# Patient Record
Sex: MALE | Race: WHITE | NOT HISPANIC OR LATINO
[De-identification: names, ages, dates, MRNs, and addresses within clinical notes are randomized per-mention and may not be internally consistent; named-entity substitution may affect disease eponyms.]

---

## 2017-01-06 ENCOUNTER — LABORATORY RESULT (OUTPATIENT)
Age: 57
End: 2017-01-06

## 2017-01-10 ENCOUNTER — APPOINTMENT (OUTPATIENT)
Dept: TRANSPLANT | Facility: CLINIC | Age: 57
End: 2017-01-10

## 2017-01-10 VITALS
SYSTOLIC BLOOD PRESSURE: 143 MMHG | WEIGHT: 228 LBS | HEIGHT: 72 IN | HEART RATE: 91 BPM | RESPIRATION RATE: 16 BRPM | DIASTOLIC BLOOD PRESSURE: 75 MMHG | BODY MASS INDEX: 30.88 KG/M2 | TEMPERATURE: 98.2 F

## 2017-01-13 ENCOUNTER — LABORATORY RESULT (OUTPATIENT)
Age: 57
End: 2017-01-13

## 2017-01-20 ENCOUNTER — LABORATORY RESULT (OUTPATIENT)
Age: 57
End: 2017-01-20

## 2017-01-23 ENCOUNTER — LABORATORY RESULT (OUTPATIENT)
Age: 57
End: 2017-01-23

## 2017-02-01 ENCOUNTER — LABORATORY RESULT (OUTPATIENT)
Age: 57
End: 2017-02-01

## 2017-02-07 ENCOUNTER — APPOINTMENT (OUTPATIENT)
Dept: TRANSPLANT | Facility: CLINIC | Age: 57
End: 2017-02-07

## 2017-02-07 ENCOUNTER — LABORATORY RESULT (OUTPATIENT)
Age: 57
End: 2017-02-07

## 2017-02-07 VITALS
HEIGHT: 72 IN | BODY MASS INDEX: 30.61 KG/M2 | WEIGHT: 226 LBS | TEMPERATURE: 98.2 F | HEART RATE: 87 BPM | SYSTOLIC BLOOD PRESSURE: 150 MMHG | DIASTOLIC BLOOD PRESSURE: 83 MMHG | RESPIRATION RATE: 17 BRPM

## 2017-03-04 ENCOUNTER — LABORATORY RESULT (OUTPATIENT)
Age: 57
End: 2017-03-04

## 2017-03-14 ENCOUNTER — APPOINTMENT (OUTPATIENT)
Dept: TRANSPLANT | Facility: CLINIC | Age: 57
End: 2017-03-14

## 2017-03-22 ENCOUNTER — LABORATORY RESULT (OUTPATIENT)
Age: 57
End: 2017-03-22

## 2017-03-23 ENCOUNTER — APPOINTMENT (OUTPATIENT)
Dept: TRANSPLANT | Facility: CLINIC | Age: 57
End: 2017-03-23

## 2017-03-23 VITALS
HEIGHT: 72 IN | DIASTOLIC BLOOD PRESSURE: 89 MMHG | TEMPERATURE: 97.6 F | HEART RATE: 74 BPM | RESPIRATION RATE: 14 BRPM | SYSTOLIC BLOOD PRESSURE: 164 MMHG | WEIGHT: 229 LBS | BODY MASS INDEX: 31.02 KG/M2

## 2017-05-10 ENCOUNTER — LABORATORY RESULT (OUTPATIENT)
Age: 57
End: 2017-05-10

## 2017-05-22 ENCOUNTER — LABORATORY RESULT (OUTPATIENT)
Age: 57
End: 2017-05-22

## 2017-06-13 ENCOUNTER — LABORATORY RESULT (OUTPATIENT)
Age: 57
End: 2017-06-13

## 2017-06-19 ENCOUNTER — LABORATORY RESULT (OUTPATIENT)
Age: 57
End: 2017-06-19

## 2017-06-19 ENCOUNTER — FORM ENCOUNTER (OUTPATIENT)
Age: 57
End: 2017-06-19

## 2017-06-20 ENCOUNTER — RESULT REVIEW (OUTPATIENT)
Age: 57
End: 2017-06-20

## 2017-06-20 ENCOUNTER — APPOINTMENT (OUTPATIENT)
Dept: ULTRASOUND IMAGING | Facility: HOSPITAL | Age: 57
End: 2017-06-20

## 2017-06-20 ENCOUNTER — APPOINTMENT (OUTPATIENT)
Dept: TRANSPLANT | Facility: CLINIC | Age: 57
End: 2017-06-20

## 2017-06-20 ENCOUNTER — OUTPATIENT (OUTPATIENT)
Dept: OUTPATIENT SERVICES | Facility: HOSPITAL | Age: 57
LOS: 1 days | End: 2017-06-20
Payer: MEDICARE

## 2017-06-20 VITALS
HEART RATE: 87 BPM | WEIGHT: 216 LBS | SYSTOLIC BLOOD PRESSURE: 144 MMHG | TEMPERATURE: 98.1 F | HEIGHT: 72 IN | BODY MASS INDEX: 29.26 KG/M2 | RESPIRATION RATE: 17 BRPM | DIASTOLIC BLOOD PRESSURE: 77 MMHG

## 2017-06-20 DIAGNOSIS — Z94.0 KIDNEY TRANSPLANT STATUS: ICD-10-CM

## 2017-06-20 PROCEDURE — 88305 TISSUE EXAM BY PATHOLOGIST: CPT | Mod: 26

## 2017-06-20 PROCEDURE — 50200 RENAL BIOPSY PERQ: CPT | Mod: LT

## 2017-06-20 PROCEDURE — 88312 SPECIAL STAINS GROUP 1: CPT

## 2017-06-20 PROCEDURE — 50200 RENAL BIOPSY PERQ: CPT

## 2017-06-20 PROCEDURE — 88305 TISSUE EXAM BY PATHOLOGIST: CPT

## 2017-06-20 PROCEDURE — 88350 IMFLUOR EA ADDL 1ANTB STN PX: CPT | Mod: 26

## 2017-06-20 PROCEDURE — 88342 IMHCHEM/IMCYTCHM 1ST ANTB: CPT

## 2017-06-20 PROCEDURE — 88342 IMHCHEM/IMCYTCHM 1ST ANTB: CPT | Mod: 26

## 2017-06-20 PROCEDURE — 88313 SPECIAL STAINS GROUP 2: CPT

## 2017-06-20 PROCEDURE — 88346 IMFLUOR 1ST 1ANTB STAIN PX: CPT

## 2017-06-20 PROCEDURE — 88350 IMFLUOR EA ADDL 1ANTB STN PX: CPT

## 2017-06-20 PROCEDURE — 88313 SPECIAL STAINS GROUP 2: CPT | Mod: 26

## 2017-06-20 PROCEDURE — 76942 ECHO GUIDE FOR BIOPSY: CPT

## 2017-06-20 PROCEDURE — 88348 ELECTRON MICROSCOPY DX: CPT

## 2017-06-20 PROCEDURE — 88346 IMFLUOR 1ST 1ANTB STAIN PX: CPT | Mod: 26

## 2017-06-20 PROCEDURE — 76942 ECHO GUIDE FOR BIOPSY: CPT | Mod: 26

## 2017-06-20 PROCEDURE — 88348 ELECTRON MICROSCOPY DX: CPT | Mod: 26

## 2017-06-20 PROCEDURE — 88312 SPECIAL STAINS GROUP 1: CPT | Mod: 26

## 2017-06-23 ENCOUNTER — RX RENEWAL (OUTPATIENT)
Age: 57
End: 2017-06-23

## 2017-08-15 ENCOUNTER — APPOINTMENT (OUTPATIENT)
Dept: TRANSPLANT | Facility: CLINIC | Age: 57
End: 2017-08-15

## 2017-08-15 ENCOUNTER — APPOINTMENT (OUTPATIENT)
Dept: TRANSPLANT | Facility: CLINIC | Age: 57
End: 2017-08-15
Payer: MEDICARE

## 2017-08-15 VITALS
DIASTOLIC BLOOD PRESSURE: 73 MMHG | OXYGEN SATURATION: 98 % | BODY MASS INDEX: 29.66 KG/M2 | HEART RATE: 77 BPM | HEIGHT: 72 IN | SYSTOLIC BLOOD PRESSURE: 132 MMHG | RESPIRATION RATE: 17 BRPM | WEIGHT: 219 LBS | TEMPERATURE: 98 F

## 2017-08-15 PROCEDURE — 99215 OFFICE O/P EST HI 40 MIN: CPT

## 2017-08-15 RX ORDER — EVEROLIMUS 0.5 MG/1
0.5 TABLET ORAL TWICE DAILY
Qty: 120 | Refills: 5 | Status: DISCONTINUED | COMMUNITY
Start: 2017-02-07 | End: 2017-08-15

## 2017-08-23 ENCOUNTER — APPOINTMENT (OUTPATIENT)
Dept: NEPHROLOGY | Facility: CLINIC | Age: 57
End: 2017-08-23
Payer: MEDICARE

## 2017-08-23 VITALS
RESPIRATION RATE: 17 BRPM | WEIGHT: 212 LBS | HEIGHT: 72 IN | DIASTOLIC BLOOD PRESSURE: 69 MMHG | BODY MASS INDEX: 28.71 KG/M2 | HEART RATE: 81 BPM | SYSTOLIC BLOOD PRESSURE: 132 MMHG | TEMPERATURE: 98.2 F

## 2017-08-23 PROCEDURE — 99204 OFFICE O/P NEW MOD 45 MIN: CPT

## 2017-09-12 ENCOUNTER — APPOINTMENT (OUTPATIENT)
Dept: TRANSPLANT | Facility: CLINIC | Age: 57
End: 2017-09-12
Payer: MEDICARE

## 2017-09-12 VITALS
SYSTOLIC BLOOD PRESSURE: 126 MMHG | HEART RATE: 75 BPM | TEMPERATURE: 98.3 F | DIASTOLIC BLOOD PRESSURE: 66 MMHG | WEIGHT: 211 LBS | RESPIRATION RATE: 17 BRPM | HEIGHT: 72 IN | BODY MASS INDEX: 28.58 KG/M2

## 2017-09-12 PROCEDURE — 99214 OFFICE O/P EST MOD 30 MIN: CPT

## 2017-09-13 ENCOUNTER — APPOINTMENT (OUTPATIENT)
Dept: NEPHROLOGY | Facility: CLINIC | Age: 57
End: 2017-09-13
Payer: MEDICARE

## 2017-09-13 VITALS — RESPIRATION RATE: 16 BRPM | SYSTOLIC BLOOD PRESSURE: 132 MMHG | DIASTOLIC BLOOD PRESSURE: 80 MMHG | HEART RATE: 72 BPM

## 2017-09-13 PROCEDURE — 96372 THER/PROPH/DIAG INJ SC/IM: CPT

## 2017-09-13 RX ORDER — LABETALOL HYDROCHLORIDE 200 MG/1
200 TABLET, FILM COATED ORAL
Qty: 120 | Refills: 0 | Status: DISCONTINUED | COMMUNITY
Start: 2017-05-26 | End: 2017-09-13

## 2017-10-09 ENCOUNTER — LABORATORY RESULT (OUTPATIENT)
Age: 57
End: 2017-10-09

## 2017-10-09 ENCOUNTER — APPOINTMENT (OUTPATIENT)
Dept: NEPHROLOGY | Facility: CLINIC | Age: 57
End: 2017-10-09
Payer: MEDICARE

## 2017-10-09 VITALS
SYSTOLIC BLOOD PRESSURE: 112 MMHG | DIASTOLIC BLOOD PRESSURE: 65 MMHG | BODY MASS INDEX: 27.62 KG/M2 | HEART RATE: 81 BPM | WEIGHT: 203.92 LBS | HEIGHT: 72 IN | OXYGEN SATURATION: 98 %

## 2017-10-09 PROCEDURE — 96372 THER/PROPH/DIAG INJ SC/IM: CPT

## 2017-10-09 PROCEDURE — 99214 OFFICE O/P EST MOD 30 MIN: CPT | Mod: 25

## 2017-10-10 LAB
ALBUMIN SERPL ELPH-MCNC: 3.5 G/DL
ALP BLD-CCNC: 68 U/L
ALT SERPL-CCNC: 18 U/L
ANION GAP SERPL CALC-SCNC: 18 MMOL/L
APPEARANCE: CLEAR
AST SERPL-CCNC: 20 U/L
BASOPHILS # BLD AUTO: 0.01 K/UL
BASOPHILS NFR BLD AUTO: 0.2 %
BILIRUB SERPL-MCNC: 0.5 MG/DL
BILIRUBIN URINE: NEGATIVE
BLOOD URINE: ABNORMAL
BUN SERPL-MCNC: 70 MG/DL
C DIFF TOX GENS STL QL NAA+PROBE: NORMAL
CALCIUM SERPL-MCNC: 8.8 MG/DL
CDIFF BY PCR: NOT DETECTED
CHLORIDE SERPL-SCNC: 99 MMOL/L
CMV DNA SPEC QL NAA+PROBE: ABNORMAL IU/ML
CMVPCR LOG: ABNORMAL LOGIU/ML
CO2 SERPL-SCNC: 18 MMOL/L
COLOR: YELLOW
CREAT SERPL-MCNC: 7.84 MG/DL
CREAT SPEC-SCNC: 116 MG/DL
CREAT/PROT UR: 1.9 RATIO
EOSINOPHIL # BLD AUTO: 0.01 K/UL
EOSINOPHIL NFR BLD AUTO: 0.2 %
FERRITIN SERPL-MCNC: 1042 NG/ML
GLUCOSE QUALITATIVE U: NEGATIVE MG/DL
GLUCOSE SERPL-MCNC: 99 MG/DL
HCT VFR BLD CALC: 28.5 %
HGB BLD-MCNC: 8.8 G/DL
IMM GRANULOCYTES NFR BLD AUTO: 0.3 %
IRON SATN MFR SERPL: 16 %
IRON SERPL-MCNC: 33 UG/DL
KETONES URINE: NEGATIVE
LDH SERPL-CCNC: 492 U/L
LEUKOCYTE ESTERASE URINE: NEGATIVE
LYMPHOCYTES # BLD AUTO: 0.28 K/UL
LYMPHOCYTES NFR BLD AUTO: 4.6 %
MAGNESIUM SERPL-MCNC: 2.1 MG/DL
MAN DIFF?: NORMAL
MCHC RBC-ENTMCNC: 23.9 PG
MCHC RBC-ENTMCNC: 30.9 GM/DL
MCV RBC AUTO: 77.4 FL
MONOCYTES # BLD AUTO: 0.17 K/UL
MONOCYTES NFR BLD AUTO: 2.8 %
NEUTROPHILS # BLD AUTO: 5.54 K/UL
NEUTROPHILS NFR BLD AUTO: 91.9 %
NITRITE URINE: NEGATIVE
PH URINE: 5.5
PHOSPHATE SERPL-MCNC: 4.4 MG/DL
PLATELET # BLD AUTO: 290 K/UL
POTASSIUM SERPL-SCNC: 4.7 MMOL/L
PROT SERPL-MCNC: 5.8 G/DL
PROT UR-MCNC: 217 MG/DL
PROTEIN URINE: 300 MG/DL
RBC # BLD: 3.68 M/UL
RBC # FLD: 16.6 %
SODIUM SERPL-SCNC: 135 MMOL/L
SPECIFIC GRAVITY URINE: 1.01
TIBC SERPL-MCNC: 207 UG/DL
UIBC SERPL-MCNC: 174 UG/DL
URATE SERPL-MCNC: 8.6 MG/DL
UROBILINOGEN URINE: NEGATIVE MG/DL
WBC # FLD AUTO: 6.03 K/UL

## 2017-10-12 LAB
BACTERIA STL CULT: NORMAL
BKV DNA SPEC QL NAA+PROBE: NORMAL

## 2017-10-13 LAB — DEPRECATED O AND P PREP STL: NORMAL

## 2017-10-26 ENCOUNTER — INPATIENT (INPATIENT)
Facility: HOSPITAL | Age: 57
LOS: 7 days | Discharge: HOME | DRG: 378 | End: 2017-11-03
Attending: INTERNAL MEDICINE | Admitting: INTERNAL MEDICINE
Payer: MEDICARE

## 2017-10-26 VITALS
DIASTOLIC BLOOD PRESSURE: 70 MMHG | RESPIRATION RATE: 16 BRPM | OXYGEN SATURATION: 95 % | HEART RATE: 120 BPM | SYSTOLIC BLOOD PRESSURE: 120 MMHG

## 2017-10-26 DIAGNOSIS — Z90.411 ACQUIRED PARTIAL ABSENCE OF PANCREAS: Chronic | ICD-10-CM

## 2017-10-26 DIAGNOSIS — N17.9 ACUTE KIDNEY FAILURE, UNSPECIFIED: ICD-10-CM

## 2017-10-26 DIAGNOSIS — E87.2 ACIDOSIS: ICD-10-CM

## 2017-10-26 DIAGNOSIS — Z94.0 KIDNEY TRANSPLANT STATUS: Chronic | ICD-10-CM

## 2017-10-26 DIAGNOSIS — I10 ESSENTIAL (PRIMARY) HYPERTENSION: ICD-10-CM

## 2017-10-26 DIAGNOSIS — Z79.899 OTHER LONG TERM (CURRENT) DRUG THERAPY: ICD-10-CM

## 2017-10-26 DIAGNOSIS — D64.9 ANEMIA, UNSPECIFIED: ICD-10-CM

## 2017-10-26 DIAGNOSIS — K92.1 MELENA: ICD-10-CM

## 2017-10-26 LAB
ACANTHOCYTES BLD QL SMEAR: SLIGHT — SIGNIFICANT CHANGE UP
ALBUMIN SERPL ELPH-MCNC: 3.1 G/DL — LOW (ref 3.3–5)
ALP SERPL-CCNC: 44 U/L — SIGNIFICANT CHANGE UP (ref 40–120)
ALT FLD-CCNC: 13 U/L RC — SIGNIFICANT CHANGE UP (ref 10–45)
ANION GAP SERPL CALC-SCNC: 20 MMOL/L — HIGH (ref 5–17)
ANISOCYTOSIS BLD QL: SLIGHT — SIGNIFICANT CHANGE UP
APTT BLD: 24.3 SEC — LOW (ref 27.5–37.4)
AST SERPL-CCNC: 21 U/L — SIGNIFICANT CHANGE UP (ref 10–40)
BASE EXCESS BLDV CALC-SCNC: -2.2 MMOL/L — LOW (ref -2–2)
BASOPHILS # BLD AUTO: 0 K/UL — SIGNIFICANT CHANGE UP (ref 0–0.2)
BASOPHILS NFR BLD AUTO: 0.2 % — SIGNIFICANT CHANGE UP (ref 0–2)
BILIRUB SERPL-MCNC: 0.4 MG/DL — SIGNIFICANT CHANGE UP (ref 0.2–1.2)
BLD GP AB SCN SERPL QL: NEGATIVE — SIGNIFICANT CHANGE UP
BUN SERPL-MCNC: 123 MG/DL — HIGH (ref 7–23)
BURR CELLS BLD QL SMEAR: PRESENT — SIGNIFICANT CHANGE UP
CA-I SERPL-SCNC: 1.16 MMOL/L — SIGNIFICANT CHANGE UP (ref 1.12–1.3)
CALCIUM SERPL-MCNC: 8.7 MG/DL — SIGNIFICANT CHANGE UP (ref 8.4–10.5)
CHLORIDE BLDV-SCNC: 106 MMOL/L — SIGNIFICANT CHANGE UP (ref 96–108)
CHLORIDE SERPL-SCNC: 102 MMOL/L — SIGNIFICANT CHANGE UP (ref 96–108)
CO2 BLDV-SCNC: 23 MMOL/L — SIGNIFICANT CHANGE UP (ref 22–30)
CO2 SERPL-SCNC: 19 MMOL/L — LOW (ref 22–31)
CREAT SERPL-MCNC: 9.52 MG/DL — HIGH (ref 0.5–1.3)
DACRYOCYTES BLD QL SMEAR: SLIGHT — SIGNIFICANT CHANGE UP
ELLIPTOCYTES BLD QL SMEAR: SLIGHT — SIGNIFICANT CHANGE UP
EOSINOPHIL # BLD AUTO: 0 K/UL — SIGNIFICANT CHANGE UP (ref 0–0.5)
EOSINOPHIL NFR BLD AUTO: 0.2 % — SIGNIFICANT CHANGE UP (ref 0–6)
GAS PNL BLDV: 138 MMOL/L — SIGNIFICANT CHANGE UP (ref 136–145)
GAS PNL BLDV: SIGNIFICANT CHANGE UP
GAS PNL BLDV: SIGNIFICANT CHANGE UP
GLUCOSE BLDV-MCNC: 120 MG/DL — HIGH (ref 70–99)
GLUCOSE SERPL-MCNC: 136 MG/DL — HIGH (ref 70–99)
HCO3 BLDV-SCNC: 22 MMOL/L — SIGNIFICANT CHANGE UP (ref 21–29)
HCT VFR BLD CALC: 15.5 % — CRITICAL LOW (ref 39–50)
HCT VFR BLDA CALC: 16 % — CRITICAL LOW (ref 39–50)
HGB BLD CALC-MCNC: 5.1 G/DL — CRITICAL LOW (ref 13–17)
HGB BLD-MCNC: 5.1 G/DL — CRITICAL LOW (ref 13–17)
HOWELL-JOLLY BOD BLD QL SMEAR: PRESENT — SIGNIFICANT CHANGE UP
HYPOCHROMIA BLD QL: SLIGHT — SIGNIFICANT CHANGE UP
INR BLD: 1.13 RATIO — SIGNIFICANT CHANGE UP (ref 0.88–1.16)
LACTATE BLDV-MCNC: 1.6 MMOL/L — SIGNIFICANT CHANGE UP (ref 0.7–2)
LYMPHOCYTES # BLD AUTO: 0.4 K/UL — LOW (ref 1–3.3)
LYMPHOCYTES # BLD AUTO: 5.6 % — LOW (ref 13–44)
MCHC RBC-ENTMCNC: 26.4 PG — LOW (ref 27–34)
MCHC RBC-ENTMCNC: 32.8 GM/DL — SIGNIFICANT CHANGE UP (ref 32–36)
MCV RBC AUTO: 80.5 FL — SIGNIFICANT CHANGE UP (ref 80–100)
MICROCYTES BLD QL: SLIGHT — SIGNIFICANT CHANGE UP
MONOCYTES # BLD AUTO: 0.7 K/UL — SIGNIFICANT CHANGE UP (ref 0–0.9)
MONOCYTES NFR BLD AUTO: 9.9 % — SIGNIFICANT CHANGE UP (ref 2–14)
NEUTROPHILS # BLD AUTO: 6.1 K/UL — SIGNIFICANT CHANGE UP (ref 1.8–7.4)
NEUTROPHILS NFR BLD AUTO: 84.2 % — HIGH (ref 43–77)
PAPPENHEIMER BOD BLD QL SMEAR: PRESENT — SIGNIFICANT CHANGE UP
PCO2 BLDV: 35 MMHG — SIGNIFICANT CHANGE UP (ref 35–50)
PH BLDV: 7.41 — SIGNIFICANT CHANGE UP (ref 7.35–7.45)
PLAT MORPH BLD: NORMAL — SIGNIFICANT CHANGE UP
PLATELET # BLD AUTO: 157 K/UL — SIGNIFICANT CHANGE UP (ref 150–400)
PO2 BLDV: 22 MMHG — LOW (ref 25–45)
POIKILOCYTOSIS BLD QL AUTO: SIGNIFICANT CHANGE UP
POTASSIUM BLDV-SCNC: 4.7 MMOL/L — SIGNIFICANT CHANGE UP (ref 3.5–5)
POTASSIUM SERPL-MCNC: 4.8 MMOL/L — SIGNIFICANT CHANGE UP (ref 3.5–5.3)
POTASSIUM SERPL-SCNC: 4.8 MMOL/L — SIGNIFICANT CHANGE UP (ref 3.5–5.3)
PROT SERPL-MCNC: 4.8 G/DL — LOW (ref 6–8.3)
PROTHROM AB SERPL-ACNC: 12.3 SEC — SIGNIFICANT CHANGE UP (ref 9.8–12.7)
RBC # BLD: 1.92 M/UL — LOW (ref 4.2–5.8)
RBC # FLD: 15.7 % — HIGH (ref 10.3–14.5)
RBC BLD AUTO: ABNORMAL
RH IG SCN BLD-IMP: POSITIVE — SIGNIFICANT CHANGE UP
SAO2 % BLDV: 30 % — LOW (ref 67–88)
SCHISTOCYTES BLD QL AUTO: SIGNIFICANT CHANGE UP
SODIUM SERPL-SCNC: 141 MMOL/L — SIGNIFICANT CHANGE UP (ref 135–145)
TARGETS BLD QL SMEAR: SLIGHT — SIGNIFICANT CHANGE UP
WBC # BLD: 7.2 K/UL — SIGNIFICANT CHANGE UP (ref 3.8–10.5)
WBC # FLD AUTO: 7.2 K/UL — SIGNIFICANT CHANGE UP (ref 3.8–10.5)

## 2017-10-26 PROCEDURE — 71010: CPT | Mod: 26

## 2017-10-26 PROCEDURE — 99222 1ST HOSP IP/OBS MODERATE 55: CPT | Mod: GC

## 2017-10-26 PROCEDURE — 93010 ELECTROCARDIOGRAM REPORT: CPT

## 2017-10-26 PROCEDURE — 99285 EMERGENCY DEPT VISIT HI MDM: CPT | Mod: 25

## 2017-10-26 RX ORDER — PANTOPRAZOLE SODIUM 20 MG/1
8 TABLET, DELAYED RELEASE ORAL
Qty: 80 | Refills: 0 | Status: DISCONTINUED | OUTPATIENT
Start: 2017-10-26 | End: 2017-10-31

## 2017-10-26 RX ORDER — SODIUM CHLORIDE 9 MG/ML
1000 INJECTION INTRAMUSCULAR; INTRAVENOUS; SUBCUTANEOUS ONCE
Qty: 0 | Refills: 0 | Status: COMPLETED | OUTPATIENT
Start: 2017-10-26 | End: 2017-10-26

## 2017-10-26 RX ORDER — EVEROLIMUS 10 MG/1
2 TABLET ORAL DAILY
Qty: 0 | Refills: 0 | Status: DISCONTINUED | OUTPATIENT
Start: 2017-10-26 | End: 2017-11-03

## 2017-10-26 RX ORDER — TACROLIMUS 5 MG/1
0 CAPSULE ORAL
Qty: 0 | Refills: 0 | COMMUNITY

## 2017-10-26 RX ORDER — MYCOPHENOLIC ACID 180 MG/1
0 TABLET, DELAYED RELEASE ORAL
Qty: 0 | Refills: 0 | COMMUNITY

## 2017-10-26 RX ORDER — TACROLIMUS 5 MG/1
1 CAPSULE ORAL
Qty: 0 | Refills: 0 | Status: DISCONTINUED | OUTPATIENT
Start: 2017-10-26 | End: 2017-11-03

## 2017-10-26 RX ORDER — EVEROLIMUS 10 MG/1
1 TABLET ORAL AT BEDTIME
Qty: 0 | Refills: 0 | Status: DISCONTINUED | OUTPATIENT
Start: 2017-10-26 | End: 2017-11-03

## 2017-10-26 RX ORDER — MAGNESIUM OXIDE 400 MG ORAL TABLET 241.3 MG
400 TABLET ORAL DAILY
Qty: 0 | Refills: 0 | Status: DISCONTINUED | OUTPATIENT
Start: 2017-10-26 | End: 2017-11-03

## 2017-10-26 RX ORDER — CALCITRIOL 0.5 UG/1
0.25 CAPSULE ORAL DAILY
Qty: 0 | Refills: 0 | Status: DISCONTINUED | OUTPATIENT
Start: 2017-10-26 | End: 2017-11-03

## 2017-10-26 RX ORDER — SODIUM BICARBONATE 1 MEQ/ML
1300 SYRINGE (ML) INTRAVENOUS
Qty: 0 | Refills: 0 | Status: DISCONTINUED | OUTPATIENT
Start: 2017-10-26 | End: 2017-11-02

## 2017-10-26 RX ORDER — FUROSEMIDE 40 MG
0 TABLET ORAL
Qty: 0 | Refills: 0 | COMMUNITY

## 2017-10-26 RX ORDER — UBIDECARENONE 100 MG
0 CAPSULE ORAL
Qty: 0 | Refills: 0 | COMMUNITY

## 2017-10-26 RX ORDER — OMEGA-3 ACID ETHYL ESTERS 1 G
0 CAPSULE ORAL
Qty: 0 | Refills: 0 | COMMUNITY

## 2017-10-26 RX ORDER — SODIUM CHLORIDE 9 MG/ML
1000 INJECTION INTRAMUSCULAR; INTRAVENOUS; SUBCUTANEOUS ONCE
Qty: 0 | Refills: 0 | Status: DISCONTINUED | OUTPATIENT
Start: 2017-10-26 | End: 2017-10-26

## 2017-10-26 RX ORDER — NIFEDIPINE 30 MG
0 TABLET, EXTENDED RELEASE 24 HR ORAL
Qty: 0 | Refills: 0 | COMMUNITY

## 2017-10-26 RX ORDER — PANTOPRAZOLE SODIUM 20 MG/1
80 TABLET, DELAYED RELEASE ORAL ONCE
Qty: 0 | Refills: 0 | Status: COMPLETED | OUTPATIENT
Start: 2017-10-26 | End: 2017-10-26

## 2017-10-26 RX ADMIN — PANTOPRAZOLE SODIUM 80 MILLIGRAM(S): 20 TABLET, DELAYED RELEASE ORAL at 13:57

## 2017-10-26 RX ADMIN — PANTOPRAZOLE SODIUM 10 MG/HR: 20 TABLET, DELAYED RELEASE ORAL at 14:16

## 2017-10-26 RX ADMIN — SODIUM CHLORIDE 2000 MILLILITER(S): 9 INJECTION INTRAMUSCULAR; INTRAVENOUS; SUBCUTANEOUS at 13:58

## 2017-10-26 NOTE — CONSULT NOTE ADULT - SUBJECTIVE AND OBJECTIVE BOX
Patient is a 57y old  Male who presents with a chief complaint of diarrhea, gib    HPI: Patient is a 57 year old man with a history of renal transplant X 2.  He has been seeing his tranplant nephrologist for the past two weeks and states he has had complaints of diarrhea.  He started having weakness and black stool for the past two days.  He was sent to the ed and found to have a hgb of 5.  He takes aspirin but no other blood thinners or nsaids.      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  History of partial pancreatectomy  History of renal transplant: x 2      MEDICATIONS  (STANDING):  pantoprazole Infusion 8 mG/Hr (10 mL/Hr) IV Continuous <Continuous>      Allergies    No Known Allergies    Intolerances        SOCIAL HISTORY:  Denies ETOh,Smoking,     FAMILY HISTORY:      REVIEW OF SYSTEMS:    CONSTITUTIONAL: No weakness, fevers or chills  EYES/ENT: No visual changes;  No vertigo or throat pain   NECK: No pain or stiffness  RESPIRATORY: No cough, wheezing, hemoptysis; No shortness of breath  CARDIOVASCULAR: No chest pain or palpitations  GASTROINTESTINAL: No abdominal or epigastric pain. No nausea, vomiting, or hematemesis; No diarrhea or constipation. No melena or hematochezia.  GENITOURINARY: No dysuria, frequency or hematuria  NEUROLOGICAL: No numbness or weakness  SKIN: No itching, burning, rashes, or lesions   All other review of systems is negative unless indicated above.    VITAL:  T(C): , Max: 37.3 (10-26-17 @ 15:30)  T(F): , Max: 99.1 (10-26-17 @ 15:30)  HR: 115 (10-26-17 @ 15:30)  BP: 128/69 (10-26-17 @ 15:30)  BP(mean): --  RR: 18 (10-26-17 @ 15:30)  SpO2: 100% (10-26-17 @ 15:30)  Wt(kg): --    I and O's:    10-26 @ 07:01  -  10-26 @ 15:54  --------------------------------------------------------  IN: 1000 mL / OUT: 0 mL / NET: 1000 mL          PHYSICAL EXAM:    Constitutional: NAD  HEENT: PERRLA,   Neck: No JVD  Respiratory: CTA B/L  Cardiovascular: S1 and S2  Gastrointestinal: BS+, soft, NT/ND  Extremities: No peripheral edema  Neurological: A/O x 3, no focal deficits  Psychiatric: Normal mood, normal affect  : No Augustin  Skin: No rashes  Access: Not applicable  Back: No CVA tenderness    LABS:                        5.1    7.2   )-----------( 157      ( 26 Oct 2017 13:26 )             15.5     10-26    141  |  102  |  123<H>  ----------------------------<  136<H>  4.8   |  19<L>  |  9.52<H>    Ca    8.7      26 Oct 2017 13:26    TPro  4.8<L>  /  Alb  3.1<L>  /  TBili  0.4  /  DBili  x   /  AST  21  /  ALT  13  /  AlkPhos  44  10-26          RADIOLOGY & ADDITIONAL STUDIES:

## 2017-10-26 NOTE — ED PROVIDER NOTE - OBJECTIVE STATEMENT
57yom pmhx of Kidney transplant x2, second transplant 2013, BIB EMS for multiple episodes of vomiting and diarrhea. pt reports diarrhea x2 weeks with extensive workup and negative for Cdiff and infectious workup. since last night pt reports started vomiting and diarrhea both melena. and unable to tolerate PO. no fever or chills. No sick contacts. No travel hx. On immunosuppressants for transplant.

## 2017-10-26 NOTE — CONSULT NOTE ADULT - ATTENDING COMMENTS
Pt with 2 kidney transplants complicated by severe CKD on multiple immunosuppressive agents now with acute GI bleed.    Stop myfortic as it can cause diarrhea and GI ulcers  continue prograf (11pm dose) and everolimus/ prednisone.  Hold all HTN meds for now.

## 2017-10-26 NOTE — H&P ADULT - NSHPLABSRESULTS_GEN_ALL_CORE
5.1    7.2   )-----------( 157      ( 26 Oct 2017 13:26 )             15.5   PT/INR - ( 26 Oct 2017 13:26 )   PT: 12.3 sec;   INR: 1.13 ratio         PTT - ( 26 Oct 2017 13:26 )  PTT:24.3 sec  10-26    141  |  102  |  123<H>  ----------------------------<  136<H>  4.8   |  19<L>  |  9.52<H>    Ca    8.7      26 Oct 2017 13:26    TPro  4.8<L>  /  Alb  3.1<L>  /  TBili  0.4  /  DBili  x   /  AST  21  /  ALT  13  /  AlkPhos  44  10-26  < from: Xray Chest 1 View AP/PA (10.26.17 @ 13:26) >    EXAM:  CHEST SINGLE AP OR PA                            PROCEDURE DATE:  10/26/2017            INTERPRETATION:  CLINICAL INFORMATION: Vomiting, diarrhea.    TECHNIQUE: AP chest radiograph.    COMPARISON: None available    FINDINGS:     The lungs are clear. No pleural effusions or pneumothoraces are seen   bilaterally.  The cardiac silhouette is unremarkable.    IMPRESSION:   Clear lungs.                ERIC FLORES M.D., RADIOLOGY RESIDENT  This document has been electronically signed.  MELECIO NUGENT M.D., ATTENDING RADIOLOGIST  This document has been electronically signed. Oct 26 2017  1:57PM        < end of copied text >

## 2017-10-26 NOTE — CONSULT NOTE ADULT - ASSESSMENT
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
Rishi has diarrhea, but his current symptoms are consistent with an upper gib.  I have recommended a ppi drip, micu evaluation and resecutation to a hgb of 10.  He will need endoscopy when stable.  If continued melena or no response to hgb can be done tonight, otherwise will be done sulema if stable.  R/B/A discussed with patient.  no indicaiton for prep for colon although slower lower gib is in the differntial diagnosi (avm/ diverticulosis etc.)
Pt is a 57M HTN and renal transplant x 2 with CKD that p/w melena admitted for UGIB.     At this time pt is not a MICU candidate. He is hemodynamically stable.     #acute blood loss anemia 2/2 UGIB  - Give 2 units pRBCs  - check post transfusion CBC  - Start IVF as pt is orthostatic  - GI c/s for upper endoscpoy  - Hold ASA  - monitor Cr for improvement back to baseline

## 2017-10-26 NOTE — CONSULT NOTE ADULT - SUBJECTIVE AND OBJECTIVE BOX
CHIEF COMPLAINT: Dizziness     HPI: Pt is a 57M w/ PMH of HTN and renal transplant x 2 now with CKD (baseline Cr 7 and baseline BUN of 70) that presents with dizziness and diarrhea. He states that he has been having diarrhea for a long time, and that he was following this up with his PMD. Over the past few days he states that his diarrhea has become more frequent associated with abdominal cramps. He also states that when he wipes he noticed that his stool went from brown to black. This morning when he woke up he felt dizzy especially when standing. He also admits to having 1 episode of black vomit.     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  History of partial pancreatectomy  History of renal transplant: x 2      FAMILY HISTORY:       SOCIAL HISTORY:  Smoking: __ packs x ___ years  EtOH Use:  Marital Status:  Occupation:  Recent Travel:  Country of Birth:  Advance Directives:    Allergies    No Known Allergies    Intolerances        HOME MEDICATIONS:    REVIEW OF SYSTEMS:  Constitutional:   Eyes:  ENT:  CV:  Resp:  GI:  :  MSK:  Integumentary:  Neurological:  Psychiatric:  Endocrine:  Hematologic/Lymphatic:  Allergic/Immunologic:  [ ] All other systems negative  [ ] Unable to assess ROS because ________    OBJECTIVE:  ICU Vital Signs Last 24 Hrs  T(C): 36.9 (26 Oct 2017 12:51), Max: 36.9 (26 Oct 2017 12:51)  T(F): 98.5 (26 Oct 2017 12:51), Max: 98.5 (26 Oct 2017 12:51)  HR: 118 (26 Oct 2017 12:51) (118 - 120)  BP: 120/78 (26 Oct 2017 12:51) (120/70 - 120/78)  BP(mean): --  ABP: --  ABP(mean): --  RR: 18 (26 Oct 2017 12:51) (16 - 18)  SpO2: 99% (26 Oct 2017 12:51) (95% - 99%)        CAPILLARY BLOOD GLUCOSE          PHYSICAL EXAM:  General:   HEENT:   Lymph Nodes:  Neck:   Respiratory:   Cardiovascular:   Abdomen:   Extremities:   Skin:   Neurological:  Psychiatry:    HOSPITAL MEDICATIONS:  MEDICATIONS  (STANDING):  pantoprazole Infusion 8 mG/Hr (10 mL/Hr) IV Continuous <Continuous>    MEDICATIONS  (PRN):      LABS:                        5.1    7.2   )-----------( 157      ( 26 Oct 2017 13:26 )             15.5     10-26    141  |  102  |  123<H>  ----------------------------<  136<H>  4.8   |  19<L>  |  9.52<H>    Ca    8.7      26 Oct 2017 13:26    TPro  4.8<L>  /  Alb  3.1<L>  /  TBili  0.4  /  DBili  x   /  AST  21  /  ALT  13  /  AlkPhos  44  10-26    PT/INR - ( 26 Oct 2017 13:26 )   PT: 12.3 sec;   INR: 1.13 ratio         PTT - ( 26 Oct 2017 13:26 )  PTT:24.3 sec      Venous Blood Gas:  10-26 @ 13:26  7.41/35/22/22/30  VBG Lactate: 1.6      MICROBIOLOGY:     RADIOLOGY:  [ ] Reviewed and interpreted by me    EKG: CHIEF COMPLAINT: Dizziness     HPI: Pt is a 57M w/ PMH of HTN and renal transplant x 2 now with CKD (baseline Cr 7 and baseline BUN of 70) that presents with dizziness and diarrhea. He states that he has been having diarrhea for a long time, and that he was following this up with his PMD. Over the past few days he states that his diarrhea has become more frequent associated with abdominal cramps. He also states that when he wipes he noticed that his stool went from brown to black. This morning when he woke up he felt dizzy especially when standing. He also admits to having 1 episode of black vomit.     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  History of partial pancreatectomy  History of renal transplant: x 2      Fam hx:   HLD      SOCIAL HISTORY:  Never smoker  EtOH Use: none  Marital Status: single  Occupation: business owner  Recent Travel: none      Allergies    No Known Allergies    Intolerances        HOME MEDICATIONS:  ASA 81  prednisone 5 mg daily  zortress 0.5 mg (4 tabs at 11AM and 2 tabs at 11PM)  myfortic 360 mg BID  Nifedipine 60 mg daily  clonidine 0.1 mg daiy  lasix 40 mg daily  bactrim for ppx  coq10  omega 3  Vit c  MVN    Review of Systems:   CONSTITUTIONAL: No fever, weight loss, or fatigue  EYES: No eye pain, visual disturbances, or discharge  ENMT:  No difficulty hearing, tinnitus, vertigo; No sinus or throat pain  NECK: No pain or stiffness  RESPIRATORY: No cough, wheezing, chills or hemoptysis; No shortness of breath  CARDIOVASCULAR: No chest pain, palpitations, dizziness, or leg swelling  GASTROINTESTINAL: No abdominal or epigastric pain. +hematochezia. +N/V  GENITOURINARY: No dysuria, frequency, hematuria, or incontinence  NEUROLOGICAL: No headaches, memory loss, loss of strength, numbness, or tremors  SKIN: No itching, burning, rashes, or lesions   MUSCULOSKELETAL: No joint pain or swelling; No muscle, back, or extremity pain  HEME/LYMPH: No easy bruising, or bleeding gums  ALLERY AND IMMUNOLOGIC: No hives or eczema    OBJECTIVE:  ICU Vital Signs Last 24 Hrs  T(C): 36.9 (26 Oct 2017 12:51), Max: 36.9 (26 Oct 2017 12:51)  T(F): 98.5 (26 Oct 2017 12:51), Max: 98.5 (26 Oct 2017 12:51)  HR: 118 (26 Oct 2017 12:51) (118 - 120)  BP: 120/78 (26 Oct 2017 12:51) (120/70 - 120/78)  BP(mean): --  ABP: --  ABP(mean): --  RR: 18 (26 Oct 2017 12:51) (16 - 18)  SpO2: 99% (26 Oct 2017 12:51) (95% - 99%)        CAPILLARY BLOOD GLUCOSE        PHYSICAL EXAM:  GENERAL: NAD, well-developed  HEAD:  Atraumatic, Normocephalic  EYES: EOMI, PERRLA, conjunctiva and sclera clear  NECK: Supple, No JVD  CHEST/LUNG: Clear to auscultation bilaterally; No wheeze  HEART: Regular rate and rhythm; No murmurs, rubs, or gallops  ABDOMEN: Soft, Nontender, Nondistended; Bowel sounds present  EXTREMITIES:  2+ Peripheral Pulses, No clubbing, cyanosis, or edema  PSYCH: AAOx3  NEUROLOGY: non-focal  SKIN: No rashes or lesions    HOSPITAL MEDICATIONS:  MEDICATIONS  (STANDING):  pantoprazole Infusion 8 mG/Hr (10 mL/Hr) IV Continuous <Continuous>    MEDICATIONS  (PRN):      LABS:                        5.1    7.2   )-----------( 157      ( 26 Oct 2017 13:26 )             15.5     10-26    141  |  102  |  123<H>  ----------------------------<  136<H>  4.8   |  19<L>  |  9.52<H>    Ca    8.7      26 Oct 2017 13:26    TPro  4.8<L>  /  Alb  3.1<L>  /  TBili  0.4  /  DBili  x   /  AST  21  /  ALT  13  /  AlkPhos  44  10-26    PT/INR - ( 26 Oct 2017 13:26 )   PT: 12.3 sec;   INR: 1.13 ratio         PTT - ( 26 Oct 2017 13:26 )  PTT:24.3 sec      Venous Blood Gas:  10-26 @ 13:26  7.41/35/22/22/30  VBG Lactate: 1.6

## 2017-10-26 NOTE — H&P ADULT - NSHPPHYSICALEXAM_GEN_ALL_CORE
AAOX3, EOMI, normocephalic  coarse bs b/l, good air entry  s1s2, tachy  soft, nt, nd, bs+  no edmea le b/l, pp+  + arom and prom X 4  cn 2-12 grossly intact

## 2017-10-26 NOTE — CONSULT NOTE ADULT - PROBLEM SELECTOR RECOMMENDATION 2
Will hold myfortic for now in view of GI symptoms. C/w everolimus 2mg/1mg, tacrolimus 1 mg BID, and prednisone 5 mg daily.

## 2017-10-26 NOTE — ED ADULT NURSE NOTE - PMH
HTN (hypertension)    Renal failure  due to nsaid use HTN (hypertension)    Renal failure  due to nsaid use  SBO (small bowel obstruction)

## 2017-10-26 NOTE — H&P ADULT - NSHPREVIEWOFSYSTEMS_GEN_ALL_CORE
no HA or dizziness  no cp or palpitations  no sob or cough  no n/v/d/c  no dysuria or hematuria  no heat intol or cold intol  no rash or itchiness

## 2017-10-26 NOTE — ED ADULT NURSE NOTE - OBJECTIVE STATEMENT
Pt bib EMS for eval of black stools and tachycardia.  He has been having diarrhea for over 2 weeks and stool became black yesterday.  Abd soft, non-tender Pt bib EMS for eval of black stools and tachycardia.  He has been having diarrhea for over 2 weeks and stool became black yesterday.  Abd soft, non-tender, conjunctiva pale, skin pale, but warm and dry.  Pt stated many years he had traumatic finger injury and took ibuprofen for pain, subsequently developing renall failure.

## 2017-10-26 NOTE — CONSULT NOTE ADULT - PROBLEM SELECTOR RECOMMENDATION 9
AMARI on CKD in setting of GI bleed: Latest Scr. is 9.52 which is elevated from baseline. Patient with hemodynamically mediated AMARI from blood loss. C/w IV hydration. Monitor Scr. and urine output. Avoid nephrotoxins.

## 2017-10-26 NOTE — H&P ADULT - HISTORY OF PRESENT ILLNESS
57yom pmhx of Kidney transplant x2, second transplant 2013, BIB EMS for multiple episodes of vomiting and diarrhea. pt reports diarrhea x2 weeks with extensive workup and negative for Cdiff and outpt cmv pcr positive. since last night pt reports started vomiting and diarrhea both melena. and unable to tolerate PO. no fever or chills. No sick contacts. No travel hx. On immunosuppressants for transplant.

## 2017-10-26 NOTE — ED PROVIDER NOTE - PROGRESS NOTE DETAILS
spoke to Dr. Patton- GI consult- Dr. Mathew Rios (paged); admission to Dr. Peter Watts. -SHAQ Bright GI fellow paged. ZR GI fellow paged.  hospitalist ,decline admissin untill  ICU SEE THE PATIENT

## 2017-10-26 NOTE — CONSULT NOTE ADULT - PROBLEM SELECTOR RECOMMENDATION 4
In setting of GI bleed: Latest Hb is 5.1; receiving blood transfusion. Transfuse if less than 7. Monitor Hb.

## 2017-10-26 NOTE — ED ADULT NURSE NOTE - PSH
History of renal transplant  x 2 History of partial pancreatectomy    History of renal transplant  x 2

## 2017-10-26 NOTE — CONSULT NOTE ADULT - SUBJECTIVE AND OBJECTIVE BOX
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 Patient has been having diarrhea for the past 1 month. But over the past 2 days, patient noticed having dark, tarry stools and also had dizziness when he walks to the bathroom. In ER, patient noted to be tachycardic and Hb of 5.1; patient is to be admitted for workup of GI bleed.    Patient has history of renal disease secondary to chronic NSAID use; he has history of 2 renal transplant. First transplant was done in 1995 at [facility] and donor was his father. 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 However, from 3/2017, Scr. has been slowly trending up with proteinuria; biopsy done on 6/17 showed chronic changes with limited reversibility. He is currently on everolimus, myfortic, prednisone, and low dose tacrolimus. Last Scr. before hospitalization was 7.84 which was done in 10/9/17.      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  History of partial pancreatectomy  History of renal transplant: x 2    FAMILY HISTORY:    PAST SOCIAL HISTORY:    ALLERGIES & MEDICATIONS  --------------------------------------------------------------------------------  Allergies    No Known Allergies    Intolerances      Standing Inpatient Medications  pantoprazole Infusion 8 mG/Hr IV Continuous <Continuous>    PRN Inpatient Medications      REVIEW OF SYSTEMS  --------------------------------------------------------------------------------  Gen: No  fevers/chills, weakness  Skin: No rashes  Head/Eyes/Ears/Mouth: No headache; Normal hearing; Normal vision w/o blurriness;   Respiratory: No dyspnea, cough, wheezing, hemoptysis  CV: No chest pain,   GI: Melena +  : No increased frequency, dysuria, hematuria, nocturia  MSK: No joint pain/swelling;   Neuro: Dizziness/lightheadedness +    All other systems were reviewed and are negative, except as noted.    VITALS/PHYSICAL EXAM  --------------------------------------------------------------------------------  T(C): 37.3 (10-26-17 @ 15:30), Max: 37.3 (10-26-17 @ 15:30)  HR: 115 (10-26-17 @ 15:30) (115 - 120)  BP: 128/69 (10-26-17 @ 15:30) (120/70 - 128/69)  RR: 18 (10-26-17 @ 15:30) (16 - 18)  SpO2: 100% (10-26-17 @ 15:30) (95% - 100%)  Wt(kg): --        10-26-17 @ 07:01  -  10-26-17 @ 15:55  --------------------------------------------------------  IN: 1000 mL / OUT: 0 mL / NET: 1000 mL      Physical Exam:  	Gen: NAD  	HEENT: PERRL, supple neck  	Pulm: CTA B/L  	CV:  S1S2  	Abd: +BS, soft   	Ext: No B/L Lower ext edema  	Neuro: No focal deficits  	Skin: Warm, without rashes    LABS/STUDIES  --------------------------------------------------------------------------------              5.1    7.2   >-----------<  157      [10-26-17 @ 13:26]              15.5     141  |  102  |  123  ----------------------------<  136      [10-26-17 @ 13:26]  4.8   |  19  |  9.52        Ca     8.7     [10-26-17 @ 13:26]    TPro  4.8  /  Alb  3.1  /  TBili  0.4  /  DBili  x   /  AST  21  /  ALT  13  /  AlkPhos  44  [10-26-17 @ 13:26]    PT/INR: PT 12.3 , INR 1.13       [10-26-17 @ 13:26]  PTT: 24.3       [10-26-17 @ 13:26]      Creatinine Trend:  SCr 9.52 [10-26 @ 13:26]

## 2017-10-26 NOTE — H&P ADULT - ASSESSMENT
58 y/o male with history as listed presented for eval of GIB, diarrhea, actue on ckd.    GIB - appreciate gi input, ppi drip, plan for endo  Diarrhea - possibly from cmv or gib or both-- continue valcyte 450mg twice weekly  acute on ckd 4 - appreciate renal input.  htn - hold bp meds    continue hold myfortic  continue pred/zortress/prograf  check cmv pcr

## 2017-10-26 NOTE — CONSULT NOTE ADULT - PROBLEM SELECTOR RECOMMENDATION 3
BP stable. Will hold all antihypertensive medications for now. Goal BP is less than 140/90 mm Hg. If BP elevated, then can restart clonidine 0.1 mg daily. Monitor BP.

## 2017-10-26 NOTE — ED PROVIDER NOTE - MEDICAL DECISION MAKING DETAILS
57yom pmhx of HTN renal failure s/p renal transplant on multiple immuno-suppresant, sent from Dr. Patton (renal) for GI bleed. pt is tachycardiac, rectal exam with melena. Will obtain blood work, type and screen, renal/GI consult. Admission to hospital. ZR

## 2017-10-27 ENCOUNTER — RESULT REVIEW (OUTPATIENT)
Age: 57
End: 2017-10-27

## 2017-10-27 LAB
ANION GAP SERPL CALC-SCNC: 21 MMOL/L — HIGH (ref 5–17)
ANION GAP SERPL CALC-SCNC: 21 MMOL/L — HIGH (ref 5–17)
BUN SERPL-MCNC: 118 MG/DL — HIGH (ref 7–23)
BUN SERPL-MCNC: 124 MG/DL — HIGH (ref 7–23)
CALCIUM SERPL-MCNC: 8.2 MG/DL — LOW (ref 8.4–10.5)
CALCIUM SERPL-MCNC: 8.9 MG/DL — SIGNIFICANT CHANGE UP (ref 8.4–10.5)
CHLORIDE SERPL-SCNC: 104 MMOL/L — SIGNIFICANT CHANGE UP (ref 96–108)
CHLORIDE SERPL-SCNC: 105 MMOL/L — SIGNIFICANT CHANGE UP (ref 96–108)
CO2 SERPL-SCNC: 16 MMOL/L — LOW (ref 22–31)
CO2 SERPL-SCNC: 16 MMOL/L — LOW (ref 22–31)
CREAT SERPL-MCNC: 9.34 MG/DL — HIGH (ref 0.5–1.3)
CREAT SERPL-MCNC: 9.36 MG/DL — HIGH (ref 0.5–1.3)
GLUCOSE SERPL-MCNC: 108 MG/DL — HIGH (ref 70–99)
GLUCOSE SERPL-MCNC: 112 MG/DL — HIGH (ref 70–99)
HCT VFR BLD CALC: 18.1 % — CRITICAL LOW (ref 39–50)
HCT VFR BLD CALC: 21.6 % — LOW (ref 39–50)
HCT VFR BLD CALC: 24.8 % — LOW (ref 39–50)
HGB BLD-MCNC: 6.3 G/DL — CRITICAL LOW (ref 13–17)
HGB BLD-MCNC: 7.3 G/DL — LOW (ref 13–17)
HGB BLD-MCNC: 8.8 G/DL — LOW (ref 13–17)
MAGNESIUM SERPL-MCNC: 2 MG/DL — SIGNIFICANT CHANGE UP (ref 1.6–2.6)
MAGNESIUM SERPL-MCNC: 2 MG/DL — SIGNIFICANT CHANGE UP (ref 1.6–2.6)
MCHC RBC-ENTMCNC: 28.1 PG — SIGNIFICANT CHANGE UP (ref 27–34)
MCHC RBC-ENTMCNC: 28.5 PG — SIGNIFICANT CHANGE UP (ref 27–34)
MCHC RBC-ENTMCNC: 29.2 PG — SIGNIFICANT CHANGE UP (ref 27–34)
MCHC RBC-ENTMCNC: 33.9 GM/DL — SIGNIFICANT CHANGE UP (ref 32–36)
MCHC RBC-ENTMCNC: 34.8 GM/DL — SIGNIFICANT CHANGE UP (ref 32–36)
MCHC RBC-ENTMCNC: 35.4 GM/DL — SIGNIFICANT CHANGE UP (ref 32–36)
MCV RBC AUTO: 82 FL — SIGNIFICANT CHANGE UP (ref 80–100)
MCV RBC AUTO: 82.5 FL — SIGNIFICANT CHANGE UP (ref 80–100)
MCV RBC AUTO: 83 FL — SIGNIFICANT CHANGE UP (ref 80–100)
PHOSPHATE SERPL-MCNC: 5.9 MG/DL — HIGH (ref 2.5–4.5)
PHOSPHATE SERPL-MCNC: 6.6 MG/DL — HIGH (ref 2.5–4.5)
PLATELET # BLD AUTO: 137 K/UL — LOW (ref 150–400)
PLATELET # BLD AUTO: 145 K/UL — LOW (ref 150–400)
PLATELET # BLD AUTO: 173 K/UL — SIGNIFICANT CHANGE UP (ref 150–400)
POTASSIUM SERPL-MCNC: 4.8 MMOL/L — SIGNIFICANT CHANGE UP (ref 3.5–5.3)
POTASSIUM SERPL-MCNC: 5 MMOL/L — SIGNIFICANT CHANGE UP (ref 3.5–5.3)
POTASSIUM SERPL-SCNC: 4.8 MMOL/L — SIGNIFICANT CHANGE UP (ref 3.5–5.3)
POTASSIUM SERPL-SCNC: 5 MMOL/L — SIGNIFICANT CHANGE UP (ref 3.5–5.3)
RBC # BLD: 2.2 M/UL — LOW (ref 4.2–5.8)
RBC # BLD: 2.6 M/UL — LOW (ref 4.2–5.8)
RBC # BLD: 3 M/UL — LOW (ref 4.2–5.8)
RBC # FLD: 13.9 % — SIGNIFICANT CHANGE UP (ref 10.3–14.5)
RBC # FLD: 14.2 % — SIGNIFICANT CHANGE UP (ref 10.3–14.5)
RBC # FLD: 14.8 % — HIGH (ref 10.3–14.5)
SODIUM SERPL-SCNC: 141 MMOL/L — SIGNIFICANT CHANGE UP (ref 135–145)
SODIUM SERPL-SCNC: 142 MMOL/L — SIGNIFICANT CHANGE UP (ref 135–145)
TACROLIMUS SERPL-MCNC: 2.6 NG/ML — SIGNIFICANT CHANGE UP
WBC # BLD: 4.9 K/UL — SIGNIFICANT CHANGE UP (ref 3.8–10.5)
WBC # BLD: 6.1 K/UL — SIGNIFICANT CHANGE UP (ref 3.8–10.5)
WBC # BLD: 9.5 K/UL — SIGNIFICANT CHANGE UP (ref 3.8–10.5)
WBC # FLD AUTO: 4.9 K/UL — SIGNIFICANT CHANGE UP (ref 3.8–10.5)
WBC # FLD AUTO: 6.1 K/UL — SIGNIFICANT CHANGE UP (ref 3.8–10.5)
WBC # FLD AUTO: 9.5 K/UL — SIGNIFICANT CHANGE UP (ref 3.8–10.5)

## 2017-10-27 PROCEDURE — 88305 TISSUE EXAM BY PATHOLOGIST: CPT | Mod: 26

## 2017-10-27 PROCEDURE — 99232 SBSQ HOSP IP/OBS MODERATE 35: CPT | Mod: GC

## 2017-10-27 RX ORDER — INFLUENZA VIRUS VACCINE 15; 15; 15; 15 UG/.5ML; UG/.5ML; UG/.5ML; UG/.5ML
0.5 SUSPENSION INTRAMUSCULAR ONCE
Qty: 0 | Refills: 0 | Status: COMPLETED | OUTPATIENT
Start: 2017-10-27 | End: 2017-11-03

## 2017-10-27 RX ADMIN — Medication 1300 MILLIGRAM(S): at 05:39

## 2017-10-27 RX ADMIN — PANTOPRAZOLE SODIUM 10 MG/HR: 20 TABLET, DELAYED RELEASE ORAL at 00:59

## 2017-10-27 RX ADMIN — EVEROLIMUS 1 MILLIGRAM(S): 10 TABLET ORAL at 00:04

## 2017-10-27 RX ADMIN — Medication 5 MILLIGRAM(S): at 05:39

## 2017-10-27 RX ADMIN — TACROLIMUS 1 MILLIGRAM(S): 5 CAPSULE ORAL at 00:59

## 2017-10-27 RX ADMIN — TACROLIMUS 1 MILLIGRAM(S): 5 CAPSULE ORAL at 23:08

## 2017-10-27 RX ADMIN — EVEROLIMUS 2 MILLIGRAM(S): 10 TABLET ORAL at 11:38

## 2017-10-27 RX ADMIN — CALCITRIOL 0.25 MICROGRAM(S): 0.5 CAPSULE ORAL at 13:04

## 2017-10-27 RX ADMIN — Medication 1 TABLET(S): at 11:41

## 2017-10-27 RX ADMIN — EVEROLIMUS 1 MILLIGRAM(S): 10 TABLET ORAL at 23:09

## 2017-10-27 RX ADMIN — PANTOPRAZOLE SODIUM 10 MG/HR: 20 TABLET, DELAYED RELEASE ORAL at 23:39

## 2017-10-27 NOTE — PROGRESS NOTE ADULT - SUBJECTIVE AND OBJECTIVE BOX
Pre-Endoscopy Evaluation      Referring Physician:  Dr. Peter Watts                               Procedure: EGD    Indication for Procedure: GIB    Pertinent History:  57 year old man with a history of Renal Transplant X 2.  Admitted with weakness and black stool, and diarrhea. 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  History of partial pancreatectomy  History of renal transplant: x 2      PMH of Gastroparesis [ ]  Gastric Surgery [ ]  Gastric Outlet Obstruction [ ]    Allergies:    No Known Allergies    Intolerances:    Latex allergy: [ ] yes [X] no    Medications:MEDICATIONS  (STANDING):  calcitriol   Capsule 0.25 MICROGram(s) Oral daily  everolimus 2 milliGRAM(s) Oral daily  everolimus 1 milliGRAM(s) Oral at bedtime  influenza   Vaccine 0.5 milliLiter(s) IntraMuscular once  magnesium oxide 400 milliGRAM(s) Oral daily  pantoprazole Infusion 8 mG/Hr (10 mL/Hr) IV Continuous <Continuous>  predniSONE   Tablet 5 milliGRAM(s) Oral daily  sodium bicarbonate 1300 milliGRAM(s) Oral two times a day  tacrolimus 1 milliGRAM(s) Oral <User Schedule>  trimethoprim  160 mG/sulfamethoxazole 800 mG 1 Tablet(s) Oral <User Schedule>    MEDICATIONS  (PRN):      Smoking: [ ] yes  [X] no    AICD/PPM: [ ] yes   [X] no    Pertinent lab data:                        7.3    6.1   )-----------( 145      ( 27 Oct 2017 09:31 )             21.6     10-27    142  |  105  |  124<H>  ----------------------------<  108<H>  5.0   |  16<L>  |  9.36<H>    Ca    8.9      27 Oct 2017 12:26  Phos  6.6     10-27  Mg     2.0     10-27    TPro  4.8<L>  /  Alb  3.1<L>  /  TBili  0.4  /  DBili  x   /  AST  21  /  ALT  13  /  AlkPhos  44  10-26    PT/INR - ( 26 Oct 2017 13:26 )   PT: 12.3 sec;   INR: 1.13 ratio       PTT - ( 26 Oct 2017 13:26 )  PTT:24.3 sec      Physical Examination:  Daily Height in cm: 182.88 (26 Oct 2017 22:48)    Daily   Vital Signs Last 24 Hrs  T(C): 36.6 (27 Oct 2017 12:27), Max: 37.4 (26 Oct 2017 18:30)  T(F): 97.8 (27 Oct 2017 12:27), Max: 99.4 (26 Oct 2017 18:30)  HR: 92 (27 Oct 2017 12:27) (85 - 115)  BP: 113/67 (27 Oct 2017 12:27) (110/72 - 128/69)  BP(mean): --  RR: 18 (27 Oct 2017 12:27) (18 - 18)  SpO2: 97% (27 Oct 2017 12:27) (96% - 100%)      Constitutional: NAD    HEENT: PERRLA, EOMI,       Neck:  No JVD    Respiratory: CTAB/L    Cardiovascular: S1 and S2    Gastrointestinal: BS+, soft, NT/ND    Extremities: No peripheral edema    Neurological: A/O x 3    : No Augustin    Skin: No rashes    Comments:    ASA Class: I [ ]  II [ ]  III [ ]  IV [x]    The patient is a suitable candidate for the planned procedure unless box checked [ ]  No, explain:

## 2017-10-27 NOTE — CHART NOTE - NSCHARTNOTEFT_GEN_A_CORE
Anemia s/p 2 units prbc repeat hgb 6.3. GI on board. recommend resuscitation of HGB up to 10. Pt had an episode of melena when he arrived to the floor this past evening pt states that he has had similar episodes at home prior to admission. Denies chest pain, dizziness, headache, sob, weakness, fatigue    Plan  2 units of PRBC to be administered   call placed to Dr. Watts service regarding above plan     Will endorse to primary team, attending to follow.

## 2017-10-27 NOTE — PATIENT PROFILE ADULT. - TEACHING/LEARNING LEARNING PREFERENCES
written material/verbal instruction/audio/computer/internet/individual instruction/video/skill demonstration

## 2017-10-27 NOTE — PROGRESS NOTE ADULT - SUBJECTIVE AND OBJECTIVE BOX
MEDICINE, PROGRESS NOTE [phone]    MEAGAN RAMOS 57y [id number]    Patient was een and examined earlier  Patient is a 57y old  Male who presents with a chief complaint of started having black stools today (26 Oct 2017 18:33)  Pt felt ok, no current complaints.    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  History of partial pancreatectomy  History of renal transplant: x 2    MEDICATIONS  (STANDING):  calcitriol   Capsule 0.25 MICROGram(s) Oral daily  everolimus 2 milliGRAM(s) Oral daily  everolimus 1 milliGRAM(s) Oral at bedtime  influenza   Vaccine 0.5 milliLiter(s) IntraMuscular once  magnesium oxide 400 milliGRAM(s) Oral daily  pantoprazole Infusion 8 mG/Hr (10 mL/Hr) IV Continuous <Continuous>  predniSONE   Tablet 5 milliGRAM(s) Oral daily  sodium bicarbonate 1300 milliGRAM(s) Oral two times a day  tacrolimus 1 milliGRAM(s) Oral <User Schedule>  trimethoprim  160 mG/sulfamethoxazole 800 mG 1 Tablet(s) Oral <User Schedule>    MEDICATIONS  (PRN):    Allergies    No Known Allergies    Intolerances        PHYSICAL EXAM:  Constitutional: NAD  HEENT: Normocephalic, EOMI  Neck:  No JVD  Respiratory: CTA B/L, No wheezes  Cardiovascular: S1, S2, RRR, + systolic murmur  Gastrointestinal: BS+, soft, NT/ND  Extremities: No peripheral edema  Neurological: AAOX3, no focal deficits  Psychiatric: Normal mood, normal affect  : No Augustin    Vital Signs Last 24 Hrs  T(C): 36.7 (27 Oct 2017 15:40), Max: 37.3 (26 Oct 2017 21:44)  T(F): 98 (27 Oct 2017 15:40), Max: 99.1 (26 Oct 2017 21:44)  HR: 88 (27 Oct 2017 15:40) (85 - 95)  BP: 118/70 (27 Oct 2017 15:40) (110/72 - 122/76)  BP(mean): --  RR: 18 (27 Oct 2017 15:40) (18 - 18)  SpO2: 99% (27 Oct 2017 15:40) (96% - 100%)  I&O's Summary    26 Oct 2017 07:01  -  27 Oct 2017 07:00  --------------------------------------------------------  IN: 1390 mL / OUT: 450 mL / NET: 940 mL    27 Oct 2017 07:01  -  27 Oct 2017 18:47  --------------------------------------------------------  IN: 0 mL / OUT: 0 mL / NET: 0 mL        LABS:                        7.3    6.1   )-----------( 145      ( 27 Oct 2017 09:31 )             21.6     10-27    142  |  105  |  124<H>  ----------------------------<  108<H>  5.0   |  16<L>  |  9.36<H>    Ca    8.9      27 Oct 2017 12:26  Phos  6.6     10-27  Mg     2.0     10-27    TPro  4.8<L>  /  Alb  3.1<L>  /  TBili  0.4  /  DBili  x   /  AST  21  /  ALT  13  /  AlkPhos  44  10-26        Magnesium, Serum: 2.0 mg/dL (10-27 @ 12:26)

## 2017-10-27 NOTE — PATIENT PROFILE ADULT. - VISION (WITH CORRECTIVE LENSES IF THE PATIENT USUALLY WEARS THEM):
Reading glasses in use and present at this time/Normal vision: sees adequately in most situations; can see medication labels, newsprint

## 2017-10-27 NOTE — PATIENT PROFILE ADULT. - NS PRO INDICAT FLU
Patient is 18 to 64 years of age with chronic diseases or conditions/Patient is immunocompromised/Patient is 18 years or older

## 2017-10-27 NOTE — PROGRESS NOTE ADULT - ATTENDING COMMENTS
Pt feels well - no BM's today  Going for EGD.  On home immunosuppression   Please check CMV pcr as last test was positive but vl < 200  Has significant AMARI but not uremic.  Monitor creatinine and electrolytes for now.  No acute indication for RRT.

## 2017-10-27 NOTE — PROGRESS NOTE ADULT - SUBJECTIVE AND OBJECTIVE BOX
INTERVAL HPI/OVERNIGHT EVENTS: Stable overnight, last BM was melenic last night, receiving additional blood this am    MEDICATIONS  (STANDING):  calcitriol   Capsule 0.25 MICROGram(s) Oral daily  everolimus 2 milliGRAM(s) Oral daily  everolimus 1 milliGRAM(s) Oral at bedtime  influenza   Vaccine 0.5 milliLiter(s) IntraMuscular once  magnesium oxide 400 milliGRAM(s) Oral daily  pantoprazole Infusion 8 mG/Hr (10 mL/Hr) IV Continuous <Continuous>  predniSONE   Tablet 5 milliGRAM(s) Oral daily  sodium bicarbonate 1300 milliGRAM(s) Oral two times a day  tacrolimus 1 milliGRAM(s) Oral <User Schedule>  trimethoprim  160 mG/sulfamethoxazole 800 mG 1 Tablet(s) Oral <User Schedule>    MEDICATIONS  (PRN):      Allergies    No Known Allergies    Intolerances            PHYSICAL EXAM:   Vital Signs:  Vital Signs Last 24 Hrs  T(C): 36.7 (27 Oct 2017 04:37), Max: 37.4 (26 Oct 2017 18:30)  T(F): 98 (27 Oct 2017 04:37), Max: 99.4 (26 Oct 2017 18:30)  HR: 85 (27 Oct 2017 04:37) (85 - 120)  BP: 115/73 (27 Oct 2017 04:37) (110/72 - 128/69)  BP(mean): --  RR: 18 (27 Oct 2017 04:37) (16 - 18)  SpO2: 100% (27 Oct 2017 04:37) (95% - 100%)  Daily Height in cm: 182.88 (26 Oct 2017 22:48)    Daily     GENERAL:  no distress  HEENT:  NC/AT,  anicteric  CHEST:   no increased effort, breath sounds clear  HEART:  Regular rhythm  ABDOMEN:  Soft, non-tender, non-distended, normoactive bowel sounds,  no masses ,no hepato-splenomegaly, no signs of chronic liver disease  EXTEREMITIES:  no cyanosis      LABS:                        6.3    4.9   )-----------( 137      ( 27 Oct 2017 03:26 )             18.1     10-27    141  |  104  |  118<H>  ----------------------------<  112<H>  4.8   |  16<L>  |  9.34<H>    Ca    8.2<L>      27 Oct 2017 04:41  Phos  5.9     10-27  Mg     2.0     10-27    TPro  4.8<L>  /  Alb  3.1<L>  /  TBili  0.4  /  DBili  x   /  AST  21  /  ALT  13  /  AlkPhos  44  10-26    PT/INR - ( 26 Oct 2017 13:26 )   PT: 12.3 sec;   INR: 1.13 ratio         PTT - ( 26 Oct 2017 13:26 )  PTT:24.3 sec      RADIOLOGY & ADDITIONAL TESTS:

## 2017-10-27 NOTE — PROGRESS NOTE ADULT - SUBJECTIVE AND OBJECTIVE BOX
[facility] Division of Kidney Diseases & Hypertension  FOLLOW UP NOTE  [phone]--------------------------------------------------------------------------------    24 hour events/subjective:    Vitals stable.  Had 1 episode of melena overnight.   Received another unit of PRBC; scheduled for EGD today.     PAST HISTORY  --------------------------------------------------------------------------------  No significant changes to PMH, PSH, FHx, SHx, unless otherwise noted    ALLERGIES & MEDICATIONS  --------------------------------------------------------------------------------  Allergies    No Known Allergies    Intolerances      Standing Inpatient Medications  calcitriol   Capsule 0.25 MICROGram(s) Oral daily  everolimus 2 milliGRAM(s) Oral daily  everolimus 1 milliGRAM(s) Oral at bedtime  influenza   Vaccine 0.5 milliLiter(s) IntraMuscular once  magnesium oxide 400 milliGRAM(s) Oral daily  pantoprazole Infusion 8 mG/Hr IV Continuous <Continuous>  predniSONE   Tablet 5 milliGRAM(s) Oral daily  sodium bicarbonate 1300 milliGRAM(s) Oral two times a day  tacrolimus 1 milliGRAM(s) Oral <User Schedule>  trimethoprim  160 mG/sulfamethoxazole 800 mG 1 Tablet(s) Oral <User Schedule>    PRN Inpatient Medications      REVIEW OF SYSTEMS  --------------------------------------------------------------------------------  Gen: No  fevers/chills  Skin: No rashes  Head/Eyes/Ears/Mouth: No headache; Normal hearing; Normal vision w/o blurriness  Respiratory: No dyspnea, cough, wheezing, hemoptysis  CV: No chest pain, PND, orthopnea  GI: No abdominal pain, diarrhea, constipation, nausea, vomiting  : No increased frequency, dysuria, hematuria, nocturia  MSK: No joint pain/swelling; no back pain; no edema  Neuro: No dizziness/lightheadedness, weakness, seizures, numbness, tingling      All other systems were reviewed and are negative, except as noted.    VITALS/PHYSICAL EXAM  --------------------------------------------------------------------------------  T(C): 36.7 (10-27-17 @ 09:00), Max: 37.4 (10-26-17 @ 18:30)  HR: 94 (10-27-17 @ 09:00) (85 - 120)  BP: 122/76 (10-27-17 @ 09:00) (110/72 - 128/69)  RR: 18 (10-27-17 @ 09:00) (16 - 18)  SpO2: 99% (10-27-17 @ 09:00) (95% - 100%)  Wt(kg): --  Height (cm): 182.88 (10-26-17 @ 22:48)  Weight (kg): 86.9 (10-26-17 @ 22:48)  BMI (kg/m2): 26 (10-26-17 @ 22:48)  BSA (m2): 2.09 (10-26-17 @ 22:48)      10-26-17 @ 07:01  -  10-27-17 @ 07:00  --------------------------------------------------------  IN: 1390 mL / OUT: 450 mL / NET: 940 mL      Physical Exam:  	Gen: NAD  	HEENT:  supple neck  	Pulm: CTA B/L  	CV: RRR, S1S2;  	Back: No spinal or CVA tenderness  	Abd: +BS, soft, nontender/nondistended  	: No suprapubic tenderness                      Extremities: no bilateral LE edema noted.                       Neuro: No focal deficits  	Skin: Warm, without rashes    LABS/STUDIES  --------------------------------------------------------------------------------              7.3    6.1   >-----------<  145      [10-27-17 @ 09:31]              21.6     141  |  104  |  118  ----------------------------<  112      [10-27-17 @ 04:41]  4.8   |  16  |  9.34        Ca     8.2     [10-27-17 @ 04:41]      Mg     2.0     [10-27-17 @ 04:41]      Phos  5.9     [10-27-17 @ 04:41]    TPro  4.8  /  Alb  3.1  /  TBili  0.4  /  DBili  x   /  AST  21  /  ALT  13  /  AlkPhos  44  [10-26-17 @ 13:26]    PT/INR: PT 12.3 , INR 1.13       [10-26-17 @ 13:26]  PTT: 24.3       [10-26-17 @ 13:26]      Creatinine Trend:  SCr 9.34 [10-27 @ 04:41]  SCr 9.52 [10-26 @ 13:26]

## 2017-10-27 NOTE — PROVIDER CONTACT NOTE (CRITICAL VALUE NOTIFICATION) - ACTION/TREATMENT ORDERED:
Orders received to administer Packed Red Cell -1 unit for Anemia hgb <7.0 mg/dl.  Following the POC.

## 2017-10-27 NOTE — PROVIDER CONTACT NOTE (CRITICAL VALUE NOTIFICATION) - BACKGROUND
Patient 57 years old male admitted to unit s/p 2 unit PRBC's in Ed secondary to low hgb 5.0 related to rectal bleeding.

## 2017-10-27 NOTE — PATIENT PROFILE ADULT. - NS TRANSFER PATIENT BELONGINGS
Clothing/Jewelry/Money (specify)/Cell Phone/PDA (specify)/White chain with a pendant and a white bracelet

## 2017-10-28 LAB
ANION GAP SERPL CALC-SCNC: 16 MMOL/L — SIGNIFICANT CHANGE UP (ref 5–17)
ANION GAP SERPL CALC-SCNC: 20 MMOL/L — HIGH (ref 5–17)
BUN SERPL-MCNC: 116 MG/DL — HIGH (ref 7–23)
BUN SERPL-MCNC: 120 MG/DL — HIGH (ref 7–23)
CALCIUM SERPL-MCNC: 8.3 MG/DL — LOW (ref 8.4–10.5)
CALCIUM SERPL-MCNC: 8.5 MG/DL — SIGNIFICANT CHANGE UP (ref 8.4–10.5)
CHLORIDE SERPL-SCNC: 107 MMOL/L — SIGNIFICANT CHANGE UP (ref 96–108)
CHLORIDE SERPL-SCNC: 108 MMOL/L — SIGNIFICANT CHANGE UP (ref 96–108)
CO2 SERPL-SCNC: 15 MMOL/L — LOW (ref 22–31)
CO2 SERPL-SCNC: 19 MMOL/L — LOW (ref 22–31)
CREAT SERPL-MCNC: 9.39 MG/DL — HIGH (ref 0.5–1.3)
CREAT SERPL-MCNC: 9.68 MG/DL — HIGH (ref 0.5–1.3)
GLUCOSE SERPL-MCNC: 121 MG/DL — HIGH (ref 70–99)
GLUCOSE SERPL-MCNC: 82 MG/DL — SIGNIFICANT CHANGE UP (ref 70–99)
HCT VFR BLD CALC: 22.8 % — LOW (ref 39–50)
HCT VFR BLD CALC: 22.8 % — LOW (ref 39–50)
HGB BLD-MCNC: 7.5 G/DL — LOW (ref 13–17)
HGB BLD-MCNC: 7.6 G/DL — LOW (ref 13–17)
MCHC RBC-ENTMCNC: 27 PG — SIGNIFICANT CHANGE UP (ref 27–34)
MCHC RBC-ENTMCNC: 28 PG — SIGNIFICANT CHANGE UP (ref 27–34)
MCHC RBC-ENTMCNC: 33.1 GM/DL — SIGNIFICANT CHANGE UP (ref 32–36)
MCHC RBC-ENTMCNC: 33.3 GM/DL — SIGNIFICANT CHANGE UP (ref 32–36)
MCV RBC AUTO: 80.9 FL — SIGNIFICANT CHANGE UP (ref 80–100)
MCV RBC AUTO: 84.8 FL — SIGNIFICANT CHANGE UP (ref 80–100)
PLATELET # BLD AUTO: 155 K/UL — SIGNIFICANT CHANGE UP (ref 150–400)
PLATELET # BLD AUTO: 193 K/UL — SIGNIFICANT CHANGE UP (ref 150–400)
POTASSIUM SERPL-MCNC: 4.6 MMOL/L — SIGNIFICANT CHANGE UP (ref 3.5–5.3)
POTASSIUM SERPL-MCNC: 5.4 MMOL/L — HIGH (ref 3.5–5.3)
POTASSIUM SERPL-SCNC: 4.6 MMOL/L — SIGNIFICANT CHANGE UP (ref 3.5–5.3)
POTASSIUM SERPL-SCNC: 5.4 MMOL/L — HIGH (ref 3.5–5.3)
RBC # BLD: 2.69 M/UL — LOW (ref 4.2–5.8)
RBC # BLD: 2.82 M/UL — LOW (ref 4.2–5.8)
RBC # FLD: 14.4 % — SIGNIFICANT CHANGE UP (ref 10.3–14.5)
RBC # FLD: 15.9 % — HIGH (ref 10.3–14.5)
SODIUM SERPL-SCNC: 142 MMOL/L — SIGNIFICANT CHANGE UP (ref 135–145)
SODIUM SERPL-SCNC: 143 MMOL/L — SIGNIFICANT CHANGE UP (ref 135–145)
WBC # BLD: 3.89 K/UL — SIGNIFICANT CHANGE UP (ref 3.8–10.5)
WBC # BLD: 4.7 K/UL — SIGNIFICANT CHANGE UP (ref 3.8–10.5)
WBC # FLD AUTO: 3.89 K/UL — SIGNIFICANT CHANGE UP (ref 3.8–10.5)
WBC # FLD AUTO: 4.7 K/UL — SIGNIFICANT CHANGE UP (ref 3.8–10.5)

## 2017-10-28 PROCEDURE — 99232 SBSQ HOSP IP/OBS MODERATE 35: CPT | Mod: GC

## 2017-10-28 RX ADMIN — PANTOPRAZOLE SODIUM 10 MG/HR: 20 TABLET, DELAYED RELEASE ORAL at 21:40

## 2017-10-28 RX ADMIN — Medication 1300 MILLIGRAM(S): at 18:41

## 2017-10-28 RX ADMIN — CALCITRIOL 0.25 MICROGRAM(S): 0.5 CAPSULE ORAL at 11:19

## 2017-10-28 RX ADMIN — Medication 5 MILLIGRAM(S): at 05:58

## 2017-10-28 RX ADMIN — EVEROLIMUS 2 MILLIGRAM(S): 10 TABLET ORAL at 18:52

## 2017-10-28 RX ADMIN — TACROLIMUS 1 MILLIGRAM(S): 5 CAPSULE ORAL at 23:22

## 2017-10-28 RX ADMIN — EVEROLIMUS 1 MILLIGRAM(S): 10 TABLET ORAL at 23:23

## 2017-10-28 RX ADMIN — Medication 1300 MILLIGRAM(S): at 05:59

## 2017-10-28 NOTE — PROGRESS NOTE ADULT - SUBJECTIVE AND OBJECTIVE BOX
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  History of partial pancreatectomy  History of renal transplant: x 2    FAMILY HISTORY:    MEDICATIONS  (STANDING):  calcitriol   Capsule 0.25 MICROGram(s) Oral daily  everolimus 2 milliGRAM(s) Oral daily  everolimus 1 milliGRAM(s) Oral at bedtime  influenza   Vaccine 0.5 milliLiter(s) IntraMuscular once  magnesium oxide 400 milliGRAM(s) Oral daily  pantoprazole Infusion 8 mG/Hr (10 mL/Hr) IV Continuous <Continuous>  predniSONE   Tablet 5 milliGRAM(s) Oral daily  sodium bicarbonate 1300 milliGRAM(s) Oral two times a day  tacrolimus 1 milliGRAM(s) Oral <User Schedule>  trimethoprim  160 mG/sulfamethoxazole 800 mG 1 Tablet(s) Oral <User Schedule>    MEDICATIONS  (PRN):      Vital Signs Last 24 Hrs  T(C): 36.6 (28 Oct 2017 04:21), Max: 36.9 (27 Oct 2017 21:05)  T(F): 97.9 (28 Oct 2017 04:21), Max: 98.5 (27 Oct 2017 21:05)  HR: 88 (28 Oct 2017 04:21) (86 - 99)  BP: 146/77 (28 Oct 2017 04:21) (113/67 - 146/77)  BP(mean): --  RR: 18 (28 Oct 2017 04:21) (18 - 18)  SpO2: 92% (28 Oct 2017 04:21) (92% - 99%)  nc/at  s1s2  cta  soft, nt, nd no guarding or rebound  no c/c/e    CBC Full  -  ( 27 Oct 2017 20:47 )  WBC Count : 9.5 K/uL  Hemoglobin : 8.8 g/dL  Hematocrit : 24.8 %  Platelet Count - Automated : 173 K/uL  Mean Cell Volume : 82.5 fl  Mean Cell Hemoglobin : 29.2 pg  Mean Cell Hemoglobin Concentration : 35.4 gm/dL  Auto Neutrophil # : x  Auto Lymphocyte # : x  Auto Monocyte # : x  Auto Eosinophil # : x  Auto Basophil # : x  Auto Neutrophil % : x  Auto Lymphocyte % : x  Auto Monocyte % : x  Auto Eosinophil % : x  Auto Basophil % : x    10-28    143  |  108  |  120<H>  ----------------------------<  82  4.6   |  15<L>  |  9.39<H>    Ca    8.5      28 Oct 2017 08:27  Phos  6.6     10-27  Mg     2.0     10-27    TPro  4.8<L>  /  Alb  3.1<L>  /  TBili  0.4  /  DBili  x   /  AST  21  /  ALT  13  /  AlkPhos  44  10-26    PT/INR - ( 26 Oct 2017 13:26 )   PT: 12.3 sec;   INR: 1.13 ratio         PTT - ( 26 Oct 2017 13:26 )  PTT:24.3 sec

## 2017-10-28 NOTE — PROGRESS NOTE ADULT - SUBJECTIVE AND OBJECTIVE BOX
MEDICINE, PROGRESS NOTE [phone]    MEAGAN RAMOS 57y [id number]    Patient seen and examined.  Patient is a 57y old  Male who presents with a chief complaint of started having black stools today (26 Oct 2017 18:33)  Pt feels better.    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  History of partial pancreatectomy  History of renal transplant: x 2    MEDICATIONS  (STANDING):  calcitriol   Capsule 0.25 MICROGram(s) Oral daily  everolimus 2 milliGRAM(s) Oral daily  everolimus 1 milliGRAM(s) Oral at bedtime  influenza   Vaccine 0.5 milliLiter(s) IntraMuscular once  magnesium oxide 400 milliGRAM(s) Oral daily  pantoprazole Infusion 8 mG/Hr (10 mL/Hr) IV Continuous <Continuous>  predniSONE   Tablet 5 milliGRAM(s) Oral daily  sodium bicarbonate 1300 milliGRAM(s) Oral two times a day  tacrolimus 1 milliGRAM(s) Oral <User Schedule>  trimethoprim  160 mG/sulfamethoxazole 800 mG 1 Tablet(s) Oral <User Schedule>    MEDICATIONS  (PRN):    Allergies    No Known Allergies    Intolerances        PHYSICAL EXAM:  Constitutional: NAD  HEENT: Normocephalic, EOMI  Neck:  No JVD  Respiratory: CTA B/L, No wheezes  Cardiovascular: S1, S2, RRR, + systolic murmur  Gastrointestinal: BS+, soft, NT/ND  Extremities: No peripheral edema  Neurological: AAOX3, no focal deficits  Psychiatric: Normal mood, normal affect  : No Augustin    Vital Signs Last 24 Hrs  T(C): 36.6 (28 Oct 2017 12:19), Max: 36.9 (27 Oct 2017 21:05)  T(F): 97.9 (28 Oct 2017 12:19), Max: 98.5 (27 Oct 2017 21:05)  HR: 84 (28 Oct 2017 12:19) (84 - 99)  BP: 147/79 (28 Oct 2017 12:19) (118/70 - 147/79)  BP(mean): --  RR: 18 (28 Oct 2017 12:19) (18 - 18)  SpO2: 98% (28 Oct 2017 12:19) (92% - 99%)  I&O's Summary    27 Oct 2017 07:01  -  28 Oct 2017 07:00  --------------------------------------------------------  IN: 60 mL / OUT: 525 mL / NET: -465 mL    28 Oct 2017 07:01  -  28 Oct 2017 15:24  --------------------------------------------------------  IN: 340 mL / OUT: 500 mL / NET: -160 mL        LABS:                        7.6    3.89  )-----------( 193      ( 28 Oct 2017 08:27 )             22.8     10-28    143  |  108  |  120<H>  ----------------------------<  82  4.6   |  15<L>  |  9.39<H>    Ca    8.5      28 Oct 2017 08:27  Phos  6.6     10-27  Mg     2.0     10-27

## 2017-10-28 NOTE — PROGRESS NOTE ADULT - SUBJECTIVE AND OBJECTIVE BOX
[facility] DIVISION OF KIDNEY DISEASES AND HYPERTENSION -- FOLLOW UP NOTE  --------------------------------------------------------------------------------  Chief Complaint:  AMARI on CKD    24 hour events/subjective:  s/p EGD yesterday which revealed bleeding angioectasia in duodenum. Pt denies any further episodes of bleeding. Denies any acute complaint including metallic taste in mouth, nausea, vomiting, fatigue.         PAST HISTORY  --------------------------------------------------------------------------------  No significant changes to PMH, PSH, FHx, SHx, unless otherwise noted    ALLERGIES & MEDICATIONS  --------------------------------------------------------------------------------  Allergies    No Known Allergies    Intolerances      Standing Inpatient Medications  calcitriol   Capsule 0.25 MICROGram(s) Oral daily  everolimus 2 milliGRAM(s) Oral daily  everolimus 1 milliGRAM(s) Oral at bedtime  influenza   Vaccine 0.5 milliLiter(s) IntraMuscular once  magnesium oxide 400 milliGRAM(s) Oral daily  pantoprazole Infusion 8 mG/Hr IV Continuous <Continuous>  predniSONE   Tablet 5 milliGRAM(s) Oral daily  sodium bicarbonate 1300 milliGRAM(s) Oral two times a day  tacrolimus 1 milliGRAM(s) Oral <User Schedule>  trimethoprim  160 mG/sulfamethoxazole 800 mG 1 Tablet(s) Oral <User Schedule>    PRN Inpatient Medications      REVIEW OF SYSTEMS  --------------------------------------------------------------------------------  Gen: No weight changes, fatigue, fevers/chills, weakness  Skin: No rashes  Head/Eyes/Ears/Mouth: No headache; Normal hearing; Normal vision w/o blurriness; No sinus pain/discomfort, sore throat  Respiratory: No dyspnea, cough, wheezing, hemoptysis  CV: No chest pain, PND, orthopnea  GI: No abdominal pain, diarrhea, constipation, nausea, vomiting, melena, hematochezia  : No increased frequency, dysuria, hematuria, nocturia  MSK: No joint pain/swelling; no back pain; no edema  Neuro: No dizziness/lightheadedness, weakness, seizures, numbness, tingling  Heme: No easy bruising or bleeding  Endo: No heat/cold intolerance  Psych: No significant nervousness, anxiety, stress, depression    All other systems were reviewed and are negative, except as noted.    VITALS/PHYSICAL EXAM  --------------------------------------------------------------------------------  T(C): 36.6 (10-28-17 @ 04:21), Max: 36.9 (10-27-17 @ 21:05)  HR: 88 (10-28-17 @ 04:21) (86 - 99)  BP: 146/77 (10-28-17 @ 04:21) (113/67 - 146/77)  RR: 18 (10-28-17 @ 04:21) (18 - 18)  SpO2: 92% (10-28-17 @ 04:21) (92% - 99%)  Wt(kg): --  Height (cm): 182.88 (10-26-17 @ 22:48)  Weight (kg): 86.9 (10-26-17 @ 22:48)  BMI (kg/m2): 26 (10-26-17 @ 22:48)  BSA (m2): 2.09 (10-26-17 @ 22:48)      10-27-17 @ 07:01  -  10-28-17 @ 07:00  --------------------------------------------------------  IN: 60 mL / OUT: 525 mL / NET: -465 mL      Physical Exam:  	Gen: NAD, well-appearing  	Pulm: CTA B/L  	CV: RRR, S1S2; no rub  	Abd: +BS, soft  	: No suprapubic tenderness  	UE: Warm,  no edema; no asterixis  	LE: trace edema  	Skin: Warm, without rashes      LABS/STUDIES  --------------------------------------------------------------------------------              7.6    3.89  >-----------<  193      [10-28-17 @ 08:27]              22.8     143  |  108  |  120  ----------------------------<  82      [10-28-17 @ 08:27]  4.6   |  15  |  9.39        Ca     8.5     [10-28-17 @ 08:27]      Mg     2.0     [10-27-17 @ 12:26]      Phos  6.6     [10-27-17 @ 12:26]    TPro  4.8  /  Alb  3.1  /  TBili  0.4  /  DBili  x   /  AST  21  /  ALT  13  /  AlkPhos  44  [10-26-17 @ 13:26]    PT/INR: PT 12.3 , INR 1.13       [10-26-17 @ 13:26]  PTT: 24.3       [10-26-17 @ 13:26]      Creatinine Trend:  SCr 9.39 [10-28 @ 08:27]  SCr 9.36 [10-27 @ 12:26]  SCr 9.34 [10-27 @ 04:41]  SCr 9.52 [10-26 @ 13:26]

## 2017-10-29 LAB
ANION GAP SERPL CALC-SCNC: 16 MMOL/L — SIGNIFICANT CHANGE UP (ref 5–17)
BUN SERPL-MCNC: 109 MG/DL — HIGH (ref 7–23)
CALCIUM SERPL-MCNC: 8.3 MG/DL — LOW (ref 8.4–10.5)
CHLORIDE SERPL-SCNC: 106 MMOL/L — SIGNIFICANT CHANGE UP (ref 96–108)
CO2 SERPL-SCNC: 16 MMOL/L — LOW (ref 22–31)
CREAT SERPL-MCNC: 9.7 MG/DL — HIGH (ref 0.5–1.3)
GLUCOSE SERPL-MCNC: 84 MG/DL — SIGNIFICANT CHANGE UP (ref 70–99)
HCT VFR BLD CALC: 23.6 % — LOW (ref 39–50)
HCT VFR BLD CALC: 24.1 % — LOW (ref 39–50)
HCT VFR BLD CALC: 25.1 % — LOW (ref 39–50)
HGB BLD-MCNC: 7.6 G/DL — LOW (ref 13–17)
HGB BLD-MCNC: 8.1 G/DL — LOW (ref 13–17)
HGB BLD-MCNC: 8.9 G/DL — LOW (ref 13–17)
MCHC RBC-ENTMCNC: 26.1 PG — LOW (ref 27–34)
MCHC RBC-ENTMCNC: 28.6 PG — SIGNIFICANT CHANGE UP (ref 27–34)
MCHC RBC-ENTMCNC: 30.2 PG — SIGNIFICANT CHANGE UP (ref 27–34)
MCHC RBC-ENTMCNC: 32.2 GM/DL — SIGNIFICANT CHANGE UP (ref 32–36)
MCHC RBC-ENTMCNC: 33.5 GM/DL — SIGNIFICANT CHANGE UP (ref 32–36)
MCHC RBC-ENTMCNC: 35.4 GM/DL — SIGNIFICANT CHANGE UP (ref 32–36)
MCV RBC AUTO: 81.1 FL — SIGNIFICANT CHANGE UP (ref 80–100)
MCV RBC AUTO: 85.3 FL — SIGNIFICANT CHANGE UP (ref 80–100)
MCV RBC AUTO: 85.5 FL — SIGNIFICANT CHANGE UP (ref 80–100)
PLATELET # BLD AUTO: 157 K/UL — SIGNIFICANT CHANGE UP (ref 150–400)
PLATELET # BLD AUTO: 159 K/UL — SIGNIFICANT CHANGE UP (ref 150–400)
PLATELET # BLD AUTO: 168 K/UL — SIGNIFICANT CHANGE UP (ref 150–400)
POTASSIUM SERPL-MCNC: 4.4 MMOL/L — SIGNIFICANT CHANGE UP (ref 3.5–5.3)
POTASSIUM SERPL-SCNC: 4.4 MMOL/L — SIGNIFICANT CHANGE UP (ref 3.5–5.3)
RBC # BLD: 2.82 M/UL — LOW (ref 4.2–5.8)
RBC # BLD: 2.91 M/UL — LOW (ref 4.2–5.8)
RBC # BLD: 2.95 M/UL — LOW (ref 4.2–5.8)
RBC # FLD: 14.5 % — SIGNIFICANT CHANGE UP (ref 10.3–14.5)
RBC # FLD: 14.6 % — HIGH (ref 10.3–14.5)
RBC # FLD: 16.5 % — HIGH (ref 10.3–14.5)
SODIUM SERPL-SCNC: 138 MMOL/L — SIGNIFICANT CHANGE UP (ref 135–145)
WBC # BLD: 4.36 K/UL — SIGNIFICANT CHANGE UP (ref 3.8–10.5)
WBC # BLD: 4.6 K/UL — SIGNIFICANT CHANGE UP (ref 3.8–10.5)
WBC # BLD: 4.7 K/UL — SIGNIFICANT CHANGE UP (ref 3.8–10.5)
WBC # FLD AUTO: 4.36 K/UL — SIGNIFICANT CHANGE UP (ref 3.8–10.5)
WBC # FLD AUTO: 4.6 K/UL — SIGNIFICANT CHANGE UP (ref 3.8–10.5)
WBC # FLD AUTO: 4.7 K/UL — SIGNIFICANT CHANGE UP (ref 3.8–10.5)

## 2017-10-29 RX ADMIN — Medication 5 MILLIGRAM(S): at 05:28

## 2017-10-29 RX ADMIN — EVEROLIMUS 2 MILLIGRAM(S): 10 TABLET ORAL at 10:55

## 2017-10-29 RX ADMIN — TACROLIMUS 1 MILLIGRAM(S): 5 CAPSULE ORAL at 23:10

## 2017-10-29 RX ADMIN — PANTOPRAZOLE SODIUM 10 MG/HR: 20 TABLET, DELAYED RELEASE ORAL at 05:30

## 2017-10-29 RX ADMIN — Medication 1300 MILLIGRAM(S): at 05:28

## 2017-10-29 RX ADMIN — CALCITRIOL 0.25 MICROGRAM(S): 0.5 CAPSULE ORAL at 10:55

## 2017-10-29 RX ADMIN — EVEROLIMUS 1 MILLIGRAM(S): 10 TABLET ORAL at 23:11

## 2017-10-29 NOTE — PROGRESS NOTE ADULT - SUBJECTIVE AND OBJECTIVE BOX
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  History of partial pancreatectomy  History of renal transplant: x 2    FAMILY HISTORY:    MEDICATIONS  (STANDING):  calcitriol   Capsule 0.25 MICROGram(s) Oral daily  everolimus 2 milliGRAM(s) Oral daily  everolimus 1 milliGRAM(s) Oral at bedtime  influenza   Vaccine 0.5 milliLiter(s) IntraMuscular once  magnesium oxide 400 milliGRAM(s) Oral daily  pantoprazole Infusion 8 mG/Hr (10 mL/Hr) IV Continuous <Continuous>  predniSONE   Tablet 5 milliGRAM(s) Oral daily  sodium bicarbonate 1300 milliGRAM(s) Oral two times a day  tacrolimus 1 milliGRAM(s) Oral <User Schedule>  trimethoprim  160 mG/sulfamethoxazole 800 mG 1 Tablet(s) Oral <User Schedule>    MEDICATIONS  (PRN):      Vital Signs Last 24 Hrs  T(C): 36.9 (29 Oct 2017 05:27), Max: 37.1 (28 Oct 2017 21:45)  T(F): 98.4 (29 Oct 2017 05:27), Max: 98.7 (28 Oct 2017 21:45)  HR: 77 (29 Oct 2017 05:27) (77 - 87)  BP: 148/80 (29 Oct 2017 05:27) (147/79 - 166/81)  BP(mean): --  RR: 18 (29 Oct 2017 05:27) (18 - 18)  SpO2: 98% (29 Oct 2017 05:27) (96% - 100%)  nc/at  s1s2  cta  soft, nt, nd no guarding or rebound  no c/c/e    CBC Full  -  ( 29 Oct 2017 08:29 )  WBC Count : 4.36 K/uL  Hemoglobin : 7.6 g/dL  Hematocrit : 23.6 %  Platelet Count - Automated : 168 K/uL  Mean Cell Volume : 81.1 fl  Mean Cell Hemoglobin : 26.1 pg  Mean Cell Hemoglobin Concentration : 32.2 gm/dL  Auto Neutrophil # : x  Auto Lymphocyte # : x  Auto Monocyte # : x  Auto Eosinophil # : x  Auto Basophil # : x  Auto Neutrophil % : x  Auto Lymphocyte % : x  Auto Monocyte % : x  Auto Eosinophil % : x  Auto Basophil % : x    10-29    138  |  106  |  109<H>  ----------------------------<  84  4.4   |  16<L>  |  9.70<H>    Ca    8.3<L>      29 Oct 2017 08:56  Phos  6.6     10-27  Mg     2.0     10-27

## 2017-10-29 NOTE — PROGRESS NOTE ADULT - SUBJECTIVE AND OBJECTIVE BOX
MEDICINE, PROGRESS NOTE [phone]    MEAGAN RAMOS 57y [id number]    Patient seen and examined.  Patient is a 57y old  Male who presents with a chief complaint of started having black stools today (26 Oct 2017 18:33)  Pt feels well. Pt tolerating liquids, diarrhea has resolved. feels constipated now.    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  History of partial pancreatectomy  History of renal transplant: x 2    MEDICATIONS  (STANDING):  calcitriol   Capsule 0.25 MICROGram(s) Oral daily  everolimus 2 milliGRAM(s) Oral daily  everolimus 1 milliGRAM(s) Oral at bedtime  influenza   Vaccine 0.5 milliLiter(s) IntraMuscular once  magnesium oxide 400 milliGRAM(s) Oral daily  pantoprazole Infusion 8 mG/Hr (10 mL/Hr) IV Continuous <Continuous>  predniSONE   Tablet 5 milliGRAM(s) Oral daily  sodium bicarbonate 1300 milliGRAM(s) Oral two times a day  tacrolimus 1 milliGRAM(s) Oral <User Schedule>  trimethoprim  160 mG/sulfamethoxazole 800 mG 1 Tablet(s) Oral <User Schedule>    MEDICATIONS  (PRN):    Allergies    No Known Allergies    Intolerances        PHYSICAL EXAM:  Constitutional: NAD  HEENT: Normocephalic, EOMI  Neck:  No JVD  Respiratory: CTA B/L, No wheezes  Cardiovascular: S1, S2, RRR, + systolic murmur  Gastrointestinal: BS+, soft, NT/ND  Extremities: No peripheral edema  Neurological: AAOX3, no focal deficits  Psychiatric: Normal mood, normal affect  : No Augustin    Vital Signs Last 24 Hrs  T(C): 36.8 (29 Oct 2017 20:13), Max: 37.1 (28 Oct 2017 21:45)  T(F): 98.2 (29 Oct 2017 20:13), Max: 98.7 (28 Oct 2017 21:45)  HR: 76 (29 Oct 2017 20:13) (76 - 87)  BP: 171/90 (29 Oct 2017 20:13) (144/87 - 171/90)  BP(mean): --  RR: 18 (29 Oct 2017 20:13) (18 - 18)  SpO2: 96% (29 Oct 2017 20:13) (96% - 98%)  I&O's Summary    28 Oct 2017 07:01  -  29 Oct 2017 07:00  --------------------------------------------------------  IN: 1050 mL / OUT: 950 mL / NET: 100 mL    29 Oct 2017 07:01  -  29 Oct 2017 21:36  --------------------------------------------------------  IN: 1070 mL / OUT: 500 mL / NET: 570 mL        LABS:                        8.9    4.6   )-----------( 157      ( 29 Oct 2017 15:22 )             25.1     10-29    138  |  106  |  109<H>  ----------------------------<  84  4.4   |  16<L>  |  9.70<H>    Ca    8.3<L>      29 Oct 2017 08:56

## 2017-10-30 LAB
ANION GAP SERPL CALC-SCNC: 16 MMOL/L — SIGNIFICANT CHANGE UP (ref 5–17)
BUN SERPL-MCNC: 97 MG/DL — HIGH (ref 7–23)
CALCIUM SERPL-MCNC: 8.8 MG/DL — SIGNIFICANT CHANGE UP (ref 8.4–10.5)
CHLORIDE SERPL-SCNC: 105 MMOL/L — SIGNIFICANT CHANGE UP (ref 96–108)
CO2 SERPL-SCNC: 18 MMOL/L — LOW (ref 22–31)
CREAT SERPL-MCNC: 9.6 MG/DL — HIGH (ref 0.5–1.3)
GLUCOSE SERPL-MCNC: 96 MG/DL — SIGNIFICANT CHANGE UP (ref 70–99)
HCT VFR BLD CALC: 26 % — LOW (ref 39–50)
HGB BLD-MCNC: 8.3 G/DL — LOW (ref 13–17)
MCHC RBC-ENTMCNC: 26.7 PG — LOW (ref 27–34)
MCHC RBC-ENTMCNC: 31.9 GM/DL — LOW (ref 32–36)
MCV RBC AUTO: 83.6 FL — SIGNIFICANT CHANGE UP (ref 80–100)
PLATELET # BLD AUTO: 190 K/UL — SIGNIFICANT CHANGE UP (ref 150–400)
POTASSIUM SERPL-MCNC: 5 MMOL/L — SIGNIFICANT CHANGE UP (ref 3.5–5.3)
POTASSIUM SERPL-SCNC: 5 MMOL/L — SIGNIFICANT CHANGE UP (ref 3.5–5.3)
RBC # BLD: 3.11 M/UL — LOW (ref 4.2–5.8)
RBC # FLD: 16.7 % — HIGH (ref 10.3–14.5)
SODIUM SERPL-SCNC: 139 MMOL/L — SIGNIFICANT CHANGE UP (ref 135–145)
WBC # BLD: 4.18 K/UL — SIGNIFICANT CHANGE UP (ref 3.8–10.5)
WBC # FLD AUTO: 4.18 K/UL — SIGNIFICANT CHANGE UP (ref 3.8–10.5)

## 2017-10-30 RX ORDER — ACETAMINOPHEN 500 MG
650 TABLET ORAL ONCE
Qty: 0 | Refills: 0 | Status: COMPLETED | OUTPATIENT
Start: 2017-10-30 | End: 2017-10-30

## 2017-10-30 RX ADMIN — EVEROLIMUS 1 MILLIGRAM(S): 10 TABLET ORAL at 22:55

## 2017-10-30 RX ADMIN — Medication 0.1 MILLIGRAM(S): at 11:07

## 2017-10-30 RX ADMIN — CALCITRIOL 0.25 MICROGRAM(S): 0.5 CAPSULE ORAL at 11:07

## 2017-10-30 RX ADMIN — Medication 1300 MILLIGRAM(S): at 17:46

## 2017-10-30 RX ADMIN — EVEROLIMUS 2 MILLIGRAM(S): 10 TABLET ORAL at 11:12

## 2017-10-30 RX ADMIN — TACROLIMUS 1 MILLIGRAM(S): 5 CAPSULE ORAL at 22:55

## 2017-10-30 RX ADMIN — Medication 1 TABLET(S): at 07:58

## 2017-10-30 RX ADMIN — Medication 5 MILLIGRAM(S): at 11:07

## 2017-10-30 NOTE — PROGRESS NOTE ADULT - SUBJECTIVE AND OBJECTIVE BOX
[facility] Division of Kidney Diseases & Hypertension  FOLLOW UP NOTE  [phone]--------------------------------------------------------------------------------    24 hour events/subjective:    No complaints.   Vitals stable, afebrile     PAST HISTORY  --------------------------------------------------------------------------------  No significant changes to PMH, PSH, FHx, SHx, unless otherwise noted    ALLERGIES & MEDICATIONS  --------------------------------------------------------------------------------  Allergies    No Known Allergies    Intolerances      Standing Inpatient Medications  calcitriol   Capsule 0.25 MICROGram(s) Oral daily  cloNIDine      everolimus 2 milliGRAM(s) Oral daily  everolimus 1 milliGRAM(s) Oral at bedtime  influenza   Vaccine 0.5 milliLiter(s) IntraMuscular once  magnesium oxide 400 milliGRAM(s) Oral daily  pantoprazole Infusion 8 mG/Hr IV Continuous <Continuous>  predniSONE   Tablet 5 milliGRAM(s) Oral daily  sodium bicarbonate 1300 milliGRAM(s) Oral two times a day  tacrolimus 1 milliGRAM(s) Oral <User Schedule>  trimethoprim  160 mG/sulfamethoxazole 800 mG 1 Tablet(s) Oral <User Schedule>    PRN Inpatient Medications      REVIEW OF SYSTEMS  --------------------------------------------------------------------------------  Gen: No  fevers/chills  Skin: No rashes  Head/Eyes/Ears/Mouth: No headache; Normal hearing; Normal vision w/o blurriness  Respiratory: No dyspnea, cough, wheezing, hemoptysis  CV: No chest pain, PND, orthopnea  GI: No abdominal pain, diarrhea, constipation, nausea, vomiting  : No increased frequency, dysuria, hematuria, nocturia  MSK: No joint pain/swelling; no back pain; no edema  Neuro: No dizziness/lightheadedness, weakness, seizures, numbness, tingling      All other systems were reviewed and are negative, except as noted.    VITALS/PHYSICAL EXAM  --------------------------------------------------------------------------------  T(C): 37.1 (10-30-17 @ 11:58), Max: 37.1 (10-30-17 @ 11:58)  HR: 90 (10-30-17 @ 11:58) (67 - 90)  BP: 163/81 (10-30-17 @ 11:58) (144/87 - 171/90)  RR: 18 (10-30-17 @ 11:58) (18 - 18)  SpO2: 98% (10-30-17 @ 11:58) (94% - 100%)  Wt(kg): --        10-29-17 @ 07:01  -  10-30-17 @ 07:00  --------------------------------------------------------  IN: 1470 mL / OUT: 500 mL / NET: 970 mL    10-30-17 @ 07:01  -  10-30-17 @ 13:06  --------------------------------------------------------  IN: 0 mL / OUT: 500 mL / NET: -500 mL      Physical Exam:  	Gen: NAD  	HEENT: supple neck  	Pulm: CTA B/L  	CV: RRR, S1S2;  	Abd: +BS, soft, nontender/nondistended                      Extremities: no bilateral LE edema noted.                       Neuro: No focal deficits  	Skin: Warm, without rashes    LABS/STUDIES  --------------------------------------------------------------------------------              8.3    4.18  >-----------<  190      [10-30-17 @ 07:30]              26.0     139  |  105  |  97  ----------------------------<  96      [10-30-17 @ 07:25]  5.0   |  18  |  9.60        Ca     8.8     [10-30-17 @ 07:25]            Creatinine Trend:  SCr 9.60 [10-30 @ 07:25]  SCr 9.70 [10-29 @ 08:56]  SCr 9.68 [10-28 @ 15:46]  SCr 9.39 [10-28 @ 08:27]  SCr 9.36 [10-27 @ 12:26] [facility] [facility] Division of Kidney Diseases & Hypertension  FOLLOW UP NOTE  [phone]--------------------------------------------------------------------------------    24 hour events/subjective:    Vitals stable, afebrile  C/o diarrhea     PAST HISTORY  --------------------------------------------------------------------------------  No significant changes to PMH, PSH, FHx, SHx, unless otherwise noted    ALLERGIES & MEDICATIONS  --------------------------------------------------------------------------------  Allergies    No Known Allergies    Intolerances      Standing Inpatient Medications  calcitriol   Capsule 0.25 MICROGram(s) Oral daily  cloNIDine      everolimus 2 milliGRAM(s) Oral daily  everolimus 1 milliGRAM(s) Oral at bedtime  influenza   Vaccine 0.5 milliLiter(s) IntraMuscular once  magnesium oxide 400 milliGRAM(s) Oral daily  pantoprazole Infusion 8 mG/Hr IV Continuous <Continuous>  predniSONE   Tablet 5 milliGRAM(s) Oral daily  sodium bicarbonate 1300 milliGRAM(s) Oral two times a day  tacrolimus 1 milliGRAM(s) Oral <User Schedule>  trimethoprim  160 mG/sulfamethoxazole 800 mG 1 Tablet(s) Oral <User Schedule>    PRN Inpatient Medications      REVIEW OF SYSTEMS  --------------------------------------------------------------------------------  Gen: No  fevers/chills  Skin: No rashes  Head/Eyes/Ears/Mouth: No headache; Normal hearing; Normal vision w/o blurriness  Respiratory: No dyspnea, cough, wheezing, hemoptysis  CV: No chest pain, PND, orthopnea  GI: No abdominal pain, diarrhea, constipation, nausea, vomiting  : No increased frequency, dysuria, hematuria, nocturia  MSK: No joint pain/swelling; no back pain; no edema  Neuro: No dizziness/lightheadedness, weakness, seizures, numbness, tingling      All other systems were reviewed and are negative, except as noted.    VITALS/PHYSICAL EXAM  --------------------------------------------------------------------------------  T(C): 37.1 (10-30-17 @ 11:58), Max: 37.1 (10-30-17 @ 11:58)  HR: 90 (10-30-17 @ 11:58) (67 - 90)  BP: 163/81 (10-30-17 @ 11:58) (144/87 - 171/90)  RR: 18 (10-30-17 @ 11:58) (18 - 18)  SpO2: 98% (10-30-17 @ 11:58) (94% - 100%)  Wt(kg): --        10-29-17 @ 07:01  -  10-30-17 @ 07:00  --------------------------------------------------------  IN: 1470 mL / OUT: 500 mL / NET: 970 mL    10-30-17 @ 07:01  -  10-30-17 @ 13:06  --------------------------------------------------------  IN: 0 mL / OUT: 500 mL / NET: -500 mL      Physical Exam:  	Gen: NAD  	HEENT: supple neck  	Pulm: CTA B/L  	CV: RRR, S1S2;  	Abd: +BS, soft, nontender/nondistended                      Extremities:  bilateral LE edema noted.                       Neuro: No focal deficits  	Skin: Warm, without rashes    LABS/STUDIES  --------------------------------------------------------------------------------              8.3    4.18  >-----------<  190      [10-30-17 @ 07:30]              26.0     139  |  105  |  97  ----------------------------<  96      [10-30-17 @ 07:25]  5.0   |  18  |  9.60        Ca     8.8     [10-30-17 @ 07:25]            Creatinine Trend:  SCr 9.60 [10-30 @ 07:25]  SCr 9.70 [10-29 @ 08:56]  SCr 9.68 [10-28 @ 15:46]  SCr 9.39 [10-28 @ 08:27]  SCr 9.36 [10-27 @ 12:26]

## 2017-10-30 NOTE — PROGRESS NOTE ADULT - SUBJECTIVE AND OBJECTIVE BOX
MEDICINE, PROGRESS NOTE [phone]    MEAGAN RAMOS 57y [id number]    Patient seen and examined.  Patient is a 57y old  Male who presents with a chief complaint of started having black stools today (26 Oct 2017 18:33)  Pt feel fine.     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  History of partial pancreatectomy  History of renal transplant: x 2    MEDICATIONS  (STANDING):  calcitriol   Capsule 0.25 MICROGram(s) Oral daily  cloNIDine      everolimus 2 milliGRAM(s) Oral daily  everolimus 1 milliGRAM(s) Oral at bedtime  influenza   Vaccine 0.5 milliLiter(s) IntraMuscular once  magnesium oxide 400 milliGRAM(s) Oral daily  pantoprazole Infusion 8 mG/Hr (10 mL/Hr) IV Continuous <Continuous>  predniSONE   Tablet 5 milliGRAM(s) Oral daily  sodium bicarbonate 1300 milliGRAM(s) Oral two times a day  tacrolimus 1 milliGRAM(s) Oral <User Schedule>  trimethoprim  160 mG/sulfamethoxazole 800 mG 1 Tablet(s) Oral <User Schedule>    MEDICATIONS  (PRN):    Allergies    No Known Allergies    Intolerances        PHYSICAL EXAM:  Constitutional: NAD  HEENT: Normocephalic, EOMI  Neck:  No JVD  Respiratory: CTA B/L, No wheezes  Cardiovascular: S1, S2, RRR, + systolic murmur  Gastrointestinal: BS+, soft, NT/ND  Extremities: No peripheral edema  Neurological: AAOX3, no focal deficits  Psychiatric: Normal mood, normal affect  : No Augustin    Vital Signs Last 24 Hrs  T(C): 36.6 (30 Oct 2017 16:34), Max: 37.1 (30 Oct 2017 11:58)  T(F): 97.9 (30 Oct 2017 16:34), Max: 98.7 (30 Oct 2017 11:58)  HR: 59 (30 Oct 2017 16:34) (59 - 90)  BP: 125/69 (30 Oct 2017 16:34) (125/69 - 168/86)  BP(mean): --  RR: 18 (30 Oct 2017 16:34) (18 - 18)  SpO2: 93% (30 Oct 2017 16:34) (93% - 100%)  I&O's Summary    29 Oct 2017 07:01  -  30 Oct 2017 07:00  --------------------------------------------------------  IN: 1470 mL / OUT: 500 mL / NET: 970 mL    30 Oct 2017 07:01  -  30 Oct 2017 20:17  --------------------------------------------------------  IN: 840 mL / OUT: 500 mL / NET: 340 mL        LABS:                        8.3    4.18  )-----------( 190      ( 30 Oct 2017 07:30 )             26.0     10-30    139  |  105  |  97<H>  ----------------------------<  96  5.0   |  18<L>  |  9.60<H>    Ca    8.8      30 Oct 2017 07:25

## 2017-10-30 NOTE — PROGRESS NOTE ADULT - SUBJECTIVE AND OBJECTIVE BOX
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  History of partial pancreatectomy  History of renal transplant: x 2    FAMILY HISTORY:    MEDICATIONS  (STANDING):  calcitriol   Capsule 0.25 MICROGram(s) Oral daily  everolimus 2 milliGRAM(s) Oral daily  everolimus 1 milliGRAM(s) Oral at bedtime  influenza   Vaccine 0.5 milliLiter(s) IntraMuscular once  magnesium oxide 400 milliGRAM(s) Oral daily  pantoprazole Infusion 8 mG/Hr (10 mL/Hr) IV Continuous <Continuous>  predniSONE   Tablet 5 milliGRAM(s) Oral daily  sodium bicarbonate 1300 milliGRAM(s) Oral two times a day  tacrolimus 1 milliGRAM(s) Oral <User Schedule>  trimethoprim  160 mG/sulfamethoxazole 800 mG 1 Tablet(s) Oral <User Schedule>    MEDICATIONS  (PRN):      Vital Signs Last 24 Hrs  T(C): 36.8 (30 Oct 2017 07:39), Max: 36.9 (30 Oct 2017 00:13)  T(F): 98.2 (30 Oct 2017 07:39), Max: 98.5 (30 Oct 2017 00:13)  HR: 83 (30 Oct 2017 07:39) (67 - 83)  BP: 155/79 (30 Oct 2017 07:39) (144/87 - 171/90)  BP(mean): --  RR: 18 (30 Oct 2017 07:39) (18 - 18)  SpO2: 94% (30 Oct 2017 07:39) (94% - 100%)  nc/at  s1s2  cta  soft, nt, nd no guarding or rebound  no c/c/e    CBC Full  -  ( 30 Oct 2017 07:30 )  WBC Count : 4.18 K/uL  Hemoglobin : 8.3 g/dL  Hematocrit : 26.0 %  Platelet Count - Automated : 190 K/uL  Mean Cell Volume : 83.6 fl  Mean Cell Hemoglobin : 26.7 pg  Mean Cell Hemoglobin Concentration : 31.9 gm/dL  Auto Neutrophil # : x  Auto Lymphocyte # : x  Auto Monocyte # : x  Auto Eosinophil # : x  Auto Basophil # : x  Auto Neutrophil % : x  Auto Lymphocyte % : x  Auto Monocyte % : x  Auto Eosinophil % : x  Auto Basophil % : x    10-30    139  |  105  |  97<H>  ----------------------------<  96  5.0   |  18<L>  |  9.60<H>    Ca    8.8      30 Oct 2017 07:25

## 2017-10-31 LAB
ANION GAP SERPL CALC-SCNC: 16 MMOL/L — SIGNIFICANT CHANGE UP (ref 5–17)
BUN SERPL-MCNC: 82 MG/DL — HIGH (ref 7–23)
CALCIUM SERPL-MCNC: 8.5 MG/DL — SIGNIFICANT CHANGE UP (ref 8.4–10.5)
CHLORIDE SERPL-SCNC: 104 MMOL/L — SIGNIFICANT CHANGE UP (ref 96–108)
CO2 SERPL-SCNC: 17 MMOL/L — LOW (ref 22–31)
CREAT SERPL-MCNC: 9.12 MG/DL — HIGH (ref 0.5–1.3)
GLUCOSE SERPL-MCNC: 92 MG/DL — SIGNIFICANT CHANGE UP (ref 70–99)
HCT VFR BLD CALC: 24.8 % — LOW (ref 39–50)
HCT VFR BLD CALC: 25.5 % — LOW (ref 39–50)
HGB BLD-MCNC: 7.9 G/DL — LOW (ref 13–17)
HGB BLD-MCNC: 8.3 G/DL — LOW (ref 13–17)
MCHC RBC-ENTMCNC: 26.3 PG — LOW (ref 27–34)
MCHC RBC-ENTMCNC: 28.3 PG — SIGNIFICANT CHANGE UP (ref 27–34)
MCHC RBC-ENTMCNC: 31.9 GM/DL — LOW (ref 32–36)
MCHC RBC-ENTMCNC: 32.6 GM/DL — SIGNIFICANT CHANGE UP (ref 32–36)
MCV RBC AUTO: 82.7 FL — SIGNIFICANT CHANGE UP (ref 80–100)
MCV RBC AUTO: 86.6 FL — SIGNIFICANT CHANGE UP (ref 80–100)
PLATELET # BLD AUTO: 147 K/UL — LOW (ref 150–400)
PLATELET # BLD AUTO: 153 K/UL — SIGNIFICANT CHANGE UP (ref 150–400)
POTASSIUM SERPL-MCNC: 4.8 MMOL/L — SIGNIFICANT CHANGE UP (ref 3.5–5.3)
POTASSIUM SERPL-SCNC: 4.8 MMOL/L — SIGNIFICANT CHANGE UP (ref 3.5–5.3)
RBC # BLD: 2.94 M/UL — LOW (ref 4.2–5.8)
RBC # BLD: 3 M/UL — LOW (ref 4.2–5.8)
RBC # FLD: 15.3 % — HIGH (ref 10.3–14.5)
RBC # FLD: 17.1 % — HIGH (ref 10.3–14.5)
SODIUM SERPL-SCNC: 137 MMOL/L — SIGNIFICANT CHANGE UP (ref 135–145)
SURGICAL PATHOLOGY STUDY: SIGNIFICANT CHANGE UP
WBC # BLD: 4.05 K/UL — SIGNIFICANT CHANGE UP (ref 3.8–10.5)
WBC # BLD: 5.6 K/UL — SIGNIFICANT CHANGE UP (ref 3.8–10.5)
WBC # FLD AUTO: 4.05 K/UL — SIGNIFICANT CHANGE UP (ref 3.8–10.5)
WBC # FLD AUTO: 5.6 K/UL — SIGNIFICANT CHANGE UP (ref 3.8–10.5)

## 2017-10-31 PROCEDURE — 99232 SBSQ HOSP IP/OBS MODERATE 35: CPT | Mod: GC

## 2017-10-31 RX ORDER — PANTOPRAZOLE SODIUM 20 MG/1
40 TABLET, DELAYED RELEASE ORAL
Qty: 0 | Refills: 0 | Status: DISCONTINUED | OUTPATIENT
Start: 2017-10-31 | End: 2017-11-03

## 2017-10-31 RX ADMIN — EVEROLIMUS 2 MILLIGRAM(S): 10 TABLET ORAL at 10:58

## 2017-10-31 RX ADMIN — Medication 1300 MILLIGRAM(S): at 18:02

## 2017-10-31 RX ADMIN — Medication 0.1 MILLIGRAM(S): at 10:56

## 2017-10-31 RX ADMIN — EVEROLIMUS 1 MILLIGRAM(S): 10 TABLET ORAL at 22:59

## 2017-10-31 RX ADMIN — Medication 5 MILLIGRAM(S): at 10:57

## 2017-10-31 RX ADMIN — TACROLIMUS 1 MILLIGRAM(S): 5 CAPSULE ORAL at 22:59

## 2017-10-31 RX ADMIN — CALCITRIOL 0.25 MICROGRAM(S): 0.5 CAPSULE ORAL at 10:57

## 2017-10-31 RX ADMIN — PANTOPRAZOLE SODIUM 40 MILLIGRAM(S): 20 TABLET, DELAYED RELEASE ORAL at 18:02

## 2017-10-31 NOTE — PROGRESS NOTE ADULT - SUBJECTIVE AND OBJECTIVE BOX
INTERVAL HPI/OVERNIGHT EVENTS: Reports having mostly brown watery diarrhea with some "pieces of black". Hb decreased 1gm over 48hrs otherwise feels well    MEDICATIONS  (STANDING):  calcitriol   Capsule 0.25 MICROGram(s) Oral daily  cloNIDine      cloNIDine 0.1 milliGRAM(s) Oral daily  everolimus 2 milliGRAM(s) Oral daily  everolimus 1 milliGRAM(s) Oral at bedtime  influenza   Vaccine 0.5 milliLiter(s) IntraMuscular once  magnesium oxide 400 milliGRAM(s) Oral daily  pantoprazole Infusion 8 mG/Hr (10 mL/Hr) IV Continuous <Continuous>  predniSONE   Tablet 5 milliGRAM(s) Oral daily  sodium bicarbonate 1300 milliGRAM(s) Oral two times a day  tacrolimus 1 milliGRAM(s) Oral <User Schedule>  trimethoprim  160 mG/sulfamethoxazole 800 mG 1 Tablet(s) Oral <User Schedule>    MEDICATIONS  (PRN):      Allergies    No Known Allergies    Intolerances            PHYSICAL EXAM:   Vital Signs:  Vital Signs Last 24 Hrs  T(C): 37.1 (31 Oct 2017 07:52), Max: 37.1 (30 Oct 2017 11:58)  T(F): 98.7 (31 Oct 2017 07:52), Max: 98.7 (30 Oct 2017 11:58)  HR: 67 (31 Oct 2017 07:52) (59 - 90)  BP: 166/86 (31 Oct 2017 07:52) (125/69 - 166/86)  BP(mean): --  RR: 18 (31 Oct 2017 07:52) (16 - 18)  SpO2: 99% (31 Oct 2017 07:52) (93% - 99%)  Daily     Daily     GENERAL:  no distress  HEENT:  NC/AT,  anicteric  CHEST:   no increased effort, breath sounds clear  HEART:  Regular rhythm  ABDOMEN:  Soft, non-tender, non-distended, normoactive bowel sounds,  no masses ,no hepato-splenomegaly, no signs of chronic liver disease  EXTEREMITIES:  no cyanosis      LABS:                        7.9    4.05  )-----------( 153      ( 31 Oct 2017 07:30 )             24.8     10-31    137  |  104  |  82<H>  ----------------------------<  92  4.8   |  17<L>  |  9.12<H>    Ca    8.5      31 Oct 2017 07:46            RADIOLOGY & ADDITIONAL TESTS:

## 2017-10-31 NOTE — PROGRESS NOTE ADULT - SUBJECTIVE AND OBJECTIVE BOX
[facility] [place] [facility] Division of Kidney Diseases & Hypertension  FOLLOW UP NOTE  [phone]--------------------------------------------------------------------------------    24 hour events/subjective:    BP slightly elevated; afebrile  Still has diarrhea but no melena    PAST HISTORY  --------------------------------------------------------------------------------  No significant changes to PMH, PSH, FHx, SHx, unless otherwise noted    ALLERGIES & MEDICATIONS  --------------------------------------------------------------------------------  Allergies    No Known Allergies    Intolerances      Standing Inpatient Medications  calcitriol   Capsule 0.25 MICROGram(s) Oral daily  cloNIDine      cloNIDine 0.1 milliGRAM(s) Oral daily  everolimus 2 milliGRAM(s) Oral daily  everolimus 1 milliGRAM(s) Oral at bedtime  influenza   Vaccine 0.5 milliLiter(s) IntraMuscular once  magnesium oxide 400 milliGRAM(s) Oral daily  pantoprazole Infusion 8 mG/Hr IV Continuous <Continuous>  predniSONE   Tablet 5 milliGRAM(s) Oral daily  sodium bicarbonate 1300 milliGRAM(s) Oral two times a day  tacrolimus 1 milliGRAM(s) Oral <User Schedule>  trimethoprim  160 mG/sulfamethoxazole 800 mG 1 Tablet(s) Oral <User Schedule>    PRN Inpatient Medications      REVIEW OF SYSTEMS  --------------------------------------------------------------------------------  Gen: No  fevers/chills  Skin: No rashes  Head/Eyes/Ears/Mouth: No headache; Normal hearing; Normal vision w/o blurriness  Respiratory: No dyspnea, cough, wheezing, hemoptysis  CV: No chest pain, PND, orthopnea  GI: Diarrhea +. No abdominal pain, constipation, nausea, vomiting  : No increased frequency, dysuria, hematuria, nocturia  MSK: No joint pain/swelling; no back pain; no edema  Neuro: No dizziness/lightheadedness, weakness, seizures, numbness, tingling      All other systems were reviewed and are negative, except as noted.    VITALS/PHYSICAL EXAM  --------------------------------------------------------------------------------  T(C): 37.1 (10-31-17 @ 07:52), Max: 37.1 (10-30-17 @ 11:58)  HR: 67 (10-31-17 @ 07:52) (59 - 90)  BP: 166/86 (10-31-17 @ 07:52) (125/69 - 166/86)  RR: 18 (10-31-17 @ 07:52) (16 - 18)  SpO2: 99% (10-31-17 @ 07:52) (93% - 99%)  Wt(kg): --        10-30-17 @ 07:01  -  10-31-17 @ 07:00  --------------------------------------------------------  IN: 1320 mL / OUT: 1200 mL / NET: 120 mL    10-31-17 @ 07:01  -  10-31-17 @ 10:59  --------------------------------------------------------  IN: 360 mL / OUT: 700 mL / NET: -340 mL      Physical Exam:  	Gen: NAD  	HEENT: neck supple  	Pulm: CTA B/L  	CV: RRR, S1S2;  	Back: No spinal or CVA tenderness  	Abd: +BS, soft, nontender/nondistended  	: No suprapubic tenderness                      Extremities: bilateral LE edema noted.                       Neuro: No focal deficits, intact gait  	Skin: Warm, without rashes    LABS/STUDIES  --------------------------------------------------------------------------------              7.9    4.05  >-----------<  153      [10-31-17 @ 07:30]              24.8     137  |  104  |  82  ----------------------------<  92      [10-31-17 @ 07:46]  4.8   |  17  |  9.12        Ca     8.5     [10-31-17 @ 07:46]            Creatinine Trend:  SCr 9.12 [10-31 @ 07:46]  SCr 9.60 [10-30 @ 07:25]  SCr 9.70 [10-29 @ 08:56]  SCr 9.68 [10-28 @ 15:46]  SCr 9.39 [10-28 @ 08:27]

## 2017-10-31 NOTE — PROGRESS NOTE ADULT - ATTENDING COMMENTS
Pt feels well, still with some diarrhea but no longer dark and tarry.  Still with AMARI - slight improvement today, pt not uremic and not ready for RRT  HTN - BP elevated, would resume nifedipine ER 30mgs daily.    check CMV pcr.

## 2017-10-31 NOTE — PROGRESS NOTE ADULT - SUBJECTIVE AND OBJECTIVE BOX
MEDICINE, PROGRESS NOTE [phone]    MEAGAN RAMOS 57y [id number]    Patient seen and examined.  Patient is a 57y old  Male who presents with a chief complaint of started having black stools today (26 Oct 2017 18:33)  Pt has no current complaints. 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  History of partial pancreatectomy  History of renal transplant: x 2    MEDICATIONS  (STANDING):  calcitriol   Capsule 0.25 MICROGram(s) Oral daily  cloNIDine      cloNIDine 0.1 milliGRAM(s) Oral daily  everolimus 2 milliGRAM(s) Oral daily  everolimus 1 milliGRAM(s) Oral at bedtime  influenza   Vaccine 0.5 milliLiter(s) IntraMuscular once  magnesium oxide 400 milliGRAM(s) Oral daily  pantoprazole    Tablet 40 milliGRAM(s) Oral two times a day before meals  predniSONE   Tablet 5 milliGRAM(s) Oral daily  sodium bicarbonate 1300 milliGRAM(s) Oral two times a day  tacrolimus 1 milliGRAM(s) Oral <User Schedule>  trimethoprim  160 mG/sulfamethoxazole 800 mG 1 Tablet(s) Oral <User Schedule>    MEDICATIONS  (PRN):    Allergies    No Known Allergies    Intolerances        PHYSICAL EXAM:  Constitutional: NAD  HEENT: Normocephalic, EOMI  Neck:  No JVD  Respiratory: CTA B/L, No wheezes  Cardiovascular: S1, S2, RRR, + systolic murmur  Gastrointestinal: BS+, soft, NT/ND  Extremities: + peripheral edema le b/l  Neurological: AAOX3, no focal deficits  Psychiatric: Normal mood, normal affect  : No Augustin    Vital Signs Last 24 Hrs  T(C): 37.1 (31 Oct 2017 11:43), Max: 37.1 (31 Oct 2017 07:52)  T(F): 98.7 (31 Oct 2017 11:43), Max: 98.7 (31 Oct 2017 07:52)  HR: 79 (31 Oct 2017 11:43) (67 - 80)  BP: 134/68 (31 Oct 2017 11:43) (134/68 - 166/86)  BP(mean): --  RR: 18 (31 Oct 2017 11:43) (16 - 18)  SpO2: 98% (31 Oct 2017 11:43) (97% - 99%)  I&O's Summary    30 Oct 2017 07:01  -  31 Oct 2017 07:00  --------------------------------------------------------  IN: 1320 mL / OUT: 1200 mL / NET: 120 mL    31 Oct 2017 07:01  -  31 Oct 2017 20:37  --------------------------------------------------------  IN: 660 mL / OUT: 700 mL / NET: -40 mL        LABS:                        8.3    5.6   )-----------( 147      ( 31 Oct 2017 13:39 )             25.5     10-31    137  |  104  |  82<H>  ----------------------------<  92  4.8   |  17<L>  |  9.12<H>    Ca    8.5      31 Oct 2017 07:46

## 2017-11-01 LAB
ALBUMIN SERPL ELPH-MCNC: 2.8 G/DL — LOW (ref 3.3–5)
ALP SERPL-CCNC: 41 U/L — SIGNIFICANT CHANGE UP (ref 40–120)
ALT FLD-CCNC: 16 U/L RC — SIGNIFICANT CHANGE UP (ref 10–45)
ANION GAP SERPL CALC-SCNC: 12 MMOL/L — SIGNIFICANT CHANGE UP (ref 5–17)
ANION GAP SERPL CALC-SCNC: 16 MMOL/L — SIGNIFICANT CHANGE UP (ref 5–17)
AST SERPL-CCNC: 22 U/L — SIGNIFICANT CHANGE UP (ref 10–40)
BILIRUB SERPL-MCNC: 0.5 MG/DL — SIGNIFICANT CHANGE UP (ref 0.2–1.2)
BUN SERPL-MCNC: 70 MG/DL — HIGH (ref 7–23)
BUN SERPL-MCNC: 73 MG/DL — HIGH (ref 7–23)
CALCIUM SERPL-MCNC: 8 MG/DL — LOW (ref 8.4–10.5)
CALCIUM SERPL-MCNC: 8.2 MG/DL — LOW (ref 8.4–10.5)
CHLORIDE SERPL-SCNC: 106 MMOL/L — SIGNIFICANT CHANGE UP (ref 96–108)
CHLORIDE SERPL-SCNC: 108 MMOL/L — SIGNIFICANT CHANGE UP (ref 96–108)
CO2 SERPL-SCNC: 15 MMOL/L — LOW (ref 22–31)
CO2 SERPL-SCNC: 16 MMOL/L — LOW (ref 22–31)
CREAT SERPL-MCNC: 9.28 MG/DL — HIGH (ref 0.5–1.3)
CREAT SERPL-MCNC: 9.46 MG/DL — HIGH (ref 0.5–1.3)
GLUCOSE SERPL-MCNC: 107 MG/DL — HIGH (ref 70–99)
GLUCOSE SERPL-MCNC: 91 MG/DL — SIGNIFICANT CHANGE UP (ref 70–99)
HCT VFR BLD CALC: 22.4 % — LOW (ref 39–50)
HCT VFR BLD CALC: 23.8 % — LOW (ref 39–50)
HGB BLD-MCNC: 7.3 G/DL — LOW (ref 13–17)
HGB BLD-MCNC: 7.8 G/DL — LOW (ref 13–17)
MCHC RBC-ENTMCNC: 27 PG — SIGNIFICANT CHANGE UP (ref 27–34)
MCHC RBC-ENTMCNC: 28.5 PG — SIGNIFICANT CHANGE UP (ref 27–34)
MCHC RBC-ENTMCNC: 32.6 GM/DL — SIGNIFICANT CHANGE UP (ref 32–36)
MCHC RBC-ENTMCNC: 32.8 GM/DL — SIGNIFICANT CHANGE UP (ref 32–36)
MCV RBC AUTO: 83 FL — SIGNIFICANT CHANGE UP (ref 80–100)
MCV RBC AUTO: 86.9 FL — SIGNIFICANT CHANGE UP (ref 80–100)
PLATELET # BLD AUTO: 134 K/UL — LOW (ref 150–400)
PLATELET # BLD AUTO: 149 K/UL — LOW (ref 150–400)
POTASSIUM SERPL-MCNC: 4.6 MMOL/L — SIGNIFICANT CHANGE UP (ref 3.5–5.3)
POTASSIUM SERPL-MCNC: 5 MMOL/L — SIGNIFICANT CHANGE UP (ref 3.5–5.3)
POTASSIUM SERPL-SCNC: 4.6 MMOL/L — SIGNIFICANT CHANGE UP (ref 3.5–5.3)
POTASSIUM SERPL-SCNC: 5 MMOL/L — SIGNIFICANT CHANGE UP (ref 3.5–5.3)
PROT SERPL-MCNC: 4.5 G/DL — LOW (ref 6–8.3)
RBC # BLD: 2.7 M/UL — LOW (ref 4.2–5.8)
RBC # BLD: 2.74 M/UL — LOW (ref 4.2–5.8)
RBC # FLD: 15.3 % — HIGH (ref 10.3–14.5)
RBC # FLD: 17.4 % — HIGH (ref 10.3–14.5)
SODIUM SERPL-SCNC: 134 MMOL/L — LOW (ref 135–145)
SODIUM SERPL-SCNC: 139 MMOL/L — SIGNIFICANT CHANGE UP (ref 135–145)
WBC # BLD: 4.42 K/UL — SIGNIFICANT CHANGE UP (ref 3.8–10.5)
WBC # BLD: 4.8 K/UL — SIGNIFICANT CHANGE UP (ref 3.8–10.5)
WBC # FLD AUTO: 4.42 K/UL — SIGNIFICANT CHANGE UP (ref 3.8–10.5)
WBC # FLD AUTO: 4.8 K/UL — SIGNIFICANT CHANGE UP (ref 3.8–10.5)

## 2017-11-01 PROCEDURE — 99232 SBSQ HOSP IP/OBS MODERATE 35: CPT

## 2017-11-01 RX ORDER — FUROSEMIDE 40 MG
40 TABLET ORAL DAILY
Qty: 0 | Refills: 0 | Status: DISCONTINUED | OUTPATIENT
Start: 2017-11-01 | End: 2017-11-03

## 2017-11-01 RX ADMIN — MAGNESIUM OXIDE 400 MG ORAL TABLET 400 MILLIGRAM(S): 241.3 TABLET ORAL at 11:08

## 2017-11-01 RX ADMIN — CALCITRIOL 0.25 MICROGRAM(S): 0.5 CAPSULE ORAL at 11:07

## 2017-11-01 RX ADMIN — EVEROLIMUS 1 MILLIGRAM(S): 10 TABLET ORAL at 23:12

## 2017-11-01 RX ADMIN — PANTOPRAZOLE SODIUM 40 MILLIGRAM(S): 20 TABLET, DELAYED RELEASE ORAL at 05:59

## 2017-11-01 RX ADMIN — Medication 1300 MILLIGRAM(S): at 17:22

## 2017-11-01 RX ADMIN — Medication 40 MILLIGRAM(S): at 17:22

## 2017-11-01 RX ADMIN — PANTOPRAZOLE SODIUM 40 MILLIGRAM(S): 20 TABLET, DELAYED RELEASE ORAL at 17:22

## 2017-11-01 RX ADMIN — EVEROLIMUS 2 MILLIGRAM(S): 10 TABLET ORAL at 11:08

## 2017-11-01 RX ADMIN — Medication 0.1 MILLIGRAM(S): at 05:59

## 2017-11-01 RX ADMIN — Medication 1 TABLET(S): at 11:06

## 2017-11-01 RX ADMIN — Medication 5 MILLIGRAM(S): at 11:07

## 2017-11-01 RX ADMIN — TACROLIMUS 1 MILLIGRAM(S): 5 CAPSULE ORAL at 23:09

## 2017-11-01 RX ADMIN — Medication 1300 MILLIGRAM(S): at 11:08

## 2017-11-01 NOTE — PROGRESS NOTE ADULT - SUBJECTIVE AND OBJECTIVE BOX
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  History of partial pancreatectomy  History of renal transplant: x 2    FAMILY HISTORY:    MEDICATIONS  (STANDING):  calcitriol   Capsule 0.25 MICROGram(s) Oral daily  cloNIDine      cloNIDine 0.1 milliGRAM(s) Oral daily  everolimus 2 milliGRAM(s) Oral daily  everolimus 1 milliGRAM(s) Oral at bedtime  influenza   Vaccine 0.5 milliLiter(s) IntraMuscular once  magnesium oxide 400 milliGRAM(s) Oral daily  pantoprazole    Tablet 40 milliGRAM(s) Oral two times a day before meals  predniSONE   Tablet 5 milliGRAM(s) Oral daily  sodium bicarbonate 1300 milliGRAM(s) Oral two times a day  tacrolimus 1 milliGRAM(s) Oral <User Schedule>  trimethoprim  160 mG/sulfamethoxazole 800 mG 1 Tablet(s) Oral <User Schedule>    MEDICATIONS  (PRN):      Vital Signs Last 24 Hrs  T(C): 37.6 (01 Nov 2017 08:31), Max: 37.6 (01 Nov 2017 08:31)  T(F): 99.6 (01 Nov 2017 08:31), Max: 99.6 (01 Nov 2017 08:31)  HR: 85 (01 Nov 2017 08:31) (69 - 93)  BP: 149/88 (01 Nov 2017 08:31) (137/81 - 157/83)  BP(mean): --  RR: 18 (01 Nov 2017 08:31) (18 - 18)  SpO2: 96% (01 Nov 2017 08:31) (96% - 99%)  nc/at  s1s2  cta  soft, nt, nd no guarding or rebound  no c/c/e    CBC Full  -  ( 01 Nov 2017 07:29 )  WBC Count : 4.42 K/uL  Hemoglobin : 7.3 g/dL  Hematocrit : 22.4 %  Platelet Count - Automated : 149 K/uL  Mean Cell Volume : 83.0 fl  Mean Cell Hemoglobin : 27.0 pg  Mean Cell Hemoglobin Concentration : 32.6 gm/dL  Auto Neutrophil # : x  Auto Lymphocyte # : x  Auto Monocyte # : x  Auto Eosinophil # : x  Auto Basophil # : x  Auto Neutrophil % : x  Auto Lymphocyte % : x  Auto Monocyte % : x  Auto Eosinophil % : x  Auto Basophil % : x    11-01    139  |  108  |  73<H>  ----------------------------<  91  4.6   |  15<L>  |  9.28<H>    Ca    8.2<L>      01 Nov 2017 07:37

## 2017-11-01 NOTE — PROGRESS NOTE ADULT - SUBJECTIVE AND OBJECTIVE BOX
[facility] DIVISION OF KIDNEY DISEASES AND HYPERTENSION -- FOLLOW UP NOTE  --------------------------------------------------------------------------------  Chief Complaint:  melana, kidney transplant     24 hour events/subjective:  Pt with diarrhea yesterday, no BM yet today.  Also still notes edema.        PAST HISTORY  --------------------------------------------------------------------------------  No significant changes to PMH, PSH, FHx, SHx, unless otherwise noted    ALLERGIES & MEDICATIONS  --------------------------------------------------------------------------------  Allergies    No Known Allergies    Intolerances      Standing Inpatient Medications  calcitriol   Capsule 0.25 MICROGram(s) Oral daily  cloNIDine      cloNIDine 0.1 milliGRAM(s) Oral daily  everolimus 2 milliGRAM(s) Oral daily  everolimus 1 milliGRAM(s) Oral at bedtime  influenza   Vaccine 0.5 milliLiter(s) IntraMuscular once  magnesium oxide 400 milliGRAM(s) Oral daily  pantoprazole    Tablet 40 milliGRAM(s) Oral two times a day before meals  predniSONE   Tablet 5 milliGRAM(s) Oral daily  sodium bicarbonate 1300 milliGRAM(s) Oral two times a day  tacrolimus 1 milliGRAM(s) Oral <User Schedule>  trimethoprim  160 mG/sulfamethoxazole 800 mG 1 Tablet(s) Oral <User Schedule>    PRN Inpatient Medications      REVIEW OF SYSTEMS  --------------------------------------------------------------------------------  Gen: No fatigue, fevers/chills, weakness  Skin: No rashes  Head/Eyes/Ears/Mouth: No headache; No sore throat  Respiratory: No dyspnea, cough,   CV: No chest pain, PND, orthopnea, + edema  GI: + diarrhea  Transplant: No pain  : No increased frequency, dysuria, hematuria, nocturia  MSK: No joint pain/swelling; no back pain; no edema  Neuro: No dizziness/lightheadedness, weakness, seizures, numbness, tingling  Psych: No significant nervousness, anxiety, stress, depression    All other systems were reviewed and are negative, except as noted.    VITALS/PHYSICAL EXAM  --------------------------------------------------------------------------------  T(C): 37.6 (11-01-17 @ 08:31), Max: 37.6 (11-01-17 @ 08:31)  HR: 85 (11-01-17 @ 08:31) (69 - 93)  BP: 149/88 (11-01-17 @ 08:31) (137/81 - 157/83)  RR: 18 (11-01-17 @ 08:31) (18 - 18)  SpO2: 96% (11-01-17 @ 08:31) (96% - 99%)  Wt(kg): --        10-31-17 @ 07:01  -  11-01-17 @ 07:00  --------------------------------------------------------  IN: 1260 mL / OUT: 1900 mL / NET: -640 mL      Physical Exam:  	Gen: NAD, well-appearing  	HEENT: PERRL, supple neck, clear oropharynx  	Pulm: CTA B/L  	CV: RRR, S1S2; no rub  	Back: No spinal or CVA tenderness; no sacral edema  	Abd: +BS, soft, nontender/nondistended                      Transplant: No tenderness, swelling  	: No suprapubic tenderness  	UE: Warm, FROM, intact strength; no edema; no asterixis  	LE: Warm, FROM, intact strength; +1 b/l LE edema  	Neuro: No focal deficits, intact gait  	Psych: Normal affect and mood  	Skin: Warm, without rashes      LABS/STUDIES  --------------------------------------------------------------------------------              7.3    4.42  >-----------<  149      [11-01-17 @ 07:29]              22.4     139  |  108  |  73  ----------------------------<  91      [11-01-17 @ 07:37]  4.6   |  15  |  9.28        Ca     8.2     [11-01-17 @ 07:37]            Creatinine Trend:  SCr 9.28 [11-01 @ 07:37]  SCr 9.12 [10-31 @ 07:46]  SCr 9.60 [10-30 @ 07:25]  SCr 9.70 [10-29 @ 08:56]  SCr 9.68 [10-28 @ 15:46]    Tacrolimus (), Serum: 2.6 ng/mL (10-27 @ 12:26)

## 2017-11-01 NOTE — DIETITIAN INITIAL EVALUATION ADULT. - NS AS NUTRI INTERV MEALS SNACK
As medically feasible advance diet per GI. Noted previously elevated phosphorus, plan to monitor. Pt states his transplant nephrologist does not want any diet restrictions as pt knows what he should have, NP aware. Encourage po intake with nutrient dense foods. Provide food preferences as able. Monitor weight, lab values, po intake and GI tolerance. RD to remain available for further nutrition interventions as indicated.

## 2017-11-01 NOTE — DIETITIAN INITIAL EVALUATION ADULT. - OTHER INFO
Nutrition assessment for length of stay. Pt reports having good po intake on clear and full liquid diets and anxiously awaiting advancement of diet. Pt declines nutrition supplements upon diet advancement. Per discussion with NP, awaiting GI confirmation for advancement. No nausea or vomiting. Pt reports bowel movements are watery but pt is no longer having melena. No chewing or swallowing difficulties at this time. No known food allergies.

## 2017-11-01 NOTE — DIETITIAN INITIAL EVALUATION ADULT. - ADHERENCE
Pt reports working very close with a transplant nephrologist and RD PTA and states following a low protein, low fat diet. Pt states he having "experience to do it without becoming malnourished". Pt S/P 2 kidney transplants, states he will need a third.

## 2017-11-01 NOTE — DIETITIAN INITIAL EVALUATION ADULT. - ENERGY NEEDS
IBW: 178 lbs (+/-10%)  Pertinent Information: Pt admitted with GIB S/P clipping. Pt S/P kidney transplant x2, Acute on CKD 3. Pt awaiting GI clearance for diet advancement.   1+ parul. foot edema, no pressure injury

## 2017-11-01 NOTE — PROGRESS NOTE ADULT - SUBJECTIVE AND OBJECTIVE BOX
MEDICINE, PROGRESS NOTE [phone]    MEAGAN RAMOS 57y [id number]    Patient seen and examined.  Patient is a 57y old  Male who presents with a chief complaint of started having black stools today (26 Oct 2017 18:33)  Pt has no current complaints.    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  History of partial pancreatectomy  History of renal transplant: x 2    MEDICATIONS  (STANDING):  calcitriol   Capsule 0.25 MICROGram(s) Oral daily  cloNIDine      cloNIDine 0.1 milliGRAM(s) Oral daily  everolimus 2 milliGRAM(s) Oral daily  everolimus 1 milliGRAM(s) Oral at bedtime  furosemide    Tablet 40 milliGRAM(s) Oral daily  influenza   Vaccine 0.5 milliLiter(s) IntraMuscular once  magnesium oxide 400 milliGRAM(s) Oral daily  pantoprazole    Tablet 40 milliGRAM(s) Oral two times a day before meals  predniSONE   Tablet 5 milliGRAM(s) Oral daily  sodium bicarbonate 1300 milliGRAM(s) Oral two times a day  tacrolimus 1 milliGRAM(s) Oral <User Schedule>  trimethoprim  160 mG/sulfamethoxazole 800 mG 1 Tablet(s) Oral <User Schedule>    MEDICATIONS  (PRN):    Allergies    No Known Allergies    Intolerances        PHYSICAL EXAM:  Constitutional: NAD  HEENT: Normocephalic, EOMI  Neck:  No JVD  Respiratory: CTA B/L, No wheezes  Cardiovascular: S1, S2, RRR, + systolic murmur  Gastrointestinal: BS+, soft, NT/ND  Extremities: No peripheral edema  Neurological: AAOX3, no focal deficits  Psychiatric: Normal mood, normal affect  : No Augustin    Vital Signs Last 24 Hrs  T(C): 36.9 (01 Nov 2017 16:30), Max: 37.6 (01 Nov 2017 08:31)  T(F): 98.5 (01 Nov 2017 16:30), Max: 99.6 (01 Nov 2017 08:31)  HR: 76 (01 Nov 2017 16:30) (69 - 93)  BP: 149/80 (01 Nov 2017 16:30) (137/81 - 157/83)  BP(mean): --  RR: 18 (01 Nov 2017 16:30) (18 - 18)  SpO2: 98% (01 Nov 2017 16:30) (96% - 99%)  I&O's Summary    31 Oct 2017 07:01  -  01 Nov 2017 07:00  --------------------------------------------------------  IN: 1260 mL / OUT: 1900 mL / NET: -640 mL    01 Nov 2017 07:01  -  01 Nov 2017 17:00  --------------------------------------------------------  IN: 630 mL / OUT: 1200 mL / NET: -570 mL        LABS:                        7.8    4.8   )-----------( 134      ( 01 Nov 2017 15:48 )             23.8     11-01    134<L>  |  106  |  70<H>  ----------------------------<  107<H>  5.0   |  16<L>  |  9.46<H>    Ca    8.0<L>      01 Nov 2017 15:48    TPro  4.5<L>  /  Alb  2.8<L>  /  TBili  0.5  /  DBili  x   /  AST  22  /  ALT  16  /  AlkPhos  41  11-01

## 2017-11-01 NOTE — DIETITIAN INITIAL EVALUATION ADULT. - ORAL INTAKE PTA
good/Pt reports although he was having diarrhea for 2 weeks PTA he was eating well and normally until the day before admission. Typical intake: fruit such as peaches, plums and grapes for breakfast; chicken or bean soups with vegetables, salads and fruit for lunch; grilled chicken, vegetables and fruit; snacks on nuts. Pt also reports taking MVI, Vitamin C, CoQ10, Omega 2 fish oil PTA.

## 2017-11-01 NOTE — DIETITIAN INITIAL EVALUATION ADULT. - NS FNS WEIGHT CHANGE REASON
intentional/Pt reports by cutting out desserts and red meat and having healthier snacks, he was able to lose wt from 224 pounds to 190 pounds PTA and states being at his goal wt.

## 2017-11-02 DIAGNOSIS — B25.9 CYTOMEGALOVIRAL DISEASE, UNSPECIFIED: ICD-10-CM

## 2017-11-02 LAB
ANION GAP SERPL CALC-SCNC: 17 MMOL/L — SIGNIFICANT CHANGE UP (ref 5–17)
BUN SERPL-MCNC: 69 MG/DL — HIGH (ref 7–23)
CALCIUM SERPL-MCNC: 8 MG/DL — LOW (ref 8.4–10.5)
CHLORIDE SERPL-SCNC: 105 MMOL/L — SIGNIFICANT CHANGE UP (ref 96–108)
CMV DNA CSF QL NAA+PROBE: 442 — SIGNIFICANT CHANGE UP
CMV DNA SPEC NAA+PROBE-LOG#: 2.65 LOGIU/ML — HIGH
CO2 SERPL-SCNC: 15 MMOL/L — LOW (ref 22–31)
CREAT SERPL-MCNC: 9.75 MG/DL — HIGH (ref 0.5–1.3)
GLUCOSE SERPL-MCNC: 91 MG/DL — SIGNIFICANT CHANGE UP (ref 70–99)
HCT VFR BLD CALC: 22.6 % — LOW (ref 39–50)
HGB BLD-MCNC: 7.3 G/DL — LOW (ref 13–17)
MCHC RBC-ENTMCNC: 26.7 PG — LOW (ref 27–34)
MCHC RBC-ENTMCNC: 32.3 GM/DL — SIGNIFICANT CHANGE UP (ref 32–36)
MCV RBC AUTO: 82.8 FL — SIGNIFICANT CHANGE UP (ref 80–100)
PLATELET # BLD AUTO: 156 K/UL — SIGNIFICANT CHANGE UP (ref 150–400)
POTASSIUM SERPL-MCNC: 4.3 MMOL/L — SIGNIFICANT CHANGE UP (ref 3.5–5.3)
POTASSIUM SERPL-SCNC: 4.3 MMOL/L — SIGNIFICANT CHANGE UP (ref 3.5–5.3)
RBC # BLD: 2.73 M/UL — LOW (ref 4.2–5.8)
RBC # FLD: 17.3 % — HIGH (ref 10.3–14.5)
SODIUM SERPL-SCNC: 137 MMOL/L — SIGNIFICANT CHANGE UP (ref 135–145)
WBC # BLD: 4.37 K/UL — SIGNIFICANT CHANGE UP (ref 3.8–10.5)
WBC # FLD AUTO: 4.37 K/UL — SIGNIFICANT CHANGE UP (ref 3.8–10.5)

## 2017-11-02 PROCEDURE — 99232 SBSQ HOSP IP/OBS MODERATE 35: CPT

## 2017-11-02 RX ORDER — NIFEDIPINE 30 MG
30 TABLET, EXTENDED RELEASE 24 HR ORAL DAILY
Qty: 0 | Refills: 0 | Status: DISCONTINUED | OUTPATIENT
Start: 2017-11-02 | End: 2017-11-03

## 2017-11-02 RX ORDER — CITRIC ACID/SODIUM CITRATE 300-500 MG
30 SOLUTION, ORAL ORAL
Qty: 0 | Refills: 0 | Status: DISCONTINUED | OUTPATIENT
Start: 2017-11-02 | End: 2017-11-03

## 2017-11-02 RX ORDER — VALGANCICLOVIR 450 MG/1
450 TABLET, FILM COATED ORAL
Qty: 0 | Refills: 0 | Status: DISCONTINUED | OUTPATIENT
Start: 2017-11-02 | End: 2017-11-03

## 2017-11-02 RX ADMIN — PANTOPRAZOLE SODIUM 40 MILLIGRAM(S): 20 TABLET, DELAYED RELEASE ORAL at 17:11

## 2017-11-02 RX ADMIN — CALCITRIOL 0.25 MICROGRAM(S): 0.5 CAPSULE ORAL at 11:33

## 2017-11-02 RX ADMIN — Medication 0.1 MILLIGRAM(S): at 07:01

## 2017-11-02 RX ADMIN — Medication 5 MILLIGRAM(S): at 07:01

## 2017-11-02 RX ADMIN — Medication 1300 MILLIGRAM(S): at 17:10

## 2017-11-02 RX ADMIN — Medication 1300 MILLIGRAM(S): at 07:49

## 2017-11-02 RX ADMIN — Medication 40 MILLIGRAM(S): at 07:01

## 2017-11-02 RX ADMIN — TACROLIMUS 1 MILLIGRAM(S): 5 CAPSULE ORAL at 23:11

## 2017-11-02 RX ADMIN — EVEROLIMUS 1 MILLIGRAM(S): 10 TABLET ORAL at 23:12

## 2017-11-02 RX ADMIN — PANTOPRAZOLE SODIUM 40 MILLIGRAM(S): 20 TABLET, DELAYED RELEASE ORAL at 07:01

## 2017-11-02 RX ADMIN — MAGNESIUM OXIDE 400 MG ORAL TABLET 400 MILLIGRAM(S): 241.3 TABLET ORAL at 11:34

## 2017-11-02 RX ADMIN — EVEROLIMUS 2 MILLIGRAM(S): 10 TABLET ORAL at 11:40

## 2017-11-02 NOTE — PROGRESS NOTE ADULT - SUBJECTIVE AND OBJECTIVE BOX
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  History of partial pancreatectomy  History of renal transplant: x 2    FAMILY HISTORY:    MEDICATIONS  (STANDING):  calcitriol   Capsule 0.25 MICROGram(s) Oral daily  cloNIDine      cloNIDine 0.1 milliGRAM(s) Oral daily  everolimus 2 milliGRAM(s) Oral daily  everolimus 1 milliGRAM(s) Oral at bedtime  furosemide    Tablet 40 milliGRAM(s) Oral daily  influenza   Vaccine 0.5 milliLiter(s) IntraMuscular once  magnesium oxide 400 milliGRAM(s) Oral daily  pantoprazole    Tablet 40 milliGRAM(s) Oral two times a day before meals  predniSONE   Tablet 5 milliGRAM(s) Oral daily  sodium bicarbonate 1300 milliGRAM(s) Oral two times a day  tacrolimus 1 milliGRAM(s) Oral <User Schedule>  trimethoprim  160 mG/sulfamethoxazole 800 mG 1 Tablet(s) Oral <User Schedule>    MEDICATIONS  (PRN):      Vital Signs Last 24 Hrs  T(C): 36.9 (02 Nov 2017 04:17), Max: 37.2 (02 Nov 2017 00:25)  T(F): 98.5 (02 Nov 2017 04:17), Max: 99 (02 Nov 2017 00:25)  HR: 82 (02 Nov 2017 06:56) (74 - 82)  BP: 159/82 (02 Nov 2017 06:56) (149/80 - 166/85)  BP(mean): --  RR: 16 (02 Nov 2017 04:17) (16 - 18)  SpO2: 96% (02 Nov 2017 04:17) (96% - 98%)  nc/at  s1s2  cta  soft, nt, nd no guarding or rebound  no c/c/e    CBC Full  -  ( 02 Nov 2017 07:25 )  WBC Count : 4.37 K/uL  Hemoglobin : 7.3 g/dL  Hematocrit : 22.6 %  Platelet Count - Automated : 156 K/uL  Mean Cell Volume : 82.8 fl  Mean Cell Hemoglobin : 26.7 pg  Mean Cell Hemoglobin Concentration : 32.3 gm/dL  Auto Neutrophil # : x  Auto Lymphocyte # : x  Auto Monocyte # : x  Auto Eosinophil # : x  Auto Basophil # : x  Auto Neutrophil % : x  Auto Lymphocyte % : x  Auto Monocyte % : x  Auto Eosinophil % : x  Auto Basophil % : x    11-01    134<L>  |  106  |  70<H>  ----------------------------<  107<H>  5.0   |  16<L>  |  9.46<H>    Ca    8.0<L>      01 Nov 2017 15:48    TPro  4.5<L>  /  Alb  2.8<L>  /  TBili  0.5  /  DBili  x   /  AST  22  /  ALT  16  /  AlkPhos  41  11-01

## 2017-11-02 NOTE — PROGRESS NOTE ADULT - SUBJECTIVE AND OBJECTIVE BOX
[facility] DIVISION OF KIDNEY DISEASES AND HYPERTENSION -- FOLLOW UP NOTE  --------------------------------------------------------------------------------  Chief Complaint:  GI bleed    24 hour events/subjective:  Pt feels ok.  Had diarrhea yesterday but no BM yet today.  No n/v, good apatite.        PAST HISTORY  --------------------------------------------------------------------------------  No significant changes to PMH, PSH, FHx, SHx, unless otherwise noted    ALLERGIES & MEDICATIONS  --------------------------------------------------------------------------------  Allergies    No Known Allergies    Intolerances      Standing Inpatient Medications  calcitriol   Capsule 0.25 MICROGram(s) Oral daily  cloNIDine      cloNIDine 0.1 milliGRAM(s) Oral daily  everolimus 2 milliGRAM(s) Oral daily  everolimus 1 milliGRAM(s) Oral at bedtime  furosemide    Tablet 40 milliGRAM(s) Oral daily  influenza   Vaccine 0.5 milliLiter(s) IntraMuscular once  magnesium oxide 400 milliGRAM(s) Oral daily  pantoprazole    Tablet 40 milliGRAM(s) Oral two times a day before meals  predniSONE   Tablet 5 milliGRAM(s) Oral daily  sodium bicarbonate 1300 milliGRAM(s) Oral two times a day  tacrolimus 1 milliGRAM(s) Oral <User Schedule>  trimethoprim  160 mG/sulfamethoxazole 800 mG 1 Tablet(s) Oral <User Schedule>    PRN Inpatient Medications      REVIEW OF SYSTEMS  --------------------------------------------------------------------------------  Gen: No fatigue, fevers/chills, weakness  Skin: No rashes  Head/Eyes/Ears/Mouth: No headache;No sore throat  Respiratory: No dyspnea, cough,   CV: No chest pain, PND, orthopnea  GI: diarrhea  Transplant: No pain  : No increased frequency, dysuria, hematuria, nocturia  MSK: No joint pain/swelling; no back pain; no edema  Neuro: No dizziness/lightheadedness, weakness, seizures, numbness, tingling  Psych: No significant nervousness, anxiety, stress, depression    All other systems were reviewed and are negative, except as noted.    VITALS/PHYSICAL EXAM  --------------------------------------------------------------------------------  T(C): 36.8 (11-02-17 @ 12:31), Max: 37.2 (11-02-17 @ 00:25)  HR: 76 (11-02-17 @ 12:31) (74 - 82)  BP: 142/78 (11-02-17 @ 12:31) (142/78 - 166/85)  RR: 18 (11-02-17 @ 12:31) (16 - 18)  SpO2: 97% (11-02-17 @ 12:31) (96% - 98%)  Wt(kg): --        11-01-17 @ 07:01  -  11-02-17 @ 07:00  --------------------------------------------------------  IN: 990 mL / OUT: 1650 mL / NET: -660 mL    11-02-17 @ 07:01  -  11-02-17 @ 17:35  --------------------------------------------------------  IN: 600 mL / OUT: 1900 mL / NET: -1300 mL      Physical Exam:  	Gen: NAD, well-appearing  	HEENT: PERRL, supple neck, clear oropharynx  	Pulm: CTA B/L  	CV: RRR, S1S2; no rub  	Back: No spinal or CVA tenderness; no sacral edema  	Abd: +BS, soft, nontender/nondistended                      Transplant: No tenderness, swelling  	: No suprapubic tenderness  	UE: Warm, FROM, intact strength; no edema; no asterixis  	LE: +1 b/l LE edema  	Neuro: No focal deficits, intact gait  	Psych: Normal affect and mood  	Skin: Warm, without rashes      LABS/STUDIES  --------------------------------------------------------------------------------              7.3    4.37  >-----------<  156      [11-02-17 @ 07:25]              22.6     137  |  105  |  69  ----------------------------<  91      [11-02-17 @ 07:25]  4.3   |  15  |  9.75        Ca     8.0     [11-02-17 @ 07:25]    TPro  4.5  /  Alb  2.8  /  TBili  0.5  /  DBili  x   /  AST  22  /  ALT  16  /  AlkPhos  41  [11-01-17 @ 15:48]          Creatinine Trend:  SCr 9.75 [11-02 @ 07:25]  SCr 9.46 [11-01 @ 15:48]  SCr 9.28 [11-01 @ 07:37]  SCr 9.12 [10-31 @ 07:46]  SCr 9.60 [10-30 @ 07:25]    Tacrolimus (), Serum: 2.6 ng/mL (10-27 @ 12:26)

## 2017-11-02 NOTE — PROGRESS NOTE ADULT - SUBJECTIVE AND OBJECTIVE BOX
MEDICINE, PROGRESS NOTE [phone]    MEAGAN RAMOS 57y [id number]    Patient seen and examined.  Patient is a 57y old  Male who presents with a chief complaint of started having black stools today (26 Oct 2017 18:33)  Pt feels well. swelling in legs is worse.    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  History of partial pancreatectomy  History of renal transplant: x 2    MEDICATIONS  (STANDING):  calcitriol   Capsule 0.25 MICROGram(s) Oral daily  citric acid/sodium citrate Solution 30 milliLiter(s) Oral two times a day  cloNIDine      cloNIDine 0.1 milliGRAM(s) Oral daily  everolimus 2 milliGRAM(s) Oral daily  everolimus 1 milliGRAM(s) Oral at bedtime  furosemide    Tablet 40 milliGRAM(s) Oral daily  influenza   Vaccine 0.5 milliLiter(s) IntraMuscular once  magnesium oxide 400 milliGRAM(s) Oral daily  NIFEdipine XL 30 milliGRAM(s) Oral daily  pantoprazole    Tablet 40 milliGRAM(s) Oral two times a day before meals  predniSONE   Tablet 5 milliGRAM(s) Oral daily  tacrolimus 1 milliGRAM(s) Oral <User Schedule>  trimethoprim  160 mG/sulfamethoxazole 800 mG 1 Tablet(s) Oral <User Schedule>  valGANciclovir 450 milliGRAM(s) Oral <User Schedule>    MEDICATIONS  (PRN):    Allergies    No Known Allergies    Intolerances        PHYSICAL EXAM:  Constitutional: NAD  HEENT: Normocephalic, EOMI  Neck:  No JVD  Respiratory: CTA B/L, No wheezes  Cardiovascular: S1, S2, RRR, + systolic murmur  Gastrointestinal: BS+, soft, NT/ND  Extremities: No peripheral edema  Neurological: AAOX3, no focal deficits  Psychiatric: Normal mood, normal affect  : No Augustin    Vital Signs Last 24 Hrs  T(C): 36.8 (02 Nov 2017 12:31), Max: 37.2 (02 Nov 2017 00:25)  T(F): 98.2 (02 Nov 2017 12:31), Max: 99 (02 Nov 2017 00:25)  HR: 76 (02 Nov 2017 12:31) (74 - 82)  BP: 142/78 (02 Nov 2017 12:31) (142/78 - 159/82)  BP(mean): --  RR: 18 (02 Nov 2017 12:31) (16 - 18)  SpO2: 97% (02 Nov 2017 12:31) (96% - 98%)  I&O's Summary    01 Nov 2017 07:01  -  02 Nov 2017 07:00  --------------------------------------------------------  IN: 990 mL / OUT: 1650 mL / NET: -660 mL    02 Nov 2017 07:01  -  02 Nov 2017 20:47  --------------------------------------------------------  IN: 600 mL / OUT: 2350 mL / NET: -1750 mL        LABS:                        7.3    4.37  )-----------( 156      ( 02 Nov 2017 07:25 )             22.6     11-02    137  |  105  |  69<H>  ----------------------------<  91  4.3   |  15<L>  |  9.75<H>    Ca    8.0<L>      02 Nov 2017 07:25    TPro  4.5<L>  /  Alb  2.8<L>  /  TBili  0.5  /  DBili  x   /  AST  22  /  ALT  16  /  AlkPhos  41  11-01

## 2017-11-03 ENCOUNTER — TRANSCRIPTION ENCOUNTER (OUTPATIENT)
Age: 57
End: 2017-11-03

## 2017-11-03 VITALS
HEART RATE: 84 BPM | OXYGEN SATURATION: 97 % | TEMPERATURE: 98 F | RESPIRATION RATE: 18 BRPM | DIASTOLIC BLOOD PRESSURE: 68 MMHG | SYSTOLIC BLOOD PRESSURE: 142 MMHG

## 2017-11-03 LAB
ANION GAP SERPL CALC-SCNC: 16 MMOL/L — SIGNIFICANT CHANGE UP (ref 5–17)
BUN SERPL-MCNC: 72 MG/DL — HIGH (ref 7–23)
CALCIUM SERPL-MCNC: 8 MG/DL — LOW (ref 8.4–10.5)
CHLORIDE SERPL-SCNC: 105 MMOL/L — SIGNIFICANT CHANGE UP (ref 96–108)
CO2 SERPL-SCNC: 17 MMOL/L — LOW (ref 22–31)
CREAT SERPL-MCNC: 9.41 MG/DL — HIGH (ref 0.5–1.3)
GLUCOSE SERPL-MCNC: 122 MG/DL — HIGH (ref 70–99)
HCT VFR BLD CALC: 23.3 % — LOW (ref 39–50)
HGB BLD-MCNC: 7.4 G/DL — LOW (ref 13–17)
MCHC RBC-ENTMCNC: 27.5 PG — SIGNIFICANT CHANGE UP (ref 27–34)
MCHC RBC-ENTMCNC: 31.5 GM/DL — LOW (ref 32–36)
MCV RBC AUTO: 87.2 FL — SIGNIFICANT CHANGE UP (ref 80–100)
PHOSPHATE SERPL-MCNC: 5.3 MG/DL — HIGH (ref 2.5–4.5)
PLATELET # BLD AUTO: 143 K/UL — LOW (ref 150–400)
POTASSIUM SERPL-MCNC: 4.4 MMOL/L — SIGNIFICANT CHANGE UP (ref 3.5–5.3)
POTASSIUM SERPL-SCNC: 4.4 MMOL/L — SIGNIFICANT CHANGE UP (ref 3.5–5.3)
RBC # BLD: 2.67 M/UL — LOW (ref 4.2–5.8)
RBC # FLD: 15.3 % — HIGH (ref 10.3–14.5)
SODIUM SERPL-SCNC: 138 MMOL/L — SIGNIFICANT CHANGE UP (ref 135–145)
WBC # BLD: 5.4 K/UL — SIGNIFICANT CHANGE UP (ref 3.8–10.5)
WBC # FLD AUTO: 5.4 K/UL — SIGNIFICANT CHANGE UP (ref 3.8–10.5)

## 2017-11-03 PROCEDURE — 99232 SBSQ HOSP IP/OBS MODERATE 35: CPT

## 2017-11-03 RX ORDER — VALGANCICLOVIR 450 MG/1
1 TABLET, FILM COATED ORAL
Qty: 15 | Refills: 0 | OUTPATIENT
Start: 2017-11-03 | End: 2017-12-03

## 2017-11-03 RX ORDER — PANTOPRAZOLE SODIUM 20 MG/1
1 TABLET, DELAYED RELEASE ORAL
Qty: 60 | Refills: 0 | OUTPATIENT
Start: 2017-11-03 | End: 2017-12-03

## 2017-11-03 RX ORDER — CITRIC ACID/SODIUM CITRATE 300-500 MG
30 SOLUTION, ORAL ORAL
Qty: 1000 | Refills: 0 | OUTPATIENT
Start: 2017-11-03 | End: 2017-12-03

## 2017-11-03 RX ORDER — MYCOPHENOLIC ACID 180 MG/1
1 TABLET, DELAYED RELEASE ORAL
Qty: 0 | Refills: 0 | COMMUNITY

## 2017-11-03 RX ORDER — ERYTHROPOIETIN 10000 [IU]/ML
20000 INJECTION, SOLUTION INTRAVENOUS; SUBCUTANEOUS ONCE
Qty: 0 | Refills: 0 | Status: COMPLETED | OUTPATIENT
Start: 2017-11-03 | End: 2017-11-03

## 2017-11-03 RX ORDER — NIFEDIPINE 30 MG
1 TABLET, EXTENDED RELEASE 24 HR ORAL
Qty: 30 | Refills: 0 | OUTPATIENT
Start: 2017-11-03 | End: 2017-12-03

## 2017-11-03 RX ORDER — SODIUM BICARBONATE 1 MEQ/ML
2 SYRINGE (ML) INTRAVENOUS
Qty: 0 | Refills: 0 | COMMUNITY

## 2017-11-03 RX ORDER — SODIUM BICARBONATE 1 MEQ/ML
2 SYRINGE (ML) INTRAVENOUS
Qty: 84 | Refills: 0 | OUTPATIENT
Start: 2017-11-03 | End: 2017-11-17

## 2017-11-03 RX ADMIN — Medication 30 MILLIGRAM(S): at 07:12

## 2017-11-03 RX ADMIN — ERYTHROPOIETIN 20000 UNIT(S): 10000 INJECTION, SOLUTION INTRAVENOUS; SUBCUTANEOUS at 16:19

## 2017-11-03 RX ADMIN — Medication 30 MILLILITER(S): at 07:16

## 2017-11-03 RX ADMIN — VALGANCICLOVIR 450 MILLIGRAM(S): 450 TABLET, FILM COATED ORAL at 08:17

## 2017-11-03 RX ADMIN — Medication 5 MILLIGRAM(S): at 11:27

## 2017-11-03 RX ADMIN — PANTOPRAZOLE SODIUM 40 MILLIGRAM(S): 20 TABLET, DELAYED RELEASE ORAL at 16:19

## 2017-11-03 RX ADMIN — Medication 40 MILLIGRAM(S): at 07:12

## 2017-11-03 RX ADMIN — MAGNESIUM OXIDE 400 MG ORAL TABLET 400 MILLIGRAM(S): 241.3 TABLET ORAL at 11:27

## 2017-11-03 RX ADMIN — INFLUENZA VIRUS VACCINE 0.5 MILLILITER(S): 15; 15; 15; 15 SUSPENSION INTRAMUSCULAR at 11:25

## 2017-11-03 RX ADMIN — EVEROLIMUS 2 MILLIGRAM(S): 10 TABLET ORAL at 11:28

## 2017-11-03 RX ADMIN — CALCITRIOL 0.25 MICROGRAM(S): 0.5 CAPSULE ORAL at 11:27

## 2017-11-03 RX ADMIN — PANTOPRAZOLE SODIUM 40 MILLIGRAM(S): 20 TABLET, DELAYED RELEASE ORAL at 07:12

## 2017-11-03 RX ADMIN — Medication 1 TABLET(S): at 08:17

## 2017-11-03 RX ADMIN — Medication 0.1 MILLIGRAM(S): at 07:12

## 2017-11-03 NOTE — DISCHARGE NOTE ADULT - SECONDARY DIAGNOSIS.
AMARI (acute kidney injury) History of renal transplant Cytomegalovirus infection, unspecified cytomegaloviral infection type

## 2017-11-03 NOTE — DISCHARGE NOTE ADULT - CARE PROVIDER_API CALL
Derik Patton), Internal Medicine; Nephrology  [address] [place] [address]  Phone: [phone]  Fax: [phone]    Mathew Rios), Gastroenterology  [address] [place] [address] [address]  [place] [address]  Phone: [phone]  Fax: [phone]

## 2017-11-03 NOTE — DISCHARGE NOTE ADULT - MEDICATION SUMMARY - MEDICATIONS TO TAKE
I will START or STAY ON the medications listed below when I get home from the hospital:    predniSONE 5 mg oral tablet  -- 1 tab(s) by mouth once a day (in the morning)  -- Indication: For Immunosuppressive drug    aspirin 81 mg oral tablet  -- 1 tab(s) by mouth once a day  -- Indication: For CAD    sodium bicarbonate 650 mg oral tablet  -- 2 tab(s) by mouth 3 times a day   -- Indication: For Metabolic acidosis, Renal failure    cloNIDine 0.1 mg oral tablet  -- 1 tab(s) by mouth once a day (in the evening)  -- Indication: For HTN (hypertension)    valGANciclovir 450 mg oral tablet  -- 1 tab(s) by mouth every 48 hours   -- Indication: For Cytomegalovirus infection, unspecified cytomegaloviral infection type    NIFEdipine 60 mg oral tablet, extended release  -- 1 tab(s) by mouth once a day (in the morning)  -- Indication: For HTN (hypertension)    Lasix 40 mg oral tablet  -- 1 tab(s) by mouth once a day (in the morning)  -- Indication: For HTN (hypertension)    Procrit 20,000 units/mL injectable solution  -- injectable once a month  -- Indication: For Anemia     tacrolimus 1 mg oral capsule  -- 1 cap(s) by mouth once a day (in the evening)  -- Indication: For Renal transplant    Mag-Ox 400 oral tablet  -- 1 tab(s) by mouth once a day (in the morning)  -- Indication: For Magnesium supplement    Bactrim  mg-160 mg oral tablet  -- 1 tab(s) by mouth 3 times a week (M, W, F)  -- Indication: For Antibiotic    Zortress 0.5 mg oral tablet  -- 4 tab(s) by mouth in the morning & 2 tab(s) in the evening  -- Indication: For immunosuppressant, renal transplant    Co Q-10  -- 1 cap(s) by mouth 2 times a day  -- Indication: For Supplement    Omega-3  -- 1 cap(s) by mouth 2 times a day  -- Indication: For Supplement    pantoprazole 40 mg oral delayed release tablet  -- 1 tab(s) by mouth 2 times a day (before meals)  -- Indication: For GERD    calcitriol 0.25 mcg oral capsule  -- 1 cap(s) by mouth once a day (in the morning)  -- Indication: For Calcium    Vitamin D3 2000 intl units oral tablet  -- 1 tab(s) by mouth once a day (in the morning)  -- Indication: For vit D    Vitamin C  -- 1 tab(s) by mouth 2 times a week (Mon & Wed)  -- Indication: For Vit C

## 2017-11-03 NOTE — DISCHARGE NOTE ADULT - CARE PROVIDERS DIRECT ADDRESSES
[email or web address]

## 2017-11-03 NOTE — PROGRESS NOTE ADULT - ASSESSMENT
GIB s/p clipping  Acute on ckd 3    continue current care  monitor renal function  pt starting to get edematous  encourage activity  plan to advance to soft diet sulema if gi agrees.
Likely acute UGI bleed, for EGD later today, transfuse to Hb of at least 8
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
Continue to watch for ongoing bleeding, stool c. diff if its mostly diarrhea, repeat EGD if has ongoing bleeding
GIB  acute on ckd 4 in renal transplant    continue current care  continue immunosuppression  await endoscopy and bx  monitor labs   trasfuse as needed
GIB s/p clipping  Acute on ckd 3 in renal transplant    if tolerating fulls may consider advance to soft diet sulema  plan to d/c protonix drip sulema and switch to ivp q 12  continue to monitor renal function  will have to review with renal about transplant
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
GIB s/p clipping  acute on ckd 3-4    continue current care  advance to full liquids sulema  encourage activity  f/u abs in am  s/p prbc
GIB s/p clipping  acute on ckd 4 in renal transplant    continue current care  monitor h/h   will potentially transfuse if needed  continue on clears
GIB s/p clipping  acute on ckd 5 after clarifying outpt cr is at 6-7  cmv viremia    check cmv pcr  continue current care  start lasix  advance diet  monitor h/h
acute on ckd 4-5 renal transplant  gib s/p clipping  metabolic acidosis  worsening cmv viremia    appreciate renal input  starti Bicitra  start valcyte  continue diuresis  poss d/c planning sulema
continjeu ppi drip and clears. would transfuse to 10/30 if risk<< benefit.  still dark stool. conservative management.  if persists angio vs rescope.  I think will stop with conservative managemtn
massive gib from avm.  recommend ppi continue, s/p inject epi, cautery and clip.  bx small ulcer r/o cmv.  stable.
no acitve gib ok advance diet
no sign of gib.  cotninue conservative managment.  can advance diet. ppi drip until tomorrow.
sb avm.  no further bleeding, advanced diet, discharge planning
stable from GI standpoint for d/c home with outpt f/u and continue PPI therapy
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

## 2017-11-03 NOTE — DISCHARGE NOTE ADULT - VISION (WITH CORRECTIVE LENSES IF THE PATIENT USUALLY WEARS THEM):
Normal vision: sees adequately in most situations; can see medication labels, newsprint/Reading glasses in use and present at this time

## 2017-11-03 NOTE — PROGRESS NOTE ADULT - PROBLEM SELECTOR PLAN 3
BP currently stable; continue to hold anti hypertensives for now. If BP more than 140/90 mm Hg, can restart on clonidine 0.1 mg daily.
BP elevated and pt with edema.  Start lasix 40mgs PO daily (pts home dose).
BP improved.  Can start nifedipine 30mgs next
BP more than 146/77 mm Hg, can restart on clonidine 0.1 mg daily.
BP uncontrolled. Currently on clonidine. Can start on nifedipine 30 mg daily if BP remains uncontrolled. Monitor BP. Goal BP is less than 140/90 mm Hg.
BP uncontrolled. Was started on clonidine today. Can restart on nifedipine 60 mg daily if BP remains uncontrolled. Monitor BP. Goal BP is less than 140/90 mm Hg.
d/c home on nifedipine 60

## 2017-11-03 NOTE — PROGRESS NOTE ADULT - SUBJECTIVE AND OBJECTIVE BOX
INTERVAL HPI/OVERNIGHT EVENTS: Doing well. No further melena. Had normal brown stool    MEDICATIONS  (STANDING):  calcitriol   Capsule 0.25 MICROGram(s) Oral daily  citric acid/sodium citrate Solution 30 milliLiter(s) Oral two times a day  cloNIDine      cloNIDine 0.1 milliGRAM(s) Oral daily  everolimus 2 milliGRAM(s) Oral daily  everolimus 1 milliGRAM(s) Oral at bedtime  furosemide    Tablet 40 milliGRAM(s) Oral daily  influenza   Vaccine 0.5 milliLiter(s) IntraMuscular once  magnesium oxide 400 milliGRAM(s) Oral daily  NIFEdipine XL 30 milliGRAM(s) Oral daily  pantoprazole    Tablet 40 milliGRAM(s) Oral two times a day before meals  predniSONE   Tablet 5 milliGRAM(s) Oral daily  tacrolimus 1 milliGRAM(s) Oral <User Schedule>  trimethoprim  160 mG/sulfamethoxazole 800 mG 1 Tablet(s) Oral <User Schedule>  valGANciclovir 450 milliGRAM(s) Oral <User Schedule>    MEDICATIONS  (PRN):      Allergies    No Known Allergies    Intolerances            PHYSICAL EXAM:   Vital Signs:  Vital Signs Last 24 Hrs  T(C): 36.9 (03 Nov 2017 04:57), Max: 36.9 (03 Nov 2017 04:57)  T(F): 98.5 (03 Nov 2017 04:57), Max: 98.5 (03 Nov 2017 04:57)  HR: 77 (03 Nov 2017 07:09) (64 - 77)  BP: 161/84 (03 Nov 2017 07:09) (142/78 - 162/85)  BP(mean): --  RR: 18 (03 Nov 2017 04:57) (18 - 18)  SpO2: 95% (03 Nov 2017 04:57) (95% - 97%)  Daily     Daily     GENERAL:  no distress  HEENT:  NC/AT,  anicteric  CHEST:   no increased effort, breath sounds clear  HEART:  Regular rhythm  ABDOMEN:  Soft, non-tender, non-distended, normoactive bowel sounds,  no masses ,no hepato-splenomegaly, no signs of chronic liver disease  EXTEREMITIES:  no cyanosis      LABS:                        7.3    4.37  )-----------( 156      ( 02 Nov 2017 07:25 )             22.6     11-02    137  |  105  |  69<H>  ----------------------------<  91  4.3   |  15<L>  |  9.75<H>    Ca    8.0<L>      02 Nov 2017 07:25    TPro  4.5<L>  /  Alb  2.8<L>  /  TBili  0.5  /  DBili  x   /  AST  22  /  ALT  16  /  AlkPhos  41  11-01          RADIOLOGY & ADDITIONAL TESTS:

## 2017-11-03 NOTE — PROGRESS NOTE ADULT - SUBJECTIVE AND OBJECTIVE BOX
[facility] DIVISION OF KIDNEY DISEASES AND HYPERTENSION -- FOLLOW UP NOTE  --------------------------------------------------------------------------------  Chief Complaint:  kidney transplant, GI bleed    24 hour events/subjective:  No more bleeding or diarrhea.  felt nauseated after bicitra.      PAST HISTORY  --------------------------------------------------------------------------------  No significant changes to PMH, PSH, FHx, SHx, unless otherwise noted    ALLERGIES & MEDICATIONS  --------------------------------------------------------------------------------  Allergies    No Known Allergies    Intolerances      Standing Inpatient Medications  calcitriol   Capsule 0.25 MICROGram(s) Oral daily  citric acid/sodium citrate Solution 30 milliLiter(s) Oral two times a day  cloNIDine      cloNIDine 0.1 milliGRAM(s) Oral daily  everolimus 2 milliGRAM(s) Oral daily  everolimus 1 milliGRAM(s) Oral at bedtime  furosemide    Tablet 40 milliGRAM(s) Oral daily  magnesium oxide 400 milliGRAM(s) Oral daily  NIFEdipine XL 30 milliGRAM(s) Oral daily  pantoprazole    Tablet 40 milliGRAM(s) Oral two times a day before meals  predniSONE   Tablet 5 milliGRAM(s) Oral daily  tacrolimus 1 milliGRAM(s) Oral <User Schedule>  trimethoprim  160 mG/sulfamethoxazole 800 mG 1 Tablet(s) Oral <User Schedule>  valGANciclovir 450 milliGRAM(s) Oral <User Schedule>    PRN Inpatient Medications      REVIEW OF SYSTEMS  --------------------------------------------------------------------------------  Gen: No fatigue, fevers/chills, weakness  Skin: No rashes  Head/Eyes/Ears/Mouth: No headache;No sore throat  Respiratory: No dyspnea, cough,   CV: No chest pain, PND, orthopnea  GI: nausea which resolved.   Transplant: No pain  : No increased frequency, dysuria, hematuria, nocturia  MSK: No joint pain/swelling; no back pain; edema present.   Neuro: No dizziness/lightheadedness, weakness, seizures, numbness, tingling  Psych: No significant nervousness, anxiety, stress, depression    All other systems were reviewed and are negative, except as noted.    VITALS/PHYSICAL EXAM  --------------------------------------------------------------------------------  T(C): 37 (11-03-17 @ 12:02), Max: 37 (11-03-17 @ 12:02)  HR: 108 (11-03-17 @ 17:32) (4 - 108)  BP: 147/86 (11-03-17 @ 17:32) (147/86 - 162/85)  RR: 18 (11-03-17 @ 17:32) (18 - 18)  SpO2: 96% (11-03-17 @ 17:32) (95% - 97%)  Wt(kg): --        11-02-17 @ 07:01  -  11-03-17 @ 07:00  --------------------------------------------------------  IN: 700 mL / OUT: 3525 mL / NET: -2825 mL    11-03-17 @ 07:01  -  11-03-17 @ 18:25  --------------------------------------------------------  IN: 650 mL / OUT: 2480 mL / NET: -1830 mL      Physical Exam:  	Gen: NAD, well-appearing  	HEENT: PERRL, supple neck, clear oropharynx  	Pulm: CTA B/L  	CV: RRR, S1S2; no rub  	Back: No spinal or CVA tenderness; no sacral edema  	Abd: +BS, soft, nontender/nondistended                      Transplant: No tenderness, swelling  	: No suprapubic tenderness  	UE: Warm, FROM, intact strength; no edema; no asterixis  	LE: Warm, FROM, intact strength; +1 b/l LE edema    	Neuro: No focal deficits, intact gait  	Psych: Normal affect and mood  	Skin: Warm, without rashes      LABS/STUDIES  --------------------------------------------------------------------------------              7.4    5.4   >-----------<  143      [11-03-17 @ 11:11]              23.3     138  |  105  |  72  ----------------------------<  122      [11-03-17 @ 11:11]  4.4   |  17  |  9.41        Ca     8.0     [11-03-17 @ 11:11]      Phos  5.3     [11-03-17 @ 11:11]            Creatinine Trend:  SCr 9.41 [11-03 @ 11:11]  SCr 9.75 [11-02 @ 07:25]  SCr 9.46 [11-01 @ 15:48]  SCr 9.28 [11-01 @ 07:37]  SCr 9.12 [10-31 @ 07:46]

## 2017-11-03 NOTE — DISCHARGE NOTE ADULT - CARE PLAN
Principal Discharge DX:	Gastrointestinal hemorrhage with melena  Goal:	to remain without bleeding  Instructions for follow-up, activity and diet:	There are 2 common types of GI Bleed, Upper GI Bleed and Lower GI Bleed.  Upper GI Bleed affects the esophagus, stomach, and first part of the small intestine. Lower GI Bleed affects the colon and rectum.  Upper GI Bleed signs and symptoms to notify your Health Care Provider are vomiting blood, or coffee ground vomitus, and bowel movements that look like black tar.  Lower GI Bleed signs and symptoms to notify your health care provider are bright red bloody bowel movements.   Take your medications as prescribed by your Gastroenterologist.  If you have had an Endoscopy or Colonoscopy, follow up with your Gastroenterologist for Pathology results.  Avoid NSAIDs unless your Health Care Provider tells you that it is ok (Aspirin, Ibuprofen, Advil, Motrin, Aleve).  Follow up with your Gastroenterologist within 1-2 weeks of discharge.  Secondary Diagnosis:	AMARI (acute kidney injury)  Instructions for follow-up, activity and diet:	Avoid taking (NSAIDs) - (ex: Ibuprofen, Advil, Celebrex, Naprosyn)  Avoid taking any nephrotoxic agents (can harm kidneys) - Intravenous contrast for diagnostic testing, combination cold medications.  Have all medications adjusted for your renal function by your Health Care Provider.  Blood pressure control is important.  Take all medication as prescribed.  Secondary Diagnosis:	History of renal transplant  Instructions for follow-up, activity and diet:	Continue current medications, follow up with Nephrology  Secondary Diagnosis:	Cytomegalovirus infection, unspecified cytomegaloviral infection type  Instructions for follow-up, activity and diet:	Continue antiviral medications Principal Discharge DX:	Gastrointestinal hemorrhage with melena  Goal:	to remain without bleeding  Instructions for follow-up, activity and diet:	There are 2 common types of GI Bleed, Upper GI Bleed and Lower GI Bleed.  Upper GI Bleed affects the esophagus, stomach, and first part of the small intestine. Lower GI Bleed affects the colon and rectum.  Upper GI Bleed signs and symptoms to notify your Health Care Provider are vomiting blood, or coffee ground vomitus, and bowel movements that look like black tar.  Lower GI Bleed signs and symptoms to notify your health care provider are bright red bloody bowel movements.   Take your medications as prescribed by your Gastroenterologist.  If you have had an Endoscopy or Colonoscopy, follow up with your Gastroenterologist for Pathology results.  Avoid NSAIDs unless your Health Care Provider tells you that it is ok (Aspirin, Ibuprofen, Advil, Motrin, Aleve).  Follow up with your Gastroenterologist within 1-2 weeks of discharge.  Secondary Diagnosis:	AMARI (acute kidney injury)  Instructions for follow-up, activity and diet:	Avoid taking (NSAIDs) - (ex: Ibuprofen, Advil, Celebrex, Naprosyn)  Avoid taking any nephrotoxic agents (can harm kidneys) - Intravenous contrast for diagnostic testing, combination cold medications.  Have all medications adjusted for your renal function by your Health Care Provider.  Blood pressure control is important.  Take all medication as prescribed.  Secondary Diagnosis:	History of renal transplant  Instructions for follow-up, activity and diet:	Continue current medications, follow up with Nephrology  Secondary Diagnosis:	Cytomegalovirus infection, unspecified cytomegaloviral infection type  Instructions for follow-up, activity and diet:	Continue antiviral medications, follow up with transplant team as scheduled.

## 2017-11-03 NOTE — DISCHARGE NOTE ADULT - PATIENT PORTAL LINK FT
“You can access the Follow[facility] Patient Portal, offered by [facility], by registering with the following website: [email or web address]”

## 2017-11-03 NOTE — DISCHARGE NOTE ADULT - MEDICATION SUMMARY - MEDICATIONS TO STOP TAKING
I will STOP taking the medications listed below when I get home from the hospital:    Myfortic 360 mg oral delayed release tablet  -- 1 tab(s) by mouth 2 times a day

## 2017-11-03 NOTE — DISCHARGE NOTE ADULT - PLAN OF CARE
to remain without bleeding There are 2 common types of GI Bleed, Upper GI Bleed and Lower GI Bleed.  Upper GI Bleed affects the esophagus, stomach, and first part of the small intestine. Lower GI Bleed affects the colon and rectum.  Upper GI Bleed signs and symptoms to notify your Health Care Provider are vomiting blood, or coffee ground vomitus, and bowel movements that look like black tar.  Lower GI Bleed signs and symptoms to notify your health care provider are bright red bloody bowel movements.   Take your medications as prescribed by your Gastroenterologist.  If you have had an Endoscopy or Colonoscopy, follow up with your Gastroenterologist for Pathology results.  Avoid NSAIDs unless your Health Care Provider tells you that it is ok (Aspirin, Ibuprofen, Advil, Motrin, Aleve).  Follow up with your Gastroenterologist within 1-2 weeks of discharge. Avoid taking (NSAIDs) - (ex: Ibuprofen, Advil, Celebrex, Naprosyn)  Avoid taking any nephrotoxic agents (can harm kidneys) - Intravenous contrast for diagnostic testing, combination cold medications.  Have all medications adjusted for your renal function by your Health Care Provider.  Blood pressure control is important.  Take all medication as prescribed. Continue current medications, follow up with Nephrology Continue antiviral medications Continue antiviral medications, follow up with transplant team as scheduled.

## 2017-11-03 NOTE — PROGRESS NOTE ADULT - PROBLEM SELECTOR PLAN 1
AMARI on CKD in setting of GI bleed: Latest Scr. is 9.34 which is stable. Patient is non oliguric(). Patient with hemodynamically mediated AMARI in setting of GI bleed. C/w IV hydration and blood transfusions. Monitor Scr. and urine output.
Patient with hemodynamically mediated AMARI on CKD in setting of GI bleed.  Latest Scr. is 9.28.   Please check calcium and phosphorus.  Becoming more acidotic - increase sodium bicarb to 1300mgs TID.
Patient with hemodynamically mediated AMARI on CKD in setting of GI bleed.  Latest Scr. is 9.12 which is trending down. pt denies any uremic symptoms. electrolytes stable, no indications for RRT today. Patient is non oliguric  with UOP of about 1200 ml over 24 hours. Monitor Scr. and urine output.
Patient with hemodynamically mediated AMARI on CKD in setting of GI bleed.  Latest Scr. is 9.39, . pt denies any uremic symptoms. electrolytes stable, no indications for RRT today. UOP about 525 ml over 24 hours. Monitor Scr. and urine output.
Patient with hemodynamically mediated AMARI on CKD in setting of GI bleed.  Latest Scr. is 9.4, phos ok 5.3.  Creatinine has not decreased but is stable.  Since pt not denny bicitra can d/c home on NaBicarb 1300 TID.
Patient with hemodynamically mediated AMARI on CKD in setting of GI bleed.  Latest Scr. is 9.60, BUN 97. pt denies any uremic symptoms. electrolytes stable, no indications for RRT today. UOP about 500 ml over 24 hours. Monitor Scr. and urine output.
Patient with hemodynamically mediated AMARI on CKD in setting of GI bleed.  Latest Scr. is 9.7.   Please check phosphorus.  Becoming more acidotic - stop sodium bicarb and start bicitra 30mg BID

## 2017-11-03 NOTE — PROGRESS NOTE ADULT - PROBLEM SELECTOR PROBLEM 1
AMARI (acute kidney injury)

## 2017-11-03 NOTE — PROGRESS NOTE ADULT - SUBJECTIVE AND OBJECTIVE BOX
MEDICINE, PROGRESS NOTE [phone]    MEAGAN RAMOS 57y [id number]    Patient seen and examined.  Patient is a 57y old  Male who presents with a chief complaint of started having black stools today (03 Nov 2017 12:20)  Pt feels well. Had sever belching post bicitra.    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  History of partial pancreatectomy  History of renal transplant: x 2    MEDICATIONS  (STANDING):  calcitriol   Capsule 0.25 MICROGram(s) Oral daily  citric acid/sodium citrate Solution 30 milliLiter(s) Oral two times a day  cloNIDine      cloNIDine 0.1 milliGRAM(s) Oral daily  everolimus 2 milliGRAM(s) Oral daily  everolimus 1 milliGRAM(s) Oral at bedtime  furosemide    Tablet 40 milliGRAM(s) Oral daily  magnesium oxide 400 milliGRAM(s) Oral daily  NIFEdipine XL 30 milliGRAM(s) Oral daily  pantoprazole    Tablet 40 milliGRAM(s) Oral two times a day before meals  predniSONE   Tablet 5 milliGRAM(s) Oral daily  tacrolimus 1 milliGRAM(s) Oral <User Schedule>  trimethoprim  160 mG/sulfamethoxazole 800 mG 1 Tablet(s) Oral <User Schedule>  valGANciclovir 450 milliGRAM(s) Oral <User Schedule>    MEDICATIONS  (PRN):    Allergies    No Known Allergies    Intolerances        PHYSICAL EXAM:  Constitutional: NAD  HEENT: Normocephalic, EOMI  Neck:  No JVD  Respiratory: CTA B/L, No wheezes  Cardiovascular: S1, S2, RRR, + systolic murmur  Gastrointestinal: BS+, soft, NT/ND  Extremities: + peripheral edema le b/l  Neurological: AAOX3, no focal deficits  Psychiatric: Normal mood, normal affect  : No Augustin    Vital Signs Last 24 Hrs  T(C): 37 (03 Nov 2017 12:02), Max: 37 (03 Nov 2017 12:02)  T(F): 98.6 (03 Nov 2017 12:02), Max: 98.6 (03 Nov 2017 12:02)  HR: 4 (03 Nov 2017 12:02) (4 - 77)  BP: 148/82 (03 Nov 2017 12:02) (148/82 - 162/85)  BP(mean): --  RR: 18 (03 Nov 2017 12:02) (18 - 18)  SpO2: 96% (03 Nov 2017 12:02) (95% - 97%)  I&O's Summary    02 Nov 2017 07:01  -  03 Nov 2017 07:00  --------------------------------------------------------  IN: 700 mL / OUT: 3525 mL / NET: -2825 mL    03 Nov 2017 07:01  -  03 Nov 2017 17:28  --------------------------------------------------------  IN: 650 mL / OUT: 1880 mL / NET: -1230 mL        LABS:                        7.4    5.4   )-----------( 143      ( 03 Nov 2017 11:11 )             23.3     11-03    138  |  105  |  72<H>  ----------------------------<  122<H>  4.4   |  17<L>  |  9.41<H>    Ca    8.0<L>      03 Nov 2017 11:11  Phos  5.3     11-03          Phosphorus Level, Serum: 5.3 mg/dL (11-03 @ 11:11)

## 2017-11-03 NOTE — PROGRESS NOTE ADULT - PROBLEM SELECTOR PLAN 2
Will hold myfortic for now in view of GI symptoms. C/w everolimus 2mg/1mg, tacrolimus 1 mg BID, and prednisone 5 mg daily.
myfortic has been held for now in view of GI symptoms. C/w everolimus 2mg/1mg, tacrolimus 1 mg BID, and prednisone 5 mg daily.   Please send serum CMV PCR to r/o CMV infection.
myfortic has been held for now in view of GI symptoms. C/w everolimus 2mg/1mg, tacrolimus 1 mg BID, and prednisone 5 mg daily.
myfortic has been held for now in view of GI symptoms. C/w everolimus 2mg/1mg, tacrolimus 1 mg BID, and prednisone 5 mg daily. Patient still has complains of diarrhea; Recommend to send serum CMV PCR to r/o CMV infection.

## 2017-11-03 NOTE — PROGRESS NOTE ADULT - PROBLEM SELECTOR PROBLEM 2
Long term current use of immunosuppressive drug

## 2017-11-03 NOTE — PROGRESS NOTE ADULT - PROBLEM SELECTOR PLAN 4
Anemia in setting of GI bleed and underlying CKD: Patient received total of 4 units of PRBC. Will have endoscopy today. Continue to monitor Hb.
Anemia in setting of GI bleed and underlying CKD: S/p EGD which showed angioectasia in duodenum. Monitor Hb.  Was on procrit as outpatient.
Anemia in setting of GI bleed and underlying CKD: Patient received total of 4 units of PRBC. s/p EGD.
Anemia in setting of GI bleed and underlying CKD: S/p EGD which showed angioectasia in duodenum.  Gave procrit 20,000 units x1 today.  Pt was on this dose monthly as an outpatient.
Anemia in setting of GI bleed and underlying CKD: S/p EGD which showed angioectasia in duodenum.  Please give procrit 20,000 units x1 now.  Pt was on this dose monthly as an outpatient.
Anemia in setting of GI bleed and underlying CKD: S/p EGD which showed angioectasia in duodenum. Monitor Hb.
Anemia in setting of GI bleed and underlying CKD: S/p EGD which showed angioectasia in duodenum. Monitor Hb.

## 2017-11-03 NOTE — PROGRESS NOTE ADULT - PROBLEM SELECTOR PLAN 6
Viral load low but positive.  With GI symptoms would treat with valcyte 450mgs every other day.  Will check CMV as outpt.    Pt to f/u with Dr. Patton as outpt.

## 2017-11-03 NOTE — PROGRESS NOTE ADULT - PROBLEM SELECTOR PLAN 5
Latest serum CO2 is 16. Increase sodium bicarbonate to 1300 mg TID. Continue to monitor serum CO2.
Increase sodium bicarbonate to 1300 mg TID.
change Na bicarb to bicitra 30ml BID
change back to sodium bicarb 1300mgs TID
continue sodium bicarbonate 1300 mg TID.

## 2017-11-03 NOTE — PROGRESS NOTE ADULT - PROVIDER SPECIALTY LIST ADULT
Gastroenterology
Internal Medicine
Nephrology
Internal Medicine
Gastroenterology
Nephrology
Nephrology

## 2017-11-03 NOTE — PROGRESS NOTE ADULT - PROBLEM SELECTOR PROBLEM 3
HTN (hypertension)

## 2017-11-14 VITALS
WEIGHT: 204 LBS | TEMPERATURE: 98.4 F | BODY MASS INDEX: 27.67 KG/M2 | HEART RATE: 84 BPM | SYSTOLIC BLOOD PRESSURE: 148 MMHG | DIASTOLIC BLOOD PRESSURE: 78 MMHG | RESPIRATION RATE: 16 BRPM

## 2017-11-21 ENCOUNTER — APPOINTMENT (OUTPATIENT)
Dept: TRANSPLANT | Facility: CLINIC | Age: 57
End: 2017-11-21
Payer: MEDICARE

## 2017-11-21 VITALS
WEIGHT: 201 LBS | HEART RATE: 88 BPM | DIASTOLIC BLOOD PRESSURE: 77 MMHG | BODY MASS INDEX: 27.22 KG/M2 | RESPIRATION RATE: 17 BRPM | SYSTOLIC BLOOD PRESSURE: 136 MMHG | TEMPERATURE: 98.1 F | HEIGHT: 72 IN

## 2017-11-21 PROBLEM — K56.609 UNSPECIFIED INTESTINAL OBSTRUCTION, UNSPECIFIED AS TO PARTIAL VERSUS COMPLETE OBSTRUCTION: Chronic | Status: ACTIVE | Noted: 2017-10-26

## 2017-11-21 PROBLEM — I10 ESSENTIAL (PRIMARY) HYPERTENSION: Chronic | Status: ACTIVE | Noted: 2017-10-26

## 2017-11-21 PROBLEM — N19 UNSPECIFIED KIDNEY FAILURE: Chronic | Status: ACTIVE | Noted: 2017-10-26

## 2017-11-21 PROCEDURE — 99214 OFFICE O/P EST MOD 30 MIN: CPT

## 2017-11-27 ENCOUNTER — APPOINTMENT (OUTPATIENT)
Dept: NEPHROLOGY | Facility: CLINIC | Age: 57
End: 2017-11-27
Payer: MEDICARE

## 2017-11-27 VITALS
OXYGEN SATURATION: 99 % | HEIGHT: 72 IN | BODY MASS INDEX: 27.17 KG/M2 | WEIGHT: 200.62 LBS | SYSTOLIC BLOOD PRESSURE: 134 MMHG | HEART RATE: 92 BPM | DIASTOLIC BLOOD PRESSURE: 75 MMHG

## 2017-11-27 PROCEDURE — 96372 THER/PROPH/DIAG INJ SC/IM: CPT

## 2017-12-06 ENCOUNTER — RX RENEWAL (OUTPATIENT)
Age: 57
End: 2017-12-06

## 2017-12-18 ENCOUNTER — APPOINTMENT (OUTPATIENT)
Dept: NEPHROLOGY | Facility: CLINIC | Age: 57
End: 2017-12-18
Payer: MEDICARE

## 2017-12-18 VITALS
SYSTOLIC BLOOD PRESSURE: 152 MMHG | DIASTOLIC BLOOD PRESSURE: 87 MMHG | WEIGHT: 196.21 LBS | HEIGHT: 72 IN | OXYGEN SATURATION: 99 % | BODY MASS INDEX: 26.58 KG/M2

## 2017-12-18 VITALS — DIASTOLIC BLOOD PRESSURE: 85 MMHG | SYSTOLIC BLOOD PRESSURE: 131 MMHG

## 2017-12-18 PROCEDURE — 96372 THER/PROPH/DIAG INJ SC/IM: CPT

## 2017-12-19 PROCEDURE — P9040: CPT

## 2017-12-19 PROCEDURE — 82435 ASSAY OF BLOOD CHLORIDE: CPT

## 2017-12-19 PROCEDURE — 90686 IIV4 VACC NO PRSV 0.5 ML IM: CPT

## 2017-12-19 PROCEDURE — 86850 RBC ANTIBODY SCREEN: CPT

## 2017-12-19 PROCEDURE — 99285 EMERGENCY DEPT VISIT HI MDM: CPT | Mod: 25

## 2017-12-19 PROCEDURE — 86901 BLOOD TYPING SEROLOGIC RH(D): CPT

## 2017-12-19 PROCEDURE — 85014 HEMATOCRIT: CPT

## 2017-12-19 PROCEDURE — 84100 ASSAY OF PHOSPHORUS: CPT

## 2017-12-19 PROCEDURE — 85610 PROTHROMBIN TIME: CPT

## 2017-12-19 PROCEDURE — 84295 ASSAY OF SERUM SODIUM: CPT

## 2017-12-19 PROCEDURE — 83690 ASSAY OF LIPASE: CPT

## 2017-12-19 PROCEDURE — 36430 TRANSFUSION BLD/BLD COMPNT: CPT

## 2017-12-19 PROCEDURE — 82330 ASSAY OF CALCIUM: CPT

## 2017-12-19 PROCEDURE — 83735 ASSAY OF MAGNESIUM: CPT

## 2017-12-19 PROCEDURE — 93005 ELECTROCARDIOGRAM TRACING: CPT

## 2017-12-19 PROCEDURE — 71045 X-RAY EXAM CHEST 1 VIEW: CPT

## 2017-12-19 PROCEDURE — 80053 COMPREHEN METABOLIC PANEL: CPT

## 2017-12-19 PROCEDURE — 88305 TISSUE EXAM BY PATHOLOGIST: CPT

## 2017-12-19 PROCEDURE — 80048 BASIC METABOLIC PNL TOTAL CA: CPT

## 2017-12-19 PROCEDURE — 82272 OCCULT BLD FECES 1-3 TESTS: CPT

## 2017-12-19 PROCEDURE — 80197 ASSAY OF TACROLIMUS: CPT

## 2017-12-19 PROCEDURE — 86900 BLOOD TYPING SEROLOGIC ABO: CPT

## 2017-12-19 PROCEDURE — 82803 BLOOD GASES ANY COMBINATION: CPT

## 2017-12-19 PROCEDURE — 86923 COMPATIBILITY TEST ELECTRIC: CPT

## 2017-12-19 PROCEDURE — 96374 THER/PROPH/DIAG INJ IV PUSH: CPT

## 2017-12-19 PROCEDURE — 83605 ASSAY OF LACTIC ACID: CPT

## 2017-12-19 PROCEDURE — 85027 COMPLETE CBC AUTOMATED: CPT

## 2017-12-19 PROCEDURE — P9016: CPT

## 2017-12-19 PROCEDURE — 82947 ASSAY GLUCOSE BLOOD QUANT: CPT

## 2017-12-19 PROCEDURE — 84132 ASSAY OF SERUM POTASSIUM: CPT

## 2017-12-19 PROCEDURE — 85730 THROMBOPLASTIN TIME PARTIAL: CPT

## 2018-01-22 ENCOUNTER — APPOINTMENT (OUTPATIENT)
Dept: NEPHROLOGY | Facility: CLINIC | Age: 58
End: 2018-01-22
Payer: MEDICARE

## 2018-01-22 VITALS
BODY MASS INDEX: 26.87 KG/M2 | HEIGHT: 72 IN | DIASTOLIC BLOOD PRESSURE: 88 MMHG | HEART RATE: 76 BPM | WEIGHT: 198.41 LBS | SYSTOLIC BLOOD PRESSURE: 145 MMHG | OXYGEN SATURATION: 98 %

## 2018-01-22 PROCEDURE — 99214 OFFICE O/P EST MOD 30 MIN: CPT | Mod: 25

## 2018-01-22 PROCEDURE — 96372 THER/PROPH/DIAG INJ SC/IM: CPT

## 2018-01-23 RX ORDER — ERYTHROPOIETIN 20000 [IU]/ML
20000 INJECTION, SOLUTION INTRAVENOUS; SUBCUTANEOUS
Qty: 1 | Refills: 0 | Status: COMPLETED | OUTPATIENT
Start: 2018-01-23

## 2018-01-23 RX ADMIN — ERYTHROPOIETIN 0 UNIT/ML: 20000 INJECTION, SOLUTION INTRAVENOUS; SUBCUTANEOUS at 00:00

## 2018-01-30 ENCOUNTER — APPOINTMENT (OUTPATIENT)
Dept: TRANSPLANT | Facility: CLINIC | Age: 58
End: 2018-01-30
Payer: MEDICARE

## 2018-01-30 VITALS
TEMPERATURE: 97.8 F | DIASTOLIC BLOOD PRESSURE: 88 MMHG | BODY MASS INDEX: 26.68 KG/M2 | HEIGHT: 72 IN | SYSTOLIC BLOOD PRESSURE: 166 MMHG | WEIGHT: 197 LBS | HEART RATE: 73 BPM | RESPIRATION RATE: 17 BRPM

## 2018-01-30 PROCEDURE — 99214 OFFICE O/P EST MOD 30 MIN: CPT

## 2018-02-15 ENCOUNTER — OTHER (OUTPATIENT)
Age: 58
End: 2018-02-15

## 2018-02-26 ENCOUNTER — APPOINTMENT (OUTPATIENT)
Dept: NEPHROLOGY | Facility: CLINIC | Age: 58
End: 2018-02-26
Payer: MEDICARE

## 2018-02-26 VITALS
BODY MASS INDEX: 26.58 KG/M2 | DIASTOLIC BLOOD PRESSURE: 96 MMHG | WEIGHT: 196.21 LBS | SYSTOLIC BLOOD PRESSURE: 161 MMHG | HEART RATE: 77 BPM | OXYGEN SATURATION: 99 % | HEIGHT: 72 IN

## 2018-02-26 PROCEDURE — 96372 THER/PROPH/DIAG INJ SC/IM: CPT

## 2018-02-26 RX ORDER — METOLAZONE 5 MG/1
5 TABLET ORAL
Qty: 30 | Refills: 3 | Status: DISCONTINUED | COMMUNITY
Start: 2018-01-22 | End: 2018-02-26

## 2018-03-01 LAB
ALBUMIN SERPL ELPH-MCNC: 3.5 G/DL
ANION GAP SERPL CALC-SCNC: 23 MMOL/L
BASOPHILS # BLD AUTO: 0.01 K/UL
BASOPHILS NFR BLD AUTO: 0.1 %
BUN SERPL-MCNC: 127 MG/DL
CALCIUM SERPL-MCNC: 9 MG/DL
CHLORIDE SERPL-SCNC: 101 MMOL/L
CO2 SERPL-SCNC: 16 MMOL/L
CREAT SERPL-MCNC: 15.79 MG/DL
EOSINOPHIL # BLD AUTO: 0.01 K/UL
EOSINOPHIL NFR BLD AUTO: 0.1 %
GLUCOSE SERPL-MCNC: 140 MG/DL
HCT VFR BLD CALC: 27.9 %
HGB BLD-MCNC: 8.8 G/DL
IMM GRANULOCYTES NFR BLD AUTO: 0.1 %
LYMPHOCYTES # BLD AUTO: 0.9 K/UL
LYMPHOCYTES NFR BLD AUTO: 12.8 %
MAN DIFF?: NORMAL
MCHC RBC-ENTMCNC: 26.9 PG
MCHC RBC-ENTMCNC: 31.5 GM/DL
MCV RBC AUTO: 85.3 FL
MONOCYTES # BLD AUTO: 0.34 K/UL
MONOCYTES NFR BLD AUTO: 4.8 %
NEUTROPHILS # BLD AUTO: 5.76 K/UL
NEUTROPHILS NFR BLD AUTO: 82.1 %
PHOSPHATE SERPL-MCNC: 11.7 MG/DL
PLATELET # BLD AUTO: 282 K/UL
POTASSIUM SERPL-SCNC: 4.7 MMOL/L
RBC # BLD: 3.27 M/UL
RBC # FLD: 16 %
SODIUM SERPL-SCNC: 140 MMOL/L
WBC # FLD AUTO: 7.03 K/UL

## 2018-03-12 ENCOUNTER — APPOINTMENT (OUTPATIENT)
Dept: NEPHROLOGY | Facility: CLINIC | Age: 58
End: 2018-03-12
Payer: MEDICARE

## 2018-03-12 VITALS
WEIGHT: 202 LBS | OXYGEN SATURATION: 99 % | RESPIRATION RATE: 14 BRPM | DIASTOLIC BLOOD PRESSURE: 84 MMHG | HEIGHT: 72 IN | HEART RATE: 97 BPM | TEMPERATURE: 97.7 F | SYSTOLIC BLOOD PRESSURE: 172 MMHG | BODY MASS INDEX: 27.36 KG/M2

## 2018-03-12 VITALS — SYSTOLIC BLOOD PRESSURE: 168 MMHG | DIASTOLIC BLOOD PRESSURE: 80 MMHG

## 2018-03-12 PROCEDURE — 99214 OFFICE O/P EST MOD 30 MIN: CPT

## 2018-03-13 ENCOUNTER — RX RENEWAL (OUTPATIENT)
Age: 58
End: 2018-03-13

## 2018-03-16 ENCOUNTER — INPATIENT (INPATIENT)
Facility: HOSPITAL | Age: 58
LOS: 21 days | Discharge: HOME | DRG: 981 | End: 2018-04-07
Attending: INTERNAL MEDICINE | Admitting: INTERNAL MEDICINE
Payer: MEDICARE

## 2018-03-16 VITALS
RESPIRATION RATE: 20 BRPM | OXYGEN SATURATION: 95 % | HEART RATE: 88 BPM | HEIGHT: 71 IN | TEMPERATURE: 98 F | DIASTOLIC BLOOD PRESSURE: 80 MMHG | WEIGHT: 207.45 LBS | SYSTOLIC BLOOD PRESSURE: 158 MMHG

## 2018-03-16 DIAGNOSIS — Z90.411 ACQUIRED PARTIAL ABSENCE OF PANCREAS: Chronic | ICD-10-CM

## 2018-03-16 DIAGNOSIS — Z94.0 KIDNEY TRANSPLANT STATUS: Chronic | ICD-10-CM

## 2018-03-16 DIAGNOSIS — T86.12 KIDNEY TRANSPLANT FAILURE: ICD-10-CM

## 2018-03-16 LAB
ANION GAP SERPL CALC-SCNC: 26 MMOL/L — HIGH (ref 5–17)
APTT BLD: 31.8 SEC — SIGNIFICANT CHANGE UP (ref 27.5–37.4)
BASOPHILS # BLD AUTO: 0 K/UL — SIGNIFICANT CHANGE UP (ref 0–0.2)
BASOPHILS NFR BLD AUTO: 0.1 % — SIGNIFICANT CHANGE UP (ref 0–2)
BUN SERPL-MCNC: 169 MG/DL — HIGH (ref 7–23)
CALCIUM SERPL-MCNC: 9.4 MG/DL — SIGNIFICANT CHANGE UP (ref 8.4–10.5)
CHLORIDE SERPL-SCNC: 97 MMOL/L — SIGNIFICANT CHANGE UP (ref 96–108)
CO2 SERPL-SCNC: 13 MMOL/L — LOW (ref 22–31)
CREAT SERPL-MCNC: 17.56 MG/DL — HIGH (ref 0.5–1.3)
EOSINOPHIL # BLD AUTO: 0 K/UL — SIGNIFICANT CHANGE UP (ref 0–0.5)
EOSINOPHIL NFR BLD AUTO: 0.1 % — SIGNIFICANT CHANGE UP (ref 0–6)
GLUCOSE SERPL-MCNC: 111 MG/DL — HIGH (ref 70–99)
HCT VFR BLD CALC: 27.5 % — LOW (ref 39–50)
HGB BLD-MCNC: 8.9 G/DL — LOW (ref 13–17)
INR BLD: 1 RATIO — SIGNIFICANT CHANGE UP (ref 0.88–1.16)
LYMPHOCYTES # BLD AUTO: 0.9 K/UL — LOW (ref 1–3.3)
LYMPHOCYTES # BLD AUTO: 16.8 % — SIGNIFICANT CHANGE UP (ref 13–44)
MCHC RBC-ENTMCNC: 29.5 PG — SIGNIFICANT CHANGE UP (ref 27–34)
MCHC RBC-ENTMCNC: 32.6 GM/DL — SIGNIFICANT CHANGE UP (ref 32–36)
MCV RBC AUTO: 90.5 FL — SIGNIFICANT CHANGE UP (ref 80–100)
MONOCYTES # BLD AUTO: 0.4 K/UL — SIGNIFICANT CHANGE UP (ref 0–0.9)
MONOCYTES NFR BLD AUTO: 8.7 % — SIGNIFICANT CHANGE UP (ref 2–14)
NEUTROPHILS # BLD AUTO: 3.8 K/UL — SIGNIFICANT CHANGE UP (ref 1.8–7.4)
NEUTROPHILS NFR BLD AUTO: 74.4 % — SIGNIFICANT CHANGE UP (ref 43–77)
PLATELET # BLD AUTO: 299 K/UL — SIGNIFICANT CHANGE UP (ref 150–400)
POTASSIUM SERPL-MCNC: 5.2 MMOL/L — SIGNIFICANT CHANGE UP (ref 3.5–5.3)
POTASSIUM SERPL-SCNC: 5.2 MMOL/L — SIGNIFICANT CHANGE UP (ref 3.5–5.3)
PROTHROM AB SERPL-ACNC: 10.9 SEC — SIGNIFICANT CHANGE UP (ref 9.8–12.7)
RBC # BLD: 3.03 M/UL — LOW (ref 4.2–5.8)
RBC # FLD: 16.3 % — HIGH (ref 10.3–14.5)
SODIUM SERPL-SCNC: 136 MMOL/L — SIGNIFICANT CHANGE UP (ref 135–145)
WBC # BLD: 5.1 K/UL — SIGNIFICANT CHANGE UP (ref 3.8–10.5)
WBC # FLD AUTO: 5.1 K/UL — SIGNIFICANT CHANGE UP (ref 3.8–10.5)

## 2018-03-16 PROCEDURE — 76937 US GUIDE VASCULAR ACCESS: CPT | Mod: 26

## 2018-03-16 PROCEDURE — 77001 FLUOROGUIDE FOR VEIN DEVICE: CPT | Mod: 26

## 2018-03-16 PROCEDURE — 36556 INSERT NON-TUNNEL CV CATH: CPT

## 2018-03-16 PROCEDURE — 71045 X-RAY EXAM CHEST 1 VIEW: CPT | Mod: 26

## 2018-03-16 RX ORDER — CALCITRIOL 0.5 UG/1
0.25 CAPSULE ORAL DAILY
Qty: 0 | Refills: 0 | Status: DISCONTINUED | OUTPATIENT
Start: 2018-03-16 | End: 2018-03-27

## 2018-03-16 RX ORDER — FUROSEMIDE 40 MG
40 TABLET ORAL DAILY
Qty: 0 | Refills: 0 | Status: DISCONTINUED | OUTPATIENT
Start: 2018-03-16 | End: 2018-03-16

## 2018-03-16 RX ORDER — EVEROLIMUS 10 MG/1
1 TABLET ORAL
Qty: 0 | Refills: 0 | Status: DISCONTINUED | OUTPATIENT
Start: 2018-03-16 | End: 2018-03-16

## 2018-03-16 RX ORDER — ASPIRIN/CALCIUM CARB/MAGNESIUM 324 MG
81 TABLET ORAL DAILY
Qty: 0 | Refills: 0 | Status: DISCONTINUED | OUTPATIENT
Start: 2018-03-16 | End: 2018-03-17

## 2018-03-16 RX ORDER — HEPARIN SODIUM 5000 [USP'U]/ML
5000 INJECTION INTRAVENOUS; SUBCUTANEOUS EVERY 8 HOURS
Qty: 0 | Refills: 0 | Status: DISCONTINUED | OUTPATIENT
Start: 2018-03-16 | End: 2018-03-27

## 2018-03-16 RX ORDER — EVEROLIMUS 10 MG/1
2 TABLET ORAL
Qty: 0 | Refills: 0 | Status: DISCONTINUED | OUTPATIENT
Start: 2018-03-16 | End: 2018-03-16

## 2018-03-16 RX ORDER — TACROLIMUS 5 MG/1
1 CAPSULE ORAL
Qty: 0 | Refills: 0 | Status: DISCONTINUED | OUTPATIENT
Start: 2018-03-16 | End: 2018-03-17

## 2018-03-16 RX ORDER — ERYTHROPOIETIN 10000 [IU]/ML
10000 INJECTION, SOLUTION INTRAVENOUS; SUBCUTANEOUS
Qty: 0 | Refills: 0 | Status: DISCONTINUED | OUTPATIENT
Start: 2018-03-16 | End: 2018-03-27

## 2018-03-16 RX ORDER — VALGANCICLOVIR 450 MG/1
450 TABLET, FILM COATED ORAL
Qty: 0 | Refills: 0 | Status: DISCONTINUED | OUTPATIENT
Start: 2018-03-16 | End: 2018-03-27

## 2018-03-16 RX ORDER — NIFEDIPINE 30 MG
60 TABLET, EXTENDED RELEASE 24 HR ORAL DAILY
Qty: 0 | Refills: 0 | Status: DISCONTINUED | OUTPATIENT
Start: 2018-03-16 | End: 2018-03-27

## 2018-03-16 RX ORDER — DESMOPRESSIN ACETATE 0.1 MG/1
20 TABLET ORAL ONCE
Qty: 0 | Refills: 0 | Status: COMPLETED | OUTPATIENT
Start: 2018-03-16 | End: 2018-03-16

## 2018-03-16 RX ADMIN — Medication 0.1 MILLIGRAM(S): at 23:05

## 2018-03-16 RX ADMIN — DESMOPRESSIN ACETATE 220 MICROGRAM(S): 0.1 TABLET ORAL at 16:22

## 2018-03-16 RX ADMIN — TACROLIMUS 1 MILLIGRAM(S): 5 CAPSULE ORAL at 23:05

## 2018-03-16 NOTE — PATIENT PROFILE ADULT. - TEACHING/LEARNING LEARNING PREFERENCES
written material/individual instruction/video/audio/computer/internet/skill demonstration/verbal instruction

## 2018-03-16 NOTE — PROGRESS NOTE ADULT - SUBJECTIVE AND OBJECTIVE BOX
Interventional Radiology Procedure Note    Procedure: Temporary HD catheter placement    Indication: ESRD     Operators: BERTIN WYNN MD    Anesthesia (type): Lidocaine 2%    Contrast: NONE    EBL: MINIMAL    Findings/Follow up Plan of Care: RIGHT IJ TEMP HD CATHETER placed in RIGHT IJ, pt tolerated procedure well, hemostasis secure and VSS,     Specimens Removed: NONE    Implants: 14 F TEMP HD CATHETER    Complications: NONE    Condition/Disposition: STABLE, pt D/C HOME in Stable condition     Please call Interventional Radiology x [phone] with any questions, concerns, or issues.

## 2018-03-16 NOTE — PROGRESS NOTE ADULT - SUBJECTIVE AND OBJECTIVE BOX
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1 %    03-16    136  |  97  |  169<H>  ----------------------------<  111<H>  5.2   |  13<L>  |  17.56<H>    Ca    9.4      16 Mar 2018 15:41      PT/INR - ( 16 Mar 2018 15:41 )   PT: 10.9 sec;   INR: 1.00 ratio         PTT - ( 16 Mar 2018 15:41 )  PTT:31.8 sec    Procedure/ risks/ benefits were explained, informed consent obtained from patient, verbalizes understanding.  Will place temporary dialysis catheter.

## 2018-03-16 NOTE — PATIENT PROFILE ADULT. - NS TRANSFER PATIENT BELONGINGS
Jewelry/Money (specify)/Clothing/White chain with a pendant and a white bracelet/Cell Phone/PDA (specify)

## 2018-03-16 NOTE — H&P ADULT - NSHPREVIEWOFSYSTEMS_GEN_ALL_CORE
no ha or dizziness  no cp or palpitations  no sob or cough  no n/v/d/c  no heat intol or cold intol  no rash or itchiness  + weakness in legs  + leg swelling  no fevers/rigors/chills

## 2018-03-16 NOTE — CHART NOTE - NSCHARTNOTEFT_GEN_A_CORE
Medicine NP note:  Notified by RN that pt. had an episode of SOB while in IR having IJ access.  Pt. had an episode of desat. to 83% and was placed on 3L nc.  Pt. returned to floor and remains on 3l / nc O2 sat 94%.  Discussed with Dr. Watts and chest xray portable ordered and pending  Vital signs q 4 x 12 hours  - and will monitor O2 sat over night.  Pt. now with right IJ access, awaiting HD.

## 2018-03-16 NOTE — H&P ADULT - HISTORY OF PRESENT ILLNESS
58 y/o male with history as listed was direct admitted to have a shiley placed and to start HD as pts allograft no longer functioning well enough. Once pt adequately dialyzed will adjust home meds. Case discussed with IR team and Dr Patton

## 2018-03-16 NOTE — H&P ADULT - NSHPPHYSICALEXAM_GEN_ALL_CORE
AAOX3, eomi, normocephalic  coarse bs b/l, dec bs at bases  s1s2, rrr, +sm  soft, nt, nd, bs+  + edema LE b/l, pp difficult to assess in LE  + arom and prom x 4 extrem  cn 2-12 grossly intact

## 2018-03-16 NOTE — H&P ADULT - ASSESSMENT
58 y/o male with history as listed with progressive volume overload and worseinng renal function and decreased urine output despite diuretics.    continue home meds for now  IR to place livan  check le dopplers  re-evaluate meds throught out the admission  will need to establish outpt HD

## 2018-03-16 NOTE — H&P ADULT - NSHPLABSRESULTS_GEN_ALL_CORE
CBC Full  -  ( 16 Mar 2018 15:41 )  WBC Count : 5.1 K/uL  Hemoglobin : 8.9 g/dL  Hematocrit : 27.5 %  Platelet Count - Automated : 299 K/uL  Mean Cell Volume : 90.5 fl  Mean Cell Hemoglobin : 29.5 pg  Mean Cell Hemoglobin Concentration : 32.6 gm/dL  Auto Neutrophil # : 3.8 K/uL  Auto Lymphocyte # : 0.9 K/uL  Auto Monocyte # : 0.4 K/uL  Auto Eosinophil # : 0.0 K/uL  Auto Basophil # : 0.0 K/uL  Auto Neutrophil % : 74.4 %  Auto Lymphocyte % : 16.8 %  Auto Monocyte % : 8.7 %  Auto Eosinophil % : 0.1 %  Auto Basophil % : 0.1 %  03-16    136  |  97  |  169<H>  ----------------------------<  111<H>  5.2   |  13<L>  |  17.56<H>    Ca    9.4      16 Mar 2018 15:41    PT/INR - ( 16 Mar 2018 15:41 )   PT: 10.9 sec;   INR: 1.00 ratio    PTT - ( 16 Mar 2018 15:41 )  PTT:31.8 sec

## 2018-03-17 DIAGNOSIS — D89.9 DISORDER INVOLVING THE IMMUNE MECHANISM, UNSPECIFIED: ICD-10-CM

## 2018-03-17 DIAGNOSIS — N18.6 END STAGE RENAL DISEASE: ICD-10-CM

## 2018-03-17 DIAGNOSIS — I10 ESSENTIAL (PRIMARY) HYPERTENSION: ICD-10-CM

## 2018-03-17 DIAGNOSIS — Z94.0 KIDNEY TRANSPLANT STATUS: ICD-10-CM

## 2018-03-17 LAB
ALBUMIN SERPL ELPH-MCNC: 3.3 G/DL — SIGNIFICANT CHANGE UP (ref 3.3–5)
ALP SERPL-CCNC: 42 U/L — SIGNIFICANT CHANGE UP (ref 40–120)
ALT FLD-CCNC: 8 U/L RC — LOW (ref 10–45)
ANION GAP SERPL CALC-SCNC: 19 MMOL/L — HIGH (ref 5–17)
AST SERPL-CCNC: 14 U/L — SIGNIFICANT CHANGE UP (ref 10–40)
BILIRUB SERPL-MCNC: 0.4 MG/DL — SIGNIFICANT CHANGE UP (ref 0.2–1.2)
BUN SERPL-MCNC: 117 MG/DL — HIGH (ref 7–23)
CALCIUM SERPL-MCNC: 8.9 MG/DL — SIGNIFICANT CHANGE UP (ref 8.4–10.5)
CHLORIDE SERPL-SCNC: 98 MMOL/L — SIGNIFICANT CHANGE UP (ref 96–108)
CO2 SERPL-SCNC: 20 MMOL/L — LOW (ref 22–31)
CREAT SERPL-MCNC: 13.63 MG/DL — HIGH (ref 0.5–1.3)
GLUCOSE SERPL-MCNC: 119 MG/DL — HIGH (ref 70–99)
HBV CORE AB SER-ACNC: SIGNIFICANT CHANGE UP
HBV CORE IGM SER-ACNC: SIGNIFICANT CHANGE UP
HBV SURFACE AB SER-ACNC: <3 MIU/ML — LOW
HBV SURFACE AG SER-ACNC: SIGNIFICANT CHANGE UP
HCT VFR BLD CALC: 22.7 % — LOW (ref 39–50)
HCV AB S/CO SERPL IA: 0.12 S/CO — SIGNIFICANT CHANGE UP
HCV AB SERPL-IMP: SIGNIFICANT CHANGE UP
HGB BLD-MCNC: 7.2 G/DL — LOW (ref 13–17)
MAGNESIUM SERPL-MCNC: 2.9 MG/DL — HIGH (ref 1.6–2.6)
MCHC RBC-ENTMCNC: 27.8 PG — SIGNIFICANT CHANGE UP (ref 27–34)
MCHC RBC-ENTMCNC: 31.7 GM/DL — LOW (ref 32–36)
MCV RBC AUTO: 87.6 FL — SIGNIFICANT CHANGE UP (ref 80–100)
NRBC # BLD: 0 /100 WBCS — SIGNIFICANT CHANGE UP (ref 0–0)
PHOSPHATE SERPL-MCNC: 9.1 MG/DL — HIGH (ref 2.5–4.5)
PLATELET # BLD AUTO: 322 K/UL — SIGNIFICANT CHANGE UP (ref 150–400)
POTASSIUM SERPL-MCNC: 4.2 MMOL/L — SIGNIFICANT CHANGE UP (ref 3.5–5.3)
POTASSIUM SERPL-SCNC: 4.2 MMOL/L — SIGNIFICANT CHANGE UP (ref 3.5–5.3)
PROT SERPL-MCNC: 6.4 G/DL — SIGNIFICANT CHANGE UP (ref 6–8.3)
RBC # BLD: 2.59 M/UL — LOW (ref 4.2–5.8)
RBC # FLD: 17.3 % — HIGH (ref 10.3–14.5)
SODIUM SERPL-SCNC: 137 MMOL/L — SIGNIFICANT CHANGE UP (ref 135–145)
TACROLIMUS SERPL-MCNC: <2 NG/ML — SIGNIFICANT CHANGE UP
WBC # BLD: 3.72 K/UL — LOW (ref 3.8–10.5)
WBC # FLD AUTO: 3.72 K/UL — LOW (ref 3.8–10.5)

## 2018-03-17 PROCEDURE — 90935 HEMODIALYSIS ONE EVALUATION: CPT | Mod: GC

## 2018-03-17 RX ORDER — DOCUSATE SODIUM 100 MG
100 CAPSULE ORAL THREE TIMES A DAY
Qty: 0 | Refills: 0 | Status: DISCONTINUED | OUTPATIENT
Start: 2018-03-17 | End: 2018-03-27

## 2018-03-17 RX ORDER — TACROLIMUS 5 MG/1
1 CAPSULE ORAL ONCE
Qty: 0 | Refills: 0 | Status: COMPLETED | OUTPATIENT
Start: 2018-03-17 | End: 2018-03-17

## 2018-03-17 RX ORDER — FUROSEMIDE 40 MG
80 TABLET ORAL DAILY
Qty: 0 | Refills: 0 | Status: DISCONTINUED | OUTPATIENT
Start: 2018-03-17 | End: 2018-03-19

## 2018-03-17 RX ORDER — TACROLIMUS 5 MG/1
1 CAPSULE ORAL
Qty: 0 | Refills: 0 | Status: DISCONTINUED | OUTPATIENT
Start: 2018-03-17 | End: 2018-03-27

## 2018-03-17 RX ADMIN — TACROLIMUS 1 MILLIGRAM(S): 5 CAPSULE ORAL at 11:19

## 2018-03-17 RX ADMIN — Medication 100 MILLIGRAM(S): at 18:41

## 2018-03-17 RX ADMIN — Medication 0.1 MILLIGRAM(S): at 22:58

## 2018-03-17 RX ADMIN — TACROLIMUS 1 MILLIGRAM(S): 5 CAPSULE ORAL at 22:59

## 2018-03-17 RX ADMIN — ERYTHROPOIETIN 10000 UNIT(S): 10000 INJECTION, SOLUTION INTRAVENOUS; SUBCUTANEOUS at 15:36

## 2018-03-17 RX ADMIN — Medication 1 TABLET(S): at 11:29

## 2018-03-17 RX ADMIN — Medication 5 MILLIGRAM(S): at 11:29

## 2018-03-17 RX ADMIN — CALCITRIOL 0.25 MICROGRAM(S): 0.5 CAPSULE ORAL at 11:29

## 2018-03-17 RX ADMIN — Medication 60 MILLIGRAM(S): at 11:29

## 2018-03-17 NOTE — PROGRESS NOTE ADULT - SUBJECTIVE AND OBJECTIVE BOX
[facility] DIVISION OF KIDNEY DISEASES AND HYPERTENSION -- HEMODIALYSIS NOTE  --------------------------------------------------------------------------------  Dung Phillips     --------------------------------------------------------------------------------  Chief Complaint: ESRD/Ongoing hemodialysis requirement    24 hour events/subjective:        PAST HISTORY  --------------------------------------------------------------------------------  No significant changes to PMH, PSH, FHx, SHx, unless otherwise noted    ALLERGIES & MEDICATIONS  --------------------------------------------------------------------------------  Allergies    No Known Allergies    Intolerances      Standing Inpatient Medications  aspirin  chewable 81 milliGRAM(s) Oral daily  calcitriol   Capsule 0.25 MICROGram(s) Oral daily  cloNIDine 0.1 milliGRAM(s) Oral at bedtime  docusate sodium 100 milliGRAM(s) Oral three times a day  epoetin sherlyn Injectable 14837 Unit(s) IV Push <User Schedule>  heparin  Injectable 5000 Unit(s) SubCutaneous every 8 hours  NIFEdipine XL 60 milliGRAM(s) Oral daily  predniSONE   Tablet 5 milliGRAM(s) Oral daily  tacrolimus 1 milliGRAM(s) Oral two times a day  trimethoprim   80 mG/sulfamethoxazole 400 mG 1 Tablet(s) Oral <User Schedule>  valGANciclovir 450 milliGRAM(s) Oral <User Schedule>    PRN Inpatient Medications      REVIEW OF SYSTEMS  --------------------------------------------------------------------------------  Constitutional: [ ] Fever [ ] Chills [ ] Fatigue [ ] Weight change   HEENT: [ ] Blurred vision [ ] Eye Pain [ ] Headache [ ] Runny nose [ ] Sore Throat   Respiratory: [ ] Cough [ ] Wheezing [ ] Shortness of breath  Cardiovascular: [ ] Chest Pain [ ] Palpitations [ ] MAY [ ] PND [ ] Orthopnea  Gastrointestinal: [ ] Abdominal Pain [ ] Diarrhea [ ] Constipation [ ] Hemorrhoids [ ] Nausea [ ] Vomiting  Genitourinary: [ ] Nocturia [ ] Dysuria [ ] Incontinence  Extremities: [ ] Swelling [ ] Joint Pain  Neurologic: [ ] Focal deficit [ ] Paresthesias [ ] Syncope  Lymphatic: [ ] Swelling [ ] Lymphadenopathy   Skin: [ ] Rash [ ] Ecchymoses [ ] Wounds [ ] Lesions  Psychiatry: [ ] Depression [ ] Suicidal/Homicidal Ideation [ ] Anxiety [ ] Sleep Disturbances  [ ] 10 point review of systems is otherwise negative except as mentioned above              [ ]Unable to obtain  All other systems were reviewed and are negative, except as noted.    VITALS/PHYSICAL EXAM  --------------------------------------------------------------------------------  T(C): 36.9 (18 @ 05:57), Max: 36.9 (18 @ 18:52)  HR: 88 (18 @ 05:57) (88 - 923)  BP: 156/81 (18 @ 05:57) (155/78 - 167/82)  RR: 18 (18 @ 05:57) (18 - 20)  SpO2: 95% (18 @ 05:57) (93% - 96%)  Wt(kg): --    Height (cm): 180.34 (18 @ 14:15)  Weight (kg): 94.1 (18 @ 14:15)  BMI (kg/m2): 28.9 (18 @ 14:15)  BSA (m2): 2.14 (18 @ 14:15)  Daily Height in cm: 180.34 (16 Mar 2018 14:15)    Daily Weight in k (17 Mar 2018 10:20)  I&O's Summary    16 Mar 2018 07:  -  17 Mar 2018 07:00  --------------------------------------------------------  IN: 0 mL / OUT: 250 mL / NET: -250 mL    17 Mar 2018 07:  -  17 Mar 2018 10:47  --------------------------------------------------------  IN: 440 mL / OUT: 400 mL / NET: 40 mL          18 @ 07:01  -  18 @ 07:00  --------------------------------------------------------  IN: 0 mL / OUT: 250 mL / NET: -250 mL    18 @ 07:01  -  18 @ 10:47  --------------------------------------------------------  IN: 440 mL / OUT: 400 mL / NET: 40 mL        Physical Exam:  	    Gen: NAD   	HEENT: anicteric  	Pulm: CTA B/L   	CV: RRR  	Back: No dependent edema  	Abd: soft, nontender, nondistended  	: No sagastume  	LE: Warm, no edema  	Neuro: no asterixis  	Skin: Warm, without rashes  	Vascular access: none    LABS/STUDIES  --------------------------------------------------------------------------------              7.2    3.72  >-----------<  322      [18 @ 08:39]              22.7     137  |  98  |  117  ----------------------------<  119      [18 @ 06:50]  4.2   |  20  |  13.63        Ca     8.9     [18 @ 06:50]    TPro  6.4  /  Alb  3.3  /  TBili  0.4  /  DBili  x   /  AST  14  /  ALT  8   /  AlkPhos  42  [18 @ 06:50]    PT/INR: PT 10.9 , INR 1.00       [18 @ 15:41]  PTT: 31.8       [18 @ 15:41]          Radiology  --------------------------------------------------------------------------------    --------------------------------------------------------------------------------  Dung Phillips    [facility] DIVISION OF KIDNEY DISEASES AND HYPERTENSION -- HEMODIALYSIS NOTE  --------------------------------------------------------------------------------  Dung Ivyahim     --------------------------------------------------------------------------------  Chief Complaint: ESRD/Ongoing hemodialysis requirement    24 hour events/subjective:  no acute events. Got HD yesterday      PAST HISTORY  --------------------------------------------------------------------------------  No significant changes to PMH, PSH, FHx, SHx, unless otherwise noted    ALLERGIES & MEDICATIONS  --------------------------------------------------------------------------------  Allergies    No Known Allergies    Intolerances      Standing Inpatient Medications  aspirin  chewable 81 milliGRAM(s) Oral daily  calcitriol   Capsule 0.25 MICROGram(s) Oral daily  cloNIDine 0.1 milliGRAM(s) Oral at bedtime  docusate sodium 100 milliGRAM(s) Oral three times a day  epoetin sherlyn Injectable 19273 Unit(s) IV Push <User Schedule>  heparin  Injectable 5000 Unit(s) SubCutaneous every 8 hours  NIFEdipine XL 60 milliGRAM(s) Oral daily  predniSONE   Tablet 5 milliGRAM(s) Oral daily  tacrolimus 1 milliGRAM(s) Oral two times a day  trimethoprim   80 mG/sulfamethoxazole 400 mG 1 Tablet(s) Oral <User Schedule>  valGANciclovir 450 milliGRAM(s) Oral <User Schedule>    PRN Inpatient Medications      REVIEW OF SYSTEMS  --------------------------------------------------------------------------------  Constitutional: [ ] Fever [ ] Chills [ ] Fatigue [ ] Weight change   HEENT: [ ] Blurred vision [ ] Eye Pain [ ] Headache [ ] Runny nose [ ] Sore Throat   Respiratory: [ ] Cough [ ] Wheezing [ ] Shortness of breath  Cardiovascular: [ ] Chest Pain [ ] Palpitations [ ] MAY [ ] PND [ ] Orthopnea  Gastrointestinal: [ ] Abdominal Pain [ ] Diarrhea [ ] Constipation [ ] Hemorrhoids [ ] Nausea [ ] Vomiting  Genitourinary: [ ] Nocturia [ ] Dysuria [ ] Incontinence  Extremities: [ ] Swelling [ ] Joint Pain  Neurologic: [ ] Focal deficit [ ] Paresthesias [ ] Syncope  Lymphatic: [ ] Swelling [ ] Lymphadenopathy   Skin: [ ] Rash [ ] Ecchymoses [ ] Wounds [ ] Lesions  Psychiatry: [ ] Depression [ ] Suicidal/Homicidal Ideation [ ] Anxiety [ ] Sleep Disturbances  [x ] 10 point review of systems is otherwise negative except as mentioned above              [ ]Unable to obtain  All other systems were reviewed and are negative, except as noted.    VITALS/PHYSICAL EXAM  --------------------------------------------------------------------------------  T(C): 36.9 (18 @ 05:57), Max: 36.9 (18 @ 18:52)  HR: 88 (18 @ 05:57) (88 - 923)  BP: 156/81 (18 @ 05:57) (155/78 - 167/82)  RR: 18 (18 @ 05:57) (18 - 20)  SpO2: 95% (18 @ 05:57) (93% - 96%)  Wt(kg): --    Height (cm): 180.34 (18 @ 14:15)  Weight (kg): 94.1 (18 @ 14:15)  BMI (kg/m2): 28.9 (18 @ 14:15)  BSA (m2): 2.14 (18 @ 14:15)  Daily Height in cm: 180.34 (16 Mar 2018 14:15)    Daily Weight in k (17 Mar 2018 10:20)  I&O's Summary    16 Mar 2018 07:  -  17 Mar 2018 07:00  --------------------------------------------------------  IN: 0 mL / OUT: 250 mL / NET: -250 mL    17 Mar 2018 07:  -  17 Mar 2018 10:47  --------------------------------------------------------  IN: 440 mL / OUT: 400 mL / NET: 40 mL          18 @ 07:  -  18 @ 07:00  --------------------------------------------------------  IN: 0 mL / OUT: 250 mL / NET: -250 mL    18 @ 07:01  -  18 @ 10:47  --------------------------------------------------------  IN: 440 mL / OUT: 400 mL / NET: 40 mL        Physical Exam:  	    Gen: NAD   	HEENT: anicteric  	Pulm: CTA B/L   	CV: RRR  	Back: No dependent edema  	Abd: soft, nontender, nondistended  	: No sagastume  	LE: Warm, 2+ edema  	Neuro: no asterixis  	Skin: Warm, without rashes  	Vascular access: antoine licona 3/16 c/d/i    LABS/STUDIES  --------------------------------------------------------------------------------              7.2    3.72  >-----------<  322      [18 @ 08:39]              22.7     137  |  98  |  117  ----------------------------<  119      [18 @ 06:50]  4.2   |  20  |  13.63        Ca     8.9     [18 @ 06:50]    TPro  6.4  /  Alb  3.3  /  TBili  0.4  /  DBili  x   /  AST  14  /  ALT  8   /  AlkPhos  42  [18 @ 06:50]    PT/INR: PT 10.9 , INR 1.00       [18 @ 15:41]  PTT: 31.8       [18 @ 15:41]          Radiology  --------------------------------------------------------------------------------    --------------------------------------------------------------------------------  Dung IvyDavid Ville [phone]

## 2018-03-17 NOTE — PROGRESS NOTE ADULT - ASSESSMENT
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

## 2018-03-17 NOTE — PROGRESS NOTE ADULT - SUBJECTIVE AND OBJECTIVE BOX
MEDICINE, PROGRESS NOTE [phone]    MEAGAN RAMOS 57y [id number]    Patient seen and examined.  Patient is a 57y old  Male who presents with a chief complaint of My kidney is not working (16 Mar 2018 16:07)  Pt feels ok, no sob again.    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  History of partial pancreatectomy  History of renal transplant: x 2    MEDICATIONS  (STANDING):  calcitriol   Capsule 0.25 MICROGram(s) Oral daily  cloNIDine 0.1 milliGRAM(s) Oral at bedtime  docusate sodium 100 milliGRAM(s) Oral three times a day  epoetin sherlyn Injectable 68127 Unit(s) IV Push <User Schedule>  heparin  Injectable 5000 Unit(s) SubCutaneous every 8 hours  NIFEdipine XL 60 milliGRAM(s) Oral daily  predniSONE   Tablet 5 milliGRAM(s) Oral daily  tacrolimus 1 milliGRAM(s) Oral two times a day  trimethoprim   80 mG/sulfamethoxazole 400 mG 1 Tablet(s) Oral <User Schedule>  valGANciclovir 450 milliGRAM(s) Oral <User Schedule>    MEDICATIONS  (PRN):    Allergies    No Known Allergies    Intolerances    PHYSICAL EXAM:  Constitutional: NAD  HEENT: Normocephalic, EOMI  Neck:  No JVD  Respiratory: CTA B/l ulf, no wheezes, dec bs at bases  Cardiovascular: S1, S2, RRR, + systolic murmur  Gastrointestinal: BS+, soft, NT/ND  Extremities: + peripheral edema  Neurological: AAOX3, no focal deficits  Psychiatric: Normal mood, normal affect  : No Augustin    Vital Signs Last 24 Hrs  T(C): 36.9 (17 Mar 2018 14:10), Max: 36.9 (16 Mar 2018 18:52)  T(F): 98.4 (17 Mar 2018 14:10), Max: 98.5 (16 Mar 2018 23:02)  HR: 88 (17 Mar 2018 14:10) (84 - 923)  BP: 173/89 (17 Mar 2018 14:10) (155/78 - 173/89)  BP(mean): --  RR: 18 (17 Mar 2018 14:10) (17 - 20)  SpO2: 93% (17 Mar 2018 14:10) (93% - 96%)  I&O's Summary    16 Mar 2018 07:01  -  17 Mar 2018 07:00  --------------------------------------------------------  IN: 0 mL / OUT: 250 mL / NET: -250 mL    17 Mar 2018 07:01  -  17 Mar 2018 17:34  --------------------------------------------------------  IN: 440 mL / OUT: 1550 mL / NET: -1110 mL        LABS:                        7.2    3.72  )-----------( 322      ( 17 Mar 2018 08:39 )             22.7     03-17    137  |  98  |  117<H>  ----------------------------<  119<H>  4.2   |  20<L>  |  13.63<H>    Ca    8.9      17 Mar 2018 06:50  Phos  9.1     03-17  Mg     2.9     03-17    TPro  6.4  /  Alb  3.3  /  TBili  0.4  /  DBili  x   /  AST  14  /  ALT  8<L>  /  AlkPhos  42  03-17  Tacrolimus (), Serum: <2.0 ng/mL (03-17 @ 08:39)  cxr - reviewed by me -  catheter in place in svc, increased pulm vascular markings

## 2018-03-17 NOTE — PROVIDER CONTACT NOTE (OTHER) - RECOMMENDATIONS
Verify medications w/ MD, change orders. Add magnesium & phosphorus labs to check electrolytes. Assess patient's L-upper arm. Continue to monitor.

## 2018-03-17 NOTE — PROGRESS NOTE ADULT - ASSESSMENT
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 2nd dialysis today.  C/w with tacro, valcyte, bactrim, ad prednisone,  Anemia-c/w epogen  HTN- will do HD with UF, cont with nifedipine

## 2018-03-18 DIAGNOSIS — N18.9 CHRONIC KIDNEY DISEASE, UNSPECIFIED: ICD-10-CM

## 2018-03-18 LAB
ANION GAP SERPL CALC-SCNC: 18 MMOL/L — HIGH (ref 5–17)
BLD GP AB SCN SERPL QL: NEGATIVE — SIGNIFICANT CHANGE UP
BUN SERPL-MCNC: 82 MG/DL — HIGH (ref 7–23)
CALCIUM SERPL-MCNC: 9.1 MG/DL — SIGNIFICANT CHANGE UP (ref 8.4–10.5)
CHLORIDE SERPL-SCNC: 100 MMOL/L — SIGNIFICANT CHANGE UP (ref 96–108)
CO2 SERPL-SCNC: 21 MMOL/L — LOW (ref 22–31)
CREAT SERPL-MCNC: 10.2 MG/DL — HIGH (ref 0.5–1.3)
GLUCOSE SERPL-MCNC: 95 MG/DL — SIGNIFICANT CHANGE UP (ref 70–99)
HCT VFR BLD CALC: 25 % — LOW (ref 39–50)
HGB BLD-MCNC: 7.7 G/DL — LOW (ref 13–17)
MCHC RBC-ENTMCNC: 27.1 PG — SIGNIFICANT CHANGE UP (ref 27–34)
MCHC RBC-ENTMCNC: 30.8 GM/DL — LOW (ref 32–36)
MCV RBC AUTO: 88 FL — SIGNIFICANT CHANGE UP (ref 80–100)
NRBC # BLD: 0 /100 WBCS — SIGNIFICANT CHANGE UP (ref 0–0)
PLATELET # BLD AUTO: 316 K/UL — SIGNIFICANT CHANGE UP (ref 150–400)
POTASSIUM SERPL-MCNC: 4.2 MMOL/L — SIGNIFICANT CHANGE UP (ref 3.5–5.3)
POTASSIUM SERPL-SCNC: 4.2 MMOL/L — SIGNIFICANT CHANGE UP (ref 3.5–5.3)
RBC # BLD: 2.84 M/UL — LOW (ref 4.2–5.8)
RBC # FLD: 17.8 % — HIGH (ref 10.3–14.5)
RH IG SCN BLD-IMP: POSITIVE — SIGNIFICANT CHANGE UP
SODIUM SERPL-SCNC: 139 MMOL/L — SIGNIFICANT CHANGE UP (ref 135–145)
WBC # BLD: 4.86 K/UL — SIGNIFICANT CHANGE UP (ref 3.8–10.5)
WBC # FLD AUTO: 4.86 K/UL — SIGNIFICANT CHANGE UP (ref 3.8–10.5)

## 2018-03-18 PROCEDURE — 99233 SBSQ HOSP IP/OBS HIGH 50: CPT | Mod: GC

## 2018-03-18 RX ADMIN — Medication 100 MILLIGRAM(S): at 11:21

## 2018-03-18 RX ADMIN — Medication 100 MILLIGRAM(S): at 22:59

## 2018-03-18 RX ADMIN — TACROLIMUS 1 MILLIGRAM(S): 5 CAPSULE ORAL at 11:21

## 2018-03-18 RX ADMIN — Medication 0.1 MILLIGRAM(S): at 22:59

## 2018-03-18 RX ADMIN — TACROLIMUS 1 MILLIGRAM(S): 5 CAPSULE ORAL at 22:59

## 2018-03-18 RX ADMIN — CALCITRIOL 0.25 MICROGRAM(S): 0.5 CAPSULE ORAL at 11:21

## 2018-03-18 RX ADMIN — Medication 5 MILLIGRAM(S): at 11:21

## 2018-03-18 RX ADMIN — Medication 60 MILLIGRAM(S): at 11:21

## 2018-03-18 NOTE — PROGRESS NOTE ADULT - ASSESSMENT
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

## 2018-03-18 NOTE — PROGRESS NOTE ADULT - SUBJECTIVE AND OBJECTIVE BOX
MEDICINE, PROGRESS NOTE [phone]    MEAGAN RAMOS 57y [id number]    Patient seen and examined.  Patient is a 57y old  Male who presents with a chief complaint of My kidney is not working (16 Mar 2018 16:07)  Pt has no new complaints.    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  History of partial pancreatectomy  History of renal transplant: x 2    MEDICATIONS  (STANDING):  calcitriol   Capsule 0.25 MICROGram(s) Oral daily  cloNIDine 0.1 milliGRAM(s) Oral at bedtime  docusate sodium 100 milliGRAM(s) Oral three times a day  epoetin sherlyn Injectable 30839 Unit(s) IV Push <User Schedule>  furosemide    Tablet 80 milliGRAM(s) Oral daily  heparin  Injectable 5000 Unit(s) SubCutaneous every 8 hours  NIFEdipine XL 60 milliGRAM(s) Oral daily  predniSONE   Tablet 5 milliGRAM(s) Oral daily  tacrolimus 1 milliGRAM(s) Oral two times a day  trimethoprim   80 mG/sulfamethoxazole 400 mG 1 Tablet(s) Oral <User Schedule>  valGANciclovir 450 milliGRAM(s) Oral <User Schedule>    MEDICATIONS  (PRN):    Allergies    No Known Allergies    Intolerances        PHYSICAL EXAM:  Constitutional: NAD  HEENT: Normocephalic, EOMI  Neck:  No JVD  Respiratory: CTA B/L, No wheezes  Cardiovascular: S1, S2, RRR, + systolic murmur  Gastrointestinal: BS+, soft, NT/ND  Extremities: + peripheral edema LE b/l  Neurological: AAOX3, no focal deficits  Psychiatric: Normal mood, normal affect  : No Augustin    Vital Signs Last 24 Hrs  T(C): 37 (18 Mar 2018 13:55), Max: 37.3 (17 Mar 2018 20:52)  T(F): 98.6 (18 Mar 2018 13:55), Max: 99.1 (17 Mar 2018 20:52)  HR: 81 (18 Mar 2018 13:55) (81 - 96)  BP: 161/84 (18 Mar 2018 13:55) (153/78 - 176/85)  BP(mean): --  RR: 18 (18 Mar 2018 13:55) (18 - 18)  SpO2: 96% (18 Mar 2018 13:55) (90% - 96%)  I&O's Summary    17 Mar 2018 07:01  -  18 Mar 2018 07:00  --------------------------------------------------------  IN: 1140 mL / OUT: 1800 mL / NET: -660 mL    18 Mar 2018 07:01  -  18 Mar 2018 17:19  --------------------------------------------------------  IN: 740 mL / OUT: 525 mL / NET: 215 mL        LABS:                        7.7    4.86  )-----------( 316      ( 18 Mar 2018 08:32 )             25.0     03-18    139  |  100  |  82<H>  ----------------------------<  95  4.2   |  21<L>  |  10.20<H>    Ca    9.1      18 Mar 2018 06:40  Phos  9.1     03-17  Mg     2.9     03-17    TPro  6.4  /  Alb  3.3  /  TBili  0.4  /  DBili  x   /  AST  14  /  ALT  8<L>  /  AlkPhos  42  03-17

## 2018-03-18 NOTE — PROGRESS NOTE ADULT - SUBJECTIVE AND OBJECTIVE BOX
[facility] DIVISION OF KIDNEY DISEASES AND HYPERTENSION -- FOLLOW UP NOTE  --------------------------------------------------------------------------------  Dung Phillips     --------------------------------------------------------------------------------  Chief Complaint:renal transplant    24 hour events/subjective:  Got HD yesterday. with 1 L removed      PAST HISTORY  --------------------------------------------------------------------------------  No significant changes to PMH, PSH, FHx, SHx, unless otherwise noted    ALLERGIES & MEDICATIONS  --------------------------------------------------------------------------------  Allergies    No Known Allergies    Intolerances      Standing Inpatient Medications  calcitriol   Capsule 0.25 MICROGram(s) Oral daily  cloNIDine 0.1 milliGRAM(s) Oral at bedtime  docusate sodium 100 milliGRAM(s) Oral three times a day  epoetin sherlyn Injectable 89607 Unit(s) IV Push <User Schedule>  furosemide    Tablet 80 milliGRAM(s) Oral daily  heparin  Injectable 5000 Unit(s) SubCutaneous every 8 hours  NIFEdipine XL 60 milliGRAM(s) Oral daily  predniSONE   Tablet 5 milliGRAM(s) Oral daily  tacrolimus 1 milliGRAM(s) Oral two times a day  trimethoprim   80 mG/sulfamethoxazole 400 mG 1 Tablet(s) Oral <User Schedule>  valGANciclovir 450 milliGRAM(s) Oral <User Schedule>    PRN Inpatient Medications      REVIEW OF SYSTEMS  --------------------------------------------------------------------------------  Review Of Systems:  Constitutional: [ ] Fever [ ] Chills [ ] Fatigue [ ] Weight change   HEENT: [ ] Blurred vision [ ] Eye Pain [ ] Headache [ ] Runny nose [ ] Sore Throat   Respiratory: [ ] Cough [ ] Wheezing [ ] Shortness of breath  Cardiovascular: [ ] Chest Pain [ ] Palpitations [x ] MAY [ ] PND [ ] Orthopnea  Gastrointestinal: [ ] Abdominal Pain [ ] Diarrhea [ ] Constipation [ ] Hemorrhoids [ ] Nausea [ ] Vomiting  Genitourinary: [ ] Nocturia [ ] Dysuria [ ] Incontinence  Extremities: [x ] Swelling [ ] Joint Pain  Neurologic: [ ] Focal deficit [ ] Paresthesias [ ] Syncope  Lymphatic: [ ] Swelling [ ] Lymphadenopathy   Skin: [ ] Rash [ ] Ecchymoses [ ] Wounds [ ] Lesions  Psychiatry: [ ] Depression [ ] Suicidal/Homicidal Ideation [ ] Anxiety [ ] Sleep Disturbances  [x ] 10 point review of systems is otherwise negative except as mentioned above       [ ]Unable to obtain    All other systems were reviewed and are negative, except as noted.    VITALS/PHYSICAL EXAM  --------------------------------------------------------------------------------  T(C): 37.3 (18 @ 20:52), Max: 37.3 (18 @ 20:52)  HR: 93 (18 @ 20:52) (84 - 96)  BP: 153/78 (18 @ 20:52) (153/78 - 173/89)  RR: 18 (18 @ 20:52) (17 - 18)  SpO2: 90% (18 @ 20:52) (90% - 96%)  Wt(kg): --    Height (cm): 180.34 (18 @ 14:15)  Weight (kg): 94.1 (18 @ 14:15)  BMI (kg/m2): 28.9 (18 @ 14:15)  BSA (m2): 2.14 (18 @ 14:15)  Daily     Daily Weight in k.9 (17 Mar 2018 14:10)  I&O's Summary    17 Mar 2018 07:01  -  18 Mar 2018 07:00  --------------------------------------------------------  IN: 1140 mL / OUT: 1800 mL / NET: -660 mL          18 @ 07:01  -  18 @ 07:00  --------------------------------------------------------  IN: 1140 mL / OUT: 1800 mL / NET: -660 mL        Physical Exam:              Gen: NAD   	HEENT: anicteric  	Pulm: CTA B/L   	CV: RRR  	Back: No dependent edema  	Abd: soft, nontender, nondistended  	: No sagastume  	LE: Warm,  edema  	Neuro: no asterixis  	Skin: Warm, without rashes  	Vascular access: SIDNEY gaytan/jez/i    LABS/STUDIES  --------------------------------------------------------------------------------              7.2    3.72  >-----------<  322      [18 08:39]              22.7     139  |  100  |  82  ----------------------------<  95      [18 06:40]  4.2   |  21  |  10.20        Ca     9.1     [18 06:40]      Mg     2.9     [18 06:50]      Phos  9.1     [18 06:50]    TPro  6.4  /  Alb  3.3  /  TBili  0.4  /  DBili  x   /  AST  14  /  ALT  8   /  AlkPhos  42  [18 06:50]    PT/INR: PT 10.9 , INR 1.00       [18 15:41]  PTT: 31.8       [18 15:41]      Creatinine Trend:  SCr 10.20 [ 06:40]  SCr 13.63 [:50]  SCr 17.56 [ 15:41]          HBsAb <3.0      [18 05:35]  HBsAg Nonreact      [18 05:35]  HBcAb Nonreact      [18 05:35]  HCV 0.12, Nonreact      [18 05:35]        Radiology  --------------------------------------------------------------------------------    --------------------------------------------------------------------------------  Dung Phillips

## 2018-03-18 NOTE — PROGRESS NOTE ADULT - ASSESSMENT
Failed renal transplant s/p alejaley and HD X 2  hypertension  anemia  hypermagnesemia  hyperphosphatemia    continue current care  plan for hd sulema  check stress test  will adjust meds as volume is removed

## 2018-03-19 PROCEDURE — 90935 HEMODIALYSIS ONE EVALUATION: CPT | Mod: GC

## 2018-03-19 PROCEDURE — 93306 TTE W/DOPPLER COMPLETE: CPT | Mod: 26

## 2018-03-19 RX ORDER — ERYTHROPOIETIN 10000 [IU]/ML
10000 INJECTION, SOLUTION INTRAVENOUS; SUBCUTANEOUS ONCE
Qty: 0 | Refills: 0 | Status: COMPLETED | OUTPATIENT
Start: 2018-03-19 | End: 2018-03-19

## 2018-03-19 RX ADMIN — TACROLIMUS 1 MILLIGRAM(S): 5 CAPSULE ORAL at 23:08

## 2018-03-19 RX ADMIN — Medication 5 MILLIGRAM(S): at 11:54

## 2018-03-19 RX ADMIN — VALGANCICLOVIR 450 MILLIGRAM(S): 450 TABLET, FILM COATED ORAL at 09:52

## 2018-03-19 RX ADMIN — TACROLIMUS 1 MILLIGRAM(S): 5 CAPSULE ORAL at 11:54

## 2018-03-19 RX ADMIN — Medication 60 MILLIGRAM(S): at 13:28

## 2018-03-19 RX ADMIN — ERYTHROPOIETIN 10000 UNIT(S): 10000 INJECTION, SOLUTION INTRAVENOUS; SUBCUTANEOUS at 15:16

## 2018-03-19 RX ADMIN — CALCITRIOL 0.25 MICROGRAM(S): 0.5 CAPSULE ORAL at 11:54

## 2018-03-19 RX ADMIN — Medication 0.1 MILLIGRAM(S): at 21:46

## 2018-03-19 NOTE — PROGRESS NOTE ADULT - ASSESSMENT
early cardiac tamponade  Failed renal transplant s/p shiley and HD X 3  hypertension  anemia  hypermagnesemia  hyperphosphatemia    continue current care  tele - on cardiac floor  d/c lasix  keep bp in syst 150 range  cardio eval  check iron panel,b12,folate  will get interval repeat echo  if decompensates will need urgent pericardial drain  discussed with NP laith  will hold off on permacath for now

## 2018-03-19 NOTE — PROGRESS NOTE ADULT - ASSESSMENT
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

## 2018-03-19 NOTE — PROGRESS NOTE ADULT - SUBJECTIVE AND OBJECTIVE BOX
MEDICINE, PROGRESS NOTE [phone]    MEAGAN RAMOS 57y [id number]    Patient seen and examined.  Patient is a 57y old  Male who presents with a chief complaint of My kidney is not working (16 Mar 2018 16:07)  Pt has no current complaints. Feels ok. still feels swollen.    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  History of partial pancreatectomy  History of renal transplant: x 2    MEDICATIONS  (STANDING):  calcitriol   Capsule 0.25 MICROGram(s) Oral daily  cloNIDine 0.1 milliGRAM(s) Oral at bedtime  docusate sodium 100 milliGRAM(s) Oral three times a day  epoetin sherlyn Injectable 92200 Unit(s) IV Push <User Schedule>  heparin  Injectable 5000 Unit(s) SubCutaneous every 8 hours  NIFEdipine XL 60 milliGRAM(s) Oral daily  predniSONE   Tablet 5 milliGRAM(s) Oral daily  tacrolimus 1 milliGRAM(s) Oral two times a day  trimethoprim   80 mG/sulfamethoxazole 400 mG 1 Tablet(s) Oral <User Schedule>  valGANciclovir 450 milliGRAM(s) Oral <User Schedule>    MEDICATIONS  (PRN):    Allergies    No Known Allergies    Intolerances        PHYSICAL EXAM:  Constitutional: NAD  HEENT: Normocephalic, EOMI  Neck:  + JVD  Respiratory: CTA B/L ulf, No wheezes, dec bs at bases  Cardiovascular: S1, S2, RRR, + systolic murmur  Gastrointestinal: BS+, soft, NT/ND  Extremities: + peripheral edema X 4 extrem - slowly improving  Neurological: AAOX3, no focal deficits  Psychiatric: Normal mood, normal affect  : No Augustin    Vital Signs Last 24 Hrs  T(C): 37.4 (19 Mar 2018 16:05), Max: 37.5 (18 Mar 2018 21:05)  T(F): 99.3 (19 Mar 2018 16:05), Max: 99.5 (18 Mar 2018 21:05)  HR: 97 (19 Mar 2018 16:05) (80 - 97)  BP: 173/83 (19 Mar 2018 16:05) (161/88 - 173/83)  BP(mean): --  RR: 18 (19 Mar 2018 16:05) (18 - 18)  SpO2: 91% (19 Mar 2018 16:05) (91% - 97%)  I&O's Summary    18 Mar 2018 07:01  -  19 Mar 2018 07:00  --------------------------------------------------------  IN: 1340 mL / OUT: 825 mL / NET: 515 mL    19 Mar 2018 07:01  -  19 Mar 2018 18:53  --------------------------------------------------------  IN: 0 mL / OUT: 2000 mL / NET: -2000 mL        LABS:                        7.7    4.86  )-----------( 316      ( 18 Mar 2018 08:32 )             25.0     03-18    139  |  100  |  82<H>  ----------------------------<  95  4.2   |  21<L>  |  10.20<H>    Ca    9.1      18 Mar 2018 06:40  < from: Transthoracic Echocardiogram (03.19.18 @ 11:00) >  Patient name: MEAGAN RAMOS  YOB: 1960   Age: 57 (M)   MR#: [id number]  Study Date: 3/19/2018  Location: [address] [place], [place] [id number]: Loretta Odonnell [facility]  Study quality: Technically good  Referring Physician: Peter Watts MD  Blood Pressure: 161/88 mmHg  Height: 180 cm  Weight: 94 kg  BSA: 2.1 m2  ------------------------------------------------------------------------  PROCEDURE: Transthoracic echocardiogram with 2-D, M-Mode  and complete spectral and color flow Doppler.  INDICATION: Edema, unspecified (R60.9)  ------------------------------------------------------------------------  Dimensions:    Normal Values:  LA:     4.3    2.0 - 4.0 cm  Ao:     3.3    2.0 - 3.8 cm  SEPTUM: 1.1    0.6 - 1.2 cm  PWT:    1.3    0.6 - 1.1cm  LVIDd:  5.7    3.0 - 5.6 cm  LVIDs:  3.7    1.8 - 4.0 cm  Derived variables:  LVMI: 135 g/m2  RWT: 0.45  Fractional short: 35 %  EF (Teicholtz): 64 %  ------------------------------------------------------------------------  Observations:  MitralValve: Normal mitral valve. Mild-moderate mitral  regurgitation.  Aortic Valve/Aorta: Normal trileaflet aortic valve.  Aortic Root: 3.4 cm.  Left Atrium: Normal left atrium.  Left Ventricle: Normal left ventricular systolic function.  Mild concentricleft ventricular hypertrophy.  Right Heart: Normal right atrium. Normal right ventricular  size and function.  RV base measures 3.1cm- 3.5cm.   Normal tricuspid valve. Mild tricuspid regurgitation.  Normal pulmonic valve. Mild pulmonic regurgitation.  Pericardium/Pleura: Moderate-large circumfrential  pericardial effusion. 1.9-2.3 cm posterior and lateral  to  the LV, 0.7cm  at  RV, 2.1 cm at RA. There is RA inversion,  respiratory variation noted at the Tricuspid valve and  IVC  measures 2.6cm and collapses 20%. There is no RV diastolic  collapse seen on this TTE, however the RV is small at times  and the RV EF is elevated. BP elevated 161/88 and HR  70s-80s. These findings are consistant with RAISED  intrapericardial pressures/early tamponade. BP is elevated  on this study, clinical evaluation indicated.  Hemodynamic: Estimated right atrial pressure is 8 mm Hg.  Estimated right ventricular systolic pressure equals 54 mm  Hg, assuming right atrial pressure equals 8 mm Hg,  consistent with moderate pulmonary hypertension.  ------------------------------------------------------------------------  Conclusions:  1. Mild concentric left ventricular hypertrophy.  2. Normal left ventricular systolic function.  3. Moderate-large circumfrential pericardial effusion.  1.9-2.3 cm posterior and lateral  to the LV, 0.7cm  at  RV,  2.1 cm at RA. There is RA inversion, respiratory variation  noted at the Tricuspid valve and  IVC measures 2.6cm and  collapses 20%. There is no RV diastolic collapse seen on  this TTE, however the RV is small at times and the RV EF is  elevated. BP elevated 161/88 and HR 70s-80s. These findings  are consistant with RAISED intrapericardial pressures/early  tamponade. BP is elevated on this study, clinical  evaluation indicated.  Discussed graham Monroy  *** No previous Echo exam.  ------------------------------------------------------------------------  Confirmed on  3/19/2018 - 11:45:59 by Vanessa Driver M.D.  ------------------------------------------------------------------------

## 2018-03-19 NOTE — PROGRESS NOTE ADULT - SUBJECTIVE AND OBJECTIVE BOX
[facility] DIVISION OF KIDNEY DISEASES AND HYPERTENSION -- FOLLOW UP NOTE  --------------------------------------------------------------------------------  Dung Pihllips     --------------------------------------------------------------------------------  Chief Complaint:Renal transplant    24 hour events/subjective:  Last HD 3/17. for HD today       PAST HISTORY  --------------------------------------------------------------------------------  No significant changes to PMH, PSH, FHx, SHx, unless otherwise noted    ALLERGIES & MEDICATIONS  --------------------------------------------------------------------------------  Allergies    No Known Allergies    Intolerances      Standing Inpatient Medications  calcitriol   Capsule 0.25 MICROGram(s) Oral daily  cloNIDine 0.1 milliGRAM(s) Oral at bedtime  docusate sodium 100 milliGRAM(s) Oral three times a day  epoetin sherlyn Injectable 99131 Unit(s) IV Push <User Schedule>  epoetin sherlyn Injectable 95225 Unit(s) IV Push once  furosemide    Tablet 80 milliGRAM(s) Oral daily  heparin  Injectable 5000 Unit(s) SubCutaneous every 8 hours  NIFEdipine XL 60 milliGRAM(s) Oral daily  predniSONE   Tablet 5 milliGRAM(s) Oral daily  tacrolimus 1 milliGRAM(s) Oral two times a day  trimethoprim   80 mG/sulfamethoxazole 400 mG 1 Tablet(s) Oral <User Schedule>  valGANciclovir 450 milliGRAM(s) Oral <User Schedule>    PRN Inpatient Medications      REVIEW OF SYSTEMS  --------------------------------------------------------------------------------  Constitutional: [ ] Fever [ ] Chills [ ] Fatigue [ ] Weight change   HEENT: [ ] Blurred vision [ ] Eye Pain [ ] Headache [ ] Runny nose [ ] Sore Throat   Respiratory: [ ] Cough [ ] Wheezing [ ] Shortness of breath  Cardiovascular: [ ] Chest Pain [ ] Palpitations [ ] MAY [ ] PND [ ] Orthopnea  Gastrointestinal: [ ] Abdominal Pain [ ] Diarrhea [ ] Constipation [ ] Hemorrhoids [ ] Nausea [ ] Vomiting  Genitourinary: [ ] Nocturia [ ] Dysuria [ ] Incontinence  Extremities: [ ] Swelling [ ] Joint Pain  Neurologic: [ ] Focal deficit [ ] Paresthesias [ ] Syncope  Lymphatic: [ ] Swelling [ ] Lymphadenopathy   Skin: [ ] Rash [ ] Ecchymoses [ ] Wounds [ ] Lesions  Psychiatry: [ ] Depression [ ] Suicidal/Homicidal Ideation [ ] Anxiety [ ] Sleep Disturbances  [x ] 10 point review of systems is otherwise negative except as mentioned above              [ ]Unable to obtain      VITALS/PHYSICAL EXAM  --------------------------------------------------------------------------------  T(C): 37.1 (18 @ 05:50), Max: 37.5 (18 @ 21:05)  HR: 80 (18 @ 05:50) (80 - 92)  BP: 161/88 (18 @ 05:50) (161/84 - 165/85)  RR: 18 (18 @ 05:50) (18 - 18)  SpO2: 93% (18 @ 05:50) (93% - 96%)  Wt(kg): --      Daily     Daily Weight in k.3 (19 Mar 2018 06:37)  I&O's Summary    18 Mar 2018 07:01  -  19 Mar 2018 07:00  --------------------------------------------------------  IN: 1340 mL / OUT: 825 mL / NET: 515 mL          18 @ 07:01  -  18 @ 07:00  --------------------------------------------------------  IN: 1340 mL / OUT: 825 mL / NET: 515 mL        Physical Exam:              Gen: NAD   	HEENT: anicteric  	Pulm: CTA B/L   	CV: RRR  	Back: No dependent edema  	Abd: soft, nontender, nondistended  	: No sagastume  	LE: Warm,  edema  	Neuro: no asterixis  	Skin: Warm, without rashes  	Vascular access: SIDNEY licona c/d/i      LABS/STUDIES  --------------------------------------------------------------------------------              7.7    4.86  >-----------<  316      [18 @ 08:32]              25.0     139  |  100  |  82  ----------------------------<  95      [18 @ 06:40]  4.2   |  21  |  10.20        Ca     9.1     [18 @ 06:40]            Creatinine Trend:  SCr 10.20 [ @ 06:40]  SCr 13.63 [ 06:50]  SCr 17.56 [ 15:41]    Tacrolimus (), Serum: <2.0 ng/mL ( 08:39)      HBsAb <3.0      [18 05:35]  HBsAg Nonreact      [18 05:35]  HBcAb Nonreact      [18 05:35]  HCV 0.12, Nonreact      [18 05:35]        Radiology  --------------------------------------------------------------------------------    --------------------------------------------------------------------------------  Dung Phillips

## 2018-03-20 LAB
FERRITIN SERPL-MCNC: 250 NG/ML — SIGNIFICANT CHANGE UP (ref 30–400)
FOLATE SERPL-MCNC: 5.9 NG/ML — SIGNIFICANT CHANGE UP (ref 4.8–24.2)
IRON SATN MFR SERPL: 15 % — LOW (ref 16–55)
IRON SATN MFR SERPL: 24 UG/DL — LOW (ref 45–165)
IRON SATN MFR SERPL: 32 % — SIGNIFICANT CHANGE UP (ref 16–55)
IRON SATN MFR SERPL: 53 UG/DL — SIGNIFICANT CHANGE UP (ref 45–165)
OB PNL STL: NEGATIVE — SIGNIFICANT CHANGE UP
TIBC SERPL-MCNC: 161 UG/DL — LOW (ref 220–430)
TIBC SERPL-MCNC: 164 UG/DL — LOW (ref 220–430)
UIBC SERPL-MCNC: 111 UG/DL — SIGNIFICANT CHANGE UP (ref 110–370)
UIBC SERPL-MCNC: 137 UG/DL — SIGNIFICANT CHANGE UP (ref 110–370)
VIT B12 SERPL-MCNC: 450 PG/ML — SIGNIFICANT CHANGE UP (ref 232–1245)

## 2018-03-20 PROCEDURE — 99233 SBSQ HOSP IP/OBS HIGH 50: CPT | Mod: GC

## 2018-03-20 RX ORDER — ACETAMINOPHEN 500 MG
650 TABLET ORAL ONCE
Qty: 0 | Refills: 0 | Status: COMPLETED | OUTPATIENT
Start: 2018-03-20 | End: 2018-03-20

## 2018-03-20 RX ADMIN — Medication 0.1 MILLIGRAM(S): at 22:29

## 2018-03-20 RX ADMIN — TACROLIMUS 1 MILLIGRAM(S): 5 CAPSULE ORAL at 12:11

## 2018-03-20 RX ADMIN — Medication 650 MILLIGRAM(S): at 11:38

## 2018-03-20 RX ADMIN — TACROLIMUS 1 MILLIGRAM(S): 5 CAPSULE ORAL at 22:29

## 2018-03-20 RX ADMIN — Medication 1 TABLET(S): at 06:04

## 2018-03-20 RX ADMIN — CALCITRIOL 0.25 MICROGRAM(S): 0.5 CAPSULE ORAL at 12:11

## 2018-03-20 RX ADMIN — ERYTHROPOIETIN 10000 UNIT(S): 10000 INJECTION, SOLUTION INTRAVENOUS; SUBCUTANEOUS at 10:09

## 2018-03-20 RX ADMIN — Medication 60 MILLIGRAM(S): at 06:04

## 2018-03-20 RX ADMIN — Medication 5 MILLIGRAM(S): at 07:43

## 2018-03-20 RX ADMIN — Medication 650 MILLIGRAM(S): at 13:14

## 2018-03-20 NOTE — PHYSICAL THERAPY INITIAL EVALUATION ADULT - GAIT TRAINING, PT EVAL
GOAL: Pt will ambulate 200 feet without assistive device independently in 2 weeks GOAL: Pt will ambulate 150 feet without assistive device independently in 2 weeks

## 2018-03-20 NOTE — CONSULT NOTE ADULT - ATTENDING COMMENTS
57 year old man with failing renal transplant admitted to start dialysis has pericardial effusion. However not short of breath at rest, no pleuritic pain, blood pressure elevated, heart rate normal. No audible rub. Management is successive days dialysis.

## 2018-03-20 NOTE — CONSULT NOTE ADULT - SUBJECTIVE AND OBJECTIVE BOX
Patient seen and evaluated at bedside    HPI:   56 yo M hx of CKD, s/p bilateral renal transplants 2/2 to NSAID use, HTN, who presented initially to establish dialysis in the hospital, in light of rising BUN/ Cr , and symptoms of pain with walking noted at his nephrologists office.  On arrival here , patient complained of SOB and pain while walking. An TTE was done which revealed a mod-large pericardial effusion with early signs of tamponade.     This AM pt underwent HD session without complaints. Pt states that he feels better with HD sessions, he is able to walk without SOB. He has no trouble breathing otherwise. He denies any chest pain, abd pain currently. He notes improved LE swelling with HD sessions.       PMH:   SBO (small bowel obstruction)  HTN (hypertension)  Renal failure      PSH:   History of partial pancreatectomy  History of renal transplant      Medications:   MEDICATIONS  (STANDING):  calcitriol   Capsule 0.25 MICROGram(s) Oral daily  cloNIDine 0.1 milliGRAM(s) Oral at bedtime  docusate sodium 100 milliGRAM(s) Oral three times a day  epoetin sherlyn Injectable 25198 Unit(s) IV Push <User Schedule>  heparin  Injectable 5000 Unit(s) SubCutaneous every 8 hours  NIFEdipine XL 60 milliGRAM(s) Oral daily  predniSONE   Tablet 5 milliGRAM(s) Oral daily  tacrolimus 1 milliGRAM(s) Oral two times a day  trimethoprim   80 mG/sulfamethoxazole 400 mG 1 Tablet(s) Oral <User Schedule>  valGANciclovir 450 milliGRAM(s) Oral <User Schedule>      Allergies:  No Known Allergies      Review of Systems:  REVIEW OF SYSTEMS:    CONSTITUTIONAL: No weakness, fevers or chills  EYES/ENT: No visual changes;  No dysphagia  NECK: No pain or stiffness  RESPIRATORY: No cough, wheezing, hemoptysis; No shortness of breath  CARDIOVASCULAR: No chest pain or palpitations; +ve lower extremity edema  GASTROINTESTINAL: No abdominal or epigastric pain. No nausea, vomiting, or hematemesis; No diarrhea or constipation. No melena or hematochezia.  BACK: No back pain  GENITOURINARY: No dysuria, frequency or hematuria  NEUROLOGICAL: No numbness,  +ve  weakness  SKIN: No itching, burning, rashes, or lesions   All other review of systems is negative unless indicated above.    Physical Exam:  T(F): 98.3 (03-20), Max: 99.3 (03-19)  HR: 97 (03-20) (85 - 97)  BP: 148/77 (03-20) (148/77 - 188/98)  RR: 18 (03-20)  SpO2: 93% (03-20)  GENERAL: No acute distress, well-developed  HEAD:  Atraumatic, Normocephalic  ENT: EOMI, PERRLA, conjunctiva and sclera clear, Neck supple, No JVD, moist mucosa  CHEST/LUNG: Clear to auscultation bilaterally; No wheeze, equal breath sounds bilaterally   BACK: No spinal tenderness  HEART: Regular rate and rhythm; nl  S1 S2, No murmurs, rubs, or gallops  ABDOMEN: Soft, Nontender, Nondistended; Bowel sounds present  EXTREMITIES:  2+ edema  to shins   PSYCH: Nl behavior, nl affect  NEUROLOGY: AAOx3, non-focal, cranial nerves intact  SKIN: Normal color, No rashes or lesions  LINES:    Cardiovascular Diagnostic Testing:    ECG: none found in chart     Echo:  < from: Transthoracic Echocardiogram (03.19.18 @ 11:00) >  Conclusions:  1. Mild concentric left ventricular hypertrophy.  2. Normal left ventricular systolic function.  3. Moderate-large circumfrential pericardial effusion.  1.9-2.3 cm posterior and lateral  to the LV, 0.7cm  at  RV,  2.1 cm at RA. There is RA inversion, respiratory variation  noted at the Tricuspid valve and  IVC measures 2.6cm and  collapses 20%. There is no RV diastolic collapse seen on  this TTE, however the RV is small at times and the RV EF is  elevated. BP elevated 161/88 and HR 70s-80s. These findings  are consistant with RAISED intrapericardial pressures/early  tamponade. BP is elevated on this study, clinical  evaluation indicated.  Discussed graham Monroy  *** No previous Echo exam.    < end of copied text >

## 2018-03-20 NOTE — PHYSICAL THERAPY INITIAL EVALUATION ADULT - PLANNED THERAPY INTERVENTIONS, PT EVAL
gait training/GOAL: Pt will negotiate 9 steps with unilateral handrail in a reciprocal pattern independently in 2 weeks

## 2018-03-20 NOTE — PHYSICAL THERAPY INITIAL EVALUATION ADULT - PERTINENT HX OF CURRENT PROBLEM, REHAB EVAL
56 y/o male presents with failed renal transplant 2/2 NSAID use. Pt with c/o progressive volume overload and worsening renal function and decreased urine output despite diuretics. Pt admitted for RIJ shiley placement and HD. TTE revealed a mod-large pericardial effusion with early signs of tamponade.

## 2018-03-20 NOTE — PHYSICAL THERAPY INITIAL EVALUATION ADULT - DISCHARGE DISPOSITION, PT EVAL
TBD pending further functional assessments. Pt appeared fatigue during today's session 2/2 HD outpatient PT

## 2018-03-20 NOTE — PHYSICAL THERAPY INITIAL EVALUATION ADULT - IMPAIRMENTS FOUND, PT EVAL
aerobic capacity/endurance/gait, locomotion, and balance muscle strength/gait, locomotion, and balance/aerobic capacity/endurance

## 2018-03-20 NOTE — PROGRESS NOTE ADULT - ASSESSMENT
early cardiac tamponade  Failed renal transplant s/p shiley and HD X 3  hypertension  anemia  hypermagnesemia  hyperphosphatemia    continue current care  continue tele  plan to check labs sulema to assess if another hd session needed sulema or we will give pt a break  plan to repeat a tte on thursday  plan for stress tamponade physiology controlled.

## 2018-03-20 NOTE — PROGRESS NOTE ADULT - SUBJECTIVE AND OBJECTIVE BOX
MEDICINE, PROGRESS NOTE [phone]    MEAGAN RAMOS 57y [id number]    Patient seen and examined.  Patient is a 57y old  Male who presents with a chief complaint of My kidney is not working (16 Mar 2018 16:07)  Pt feels ok. No current complaints.    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  History of partial pancreatectomy  History of renal transplant: x 2    MEDICATIONS  (STANDING):  calcitriol   Capsule 0.25 MICROGram(s) Oral daily  cloNIDine 0.1 milliGRAM(s) Oral at bedtime  docusate sodium 100 milliGRAM(s) Oral three times a day  epoetin sherlyn Injectable 40208 Unit(s) IV Push <User Schedule>  heparin  Injectable 5000 Unit(s) SubCutaneous every 8 hours  NIFEdipine XL 60 milliGRAM(s) Oral daily  predniSONE   Tablet 5 milliGRAM(s) Oral daily  tacrolimus 1 milliGRAM(s) Oral two times a day  trimethoprim   80 mG/sulfamethoxazole 400 mG 1 Tablet(s) Oral <User Schedule>  valGANciclovir 450 milliGRAM(s) Oral <User Schedule>    MEDICATIONS  (PRN):    Allergies    No Known Allergies    Intolerances        PHYSICAL EXAM:  Constitutional: NAD  HEENT: Normocephalic, EOMI  Neck:  No JVD  Respiratory: CTA B/L, No wheezes  Cardiovascular: S1, S2, RRR, + systolic murmur  Gastrointestinal: BS+, soft, NT/ND  Extremities: dec peripheral edema le b/l  Neurological: AAOX3, no focal deficits  Psychiatric: Normal mood, normal affect  : No Augustin    Vital Signs Last 24 Hrs  T(C): 36.8 (20 Mar 2018 12:32), Max: 37.2 (20 Mar 2018 11:31)  T(F): 98.3 (20 Mar 2018 12:32), Max: 99 (20 Mar 2018 11:31)  HR: 97 (20 Mar 2018 13:21) (87 - 97)  BP: 148/77 (20 Mar 2018 13:21) (148/77 - 188/98)  BP(mean): --  RR: 18 (20 Mar 2018 12:32) (16 - 18)  SpO2: 93% (20 Mar 2018 13:21) (93% - 95%)  I&O's Summary    19 Mar 2018 07:01  -  20 Mar 2018 07:00  --------------------------------------------------------  IN: 660 mL / OUT: 2400 mL / NET: -1740 mL    20 Mar 2018 07:01  -  20 Mar 2018 20:00  --------------------------------------------------------  IN: 200 mL / OUT: 2200 mL / NET: -2000 mL

## 2018-03-20 NOTE — PHYSICAL THERAPY INITIAL EVALUATION ADULT - ADDITIONAL COMMENTS
Pt states he lives in an apartment with a few steps to enter (+handrail). Pt states prior to admission being independent with all functional mobility and ADLs.

## 2018-03-20 NOTE — PROGRESS NOTE ADULT - SUBJECTIVE AND OBJECTIVE BOX
[facility] DIVISION OF KIDNEY DISEASES AND HYPERTENSION -- FOLLOW UP NOTE  --------------------------------------------------------------------------------  -------------------------------------------------------  Chief Complaint:Renal transplant on dialysis    24 hour events/subjective:   for HD today       PAST HISTORY  --------------------------------------------------------------------------------  No significant changes to PMH, PSH, FHx, SHx, unless otherwise noted    ALLERGIES & MEDICATIONS  --------------------------------------------------------------------------------  Allergies    No Known Allergies    Intolerances      Standing Inpatient Medications  calcitriol   Capsule 0.25 MICROGram(s) Oral daily  cloNIDine 0.1 milliGRAM(s) Oral at bedtime  docusate sodium 100 milliGRAM(s) Oral three times a day  epoetin sherlyn Injectable 15572 Unit(s) IV Push <User Schedule>  epoetin sherlyn Injectable 70405 Unit(s) IV Push once  furosemide    Tablet 80 milliGRAM(s) Oral daily  heparin  Injectable 5000 Unit(s) SubCutaneous every 8 hours  NIFEdipine XL 60 milliGRAM(s) Oral daily  predniSONE   Tablet 5 milliGRAM(s) Oral daily  tacrolimus 1 milliGRAM(s) Oral two times a day  trimethoprim   80 mG/sulfamethoxazole 400 mG 1 Tablet(s) Oral <User Schedule>  valGANciclovir 450 milliGRAM(s) Oral <User Schedule>    PRN Inpatient Medications      REVIEW OF SYSTEMS  --------------------------------------------------------------------------------  Constitutional: [ ] Fever [ ] Chills [ ] Fatigue [ ] Weight change   HEENT: [ ] Blurred vision [ ] Eye Pain [ ] Headache [ ] Runny nose [ ] Sore Throat   Respiratory: [ ] Cough [ ] Wheezing [ ] Shortness of breath  Cardiovascular: [ ] Chest Pain [ ] Palpitations [ ] MAY [ ] PND [ ] Orthopnea  Gastrointestinal: [ ] Abdominal Pain [ ] Diarrhea [ ] Constipation [ ] Hemorrhoids [ ] Nausea [ ] Vomiting  Genitourinary: [ ] Nocturia [ ] Dysuria [ ] Incontinence  Extremities: [ ] Swelling [ ] Joint Pain  Neurologic: [ ] Focal deficit [ ] Paresthesias [ ] Syncope  Lymphatic: [ ] Swelling [ ] Lymphadenopathy   Skin: [ ] Rash [ ] Ecchymoses [ ] Wounds [ ] Lesions  Psychiatry: [ ] Depression [ ] Suicidal/Homicidal Ideation [ ] Anxiety [ ] Sleep Disturbances  [x ] 10 point review of systems is otherwise negative except as mentioned above              [ ]Unable to obtain      Vital Signs Last 24 Hrs  T(C): 36.8 (03-20-18 @ 12:32)  T(F): 98.3 (03-20-18 @ 12:32), Max: 99 (03-20-18 @ 11:31)  HR: 97 (03-20-18 @ 13:21) (87 - 97)  BP: 148/77 (03-20-18 @ 13:21)  BP(mean): --  RR: 18 (03-20-18 @ 12:32) (16 - 18)  SpO2: 93% (03-20-18 @ 13:21) (93% - 95%)  Wt(kg): --    03-19 @ 07:01  -  03-20 @ 07:00  --------------------------------------------------------  IN: 660 mL / OUT: 2400 mL / NET: -1740 mL    03-20 @ 07:01  -  03-20 @ 16:46  --------------------------------------------------------  IN: 0 mL / OUT: 2000 mL / NET: -2000 mL        03-18-18 @ 07:01  -  03-19-18 @ 07:00  --------------------------------------------------------  IN: 1340 mL / OUT: 825 mL / NET: 515 mL        Physical Exam:              Gen: NAD   	HEENT: anicteric  	Pulm: CTA B/L   	CV: RRR  	Back: No dependent edema  	Abd: soft, nontender, nondistended  	: No sagastume  	LE: Warm,  edema  	Neuro: no asterixis  	Skin: Warm, without rashes  	Vascular access: SIDNEY licona c/d/i      LABS/STUDIES  --------------------------------------------------------------------------------                Creatinine Trend:  SCr 10.20 [03-18 @ 06:40]  SCr 13.63 [03-17 @ 06:50]  SCr 17.56 [03-16 @ 15:41]    Tacrolimus (), Serum: <2.0 ng/mL (03-17 @ 08:39)

## 2018-03-21 DIAGNOSIS — N19 UNSPECIFIED KIDNEY FAILURE: ICD-10-CM

## 2018-03-21 DIAGNOSIS — I31.3 PERICARDIAL EFFUSION (NONINFLAMMATORY): ICD-10-CM

## 2018-03-21 LAB
ANION GAP SERPL CALC-SCNC: 13 MMOL/L — SIGNIFICANT CHANGE UP (ref 5–17)
BUN SERPL-MCNC: 28 MG/DL — HIGH (ref 7–23)
CALCIUM SERPL-MCNC: 9 MG/DL — SIGNIFICANT CHANGE UP (ref 8.4–10.5)
CHLORIDE SERPL-SCNC: 95 MMOL/L — LOW (ref 96–108)
CO2 SERPL-SCNC: 27 MMOL/L — SIGNIFICANT CHANGE UP (ref 22–31)
CREAT SERPL-MCNC: 6.28 MG/DL — HIGH (ref 0.5–1.3)
GLUCOSE SERPL-MCNC: 90 MG/DL — SIGNIFICANT CHANGE UP (ref 70–99)
HCT VFR BLD CALC: 25.7 % — LOW (ref 39–50)
HGB BLD-MCNC: 7.5 G/DL — LOW (ref 13–17)
MCHC RBC-ENTMCNC: 27.5 PG — SIGNIFICANT CHANGE UP (ref 27–34)
MCHC RBC-ENTMCNC: 29.2 GM/DL — LOW (ref 32–36)
MCV RBC AUTO: 94.1 FL — SIGNIFICANT CHANGE UP (ref 80–100)
PLATELET # BLD AUTO: 401 K/UL — HIGH (ref 150–400)
POTASSIUM SERPL-MCNC: 4.2 MMOL/L — SIGNIFICANT CHANGE UP (ref 3.5–5.3)
POTASSIUM SERPL-SCNC: 4.2 MMOL/L — SIGNIFICANT CHANGE UP (ref 3.5–5.3)
RBC # BLD: 2.73 M/UL — LOW (ref 4.2–5.8)
RBC # FLD: 17.4 % — HIGH (ref 10.3–14.5)
SODIUM SERPL-SCNC: 135 MMOL/L — SIGNIFICANT CHANGE UP (ref 135–145)
TACROLIMUS SERPL-MCNC: <2 NG/ML — SIGNIFICANT CHANGE UP
WBC # BLD: 7.01 K/UL — SIGNIFICANT CHANGE UP (ref 3.8–10.5)
WBC # FLD AUTO: 7.01 K/UL — SIGNIFICANT CHANGE UP (ref 3.8–10.5)

## 2018-03-21 PROCEDURE — 93970 EXTREMITY STUDY: CPT | Mod: 26

## 2018-03-21 PROCEDURE — 99233 SBSQ HOSP IP/OBS HIGH 50: CPT

## 2018-03-21 PROCEDURE — 99233 SBSQ HOSP IP/OBS HIGH 50: CPT | Mod: GC

## 2018-03-21 RX ORDER — MENTHOL AND METHYL SALICYLATE 10; 30 G/100G; G/100G
1 STICK TOPICAL
Qty: 0 | Refills: 0 | Status: DISCONTINUED | OUTPATIENT
Start: 2018-03-21 | End: 2018-03-27

## 2018-03-21 RX ORDER — IRON SUCROSE 20 MG/ML
200 INJECTION, SOLUTION INTRAVENOUS DAILY
Qty: 0 | Refills: 0 | Status: COMPLETED | OUTPATIENT
Start: 2018-03-21 | End: 2018-03-26

## 2018-03-21 RX ADMIN — CALCITRIOL 0.25 MICROGRAM(S): 0.5 CAPSULE ORAL at 11:02

## 2018-03-21 RX ADMIN — Medication 5 MILLIGRAM(S): at 11:02

## 2018-03-21 RX ADMIN — TACROLIMUS 1 MILLIGRAM(S): 5 CAPSULE ORAL at 11:02

## 2018-03-21 RX ADMIN — IRON SUCROSE 110 MILLIGRAM(S): 20 INJECTION, SOLUTION INTRAVENOUS at 19:37

## 2018-03-21 RX ADMIN — VALGANCICLOVIR 450 MILLIGRAM(S): 450 TABLET, FILM COATED ORAL at 11:02

## 2018-03-21 RX ADMIN — Medication 60 MILLIGRAM(S): at 05:15

## 2018-03-21 RX ADMIN — Medication 0.1 MILLIGRAM(S): at 22:57

## 2018-03-21 RX ADMIN — TACROLIMUS 1 MILLIGRAM(S): 5 CAPSULE ORAL at 22:57

## 2018-03-21 NOTE — PROGRESS NOTE ADULT - SUBJECTIVE AND OBJECTIVE BOX
[facility] DIVISION OF KIDNEY DISEASES AND HYPERTENSION -- FOLLOW UP NOTE  --------------------------------------------------------------------------------  -------------------------------------------------------  Chief Complaint:Renal transplant, failing allograft,  on dialysis    24 hour events/subjective:   for HD today       PAST HISTORY  --------------------------------------------------------------------------------  No significant changes to PMH, PSH, FHx, SHx, unless otherwise noted    ALLERGIES & MEDICATIONS  --------------------------------------------------------------------------------  Allergies    No Known Allergies    Intolerances  MEDICATIONS  (STANDING):  calcitriol   Capsule 0.25 MICROGram(s) Oral daily  cloNIDine 0.1 milliGRAM(s) Oral at bedtime  docusate sodium 100 milliGRAM(s) Oral three times a day  epoetin sherlyn Injectable 07267 Unit(s) IV Push <User Schedule>  heparin  Injectable 5000 Unit(s) SubCutaneous every 8 hours  iron sucrose IVPB 200 milliGRAM(s) IV Intermittent daily  NIFEdipine XL 60 milliGRAM(s) Oral daily  predniSONE   Tablet 5 milliGRAM(s) Oral daily  tacrolimus 1 milliGRAM(s) Oral two times a day  trimethoprim   80 mG/sulfamethoxazole 400 mG 1 Tablet(s) Oral <User Schedule>  valGANciclovir 450 milliGRAM(s) Oral <User Schedule>    MEDICATIONS  (PRN):  methyl salicylate 14%/menthol 6% Topical Ointment 1 Application(s) Topical two times a day PRN LE discomfort      REVIEW OF SYSTEMS  --------------------------------------------------------------------------------  Constitutional: [ ] Fever [ ] Chills [ ] Fatigue [ ] Weight change   HEENT: [ ] Blurred vision [ ] Eye Pain [ ] Headache [ ] Runny nose [ ] Sore Throat   Respiratory: [ ] Cough [ ] Wheezing [ ] Shortness of breath  Cardiovascular: [ ] Chest Pain [ ] Palpitations [ ] MAY [ ] PND [ ] Orthopnea  Gastrointestinal: [ ] Abdominal Pain [ ] Diarrhea [ ] Constipation [ ] Hemorrhoids [ ] Nausea [ ] Vomiting  Genitourinary: [ ] Nocturia [ ] Dysuria [ ] Incontinence  Extremities: [ ] Swelling [ ] Joint Pain  Neurologic: [ ] Focal deficit [ ] Paresthesias [ ] Syncope  Lymphatic: [ ] Swelling [ ] Lymphadenopathy   Skin: [ ] Rash [ ] Ecchymoses [ ] Wounds [ ] Lesions  Psychiatry: [ ] Depression [ ] Suicidal/Homicidal Ideation [ ] Anxiety [ ] Sleep Disturbances  [x ] 10 point review of systems is otherwise negative except as mentioned above              [ ]Unable to obtain      Vital Signs Last 24 Hrs  T(C): 36.9 (03-21-18 @ 14:19)  T(F): 98.5 (03-21-18 @ 14:19), Max: 98.5 (03-21-18 @ 14:19)  HR: 84 (03-21-18 @ 14:19) (74 - 89)  BP: 156/86 (03-21-18 @ 14:19)  BP(mean): --  RR: 18 (03-21-18 @ 14:19) (18 - 18)  SpO2: 96% (03-21-18 @ 14:19) (96% - 98%)  Wt(kg): --    03-20 @ 07:01  -  03-21 @ 07:00  --------------------------------------------------------  IN: 200 mL / OUT: 2540 mL / NET: -2340 mL    03-21 @ 07:01  -  03-21 @ 15:25  --------------------------------------------------------  IN: 520 mL / OUT: 360 mL / NET: 160 mL        Physical Exam:              Gen: NAD   	HEENT: anicteric  	Pulm: CTA B/L   	CV: RRR  	Abd: soft, nontender, nondistended  	: No tanika  	LE: Warm,  edema  	Neuro: no asterixis  	Skin: Warm, without rashes  	Vascular access: RIHELGA non tunneled dialysis catheter        LABS/STUDIES  --------------------------------------------------------------------------------    135  |  95  |  28  ----------------------------<  90      [03-21-18 @ 05:48]  4.2   |  27  |  6.28        Ca     9.0     [03-21-18 @ 05:48]            Creatinine Trend:  SCr 6.28 [03-21 @ 05:48]  SCr 10.20 [03-18 @ 06:40]  SCr 13.63 [03-17 @ 06:50]  SCr 17.56 [03-16 @ 15:41]    Tacrolimus (), Serum: <2.0 ng/mL (03-21 @ 07:43)  Tacrolimus (), Serum: <2.0 ng/mL (03-17 @ 08:39)

## 2018-03-21 NOTE — PROGRESS NOTE ADULT - ASSESSMENT
A/P:  56 yo M hx of CKD, s/p bilateral renal transplants 2/2 to NSAID use, HTN, who presented initially to establish dialysis in the hospital,on TTE noted to have pericardial effusion with early signs of tamponade. Patient is awaiting renal transplant. 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 Pt shows echo evidence of early tamponade, however is not clinically in tamponade.   Plan  - repeat TTE on Thursday  - monitor clinically for signs of hypoxia, SOB, and monitor on tele for signs of tachycardia.     Jimmie Ching MD  Cardiology Fellow  x [phone]

## 2018-03-21 NOTE — PROGRESS NOTE ADULT - SUBJECTIVE AND OBJECTIVE BOX
No acute events overnight. remains w/o CP, SOB.    Medications:   MEDICATIONS  (STANDING):  calcitriol   Capsule 0.25 MICROGram(s) Oral daily  cloNIDine 0.1 milliGRAM(s) Oral at bedtime  docusate sodium 100 milliGRAM(s) Oral three times a day  epoetin sherlyn Injectable 63575 Unit(s) IV Push <User Schedule>  heparin  Injectable 5000 Unit(s) SubCutaneous every 8 hours  NIFEdipine XL 60 milliGRAM(s) Oral daily  predniSONE   Tablet 5 milliGRAM(s) Oral daily  tacrolimus 1 milliGRAM(s) Oral two times a day  trimethoprim   80 mG/sulfamethoxazole 400 mG 1 Tablet(s) Oral <User Schedule>  valGANciclovir 450 milliGRAM(s) Oral <User Schedule>    Allergies:  No Known Allergies      Review of Systems:  REVIEW OF SYSTEMS:    CONSTITUTIONAL: No weakness, fevers or chills  EYES/ENT: No visual changes;  No dysphagia  NECK: No pain or stiffness  RESPIRATORY: No cough, wheezing, hemoptysis; No shortness of breath  CARDIOVASCULAR: No chest pain or palpitations; +ve lower extremity edema  GASTROINTESTINAL: No abdominal or epigastric pain. No nausea, vomiting, or hematemesis; No diarrhea or constipation. No melena or hematochezia.  BACK: No back pain  GENITOURINARY: No dysuria, frequency or hematuria  NEUROLOGICAL: No numbness,  +ve  weakness  SKIN: No itching, burning, rashes, or lesions   All other review of systems is negative unless indicated above.    Physical Exam:  Vital Signs Last 24 Hrs  T(C): 36.7 (21 Mar 2018 04:51), Max: 37.2 (20 Mar 2018 11:31)  T(F): 98.1 (21 Mar 2018 04:51), Max: 99 (20 Mar 2018 11:31)  HR: 74 (21 Mar 2018 04:51) (74 - 97)  BP: 177/95 (21 Mar 2018 04:51) (148/77 - 188/98)  BP(mean): --  RR: 18 (21 Mar 2018 04:51) (17 - 18)  SpO2: 96% (21 Mar 2018 04:51) (93% - 98%)  GENERAL: No acute distress, well-developed  HEAD:  Atraumatic, Normocephalic  ENT: EOMI, PERRLA, conjunctiva and sclera clear, Neck supple, No JVD, moist mucosa  CHEST/LUNG: Clear to auscultation bilaterally; No wheeze, equal breath sounds bilaterally   BACK: No spinal tenderness  HEART: Regular rate and rhythm; nl  S1 S2, No murmurs, rubs, or gallops  ABDOMEN: Soft, Nontender, Nondistended; Bowel sounds present  EXTREMITIES:  2+ edema  to shins   PSYCH: Nl behavior, nl affect  NEUROLOGY: AAOx3, non-focal, cranial nerves intact  SKIN: Normal color, No rashes or lesions  LINES:    Cardiovascular Diagnostic Testing:    ECG: none found in chart     Echo:  < from: Transthoracic Echocardiogram (03.19.18 @ 11:00) >  Conclusions:  1. Mild concentric left ventricular hypertrophy.  2. Normal left ventricular systolic function.  3. Moderate-large circumfrential pericardial effusion.  1.9-2.3 cm posterior and lateral  to the LV, 0.7cm  at  RV,  2.1 cm at RA. There is RA inversion, respiratory variation  noted at the Tricuspid valve and  IVC measures 2.6cm and  collapses 20%. There is no RV diastolic collapse seen on  this TTE, however the RV is small at times and the RV EF is  elevated. BP elevated 161/88 and HR 70s-80s. These findings  are consistant with RAISED intrapericardial pressures/early  tamponade. BP is elevated on this study, clinical  evaluation indicated.  Discussed graham Monroy  *** No previous Echo exam.    < end of copied text > No acute events overnight. remains w/o CP, SOB.    Medications:   MEDICATIONS  (STANDING):  calcitriol   Capsule 0.25 MICROGram(s) Oral daily  cloNIDine 0.1 milliGRAM(s) Oral at bedtime  docusate sodium 100 milliGRAM(s) Oral three times a day  epoetin sherlyn Injectable 59603 Unit(s) IV Push <User Schedule>  heparin  Injectable 5000 Unit(s) SubCutaneous every 8 hours  NIFEdipine XL 60 milliGRAM(s) Oral daily  predniSONE   Tablet 5 milliGRAM(s) Oral daily  tacrolimus 1 milliGRAM(s) Oral two times a day  trimethoprim   80 mG/sulfamethoxazole 400 mG 1 Tablet(s) Oral <User Schedule>  valGANciclovir 450 milliGRAM(s) Oral <User Schedule>    Allergies:  No Known Allergies      Review of Systems:  REVIEW OF SYSTEMS:    CONSTITUTIONAL: No weakness, fevers or chills  EYES/ENT: No visual changes;  No dysphagia  NECK: No pain or stiffness  RESPIRATORY: No cough, wheezing, hemoptysis; No shortness of breath  CARDIOVASCULAR: No chest pain or palpitations; +ve lower extremity edema  GASTROINTESTINAL: No abdominal or epigastric pain. No nausea, vomiting, or hematemesis; No diarrhea or constipation. No melena or hematochezia.  BACK: No back pain  GENITOURINARY: No dysuria, frequency or hematuria  NEUROLOGICAL: No numbness,  +ve  weakness  SKIN: No itching, burning, rashes, or lesions   All other review of systems is negative unless indicated above.    Physical Exam:  Vital Signs Last 24 Hrs  T(C): 36.7 (21 Mar 2018 04:51), Max: 37.2 (20 Mar 2018 11:31)  T(F): 98.1 (21 Mar 2018 04:51), Max: 99 (20 Mar 2018 11:31)  HR: 74 (21 Mar 2018 04:51) (74 - 97)  BP: 177/95 (21 Mar 2018 04:51) (148/77 - 188/98)  BP(mean): --  RR: 18 (21 Mar 2018 04:51) (17 - 18)  SpO2: 96% (21 Mar 2018 04:51) (93% - 98%)  GENERAL: No acute distress, well-developed  HEAD:  Atraumatic, Normocephalic  ENT: EOMI, PERRLA, conjunctiva and sclera clear, Neck supple, No JVD, moist mucosa  CHEST/LUNG: Clear to auscultation bilaterally; No wheeze, equal breath sounds bilaterally   BACK: No spinal tenderness  HEART: Regular rate and rhythm; nl  S1 S2, No murmurs, rubs, or gallops  ABDOMEN: Soft, Nontender, Nondistended; Bowel sounds present  EXTREMITIES:  2+ edema  to shins   PSYCH: Nl behavior, nl affect  NEUROLOGY: AAOx3, non-focal, cranial nerves intact  SKIN: Normal color, No rashes or lesions  LINES:    Cardiovascular Diagnostic Testing:    ECG: none in chart     Echo:  < from: Transthoracic Echocardiogram (03.19.18 @ 11:00) >  Conclusions:  1. Mild concentric left ventricular hypertrophy.  2. Normal left ventricular systolic function.  3. Moderate-large circumfrential pericardial effusion.  1.9-2.3 cm posterior and lateral  to the LV, 0.7cm  at  RV,  2.1 cm at RA. There is RA inversion, respiratory variation  noted at the Tricuspid valve and  IVC measures 2.6cm and  collapses 20%. There is no RV diastolic collapse seen on  this TTE, however the RV is small at times and the RV EF is  elevated. BP elevated 161/88 and HR 70s-80s. These findings  are consistant with RAISED intrapericardial pressures/early  tamponade. BP is elevated on this study, clinical  evaluation indicated.  Discussed graham Monroy  *** No previous Echo exam.    < end of copied text >

## 2018-03-21 NOTE — PROGRESS NOTE ADULT - ASSESSMENT
early cardiac tamponade  Failed renal transplant s/p shiley and HD X 3  hypertension  anemia  hypermagnesemia  hyperphosphatemia    continue current care  plan for hd today  plan for repeat tte sulema  eventual stress test  eventual transition to PermCath

## 2018-03-21 NOTE — PROGRESS NOTE ADULT - ASSESSMENT
Renal Transplant, Failing allograft, anemia, pericardial effusion on daily hemodialysis.  Plan:  Procrit three times weekly  Venofer  Hemodialysis daily.    Will follow

## 2018-03-21 NOTE — PROGRESS NOTE ADULT - SUBJECTIVE AND OBJECTIVE BOX
MEDICINE, PROGRESS NOTE [phone]    MEAGAN RAMOS 57y [id number]    Patient seen and examined.  Patient is a 57y old  Male who presents with a chief complaint of My kidney is not working (16 Mar 2018 16:07)  Pt feels better. 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  History of partial pancreatectomy  History of renal transplant: x 2    MEDICATIONS  (STANDING):  calcitriol   Capsule 0.25 MICROGram(s) Oral daily  cloNIDine 0.1 milliGRAM(s) Oral at bedtime  docusate sodium 100 milliGRAM(s) Oral three times a day  epoetin sherlyn Injectable 81163 Unit(s) IV Push <User Schedule>  heparin  Injectable 5000 Unit(s) SubCutaneous every 8 hours  NIFEdipine XL 60 milliGRAM(s) Oral daily  predniSONE   Tablet 5 milliGRAM(s) Oral daily  tacrolimus 1 milliGRAM(s) Oral two times a day  trimethoprim   80 mG/sulfamethoxazole 400 mG 1 Tablet(s) Oral <User Schedule>  valGANciclovir 450 milliGRAM(s) Oral <User Schedule>    MEDICATIONS  (PRN):    Allergies    No Known Allergies    Intolerances        PHYSICAL EXAM:  Constitutional: NAD  HEENT: Normocephalic, EOMI  Neck:  mild JVD  Respiratory: CTA B/L, No wheezes  Cardiovascular: S1, S2, RRR, + systolic murmur  Gastrointestinal: BS+, soft, NT/ND  Extremities: + peripheral edema le b/l - slowly improving  Neurological: AAOX3, no focal deficits  Psychiatric: Normal mood, normal affect  : No Augustin    Vital Signs Last 24 Hrs  T(C): 36.7 (21 Mar 2018 04:51), Max: 36.8 (20 Mar 2018 20:21)  T(F): 98.1 (21 Mar 2018 04:51), Max: 98.3 (20 Mar 2018 20:21)  HR: 80 (21 Mar 2018 07:50) (74 - 97)  BP: 164/88 (21 Mar 2018 07:50) (148/77 - 177/95)  BP(mean): --  RR: 18 (21 Mar 2018 04:51) (18 - 18)  SpO2: 96% (21 Mar 2018 04:51) (93% - 98%)  I&O's Summary    20 Mar 2018 07:01  -  21 Mar 2018 07:00  --------------------------------------------------------  IN: 200 mL / OUT: 2540 mL / NET: -2340 mL        LABS:    03-21    135  |  95<L>  |  28<H>  ----------------------------<  90  4.2   |  27  |  6.28<H>    Ca    9.0      21 Mar 2018 05:48  Tacrolimus (), Serum: <2.0 ng/mL (03-21 @ 07:43)    < from: [facility] Duplex Lower Ext Vein Scan, Bilat (03.21.18 @ 10:02) >  EXAM:  DUPLEX SCAN EXT VEINS LOWER BI                        PROCEDURE DATE:  03/21/2018      INTERPRETATION:  CLINICAL INFORMATION: Bilateral lower extremity edema.   Rule out deep vein thrombosis.    COMPARISON: None available.    TECHNIQUE: Duplex sonography of the BILATERAL LOWER extremities with   color and spectral Doppler, with and without compression.      FINDINGS:    There is normal compressibility of the bilateral common femoral, femoral   and popliteal veins. No calf vein thrombosis is detected.    Doppler examination shows normal spontaneous and phasic flow.   Subcutaneous edema is noted.    IMPRESSION:     No evidence of bilateral lower extremity deep venous thrombosis.  DERICK HEWITT M.D., ATTENDING RADIOLOGIST [phone]  This document has been electronically signed. Mar 21 2018 12:06PM

## 2018-03-22 LAB
ANION GAP SERPL CALC-SCNC: 15 MMOL/L — SIGNIFICANT CHANGE UP (ref 5–17)
BUN SERPL-MCNC: 20 MG/DL — SIGNIFICANT CHANGE UP (ref 7–23)
CALCIUM SERPL-MCNC: 8.6 MG/DL — SIGNIFICANT CHANGE UP (ref 8.4–10.5)
CHLORIDE SERPL-SCNC: 98 MMOL/L — SIGNIFICANT CHANGE UP (ref 96–108)
CMV DNA CSF QL NAA+PROBE: SIGNIFICANT CHANGE UP
CO2 SERPL-SCNC: 25 MMOL/L — SIGNIFICANT CHANGE UP (ref 22–31)
CREAT SERPL-MCNC: 5.2 MG/DL — HIGH (ref 0.5–1.3)
GLUCOSE SERPL-MCNC: 104 MG/DL — HIGH (ref 70–99)
HCT VFR BLD CALC: 25.9 % — LOW (ref 39–50)
HGB BLD-MCNC: 8.1 G/DL — LOW (ref 13–17)
MCHC RBC-ENTMCNC: 29.6 PG — SIGNIFICANT CHANGE UP (ref 27–34)
MCHC RBC-ENTMCNC: 31.1 GM/DL — LOW (ref 32–36)
MCV RBC AUTO: 95 FL — SIGNIFICANT CHANGE UP (ref 80–100)
PLATELET # BLD AUTO: 402 K/UL — HIGH (ref 150–400)
POTASSIUM SERPL-MCNC: 4.3 MMOL/L — SIGNIFICANT CHANGE UP (ref 3.5–5.3)
POTASSIUM SERPL-SCNC: 4.3 MMOL/L — SIGNIFICANT CHANGE UP (ref 3.5–5.3)
RBC # BLD: 2.72 M/UL — LOW (ref 4.2–5.8)
RBC # FLD: 16.5 % — HIGH (ref 10.3–14.5)
SODIUM SERPL-SCNC: 138 MMOL/L — SIGNIFICANT CHANGE UP (ref 135–145)
WBC # BLD: 6.6 K/UL — SIGNIFICANT CHANGE UP (ref 3.8–10.5)
WBC # FLD AUTO: 6.6 K/UL — SIGNIFICANT CHANGE UP (ref 3.8–10.5)

## 2018-03-22 PROCEDURE — 99233 SBSQ HOSP IP/OBS HIGH 50: CPT | Mod: GC

## 2018-03-22 PROCEDURE — 93306 TTE W/DOPPLER COMPLETE: CPT | Mod: 26

## 2018-03-22 PROCEDURE — 90935 HEMODIALYSIS ONE EVALUATION: CPT

## 2018-03-22 RX ADMIN — Medication 1 TABLET(S): at 11:04

## 2018-03-22 RX ADMIN — CALCITRIOL 0.25 MICROGRAM(S): 0.5 CAPSULE ORAL at 11:04

## 2018-03-22 RX ADMIN — ERYTHROPOIETIN 10000 UNIT(S): 10000 INJECTION, SOLUTION INTRAVENOUS; SUBCUTANEOUS at 14:03

## 2018-03-22 RX ADMIN — Medication 5 MILLIGRAM(S): at 11:04

## 2018-03-22 RX ADMIN — Medication 0.1 MILLIGRAM(S): at 21:18

## 2018-03-22 RX ADMIN — IRON SUCROSE 110 MILLIGRAM(S): 20 INJECTION, SOLUTION INTRAVENOUS at 14:04

## 2018-03-22 RX ADMIN — TACROLIMUS 1 MILLIGRAM(S): 5 CAPSULE ORAL at 11:04

## 2018-03-22 RX ADMIN — TACROLIMUS 1 MILLIGRAM(S): 5 CAPSULE ORAL at 21:18

## 2018-03-22 RX ADMIN — Medication 60 MILLIGRAM(S): at 05:04

## 2018-03-22 NOTE — DIETITIAN INITIAL EVALUATION ADULT. - NS AS NUTRI INTERV MEALS SNACK
Would recommend Renal diet, however per pt request and MD orders, plan to continue with regular diet. Monitor po intake, GI tolerance, weight and lab values. RD to remain available for further nutritional interventions as indicated.

## 2018-03-22 NOTE — DIETITIAN INITIAL EVALUATION ADULT. - NS AS NUTRI INTERV ED CONTENT
RD provided nutrition education for dialysis, went over [facility] Hemodialysis Diet booklet. Discussed limiting foods high in sodium, phosphorous & potassium. Discussed increased calorie and protein needs while on dialysis. Discussed limiting fluid intake and following MD recommendations. Pt receptive and accepted written materials, pt aware RD remains available for follow-up as needed.

## 2018-03-22 NOTE — PROGRESS NOTE ADULT - SUBJECTIVE AND OBJECTIVE BOX
MEDICINE, PROGRESS NOTE [phone]    MEAGAN RAMOS 57y [id number]    Patient seen and examined.  Patient is a 57y old  Male who presents with a chief complaint of My kidney is not working (16 Mar 2018 16:07)  Pt has no current complaints.    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  History of partial pancreatectomy  History of renal transplant: x 2    MEDICATIONS  (STANDING):  calcitriol   Capsule 0.25 MICROGram(s) Oral daily  cloNIDine 0.1 milliGRAM(s) Oral at bedtime  docusate sodium 100 milliGRAM(s) Oral three times a day  epoetin sherlyn Injectable 30713 Unit(s) IV Push <User Schedule>  heparin  Injectable 5000 Unit(s) SubCutaneous every 8 hours  iron sucrose IVPB 200 milliGRAM(s) IV Intermittent daily  NIFEdipine XL 60 milliGRAM(s) Oral daily  predniSONE   Tablet 5 milliGRAM(s) Oral daily  tacrolimus 1 milliGRAM(s) Oral two times a day  trimethoprim   80 mG/sulfamethoxazole 400 mG 1 Tablet(s) Oral <User Schedule>  valGANciclovir 450 milliGRAM(s) Oral <User Schedule>    MEDICATIONS  (PRN):  methyl salicylate 14%/menthol 6% Topical Ointment 1 Application(s) Topical two times a day PRN LE discomfort    Allergies    No Known Allergies    Intolerances        PHYSICAL EXAM:  Constitutional: NAD  HEENT: Normocephalic, EOMI  Neck:  No JVD  Respiratory: CTA B/L, No wheezes  Cardiovascular: S1, S2, RRR, + systolic murmur  Gastrointestinal: BS+, soft, NT/ND  Extremities: + peripheral edema le b/l  Neurological: AAOX3, no focal deficits  Psychiatric: Normal mood, normal affect  : No Augustin    Vital Signs Last 24 Hrs  T(C): 36.9 (22 Mar 2018 16:43), Max: 37.3 (22 Mar 2018 04:18)  T(F): 98.5 (22 Mar 2018 16:43), Max: 99.1 (22 Mar 2018 04:18)  HR: 89 (22 Mar 2018 16:43) (75 - 89)  BP: 180/97 (22 Mar 2018 16:43) (149/94 - 180/97)  BP(mean): --  RR: 18 (22 Mar 2018 16:43) (18 - 18)  SpO2: 96% (22 Mar 2018 16:43) (92% - 99%)  I&O's Summary    21 Mar 2018 07:01  -  22 Mar 2018 07:00  --------------------------------------------------------  IN: 824 mL / OUT: 2560 mL / NET: -1736 mL    22 Mar 2018 07:01  -  22 Mar 2018 19:38  --------------------------------------------------------  IN: 1120 mL / OUT: 3350 mL / NET: -2230 mL        LABS:                        8.1    6.6   )-----------( 402      ( 22 Mar 2018 13:38 )             25.9     03-22    138  |  98  |  20  ----------------------------<  104<H>  4.3   |  25  |  5.20<H>    Ca    8.6      22 Mar 2018 13:38    < from: Transthoracic Echocardiogram (03.22.18 @ 15:49) >  Patient name: MEAGAN RAMOS  YOB: 1960   Age: 57 (M)   MR#: [id number]  Study Date: 3/22/2018  Location: [address] [place], [place] [id number]: Melany NguyenADRIANNA  Study quality: Technically good  Referring Physician: Peter Watts MD  Blood Pressure: 165/95 mmHg  Height: 180 cm  Weight: 86 kg  BSA: 2.1 m2  ------------------------------------------------------------------------  PROCEDURE: Transthoracic echocardiogram with 2-D, M-Mode  and complete spectral and color flow Doppler.  INDICATION: Pericardial effusion (noninflammatory) (I31.3)  ------------------------------------------------------------------------  Observations:  Left Ventricle: Endocardium not well visualized; grossly  normal left ventricular systolic function.  Right Heart: Normal right atrium. Normal right ventricular  size and function. Normal tricuspid valve.  Pericardium/Pleura: Thickened pericardium anterior to the  right heart with a large pericardial effusion predominantly  posterior and lateral to the LV. Theeffusion measures up  to approximately 2.6 cm lateral to the LV in its largest  dimension. There is signficant inversion of the right  atrial wall during atrial diastole. On apical views, there  is inversion of the RV wall during ventricular diastole.  The IVC is dilated and plethoric. These findings are  consistent with echocardiographic evidence of pericardial  tamponade physiology.  Hemodynamic: Estimated right atrial pressure is >15 mm Hg.  ------------------------------------------------------------------------  Conclusions:  Limited TTE to evaluate the pericardium.  1. Thickened pericardium anterior to the right heart with a  large pericardial effusion predominantly posterior and  lateral to the LV. The effusion measures up to  approximately 2.6 cm lateral to the LV in its largest  dimension. There is signficant inversion of the right  atrial wall during atrial diastole. On apical views, there  is inversion of the RV wall during ventricular diastole.  The IVC is dilated and plethoric. These findings are  consistent with echocardiographic evidence of pericardial  tamponade physiology.  *** Compared with echocardiogram of 3/19/2018, findings are  similar on today's study. The effusion is larger posterior  and lateral to the LV. Signs of pericardial tamponade are  again seen on today's study. Results discussed with  cardiology consult fellow.  ------------------------------------------------------------------------  Confirmed on  3/22/2018 - 18:10:57 by Margaux Stuart M.D.  ------------------------------------------------------------------------

## 2018-03-22 NOTE — DIETITIAN INITIAL EVALUATION ADULT. - OTHER INFO
Nutrition assessment for length of stay. Pt reports his appetite is great. Pt refuses modified diet and states his doctors (transplant and attending physician) have agreed to allow him to continue a regular diet. No GI distress at this time. Last BM was today. No chewing or swallowing difficulties at this time. No known food allergies, pt reports he avoids MSG- RD noted in food preferences.

## 2018-03-22 NOTE — DIETITIAN INITIAL EVALUATION ADULT. - ENERGY NEEDS
Ht: 71“, Wt: 190 lbs-dry wt, BMI: 26.5 kg/m2, IBW: 172 lbs (+/-10%)  Pertinent Information: Pt S/P renal transplant x2- now failing- pt started HD via [facility]- today was 4th session, pt receiving daily HD for uremic pericardial fluid management. Eventual transition to PermCath. Pt also with eventual plan for stress test and pt awaiting another kidney transplant.   1+ parul. leg edema, no pressure injury

## 2018-03-22 NOTE — DIETITIAN INITIAL EVALUATION ADULT. - NS FNS WEIGHT CHANGE REASON
intentional/Pt reports losing wt from 244 pound about 2.5 years ago to 190 pounds. Pt reports that is likely his dry wt as he currently has fluid retention. Pt reports losing wt from 244 pound about 2.5 years ago to 190 pounds. Pt reports that is likely his dry wt as he currently has fluid retention. Pt's admission wt was 207.4 pounds and lowest measured standing wt was today: 198.4 pounds./intentional

## 2018-03-22 NOTE — DIETITIAN INITIAL EVALUATION ADULT. - ADHERENCE
Pt S/P 2 kidney transplants- now failing. Pt reports his 1st transplant was 20+ years ago and he never required HD before this admission. Pt reports following a low sodium, low protein diet and monitoring phosphorus and K intake. Pt states his levels were closely monitored and his potassium was within normal ranges. Pt diet did contain high K and Phos foods, RD reviewed with pt and other HD recommendations.

## 2018-03-22 NOTE — DIETITIAN INITIAL EVALUATION ADULT. - SOURCE
Comprehensive review of medical records/patient patient/seen in inpatient HD, Comprehensive review of medical records

## 2018-03-22 NOTE — PROGRESS NOTE ADULT - SUBJECTIVE AND OBJECTIVE BOX
No acute events overnight. remains w/o CP, SOB.  SR HR 60-80s on tele     Medications:   MEDICATIONS  (STANDING):  calcitriol   Capsule 0.25 MICROGram(s) Oral daily  cloNIDine 0.1 milliGRAM(s) Oral at bedtime  docusate sodium 100 milliGRAM(s) Oral three times a day  epoetin sherlyn Injectable 26085 Unit(s) IV Push <User Schedule>  heparin  Injectable 5000 Unit(s) SubCutaneous every 8 hours  iron sucrose IVPB 200 milliGRAM(s) IV Intermittent daily  NIFEdipine XL 60 milliGRAM(s) Oral daily  predniSONE   Tablet 5 milliGRAM(s) Oral daily  tacrolimus 1 milliGRAM(s) Oral two times a day  trimethoprim   80 mG/sulfamethoxazole 400 mG 1 Tablet(s) Oral <User Schedule>  valGANciclovir 450 milliGRAM(s) Oral <User Schedule>      Allergies:  No Known Allergies      Review of Systems:  REVIEW OF SYSTEMS:    CONSTITUTIONAL: No weakness, fevers or chills  EYES/ENT: No visual changes;  No dysphagia  NECK: No pain or stiffness  RESPIRATORY: No cough, wheezing, hemoptysis; No shortness of breath  CARDIOVASCULAR: No chest pain or palpitations; +ve lower extremity edema  GASTROINTESTINAL: No abdominal or epigastric pain. No nausea, vomiting, or hematemesis; No diarrhea or constipation. No melena or hematochezia.  BACK: No back pain  GENITOURINARY: No dysuria, frequency or hematuria  NEUROLOGICAL: No numbness,  +ve  weakness  SKIN: No itching, burning, rashes, or lesions   All other review of systems is negative unless indicated above.    Physical Exam:  Vital Signs Last 24 Hrs  T(C): 37.3 (22 Mar 2018 04:18), Max: 37.3 (22 Mar 2018 04:18)  T(F): 99.1 (22 Mar 2018 04:18), Max: 99.1 (22 Mar 2018 04:18)  HR: 83 (22 Mar 2018 04:18) (80 - 92)  BP: 173/96 (22 Mar 2018 04:18) (151/91 - 173/96)  BP(mean): --  RR: 18 (22 Mar 2018 04:18) (18 - 18)  SpO2: 92% (22 Mar 2018 04:18) (92% - 98%)  GENERAL: No acute distress, well-developed  HEAD:  Atraumatic, Normocephalic  ENT: EOMI, PERRLA, conjunctiva and sclera clear, Neck supple, No JVD, moist mucosa  CHEST/LUNG: Clear to auscultation bilaterally; No wheeze, equal breath sounds bilaterally   BACK: No spinal tenderness  HEART: Regular rate and rhythm; nl  S1 S2, No murmurs, rubs, or gallops  ABDOMEN: Soft, Nontender, Nondistended; Bowel sounds present  EXTREMITIES:  2+ edema  to shins   PSYCH: Nl behavior, nl affect  NEUROLOGY: AAOx3, non-focal, cranial nerves intact  SKIN: Normal color, No rashes or lesions  LINES:    Cardiovascular Diagnostic Testing:    ECG: none in chart     Echo:  < from: Transthoracic Echocardiogram (03.19.18 @ 11:00) >  Conclusions:  1. Mild concentric left ventricular hypertrophy.  2. Normal left ventricular systolic function.  3. Moderate-large circumfrential pericardial effusion.  1.9-2.3 cm posterior and lateral  to the LV, 0.7cm  at  RV,  2.1 cm at RA. There is RA inversion, respiratory variation  noted at the Tricuspid valve and  IVC measures 2.6cm and  collapses 20%. There is no RV diastolic collapse seen on  this TTE, however the RV is small at times and the RV EF is  elevated. BP elevated 161/88 and HR 70s-80s. These findings  are consistant with RAISED intrapericardial pressures/early  tamponade. BP is elevated on this study, clinical  evaluation indicated.  Discussed graham Monroy  *** No previous Echo exam.    < end of copied text >

## 2018-03-22 NOTE — PROGRESS NOTE ADULT - ASSESSMENT
A/P:  58 yo M hx of CKD, s/p bilateral renal transplants 2/2 to NSAID use, HTN, who presented initially to establish dialysis in the hospital,on TTE noted to have pericardial effusion with early signs of tamponade. Patient is awaiting renal transplant. 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 Pt shows echo evidence of early tamponade, however is not clinically in tamponade.   Plan  - repeat TTE today to assess for changes in effusion/ tamponade physiology.   - monitor clinically for signs of hypoxia, SOB, and monitor on tele for signs of tachycardia.     Jimmie Ching MD  Cardiology Fellow  x [phone]

## 2018-03-22 NOTE — PROGRESS NOTE ADULT - SUBJECTIVE AND OBJECTIVE BOX
[facility] DIVISION OF KIDNEY DISEASES AND HYPERTENSION -- FOLLOW UP NOTE  --------------------------------------------------------------------------------  Dung Phillips     --------------------------------------------------------------------------------  Chief Complaint:Renal transplant, failing allograft,  on dialysis    24 hour events/subjective:  Seen on HD today. no acute events.       PAST HISTORY  --------------------------------------------------------------------------------  No significant changes to PMH, PSH, FHx, SHx, unless otherwise noted    ALLERGIES & MEDICATIONS  --------------------------------------------------------------------------------  Allergies    No Known Allergies    Intolerances      Standing Inpatient Medications  calcitriol   Capsule 0.25 MICROGram(s) Oral daily  cloNIDine 0.1 milliGRAM(s) Oral at bedtime  docusate sodium 100 milliGRAM(s) Oral three times a day  epoetin sherlyn Injectable 87272 Unit(s) IV Push <User Schedule>  heparin  Injectable 5000 Unit(s) SubCutaneous every 8 hours  iron sucrose IVPB 200 milliGRAM(s) IV Intermittent daily  NIFEdipine XL 60 milliGRAM(s) Oral daily  predniSONE   Tablet 5 milliGRAM(s) Oral daily  tacrolimus 1 milliGRAM(s) Oral two times a day  trimethoprim   80 mG/sulfamethoxazole 400 mG 1 Tablet(s) Oral <User Schedule>  valGANciclovir 450 milliGRAM(s) Oral <User Schedule>    PRN Inpatient Medications  methyl salicylate 14%/menthol 6% Topical Ointment 1 Application(s) Topical two times a day PRN      REVIEW OF SYSTEMS  --------------------------------------------------------------------------------  Constitutional: [ ] Fever [ ] Chills [ ] Fatigue [ ] Weight change   HEENT: [ ] Blurred vision [ ] Eye Pain [ ] Headache [ ] Runny nose [ ] Sore Throat   Respiratory: [ ] Cough [ ] Wheezing [ ] Shortness of breath  Cardiovascular: [ ] Chest Pain [ ] Palpitations [ ] MAY [ ] PND [ ] Orthopnea  Gastrointestinal: [ ] Abdominal Pain [ ] Diarrhea [ ] Constipation [ ] Hemorrhoids [ ] Nausea [ ] Vomiting  Genitourinary: [ ] Nocturia [ ] Dysuria [ ] Incontinence  Extremities: [ ] Swelling [ ] Joint Pain  Neurologic: [ ] Focal deficit [ ] Paresthesias [ ] Syncope  Lymphatic: [ ] Swelling [ ] Lymphadenopathy   Skin: [ ] Rash [ ] Ecchymoses [ ] Wounds [ ] Lesions  Psychiatry: [ ] Depression [ ] Suicidal/Homicidal Ideation [ ] Anxiety [ ] Sleep Disturbances  [ x] 10 point review of systems is otherwise negative except as mentioned above              [ ]Unable to obtain      VITALS/PHYSICAL EXAM  --------------------------------------------------------------------------------  T(C): 37.3 (18 @ 04:18), Max: 37.3 (18 @ 04:18)  HR: 83 (18 @ 04:18) (83 - 92)  BP: 173/96 (18 @ 04:18) (151/91 - 173/96)  RR: 18 (18 @ 04:18) (18 - 18)  SpO2: 92% (18 @ 04:18) (92% - 98%)  Wt(kg): --      Daily     Daily Weight in k (22 Mar 2018 08:00)  I&O's Summary    21 Mar 2018 07:01  -  22 Mar 2018 07:00  --------------------------------------------------------  IN: 824 mL / OUT: 2560 mL / NET: -1736 mL    22 Mar 2018 07:  -  22 Mar 2018 12:33  --------------------------------------------------------  IN: 0 mL / OUT: 550 mL / NET: -550 mL          18 @ 07:  -  18 @ 07:00  --------------------------------------------------------  IN: 824 mL / OUT: 2560 mL / NET: -1736 mL    18 @ 07:01  -  18 @ 12:33  --------------------------------------------------------  IN: 0 mL / OUT: 550 mL / NET: -550 mL        Physical Exam:              Gen: NAD   	HEENT: anicteric  	Pulm: CTA B/L   	CV: RRR  	Back: No dependent edema  	Abd: soft, nontender, nondistended  	: No sagastume  	LE: Warm, 2+ edema  	Neuro: no asterixis  	Skin: Warm, without rashes  	Vascular access: RAUDEL licona c/d/i    LABS/STUDIES  --------------------------------------------------------------------------------              7.5    7.01  >-----------<  401      [18 07:43]              25.7     135  |  95  |  28  ----------------------------<  90      [18 05:48]  4.2   |  27  |  6.28        Ca     9.0     [18 05:48]      Creatinine Trend:  SCr 6.28 [ 05:48]  SCr 10.20 [ 06:40]  SCr 13.63 [ 06:50]  SCr 17.56 [ @ 15:41]    Tacrolimus (), Serum: <2.0 ng/mL ( 07:43)  Tacrolimus (), Serum: <2.0 ng/mL ( 08:39)      Iron 24, TIBC 161, %sat 15      [18 @ 11:31]  Ferritin 250      [18 @ 20:02]    HBsAb <3.0      [18 05:35]  HBsAg Nonreact      [18 05:35]  HBcAb Nonreact      [18 05:35]  HCV 0.12, Nonreact      [18 05:35]        Radiology  --------------------------------------------------------------------------------    --------------------------------------------------------------------------------  Dung Phillips

## 2018-03-22 NOTE — PROGRESS NOTE ADULT - ASSESSMENT
early cardiac tamponade - worsening effusion  Failed renal transplant s/p shiley and HD X 3  hypertension  anemia  hypermagnesemia  hyperphosphatemia    continue current care  check ct chest non contrast  discussed with IR in case emergent pericardial drain required  check ldh  continue HD  monitor labs daily  discussed with cardio fellow  discussed with pt  discussed with NP rolan  hold off on permacath for now  keep pt npo after MN for possible drain in am if needed

## 2018-03-22 NOTE — DIETITIAN INITIAL EVALUATION ADULT. - ORAL INTAKE PTA
Typical intake: fruit sometimes toast for breakfast; low sodium or no sodium homemade soups or sometimes tomato and mozzarella for lunch; chicken or fish with vegetables and potatoes. Pt was taking CoQ10, omega 3 fish oil, vitamin C (2x/week), Vitamin D3, started taking calcium acetate but stopped due to tolerance issues./good Typical intake: fruit sometimes toast with cheese for breakfast; low sodium or no sodium homemade soups or sometimes tomato and mozzarella for lunch; chicken or fish with vegetables and potatoes. Pt was taking CoQ10, omega 3 fish oil, vitamin C (2x/week), Vitamin D3, started taking calcium acetate but stopped due to tolerance issues./good

## 2018-03-23 LAB
ANION GAP SERPL CALC-SCNC: 12 MMOL/L — SIGNIFICANT CHANGE UP (ref 5–17)
BUN SERPL-MCNC: 21 MG/DL — SIGNIFICANT CHANGE UP (ref 7–23)
CALCIUM SERPL-MCNC: 9 MG/DL — SIGNIFICANT CHANGE UP (ref 8.4–10.5)
CHLORIDE SERPL-SCNC: 99 MMOL/L — SIGNIFICANT CHANGE UP (ref 96–108)
CO2 SERPL-SCNC: 28 MMOL/L — SIGNIFICANT CHANGE UP (ref 22–31)
CREAT SERPL-MCNC: 5.54 MG/DL — HIGH (ref 0.5–1.3)
GLUCOSE SERPL-MCNC: 119 MG/DL — HIGH (ref 70–99)
HCT VFR BLD CALC: 26.6 % — LOW (ref 39–50)
HGB BLD-MCNC: 8.2 G/DL — LOW (ref 13–17)
LDH SERPL L TO P-CCNC: 400 U/L — HIGH (ref 50–242)
MCHC RBC-ENTMCNC: 29.6 PG — SIGNIFICANT CHANGE UP (ref 27–34)
MCHC RBC-ENTMCNC: 30.9 GM/DL — LOW (ref 32–36)
MCV RBC AUTO: 95.9 FL — SIGNIFICANT CHANGE UP (ref 80–100)
PHOSPHATE SERPL-MCNC: 4.1 MG/DL — SIGNIFICANT CHANGE UP (ref 2.5–4.5)
PLATELET # BLD AUTO: 391 K/UL — SIGNIFICANT CHANGE UP (ref 150–400)
POTASSIUM SERPL-MCNC: 4.2 MMOL/L — SIGNIFICANT CHANGE UP (ref 3.5–5.3)
POTASSIUM SERPL-SCNC: 4.2 MMOL/L — SIGNIFICANT CHANGE UP (ref 3.5–5.3)
RBC # BLD: 2.77 M/UL — LOW (ref 4.2–5.8)
RBC # FLD: 16.8 % — HIGH (ref 10.3–14.5)
SODIUM SERPL-SCNC: 139 MMOL/L — SIGNIFICANT CHANGE UP (ref 135–145)
WBC # BLD: 8 K/UL — SIGNIFICANT CHANGE UP (ref 3.8–10.5)
WBC # FLD AUTO: 8 K/UL — SIGNIFICANT CHANGE UP (ref 3.8–10.5)

## 2018-03-23 PROCEDURE — 99233 SBSQ HOSP IP/OBS HIGH 50: CPT | Mod: GC

## 2018-03-23 PROCEDURE — 71250 CT THORAX DX C-: CPT | Mod: 26

## 2018-03-23 PROCEDURE — 99232 SBSQ HOSP IP/OBS MODERATE 35: CPT

## 2018-03-23 RX ORDER — OXYCODONE AND ACETAMINOPHEN 5; 325 MG/1; MG/1
1 TABLET ORAL ONCE
Qty: 0 | Refills: 0 | Status: DISCONTINUED | OUTPATIENT
Start: 2018-03-23 | End: 2018-03-23

## 2018-03-23 RX ADMIN — IRON SUCROSE 110 MILLIGRAM(S): 20 INJECTION, SOLUTION INTRAVENOUS at 13:41

## 2018-03-23 RX ADMIN — CALCITRIOL 0.25 MICROGRAM(S): 0.5 CAPSULE ORAL at 11:14

## 2018-03-23 RX ADMIN — TACROLIMUS 1 MILLIGRAM(S): 5 CAPSULE ORAL at 11:14

## 2018-03-23 RX ADMIN — Medication 60 MILLIGRAM(S): at 05:06

## 2018-03-23 RX ADMIN — TACROLIMUS 1 MILLIGRAM(S): 5 CAPSULE ORAL at 22:41

## 2018-03-23 RX ADMIN — ERYTHROPOIETIN 10000 UNIT(S): 10000 INJECTION, SOLUTION INTRAVENOUS; SUBCUTANEOUS at 13:36

## 2018-03-23 RX ADMIN — OXYCODONE AND ACETAMINOPHEN 1 TABLET(S): 5; 325 TABLET ORAL at 08:48

## 2018-03-23 RX ADMIN — Medication 0.1 MILLIGRAM(S): at 22:41

## 2018-03-23 RX ADMIN — Medication 5 MILLIGRAM(S): at 11:14

## 2018-03-23 RX ADMIN — OXYCODONE AND ACETAMINOPHEN 1 TABLET(S): 5; 325 TABLET ORAL at 09:20

## 2018-03-23 NOTE — PROGRESS NOTE ADULT - SUBJECTIVE AND OBJECTIVE BOX
Interval events:     - Remains w/o CP, SOB, complains of cramps in his legs  - SR HR .   - TTE with continued 2.6 cm large pericardial effusion.   - CT chest done overnight, for anticipation of possible IR drain     Medications:   MEDICATIONS  (STANDING):  calcitriol   Capsule 0.25 MICROGram(s) Oral daily  cloNIDine 0.1 milliGRAM(s) Oral at bedtime  docusate sodium 100 milliGRAM(s) Oral three times a day  epoetin sherlyn Injectable 02871 Unit(s) IV Push <User Schedule>  heparin  Injectable 5000 Unit(s) SubCutaneous every 8 hours  iron sucrose IVPB 200 milliGRAM(s) IV Intermittent daily  NIFEdipine XL 60 milliGRAM(s) Oral daily  predniSONE   Tablet 5 milliGRAM(s) Oral daily  tacrolimus 1 milliGRAM(s) Oral two times a day  trimethoprim   80 mG/sulfamethoxazole 400 mG 1 Tablet(s) Oral <User Schedule>  valGANciclovir 450 milliGRAM(s) Oral <User Schedule>      Review of Systems:  REVIEW OF SYSTEMS:    CONSTITUTIONAL: No weakness, fevers or chills  EYES/ENT: No visual changes;  No dysphagia  NECK: No pain or stiffness  RESPIRATORY: No cough, wheezing, hemoptysis; No shortness of breath  CARDIOVASCULAR: No chest pain or palpitations; mild lower extremity edema  GASTROINTESTINAL: No abdominal or epigastric pain. No nausea, vomiting, or hematemesis; No diarrhea or constipation. No melena or hematochezia.  BACK: No back pain  GENITOURINARY: No dysuria, frequency or hematuria  NEUROLOGICAL: No numbness,  +ve  weakness  SKIN: No itching, burning, rashes, or lesions   All other review of systems is negative unless indicated above.    Physical Exam:  Vital Signs Last 24 Hrs  Vital Signs Last 24 Hrs  T(C): 36.5 (23 Mar 2018 05:05), Max: 37.2 (22 Mar 2018 20:19)  T(F): 97.7 (23 Mar 2018 05:05), Max: 98.9 (22 Mar 2018 20:19)  HR: 82 (23 Mar 2018 05:05) (75 - 91)  BP: 171/96 (23 Mar 2018 05:05) (149/94 - 180/97)  BP(mean): --  RR: 18 (23 Mar 2018 05:05) (18 - 18)  SpO2: 94% (23 Mar 2018 05:05) (94% - 99%)  GENERAL: No acute distress, well-developed  HEAD:  Atraumatic, Normocephalic  ENT: EOMI, PERRLA, conjunctiva and sclera clear, Neck supple, No JVD, moist mucosa  CHEST/LUNG: Clear to auscultation bilaterally; No wheeze, equal breath sounds bilaterally   BACK: No spinal tenderness  HEART: Regular rate and rhythm; nl  S1 S2, No murmurs, rubs, or gallops  ABDOMEN: Soft, Nontender, Nondistended; Bowel sounds present  EXTREMITIES:  2+ edema  to shins   PSYCH: Nl behavior, nl affect  NEUROLOGY: AAOx3, non-focal, cranial nerves intact  SKIN: Normal color, No rashes or lesions  LINES:    Cardiovascular Diagnostic Testing:    ECG: none in chart     < from: Transthoracic Echocardiogram (03.22.18 @ 15:49) >  Limited TTE to evaluate the pericardium.  1. Thickened pericardium anterior to the right heart with a  large pericardial effusion predominantly posterior and  lateral to the LV. The effusion measures up to  approximately 2.6 cm lateral to the LV in its largest  dimension. There is signficant inversion of the right  atrial wall during atrial diastole. On apical views, there  is inversion of the RV wall during ventricular diastole.  The IVC is dilated and plethoric. These findings are  consistent with echocardiographic evidence of pericardial  tamponade physiology.    < end of copied text >    < from: Transthoracic Echocardiogram (03.19.18 @ 11:00) >  1. Mild concentric left ventricular hypertrophy.  2. Normal left ventricular systolic function.  3. Moderate-large circumfrential pericardial effusion.  1.9-2.3 cm posterior and lateral  to the LV, 0.7cm  at  RV,  2.1 cm at RA. There is RA inversion, respiratory variation  noted at the Tricuspid valve and  IVC measures 2.6cm and  collapses 20%. There is no RV diastolic collapse seen on  this TTE, however the RV is small at times and the RV EF is  elevated. BP elevated 161/88 and HR 70s-80s. These findings  are consistant with RAISED intrapericardial pressures/early  tamponade. BP is elevated on this study, clinical  evaluation indicated.    < end of copied text > Interval events:     - Remains w/o CP, SOB, complains of cramps in his legs  - SR HR .   - TTE with continued 2.6 cm large pericardial effusion.   - CT chest done overnight, for anticipation of possible IR drain     Medications:   MEDICATIONS  (STANDING):  calcitriol   Capsule 0.25 MICROGram(s) Oral daily  cloNIDine 0.1 milliGRAM(s) Oral at bedtime  docusate sodium 100 milliGRAM(s) Oral three times a day  epoetin sherlyn Injectable 48902 Unit(s) IV Push <User Schedule>  heparin  Injectable 5000 Unit(s) SubCutaneous every 8 hours  iron sucrose IVPB 200 milliGRAM(s) IV Intermittent daily  NIFEdipine XL 60 milliGRAM(s) Oral daily  predniSONE   Tablet 5 milliGRAM(s) Oral daily  tacrolimus 1 milliGRAM(s) Oral two times a day  trimethoprim   80 mG/sulfamethoxazole 400 mG 1 Tablet(s) Oral <User Schedule>  valGANciclovir 450 milliGRAM(s) Oral <User Schedule>      REVIEW OF SYSTEMS:  CONSTITUTIONAL: No weakness, fevers or chills  EYES/ENT: No visual changes;  No dysphagia  NECK: No pain or stiffness  RESPIRATORY: No cough, wheezing, hemoptysis; No shortness of breath  CARDIOVASCULAR: No chest pain or palpitations; mild lower extremity edema  GASTROINTESTINAL: No abdominal or epigastric pain. No nausea, vomiting, or hematemesis; No diarrhea or constipation. No melena or hematochezia.  BACK: No back pain  GENITOURINARY: No dysuria, frequency or hematuria  NEUROLOGICAL: No numbness,  +ve  weakness  SKIN: No itching, burning, rashes, or lesions   All other review of systems is negative unless indicated above.    Physical Exam:  Vital Signs Last 24 Hrs  Vital Signs Last 24 Hrs  T(C): 36.5 (23 Mar 2018 05:05), Max: 37.2 (22 Mar 2018 20:19)  T(F): 97.7 (23 Mar 2018 05:05), Max: 98.9 (22 Mar 2018 20:19)  HR: 82 (23 Mar 2018 05:05) (75 - 91)  BP: 171/96 (23 Mar 2018 05:05) (149/94 - 180/97)  BP(mean): --  RR: 18 (23 Mar 2018 05:05) (18 - 18)  SpO2: 94% (23 Mar 2018 05:05) (94% - 99%)  GENERAL: No acute distress, well-developed  HEAD:  Atraumatic, Normocephalic  ENT: EOMI, PERRLA, conjunctiva and sclera clear, Neck supple, No JVD, moist mucosa  CHEST/LUNG: Clear to auscultation bilaterally; No wheeze, equal breath sounds bilaterally   BACK: No spinal tenderness  HEART: Regular rate and rhythm; nl  S1 S2, No murmurs, rubs, or gallops  ABDOMEN: Soft, Nontender, Nondistended; Bowel sounds present  EXTREMITIES:  2+ edema  to shins   PSYCH: Nl behavior, nl affect  NEUROLOGY: AAOx3, non-focal, cranial nerves intact  SKIN: Normal color, No rashes or lesions  LINES:    Cardiovascular Diagnostic Testing:    ECG: none in chart     < from: Transthoracic Echocardiogram (03.22.18 @ 15:49) >  Limited TTE to evaluate the pericardium.  1. Thickened pericardium anterior to the right heart with a  large pericardial effusion predominantly posterior and  lateral to the LV. The effusion measures up to  approximately 2.6 cm lateral to the LV in its largest  dimension. There is signficant inversion of the right  atrial wall during atrial diastole. On apical views, there  is inversion of the RV wall during ventricular diastole.  The IVC is dilated and plethoric. These findings are  consistent with echocardiographic evidence of pericardial  tamponade physiology.    < end of copied text >    < from: Transthoracic Echocardiogram (03.19.18 @ 11:00) >  1. Mild concentric left ventricular hypertrophy.  2. Normal left ventricular systolic function.  3. Moderate-large circumfrential pericardial effusion.  1.9-2.3 cm posterior and lateral  to the LV, 0.7cm  at  RV,  2.1 cm at RA. There is RA inversion, respiratory variation  noted at the Tricuspid valve and  IVC measures 2.6cm and  collapses 20%. There is no RV diastolic collapse seen on  this TTE, however the RV is small at times and the RV EF is  elevated. BP elevated 161/88 and HR 70s-80s. These findings  are consistant with RAISED intrapericardial pressures/early  tamponade. BP is elevated on this study, clinical  evaluation indicated.    < end of copied text >

## 2018-03-23 NOTE — PROGRESS NOTE ADULT - ASSESSMENT
early cardiac tamponade - worsening effusion  Failed renal transplant s/p shiley and HD X 3  hypertension  anemia  hypermagnesemia  hyperphosphatemia    continue current care  monitor pt over weekend  will discuss with IR today  plan for repeat ECHO Monday  suspect drainage and analysis of fluid will be needed  discussed with dr randall and dr hagan  discussed case in detail with pt and answered all his questions to the best of my ability.

## 2018-03-23 NOTE — PROGRESS NOTE ADULT - ASSESSMENT
A/P:  58 yo M hx of CKD, s/p bilateral renal transplants 2/2 to NSAID use, HTN, who presented initially to establish dialysis in the hospital,on TTE noted to have pericardial effusion with early signs of tamponade. Patient is awaiting renal transplant. Pt has 3 HD sessions, and a subsequent TTE that shows the pericardial effusion continues to remain large with signs of echocardiographic tamponade.     Plan:  - will discuss with attending Dr. Velazquez role of possible drainage today.   - monitor clinically for signs of hypoxia, SOB, and monitor on tele for signs of tachycardia.   - continue to keep NPO     Jimmie Ching MD  Cardiology Fellow  x [phone]

## 2018-03-23 NOTE — PROGRESS NOTE ADULT - SUBJECTIVE AND OBJECTIVE BOX
MEDICINE, PROGRESS NOTE [phone]    MEAGAN RAMOS 57y [id number]    Patient seen and examined.  Patient is a 57y old  Male who presents with a chief complaint of My kidney is not working (16 Mar 2018 16:07)  Pt feels ok with no sob or cp. Legs hurt a little.    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  History of partial pancreatectomy  History of renal transplant: x 2    MEDICATIONS  (STANDING):  calcitriol   Capsule 0.25 MICROGram(s) Oral daily  cloNIDine 0.1 milliGRAM(s) Oral at bedtime  docusate sodium 100 milliGRAM(s) Oral three times a day  epoetin sherlyn Injectable 75276 Unit(s) IV Push <User Schedule>  heparin  Injectable 5000 Unit(s) SubCutaneous every 8 hours  iron sucrose IVPB 200 milliGRAM(s) IV Intermittent daily  NIFEdipine XL 60 milliGRAM(s) Oral daily  predniSONE   Tablet 5 milliGRAM(s) Oral daily  tacrolimus 1 milliGRAM(s) Oral two times a day  trimethoprim   80 mG/sulfamethoxazole 400 mG 1 Tablet(s) Oral <User Schedule>  valGANciclovir 450 milliGRAM(s) Oral <User Schedule>    MEDICATIONS  (PRN):  methyl salicylate 14%/menthol 6% Topical Ointment 1 Application(s) Topical two times a day PRN LE discomfort    Allergies    No Known Allergies    Intolerances        PHYSICAL EXAM:  Constitutional: NAD  HEENT: Normocephalic, EOMI  Neck:  + JVD  Respiratory: CTA B/L, No wheezes  Cardiovascular: S1, S2, RRR, + systolic murmur  Gastrointestinal: BS+, soft, NT/ND  Extremities: No peripheral edema  Neurological: AAOX3, no focal deficits  Psychiatric: Normal mood, normal affect  : No Augustin    Vital Signs Last 24 Hrs  T(C): 37 (23 Mar 2018 12:45), Max: 37.2 (22 Mar 2018 20:19)  T(F): 98.6 (23 Mar 2018 12:45), Max: 98.9 (22 Mar 2018 20:19)  HR: 93 (23 Mar 2018 12:45) (70 - 93)  BP: 159/88 (23 Mar 2018 12:45) (141/78 - 180/97)  BP(mean): --  RR: 17 (23 Mar 2018 12:45) (17 - 18)  SpO2: 94% (23 Mar 2018 12:45) (94% - 97%)  I&O's Summary    22 Mar 2018 07:01  -  23 Mar 2018 07:00  --------------------------------------------------------  IN: 1200 mL / OUT: 3470 mL / NET: -2270 mL    23 Mar 2018 07:01  -  23 Mar 2018 16:07  --------------------------------------------------------  IN: 440 mL / OUT: 200 mL / NET: 240 mL        LABS:                        8.2    8.0   )-----------( 391      ( 23 Mar 2018 13:26 )             26.6     03-23    139  |  99  |  21  ----------------------------<  119<H>  4.2   |  28  |  5.54<H>    Ca    9.0      23 Mar 2018 13:23  Phos  4.1     03-23  Phosphorus Level, Serum: 4.1 mg/dL (03-23 @ 13:23)

## 2018-03-23 NOTE — CHART NOTE - NSCHARTNOTEFT_GEN_A_CORE
03/23/2018 0230  Interval events  Patient's CT chest prelim results with large pericardial effusion ( patient refused to go to CT chest multiple times, then agreed). Patient hemodynamically stable, asymptomatic. No SOB/hypoxia/tachycardia    56 yo M hx of CKD, s/p bilateral renal transplants, HTN, on HD daily now, awaiting PermaCath, on TTE noted to have pericardial effusion with early signs of tamponade. subsequent TTE that shows the pericardial effusion with early signs of tamponade.     Vital Signs Last 24 Hrs  T(C): 36.5 (23 Mar 2018 05:05), Max: 37.2 (22 Mar 2018 20:19)  T(F): 97.7 (23 Mar 2018 05:05), Max: 98.9 (22 Mar 2018 20:19)  HR: 82 (23 Mar 2018 05:05) (75 - 91)  BP: 171/96 (23 Mar 2018 05:05) (149/94 - 180/97)  BP(mean): --  ABP: --  ABP(mean): --  RR: 18 (23 Mar 2018 05:05) (18 - 18)  SpO2: 94% (23 Mar 2018 05:05) (94% - 99%)      Limited TTE to evaluate the pericardium.  1. Thickened pericardium anterior to the right heart with a  large pericardial effusion predominantly posterior and  lateral to the LV. The effusion measures up to  approximately 2.6 cm lateral to the LV in its largest  dimension. There is signficant inversion of the right  atrial wall during atrial diastole. On apical views, there  is inversion of the RV wall during ventricular diastole.  The IVC is dilated and plethoric. These findings are  consistent with echocardiographic evidence of pericardial  tamponade physiology.  *** Compared with echocardiogram of 3/19/2018, findings are  similar on today's study. The effusion is larger posterior  and lateral to the LV. Signs of pericardial tamponade are  again seen on today's study. Results discussed with  cardiology consult fellow.    CT chest results discussed with cardiology fellow  No plan for pericardial drain now per cardiology fellow,   Vitals q4 hourly  Will continue to monitor  If patient's symptoms worsening/tamponade will consider CCU consult  Will continue to monitor  Will update with primary team    Raphael Arreaga [facility]  Spectra# [id number]

## 2018-03-24 LAB
ANION GAP SERPL CALC-SCNC: 15 MMOL/L — SIGNIFICANT CHANGE UP (ref 5–17)
BUN SERPL-MCNC: 18 MG/DL — SIGNIFICANT CHANGE UP (ref 7–23)
CALCIUM SERPL-MCNC: 8.8 MG/DL — SIGNIFICANT CHANGE UP (ref 8.4–10.5)
CHLORIDE SERPL-SCNC: 97 MMOL/L — SIGNIFICANT CHANGE UP (ref 96–108)
CO2 SERPL-SCNC: 25 MMOL/L — SIGNIFICANT CHANGE UP (ref 22–31)
CREAT SERPL-MCNC: 5.41 MG/DL — HIGH (ref 0.5–1.3)
GLUCOSE SERPL-MCNC: 109 MG/DL — HIGH (ref 70–99)
HCT VFR BLD CALC: 27.4 % — LOW (ref 39–50)
HGB BLD-MCNC: 8.5 G/DL — LOW (ref 13–17)
LDH SERPL L TO P-CCNC: 299 U/L — HIGH (ref 50–242)
MCHC RBC-ENTMCNC: 30 PG — SIGNIFICANT CHANGE UP (ref 27–34)
MCHC RBC-ENTMCNC: 31 GM/DL — LOW (ref 32–36)
MCV RBC AUTO: 96.7 FL — SIGNIFICANT CHANGE UP (ref 80–100)
PLATELET # BLD AUTO: 379 K/UL — SIGNIFICANT CHANGE UP (ref 150–400)
POTASSIUM SERPL-MCNC: 4.7 MMOL/L — SIGNIFICANT CHANGE UP (ref 3.5–5.3)
POTASSIUM SERPL-SCNC: 4.7 MMOL/L — SIGNIFICANT CHANGE UP (ref 3.5–5.3)
RBC # BLD: 2.83 M/UL — LOW (ref 4.2–5.8)
RBC # FLD: 17.5 % — HIGH (ref 10.3–14.5)
SODIUM SERPL-SCNC: 137 MMOL/L — SIGNIFICANT CHANGE UP (ref 135–145)
TACROLIMUS SERPL-MCNC: 2.4 NG/ML — SIGNIFICANT CHANGE UP
WBC # BLD: 8.6 K/UL — SIGNIFICANT CHANGE UP (ref 3.8–10.5)
WBC # FLD AUTO: 8.6 K/UL — SIGNIFICANT CHANGE UP (ref 3.8–10.5)

## 2018-03-24 PROCEDURE — 90935 HEMODIALYSIS ONE EVALUATION: CPT

## 2018-03-24 PROCEDURE — 99233 SBSQ HOSP IP/OBS HIGH 50: CPT | Mod: GC

## 2018-03-24 RX ORDER — OXYCODONE AND ACETAMINOPHEN 5; 325 MG/1; MG/1
1 TABLET ORAL ONCE
Qty: 0 | Refills: 0 | Status: DISCONTINUED | OUTPATIENT
Start: 2018-03-24 | End: 2018-03-24

## 2018-03-24 RX ADMIN — Medication 60 MILLIGRAM(S): at 05:23

## 2018-03-24 RX ADMIN — Medication 0.1 MILLIGRAM(S): at 22:26

## 2018-03-24 RX ADMIN — Medication 1 TABLET(S): at 11:17

## 2018-03-24 RX ADMIN — CALCITRIOL 0.25 MICROGRAM(S): 0.5 CAPSULE ORAL at 11:17

## 2018-03-24 RX ADMIN — IRON SUCROSE 110 MILLIGRAM(S): 20 INJECTION, SOLUTION INTRAVENOUS at 14:56

## 2018-03-24 RX ADMIN — Medication 5 MILLIGRAM(S): at 11:18

## 2018-03-24 RX ADMIN — OXYCODONE AND ACETAMINOPHEN 1 TABLET(S): 5; 325 TABLET ORAL at 05:23

## 2018-03-24 RX ADMIN — TACROLIMUS 1 MILLIGRAM(S): 5 CAPSULE ORAL at 22:26

## 2018-03-24 RX ADMIN — OXYCODONE AND ACETAMINOPHEN 1 TABLET(S): 5; 325 TABLET ORAL at 06:10

## 2018-03-24 RX ADMIN — TACROLIMUS 1 MILLIGRAM(S): 5 CAPSULE ORAL at 11:16

## 2018-03-24 NOTE — PROGRESS NOTE ADULT - ASSESSMENT
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 Pt is currently asymptomatic and hemodynamically stable, without hypotension or tachycardia. He does not appear to be in clinical tamponade at this time.  -given characteristics of effusion and increasing size, would recommend elective diagnostic/therapeutic pericardiocentesis  -monitor for clinical tamponade

## 2018-03-24 NOTE — PROGRESS NOTE ADULT - SUBJECTIVE AND OBJECTIVE BOX
Obtained CDs of CT from OSH, will take to radiology for upload.  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    T(F): 98.5, Max: 98.8 (03-24-18 @ 04:03)  HR: 84  BP: 169/99  SpO2: 98%    Gen: NAD, pleasant  Resp: No respiratory distress    WBC 8.0   / Hct 26.6  / SCr 5.54

## 2018-03-24 NOTE — CONSULT NOTE ADULT - SUBJECTIVE AND OBJECTIVE BOX
56 y/o male with hx ESRD s/p renal transplantation (father's kidney) 1990s, s/p second transplant (sister's kidney) 2013, was admitted for HD, as pts transplant no longer functioning well enough.     Patient has hx malignancy in native kidney s/p nephrectomy.  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      Patient working on getting on multiple transplant lists, needs urologic clearance.     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  History of partial pancreatectomy  History of renal transplant: x 2    No Known Allergies    REVIEW OF SYSTEMS: Pertinent positives and negatives as stated in HPI, otherwise negative    Vital signs  T(C): 36.9, Max: 37.1 (03-23 @ 15:50)  HR: 84  BP: 169/99  SpO2: 98%      Physical Exam  Gen: NAD  Abd: Soft, NT, ND    WBC 8.0   / Hct 26.6  / SCr 5.54    139  |  99  |  21  ----------------------------<  119<H>  4.2   |  28  |  5.54<H>    Ca    9.0      23 Mar 2018 13:23  Phos  4.1     03-23    RADIOLOGY: Obtained CDs of CT from OSH, will take to radiology for upload.

## 2018-03-24 NOTE — PROGRESS NOTE ADULT - ASSESSMENT
early cardiac tamponade - worsening effusion  Failed renal transplant s/p shiley   hypertension  anemia    continue current care  check repeat echo to assess effusion as it has been worsening thus far.  monitor pt closely  will if effusion not improving will review with IR/CTS for drainage and analysis

## 2018-03-24 NOTE — PROGRESS NOTE ADULT - SUBJECTIVE AND OBJECTIVE BOX
MEDICINE, PROGRESS NOTE [phone]    MEAGAN RAMOS 57y [id number]    Patient seen and examined.  Patient is a 57y old  Male who presents with a chief complaint of My kidney is not working (16 Mar 2018 16:07)  Pt has no current complaints. feels well.    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  History of partial pancreatectomy  History of renal transplant: x 2    MEDICATIONS  (STANDING):  calcitriol   Capsule 0.25 MICROGram(s) Oral daily  cloNIDine 0.1 milliGRAM(s) Oral at bedtime  docusate sodium 100 milliGRAM(s) Oral three times a day  epoetin sherlyn Injectable 01519 Unit(s) IV Push <User Schedule>  heparin  Injectable 5000 Unit(s) SubCutaneous every 8 hours  iron sucrose IVPB 200 milliGRAM(s) IV Intermittent daily  NIFEdipine XL 60 milliGRAM(s) Oral daily  predniSONE   Tablet 5 milliGRAM(s) Oral daily  tacrolimus 1 milliGRAM(s) Oral two times a day  trimethoprim   80 mG/sulfamethoxazole 400 mG 1 Tablet(s) Oral <User Schedule>  valGANciclovir 450 milliGRAM(s) Oral <User Schedule>    MEDICATIONS  (PRN):  methyl salicylate 14%/menthol 6% Topical Ointment 1 Application(s) Topical two times a day PRN LE discomfort    Allergies    No Known Allergies    Intolerances        PHYSICAL EXAM:  Constitutional: NAD  HEENT: Normocephalic, EOMI  Neck:  No JVD  Respiratory: CTA B/L, No wheezes  Cardiovascular: S1, S2, RRR, + systolic murmur  Gastrointestinal: BS+, soft, NT/ND  Extremities: No peripheral edema  Neurological: AAOX3, no focal deficits  Psychiatric: Normal mood, normal affect  : No Augustin    Vital Signs Last 24 Hrs  T(C): 37.2 (24 Mar 2018 16:50), Max: 37.3 (24 Mar 2018 13:06)  T(F): 98.9 (24 Mar 2018 16:50), Max: 99.1 (24 Mar 2018 13:06)  HR: 74 (24 Mar 2018 16:50) (58 - 86)  BP: 146/91 (24 Mar 2018 16:50) (143/82 - 175/100)  BP(mean): --  RR: 17 (24 Mar 2018 16:50) (16 - 18)  SpO2: 95% (24 Mar 2018 16:50) (95% - 98%)  I&O's Summary    23 Mar 2018 07:01  -  24 Mar 2018 07:00  --------------------------------------------------------  IN: 920 mL / OUT: 2200 mL / NET: -1280 mL    24 Mar 2018 07:01  -  24 Mar 2018 19:11  --------------------------------------------------------  IN: 1460 mL / OUT: 2525 mL / NET: -1065 mL        LABS:                        8.5    8.6   )-----------( 379      ( 24 Mar 2018 14:09 )             27.4     03-24    137  |  97  |  18  ----------------------------<  109<H>  4.7   |  25  |  5.41<H>    Ca    8.8      24 Mar 2018 14:09  Phos  4.1     03-23          Tacrolimus (), Serum: 2.4 ng/mL (03-24 @ 16:57)

## 2018-03-24 NOTE — PROGRESS NOTE ADULT - SUBJECTIVE AND OBJECTIVE BOX
[facility] Progress Note  Consult Spectra [id number]    Chief Complaint: pericardial effusion    Interval Events:      MEDICATIONS:  cloNIDine 0.1 milliGRAM(s) Oral at bedtime  heparin  Injectable 5000 Unit(s) SubCutaneous every 8 hours  NIFEdipine XL 60 milliGRAM(s) Oral daily    trimethoprim   80 mG/sulfamethoxazole 400 mG 1 Tablet(s) Oral <User Schedule>  valGANciclovir 450 milliGRAM(s) Oral <User Schedule>        docusate sodium 100 milliGRAM(s) Oral three times a day    predniSONE   Tablet 5 milliGRAM(s) Oral daily    calcitriol   Capsule 0.25 MICROGram(s) Oral daily  epoetin sherlyn Injectable 37699 Unit(s) IV Push <User Schedule>  iron sucrose IVPB 200 milliGRAM(s) IV Intermittent daily  methyl salicylate 14%/menthol 6% Topical Ointment 1 Application(s) Topical two times a day PRN  tacrolimus 1 milliGRAM(s) Oral two times a day        PHYSICAL EXAM:  T(C): 36.9 (18 @ 08:35), Max: 37.1 (18 @ 15:50)  HR: 84 (18 @ 08:35) (58 - 93)  BP: 169/99 (18 @ 08:35) (141/78 - 175/100)  RR: 16 (18 @ 08:35) (16 - 18)  SpO2: 98% (18 @ 08:35) (94% - 98%)  Wt(kg): --  I&O's Summary    23 Mar 2018 07:  -  24 Mar 2018 07:00  --------------------------------------------------------  IN: 920 mL / OUT: 2200 mL / NET: -1280 mL    24 Mar 2018 07:  -  24 Mar 2018 11:26  --------------------------------------------------------  IN: 280 mL / OUT: 150 mL / NET: 130 mL      Daily     Daily Weight in k.1 (24 Mar 2018 08:35)    Appearance: Normal	  HEENT:   Normal oral mucosa, PERRL, EOMI	  Cardiovascular: Normal S1 S2, No JVD, No murmurs, No edema  Respiratory: Lungs clear to auscultation	  Psychiatry: A & O x 3, Mood & affect appropriate  Gastrointestinal:  soft nt nd, normoactive bs  Skin: No rashes, No ecchymoses, No cyanosis	  Neurologic: grossly nonfocal  Extremities: Normal range of motion, No clubbing, cyanosis  Vascular: Peripheral pulses palpable bilaterally    LABS:	 	    CBC Full  -  ( 23 Mar 2018 13:26 )  WBC Count : 8.0 K/uL  Hemoglobin : 8.2 g/dL  Hematocrit : 26.6 %  Platelet Count - Automated : 391 K/uL  Mean Cell Volume : 95.9 fl  Mean Cell Hemoglobin : 29.6 pg  Mean Cell Hemoglobin Concentration : 30.9 gm/dL  Auto Neutrophil # : x  Auto Lymphocyte # : x  Auto Monocyte # : x  Auto Eosinophil # : x  Auto Basophil # : x  Auto Neutrophil % : x  Auto Lymphocyte % : x  Auto Monocyte % : x  Auto Eosinophil % : x  Auto Basophil % : x    03    139  |  99  |  21  ----------------------------<  119<H>  4.2   |  28  |  5.54<H>  03    138  |  98  |  20  ----------------------------<  104<H>  4.3   |  25  |  5.20<H>    Ca    9.0      23 Mar 2018 13:23  Ca    8.6      22 Mar 2018 13:38  Phos  4.1             proBNP:   Lipid Profile:   HgA1c:   TSH:     CARDIAC MARKERS:            TELEMETRY: 	    ECG:  	  RADIOLOGY:  OTHER: 	    PREVIOUS DIAGNOSTIC TESTING:    [ ] Echocardiogram:  [ ] Catheterization:  [ ] Stress Test: [facility] Progress Note  Consult Spectra [id number]    Chief Complaint: pericardial effusion    Interval Events:  Feels well. No CP, orthopnea, dizziness, lightheadedness. No hypotension.    MEDICATIONS:  cloNIDine 0.1 milliGRAM(s) Oral at bedtime  heparin  Injectable 5000 Unit(s) SubCutaneous every 8 hours  NIFEdipine XL 60 milliGRAM(s) Oral daily  trimethoprim   80 mG/sulfamethoxazole 400 mG 1 Tablet(s) Oral <User Schedule>  valGANciclovir 450 milliGRAM(s) Oral <User Schedule>  docusate sodium 100 milliGRAM(s) Oral three times a day  predniSONE   Tablet 5 milliGRAM(s) Oral daily  calcitriol   Capsule 0.25 MICROGram(s) Oral daily  epoetin sherlyn Injectable 17496 Unit(s) IV Push <User Schedule>  iron sucrose IVPB 200 milliGRAM(s) IV Intermittent daily  methyl salicylate 14%/menthol 6% Topical Ointment 1 Application(s) Topical two times a day PRN  tacrolimus 1 milliGRAM(s) Oral two times a day    PHYSICAL EXAM:  T(C): 36.9 (18 @ 08:35), Max: 37.1 (18 @ 15:50)  HR: 84 (18 @ 08:35) (58 - 93)  BP: 169/99 (18 @ 08:35) (141/78 - 175/100)  RR: 16 (18 @ 08:35) (16 - 18)  SpO2: 98% (18 @ 08:35) (94% - 98%)  Wt(kg): --  I&O's Summary    23 Mar 2018 07:  -  24 Mar 2018 07:00  --------------------------------------------------------  IN: 920 mL / OUT: 2200 mL / NET: -1280 mL    24 Mar 2018 07:  -  24 Mar 2018 11:26  --------------------------------------------------------  IN: 280 mL / OUT: 150 mL / NET: 130 mL    Daily     Daily Weight in k.1 (24 Mar 2018 08:35)    Appearance: No distress  HEENT:   mmm	  Cardiovascular: Normal S1 S2, significantly elevated JVP  Respiratory: Lungs clear to auscultation	  Psychiatry: A & O x 3, Mood & affect appropriate  Gastrointestinal:  soft nt nd  Skin: No rashes, No ecchymoses, No cyanosis	  Neurologic: grossly nonfocal  Extremities: 1+/2+ LE edema  Vascular: Peripheral pulses palpable bilaterally    LABS:	 	  CBC Full  -  ( 23 Mar 2018 13:26 )  WBC Count : 8.0 K/uL  Hemoglobin : 8.2 g/dL  Hematocrit : 26.6 %  Platelet Count - Automated : 391 K/uL      139  |  99  |  21  ----------------------------<  119<H>  4.2   |  28  |  5.54<H>      138  |  98  |  20  ----------------------------<  104<H>  4.3   |  25  |  5.20<H>    Ca    9.0      23 Mar 2018 13:23  Ca    8.6      22 Mar 2018 13:38  Phos  4.1         PREVIOUS DIAGNOSTIC TESTING:    [x] Echocardiogram: < from: Transthoracic Echocardiogram (18 @ 15:49) >  Conclusions:  Limited TTE to evaluate the pericardium.  1. Thickened pericardium anterior to the right heart with a  large pericardial effusion predominantly posterior and  lateral to the LV. The effusion measures up to  approximately 2.6 cm lateral to the LV in its largest  dimension. There is signficant inversion of the right  atrial wall during atrial diastole. On apical views, there  is inversion of the RV wall during ventricular diastole.  The IVC is dilated and plethoric. These findings are  consistent with echocardiographic evidence of pericardial  tamponade physiology.  *** Compared with echocardiogram of 3/19/2018, findings are  similar on today's study. The effusion is larger posterior  and lateral to the LV. Signs of pericardial tamponade are  again seen on today's study. Results discussed with  cardiology consult fellow.    < end of copied text >    [ ] Catheterization:  [ ] Stress Test: [facility] Progress Note  Consult Spectra [id number]    Chief Complaint: pericardial effusion    Interval Events:  Feels well. No CP, orthopnea, dizziness, lightheadedness. No hypotension.    MEDICATIONS:  cloNIDine 0.1 milliGRAM(s) Oral at bedtime  heparin  Injectable 5000 Unit(s) SubCutaneous every 8 hours  NIFEdipine XL 60 milliGRAM(s) Oral daily  trimethoprim   80 mG/sulfamethoxazole 400 mG 1 Tablet(s) Oral <User Schedule>  valGANciclovir 450 milliGRAM(s) Oral <User Schedule>  docusate sodium 100 milliGRAM(s) Oral three times a day  predniSONE   Tablet 5 milliGRAM(s) Oral daily  calcitriol   Capsule 0.25 MICROGram(s) Oral daily  epoetin sherlyn Injectable 63593 Unit(s) IV Push <User Schedule>  iron sucrose IVPB 200 milliGRAM(s) IV Intermittent daily  methyl salicylate 14%/menthol 6% Topical Ointment 1 Application(s) Topical two times a day PRN  tacrolimus 1 milliGRAM(s) Oral two times a day    PHYSICAL EXAM:  T(C): 36.9 (18 @ 08:35), Max: 37.1 (18 @ 15:50)  HR: 84 (18 @ 08:35) (58 - 93)  BP: 169/99 (18 @ 08:35) (141/78 - 175/100)  RR: 16 (18 @ 08:35) (16 - 18)  SpO2: 98% (18 @ 08:35) (94% - 98%)  Wt(kg): --  I&O's Summary    23 Mar 2018 07:  -  24 Mar 2018 07:00  --------------------------------------------------------  IN: 920 mL / OUT: 2200 mL / NET: -1280 mL    24 Mar 2018 07:  -  24 Mar 2018 11:26  --------------------------------------------------------  IN: 280 mL / OUT: 150 mL / NET: 130 mL    Daily     Daily Weight in k.1 (24 Mar 2018 08:35)    Appearance: No distress  HEENT:   mmm	  Cardiovascular: Normal S1 S2, significantly elevated JVP  Respiratory: Lungs clear to auscultation	  Psychiatry: A & O x 3, Mood & affect appropriate  Gastrointestinal:  soft nt nd  Skin: No rashes, No ecchymoses, No cyanosis	  Neurologic: grossly nonfocal  Extremities: 1+/2+ LE edema  Vascular: Peripheral pulses palpable bilaterally    LABS:	 	  CBC Full  -  ( 23 Mar 2018 13:26 )  WBC Count : 8.0 K/uL  Hemoglobin : 8.2 g/dL  Hematocrit : 26.6 %  Platelet Count - Automated : 391 K/uL      139  |  99  |  21  ----------------------------<  119<H>  4.2   |  28  |  5.54<H>      138  |  98  |  20  ----------------------------<  104<H>  4.3   |  25  |  5.20<H>    Ca    9.0      23 Mar 2018 13:23  Ca    8.6      22 Mar 2018 13:38  Phos  4.1         PREVIOUS DIAGNOSTIC TESTING:    [x] Echocardiogram: < from: Transthoracic Echocardiogram (18 @ 15:49) >  Conclusions:  Limited TTE to evaluate the pericardium.  1. Thickened pericardium anterior to the right heart with a  large pericardial effusion predominantly posterior and  lateral to the LV. The effusion measures up to  approximately 2.6 cm lateral to the LV in its largest  dimension. There is signficant inversion of the right  atrial wall during atrial diastole. On apical views, there  is inversion of the RV wall during ventricular diastole.  The IVC is dilated and plethoric. These findings are  consistent with echocardiographic evidence of pericardial  tamponade physiology.  *** Compared with echocardiogram of 3/19/2018, findings are  similar on today's study. The effusion is larger posterior  and lateral to the LV. Signs of pericardial tamponade are  again seen on today's study. Results discussed with  cardiology consult fellow.    < end of copied text >

## 2018-03-24 NOTE — PROGRESS NOTE ADULT - SUBJECTIVE AND OBJECTIVE BOX
[facility] DIVISION OF KIDNEY DISEASES AND HYPERTENSION -- FOLLOW UP NOTE  --------------------------------------------------------------------------------  Chief Complaint:  AMARI and failing graft    24 hour events/subjective:  seen on hd today      PAST HISTORY  --------------------------------------------------------------------------------  No significant changes to PMH, PSH, FHx, SHx, unless otherwise noted    ALLERGIES & MEDICATIONS  --------------------------------------------------------------------------------  Allergies    No Known Allergies    Intolerances      Standing Inpatient Medications  calcitriol   Capsule 0.25 MICROGram(s) Oral daily  cloNIDine 0.1 milliGRAM(s) Oral at bedtime  docusate sodium 100 milliGRAM(s) Oral three times a day  epoetin sherlyn Injectable 07242 Unit(s) IV Push <User Schedule>  heparin  Injectable 5000 Unit(s) SubCutaneous every 8 hours  iron sucrose IVPB 200 milliGRAM(s) IV Intermittent daily  NIFEdipine XL 60 milliGRAM(s) Oral daily  predniSONE   Tablet 5 milliGRAM(s) Oral daily  tacrolimus 1 milliGRAM(s) Oral two times a day  trimethoprim   80 mG/sulfamethoxazole 400 mG 1 Tablet(s) Oral <User Schedule>  valGANciclovir 450 milliGRAM(s) Oral <User Schedule>    PRN Inpatient Medications  methyl salicylate 14%/menthol 6% Topical Ointment 1 Application(s) Topical two times a day PRN      REVIEW OF SYSTEMS  --------------------------------------------------------------------------------  Gen: No weight changes, fatigue, fevers/chills, weakness  Skin: No rashes  Head/Eyes/Ears/Mouth: No headache; Normal hearing; Normal vision w/o blurriness; No sinus pain/discomfort, sore throat  Respiratory: No dyspnea, cough, wheezing, hemoptysis  CV: No chest pain, PND, orthopnea  GI: No abdominal pain, diarrhea, constipation, nausea, vomiting, melena, hematochezia  : No increased frequency, dysuria, hematuria, nocturia  MSK: No joint pain/swelling; no back pain; no edema  Neuro: No dizziness/lightheadedness, weakness, seizures, numbness, tingling  Heme: No easy bruising or bleeding  Endo: No heat/cold intolerance  Psych: No significant nervousness, anxiety, stress, depression    All other systems were reviewed and are negative, except as noted.    VITALS/PHYSICAL EXAM  --------------------------------------------------------------------------------  T(C): 37.3 (03-24-18 @ 13:06), Max: 37.3 (03-24-18 @ 13:06)  HR: 80 (03-24-18 @ 13:06) (58 - 93)  BP: 165/95 (03-24-18 @ 13:06) (159/91 - 175/100)  RR: 16 (03-24-18 @ 13:06) (16 - 18)  SpO2: 97% (03-24-18 @ 13:06) (94% - 98%)  Wt(kg): --        03-23-18 @ 07:01  -  03-24-18 @ 07:00  --------------------------------------------------------  IN: 920 mL / OUT: 2200 mL / NET: -1280 mL    03-24-18 @ 07:01  -  03-24-18 @ 13:58  --------------------------------------------------------  IN: 1060 mL / OUT: 275 mL / NET: 785 mL      Physical Exam:  	Gen: NAD, well-appearing  	HEENT: PERRL, supple neck, clear oropharynx  	Pulm: CTA B/L  	CV: RRR, S1S2; no rub  	Back: No spinal or CVA tenderness; no sacral edema  	Abd: +BS, soft, nontender/nondistended  	: No suprapubic tenderness  	UE: Warm, FROM, no clubbing, intact strength; no edema; no asterixis  	LE: Warm, FROM, no clubbing, intact strength; no edema  	  LABS/STUDIES  --------------------------------------------------------------------------------              8.2    8.0   >-----------<  391      [03-23-18 @ 13:26]              26.6     139  |  99  |  21  ----------------------------<  119      [03-23-18 @ 13:23]  4.2   |  28  |  5.54        Ca     9.0     [03-23-18 @ 13:23]      Phos  4.1     [03-23-18 @ 13:23]                [03-23-18 @ 13:23]    Creatinine Trend:  SCr 5.54 [03-23 @ 13:23]  SCr 5.20 [03-22 @ 13:38]  SCr 6.28 [03-21 @ 05:48]  SCr 10.20 [03-18 @ 06:40]  SCr 13.63 [03-17 @ 06:50]        Iron 24, TIBC 161, %sat 15      [03-20-18 @ 11:31]  Ferritin 250      [03-19-18 @ 20:02]    HBsAb <3.0      [03-17-18 @ 05:35]  HBsAg Nonreact      [03-17-18 @ 05:35]  HBcAb Nonreact      [03-17-18 @ 05:35]  HCV 0.12, Nonreact      [03-17-18 @ 05:35]

## 2018-03-24 NOTE — CONSULT NOTE ADULT - ATTENDING COMMENTS
CT from 2017 and prior pathology removed. There is relatively low likelihood of recurrence with chromophobe subtype, but there are several indeterminate findings on most recent CT that require further characterization before urologic clearance can be given. Recommend non-contrast MRI Abd/pel with diffusion weighted images for further assessment.

## 2018-03-25 LAB
ANION GAP SERPL CALC-SCNC: 13 MMOL/L — SIGNIFICANT CHANGE UP (ref 5–17)
BUN SERPL-MCNC: 15 MG/DL — SIGNIFICANT CHANGE UP (ref 7–23)
CALCIUM SERPL-MCNC: 8.8 MG/DL — SIGNIFICANT CHANGE UP (ref 8.4–10.5)
CHLORIDE SERPL-SCNC: 100 MMOL/L — SIGNIFICANT CHANGE UP (ref 96–108)
CO2 SERPL-SCNC: 26 MMOL/L — SIGNIFICANT CHANGE UP (ref 22–31)
CREAT SERPL-MCNC: 4.62 MG/DL — HIGH (ref 0.5–1.3)
GLUCOSE SERPL-MCNC: 100 MG/DL — HIGH (ref 70–99)
HCT VFR BLD CALC: 28.5 % — LOW (ref 39–50)
HGB BLD-MCNC: 8.1 G/DL — LOW (ref 13–17)
MCHC RBC-ENTMCNC: 28.4 GM/DL — LOW (ref 32–36)
MCHC RBC-ENTMCNC: 28.6 PG — SIGNIFICANT CHANGE UP (ref 27–34)
MCV RBC AUTO: 100.7 FL — HIGH (ref 80–100)
NRBC # BLD: 2 /100 WBCS — HIGH (ref 0–0)
PLATELET # BLD AUTO: 398 K/UL — SIGNIFICANT CHANGE UP (ref 150–400)
POTASSIUM SERPL-MCNC: 4.5 MMOL/L — SIGNIFICANT CHANGE UP (ref 3.5–5.3)
POTASSIUM SERPL-SCNC: 4.5 MMOL/L — SIGNIFICANT CHANGE UP (ref 3.5–5.3)
RBC # BLD: 2.83 M/UL — LOW (ref 4.2–5.8)
RBC # FLD: 18.3 % — HIGH (ref 10.3–14.5)
SODIUM SERPL-SCNC: 139 MMOL/L — SIGNIFICANT CHANGE UP (ref 135–145)
TACROLIMUS SERPL-MCNC: <2 NG/ML — SIGNIFICANT CHANGE UP
WBC # BLD: 6.57 K/UL — SIGNIFICANT CHANGE UP (ref 3.8–10.5)
WBC # FLD AUTO: 6.57 K/UL — SIGNIFICANT CHANGE UP (ref 3.8–10.5)

## 2018-03-25 PROCEDURE — 93321 DOPPLER ECHO F-UP/LMTD STD: CPT | Mod: 26

## 2018-03-25 PROCEDURE — 99232 SBSQ HOSP IP/OBS MODERATE 35: CPT | Mod: GC

## 2018-03-25 PROCEDURE — 93308 TTE F-UP OR LMTD: CPT | Mod: 26

## 2018-03-25 RX ADMIN — Medication 100 MILLIGRAM(S): at 05:11

## 2018-03-25 RX ADMIN — Medication 60 MILLIGRAM(S): at 05:11

## 2018-03-25 RX ADMIN — TACROLIMUS 1 MILLIGRAM(S): 5 CAPSULE ORAL at 23:23

## 2018-03-25 RX ADMIN — Medication 0.1 MILLIGRAM(S): at 21:40

## 2018-03-25 RX ADMIN — Medication 5 MILLIGRAM(S): at 11:15

## 2018-03-25 RX ADMIN — TACROLIMUS 1 MILLIGRAM(S): 5 CAPSULE ORAL at 11:15

## 2018-03-25 RX ADMIN — CALCITRIOL 0.25 MICROGRAM(S): 0.5 CAPSULE ORAL at 11:15

## 2018-03-25 RX ADMIN — Medication 100 MILLIGRAM(S): at 21:40

## 2018-03-25 NOTE — PROGRESS NOTE ADULT - ASSESSMENT
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 Pt is currently asymptomatic and hemodynamically stable, without hypotension or tachycardia. He does not appear to be in clinical tamponade at this time.  -repeat TTE pending  -given characteristics of effusion and increasing size, elective diagnostic/therapeutic pericardiocentesis a possibility if accessible, prev studies showing posterior collection of fluid  -monitor for clinical tamponade    Andres Ramirez MD [id number]

## 2018-03-25 NOTE — PROGRESS NOTE ADULT - SUBJECTIVE AND OBJECTIVE BOX
24H hour events:   feeling well, no complaints  no acute overnight events  tele: SR 80-90s  MEDICATIONS:  cloNIDine 0.1 milliGRAM(s) Oral at bedtime  heparin  Injectable 5000 Unit(s) SubCutaneous every 8 hours  NIFEdipine XL 60 milliGRAM(s) Oral daily    trimethoprim   80 mG/sulfamethoxazole 400 mG 1 Tablet(s) Oral <User Schedule>  valGANciclovir 450 milliGRAM(s) Oral <User Schedule>        docusate sodium 100 milliGRAM(s) Oral three times a day    predniSONE   Tablet 5 milliGRAM(s) Oral daily    calcitriol   Capsule 0.25 MICROGram(s) Oral daily  epoetin sherlyn Injectable 34015 Unit(s) IV Push <User Schedule>  iron sucrose IVPB 200 milliGRAM(s) IV Intermittent daily  methyl salicylate 14%/menthol 6% Topical Ointment 1 Application(s) Topical two times a day PRN  tacrolimus 1 milliGRAM(s) Oral two times a day          PHYSICAL EXAM:  T(C): 36.9 (03-25-18 @ 08:59), Max: 37.3 (03-24-18 @ 13:06)  HR: 80 (03-25-18 @ 08:59) (74 - 87)  BP: 151/89 (03-25-18 @ 08:59) (143/82 - 167/91)  RR: 18 (03-25-18 @ 08:59) (16 - 19)  SpO2: 97% (03-25-18 @ 08:59) (95% - 97%)  Wt(kg): --  I&O's Summary    24 Mar 2018 07:01  -  25 Mar 2018 07:00  --------------------------------------------------------  IN: 2540 mL / OUT: 2525 mL / NET: 15 mL    25 Mar 2018 07:01  -  25 Mar 2018 10:20  --------------------------------------------------------  IN: 360 mL / OUT: 275 mL / NET: 85 mL        Appearance: Normal	  HEENT:   Normal oral mucosa, PERRL, EOMI	  Lymphatic: No lymphadenopathy  Cardiovascular: Normal clear S1 S2, No JVD, No murmurs, 3+ pitting b/l LE edema  Respiratory: Lungs clear to auscultation	  Psychiatry: A & O x 3, Mood & affect appropriate  Gastrointestinal:  Soft, Non-tender, + BS	  Skin: No rashes, No ecchymoses, No cyanosis	  Neurologic: Non-focal  Extremities: Normal range of motion, No clubbing, cyanosis       LABS:	 	    CBC Full  -  ( 24 Mar 2018 14:09 )  WBC Count : 8.6 K/uL  Hemoglobin : 8.5 g/dL  Hematocrit : 27.4 %  Platelet Count - Automated : 379 K/uL  Mean Cell Volume : 96.7 fl  Mean Cell Hemoglobin : 30.0 pg  Mean Cell Hemoglobin Concentration : 31.0 gm/dL  Auto Neutrophil # : x  Auto Lymphocyte # : x  Auto Monocyte # : x  Auto Eosinophil # : x  Auto Basophil # : x  Auto Neutrophil % : x  Auto Lymphocyte % : x  Auto Monocyte % : x  Auto Eosinophil % : x  Auto Basophil % : x    03-25    139  |  100  |  15  ----------------------------<  100<H>  4.5   |  26  |  4.62<H>  03-24    137  |  97  |  18  ----------------------------<  109<H>  4.7   |  25  |  5.41<H>    Ca    8.8      25 Mar 2018 05:17  Ca    8.8      24 Mar 2018 14:09  Phos  4.1     03-23        proBNP:   Lipid Profile:   HgA1c:   TSH:       CARDIAC MARKERS:            TELEMETRY: 	    ECG:  	  RADIOLOGY:  OTHER: 	    PREVIOUS DIAGNOSTIC TESTING:    [ ] Echocardiogram:  [ ]  Catheterization:  [ ] Stress Test:  	  	  ASSESSMENT/PLAN:

## 2018-03-25 NOTE — CHART NOTE - NSCHARTNOTEFT_GEN_A_CORE
Medicine NP([id number])    D/W Dr.V. Watts earlier today 3/25 TTE results. Pt VSS; no c/o CP/SOB/dizziness. Pt. Amb.  -c/w monitoring; Dr. Watts will see pt.  today  - s/w Cardio Fellow; c/w monitoring since pt. hemodynamically stable.

## 2018-03-25 NOTE — PROGRESS NOTE ADULT - ASSESSMENT
cardiac tamponade - worsening effusion  Failed renal transplant s/p shiley   hypertension  anemia    continue current care  npo after mn for IR attempt at pericardiocentesis, if IR unablt to do will call CTS eval  continue hd per renal  once effusion managed will address change over to permacath and stress test.

## 2018-03-25 NOTE — PROGRESS NOTE ADULT - SUBJECTIVE AND OBJECTIVE BOX
MEDICINE, PROGRESS NOTE [phone]    MEAGAN RAMOS 57y [id number]    Patient seen and examined.  Patient is a 57y old  Male who presents with a chief complaint of My kidney is not working (16 Mar 2018 16:07)  Pt feels ok and is ambulatory.    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  History of partial pancreatectomy  History of renal transplant: x 2    MEDICATIONS  (STANDING):  calcitriol   Capsule 0.25 MICROGram(s) Oral daily  cloNIDine 0.1 milliGRAM(s) Oral at bedtime  docusate sodium 100 milliGRAM(s) Oral three times a day  epoetin sherlyn Injectable 21237 Unit(s) IV Push <User Schedule>  heparin  Injectable 5000 Unit(s) SubCutaneous every 8 hours  iron sucrose IVPB 200 milliGRAM(s) IV Intermittent daily  NIFEdipine XL 60 milliGRAM(s) Oral daily  predniSONE   Tablet 5 milliGRAM(s) Oral daily  tacrolimus 1 milliGRAM(s) Oral two times a day  trimethoprim   80 mG/sulfamethoxazole 400 mG 1 Tablet(s) Oral <User Schedule>  valGANciclovir 450 milliGRAM(s) Oral <User Schedule>    MEDICATIONS  (PRN):  methyl salicylate 14%/menthol 6% Topical Ointment 1 Application(s) Topical two times a day PRN LE discomfort    Allergies    No Known Allergies    Intolerances        PHYSICAL EXAM:  Constitutional: NAD  HEENT: Normocephalic, EOMI  Neck:  No JVD  Respiratory: CTA B/L, No wheezes  Cardiovascular: S1, S2, RRR, + systolic murmur  Gastrointestinal: BS+, soft, NT/ND  Extremities: No peripheral edema  Neurological: AAOX3, no focal deficits  Psychiatric: Normal mood, normal affect  : No Augustin    Vital Signs Last 24 Hrs  T(C): 36.9 (25 Mar 2018 14:22), Max: 36.9 (25 Mar 2018 08:59)  T(F): 98.5 (25 Mar 2018 14:22), Max: 98.5 (25 Mar 2018 08:59)  HR: 97 (25 Mar 2018 17:25) (74 - 97)  BP: 158/76 (25 Mar 2018 17:25) (151/89 - 167/91)  BP(mean): --  RR: 18 (25 Mar 2018 17:25) (18 - 19)  SpO2: 97% (25 Mar 2018 17:25) (95% - 99%)  I&O's Summary    24 Mar 2018 07:01  -  25 Mar 2018 07:00  --------------------------------------------------------  IN: 2540 mL / OUT: 2525 mL / NET: 15 mL    25 Mar 2018 07:01  -  25 Mar 2018 18:27  --------------------------------------------------------  IN: 600 mL / OUT: 375 mL / NET: 225 mL        LABS:                        8.1    6.57  )-----------( 398      ( 25 Mar 2018 07:11 )             28.5     03-25    139  |  100  |  15  ----------------------------<  100<H>  4.5   |  26  |  4.62<H>    Ca    8.8      25 Mar 2018 05:17    Tacrolimus (), Serum: <2.0 ng/mL (03-25 @ 07:11)

## 2018-03-26 LAB
ANION GAP SERPL CALC-SCNC: 17 MMOL/L — SIGNIFICANT CHANGE UP (ref 5–17)
APTT BLD: 32.7 SEC — SIGNIFICANT CHANGE UP (ref 27.5–37.4)
BUN SERPL-MCNC: 34 MG/DL — HIGH (ref 7–23)
CALCIUM SERPL-MCNC: 9.1 MG/DL — SIGNIFICANT CHANGE UP (ref 8.4–10.5)
CHLORIDE SERPL-SCNC: 97 MMOL/L — SIGNIFICANT CHANGE UP (ref 96–108)
CO2 SERPL-SCNC: 25 MMOL/L — SIGNIFICANT CHANGE UP (ref 22–31)
CREAT SERPL-MCNC: 6.68 MG/DL — HIGH (ref 0.5–1.3)
GLUCOSE SERPL-MCNC: 90 MG/DL — SIGNIFICANT CHANGE UP (ref 70–99)
HCT VFR BLD CALC: 26.5 % — LOW (ref 39–50)
HGB BLD-MCNC: 8 G/DL — LOW (ref 13–17)
INR BLD: 1.11 RATIO — SIGNIFICANT CHANGE UP (ref 0.88–1.16)
MAGNESIUM SERPL-MCNC: 2 MG/DL — SIGNIFICANT CHANGE UP (ref 1.6–2.6)
MCHC RBC-ENTMCNC: 29 PG — SIGNIFICANT CHANGE UP (ref 27–34)
MCHC RBC-ENTMCNC: 30.2 GM/DL — LOW (ref 32–36)
MCV RBC AUTO: 96 FL — SIGNIFICANT CHANGE UP (ref 80–100)
NRBC # BLD: 0 /100 WBCS — SIGNIFICANT CHANGE UP (ref 0–0)
PLATELET # BLD AUTO: 387 K/UL — SIGNIFICANT CHANGE UP (ref 150–400)
POTASSIUM SERPL-MCNC: 4.8 MMOL/L — SIGNIFICANT CHANGE UP (ref 3.5–5.3)
POTASSIUM SERPL-SCNC: 4.8 MMOL/L — SIGNIFICANT CHANGE UP (ref 3.5–5.3)
PROTHROM AB SERPL-ACNC: 12.1 SEC — SIGNIFICANT CHANGE UP (ref 9.8–12.7)
RBC # BLD: 2.76 M/UL — LOW (ref 4.2–5.8)
RBC # FLD: 18.5 % — HIGH (ref 10.3–14.5)
SODIUM SERPL-SCNC: 139 MMOL/L — SIGNIFICANT CHANGE UP (ref 135–145)
WBC # BLD: 7.04 K/UL — SIGNIFICANT CHANGE UP (ref 3.8–10.5)
WBC # FLD AUTO: 7.04 K/UL — SIGNIFICANT CHANGE UP (ref 3.8–10.5)

## 2018-03-26 PROCEDURE — 99232 SBSQ HOSP IP/OBS MODERATE 35: CPT | Mod: GC

## 2018-03-26 PROCEDURE — 90935 HEMODIALYSIS ONE EVALUATION: CPT | Mod: GC

## 2018-03-26 RX ORDER — CEFAZOLIN SODIUM 1 G
2000 VIAL (EA) INJECTION ONCE
Qty: 0 | Refills: 0 | Status: DISCONTINUED | OUTPATIENT
Start: 2018-03-26 | End: 2018-03-27

## 2018-03-26 RX ORDER — OXYCODONE AND ACETAMINOPHEN 5; 325 MG/1; MG/1
1 TABLET ORAL ONCE
Qty: 0 | Refills: 0 | Status: DISCONTINUED | OUTPATIENT
Start: 2018-03-26 | End: 2018-03-26

## 2018-03-26 RX ORDER — CHLORHEXIDINE GLUCONATE 213 G/1000ML
30 SOLUTION TOPICAL ONCE
Qty: 0 | Refills: 0 | Status: COMPLETED | OUTPATIENT
Start: 2018-03-26 | End: 2018-03-27

## 2018-03-26 RX ORDER — CHLORHEXIDINE GLUCONATE 213 G/1000ML
1 SOLUTION TOPICAL ONCE
Qty: 0 | Refills: 0 | Status: COMPLETED | OUTPATIENT
Start: 2018-03-26 | End: 2018-03-26

## 2018-03-26 RX ADMIN — TACROLIMUS 1 MILLIGRAM(S): 5 CAPSULE ORAL at 22:25

## 2018-03-26 RX ADMIN — Medication 100 MILLIGRAM(S): at 05:42

## 2018-03-26 RX ADMIN — ERYTHROPOIETIN 10000 UNIT(S): 10000 INJECTION, SOLUTION INTRAVENOUS; SUBCUTANEOUS at 14:59

## 2018-03-26 RX ADMIN — Medication 5 MILLIGRAM(S): at 11:49

## 2018-03-26 RX ADMIN — IRON SUCROSE 110 MILLIGRAM(S): 20 INJECTION, SOLUTION INTRAVENOUS at 14:46

## 2018-03-26 RX ADMIN — OXYCODONE AND ACETAMINOPHEN 1 TABLET(S): 5; 325 TABLET ORAL at 06:30

## 2018-03-26 RX ADMIN — OXYCODONE AND ACETAMINOPHEN 1 TABLET(S): 5; 325 TABLET ORAL at 05:42

## 2018-03-26 RX ADMIN — Medication 0.1 MILLIGRAM(S): at 22:25

## 2018-03-26 RX ADMIN — TACROLIMUS 1 MILLIGRAM(S): 5 CAPSULE ORAL at 10:50

## 2018-03-26 RX ADMIN — CALCITRIOL 0.25 MICROGRAM(S): 0.5 CAPSULE ORAL at 11:49

## 2018-03-26 RX ADMIN — CHLORHEXIDINE GLUCONATE 1 APPLICATION(S): 213 SOLUTION TOPICAL at 22:23

## 2018-03-26 RX ADMIN — Medication 60 MILLIGRAM(S): at 05:42

## 2018-03-26 RX ADMIN — Medication 100 MILLIGRAM(S): at 22:25

## 2018-03-26 RX ADMIN — VALGANCICLOVIR 450 MILLIGRAM(S): 450 TABLET, FILM COATED ORAL at 10:50

## 2018-03-26 NOTE — CONSULT NOTE ADULT - CONSULT REASON
Evaluate patient for pericardiocentesis with subxiphoid pericardial window procedure.
Urologic clearance for transplant
pericardial effusion

## 2018-03-26 NOTE — CONSULT NOTE ADULT - PROBLEM SELECTOR RECOMMENDATION 9
Admit to [facility]   Continue with current medication regimen   NPO after midnight   Hibiclens Shower   Plan for pericardial window in AM with Dr. Rodriguez

## 2018-03-26 NOTE — PROGRESS NOTE ADULT - NSHPATTENDINGPLANDISCUSS_GEN_ALL_CORE
cardiology fellow, Dr. NELY Ching; patient seen and examined.  Hx., exam and labs as above.  I agree with the assessment and recommendations.

## 2018-03-26 NOTE — CONSULT NOTE ADULT - ASSESSMENT
A/P:  56 yo M hx of CKD, s/p bilateral renal transplants 2/2 to NSAID use, HTN, who presented initially to establish dialysis in the hospital,on TTE noted to have pericardial effusion with early signs of tamponade. Patient is awaiting renal transplant. 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 Pt shows echo evidence of early tamponade, however is not clinically in tamponade.   Plan:  - obtain pulsus paradoxus daily   - may consider repeat TTE tomorrow   - monitor clinically for signs of hypoxia, SOB, and monitor on tele for signs of tachycardia.   - no EKG in chart, please obtain     Jimmie Ching MD  Cardiology Fellow  x [phone]
57y Male with large circumferential pericardial effusion with tamponade physiology
57yoM with ESRD on HD 2/2 transplant failure x2, requesting urologic clearance for transplant list    -Will take CDs of OSH imaging to radiology for upload into [facility]  -Obtain path report from Dr. Tom Joyner at [facility]

## 2018-03-26 NOTE — CONSULT NOTE ADULT - SUBJECTIVE AND OBJECTIVE BOX
History of Present Illness:    58 yo M hx of ESRD, s/p bilateral renal transplants 2/2 to NSAID use, renal transplantation (father's kidney) 1990s, s/p second transplant (sister's kidney) 2013, HTN, and SBO (small bowel obstruction) who presented initially to establish dialysis in the hospital, in light of rising BUN/ Cr , and symptoms of pain with walking noted at his nephrologists office.  On arrival here, patient complained of SOB and pain while walking. On 3/19 TTE was done which revealed Moderate-large circumferential pericardial effusion. 1.9-2.3 cm posterior and lateral to the LV, 0.7cm at RV, 2.1 cm at RA. There is RA inversion, respiratory variation noted at the Tricuspid valve and IVC measures 2.6cm and collapses 20%. There is no RV diastolic collapse seen on this TTE, however the RV is small at times and the RV EF is elevated, consistent early tamponade. Patient started in daily HD, clinically asymptomatic, repeat TTE on 3/25 revealed Large (> 2cm) circumferential pericardial effusion, largest over the right atrium and postero-lateral left ventricle. There is increased respiratory variation across the mitral valve. The right atrium is buckling. The IVC is dilated with < 50% collapse with sniff. Findings are suggestive of tamponade physiology.  CTS consult called to evaluate patient for pericardiocentesis with subxiphoid pericardial window procedure.       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      Past Surgical History  History of partial pancreatectomy  History of renal transplant: x 2    MEDICATIONS  (STANDING):  calcitriol   Capsule 0.25 MICROGram(s) Oral daily  cloNIDine 0.1 milliGRAM(s) Oral at bedtime  docusate sodium 100 milliGRAM(s) Oral three times a day  epoetin sherlyn Injectable 85557 Unit(s) IV Push <User Schedule>  heparin  Injectable 5000 Unit(s) SubCutaneous every 8 hours  iron sucrose IVPB 200 milliGRAM(s) IV Intermittent daily  NIFEdipine XL 60 milliGRAM(s) Oral daily  predniSONE   Tablet 5 milliGRAM(s) Oral daily  tacrolimus 1 milliGRAM(s) Oral two times a day  trimethoprim   80 mG/sulfamethoxazole 400 mG 1 Tablet(s) Oral <User Schedule>  valGANciclovir 450 milliGRAM(s) Oral <User Schedule>    MEDICATIONS  (PRN):  methyl salicylate 14%/menthol 6% Topical Ointment 1 Application(s) Topical two times a day PRN LE discomfort    Allergies: No Known Allergies    SOCIAL HISTORY:  Smoker: [ ] Yes  [ ] No        PACK YEARS:                         WHEN QUIT?  ETOH use: [ ] Yes  [ ] No              FREQUENCY / QUANTITY:  Ilicit Drug use:  [ ] Yes  [ ] No  Occupation:  Live with:  Assist device use:    Relevant Family History  FAMILY HISTORY:      Review of Systems  GENERAL:  Fevers[] chills[] sweats[] fatigue[] weight loss[] weight gain []                                        NEURO:  parathesias[] seizures []  syncope []  confusion []                                                                                  EYES: glasses[]  blurry vision[]  discharge[] pain[] glaucoma []                                                                            ENMT:  difficulty hearing []  vertigo[]  dysphagia[] epistaxis[] recent dental work []                                      CV:  chest pain[] palpitations[] MAY [] diaphoresis [] edema[]                                                                                             RESPIRATORY:  wheezing[] SOB[] cough [] sputum[] hemoptysis[]                                                                    GI:  nausea[]  vomiting []  diarrhea[] constipation [] melena []                                                                        : hematuria[ ]  dysuria[ ] urgency[] incontinence[]                                                                                              MUSCULOSKELETAL  arthritis[ ]  joint swelling [ ] muscle weakness [ ]                                                                  SKIN/BREAST:  rash[ ] itching [ ]  hair loss[ ] masses[ ]                                                                                                PSYCH:  dementia [ ] depression [ ] anxiety[ ]                                                                                                                  HEME/LYMPH:  bruises easily[ ] enlarged lymph nodes[ ] tender lymph nodes[ ]                                                 ENDOCRINE:  cold intolerance[ ] heat intolerance[ ] polydipsia[ ]                                                                              PHYSICAL EXAM  Vital Signs Last 24 Hrs  T(C): 36.9 (26 Mar 2018 12:51), Max: 37.3 (26 Mar 2018 05:40)  T(F): 98.5 (26 Mar 2018 12:51), Max: 99.1 (26 Mar 2018 05:40)  HR: 87 (26 Mar 2018 12:51) (75 - 97)  BP: 162/86 (26 Mar 2018 12:51) (158/76 - 174/94)  BP(mean): --  RR: 18 (26 Mar 2018 12:51) (18 - 19)  SpO2: 97% (26 Mar 2018 12:51) (94% - 98%)    General: Well nourished, well developed, no acute distress.                                                         Neuro: Normal exam oriented to person/place & time with no focal motor or sensory  deficits.                    Eyes: Normal exam of conjunctiva & lids, pupils equally reactive.   ENT: Normal exam of nasal/oral mucosa with absence of cyanosis.   Neck: Normal exam of jugular veins, trachea & thyroid.   Chest: Normal lung exam with good air movement absence of wheezes, rales, or rhonchi:                                                                          CV:  Auscultation: normal [ ] S3[ ] S4[ ] Irregular [ ] Rub[ ] Clicks[ ]  Murmurs none:[ ]systolic [ ]  diastolic [ ] holosystolic [ ]  Carotids: No Bruits[ ] Other____________ Abdominal Aorta: normal [ ] nonpalpable[ ]                                                                         GI: Normal exam of abdomen, liver & spleen with no noted masses or tenderness.  (+) BS X 4 Quadrants, Nontender / Non Distended.                                                                                             Extremities: Normal no evidence of cyanosis or deformity Edema: none[ ]trace[ ]1+[ ]2+[ ]3+[ ]4+[ ]  Lower Extremity Pulses: Right[ ] Left[ ]Varicosities[ ]  SKIN : Normal exam to inspection & palpation.                                                           LABS:                        8.0    7.04  )-----------( 387      ( 26 Mar 2018 06:57 )             26.5     03-26    139  |  97  |  34<H>  ----------------------------<  90  4.8   |  25  |  6.68<H>    Ca    9.1      26 Mar 2018 06:14  Mg     2.0     03-26 History of Present Illness:    58 yo M hx of ESRD, s/p bilateral renal transplants 2/2 to NSAID use, renal transplantation (father's kidney) 1990s, s/p second transplant (sister's kidney) 2013, HTN, and SBO (small bowel obstruction) who presented initially to establish dialysis in the hospital, in light of rising BUN/ Cr , and symptoms of pain with walking noted at his nephrologists office.  On arrival here, patient complained of SOB and pain while walking. On 3/19 TTE was done which revealed Moderate-large circumferential pericardial effusion. 1.9-2.3 cm posterior and lateral to the LV, 0.7cm at RV, 2.1 cm at RA. There is RA inversion, respiratory variation noted at the Tricuspid valve and IVC measures 2.6cm and collapses 20%. There is no RV diastolic collapse seen on this TTE, however the RV is small at times and the RV EF is elevated, consistent early tamponade. Patient started in daily HD, clinically asymptomatic, repeat TTE on 3/25 revealed Large (> 2cm) circumferential pericardial effusion, largest over the right atrium and postero-lateral left ventricle. There is increased respiratory variation across the mitral valve. The right atrium is buckling. The IVC is dilated with < 50% collapse with sniff. Findings are suggestive of tamponade physiology.  CTS consult called to evaluate patient for pericardiocentesis with subxiphoid pericardial window procedure.       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      Past Surgical History  History of partial pancreatectomy  History of renal transplant: x 2    MEDICATIONS  (STANDING):  calcitriol Capsule 0.25 MICROGram(s) Oral daily  cloNIDine 0.1 milliGRAM(s) Oral at bedtime  docusate sodium 100 milliGRAM(s) Oral three times a day  epoetin sherlyn Injectable 39419 Unit(s) IV Push <User Schedule>  heparinInjectable 5000 Unit(s) SubCutaneous every 8 hours  iron sucrose IVPB 200 milliGRAM(s) IV Intermittent daily  NIFEdipine XL 60 milliGRAM(s) Oral daily  predniSONE Tablet 5 milliGRAM(s) Oral daily  tacrolimus 1 milliGRAM(s) Oral two times a day  trimethoprim  80 mG/sulfamethoxazole 400 mG 1 Tablet(s) Oral <User Schedule>  valGANciclovir 450 milliGRAM(s) Oral <User Schedule>    MEDICATIONS  (PRN):  methyl salicylate 14%/menthol 6% Topical Ointment 1 Application(s) Topical two times a day PRN LE discomfort    Allergies: No Known Allergies    SOCIAL HISTORY:  Smoker: [ ] Yes  [X ] No        PACK YEARS:                         WHEN QUIT?  ETOH use: [ ] Yes  [X] No              FREQUENCY / QUANTITY:  Ilicit Drug use:  [ ] Yes  [x ] No  Occupation:  Live with:  Assist device use: Denies    Relevant Family History  FAMILY HISTORY:      Review of Systems  GENERAL:  Fevers[] chills[] sweats[] fatigue[X] weight loss[] weight gain []                                        NEURO:  parathesias[] seizures []  syncope []  confusion []                                                                                  EYES: glasses[]  blurry vision[]  discharge[] pain[] glaucoma []                                                                            ENMT:  difficulty hearing []  vertigo[]  dysphagia[] epistaxis[] recent dental work []                                      CV:  chest pain[] palpitations[] MAY [X] diaphoresis [] edema[]                                                                                             RESPIRATORY:  wheezing[] SOB[X] cough [] sputum[] hemoptysis[]                                                                    GI:  nausea[]  vomiting []  diarrhea[] constipation [] melena []                                                                        : hematuria[ ]  dysuria[ ] urgency[] incontinence[]                                                                                              MUSCULOSKELETAL  arthritis[ ]  joint swelling [ ] muscle weakness [ ]                                                                  SKIN/BREAST:  rash[ ] itching [ ]  hair loss[ ] masses[ ]                                                                                                PSYCH:  dementia [ ] depression [ ] anxiety[ ]                                                                                                                  HEME/LYMPH:  bruises easily[ ] enlarged lymph nodes[ ] tender lymph nodes[ ]                                                 ENDOCRINE:  cold intolerance[ ] heat intolerance[ ] polydipsia[ ]                                                                              PHYSICAL EXAM  Vital Signs Last 24 Hrs  T(C): 36.9 (26 Mar 2018 12:51), Max: 37.3 (26 Mar 2018 05:40)  T(F): 98.5 (26 Mar 2018 12:51), Max: 99.1 (26 Mar 2018 05:40)  HR: 87 (26 Mar 2018 12:51) (75 - 97)  BP: 162/86 (26 Mar 2018 12:51) (158/76 - 174/94)  BP(mean): --  RR: 18 (26 Mar 2018 12:51) (18 - 19)  SpO2: 97% (26 Mar 2018 12:51) (94% - 98%)    General: Well nourished, well developed, no acute distress.                                                         Neuro: Normal exam oriented to person/place & time with no focal motor or sensory  deficits.                    Eyes: Normal exam of conjunctiva & lids, pupils equally reactive.   ENT: Normal exam of nasal/oral mucosa with absence of cyanosis.   Neck: Normal exam of jugular veins, trachea & thyroid.   Chest: Normal lung exam with good air movement absence of wheezes, rales, or rhonchi:                                                                          CV:  Auscultation: normal [ ] S3[ ] S4[ ] Irregular [ ] Rub[ ] Clicks[ ]  Murmurs none:[ ]systolic [ ]  diastolic [ ] holosystolic [ ]; Right IJ HD cath with occlusive dressing C/D/I   Carotids: No Bruits[-] Other____________ Abdominal Aorta: normal [+ ] nonpalpable[+]                                                                         GI: Normal exam of abdomen, liver & spleen with no noted masses or tenderness.  (+) BS X 4 Quadrants, Nontender / Non Distended.                                                                                             Extremities: Normal no evidence of cyanosis or deformity Edema: none[ ]trace[ ]1+[+ ]2+[ ]3+[ ]4+[ ]  Lower Extremity Pulses: Right[+1 ] Left[+1 ]Varicosities[0 ]  SKIN : Normal exam to inspection & palpation.                                                           LABS:                        8.0    7.04  )-----------( 387      ( 26 Mar 2018 06:57 )             26.5     03-26    139  |  97  |  34<H>  ----------------------------<  90  4.8   |  25  |  6.68<H>    Ca    9.1      26 Mar 2018 06:14  Mg     2.0     03-26

## 2018-03-26 NOTE — PROGRESS NOTE ADULT - ASSESSMENT
cardiac tamponade - worsening effusion  Failed renal transplant s/p livan   hypertension  anemia    continue current care  plan for CTS sulema   npo after mn  check labs and fluid analysis.  appreciate renal and cts input

## 2018-03-26 NOTE — PROGRESS NOTE ADULT - ASSESSMENT
A/P:   58 yo M hx of CKD, s/p bilateral renal transplants 2/2 to NSAID use, HTN, who presented initially to establish dialysis in the hospital,on TTE noted to have pericardial effusion with echocardiographic findings of tamponade. Pt is currently asymptomatic and hemodynamically stable, without hypotension or tachycardia. He does not appear to be in clinical tamponade at this time. Pt is currently awaiting renal transplant     -given characteristics of effusion and increasing size, elective diagnostic/therapeutic pericardiocentesis a possibility if accessible, prev studies showing posterior collection of fluid  -monitor for clinical tamponade    Jimmie Ching MD  Cardiology Fellow  x [phone] ASSESSMENT:   56 yo M hx of CKD, s/p bilateral renal transplants 2/2 to NSAID use, HTN  Presented initially to establish dialysis in the hospital; on TTE, noted to have pericardial effusion with echocardiographic findings c/w  tamponade physiology. Pt is currently asymptomatic and hemodynamically stable, without hypotension or tachycardia. He does not appear to be in clinical tamponade at this time. Pt is currently awaiting renal transplant     REC:  -given characteristics of effusion and increasing size, elective diagnostic/therapeutic pericardiocentesis a possibility if accessible, prev studies showing posterior collection of fluid  -monitor for clinical tamponade    Jimmie Ching MD  Cardiology Fellow  x [phone]     Favian Smith M.D.  Cardiology Consult Service  [phone] or [phone]

## 2018-03-26 NOTE — PROGRESS NOTE ADULT - SUBJECTIVE AND OBJECTIVE BOX
MEDICINE, PROGRESS NOTE [phone]    MEAGAN RAMOS 57y [id number]    Patient seen and examined.  Patient is a 57y old  Male who presents with a chief complaint of My kidney is not working (16 Mar 2018 16:07)  pt has no current complaints.       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  History of partial pancreatectomy  History of renal transplant: x 2    MEDICATIONS  (STANDING):  calcitriol   Capsule 0.25 MICROGram(s) Oral daily  cloNIDine 0.1 milliGRAM(s) Oral at bedtime  docusate sodium 100 milliGRAM(s) Oral three times a day  epoetin sherlyn Injectable 27844 Unit(s) IV Push <User Schedule>  heparin  Injectable 5000 Unit(s) SubCutaneous every 8 hours  NIFEdipine XL 60 milliGRAM(s) Oral daily  predniSONE   Tablet 5 milliGRAM(s) Oral daily  tacrolimus 1 milliGRAM(s) Oral two times a day  trimethoprim   80 mG/sulfamethoxazole 400 mG 1 Tablet(s) Oral <User Schedule>  valGANciclovir 450 milliGRAM(s) Oral <User Schedule>    MEDICATIONS  (PRN):  methyl salicylate 14%/menthol 6% Topical Ointment 1 Application(s) Topical two times a day PRN LE discomfort    Allergies    No Known Allergies    Intolerances        PHYSICAL EXAM:  Constitutional: NAD  HEENT: Normocephalic, EOMI  Neck:  No JVD  Respiratory: CTA B/L, No wheezes  Cardiovascular: S1, S2, RRR, + systolic murmur  Gastrointestinal: BS+, soft, NT/ND  Extremities: No peripheral edema  Neurological: AAOX3, no focal deficits  Psychiatric: Normal mood, normal affect  : No Augustin    Vital Signs Last 24 Hrs  T(C): 36.5 (26 Mar 2018 13:50), Max: 37.3 (26 Mar 2018 05:40)  T(F): 97.7 (26 Mar 2018 13:50), Max: 99.1 (26 Mar 2018 05:40)  HR: 84 (26 Mar 2018 13:50) (75 - 97)  BP: 162/91 (26 Mar 2018 13:50) (158/76 - 174/94)  BP(mean): --  RR: 18 (26 Mar 2018 13:50) (18 - 19)  SpO2: 97% (26 Mar 2018 13:50) (94% - 98%)  I&O's Summary    25 Mar 2018 07:01  -  26 Mar 2018 07:00  --------------------------------------------------------  IN: 1000 mL / OUT: 525 mL / NET: 475 mL        LABS:                        8.0    7.04  )-----------( 387      ( 26 Mar 2018 06:57 )             26.5     03-26    139  |  97  |  34<H>  ----------------------------<  90  4.8   |  25  |  6.68<H>    Ca    9.1      26 Mar 2018 06:14  Mg     2.0     03-26

## 2018-03-26 NOTE — PROGRESS NOTE ADULT - SUBJECTIVE AND OBJECTIVE BOX
24H hour events:   feeling well, no complaints  no acute overnight events  tele: SR 80-90s      MEDICATIONS:  MEDICATIONS  (STANDING):  calcitriol   Capsule 0.25 MICROGram(s) Oral daily  cloNIDine 0.1 milliGRAM(s) Oral at bedtime  docusate sodium 100 milliGRAM(s) Oral three times a day  epoetin sherlyn Injectable 43558 Unit(s) IV Push <User Schedule>  heparin  Injectable 5000 Unit(s) SubCutaneous every 8 hours  iron sucrose IVPB 200 milliGRAM(s) IV Intermittent daily  NIFEdipine XL 60 milliGRAM(s) Oral daily  predniSONE   Tablet 5 milliGRAM(s) Oral daily  tacrolimus 1 milliGRAM(s) Oral two times a day  trimethoprim   80 mG/sulfamethoxazole 400 mG 1 Tablet(s) Oral <User Schedule>  valGANciclovir 450 milliGRAM(s) Oral <User Schedule>        PHYSICAL EXAM:  Vital Signs Last 24 Hrs  T(C): 36.9 (26 Mar 2018 12:51), Max: 37.3 (26 Mar 2018 05:40)  T(F): 98.5 (26 Mar 2018 12:51), Max: 99.1 (26 Mar 2018 05:40)  HR: 87 (26 Mar 2018 12:51) (75 - 97)  BP: 162/86 (26 Mar 2018 12:51) (158/76 - 174/94)  BP(mean): --  RR: 18 (26 Mar 2018 12:51) (18 - 19)  SpO2: 97% (26 Mar 2018 12:51) (94% - 98%)    Appearance: Normal	  HEENT:   Normal oral mucosa, PERRL, EOMI	  Lymphatic: No lymphadenopathy  Cardiovascular: Normal clear S1 S2, No JVD, No murmurs,  Respiratory: Lungs clear to auscultation	  Psychiatry: A & O x 3, Mood & affect appropriate  Gastrointestinal:  Soft, Non-tender, + BS	  Skin: No rashes, No ecchymoses, No cyanosis	  Neurologic: Non-focal  Extremities: Normal range of motion, No clubbing, cyanosis       LABS:	 	                        8.0    7.04  )-----------( 387      ( 26 Mar 2018 06:57 )             26.5   03-26    139  |  97  |  34<H>  ----------------------------<  90  4.8   |  25  |  6.68<H>    Ca    9.1      26 Mar 2018 06:14  Mg     2.0     03-26    < from: Limited Transthoracic Echo (03.25.18 @ 10:58) >  ***Limited TTE to evaluate effusion.  1. Large (> 2cm) circumferential pericardial effusion. The  effusion is largest over the right atrium and  postero-lateral left ventricle. There is increased  respiratory variation across the mtiral valve. The right  atrium is buckling. The IVC is dilated with < 50% collapse  with sniff. Findings are suggestive of tamponade  physiology.  *** Compared with echocardiogram of 3/22/2018, effusion  appears slightly larger over right ventricle and right  atrium. Findings discussed with KULDEEP Woodson at 1PM on  3/25/2018.    < end of copied text > 24H hour events:   feeling well, no complaints  no acute overnight events    tele: SR 80-90s    MEDICATIONS  (STANDING):  calcitriol   Capsule 0.25 MICROGram(s) Oral daily  cloNIDine 0.1 milliGRAM(s) Oral at bedtime  docusate sodium 100 milliGRAM(s) Oral three times a day  epoetin sherlyn Injectable 25691 Unit(s) IV Push <User Schedule>  heparin  Injectable 5000 Unit(s) SubCutaneous every 8 hours  iron sucrose IVPB 200 milliGRAM(s) IV Intermittent daily  NIFEdipine XL 60 milliGRAM(s) Oral daily  predniSONE   Tablet 5 milliGRAM(s) Oral daily  tacrolimus 1 milliGRAM(s) Oral two times a day  trimethoprim   80 mG/sulfamethoxazole 400 mG 1 Tablet(s) Oral <User Schedule>  valGANciclovir 450 milliGRAM(s) Oral <User Schedule>      PHYSICAL EXAM:  Vital Signs Last 24 Hrs  T(C): 36.9 (26 Mar 2018 12:51), Max: 37.3 (26 Mar 2018 05:40)  T(F): 98.5 (26 Mar 2018 12:51), Max: 99.1 (26 Mar 2018 05:40)  HR: 87 (26 Mar 2018 12:51) (75 - 97)  BP: 162/86 (26 Mar 2018 12:51) (158/76 - 174/94)  RR: 18 (26 Mar 2018 12:51) (18 - 19)  SpO2: 97% (26 Mar 2018 12:51) (94% - 98%)    Appearance: Normal	  HEENT:   Normal oral mucosa, PERRL, EOMI	  Lymphatic: No lymphadenopathy  Cardiovascular: Normal clear S1 S2, No JVD, No murmurs,  Respiratory: Lungs clear to auscultation	  Psychiatry: A & O x 3, Mood & affect appropriate  Gastrointestinal:  Soft, Non-tender, + BS	  Skin: No rashes, No ecchymoses, No cyanosis	  Neurologic: Non-focal  Extremities: Normal range of motion, No clubbing, cyanosis     LABS:	 	                      8.0    7.04  )-----------( 387      ( 26 Mar 2018 06:57 )             26.5     03-26    139  |  97  |  34<H>  ----------------------------<  90  4.8   |  25  |  6.68<H>    Ca    9.1      26 Mar 2018 06:14  Mg     2.0     03-26      Limited Transthoracic Echo (03.25.18 @ 10:58) >  ***Limited TTE to evaluate effusion.  1. Large (> 2cm) circumferential pericardial effusion. The effusion is largest over the right atrium and postero-lateral left ventricle. There is increased  respiratory variation across the mtiral valve. The right atrium is buckling. The IVC is dilated with < 50% collapse with sniff. Findings are suggestive of tamponade physiology.  *** Compared with echocardiogram of 3/22/2018, effusion appears slightly larger over right ventricle and right atrium. Findings discussed with KULDEEP Woodson at 1PM on 3/25/2018.

## 2018-03-26 NOTE — PROGRESS NOTE ADULT - SUBJECTIVE AND OBJECTIVE BOX
[facility] DIVISION OF KIDNEY DISEASES AND HYPERTENSION -- HEMODIALYSIS NOTE  --------------------------------------------------------------------------------  Dung Ivyahim     --------------------------------------------------------------------------------  Chief Complaint: ESRD/Ongoing hemodialysis requirement    24 hour events/subjective:   pericardial effusion increasing in size,      PAST HISTORY  --------------------------------------------------------------------------------  No significant changes to PMH, PSH, FHx, SHx, unless otherwise noted    ALLERGIES & MEDICATIONS  --------------------------------------------------------------------------------  Allergies    No Known Allergies    Intolerances      Standing Inpatient Medications  calcitriol   Capsule 0.25 MICROGram(s) Oral daily  cloNIDine 0.1 milliGRAM(s) Oral at bedtime  docusate sodium 100 milliGRAM(s) Oral three times a day  epoetin sherlyn Injectable 84738 Unit(s) IV Push <User Schedule>  heparin  Injectable 5000 Unit(s) SubCutaneous every 8 hours  iron sucrose IVPB 200 milliGRAM(s) IV Intermittent daily  NIFEdipine XL 60 milliGRAM(s) Oral daily  predniSONE   Tablet 5 milliGRAM(s) Oral daily  tacrolimus 1 milliGRAM(s) Oral two times a day  trimethoprim   80 mG/sulfamethoxazole 400 mG 1 Tablet(s) Oral <User Schedule>  valGANciclovir 450 milliGRAM(s) Oral <User Schedule>    PRN Inpatient Medications  methyl salicylate 14%/menthol 6% Topical Ointment 1 Application(s) Topical two times a day PRN      REVIEW OF SYSTEMS  --------------------------------------------------------------------------------  Constitutional: [ ] Fever [ ] Chills [ ] Fatigue [ ] Weight change   HEENT: [ ] Blurred vision [ ] Eye Pain [ ] Headache [ ] Runny nose [ ] Sore Throat   Respiratory: [ ] Cough [ ] Wheezing [ ] Shortness of breath  Cardiovascular: [ ] Chest Pain [ ] Palpitations [ ] MAY [ ] PND [ ] Orthopnea  Gastrointestinal: [ ] Abdominal Pain [ ] Diarrhea [ ] Constipation [ ] Hemorrhoids [ ] Nausea [ ] Vomiting  Genitourinary: [ ] Nocturia [ ] Dysuria [ ] Incontinence  Extremities: [ ] Swelling [ ] Joint Pain  Neurologic: [ ] Focal deficit [ ] Paresthesias [ ] Syncope  Lymphatic: [ ] Swelling [ ] Lymphadenopathy   Skin: [ ] Rash [ ] Ecchymoses [ ] Wounds [ ] Lesions  Psychiatry: [ ] Depression [ ] Suicidal/Homicidal Ideation [ ] Anxiety [ ] Sleep Disturbances  [x ] 10 point review of systems is otherwise negative except as mentioned above              [ ]Unable to obtain  All other systems were reviewed and are negative, except as noted.    VITALS/PHYSICAL EXAM  --------------------------------------------------------------------------------  T(C): 36.9 (18 @ 12:51), Max: 37.3 (18 @ 05:40)  HR: 87 (18 @ 12:51) (75 - 97)  BP: 162/86 (18 @ 12:51) (158/76 - 174/94)  RR: 18 (18 @ 12:51) (18 - 19)  SpO2: 97% (18 @ 12:51) (94% - 98%)  Wt(kg): --      Daily     Daily Weight in k.6 (26 Mar 2018 08:51)  I&O's Summary    25 Mar 2018 07:01  -  26 Mar 2018 07:00  --------------------------------------------------------  IN: 1000 mL / OUT: 525 mL / NET: 475 mL          18 @ 07:01  -  18 @ 07:00  --------------------------------------------------------  IN: 1000 mL / OUT: 525 mL / NET: 475 mL        Physical Exam:  	    Gen: NAD   	HEENT: anicteric  	Pulm: CTA B/L   	CV: RRR  	Back: No dependent edema  	Abd: soft, nontender, nondistended  	: No sagastume  	LE: Warm,  edema  	Neuro: no asterixis  	Skin: Warm, without rashes  	Vascular access: SIDNEY licona   LABS/STUDIES  --------------------------------------------------------------------------------              8.0    7.04  >-----------<  387      [18 @ 06:57]              26.5     139  |  97  |  34  ----------------------------<  90      [18 06:14]  4.8   |  25  |  6.68        Ca     9.1     [18 06:14]      Mg     2.0     [18 06:14]            [18 14:09]    Iron 24, TIBC 161, %sat 15      [18 11:31]  Ferritin 250      [18 20:02]    HBsAb <3.0      [18:35]  HBsAg Nonreact      [18:35]  HBcAb Nonreact      [18:35]  HCV 0.12, Nonreact      [18:35]    Iron Total, Serum: 24 ug/dL (:)  Iron - Total Binding Capacity.: 161 ug/dL (:)  Iron Total, Serum: 53 ug/dL (:27)  Iron - Total Binding Capacity.: 164 ug/dL (:27)  Ferritin, Serum: 250 ng/mL ( 20:02)      Radiology  --------------------------------------------------------------------------------    --------------------------------------------------------------------------------  Dung Phillips

## 2018-03-27 ENCOUNTER — RESULT REVIEW (OUTPATIENT)
Age: 58
End: 2018-03-27

## 2018-03-27 ENCOUNTER — APPOINTMENT (OUTPATIENT)
Dept: CARDIOTHORACIC SURGERY | Facility: HOSPITAL | Age: 58
End: 2018-03-27
Payer: MEDICARE

## 2018-03-27 LAB
ALBUMIN FLD-MCNC: 2.8 G/DL — SIGNIFICANT CHANGE UP
ALBUMIN SERPL ELPH-MCNC: 3.4 G/DL — SIGNIFICANT CHANGE UP (ref 3.3–5)
ALP SERPL-CCNC: 60 U/L — SIGNIFICANT CHANGE UP (ref 40–120)
ALT FLD-CCNC: 18 U/L RC — SIGNIFICANT CHANGE UP (ref 10–45)
ANION GAP SERPL CALC-SCNC: 15 MMOL/L — SIGNIFICANT CHANGE UP (ref 5–17)
ANION GAP SERPL CALC-SCNC: 18 MMOL/L — HIGH (ref 5–17)
APTT BLD: 32.8 SEC — SIGNIFICANT CHANGE UP (ref 27.5–37.4)
AST SERPL-CCNC: 18 U/L — SIGNIFICANT CHANGE UP (ref 10–40)
B PERT IGG+IGM PNL SER: ABNORMAL
BASE EXCESS BLDA CALC-SCNC: -0.7 MMOL/L — SIGNIFICANT CHANGE UP (ref -2–2)
BASOPHILS # BLD AUTO: 0 K/UL — SIGNIFICANT CHANGE UP (ref 0–0.2)
BASOPHILS NFR BLD AUTO: 0.2 % — SIGNIFICANT CHANGE UP (ref 0–2)
BILIRUB SERPL-MCNC: 0.4 MG/DL — SIGNIFICANT CHANGE UP (ref 0.2–1.2)
BLD GP AB SCN SERPL QL: NEGATIVE — SIGNIFICANT CHANGE UP
BUN SERPL-MCNC: 36 MG/DL — HIGH (ref 7–23)
BUN SERPL-MCNC: 40 MG/DL — HIGH (ref 7–23)
CALCIUM SERPL-MCNC: 9 MG/DL — SIGNIFICANT CHANGE UP (ref 8.4–10.5)
CALCIUM SERPL-MCNC: 9.2 MG/DL — SIGNIFICANT CHANGE UP (ref 8.4–10.5)
CHLORIDE SERPL-SCNC: 97 MMOL/L — SIGNIFICANT CHANGE UP (ref 96–108)
CHLORIDE SERPL-SCNC: 98 MMOL/L — SIGNIFICANT CHANGE UP (ref 96–108)
CK MB BLD-MCNC: 2 % — SIGNIFICANT CHANGE UP (ref 0–3.5)
CK MB CFR SERPL CALC: 2.5 NG/ML — SIGNIFICANT CHANGE UP (ref 0–6.7)
CK SERPL-CCNC: 127 U/L — SIGNIFICANT CHANGE UP (ref 30–200)
CO2 BLDA-SCNC: 26 MMOL/L — SIGNIFICANT CHANGE UP (ref 22–30)
CO2 SERPL-SCNC: 19 MMOL/L — LOW (ref 22–31)
CO2 SERPL-SCNC: 25 MMOL/L — SIGNIFICANT CHANGE UP (ref 22–31)
COLOR FLD: YELLOW — SIGNIFICANT CHANGE UP
CREAT SERPL-MCNC: 5.83 MG/DL — HIGH (ref 0.5–1.3)
CREAT SERPL-MCNC: 6.34 MG/DL — HIGH (ref 0.5–1.3)
EOSINOPHIL # BLD AUTO: 0 K/UL — SIGNIFICANT CHANGE UP (ref 0–0.5)
EOSINOPHIL NFR BLD AUTO: 0 % — SIGNIFICANT CHANGE UP (ref 0–6)
FIBRINOGEN PPP-MCNC: 631 MG/DL — HIGH (ref 310–510)
FLUID INTAKE SUBSTANCE CLASS: SIGNIFICANT CHANGE UP
FLUID SEGMENTED GRANULOCYTES: 0 % — SIGNIFICANT CHANGE UP
GAS PNL BLDA: SIGNIFICANT CHANGE UP
GLUCOSE SERPL-MCNC: 102 MG/DL — HIGH (ref 70–99)
GLUCOSE SERPL-MCNC: 117 MG/DL — HIGH (ref 70–99)
GRAM STN FLD: SIGNIFICANT CHANGE UP
HCO3 BLDA-SCNC: 25 MMOL/L — SIGNIFICANT CHANGE UP (ref 21–29)
HCT VFR BLD CALC: 29.8 % — LOW (ref 39–50)
HGB BLD-MCNC: 9.1 G/DL — LOW (ref 13–17)
HOROWITZ INDEX BLDA+IHG-RTO: 100 — SIGNIFICANT CHANGE UP
INR BLD: 1.15 RATIO — SIGNIFICANT CHANGE UP (ref 0.88–1.16)
LDH SERPL L TO P-CCNC: 153 U/L — SIGNIFICANT CHANGE UP
LYMPHOCYTES # BLD AUTO: 1.9 K/UL — SIGNIFICANT CHANGE UP (ref 1–3.3)
LYMPHOCYTES # BLD AUTO: 23.5 % — SIGNIFICANT CHANGE UP (ref 13–44)
LYMPHOCYTES # FLD: 7 % — SIGNIFICANT CHANGE UP
MCHC RBC-ENTMCNC: 30.2 PG — SIGNIFICANT CHANGE UP (ref 27–34)
MCHC RBC-ENTMCNC: 30.5 GM/DL — LOW (ref 32–36)
MCV RBC AUTO: 99 FL — SIGNIFICANT CHANGE UP (ref 80–100)
MESOTHL CELL # FLD: 8 % — SIGNIFICANT CHANGE UP
MONOCYTES # BLD AUTO: 0.6 K/UL — SIGNIFICANT CHANGE UP (ref 0–0.9)
MONOCYTES NFR BLD AUTO: 7.4 % — SIGNIFICANT CHANGE UP (ref 2–14)
MONOS+MACROS # FLD: 84 % — SIGNIFICANT CHANGE UP
MRSA PCR RESULT.: SIGNIFICANT CHANGE UP
NEUTROPHILS # BLD AUTO: 5.5 K/UL — SIGNIFICANT CHANGE UP (ref 1.8–7.4)
NEUTROPHILS NFR BLD AUTO: 68.8 % — SIGNIFICANT CHANGE UP (ref 43–77)
NIGHT BLUE STAIN TISS: SIGNIFICANT CHANGE UP
OTHER CELLS FLD MANUAL: 1 % — SIGNIFICANT CHANGE UP
PCO2 BLDA: 49 MMHG — HIGH (ref 32–46)
PH BLDA: 7.32 — LOW (ref 7.35–7.45)
PLATELET # BLD AUTO: 377 K/UL — SIGNIFICANT CHANGE UP (ref 150–400)
PO2 BLDA: 93 MMHG — SIGNIFICANT CHANGE UP (ref 74–108)
POTASSIUM SERPL-MCNC: 5.1 MMOL/L — SIGNIFICANT CHANGE UP (ref 3.5–5.3)
POTASSIUM SERPL-MCNC: 6.3 MMOL/L — CRITICAL HIGH (ref 3.5–5.3)
POTASSIUM SERPL-SCNC: 5.1 MMOL/L — SIGNIFICANT CHANGE UP (ref 3.5–5.3)
POTASSIUM SERPL-SCNC: 6.3 MMOL/L — CRITICAL HIGH (ref 3.5–5.3)
PROT FLD-MCNC: 4.6 G/DL — SIGNIFICANT CHANGE UP
PROT SERPL-MCNC: 6.7 G/DL — SIGNIFICANT CHANGE UP (ref 6–8.3)
PROTHROM AB SERPL-ACNC: 12.5 SEC — SIGNIFICANT CHANGE UP (ref 9.8–12.7)
RBC # BLD: 3.01 M/UL — LOW (ref 4.2–5.8)
RBC # FLD: 17.9 % — HIGH (ref 10.3–14.5)
RCV VOL RI: 210 /UL — HIGH (ref 0–5)
RH IG SCN BLD-IMP: POSITIVE — SIGNIFICANT CHANGE UP
S AUREUS DNA NOSE QL NAA+PROBE: DETECTED
SAO2 % BLDA: 96 % — SIGNIFICANT CHANGE UP (ref 92–96)
SODIUM SERPL-SCNC: 134 MMOL/L — LOW (ref 135–145)
SODIUM SERPL-SCNC: 138 MMOL/L — SIGNIFICANT CHANGE UP (ref 135–145)
SP GR UR: 1.03 — SIGNIFICANT CHANGE UP
SPECIMEN SOURCE: SIGNIFICANT CHANGE UP
SPECIMEN SOURCE: SIGNIFICANT CHANGE UP
TACROLIMUS SERPL-MCNC: <2 NG/ML — SIGNIFICANT CHANGE UP
TOTAL NUCLEATED CELL COUNT, BODY FLUID: 193 /UL — HIGH (ref 0–5)
TROPONIN T SERPL-MCNC: 0.08 NG/ML — HIGH (ref 0–0.06)
TUBE TYPE: SIGNIFICANT CHANGE UP
WBC # BLD: 8 K/UL — SIGNIFICANT CHANGE UP (ref 3.8–10.5)
WBC # FLD AUTO: 8 K/UL — SIGNIFICANT CHANGE UP (ref 3.8–10.5)

## 2018-03-27 PROCEDURE — 88305 TISSUE EXAM BY PATHOLOGIST: CPT | Mod: 26,59

## 2018-03-27 PROCEDURE — 33025 INCISION OF HEART SAC: CPT

## 2018-03-27 PROCEDURE — 88112 CYTOPATH CELL ENHANCE TECH: CPT | Mod: 26

## 2018-03-27 PROCEDURE — 88305 TISSUE EXAM BY PATHOLOGIST: CPT | Mod: 26

## 2018-03-27 PROCEDURE — 33025 INCISION OF HEART SAC: CPT | Mod: AS

## 2018-03-27 PROCEDURE — 99222 1ST HOSP IP/OBS MODERATE 55: CPT

## 2018-03-27 PROCEDURE — 71045 X-RAY EXAM CHEST 1 VIEW: CPT | Mod: 26

## 2018-03-27 PROCEDURE — 99233 SBSQ HOSP IP/OBS HIGH 50: CPT

## 2018-03-27 PROCEDURE — 88108 CYTOPATH CONCENTRATE TECH: CPT | Mod: 26,59

## 2018-03-27 PROCEDURE — 93010 ELECTROCARDIOGRAM REPORT: CPT | Mod: 76

## 2018-03-27 RX ORDER — SODIUM CHLORIDE 9 MG/ML
1000 INJECTION, SOLUTION INTRAVENOUS
Qty: 0 | Refills: 0 | Status: DISCONTINUED | OUTPATIENT
Start: 2018-03-27 | End: 2018-03-31

## 2018-03-27 RX ORDER — HYDROMORPHONE HYDROCHLORIDE 2 MG/ML
30 INJECTION INTRAMUSCULAR; INTRAVENOUS; SUBCUTANEOUS
Qty: 0 | Refills: 0 | Status: DISCONTINUED | OUTPATIENT
Start: 2018-03-27 | End: 2018-03-30

## 2018-03-27 RX ORDER — CEFAZOLIN SODIUM 1 G
1000 VIAL (EA) INJECTION
Qty: 0 | Refills: 0 | Status: COMPLETED | OUTPATIENT
Start: 2018-03-27 | End: 2018-03-30

## 2018-03-27 RX ORDER — TACROLIMUS 5 MG/1
1 CAPSULE ORAL
Qty: 0 | Refills: 0 | Status: DISCONTINUED | OUTPATIENT
Start: 2018-03-27 | End: 2018-04-07

## 2018-03-27 RX ORDER — METOCLOPRAMIDE HCL 10 MG
10 TABLET ORAL ONCE
Qty: 0 | Refills: 0 | Status: DISCONTINUED | OUTPATIENT
Start: 2018-03-27 | End: 2018-03-27

## 2018-03-27 RX ORDER — DOCUSATE SODIUM 100 MG
100 CAPSULE ORAL THREE TIMES A DAY
Qty: 0 | Refills: 0 | Status: DISCONTINUED | OUTPATIENT
Start: 2018-03-27 | End: 2018-04-07

## 2018-03-27 RX ORDER — OXYCODONE HYDROCHLORIDE 5 MG/1
5 TABLET ORAL ONCE
Qty: 0 | Refills: 0 | Status: DISCONTINUED | OUTPATIENT
Start: 2018-03-27 | End: 2018-03-30

## 2018-03-27 RX ORDER — FENTANYL CITRATE 50 UG/ML
50 INJECTION INTRAVENOUS
Qty: 0 | Refills: 0 | Status: DISCONTINUED | OUTPATIENT
Start: 2018-03-27 | End: 2018-03-27

## 2018-03-27 RX ORDER — MEPERIDINE HYDROCHLORIDE 50 MG/ML
25 INJECTION INTRAMUSCULAR; INTRAVENOUS; SUBCUTANEOUS ONCE
Qty: 0 | Refills: 0 | Status: DISCONTINUED | OUTPATIENT
Start: 2018-03-27 | End: 2018-03-30

## 2018-03-27 RX ORDER — HYDROMORPHONE HYDROCHLORIDE 2 MG/ML
0.25 INJECTION INTRAMUSCULAR; INTRAVENOUS; SUBCUTANEOUS
Qty: 0 | Refills: 0 | Status: DISCONTINUED | OUTPATIENT
Start: 2018-03-27 | End: 2018-03-30

## 2018-03-27 RX ORDER — HYDROMORPHONE HYDROCHLORIDE 2 MG/ML
0.5 INJECTION INTRAMUSCULAR; INTRAVENOUS; SUBCUTANEOUS
Qty: 0 | Refills: 0 | Status: DISCONTINUED | OUTPATIENT
Start: 2018-03-27 | End: 2018-03-27

## 2018-03-27 RX ORDER — ONDANSETRON 8 MG/1
4 TABLET, FILM COATED ORAL ONCE
Qty: 0 | Refills: 0 | Status: DISCONTINUED | OUTPATIENT
Start: 2018-03-27 | End: 2018-03-27

## 2018-03-27 RX ORDER — ACETAMINOPHEN 500 MG
1000 TABLET ORAL ONCE
Qty: 0 | Refills: 0 | Status: COMPLETED | OUTPATIENT
Start: 2018-03-27 | End: 2018-03-27

## 2018-03-27 RX ORDER — HYDROMORPHONE HYDROCHLORIDE 2 MG/ML
0.25 INJECTION INTRAMUSCULAR; INTRAVENOUS; SUBCUTANEOUS
Qty: 0 | Refills: 0 | Status: DISCONTINUED | OUTPATIENT
Start: 2018-03-27 | End: 2018-03-27

## 2018-03-27 RX ORDER — PANTOPRAZOLE SODIUM 20 MG/1
40 TABLET, DELAYED RELEASE ORAL DAILY
Qty: 0 | Refills: 0 | Status: DISCONTINUED | OUTPATIENT
Start: 2018-03-27 | End: 2018-04-05

## 2018-03-27 RX ORDER — ONDANSETRON 8 MG/1
4 TABLET, FILM COATED ORAL EVERY 6 HOURS
Qty: 0 | Refills: 0 | Status: DISCONTINUED | OUTPATIENT
Start: 2018-03-27 | End: 2018-03-30

## 2018-03-27 RX ORDER — NALOXONE HYDROCHLORIDE 4 MG/.1ML
0.1 SPRAY NASAL
Qty: 0 | Refills: 0 | Status: DISCONTINUED | OUTPATIENT
Start: 2018-03-27 | End: 2018-03-30

## 2018-03-27 RX ADMIN — SODIUM CHLORIDE 10 MILLILITER(S): 9 INJECTION, SOLUTION INTRAVENOUS at 11:45

## 2018-03-27 RX ADMIN — HYDROMORPHONE HYDROCHLORIDE 0.25 MILLIGRAM(S): 2 INJECTION INTRAMUSCULAR; INTRAVENOUS; SUBCUTANEOUS at 11:30

## 2018-03-27 RX ADMIN — PANTOPRAZOLE SODIUM 40 MILLIGRAM(S): 20 TABLET, DELAYED RELEASE ORAL at 12:08

## 2018-03-27 RX ADMIN — HYDROMORPHONE HYDROCHLORIDE 0.5 MILLIGRAM(S): 2 INJECTION INTRAMUSCULAR; INTRAVENOUS; SUBCUTANEOUS at 11:00

## 2018-03-27 RX ADMIN — HYDROMORPHONE HYDROCHLORIDE 0.5 MILLIGRAM(S): 2 INJECTION INTRAMUSCULAR; INTRAVENOUS; SUBCUTANEOUS at 11:15

## 2018-03-27 RX ADMIN — CHLORHEXIDINE GLUCONATE 30 MILLILITER(S): 213 SOLUTION TOPICAL at 05:12

## 2018-03-27 RX ADMIN — TACROLIMUS 1 MILLIGRAM(S): 5 CAPSULE ORAL at 23:08

## 2018-03-27 RX ADMIN — Medication 400 MILLIGRAM(S): at 11:26

## 2018-03-27 RX ADMIN — Medication 100 MILLIGRAM(S): at 05:11

## 2018-03-27 RX ADMIN — HYDROMORPHONE HYDROCHLORIDE 0.25 MILLIGRAM(S): 2 INJECTION INTRAMUSCULAR; INTRAVENOUS; SUBCUTANEOUS at 11:45

## 2018-03-27 RX ADMIN — HYDROMORPHONE HYDROCHLORIDE 0.25 MILLIGRAM(S): 2 INJECTION INTRAMUSCULAR; INTRAVENOUS; SUBCUTANEOUS at 12:45

## 2018-03-27 RX ADMIN — HYDROMORPHONE HYDROCHLORIDE 0.25 MILLIGRAM(S): 2 INJECTION INTRAMUSCULAR; INTRAVENOUS; SUBCUTANEOUS at 12:22

## 2018-03-27 RX ADMIN — Medication 5 MILLIGRAM(S): at 05:11

## 2018-03-27 RX ADMIN — HYDROMORPHONE HYDROCHLORIDE 30 MILLILITER(S): 2 INJECTION INTRAMUSCULAR; INTRAVENOUS; SUBCUTANEOUS at 15:20

## 2018-03-27 RX ADMIN — HYDROMORPHONE HYDROCHLORIDE 30 MILLILITER(S): 2 INJECTION INTRAMUSCULAR; INTRAVENOUS; SUBCUTANEOUS at 19:13

## 2018-03-27 RX ADMIN — Medication 60 MILLIGRAM(S): at 05:12

## 2018-03-27 RX ADMIN — Medication 100 MILLIGRAM(S): at 22:14

## 2018-03-27 RX ADMIN — Medication 100 MILLIGRAM(S): at 13:51

## 2018-03-27 RX ADMIN — HYDROMORPHONE HYDROCHLORIDE 0.25 MILLIGRAM(S): 2 INJECTION INTRAMUSCULAR; INTRAVENOUS; SUBCUTANEOUS at 11:20

## 2018-03-27 RX ADMIN — Medication 1 TABLET(S): at 05:11

## 2018-03-27 RX ADMIN — TACROLIMUS 1 MILLIGRAM(S): 5 CAPSULE ORAL at 16:01

## 2018-03-27 RX ADMIN — Medication 1000 MILLIGRAM(S): at 11:57

## 2018-03-27 RX ADMIN — HYDROMORPHONE HYDROCHLORIDE 30 MILLILITER(S): 2 INJECTION INTRAMUSCULAR; INTRAVENOUS; SUBCUTANEOUS at 17:09

## 2018-03-27 NOTE — PROVIDER CONTACT NOTE (CRITICAL VALUE NOTIFICATION) - BACKGROUND
Patient ESRD with right shiley for HD. s/p pericardial window for pericardial effusion. 2 Left chest tubes and PCA pump.

## 2018-03-27 NOTE — CHART NOTE - NSCHARTNOTEFT_GEN_A_CORE
Notified by RN for critical lab value Potassium 6.3. Pt w/o complaints at this time, sinus 80bpm on telemetry with no ectopy. STAT ekg NSR 83bpm, no peaked T's or signs of hyperkalemia. Case discussed with covering nephrology fellow, pt to be dialyzed this evening. Pt in the process of being transferred to  to accommodate dialysis. Case discussed with Dr. Watts, c/w current management.     Rishi Mcbride PA-C  Department of Medicine  [id number]

## 2018-03-27 NOTE — PROGRESS NOTE ADULT - SUBJECTIVE AND OBJECTIVE BOX
Subjective    Currently POD #0 from pericardial window by CT Sx. Pt c/o significant discomfort from the chest tubes. Anxiously awaiting transplantation    Objective    Vital signs  T(F): , Max: 98.7 (03-26-18 @ 20:21)  HR: 85 (03-27-18 @ 17:00)  BP: 168/94 (03-27-18 @ 17:00)  SpO2: 94% (03-27-18 @ 17:00)  Wt(kg): --    Output     OUT:    Voided: 300 mL  Total OUT: 300 mL    Total NET: -300 mL          Gen NAD  Chest chest tube light red to suction  Abd non-distended   non-palpable bladder    Labs      03-27 @ 11:19    WBC 8.0   / Hct 29.8  / SCr 5.83     03-26 @ 06:57    WBC 7.04  / Hct 26.5  / SCr --

## 2018-03-27 NOTE — PROGRESS NOTE ADULT - ASSESSMENT
cardiac tamponade s/p window/drainage  Failed renal transplant s/p livan   hypertension  anemia  hyperkalemia    continue current care  drains per cts  check stat bmp  HD per renal  f/u fluid analysis

## 2018-03-27 NOTE — PROGRESS NOTE ADULT - ASSESSMENT
Failed renal allograft, pericardial effusion s/p drainage, window. Now recovering in PACU.  Plan:  Monitor K, if remains within normal will dialyze in AM and then daily without heparin. If becomes hyperkalemic will dialyze tonight  F/u pericardial fluid studies  Monitor EKG  Will follow Failed renal allograft, pericardial effusion s/p drainage, window. Now recovering in PACU.  Plan:  Monitor K, if remains within normal will dialyze in AM and then daily without heparin. If becomes hyperkalemic will dialyze tonight  F/u pericardial fluid studies  Monitor EKG  restart prednisone 5 mg daily  Will follow

## 2018-03-27 NOTE — PROGRESS NOTE ADULT - SUBJECTIVE AND OBJECTIVE BOX
[facility] DIVISION OF KIDNEY DISEASES AND HYPERTENSION -- HEMODIALYSIS NOTE  --------------------------------------------------------------------------------    24 hour events/subjective:  a/p pericardial window  seen in PACU      PAST HISTORY  --------------------------------------------------------------------------------  No significant changes to PMH, PSH, FHx, SHx, unless otherwise noted    ALLERGIES & MEDICATIONS  --------------------------------------------------------------------------------  Allergies    No Known Allergies    Intolerances      MEDICATIONS  (STANDING):  ceFAZolin   IVPB 1000 milliGRAM(s) IV Intermittent <User Schedule>  docusate sodium 100 milliGRAM(s) Oral three times a day  HYDROmorphone PCA (1 mG/mL) 30 milliLiter(s) PCA Continuous PCA Continuous  pantoprazole  Injectable 40 milliGRAM(s) IV Push daily  sodium chloride 0.45%. 1000 milliLiter(s) (10 mL/Hr) IV Continuous <Continuous>  tacrolimus 1 milliGRAM(s) Oral <User Schedule>    MEDICATIONS  (PRN):  HYDROmorphone  Injectable 0.5 milliGRAM(s) IV Push every 10 minutes PRN Moderate Pain  HYDROmorphone PCA (1 mG/mL) Rescue Clinician Bolus 0.25 milliGRAM(s) IV Push every 15 minutes PRN for Pain Scale GREATER THAN 6  meperidine     Injectable 25 milliGRAM(s) IV Push once PRN For Shivering  metoclopramide Injectable 10 milliGRAM(s) IV Push once PRN Nausea and/or Vomiting  naloxone Injectable 0.1 milliGRAM(s) IV Push every 3 minutes PRN For ANY of the following changes in patient status:  A. RR LESS THAN 10 breaths per minute, B. Oxygen saturation LESS THAN 90%, C. Sedation score of 6  ondansetron Injectable 4 milliGRAM(s) IV Push once PRN Nausea and/or Vomiting  ondansetron Injectable 4 milliGRAM(s) IV Push every 6 hours PRN Nausea  oxyCODONE    IR 5 milliGRAM(s) Oral once PRN Severe Pain (7 - 10)  Vital Signs Last 24 Hrs  T(C): 36.5 (03-27-18 @ 16:30)  T(F): 97.7 (03-27-18 @ 16:30), Max: 98.7 (03-26-18 @ 20:21)  HR: 87 (03-27-18 @ 16:45) (67 - 96)  BP: 162/93 (03-27-18 @ 16:45)  BP(mean): 121 (03-27-18 @ 16:45) (96 - 121)  RR: 16 (03-27-18 @ 16:45) (6 - 18)  SpO2: 97% (03-27-18 @ 16:45) (89% - 100%)  Wt(kg): --    03-26 @ 07:01  -  03-27 @ 07:00  --------------------------------------------------------  IN: 240 mL / OUT: 2300 mL / NET: -2060 mL    03-27 @ 07:01  -  03-27 @ 16:51  --------------------------------------------------------  IN: 150 mL / OUT: 350 mL / NET: -200 mL        Physical Exam:  	    Gen: Conscious alert, reports being uncomfortable  	HEENT: anicteric  	Pulm: Left sided drains with underwater seal  	CV: rub noted  	Abd: soft, nontender, nondistended  	LE: Warm,  edema  	Neuro: no asterixis  	Skin: Warm, without rashes  	Vascular access: [facility] hemodialysis catheter    LABS/STUDIES  --------------------------------------------------------------------------------              9.1    8.0   >-----------<  377      [03-27-18 @ 11:19]              29.8     138  |  98  |  36  ----------------------------<  117      [03-27-18 @ 11:19]  5.1   |  25  |  5.83        Ca     9.0     [03-27-18 @ 11:19]      Mg     2.0     [03-26-18 @ 06:14]    TPro  6.7  /  Alb  3.4  /  TBili  0.4  /  DBili  x   /  AST  18  /  ALT  18  /  AlkPhos  60  [03-27-18 @ 11:19]    PT/INR: PT 12.5 , INR 1.15       [03-27-18 @ 11:19]  PTT: 32.8       [03-27-18 @ 11:19]    Troponin 0.08      [03-27-18 @ 11:19]        [03-27-18 @ 11:19]    Creatinine Trend:  SCr 5.83 [03-27 @ 11:19]  SCr 6.68 [03-26 @ 06:14]  SCr 4.62 [03-25 @ 05:17]  SCr 5.41 [03-24 @ 14:09]  SCr 5.54 [03-23 @ 13:23]    Tacrolimus (), Serum: <2.0 ng/mL (03-27 @ 04:10)  Tacrolimus (), Serum: <2.0 ng/mL (03-25 @ 07:11)  Tacrolimus (), Serum: 2.4 ng/mL (03-24 @ 16:57)  Tacrolimus (), Serum: <2.0 ng/mL (03-21 @ 07:43)

## 2018-03-27 NOTE — PROGRESS NOTE ADULT - ASSESSMENT
56 y/o M h/o RCC, ESRD s/p renal transplant x 2, both failed now awaiting renal transplantation, cardiac tamponade s/p pericardial window presently stable

## 2018-03-27 NOTE — PROGRESS NOTE ADULT - SUBJECTIVE AND OBJECTIVE BOX
MEDICINE, PROGRESS NOTE [phone]    MEAGAN RAMOS 57y [id number]    Patient seen and examined.  Patient is a 57y old  Male who presents with a chief complaint of My kidney is not working (16 Mar 2018 16:07)  Pt has a lot of chest pain. PCA is helping a little.    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 1000 milliLiter(s) (10 mL/Hr) IV Continuous <Continuous>  tacrolimus 1 milliGRAM(s) Oral <User Schedule>    MEDICATIONS  (PRN):  HYDROmorphone PCA (1 mG/mL) Rescue Clinician Bolus 0.25 milliGRAM(s) IV Push every 15 minutes PRN for Pain Scale GREATER THAN 6  meperidine     Injectable 25 milliGRAM(s) IV Push once PRN For Shivering  naloxone Injectable 0.1 milliGRAM(s) IV Push every 3 minutes PRN For ANY of the following changes in patient status:  A. RR LESS THAN 10 breaths per minute, B. Oxygen saturation LESS THAN 90%, C. Sedation score of 6  ondansetron Injectable 4 milliGRAM(s) IV Push every 6 hours PRN Nausea  oxyCODONE    IR 5 milliGRAM(s) Oral once PRN Severe Pain (7 - 10)    Allergies    No Known Allergies    Intolerances        PHYSICAL EXAM:  Constitutional: NAD  HEENT: Normocephalic, EOMI  Neck:  No JVD  Respiratory: CTA B/L, No wheezes  Cardiovascular: S1, S2, RRR,  Gastrointestinal: BS+, soft, NT/ND  Extremities: No peripheral edema  Neurological: AAOX3, no focal deficits  Psychiatric: Normal mood, normal affect  : No Augustin  + chest tube and + pericardial drain    Vital Signs Last 24 Hrs  T(C): 36.4 (27 Mar 2018 17:00), Max: 37.1 (26 Mar 2018 20:21)  T(F): 97.5 (27 Mar 2018 17:00), Max: 98.7 (26 Mar 2018 20:21)  HR: 85 (27 Mar 2018 17:00) (67 - 94)  BP: 168/94 (27 Mar 2018 17:00) (126/67 - 169/83)  BP(mean): 121 (27 Mar 2018 16:45) (96 - 121)  RR: 16 (27 Mar 2018 17:00) (6 - 18)  SpO2: 94% (27 Mar 2018 17:00) (89% - 100%)  I&O's Summary    26 Mar 2018 07:01  -  27 Mar 2018 07:00  --------------------------------------------------------  IN: 240 mL / OUT: 2300 mL / NET: -2060 mL    27 Mar 2018 07:01  -  27 Mar 2018 18:27  --------------------------------------------------------  IN: 150 mL / OUT: 350 mL / NET: -200 mL        LABS:                        9.1    8.0   )-----------( 377      ( 27 Mar 2018 11:19 )             29.8     03-27    138  |  98  |  36<H>  ----------------------------<  117<H>  5.1   |  25  |  5.83<H>    Ca    9.0      27 Mar 2018 11:19  Mg     2.0     03-26    TPro  6.7  /  Alb  3.4  /  TBili  0.4  /  DBili  x   /  AST  18  /  ALT  18  /  AlkPhos  60  03-27    CARDIAC MARKERS ( 27 Mar 2018 11:19 )  x     / 0.08 ng/mL / 127 U/L / x     / 2.5 ng/mL      Tacrolimus (), Serum: <2.0 ng/mL (03-27 @ 04:10)

## 2018-03-27 NOTE — BRIEF OPERATIVE NOTE - PROCEDURE
<<-----Click on this checkbox to enter Procedure Creation of pericardial window  03/27/2018  Pericardial Window via Left Thoracotomy  Active  ANN MARIEC

## 2018-03-28 DIAGNOSIS — I10 ESSENTIAL (PRIMARY) HYPERTENSION: ICD-10-CM

## 2018-03-28 DIAGNOSIS — I31.4 CARDIAC TAMPONADE: ICD-10-CM

## 2018-03-28 LAB
ANION GAP SERPL CALC-SCNC: 13 MMOL/L — SIGNIFICANT CHANGE UP (ref 5–17)
ANISOCYTOSIS BLD QL: SLIGHT — SIGNIFICANT CHANGE UP
BASE EXCESS BLDA CALC-SCNC: 4 MMOL/L — HIGH (ref -2–2)
BASOPHILS # BLD AUTO: 0.12 K/UL — SIGNIFICANT CHANGE UP (ref 0–0.2)
BASOPHILS NFR BLD AUTO: 0.9 % — SIGNIFICANT CHANGE UP (ref 0–2)
BLASTS # FLD: 0 % — SIGNIFICANT CHANGE UP (ref 0–0)
BUN SERPL-MCNC: 30 MG/DL — HIGH (ref 7–23)
CALCIUM SERPL-MCNC: 9.1 MG/DL — SIGNIFICANT CHANGE UP (ref 8.4–10.5)
CHLORIDE SERPL-SCNC: 93 MMOL/L — LOW (ref 96–108)
CO2 BLDA-SCNC: 30 MMOL/L — SIGNIFICANT CHANGE UP (ref 22–30)
CO2 SERPL-SCNC: 28 MMOL/L — SIGNIFICANT CHANGE UP (ref 22–31)
CREAT SERPL-MCNC: 5.8 MG/DL — HIGH (ref 0.5–1.3)
EOSINOPHIL # BLD AUTO: 0 K/UL — SIGNIFICANT CHANGE UP (ref 0–0.5)
EOSINOPHIL NFR BLD AUTO: 0 % — SIGNIFICANT CHANGE UP (ref 0–6)
GAS PNL BLDA: SIGNIFICANT CHANGE UP
GAS PNL BLDA: SIGNIFICANT CHANGE UP
GLUCOSE SERPL-MCNC: 100 MG/DL — HIGH (ref 70–99)
HCO3 BLDA-SCNC: 29 MMOL/L — SIGNIFICANT CHANGE UP (ref 21–29)
HCT VFR BLD CALC: 32.1 % — LOW (ref 39–50)
HGB BLD-MCNC: 9.6 G/DL — LOW (ref 13–17)
HOROWITZ INDEX BLDA+IHG-RTO: 32 — SIGNIFICANT CHANGE UP
LYMPHOCYTES # BLD AUTO: 1.04 K/UL — SIGNIFICANT CHANGE UP (ref 1–3.3)
LYMPHOCYTES # BLD AUTO: 7.8 % — LOW (ref 13–44)
LYMPHOCYTES # SPEC AUTO: 0 % — SIGNIFICANT CHANGE UP (ref 0–0)
MACROCYTES BLD QL: SIGNIFICANT CHANGE UP
MANUAL SMEAR VERIFICATION: SIGNIFICANT CHANGE UP
MCHC RBC-ENTMCNC: 28.8 PG — SIGNIFICANT CHANGE UP (ref 27–34)
MCHC RBC-ENTMCNC: 29.9 GM/DL — LOW (ref 32–36)
MCV RBC AUTO: 96.4 FL — SIGNIFICANT CHANGE UP (ref 80–100)
METAMYELOCYTES # FLD: 0 % — SIGNIFICANT CHANGE UP (ref 0–0)
MONOCYTES # BLD AUTO: 1.63 K/UL — HIGH (ref 0–0.9)
MONOCYTES NFR BLD AUTO: 12.2 % — SIGNIFICANT CHANGE UP (ref 2–14)
MYELOCYTES NFR BLD: 0 % — SIGNIFICANT CHANGE UP (ref 0–0)
NEUTROPHILS # BLD AUTO: 10.58 K/UL — HIGH (ref 1.8–7.4)
NEUTROPHILS NFR BLD AUTO: 79.1 % — HIGH (ref 43–77)
NEUTS BAND # BLD: 0 % — SIGNIFICANT CHANGE UP (ref 0–8)
PCO2 BLDA: 48 MMHG — HIGH (ref 32–46)
PH BLDA: 7.4 — SIGNIFICANT CHANGE UP (ref 7.35–7.45)
PLAT MORPH BLD: NORMAL — SIGNIFICANT CHANGE UP
PLATELET # BLD AUTO: 377 K/UL — SIGNIFICANT CHANGE UP (ref 150–400)
PO2 BLDA: 83 MMHG — SIGNIFICANT CHANGE UP (ref 74–108)
POLYCHROMASIA BLD QL SMEAR: SLIGHT — SIGNIFICANT CHANGE UP
POTASSIUM SERPL-MCNC: 5 MMOL/L — SIGNIFICANT CHANGE UP (ref 3.5–5.3)
POTASSIUM SERPL-SCNC: 5 MMOL/L — SIGNIFICANT CHANGE UP (ref 3.5–5.3)
PROMYELOCYTES # FLD: 0 % — SIGNIFICANT CHANGE UP (ref 0–0)
RBC # BLD: 3.33 M/UL — LOW (ref 4.2–5.8)
RBC # FLD: 18.7 % — HIGH (ref 10.3–14.5)
RBC BLD AUTO: ABNORMAL
SAO2 % BLDA: 97 % — HIGH (ref 92–96)
SODIUM SERPL-SCNC: 134 MMOL/L — LOW (ref 135–145)
WBC # BLD: 13.38 K/UL — HIGH (ref 3.8–10.5)
WBC # FLD AUTO: 13.38 K/UL — HIGH (ref 3.8–10.5)

## 2018-03-28 PROCEDURE — 71045 X-RAY EXAM CHEST 1 VIEW: CPT | Mod: 26

## 2018-03-28 PROCEDURE — 99233 SBSQ HOSP IP/OBS HIGH 50: CPT

## 2018-03-28 RX ORDER — CALCITRIOL 0.5 UG/1
0.25 CAPSULE ORAL DAILY
Qty: 0 | Refills: 0 | Status: DISCONTINUED | OUTPATIENT
Start: 2018-03-28 | End: 2018-03-28

## 2018-03-28 RX ORDER — ERYTHROPOIETIN 10000 [IU]/ML
10000 INJECTION, SOLUTION INTRAVENOUS; SUBCUTANEOUS
Qty: 0 | Refills: 0 | Status: DISCONTINUED | OUTPATIENT
Start: 2018-03-28 | End: 2018-03-28

## 2018-03-28 RX ORDER — NIFEDIPINE 30 MG
60 TABLET, EXTENDED RELEASE 24 HR ORAL DAILY
Qty: 0 | Refills: 0 | Status: DISCONTINUED | OUTPATIENT
Start: 2018-03-28 | End: 2018-04-07

## 2018-03-28 RX ADMIN — SODIUM CHLORIDE 10 MILLILITER(S): 9 INJECTION, SOLUTION INTRAVENOUS at 18:47

## 2018-03-28 RX ADMIN — SODIUM CHLORIDE 10 MILLILITER(S): 9 INJECTION, SOLUTION INTRAVENOUS at 07:20

## 2018-03-28 RX ADMIN — Medication 100 MILLIGRAM(S): at 23:00

## 2018-03-28 RX ADMIN — Medication 100 MILLIGRAM(S): at 18:45

## 2018-03-28 RX ADMIN — TACROLIMUS 1 MILLIGRAM(S): 5 CAPSULE ORAL at 23:00

## 2018-03-28 RX ADMIN — Medication 100 MILLIGRAM(S): at 13:51

## 2018-03-28 RX ADMIN — HYDROMORPHONE HYDROCHLORIDE 30 MILLILITER(S): 2 INJECTION INTRAMUSCULAR; INTRAVENOUS; SUBCUTANEOUS at 07:21

## 2018-03-28 RX ADMIN — HYDROMORPHONE HYDROCHLORIDE 30 MILLILITER(S): 2 INJECTION INTRAMUSCULAR; INTRAVENOUS; SUBCUTANEOUS at 19:36

## 2018-03-28 RX ADMIN — Medication 60 MILLIGRAM(S): at 13:50

## 2018-03-28 RX ADMIN — Medication 100 MILLIGRAM(S): at 05:04

## 2018-03-28 RX ADMIN — PANTOPRAZOLE SODIUM 40 MILLIGRAM(S): 20 TABLET, DELAYED RELEASE ORAL at 13:52

## 2018-03-28 RX ADMIN — TACROLIMUS 1 MILLIGRAM(S): 5 CAPSULE ORAL at 11:46

## 2018-03-28 RX ADMIN — Medication 5 MILLIGRAM(S): at 13:50

## 2018-03-28 NOTE — PROGRESS NOTE ADULT - ASSESSMENT
Renal transplant recipient, failing allograft, s/p pericardial window, renal failure, s/p hyperkalemia.    Plan:  hemodialysis as scheduled  Once improved clinically will change catheter to tunneled catheter  Home dialysis on discharge at [facility] on discharge , may need to bridge through [facility] out patient dialysis unit  Continue Tacrolimus and prednisone 5 mg daily  Hemodialysis today

## 2018-03-28 NOTE — PROVIDER CONTACT NOTE (OTHER) - RECOMMENDATIONS
order states -20cm h20 suction   please clarify if low wall suction or water seal is wanted  please specify in orders

## 2018-03-28 NOTE — PROGRESS NOTE ADULT - ASSESSMENT
cardiac tamponade s/p window/drainage  Failed renal transplant s/p shiley   hypertension  anemia  hyperkalemia    continue current care  drains per cts  monitor potassium closely  f/u fluid analysis  eventual permacath  eventual stress test  eventual mri a/p

## 2018-03-28 NOTE — PROGRESS NOTE ADULT - SUBJECTIVE AND OBJECTIVE BOX
MEDICINE, PROGRESS NOTE [phone]    MEAGAN RAMOS 57y [id number]    Patient seen and examined.  Patient is a 57y old  Male who presents with a chief complaint of My kidney is not working (16 Mar 2018 16:07)  Pt feels better today.    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 1000 milliLiter(s) (10 mL/Hr) IV Continuous <Continuous>  tacrolimus 1 milliGRAM(s) Oral <User Schedule>    MEDICATIONS  (PRN):  HYDROmorphone PCA (1 mG/mL) Rescue Clinician Bolus 0.25 milliGRAM(s) IV Push every 15 minutes PRN for Pain Scale GREATER THAN 6  meperidine     Injectable 25 milliGRAM(s) IV Push once PRN For Shivering  naloxone Injectable 0.1 milliGRAM(s) IV Push every 3 minutes PRN For ANY of the following changes in patient status:  A. RR LESS THAN 10 breaths per minute, B. Oxygen saturation LESS THAN 90%, C. Sedation score of 6  ondansetron Injectable 4 milliGRAM(s) IV Push every 6 hours PRN Nausea  oxyCODONE    IR 5 milliGRAM(s) Oral once PRN Severe Pain (7 - 10)    Allergies    No Known Allergies    Intolerances        PHYSICAL EXAM:  Constitutional: NAD  HEENT: Normocephalic, EOMI  Neck:  No JVD  Respiratory: CTA B/L, No wheezes, + pericardial drain and chest tube  Cardiovascular: S1, S2, RRR, + systolic murmur  Gastrointestinal: BS+, soft, NT/ND  Extremities: No peripheral edema  Neurological: AAOX3, no focal deficits  Psychiatric: Normal mood, normal affect  : No Augustin    Vital Signs Last 24 Hrs  T(C): 36.8 (28 Mar 2018 14:25), Max: 37.2 (28 Mar 2018 02:40)  T(F): 98.3 (28 Mar 2018 14:25), Max: 98.9 (28 Mar 2018 02:40)  HR: 88 (28 Mar 2018 17:30) (81 - 91)  BP: 168/96 (28 Mar 2018 17:30) (155/96 - 190/110)  BP(mean): --  RR: 18 (28 Mar 2018 14:25) (18 - 18)  SpO2: 97% (28 Mar 2018 14:25) (92% - 98%)  I&O's Summary    27 Mar 2018 07:01  -  28 Mar 2018 07:00  --------------------------------------------------------  IN: 200 mL / OUT: 1895 mL / NET: -1695 mL    28 Mar 2018 07:01  -  28 Mar 2018 19:40  --------------------------------------------------------  IN: 900 mL / OUT: 2110 mL / NET: -1210 mL        LABS:                        9.6    13.38 )-----------( 377      ( 28 Mar 2018 07:30 )             32.1     03-28    134<L>  |  93<L>  |  30<H>  ----------------------------<  100<H>  5.0   |  28  |  5.80<H>    Ca    9.1      28 Mar 2018 13:42    TPro  6.7  /  Alb  3.4  /  TBili  0.4  /  DBili  x   /  AST  18  /  ALT  18  /  AlkPhos  60  03-27    CARDIAC MARKERS ( 27 Mar 2018 11:19 )  x     / 0.08 ng/mL / 127 U/L / x     / 2.5 ng/mL

## 2018-03-28 NOTE — PROGRESS NOTE ADULT - SUBJECTIVE AND OBJECTIVE BOX
Subjective    pt states he feels better today, pain improved compared to yesterday.    Objective    Vital signs  T(F): , Max: 98.9 (03-28-18 @ 02:40)  HR: 87 (03-28-18 @ 05:00)  BP: 161/88 (03-28-18 @ 05:00)  SpO2: 98% (03-28-18 @ 05:00)  Wt(kg): --    Output     OUT:    Voided: 300 mL  Total OUT: 300 mL    Total NET: -300 mL    Gen NAD  Abd nondistended   nonpalpable bladder    Labs      03-27 @ 19:54    WBC --    / Hct --    / SCr 6.34     03-27 @ 11:19    WBC 8.0   / Hct 29.8  / SCr 5.83

## 2018-03-28 NOTE — PROGRESS NOTE ADULT - ASSESSMENT
56 yo M hx of ESRD, s/p bilateral renal transplants 2/2 to NSAID use, renal transplantation (father's kidney) 1990s, s/p second transplant (sister's kidney) 2013, HTN, and SBO (small bowel obstruction) who presented initially to establish dialysis in the hospital, in light of rising BUN/ Cr , and symptoms of pain with walking noted at his nephrologists office.  On arrival here, patient complained of SOB and pain while walking. On 3/19 TTE was done which revealed Moderate-large circumferential pericardial effusion. 1.9-2.3 cm posterior and lateral to the LV, 0.7cm at RV, 2.1 cm at RA. There is RA inversion, respiratory variation noted at the Tricuspid valve and IVC measures 2.6cm and collapses 20%. There is no RV diastolic collapse seen on this TTE, however the RV is small at times and the RV EF is elevated, consistent early tamponade. Patient started in daily HD, clinically asymptomatic, repeat TTE on 3/25 revealed Large (> 2cm) circumferential pericardial effusion, largest over the right atrium and postero-lateral left ventricle. There is increased respiratory variation across the mitral valve. The right atrium is buckling. The IVC is dilated with < 50% collapse with sniff. Findings are suggestive of tamponade physiology.  CTS consult called to evaluate patient for pericardiocentesis with subxiphoid pericardial window procedure.   On 03/27/2018 s/p Pericardial Window via Left Thoracotomy   Post op Course: Unremarkable   Pain management on PCA   Renal following on HD   MS madhu drain x 2 à Pleural vac 58 yo M hx of ESRD, s/p bilateral renal transplants 2/2 to NSAID use, renal transplantation (father's kidney) 1990s, s/p second transplant (sister's kidney) 2013, HTN, and SBO (small bowel obstruction) who presented initially to establish dialysis in the hospital, in light of rising BUN/ Cr , and symptoms of pain with walking noted at his nephrologists office.  On arrival here, patient complained of SOB and pain while walking. On 3/19 TTE was done which revealed Moderate-large circumferential pericardial effusion. 1.9-2.3 cm posterior and lateral to the LV, 0.7cm at RV, 2.1 cm at RA. There is RA inversion, respiratory variation noted at the Tricuspid valve and IVC measures 2.6cm and collapses 20%. There is no RV diastolic collapse seen on this TTE, however the RV is small at times and the RV EF is elevated, consistent early tamponade. Patient started in daily HD, clinically asymptomatic, repeat TTE on 3/25 revealed Large (> 2cm) circumferential pericardial effusion, largest over the right atrium and postero-lateral left ventricle. There is increased respiratory variation across the mitral valve. The right atrium is buckling. The IVC is dilated with < 50% collapse with sniff. Findings are suggestive of tamponade physiology.  CTS consult called to evaluate patient for pericardiocentesis with subxiphoid pericardial window procedure.   On 03/27/2018 s/p Pericardial Window via Left Thoracotomy   Post op Course: Unremarkable   Pain management on PCA   Renal following on HD   MS madhu drain x 1 and Left pleural effusion x 1 à Pleural vac

## 2018-03-28 NOTE — PROGRESS NOTE ADULT - SUBJECTIVE AND OBJECTIVE BOX
Day 1 of Anesthesia Pain Management Service    SUBJECTIVE: Patient is doing well with IV PCA    Pain Scale Score:	[X] Refer to charted pain scores    THERAPY:    [ ] IV PCA Morphine		[ ] 5 mg/mL	[ ] 1 mg/mL  [X] IV PCA Hydromorphone	[ ] 5 mg/mL	[X] 1 mg/mL  [ ] IV PCA Fentanyl		[ ] 50 micrograms/mL    Demand dose: 0.2 mg     Lockout: 6 minutes   Continuous Rate: 0 mg/hr  4 Hour Limit: 4 mg    MEDICATIONS  (STANDING):  ceFAZolin   IVPB 1000 milliGRAM(s) IV Intermittent <User Schedule>  docusate sodium 100 milliGRAM(s) Oral three times a day  HYDROmorphone PCA (1 mG/mL) 30 milliLiter(s) PCA Continuous PCA Continuous  pantoprazole  Injectable 40 milliGRAM(s) IV Push daily  sodium chloride 0.45%. 1000 milliLiter(s) (10 mL/Hr) IV Continuous <Continuous>  tacrolimus 1 milliGRAM(s) Oral <User Schedule>    MEDICATIONS  (PRN):  HYDROmorphone PCA (1 mG/mL) Rescue Clinician Bolus 0.25 milliGRAM(s) IV Push every 15 minutes PRN for Pain Scale GREATER THAN 6  meperidine     Injectable 25 milliGRAM(s) IV Push once PRN For Shivering  naloxone Injectable 0.1 milliGRAM(s) IV Push every 3 minutes PRN For ANY of the following changes in patient status:  A. RR LESS THAN 10 breaths per minute, B. Oxygen saturation LESS THAN 90%, C. Sedation score of 6  ondansetron Injectable 4 milliGRAM(s) IV Push every 6 hours PRN Nausea  oxyCODONE    IR 5 milliGRAM(s) Oral once PRN Severe Pain (7 - 10)      OBJECTIVE:    Sedation Score:	[ X] Alert	[ ] Drowsy 	[ ] Arousable	[ ] Asleep	[ ] Unresponsive    Side Effects:	[X ] None	[ ] Nausea	[ ] Vomiting	[ ] Pruritus  		[ ] Other:    Vital Signs Last 24 Hrs  T(C): 36.7 (28 Mar 2018 05:00), Max: 37.2 (28 Mar 2018 02:40)  T(F): 98 (28 Mar 2018 05:00), Max: 98.9 (28 Mar 2018 02:40)  HR: 87 (28 Mar 2018 05:00) (71 - 94)  BP: 161/88 (28 Mar 2018 05:00) (131/73 - 175/93)  BP(mean): 121 (27 Mar 2018 16:45) (96 - 121)  RR: 18 (28 Mar 2018 05:00) (6 - 18)  SpO2: 98% (28 Mar 2018 05:00) (89% - 100%)    ASSESSMENT/ PLAN    Therapy to  be:               [X] Continued   [ ] Discontinued   [ ] Changed to PRN Analgesics    Documentation and Verification of current medications:   [X] Done	[ ] Not done, not eligible    Comments: OOB in chair. +Chest tubes x2, Reeducated to use.

## 2018-03-28 NOTE — PROGRESS NOTE ADULT - SUBJECTIVE AND OBJECTIVE BOX
VITAL SIGNS    Subjective: Patient states: "I'm feeling much better today with the pain control". Denies CP, palpitation, SOB, MAY, HA, N/V/D, fever or chills.  No acute events noted overnight.      Telemetry: NSR 80-90       Vital Signs Last 24 Hrs  T(C): 36.8 (03-28-18 @ 13:31), Max: 37.2 (03-28-18 @ 02:40)  T(F): 98.3 (03-28-18 @ 13:31), Max: 98.9 (03-28-18 @ 02:40)  HR: 91 (03-28-18 @ 13:31) (81 - 93)  BP: 190/110 (03-28-18 @ 13:31) (145/80 - 190/110)  RR: 18 (03-28-18 @ 13:31) (6 - 18)  SpO2: 96% (03-28-18 @ 13:31) (89% - 98%)           03-27 @ 07:01  -  03-28 @ 07:00  --------------------------------------------------------  IN: 200 mL / OUT: 1895 mL / NET: -1695 mL    03-28 @ 07:01  -  03-28 @ 13:56  --------------------------------------------------------  IN: 660 mL / OUT: 0 mL / NET: 660 mL    CAPILLARY BLOOD GLUCOSE       Drains:  MS #1[ x ] Madhu Drainage:  60/420 /    MS #2 [ X ] Madhu Drainage: 5/170       PHYSICAL EXAM    Neurology: alert and oriented x 3, nonfocal, no gross deficits    CV: (+) S1 and S2, No murmurs, rubs, gallops or clicks     Sternal Wound:  MSI subxiphoid incision -->CDI , sternum stable; MS madhu drain x 2 --> SD     Lungs: CTA B/L     Abdomen: soft, nontender, nondistended, positive bowel sounds, (+) Flatus; (+) BM     : bladder non palpable; non distended               Extremities:  B/L LE (+) edema; negative calf tenderness; (+) 2 DP palpable; Right IJ HD cath C/D/I         ceFAZolin IVPB 1000 milliGRAM(s) IV Intermittent <User Schedule>  docusate sodium 100 milliGRAM(s) Oral three times a day  HYDROmorphone PCA (1 mG/mL) 30 milliLiter(s) PCA Continuous PCA Continuous  HYDROmorphone PCA (1 mG/mL) Rescue Clinician Bolus 0.25 milliGRAM(s) IV Push every 15 minutes PRN  meperidine  Injectable 25 milliGRAM(s) IV Push once PRN  naloxone Injectable 0.1 milliGRAM(s) IV Push every 3 minutes PRN  NIFEdipine XL 60 milliGRAM(s) Oral daily  ondansetron Injectable 4 milliGRAM(s) IV Push every 6 hours PRN  oxyCODONE  IR 5 milliGRAM(s) Oral once PRN  pantoprazole  Injectable 40 milliGRAM(s) IV Push daily  predniSONE  Tablet 5 milliGRAM(s) Oral daily  sodium chloride 0.45%. 1000 milliLiter(s) IV Continuous <Continuous>  tacrolimus 1 milliGRAM(s) Oral <User Schedule>    Discussed with Cardiothoracic Team at AM rounds. VITAL SIGNS    Subjective: Patient states: "I'm feeling much better today with the pain control". Denies CP, palpitation, SOB, MAY, HA, N/V/D, fever or chills.  No acute events noted overnight.      Telemetry: NSR 80-90       Vital Signs Last 24 Hrs  T(C): 36.8 (03-28-18 @ 13:31), Max: 37.2 (03-28-18 @ 02:40)  T(F): 98.3 (03-28-18 @ 13:31), Max: 98.9 (03-28-18 @ 02:40)  HR: 91 (03-28-18 @ 13:31) (81 - 93)  BP: 190/110 (03-28-18 @ 13:31) (145/80 - 190/110)  RR: 18 (03-28-18 @ 13:31) (6 - 18)  SpO2: 96% (03-28-18 @ 13:31) (89% - 98%)           03-27 @ 07:01  -  03-28 @ 07:00  --------------------------------------------------------  IN: 200 mL / OUT: 1895 mL / NET: -1695 mL    03-28 @ 07:01  -  03-28 @ 13:56  --------------------------------------------------------  IN: 660 mL / OUT: 0 mL / NET: 660 mL    CAPILLARY BLOOD GLUCOSE       Drains:  MS #1[ x ] Madhu Drainage:  420 cc/24 hr /    MS #2 [ X ] Pleural CT Drainage: 170 cc / 24 hr     PHYSICAL EXAM    Neurology: alert and oriented x 3, nonfocal, no gross deficits    CV: (+) S1 and S2, No murmurs, rubs, gallops or clicks     Chest Wound: Left thoracotomy incision with coverlet dressing -->CDI , sternum stable; MS madhu drain and left pleural CT --> SD     Lungs: CTA B/L     Abdomen: soft, nontender, nondistended, positive bowel sounds, (+) Flatus; (+) BM     : bladder non palpable; non distended               Extremities:  B/L LE (+) 1 pitting edema; negative calf tenderness; (+) 2 DP palpable; Right IJ HD cath C/D/I         ceFAZolin IVPB 1000 milliGRAM(s) IV Intermittent <User Schedule>  docusate sodium 100 milliGRAM(s) Oral three times a day  HYDROmorphone PCA (1 mG/mL) 30 milliLiter(s) PCA Continuous PCA Continuous  HYDROmorphone PCA (1 mG/mL) Rescue Clinician Bolus 0.25 milliGRAM(s) IV Push every 15 minutes PRN  meperidine  Injectable 25 milliGRAM(s) IV Push once PRN  naloxone Injectable 0.1 milliGRAM(s) IV Push every 3 minutes PRN  NIFEdipine XL 60 milliGRAM(s) Oral daily  ondansetron Injectable 4 milliGRAM(s) IV Push every 6 hours PRN  oxyCODONE  IR 5 milliGRAM(s) Oral once PRN  pantoprazole  Injectable 40 milliGRAM(s) IV Push daily  predniSONE  Tablet 5 milliGRAM(s) Oral daily  sodium chloride 0.45%. 1000 milliLiter(s) IV Continuous <Continuous>  tacrolimus 1 milliGRAM(s) Oral <User Schedule>    Discussed with Cardiothoracic Team at AM rounds.

## 2018-03-28 NOTE — CHART NOTE - NSCHARTNOTEFT_GEN_A_CORE
57 year old male pt     Source: Patient [x]    Family [ ]     other [x] electronic medical records    Diet :     Patient reports [ ] nausea  [ ] vomiting [ ] diarrhea [ ] constipation  [ ]chewing problems [ ] swallowing issues  [ ] other:     PO intake:  < 50% [ ] 50-75% [ ]   % [ ]  other :    Source for PO intake [ ] Patient [ ] family [ ] chart [ ] staff [ ] other    Enteral /Parenteral Nutrition:     Current Weight: Weight (kg): 88.4 (03-27 @ 05:07)  % Weight Change    Pertinent Medications: MEDICATIONS  (STANDING):  ceFAZolin   IVPB 1000 milliGRAM(s) IV Intermittent <User Schedule>  docusate sodium 100 milliGRAM(s) Oral three times a day  HYDROmorphone PCA (1 mG/mL) 30 milliLiter(s) PCA Continuous PCA Continuous  pantoprazole  Injectable 40 milliGRAM(s) IV Push daily  sodium chloride 0.45%. 1000 milliLiter(s) (10 mL/Hr) IV Continuous <Continuous>  tacrolimus 1 milliGRAM(s) Oral <User Schedule>    MEDICATIONS  (PRN):  HYDROmorphone PCA (1 mG/mL) Rescue Clinician Bolus 0.25 milliGRAM(s) IV Push every 15 minutes PRN for Pain Scale GREATER THAN 6  meperidine     Injectable 25 milliGRAM(s) IV Push once PRN For Shivering  naloxone Injectable 0.1 milliGRAM(s) IV Push every 3 minutes PRN For ANY of the following changes in patient status:  A. RR LESS THAN 10 breaths per minute, B. Oxygen saturation LESS THAN 90%, C. Sedation score of 6  ondansetron Injectable 4 milliGRAM(s) IV Push every 6 hours PRN Nausea  oxyCODONE    IR 5 milliGRAM(s) Oral once PRN Severe Pain (7 - 10)    Pertinent Labs:  03-27 Na134 mmol/L<L> Glu 102 mg/dL<H> K+ 6.3 mmol/L<HH> Cr  6.34 mg/dL<H> BUN 40 mg/dL<H> 03-23 Phos 4.1 mg/dL 03-27 Alb 3.4 g/dL      Skin: No pressure ulcers noted       Estimated Needs:   [x] no change since previous assessment  [ ] recalculated:       Previous Nutrition Diagnosis: Food and nutrition-related knowledge deficit      Nutrition Diagnosis is [x] ongoing  [ ] resolved [ ] not applicable        New Nutrition Diagnosis: Altered nutrition-related lab values (K+) related to ESRD on HD as evidenced by K+ 6.3 3/27       Interventions:     1) Recommend renal diet in setting of hyperkalemia, ESRD on HD  2)      Monitoring and Evaluation:     [x] PO intake [x] Tolerance to diet prescription [x] weights [x] follow up per protocol    [x] other: RD remains available: Marquita Duron MS, RDN, CDN, CDE. #[phone] 57 year old male pt with PMH HTN, renal failure s/p kidney transplant x2 both failed was directly admitted for shiley placement and to start HD. Plans for permacath prior to d/c. Pt s/p cardiac tamponade s/p pericardial window 3/27. Of note pt with hyperkalemia per yesterday's labs; BMP pending for today's result.    Pt seen today for LOS follow-up. Pt reports good po intake/appetite, eating soups and vegetables from the meals here, has also been getting chicken breast and other protein-rich food sources brought in from home. Pt well versed on HD diet recommendations, received education upon prior RD visit and has HD diet packet for reference. Pt however wishes to have diet liberalized to promote improved food choices, otherwise can manage the diet himself, per prior RD note MD on board with diet liberalization.    Source: Patient [x]    Family [ ]     other [x] electronic medical records    Diet : DASH/TLC diet    Patient reports [ ] nausea  [ ] vomiting [ ] diarrhea [ ] constipation  [ ]chewing problems [ ] swallowing issues  [x] other: Pt denies GI distress at this time, last BM 3/26    PO intake:  < 50% [ ] 50-75% [ ]   % [x]  other :    Source for PO intake [x] Patient [ ] family [x] chart [ ] staff [ ] other    Enteral /Parenteral Nutrition: [x] n/a    Current Weight:  194 pounds (as of 3/26, bedscale, +3 B/L leg edema). 212.7 pounds admit wt ~19 pound wt loss likely r/t fluid removal s/p HD.    Pertinent Medications: MEDICATIONS  (STANDING):  ceFAZolin   IVPB 1000 milliGRAM(s) IV Intermittent <User Schedule>  docusate sodium 100 milliGRAM(s) Oral three times a day  HYDROmorphone PCA (1 mG/mL) 30 milliLiter(s) PCA Continuous PCA Continuous  pantoprazole  Injectable 40 milliGRAM(s) IV Push daily  sodium chloride 0.45%. 1000 milliLiter(s) (10 mL/Hr) IV Continuous <Continuous>  tacrolimus 1 milliGRAM(s) Oral <User Schedule>    MEDICATIONS  (PRN):  HYDROmorphone PCA (1 mG/mL) Rescue Clinician Bolus 0.25 milliGRAM(s) IV Push every 15 minutes PRN for Pain Scale GREATER THAN 6  meperidine     Injectable 25 milliGRAM(s) IV Push once PRN For Shivering  naloxone Injectable 0.1 milliGRAM(s) IV Push every 3 minutes PRN For ANY of the following changes in patient status:  A. RR LESS THAN 10 breaths per minute, B. Oxygen saturation LESS THAN 90%, C. Sedation score of 6  ondansetron Injectable 4 milliGRAM(s) IV Push every 6 hours PRN Nausea  oxyCODONE    IR 5 milliGRAM(s) Oral once PRN Severe Pain (7 - 10)    Pertinent Labs:  03-27 Na134 mmol/L<L> Glu 102 mg/dL<H> K+ 6.3 mmol/L<HH> Cr  6.34 mg/dL<H> BUN 40 mg/dL<H> 03-23 Phos 4.1 mg/dL 03-27 Alb 3.4 g/dL      Skin: No pressure ulcers noted       Estimated Needs:   [x] no change since previous assessment  [ ] recalculated:       Previous Nutrition Diagnosis: Food and nutrition-related knowledge deficit      Nutrition Diagnosis is [ ] ongoing  [x] resolved, pt well versed on HD diet, was educated by RD upon last assessment and has packet for reference as needed [ ] not applicable        New Nutrition Diagnosis: Altered nutrition-related lab values (K+) related to ESRD on HD as evidenced by K+ 6.3 3/27       Interventions:     1) Discussed elevated K+ with pt and NP; per pt MD stated elevated lab as a result of cardiac tamponade yesterday, has had WDL K+ otherwise; NP agrees that dietary K+ does not need to be restricted at this time however will follow up with K+ results and re-visit if needed  2) Otherwise can continue regular liberalized diet. Encouraged adequate intake of kcal/protein. Pt voiced understanding.     Monitoring and Evaluation:     [x] PO intake [x] Tolerance to diet prescription [x] weights [x] follow up per protocol    [x] other: RD remains available: Marquita Duron MS, RDN, CDN, CDE. #[phone]

## 2018-03-28 NOTE — PROGRESS NOTE ADULT - SUBJECTIVE AND OBJECTIVE BOX
[facility] DIVISION OF KIDNEY DISEASES AND HYPERTENSION -- HEMODIALYSIS NOTE  --------------------------------------------------------------------------------    24 hour events/subjective:  s/p pericardial window, received dialysis last night for hyperkalemia  seen in 2 DSU      PAST HISTORY  --------------------------------------------------------------------------------  No significant changes to PMH, PSH, FHx, SHx, unless otherwise noted    ALLERGIES & MEDICATIONS  --------------------------------------------------------------------------------  Allergies    No Known Allergies    Intolerances      MEDICATIONS  (STANDING):  ceFAZolin   IVPB 1000 milliGRAM(s) IV Intermittent <User Schedule>  docusate sodium 100 milliGRAM(s) Oral three times a day  HYDROmorphone PCA (1 mG/mL) 30 milliLiter(s) PCA Continuous PCA Continuous  NIFEdipine XL 60 milliGRAM(s) Oral daily  pantoprazole  Injectable 40 milliGRAM(s) IV Push daily  predniSONE   Tablet 5 milliGRAM(s) Oral daily  sodium chloride 0.45%. 1000 milliLiter(s) (10 mL/Hr) IV Continuous <Continuous>  tacrolimus 1 milliGRAM(s) Oral <User Schedule>    MEDICATIONS  (PRN):  HYDROmorphone PCA (1 mG/mL) Rescue Clinician Bolus 0.25 milliGRAM(s) IV Push every 15 minutes PRN for Pain Scale GREATER THAN 6  meperidine     Injectable 25 milliGRAM(s) IV Push once PRN For Shivering  naloxone Injectable 0.1 milliGRAM(s) IV Push every 3 minutes PRN For ANY of the following changes in patient status:  A. RR LESS THAN 10 breaths per minute, B. Oxygen saturation LESS THAN 90%, C. Sedation score of 6  ondansetron Injectable 4 milliGRAM(s) IV Push every 6 hours PRN Nausea  Vital Signs Last 24 Hrs  T(C): 36.8 (03-28-18 @ 14:25)  T(F): 98.3 (03-28-18 @ 14:25), Max: 98.9 (03-28-18 @ 02:40)  HR: 88 (03-28-18 @ 14:25) (81 - 91)  BP: 164/97 (03-28-18 @ 14:25)  BP(mean): 121 (03-27-18 @ 16:45) (121 - 121)  RR: 18 (03-28-18 @ 14:25) (16 - 18)  SpO2: 97% (03-28-18 @ 14:25) (92% - 98%)      03-27 @ 07:01  -  03-28 @ 07:00  --------------------------------------------------------  IN: 200 mL / OUT: 1895 mL / NET: -1695 mL    03-28 @ 07:01  -  03-28 @ 16:06  --------------------------------------------------------  IN: 660 mL / OUT: 0 mL / NET: 660 mL    oxyCODONE    IR 5 milliGRAM(s) Oral once PRN Severe Pain (7 - 10)        Physical Exam:  	   Gen: Conscious alert, reports improved comfort  	HEENT: no acute signs  	Pulm: Left sided drains with underwater seal  	CV: No rub noted  	Abd: soft, nontender, nondistended  	LE: Warm,  edema noted bilaterally  	Neuro: no asterixis  	Skin: Warm, without rashes  	Vascular access: [facility] [place] hemodialysis catheter      LABS/STUDIES  --------------------------------------------------------------------------------              9.6    13.38 >-----------<  377      [03-28-18 @ 07:30]              32.1     134  |  93  |  30  ----------------------------<  100      [03-28-18 @ 13:42]  5.0   |  28  |  5.80        Ca     9.1     [03-28-18 @ 13:42]    TPro  6.7  /  Alb  3.4  /  TBili  0.4  /  DBili  x   /  AST  18  /  ALT  18  /  AlkPhos  60  [03-27-18 @ 11:19]    PT/INR: PT 12.5 , INR 1.15       [03-27-18 @ 11:19]  PTT: 32.8       [03-27-18 @ 11:19]    Troponin 0.08      [03-27-18 @ 11:19]        [03-27-18 @ 11:19]    Creatinine Trend:  SCr 5.80 [03-28 @ 13:42]  SCr 6.34 [03-27 @ 19:54]  SCr 5.83 [03-27 @ 11:19]  SCr 6.68 [03-26 @ 06:14]  SCr 4.62 [03-25 @ 05:17]    Tacrolimus (), Serum: <2.0 ng/mL (03-27 @ 04:10)  Tacrolimus (), Serum: <2.0 ng/mL (03-25 @ 07:11)  Tacrolimus (), Serum: 2.4 ng/mL (03-24 @ 16:57)

## 2018-03-29 LAB
ANION GAP SERPL CALC-SCNC: 13 MMOL/L — SIGNIFICANT CHANGE UP (ref 5–17)
BUN SERPL-MCNC: 24 MG/DL — HIGH (ref 7–23)
CALCIUM SERPL-MCNC: 9 MG/DL — SIGNIFICANT CHANGE UP (ref 8.4–10.5)
CHLORIDE SERPL-SCNC: 94 MMOL/L — LOW (ref 96–108)
CO2 SERPL-SCNC: 27 MMOL/L — SIGNIFICANT CHANGE UP (ref 22–31)
CREAT SERPL-MCNC: 4.88 MG/DL — HIGH (ref 0.5–1.3)
GLUCOSE SERPL-MCNC: 126 MG/DL — HIGH (ref 70–99)
HCT VFR BLD CALC: 33.5 % — LOW (ref 39–50)
HGB BLD-MCNC: 9.6 G/DL — LOW (ref 13–17)
MCHC RBC-ENTMCNC: 28.7 GM/DL — LOW (ref 32–36)
MCHC RBC-ENTMCNC: 28.8 PG — SIGNIFICANT CHANGE UP (ref 27–34)
MCV RBC AUTO: 100.6 FL — HIGH (ref 80–100)
NON-GYNECOLOGICAL CYTOLOGY STUDY: SIGNIFICANT CHANGE UP
NRBC # BLD: 0 /100 WBCS — SIGNIFICANT CHANGE UP (ref 0–0)
PLATELET # BLD AUTO: 372 K/UL — SIGNIFICANT CHANGE UP (ref 150–400)
POTASSIUM SERPL-MCNC: 4.2 MMOL/L — SIGNIFICANT CHANGE UP (ref 3.5–5.3)
POTASSIUM SERPL-SCNC: 4.2 MMOL/L — SIGNIFICANT CHANGE UP (ref 3.5–5.3)
RBC # BLD: 3.33 M/UL — LOW (ref 4.2–5.8)
RBC # FLD: 18.1 % — HIGH (ref 10.3–14.5)
SODIUM SERPL-SCNC: 134 MMOL/L — LOW (ref 135–145)
WBC # BLD: 8.46 K/UL — SIGNIFICANT CHANGE UP (ref 3.8–10.5)
WBC # FLD AUTO: 8.46 K/UL — SIGNIFICANT CHANGE UP (ref 3.8–10.5)

## 2018-03-29 PROCEDURE — 71045 X-RAY EXAM CHEST 1 VIEW: CPT | Mod: 26

## 2018-03-29 PROCEDURE — 99233 SBSQ HOSP IP/OBS HIGH 50: CPT

## 2018-03-29 RX ADMIN — Medication 100 MILLIGRAM(S): at 17:20

## 2018-03-29 RX ADMIN — Medication 100 MILLIGRAM(S): at 11:18

## 2018-03-29 RX ADMIN — Medication 5 MILLIGRAM(S): at 11:15

## 2018-03-29 RX ADMIN — SODIUM CHLORIDE 10 MILLILITER(S): 9 INJECTION, SOLUTION INTRAVENOUS at 17:20

## 2018-03-29 RX ADMIN — TACROLIMUS 1 MILLIGRAM(S): 5 CAPSULE ORAL at 23:09

## 2018-03-29 RX ADMIN — TACROLIMUS 1 MILLIGRAM(S): 5 CAPSULE ORAL at 11:15

## 2018-03-29 RX ADMIN — Medication 60 MILLIGRAM(S): at 05:23

## 2018-03-29 RX ADMIN — Medication 100 MILLIGRAM(S): at 05:23

## 2018-03-29 RX ADMIN — PANTOPRAZOLE SODIUM 40 MILLIGRAM(S): 20 TABLET, DELAYED RELEASE ORAL at 11:16

## 2018-03-29 RX ADMIN — HYDROMORPHONE HYDROCHLORIDE 30 MILLILITER(S): 2 INJECTION INTRAMUSCULAR; INTRAVENOUS; SUBCUTANEOUS at 13:58

## 2018-03-29 RX ADMIN — SODIUM CHLORIDE 10 MILLILITER(S): 9 INJECTION, SOLUTION INTRAVENOUS at 07:15

## 2018-03-29 RX ADMIN — HYDROMORPHONE HYDROCHLORIDE 30 MILLILITER(S): 2 INJECTION INTRAMUSCULAR; INTRAVENOUS; SUBCUTANEOUS at 07:15

## 2018-03-29 RX ADMIN — Medication 100 MILLIGRAM(S): at 23:09

## 2018-03-29 RX ADMIN — HYDROMORPHONE HYDROCHLORIDE 30 MILLILITER(S): 2 INJECTION INTRAMUSCULAR; INTRAVENOUS; SUBCUTANEOUS at 19:36

## 2018-03-29 NOTE — PROGRESS NOTE ADULT - ASSESSMENT
Patient with advanced CKD, s/p pericardial drainage, failed allograft on low dose Tac/prednisone.  Plan:  Hemodialysis today.  Regular diet  No need for daily lab draw if clinically stable  Change temp catheter to tunneled catheter for dialysis access  Discussed with out patient dialysis team for continued care  Will follow.

## 2018-03-29 NOTE — PROGRESS NOTE ADULT - ASSESSMENT
56 yo M hx of ESRD, s/p bilateral renal transplants 2/2 to NSAID use, renal transplantation (father's kidney) 1990s, s/p second transplant (sister's kidney) 2013, HTN, and SBO (small bowel obstruction) who presented initially to establish dialysis in the hospital, in light of rising BUN/ Cr , and symptoms of pain with walking noted at his nephrologists office.  On arrival here, patient complained of SOB and pain while walking. On 3/19 TTE was done which revealed Moderate-large circumferential pericardial effusion. 1.9-2.3 cm posterior and lateral to the LV, 0.7cm at RV, 2.1 cm at RA. There is RA inversion, respiratory variation noted at the Tricuspid valve and IVC measures 2.6cm and collapses 20%. There is no RV diastolic collapse seen on this TTE, however the RV is small at times and the RV EF is elevated, consistent early tamponade. Patient started in daily HD, clinically asymptomatic, repeat TTE on 3/25 revealed Large (> 2cm) circumferential pericardial effusion, largest over the right atrium and postero-lateral left ventricle. There is increased respiratory variation across the mitral valve. The right atrium is buckling. The IVC is dilated with < 50% collapse with sniff. Findings are suggestive of tamponade physiology.  CTS consult called to evaluate patient for pericardiocentesis with subxiphoid pericardial window procedure.   On 03/27/2018 s/p Pericardial Window via Left Thoracotomy   Post op Course: Unremarkable   Pain management on PCA   Renal following on HD   3/28 Maintain Pericardial madhu drain x 1 and Left pleural effusion x 1 à Pleural vac f/u CT in AM   3/29 D/C Pericardial drain; f/u CXR; Maintain Left pleural CT --> SD

## 2018-03-29 NOTE — PROGRESS NOTE ADULT - SUBJECTIVE AND OBJECTIVE BOX
Day 2 of Anesthesia Pain Management Service    SUBJECTIVE: Patient is doing well with IV PCA    Pain Scale Score:	[X] Refer to charted pain scores    THERAPY:    [ ] IV PCA Morphine		[ ] 5 mg/mL	[ ] 1 mg/mL  [X] IV PCA Hydromorphone	[ ] 5 mg/mL	[X] 1 mg/mL  [ ] IV PCA Fentanyl		[ ] 50 micrograms/mL    Demand dose: 0.2 mg     Lockout: 6 minutes   Continuous Rate: 0 mg/hr  4 Hour Limit: 4 mg    MEDICATIONS  (STANDING):  ceFAZolin   IVPB 1000 milliGRAM(s) IV Intermittent <User Schedule>  docusate sodium 100 milliGRAM(s) Oral three times a day  HYDROmorphone PCA (1 mG/mL) 30 milliLiter(s) PCA Continuous PCA Continuous  NIFEdipine XL 60 milliGRAM(s) Oral daily  pantoprazole  Injectable 40 milliGRAM(s) IV Push daily  predniSONE   Tablet 5 milliGRAM(s) Oral daily  sodium chloride 0.45%. 1000 milliLiter(s) (10 mL/Hr) IV Continuous <Continuous>  tacrolimus 1 milliGRAM(s) Oral <User Schedule>    MEDICATIONS  (PRN):  HYDROmorphone PCA (1 mG/mL) Rescue Clinician Bolus 0.25 milliGRAM(s) IV Push every 15 minutes PRN for Pain Scale GREATER THAN 6  meperidine     Injectable 25 milliGRAM(s) IV Push once PRN For Shivering  naloxone Injectable 0.1 milliGRAM(s) IV Push every 3 minutes PRN For ANY of the following changes in patient status:  A. RR LESS THAN 10 breaths per minute, B. Oxygen saturation LESS THAN 90%, C. Sedation score of 6  ondansetron Injectable 4 milliGRAM(s) IV Push every 6 hours PRN Nausea  oxyCODONE    IR 5 milliGRAM(s) Oral once PRN Severe Pain (7 - 10)      OBJECTIVE:    Sedation Score:	[ X] Alert	[ ] Drowsy 	[ ] Arousable	[ ] Asleep	[ ] Unresponsive    Side Effects:	[X ] None	[ ] Nausea	[ ] Vomiting	[ ] Pruritus  		[ ] Other:    Vital Signs Last 24 Hrs  T(C): 37.3 (29 Mar 2018 04:40), Max: 37.4 (29 Mar 2018 00:36)  T(F): 99.2 (29 Mar 2018 04:40), Max: 99.3 (29 Mar 2018 00:36)  HR: 98 (29 Mar 2018 04:40) (88 - 103)  BP: 138/77 (29 Mar 2018 04:40) (137/74 - 190/110)  BP(mean): --  RR: 18 (29 Mar 2018 04:40) (18 - 18)  SpO2: 95% (29 Mar 2018 04:40) (92% - 97%)    ASSESSMENT/ PLAN    Therapy to  be:               [X] Continued   [ ] Discontinued   [ ] Changed to PRN Analgesics    Documentation and Verification of current medications:   [X] Done	[ ] Not done, not eligible    Comments: OOB. (+) chest tubes x2. Continue PCA until CT's D\C'd

## 2018-03-29 NOTE — PROGRESS NOTE ADULT - SUBJECTIVE AND OBJECTIVE BOX
MEDICINE, PROGRESS NOTE [phone]    MEAGAN RAMOS 57y [id number]    Patient seen and examined.  Patient is a 57y old  Male who presents with a chief complaint of My kidney is not working (16 Mar 2018 16:07)  Pt feels better.    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 1000 milliLiter(s) (10 mL/Hr) IV Continuous <Continuous>  tacrolimus 1 milliGRAM(s) Oral <User Schedule>    MEDICATIONS  (PRN):  HYDROmorphone PCA (1 mG/mL) Rescue Clinician Bolus 0.25 milliGRAM(s) IV Push every 15 minutes PRN for Pain Scale GREATER THAN 6  meperidine     Injectable 25 milliGRAM(s) IV Push once PRN For Shivering  naloxone Injectable 0.1 milliGRAM(s) IV Push every 3 minutes PRN For ANY of the following changes in patient status:  A. RR LESS THAN 10 breaths per minute, B. Oxygen saturation LESS THAN 90%, C. Sedation score of 6  ondansetron Injectable 4 milliGRAM(s) IV Push every 6 hours PRN Nausea  oxyCODONE    IR 5 milliGRAM(s) Oral once PRN Severe Pain (7 - 10)    Allergies    No Known Allergies    Intolerances        PHYSICAL EXAM:  Constitutional: NAD  HEENT: Normocephalic, EOMI  Neck:  No JVD  Respiratory: CTA B/L, No wheezes  Cardiovascular: S1, S2, RRR, + systolic murmur  Gastrointestinal: BS+, soft, NT/ND  Extremities: No peripheral edema  Neurological: AAOX3, no focal deficits  Psychiatric: Normal mood, normal affect  : No Augustin    Vital Signs Last 24 Hrs  T(C): 36.9 (29 Mar 2018 20:21), Max: 37.4 (29 Mar 2018 00:36)  T(F): 98.4 (29 Mar 2018 20:21), Max: 99.3 (29 Mar 2018 00:36)  HR: 111 (29 Mar 2018 20:21) (95 - 111)  BP: 140/73 (29 Mar 2018 20:21) (137/74 - 161/99)  BP(mean): --  RR: 18 (29 Mar 2018 20:21) (18 - 18)  SpO2: 96% (29 Mar 2018 20:21) (85% - 96%)  I&O's Summary    28 Mar 2018 07:01  -  29 Mar 2018 07:00  --------------------------------------------------------  IN: 1020 mL / OUT: 2510 mL / NET: -1490 mL    29 Mar 2018 07:01  -  29 Mar 2018 20:49  --------------------------------------------------------  IN: 1760 mL / OUT: 2600 mL / NET: -840 mL        LABS:                        9.6    8.46  )-----------( 372      ( 29 Mar 2018 17:32 )             33.5     03-29    134<L>  |  94<L>  |  24<H>  ----------------------------<  126<H>  4.2   |  27  |  4.88<H>    Ca    9.0      29 Mar 2018 13:59

## 2018-03-29 NOTE — PROGRESS NOTE ADULT - SUBJECTIVE AND OBJECTIVE BOX
VITAL SIGNS    Subjective: Patient states: "I'm feeling better today".  Denies CP, palpitation, SOB, MAY,     Telemetry: NSR / ST  (PVC's / Breif 's)     Vital Signs Last 24 Hrs  T(C): 37.3 (18 @ 04:40), Max: 37.4 (18 @ 00:36)  T(F): 99.2 (18 @ 04:40), Max: 99.3 (18 @ 00:36)  HR: 98 (18 @ 04:40) (88 - 103)  BP: 138/77 (18 @ 04:40) (137/74 - 190/110)  RR: 18 (18 @ 04:40) (18 - 18)  SpO2: 95% (18 @ 04:40) (92% - 97%)              @ 07:01  -   @ 07:00  --------------------------------------------------------  IN: 1020 mL / OUT: 2510 mL / NET: -1490 mL    Daily     Daily Weight in k.9 (28 Mar 2018 14:25)      Drains:     Pericardial Naseem [ X ] Drainage: 40 cc /24 hr   L Pleural  [ X ]  Drainage: 120 cc /24 hr                        PHYSICAL EXAM    Neurology: alert and oriented x 3, nonfocal, no gross deficits    CV: (+) S1 and S2, No murmurs, rubs, gallops or clicks     Chest Wound:  Left thoracotomy incision with coverlet dressing-->CDI     Lungs: CTA B/L     Abdomen: soft, nontender, nondistended, positive bowel sounds, (+) Flatus; (+) BM     :  Voiding               Extremities:  B/L LE (+ 1) pitting edema; negative calf tenderness; (+) 2 DP palpable; Right IJ HS cath with occlusive dressing in place C/D/I          ceFAZolin IVPB 1000 milliGRAM(s) IV Intermittent <User Schedule>  docusate sodium 100 milliGRAM(s) Oral three times a day  HYDROmorphone PCA (1 mG/mL) 30 milliLiter(s) PCA Continuous PCA Continuous  HYDROmorphone PCA (1 mG/mL) Rescue Clinician Bolus 0.25 milliGRAM(s) IV Push every 15 minutes PRN  meperidine  Injectable 25 milliGRAM(s) IV Push once PRN  naloxone Injectable 0.1 milliGRAM(s) IV Push every 3 minutes PRN  NIFEdipine XL 60 milliGRAM(s) Oral daily  ondansetron Injectable 4 milliGRAM(s) IV Push every 6 hours PRN  oxyCODONE IR 5 milliGRAM(s) Oral once PRN  pantoprazoleInjectable 40 milliGRAM(s) IV Push daily  predniSONE Tablet 5 milliGRAM(s) Oral daily  sodium chloride 0.45%. 1000 milliLiter(s) IV Continuous <Continuous>  tacrolimus 1 milliGRAM(s) Oral <User Schedule>    Physical Therapy Rec:   Home  [  ]   Home w/ PT  [ X ]  Rehab  [  ]    Discussed with Cardiothoracic Team at AM rounds.

## 2018-03-29 NOTE — CHART NOTE - NSCHARTNOTEFT_GEN_A_CORE
MEAGAN RAMOS  57y Male  Patient is a 57y old  Male who presents with a chief complaint of My kidney is not working (16 Mar 2018 16:07)     Event Summary:    Patient seen & examined during AM rounds for hypoxia 85-87% on RA last night, improved on 3LNC to >94%. Patient denies any SOB last night, with considerable pain improvement while on PCA pump. Pt is s/p pericardial madhu drain removal by CTs team this morning, remains with L CT drain in place. Denies CP, palpitation, SOB, MAY, HA, N/V/D, fever or chills.     -Pt with no respiratory distress on exam with no fragmented speech, RR>12, clear BS b/l, no wheezes, crackles or rhonchi. PCA pump noted with daily decreased demand.   -Placed on continuous pulse oximetry monitoring, supplemental O2 2-3L NC to maintain spo2>93%, elevate HOB 30-45 degrees as tolerated 2/2 CT drain in place. Nursing staff instructed to monitor for RR<10 & notify provider.  -Plan to continue IV PCA pump as per CTs team & Anethesia team      Nathaniel Saintus [facility]  #[phone]

## 2018-03-29 NOTE — PROGRESS NOTE ADULT - SUBJECTIVE AND OBJECTIVE BOX
[facility] DIVISION OF KIDNEY DISEASES AND HYPERTENSION -- HEMODIALYSIS NOTE  --------------------------------------------------------------------------------    24 hour events/subjective:  s/p pericardial window, received dialysis on 3/28/18  seen in 2 DSU      PAST HISTORY  --------------------------------------------------------------------------------  No significant changes to PMH, PSH, FHx, SHx, unless otherwise noted    ALLERGIES & MEDICATIONS  --------------------------------------------------------------------------------  Allergies    No Known Allergies    Intolerances      MEDICATIONS  (STANDING):  ceFAZolin   IVPB 1000 milliGRAM(s) IV Intermittent <User Schedule>  docusate sodium 100 milliGRAM(s) Oral three times a day  HYDROmorphone PCA (1 mG/mL) 30 milliLiter(s) PCA Continuous PCA Continuous  NIFEdipine XL 60 milliGRAM(s) Oral daily  pantoprazole  Injectable 40 milliGRAM(s) IV Push daily  predniSONE   Tablet 5 milliGRAM(s) Oral daily  sodium chloride 0.45%. 1000 milliLiter(s) (10 mL/Hr) IV Continuous <Continuous>  tacrolimus 1 milliGRAM(s) Oral <User Schedule>    MEDICATIONS  (PRN):  HYDROmorphone PCA (1 mG/mL) Rescue Clinician Bolus 0.25 milliGRAM(s) IV Push every 15 minutes PRN for Pain Scale GREATER THAN 6  meperidine     Injectable 25 milliGRAM(s) IV Push once PRN For Shivering  naloxone Injectable 0.1 milliGRAM(s) IV Push every 3 minutes PRN For ANY of the following changes in patient status:  A. RR LESS THAN 10 breaths per minute, B. Oxygen saturation LESS THAN 90%, C. Sedation score of 6  ondansetron Injectable 4 milliGRAM(s) IV Push every 6 hours PRN Nausea  oxyCODONE    IR 5 milliGRAM(s) Oral once PRN Severe Pain (7 - 10)  Vital Signs Last 24 Hrs  T(C): 36.9 (03-29-18 @ 13:15)  T(F): 98.4 (03-29-18 @ 13:15), Max: 99.3 (03-29-18 @ 00:36)  HR: 108 (03-29-18 @ 13:15) (88 - 108)  BP: 161/99 (03-29-18 @ 13:15)  BP(mean): --  RR: 18 (03-29-18 @ 13:15) (18 - 18)  SpO2: 93% (03-29-18 @ 13:15) (85% - 95%)      03-28 @ 07:01  -  03-29 @ 07:00  --------------------------------------------------------  IN: 1020 mL / OUT: 2510 mL / NET: -1490 mL    03-29 @ 07:01  -  03-29 @ 14:39  --------------------------------------------------------  IN: 660 mL / OUT: 200 mL / NET: 460 mL        Physical Exam:  	Gen: Appears more comfortable.  	HEENT: no acute signs  	Pulm: Left sided drains with underwater seal  	CV: No rub noted. No gallop  	Abd: soft, nontender, nondistended  	LE: Warm,  edema noted bilaterally  	Neuro: no asterixis  	Skin: Warm, without rashes  	Vascular access: [facility] hemodialysis catheter      LABS/STUDIES  --------------------------------------------------------------------------------              9.6    13.38 >-----------<  377      [03-28-18 @ 07:30]              32.1     134  |  94  |  24  ----------------------------<  126      [03-29-18 @ 13:59]  4.2   |  27  |  4.88        Ca     9.0     [03-29-18 @ 13:59]            Creatinine Trend:  SCr 4.88 [03-29 @ 13:59]  SCr 5.80 [03-28 @ 13:42]  SCr 6.34 [03-27 @ 19:54]  SCr 5.83 [03-27 @ 11:19]  SCr 6.68 [03-26 @ 06:14]    Tacrolimus (), Serum: <2.0 ng/mL (03-27 @ 04:10)  Tacrolimus (), Serum: <2.0 ng/mL (03-25 @ 07:11)  Tacrolimus (), Serum: 2.4 ng/mL (03-24 @ 16:57)

## 2018-03-29 NOTE — PROGRESS NOTE ADULT - ASSESSMENT
cardiac tamponade s/p window/drainage  Failed renal transplant s/p livan   hypertension  anemia  hyperkalemia    continue current care  chest tube per cts  f/u path  eventual permacath

## 2018-03-30 LAB
ANION GAP SERPL CALC-SCNC: 13 MMOL/L — SIGNIFICANT CHANGE UP (ref 5–17)
BUN SERPL-MCNC: 28 MG/DL — HIGH (ref 7–23)
CALCIUM SERPL-MCNC: 9.4 MG/DL — SIGNIFICANT CHANGE UP (ref 8.4–10.5)
CHLORIDE SERPL-SCNC: 96 MMOL/L — SIGNIFICANT CHANGE UP (ref 96–108)
CMV DNA CSF QL NAA+PROBE: SIGNIFICANT CHANGE UP
CMV DNA SPEC NAA+PROBE-LOG#: SIGNIFICANT CHANGE UP LOGIU/ML
CO2 SERPL-SCNC: 27 MMOL/L — SIGNIFICANT CHANGE UP (ref 22–31)
CREAT SERPL-MCNC: 5.79 MG/DL — HIGH (ref 0.5–1.3)
GLUCOSE SERPL-MCNC: 112 MG/DL — HIGH (ref 70–99)
HCT VFR BLD CALC: 31.7 % — LOW (ref 39–50)
HGB BLD-MCNC: 9.2 G/DL — LOW (ref 13–17)
MCHC RBC-ENTMCNC: 28.8 PG — SIGNIFICANT CHANGE UP (ref 27–34)
MCHC RBC-ENTMCNC: 29 GM/DL — LOW (ref 32–36)
MCV RBC AUTO: 99.4 FL — SIGNIFICANT CHANGE UP (ref 80–100)
NON-GYNECOLOGICAL CYTOLOGY STUDY: SIGNIFICANT CHANGE UP
NRBC # BLD: 0 /100 WBCS — SIGNIFICANT CHANGE UP (ref 0–0)
PLATELET # BLD AUTO: 388 K/UL — SIGNIFICANT CHANGE UP (ref 150–400)
POTASSIUM SERPL-MCNC: 4.9 MMOL/L — SIGNIFICANT CHANGE UP (ref 3.5–5.3)
POTASSIUM SERPL-SCNC: 4.9 MMOL/L — SIGNIFICANT CHANGE UP (ref 3.5–5.3)
RBC # BLD: 3.19 M/UL — LOW (ref 4.2–5.8)
RBC # FLD: 17.7 % — HIGH (ref 10.3–14.5)
SODIUM SERPL-SCNC: 136 MMOL/L — SIGNIFICANT CHANGE UP (ref 135–145)
WBC # BLD: 9.61 K/UL — SIGNIFICANT CHANGE UP (ref 3.8–10.5)
WBC # FLD AUTO: 9.61 K/UL — SIGNIFICANT CHANGE UP (ref 3.8–10.5)

## 2018-03-30 PROCEDURE — 90935 HEMODIALYSIS ONE EVALUATION: CPT | Mod: GC

## 2018-03-30 PROCEDURE — 71045 X-RAY EXAM CHEST 1 VIEW: CPT | Mod: 26

## 2018-03-30 PROCEDURE — 99232 SBSQ HOSP IP/OBS MODERATE 35: CPT

## 2018-03-30 RX ORDER — POLYETHYLENE GLYCOL 3350 17 G/17G
17 POWDER, FOR SOLUTION ORAL DAILY
Qty: 0 | Refills: 0 | Status: DISCONTINUED | OUTPATIENT
Start: 2018-03-30 | End: 2018-04-07

## 2018-03-30 RX ORDER — HYDROMORPHONE HYDROCHLORIDE 2 MG/ML
0.5 INJECTION INTRAMUSCULAR; INTRAVENOUS; SUBCUTANEOUS EVERY 6 HOURS
Qty: 0 | Refills: 0 | Status: DISCONTINUED | OUTPATIENT
Start: 2018-03-30 | End: 2018-04-06

## 2018-03-30 RX ORDER — ERYTHROPOIETIN 10000 [IU]/ML
10000 INJECTION, SOLUTION INTRAVENOUS; SUBCUTANEOUS ONCE
Qty: 0 | Refills: 0 | Status: COMPLETED | OUTPATIENT
Start: 2018-03-31 | End: 2018-03-31

## 2018-03-30 RX ORDER — HYDROMORPHONE HYDROCHLORIDE 2 MG/ML
30 INJECTION INTRAMUSCULAR; INTRAVENOUS; SUBCUTANEOUS
Qty: 0 | Refills: 0 | Status: DISCONTINUED | OUTPATIENT
Start: 2018-03-30 | End: 2018-03-30

## 2018-03-30 RX ORDER — OXYCODONE HYDROCHLORIDE 5 MG/1
5 TABLET ORAL EVERY 6 HOURS
Qty: 0 | Refills: 0 | Status: DISCONTINUED | OUTPATIENT
Start: 2018-03-30 | End: 2018-04-06

## 2018-03-30 RX ADMIN — PANTOPRAZOLE SODIUM 40 MILLIGRAM(S): 20 TABLET, DELAYED RELEASE ORAL at 11:06

## 2018-03-30 RX ADMIN — Medication 60 MILLIGRAM(S): at 05:07

## 2018-03-30 RX ADMIN — Medication 5 MILLIGRAM(S): at 11:06

## 2018-03-30 RX ADMIN — SODIUM CHLORIDE 10 MILLILITER(S): 9 INJECTION, SOLUTION INTRAVENOUS at 07:23

## 2018-03-30 RX ADMIN — Medication 100 MILLIGRAM(S): at 22:39

## 2018-03-30 RX ADMIN — HYDROMORPHONE HYDROCHLORIDE 30 MILLILITER(S): 2 INJECTION INTRAMUSCULAR; INTRAVENOUS; SUBCUTANEOUS at 10:52

## 2018-03-30 RX ADMIN — Medication 100 MILLIGRAM(S): at 18:46

## 2018-03-30 RX ADMIN — SODIUM CHLORIDE 10 MILLILITER(S): 9 INJECTION, SOLUTION INTRAVENOUS at 05:07

## 2018-03-30 RX ADMIN — Medication 100 MILLIGRAM(S): at 05:07

## 2018-03-30 RX ADMIN — HYDROMORPHONE HYDROCHLORIDE 30 MILLILITER(S): 2 INJECTION INTRAMUSCULAR; INTRAVENOUS; SUBCUTANEOUS at 07:22

## 2018-03-30 RX ADMIN — TACROLIMUS 1 MILLIGRAM(S): 5 CAPSULE ORAL at 11:06

## 2018-03-30 RX ADMIN — TACROLIMUS 1 MILLIGRAM(S): 5 CAPSULE ORAL at 22:39

## 2018-03-30 RX ADMIN — POLYETHYLENE GLYCOL 3350 17 GRAM(S): 17 POWDER, FOR SOLUTION ORAL at 11:21

## 2018-03-30 NOTE — PROGRESS NOTE ADULT - ASSESSMENT
cardiac tamponade s/p window/drainage  Failed renal transplant s/p shiley   hypertension  anemia    continue current care  with drains removed, f/u cxr  d/c pca  d/c tele  encourage activity  check limited echo  eventual nst  eventual permacath

## 2018-03-30 NOTE — PROGRESS NOTE ADULT - SUBJECTIVE AND OBJECTIVE BOX
MEDICINE, PROGRESS NOTE [phone]    MEAGAN RAMOS 57y [id number]    Patient seen and examined.  Patient is a 57y old  Male who presents with a chief complaint of My kidney is not working (16 Mar 2018 16:07)  Pt has no current complaint. Feels well.    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 1000 milliLiter(s) (10 mL/Hr) IV Continuous <Continuous>  tacrolimus 1 milliGRAM(s) Oral <User Schedule>    MEDICATIONS  (PRN):  HYDROmorphone  Injectable 0.5 milliGRAM(s) IV Push every 6 hours PRN Severe Pain (7 - 10)  oxyCODONE    IR 5 milliGRAM(s) Oral every 6 hours PRN Moderate Pain (4 - 6)    Allergies    No Known Allergies    Intolerances        PHYSICAL EXAM:  Constitutional: NAD  HEENT: Normocephalic, EOMI  Neck:  No JVD  Respiratory: CTA B/L, No wheezes  Cardiovascular: S1, S2, RRR, + systolic murmur  Gastrointestinal: BS+, soft, NT/ND  Extremities: No peripheral edema  Neurological: AAOX3, no focal deficits  Psychiatric: Normal mood, normal affect  : No Augustin    Vital Signs Last 24 Hrs  T(C): 37.2 (30 Mar 2018 14:45), Max: 37.2 (30 Mar 2018 14:45)  T(F): 98.9 (30 Mar 2018 14:45), Max: 98.9 (30 Mar 2018 14:45)  HR: 98 (30 Mar 2018 14:45) (93 - 111)  BP: 152/91 (30 Mar 2018 14:45) (140/73 - 162/83)  BP(mean): --  RR: 18 (30 Mar 2018 14:45) (18 - 18)  SpO2: 96% (30 Mar 2018 14:45) (95% - 99%)  I&O's Summary    29 Mar 2018 07:01  -  30 Mar 2018 07:00  --------------------------------------------------------  IN: 1880 mL / OUT: 3360 mL / NET: -1480 mL    30 Mar 2018 07:01  -  30 Mar 2018 17:37  --------------------------------------------------------  IN: 720 mL / OUT: 0 mL / NET: 720 mL        LABS:                        9.2    9.61  )-----------( 388      ( 30 Mar 2018 16:08 )             31.7     03-30    136  |  96  |  28<H>  ----------------------------<  112<H>  4.9   |  27  |  5.79<H>    Ca    9.4      30 Mar 2018 15:18

## 2018-03-30 NOTE — PROGRESS NOTE ADULT - ASSESSMENT
56 yo M hx of ESRD, s/p bilateral renal transplants 2/2 to NSAID use, renal transplantation (father's kidney) 1990s, s/p second transplant (sister's kidney) 2013, HTN, and SBO (small bowel obstruction) who presented initially to establish dialysis in the hospital, in light of rising BUN/ Cr , and symptoms of pain with walking noted at his nephrologists office.  On arrival here, patient complained of SOB and pain while walking. On 3/19 TTE was done which revealed Moderate-large circumferential pericardial effusion. 1.9-2.3 cm posterior and lateral to the LV, 0.7cm at RV, 2.1 cm at RA. There is RA inversion, respiratory variation noted at the Tricuspid valve and IVC measures 2.6cm and collapses 20%. There is no RV diastolic collapse seen on this TTE, however the RV is small at times and the RV EF is elevated, consistent early tamponade. Patient started in daily HD, clinically asymptomatic, repeat TTE on 3/25 revealed Large (> 2cm) circumferential pericardial effusion, largest over the right atrium and postero-lateral left ventricle. There is increased respiratory variation across the mitral valve. The right atrium is buckling. The IVC is dilated with < 50% collapse with sniff. Findings are suggestive of tamponade physiology.  CTS consult called to evaluate patient for pericardiocentesis with subxiphoid pericardial window procedure.   On 03/27/2018 s/p Pericardial Window via Left Thoracotomy   Post op Course: Unremarkable   Pain management on PCA   Renal following on HD   3/28 Maintain Pericardial madhu drain x 1 and Left pleural effusion x 1 à Pleural vac f/u CT in AM   3/29 D/C Pericardial drain; f/u CXR; Maintain Left pleural CT --> SD   3/20 Left CT D/C'd --> F/U CXR

## 2018-03-30 NOTE — PROGRESS NOTE ADULT - SUBJECTIVE AND OBJECTIVE BOX
Day 3 of Anesthesia Pain Management Service    SUBJECTIVE: Patient is doing well with IV PCA    Pain Scale Score:	[X] Refer to charted pain scores    THERAPY:    [ ] IV PCA Morphine		[ ] 5 mg/mL	[ ] 1 mg/mL  [X] IV PCA Hydromorphone	[ ] 5 mg/mL	[X] 1 mg/mL  [ ] IV PCA Fentanyl		[ ] 50 micrograms/mL    Demand dose: 0.2 mg     Lockout: 6 minutes   Continuous Rate: 0 mg/hr  4 Hour Limit: 4 mg    MEDICATIONS  (STANDING):  ceFAZolin   IVPB 1000 milliGRAM(s) IV Intermittent <User Schedule>  docusate sodium 100 milliGRAM(s) Oral three times a day  HYDROmorphone PCA (1 mG/mL) 30 milliLiter(s) PCA Continuous PCA Continuous  NIFEdipine XL 60 milliGRAM(s) Oral daily  pantoprazole  Injectable 40 milliGRAM(s) IV Push daily  predniSONE   Tablet 5 milliGRAM(s) Oral daily  sodium chloride 0.45%. 1000 milliLiter(s) (10 mL/Hr) IV Continuous <Continuous>  tacrolimus 1 milliGRAM(s) Oral <User Schedule>    MEDICATIONS  (PRN):  HYDROmorphone PCA (1 mG/mL) Rescue Clinician Bolus 0.25 milliGRAM(s) IV Push every 15 minutes PRN for Pain Scale GREATER THAN 6  meperidine     Injectable 25 milliGRAM(s) IV Push once PRN For Shivering  naloxone Injectable 0.1 milliGRAM(s) IV Push every 3 minutes PRN For ANY of the following changes in patient status:  A. RR LESS THAN 10 breaths per minute, B. Oxygen saturation LESS THAN 90%, C. Sedation score of 6  ondansetron Injectable 4 milliGRAM(s) IV Push every 6 hours PRN Nausea  oxyCODONE    IR 5 milliGRAM(s) Oral once PRN Severe Pain (7 - 10)      OBJECTIVE:    Sedation Score:	[ X] Alert	[ ] Drowsy 	[ ] Arousable	[ ] Asleep	[ ] Unresponsive    Side Effects:	[X ] None	[ ] Nausea	[ ] Vomiting	[ ] Pruritus  		[ ] Other:    Vital Signs Last 24 Hrs  T(C): 36.3 (30 Mar 2018 04:42), Max: 36.9 (29 Mar 2018 11:09)  T(F): 97.4 (30 Mar 2018 04:42), Max: 98.5 (30 Mar 2018 00:31)  HR: 98 (30 Mar 2018 04:42) (93 - 111)  BP: 162/83 (30 Mar 2018 04:42) (140/73 - 162/83)  BP(mean): --  RR: 18 (30 Mar 2018 04:42) (18 - 18)  SpO2: 95% (30 Mar 2018 04:42) (93% - 99%)    ASSESSMENT/ PLAN    Therapy to  be:               [X] Continued   [ ] Discontinued   [ ] Changed to PRN Analgesics    Documentation and Verification of current medications:   [X] Done	[ ] Not done, not eligible    Comments: Using 1-6x/hr. Chest tube x 1 remains. Reporting pain at chest tube site. Demand dose increased to 0.25mg and four hour limit to 5mg

## 2018-03-30 NOTE — PROGRESS NOTE ADULT - SUBJECTIVE AND OBJECTIVE BOX
Pain Management Attending Addendum    SUBJECTIVE: Patient doing well with IV PCA    Therapy:    [X] IV PCA         [ ] PRN Analgesics    OBJECTIVE:   [X] Pain appropriately controlled    [ ] Other:    Side Effects:  [X] None	             [ ] Nausea              [ ] Pruritis                	[ ] Other:    ASSESSMENT/PLAN:  Therapy changed to PRN analgesics    Comments: Pain Management Attending Addendum    SUBJECTIVE: Patient doing well with IV PCA    Therapy:    [X] IV PCA         [ ] PRN Analgesics    OBJECTIVE:   [X] Pain appropriately controlled    [ ] Other:    Side Effects:  [X] None	             [ ] Nausea              [ ] Pruritis                	[ ] Other:    ASSESSMENT/PLAN:  Therapy changed to PRN analgesics once chest tube is d/c.    Comments:

## 2018-03-30 NOTE — PROGRESS NOTE ADULT - SUBJECTIVE AND OBJECTIVE BOX
[facility] DIVISION OF KIDNEY DISEASES AND HYPERTENSION -- HEMODIALYSIS NOTE  --------------------------------------------------------------------------------  Dung Ivyahim     --------------------------------------------------------------------------------  Chief Complaint: ESRD/Ongoing hemodialysis requirement    24 hour events/subjective:  no acute events.       PAST HISTORY  --------------------------------------------------------------------------------  No significant changes to PMH, PSH, FHx, SHx, unless otherwise noted    ALLERGIES & MEDICATIONS  --------------------------------------------------------------------------------  Allergies    No Known Allergies    Intolerances      Standing Inpatient Medications  ceFAZolin   IVPB 1000 milliGRAM(s) IV Intermittent <User Schedule>  docusate sodium 100 milliGRAM(s) Oral three times a day  HYDROmorphone PCA (1 mG/mL) 30 milliLiter(s) PCA Continuous PCA Continuous  NIFEdipine XL 60 milliGRAM(s) Oral daily  pantoprazole  Injectable 40 milliGRAM(s) IV Push daily  predniSONE   Tablet 5 milliGRAM(s) Oral daily  sodium chloride 0.45%. 1000 milliLiter(s) IV Continuous <Continuous>  tacrolimus 1 milliGRAM(s) Oral <User Schedule>    PRN Inpatient Medications  HYDROmorphone PCA (1 mG/mL) Rescue Clinician Bolus 0.25 milliGRAM(s) IV Push every 15 minutes PRN  meperidine     Injectable 25 milliGRAM(s) IV Push once PRN  naloxone Injectable 0.1 milliGRAM(s) IV Push every 3 minutes PRN  ondansetron Injectable 4 milliGRAM(s) IV Push every 6 hours PRN  oxyCODONE    IR 5 milliGRAM(s) Oral once PRN      REVIEW OF SYSTEMS  --------------------------------------------------------------------------------  Constitutional: [ ] Fever [ ] Chills [ ] Fatigue [ ] Weight change   HEENT: [ ] Blurred vision [ ] Eye Pain [ ] Headache [ ] Runny nose [ ] Sore Throat   Respiratory: [ ] Cough [ ] Wheezing [ ] Shortness of breath  Cardiovascular: [ ] Chest Pain [ ] Palpitations [ ] MAY [ ] PND [ ] Orthopnea  Gastrointestinal: [ ] Abdominal Pain [ ] Diarrhea [ ] Constipation [ ] Hemorrhoids [ ] Nausea [ ] Vomiting  Genitourinary: [ ] Nocturia [ ] Dysuria [ ] Incontinence  Extremities: [ ] Swelling [ ] Joint Pain  Neurologic: [ ] Focal deficit [ ] Paresthesias [ ] Syncope  Lymphatic: [ ] Swelling [ ] Lymphadenopathy   Skin: [ ] Rash [ ] Ecchymoses [ ] Wounds [ ] Lesions  Psychiatry: [ ] Depression [ ] Suicidal/Homicidal Ideation [ ] Anxiety [ ] Sleep Disturbances  [ ] 10 point review of systems is otherwise negative except as mentioned above              [ ]Unable to obtain  All other systems were reviewed and are negative, except as noted.    VITALS/PHYSICAL EXAM  --------------------------------------------------------------------------------  T(C): 36.3 (18 @ 04:42), Max: 36.9 (18 @ 11:09)  HR: 98 (18 @ 04:42) (93 - 111)  BP: 162/83 (18 @ 04:42) (140/73 - 162/83)  RR: 18 (18 @ 04:42) (18 - 18)  SpO2: 95% (18 @ 04:42) (93% - 99%)  Wt(kg): --      Daily     Daily Weight in k.5 (30 Mar 2018 08:13)  I&O's Summary    29 Mar 2018 07:  -  30 Mar 2018 07:00  --------------------------------------------------------  IN: 1880 mL / OUT: 3360 mL / NET: -1480 mL    30 Mar 2018 07:01  -  30 Mar 2018 10:57  --------------------------------------------------------  IN: 240 mL / OUT: 0 mL / NET: 240 mL          18 @ 07:18 @ 07:00  --------------------------------------------------------  IN: 1880 mL / OUT: 3360 mL / NET: -1480 mL    18 @ 07:18 @ 10:57  --------------------------------------------------------  IN: 240 mL / OUT: 0 mL / NET: 240 mL        Physical Exam:  	    Gen: NAD   	HEENT: anicteric  	Pulm: CTA B/L   	CV: RRR  	Back: No dependent edema  	Abd: soft, nontender, nondistended  	: No sagastume  	LE: Warm, no edema  	Neuro: no asterixis  	Skin: Warm, without rashes  	Vascular access: none    LABS/STUDIES  --------------------------------------------------------------------------------              9.6    8.46  >-----------<  372      [18 17:32]              33.5     134  |  94  |  24  ----------------------------<  126      [18 13:59]  4.2   |  27  |  4.88        Ca     9.0     [18 @ 13:59]    Iron 24, TIBC 161, %sat 15      [18 11:31]  Ferritin 250      [18 20:02]    HBsAb <3.0      [18 05:35]  HBsAg Nonreact      [18 05:35]  HBcAb Nonreact      [18 05:35]  HCV 0.12, Nonreact      [18 05:35]    Iron Total, Serum: 24 ug/dL (:31)  Iron - Total Binding Capacity.: 161 ug/dL ( 11:31)  Iron Total, Serum: 53 ug/dL (:27)  Iron - Total Binding Capacity.: 164 ug/dL (:27)  Ferritin, Serum: 250 ng/mL ( 20:02)      Radiology  --------------------------------------------------------------------------------    --------------------------------------------------------------------------------  Dung Phillips   [phone] [facility] DIVISION OF KIDNEY DISEASES AND HYPERTENSION -- HEMODIALYSIS NOTE  --------------------------------------------------------------------------------  Dung Ivyahim     --------------------------------------------------------------------------------  Chief Complaint: ESRD/Ongoing hemodialysis requirement    24 hour events/subjective:  no acute events.       PAST HISTORY  --------------------------------------------------------------------------------  No significant changes to PMH, PSH, FHx, SHx, unless otherwise noted    ALLERGIES & MEDICATIONS  --------------------------------------------------------------------------------  Allergies    No Known Allergies    Intolerances      Standing Inpatient Medications  ceFAZolin   IVPB 1000 milliGRAM(s) IV Intermittent <User Schedule>  docusate sodium 100 milliGRAM(s) Oral three times a day  HYDROmorphone PCA (1 mG/mL) 30 milliLiter(s) PCA Continuous PCA Continuous  NIFEdipine XL 60 milliGRAM(s) Oral daily  pantoprazole  Injectable 40 milliGRAM(s) IV Push daily  predniSONE   Tablet 5 milliGRAM(s) Oral daily  sodium chloride 0.45%. 1000 milliLiter(s) IV Continuous <Continuous>  tacrolimus 1 milliGRAM(s) Oral <User Schedule>    PRN Inpatient Medications  HYDROmorphone PCA (1 mG/mL) Rescue Clinician Bolus 0.25 milliGRAM(s) IV Push every 15 minutes PRN  meperidine     Injectable 25 milliGRAM(s) IV Push once PRN  naloxone Injectable 0.1 milliGRAM(s) IV Push every 3 minutes PRN  ondansetron Injectable 4 milliGRAM(s) IV Push every 6 hours PRN  oxyCODONE    IR 5 milliGRAM(s) Oral once PRN      REVIEW OF SYSTEMS  --------------------------------------------------------------------------------  Constitutional: [ ] Fever [ ] Chills [ ] Fatigue [ ] Weight change   HEENT: [ ] Blurred vision [ ] Eye Pain [ ] Headache [ ] Runny nose [ ] Sore Throat   Respiratory: [ ] Cough [ ] Wheezing [ ] Shortness of breath  Cardiovascular: [ ] Chest Pain [ ] Palpitations [ ] MAY [ ] PND [ ] Orthopnea  Gastrointestinal: [ ] Abdominal Pain [ ] Diarrhea [ ] Constipation [ ] Hemorrhoids [ ] Nausea [ ] Vomiting  Genitourinary: [ ] Nocturia [ ] Dysuria [ ] Incontinence  Extremities: [ ] Swelling [ ] Joint Pain  Neurologic: [ ] Focal deficit [ ] Paresthesias [ ] Syncope  Lymphatic: [ ] Swelling [ ] Lymphadenopathy   Skin: [ ] Rash [ ] Ecchymoses [ ] Wounds [ ] Lesions  Psychiatry: [ ] Depression [ ] Suicidal/Homicidal Ideation [ ] Anxiety [ ] Sleep Disturbances  [ ] 10 point review of systems is otherwise negative except as mentioned above              [ ]Unable to obtain  All other systems were reviewed and are negative, except as noted.    VITALS/PHYSICAL EXAM  --------------------------------------------------------------------------------  T(C): 36.3 (18 @ 04:42), Max: 36.9 (18 @ 11:09)  HR: 98 (18 @ 04:42) (93 - 111)  BP: 162/83 (18 @ 04:42) (140/73 - 162/83)  RR: 18 (18 @ 04:42) (18 - 18)  SpO2: 95% (18 @ 04:42) (93% - 99%)  Wt(kg): --      Daily     Daily Weight in k.5 (30 Mar 2018 08:13)  I&O's Summary    29 Mar 2018 07:  -  30 Mar 2018 07:00  --------------------------------------------------------  IN: 1880 mL / OUT: 3360 mL / NET: -1480 mL    30 Mar 2018 07:01  -  30 Mar 2018 10:57  --------------------------------------------------------  IN: 240 mL / OUT: 0 mL / NET: 240 mL          18 @ 07:18 @ 07:00  --------------------------------------------------------  IN: 1880 mL / OUT: 3360 mL / NET: -1480 mL    18 @ 07:18 @ 10:57  --------------------------------------------------------  IN: 240 mL / OUT: 0 mL / NET: 240 mL        Physical Exam:  	    Gen: NAD   	HEENT: anicteric  	Pulm: CTA B/L chest tube X1 on left  	CV: no rub  	Abd: soft, nontender, nondistended  	: No sagastume  	LE: Warm, 2+edema  	Neuro: no asterixis  	Skin: Warm, without rashes  	Vascular access: I/j non tunneled catheter    LABS/STUDIES  --------------------------------------------------------------------------------              9.6    8.46  >-----------<  372      [18 17:32]              33.5     134  |  94  |  24  ----------------------------<  126      [18 13:59]  4.2   |  27  |  4.88        Ca     9.0     [18 13:59]    Iron 24, TIBC 161, %sat 15      [18 11:31]  Ferritin 250      [18 20:02]    HBsAb <3.0      [18 05:35]  HBsAg Nonreact      [18 05:35]  HBcAb Nonreact      [18 05:35]  HCV 0.12, Nonreact      [18 05:35]    Iron Total, Serum: 24 ug/dL (:31)  Iron - Total Binding Capacity.: 161 ug/dL ( 11:31)  Iron Total, Serum: 53 ug/dL (:27)  Iron - Total Binding Capacity.: 164 ug/dL (:27)  Ferritin, Serum: 250 ng/mL ( 20:02)      Radiology  --------------------------------------------------------------------------------    --------------------------------------------------------------------------------  Dung Phillips

## 2018-03-30 NOTE — PROGRESS NOTE ADULT - SUBJECTIVE AND OBJECTIVE BOX
SUBJ:  Feeling well overall and tolerating dialysis. One drain remains to be removed in the near future. CXR stable with small bilateral pleural effusions. Denies shortness of breath, chest pain, palptiations.     MEDICATIONS  (STANDING):  ceFAZolin   IVPB 1000 milliGRAM(s) IV Intermittent <User Schedule>  docusate sodium 100 milliGRAM(s) Oral three times a day  HYDROmorphone PCA (1 mG/mL) 30 milliLiter(s) PCA Continuous PCA Continuous  NIFEdipine XL 60 milliGRAM(s) Oral daily  pantoprazole  Injectable 40 milliGRAM(s) IV Push daily  predniSONE   Tablet 5 milliGRAM(s) Oral daily  sodium chloride 0.45%. 1000 milliLiter(s) (10 mL/Hr) IV Continuous <Continuous>  tacrolimus 1 milliGRAM(s) Oral <User Schedule>    MEDICATIONS  (PRN):  HYDROmorphone PCA (1 mG/mL) Rescue Clinician Bolus 0.25 milliGRAM(s) IV Push every 15 minutes PRN for Pain Scale GREATER THAN 6  meperidine     Injectable 25 milliGRAM(s) IV Push once PRN For Shivering  naloxone Injectable 0.1 milliGRAM(s) IV Push every 3 minutes PRN For ANY of the following changes in patient status:  A. RR LESS THAN 10 breaths per minute, B. Oxygen saturation LESS THAN 90%, C. Sedation score of 6  ondansetron Injectable 4 milliGRAM(s) IV Push every 6 hours PRN Nausea  oxyCODONE    IR 5 milliGRAM(s) Oral once PRN Severe Pain (7 - 10)    Vital Signs Last 24 Hrs  T(C): 36.3 (30 Mar 2018 04:42), Max: 36.9 (29 Mar 2018 11:09)  T(F): 97.4 (30 Mar 2018 04:42), Max: 98.5 (30 Mar 2018 00:31)  HR: 98 (30 Mar 2018 04:42) (93 - 111)  BP: 162/83 (30 Mar 2018 04:42) (140/73 - 162/83)  RR: 18 (30 Mar 2018 04:42) (18 - 18)  SpO2: 95% (30 Mar 2018 04:42) (93% - 99%)    REVIEW OF SYSTEMS:  As per HPI, otherwise unremarkable.     PHYSICAL EXAM:  · CONSTITUTIONAL: Well-developed, well nourished      · EYES: no drainage or redness  · NECK: supple  ·RESPIRATORY:   airway patent; breath sounds equal; good air movement; respirations non-labored; rare crackles bibasilar.   · CARDIOVASCULAR: regular rate and rhythm  . GASTROINTESTINAL:  no distention; no masses palpable  · EXTREMITIES: No cyanosis, clubbing  · VASCULAR:  Equal and normal pulses (radial)  ·NEUROLOGICAL:  Alert and oriented x 3  · SKIN: No lesions; no rash  . LYMPH NODES: No lymphadedenopathy  · MUSCULOSKELETAL:  No calf tenderness    TTE 3/25/2018  Conclusions:  ***Limited TTE to evaluate effusion.  1. Large (> 2cm) circumferential pericardial effusion. The  effusion is largest over the right atrium and  postero-lateral left ventricle. There is increased  respiratory variation across the mtiral valve. The right  atrium is buckling. The IVC is dilated with < 50% collapse  with sniff. Findings are suggestive of tamponade  physiology.  *** Compared with echocardiogram of 3/22/2018, effusion  appears slightly larger over right ventricle and right  atrium. Findings discussed with KULDEEP Woodson at 1PM on  3/25/2018.    LABS:   CBC Full  -  ( 29 Mar 2018 17:32 )  WBC Count : 8.46 K/uL  Hemoglobin : 9.6 g/dL  Hematocrit : 33.5 %  Platelet Count - Automated : 372 K/uL  Mean Cell Volume : 100.6 fl  Mean Cell Hemoglobin : 28.8 pg  Mean Cell Hemoglobin Concentration : 28.7 gm/dL    03-29    134<L>  |  94<L>  |  24<H>  ----------------------------<  126<H>  4.2   |  27  |  4.88<H>    Ca    9.0      29 Mar 2018 13:59    RADIOLOGY & ADDITIONAL STUDIES:  CXR 3/29/2018  Lungs: Bilateral lower lobe atelectasis.  Pleura: Bilateral pleural effusions.  Heart and Mediastinum: Cardiomegaly.      Skeletal: Unremarkable.    IMPRESSION:  1.  Bilateral pleural effusions and atelectasis.    IMPRESSION AND PLAN:  58 yo M hx of ESRD on dialysis, HTN, with asymptomatic large pericardial effusion with tamponade physiology s/p pericardial window 3/27/2018.     1) Pericardial effusion  Large effusion with pericardial tamponade physiology without hypotension or tachycardia.   s/p pericardial window 3/27/2018    ·	CTS managing drains; to be removed in the near future  ·	Tolerating dialysis without complication  ·	Suggest limited ECHO to r/o pericardial effusion and establish new baseline    No further cardiac recommendations Signing off the case. Please call with questions.     Monica Anderson MD, MPH, ART  Cardiovascular Specialist Attending  [facility]  C: [phone]  E: [email or web address]  (Cardiology nocturnist available every night 7 pm - 7 am; available week days for follow-up; general cardiology consult coverage weekend days)

## 2018-03-30 NOTE — PROGRESS NOTE ADULT - ASSESSMENT
Patient with advanced CKD, s/p Pericardial window via Left thoracotomy failed allograft on low dose Tac/prednisone.      Plan:  Hemodialysis everyday with UF except sunday  Regular diet  No need for daily lab draw if clinically stable  Change temp catheter to tunneled catheter for dialysis access once pericardial drains are removed.   Discussed with out patient dialysis team for continued care  Will follow. Patient with advanced CKD, s/p Pericardial window via Left thoracotomy failed allograft on low dose Tac/prednisone.      Plan:  Hemodialysis everyday with UF except sunday  Regular diet  No need for daily lab draw if clinically stable  Change temp catheter to tunneled catheter for dialysis access once pericardial drains are removed.   Procrit 39530 units with dialysis on Saturday.  continue low dose Tacrolimus 1 mg/dose and prednisone to prevent sensitization  Discussed with out patient dialysis team for continued care  Will follow.

## 2018-03-30 NOTE — PROGRESS NOTE ADULT - SUBJECTIVE AND OBJECTIVE BOX
VITAL SIGNS    Subjective: Patient states: "Is my tube coming out today" Denies CT, palpitation, SOB, MAY, HA, N/V/D, fever or chills.  No acute events noted overnight.     Telemetry: NSR / ST      Vital Signs Last 24 Hrs  T(C): 37 (18 @ 11:03), Max: 37 (18 @ 11:03)  T(F): 98.6 (18 @ 11:03), Max: 98.6 (18 @ 11:03)  HR: 102 (18 @ 11:03) (93 - 111)  BP: 145/86 (18 @ 11:03) (140/73 - 162/83)  RR: 18 (18 @ 11:03) (18 - 18)  SpO2: 98% (18 @ 11:03) (95% - 99%)            @ 07:01  -   @ 07:00  --------------------------------------------------------  IN: 1880 mL / OUT: 3360 mL / NET: -1480 mL     @ 07:01  -   @ 13:46  --------------------------------------------------------  IN: 240 mL / OUT: 0 mL / NET: 240 mL    Daily     Daily Weight in k.5 (30 Mar 2018 08:13)    PHYSICAL EXAM      Neurology: alert and oriented x 3, nonfocal, no gross deficits    CV: (+) S1 and S2, No murmurs, rubs, gallops or clicks     Chest Wound: Left Thoracotomy incision C/D/I; Left pleural CT --> Pleural vac --> LWS; no air leak noted.     Lungs: CTA B/L     Abdomen: soft, nontender, nondistended, positive bowel sounds, (+) Flatus; (+) BM     :  Voiding               Extremities:  B/L LE (+) 1-2 pitting edema; negative calf tenderness; (+) 2 DP palpable; Right IJ HD cath --> C/D/I        ceFAZolin IVPB 1000 milliGRAM(s) IV Intermittent <User Schedule>  docusate sodium 100 milliGRAM(s) Oral three times a day  HYDROmorphone PCA (1 mG/mL) 30 milliLiter(s) PCA Continuous PCA Continuous  HYDROmorphone PCA (1 mG/mL) Rescue Clinician Bolus 0.25 milliGRAM(s) IV Push every 15 minutes PRN  meperidine Injectable 25 milliGRAM(s) IV Push once PRN  naloxone Injectable 0.1 milliGRAM(s) IV Push every 3 minutes PRN  NIFEdipine XL 60 milliGRAM(s) Oral daily  ondansetron Injectable 4 milliGRAM(s) IV Push every 6 hours PRN  oxyCODONE IR 5 milliGRAM(s) Oral once PRN  pantoprazole Injectable 40 milliGRAM(s) IV Push daily  polyethylene glycol 3350 17 Gram(s) Oral daily  predniSONE Tablet 5 milliGRAM(s) Oral daily  tacrolimus 1 milliGRAM(s) Oral <User Schedule>    Discussed with Cardiothoracic Team at AM rounds.

## 2018-03-31 LAB
ALBUMIN SERPL ELPH-MCNC: 3.3 G/DL — SIGNIFICANT CHANGE UP (ref 3.3–5)
ALP SERPL-CCNC: 50 U/L — SIGNIFICANT CHANGE UP (ref 40–120)
ALT FLD-CCNC: <5 U/L RC — LOW (ref 10–45)
ANION GAP SERPL CALC-SCNC: 12 MMOL/L — SIGNIFICANT CHANGE UP (ref 5–17)
ANION GAP SERPL CALC-SCNC: 13 MMOL/L — SIGNIFICANT CHANGE UP (ref 5–17)
AST SERPL-CCNC: 18 U/L — SIGNIFICANT CHANGE UP (ref 10–40)
BILIRUB SERPL-MCNC: 0.6 MG/DL — SIGNIFICANT CHANGE UP (ref 0.2–1.2)
BUN SERPL-MCNC: 18 MG/DL — SIGNIFICANT CHANGE UP (ref 7–23)
BUN SERPL-MCNC: 23 MG/DL — SIGNIFICANT CHANGE UP (ref 7–23)
CALCIUM SERPL-MCNC: 9.3 MG/DL — SIGNIFICANT CHANGE UP (ref 8.4–10.5)
CALCIUM SERPL-MCNC: 9.4 MG/DL — SIGNIFICANT CHANGE UP (ref 8.4–10.5)
CHLORIDE SERPL-SCNC: 98 MMOL/L — SIGNIFICANT CHANGE UP (ref 96–108)
CHLORIDE SERPL-SCNC: 99 MMOL/L — SIGNIFICANT CHANGE UP (ref 96–108)
CO2 SERPL-SCNC: 27 MMOL/L — SIGNIFICANT CHANGE UP (ref 22–31)
CO2 SERPL-SCNC: 29 MMOL/L — SIGNIFICANT CHANGE UP (ref 22–31)
CREAT SERPL-MCNC: 4.55 MG/DL — HIGH (ref 0.5–1.3)
CREAT SERPL-MCNC: 5.69 MG/DL — HIGH (ref 0.5–1.3)
GLUCOSE SERPL-MCNC: 112 MG/DL — HIGH (ref 70–99)
GLUCOSE SERPL-MCNC: 99 MG/DL — SIGNIFICANT CHANGE UP (ref 70–99)
HCT VFR BLD CALC: 31.8 % — LOW (ref 39–50)
HGB BLD-MCNC: 9.3 G/DL — LOW (ref 13–17)
MCHC RBC-ENTMCNC: 28.4 PG — SIGNIFICANT CHANGE UP (ref 27–34)
MCHC RBC-ENTMCNC: 29.2 GM/DL — LOW (ref 32–36)
MCV RBC AUTO: 97.2 FL — SIGNIFICANT CHANGE UP (ref 80–100)
NRBC # BLD: 0 /100 WBCS — SIGNIFICANT CHANGE UP (ref 0–0)
PLATELET # BLD AUTO: 434 K/UL — HIGH (ref 150–400)
POTASSIUM SERPL-MCNC: 4.2 MMOL/L — SIGNIFICANT CHANGE UP (ref 3.5–5.3)
POTASSIUM SERPL-MCNC: 4.3 MMOL/L — SIGNIFICANT CHANGE UP (ref 3.5–5.3)
POTASSIUM SERPL-SCNC: 4.2 MMOL/L — SIGNIFICANT CHANGE UP (ref 3.5–5.3)
POTASSIUM SERPL-SCNC: 4.3 MMOL/L — SIGNIFICANT CHANGE UP (ref 3.5–5.3)
PROT SERPL-MCNC: 6.6 G/DL — SIGNIFICANT CHANGE UP (ref 6–8.3)
RBC # BLD: 3.27 M/UL — LOW (ref 4.2–5.8)
RBC # FLD: 17.8 % — HIGH (ref 10.3–14.5)
SODIUM SERPL-SCNC: 138 MMOL/L — SIGNIFICANT CHANGE UP (ref 135–145)
SODIUM SERPL-SCNC: 140 MMOL/L — SIGNIFICANT CHANGE UP (ref 135–145)
WBC # BLD: 8.4 K/UL — SIGNIFICANT CHANGE UP (ref 3.8–10.5)
WBC # FLD AUTO: 8.4 K/UL — SIGNIFICANT CHANGE UP (ref 3.8–10.5)

## 2018-03-31 PROCEDURE — 74018 RADEX ABDOMEN 1 VIEW: CPT | Mod: 26

## 2018-03-31 PROCEDURE — 90935 HEMODIALYSIS ONE EVALUATION: CPT | Mod: GC

## 2018-03-31 PROCEDURE — 71045 X-RAY EXAM CHEST 1 VIEW: CPT | Mod: 26

## 2018-03-31 RX ADMIN — ERYTHROPOIETIN 10000 UNIT(S): 10000 INJECTION, SOLUTION INTRAVENOUS; SUBCUTANEOUS at 17:39

## 2018-03-31 RX ADMIN — PANTOPRAZOLE SODIUM 40 MILLIGRAM(S): 20 TABLET, DELAYED RELEASE ORAL at 11:49

## 2018-03-31 RX ADMIN — Medication 5 MILLIGRAM(S): at 11:50

## 2018-03-31 RX ADMIN — HYDROMORPHONE HYDROCHLORIDE 0.5 MILLIGRAM(S): 2 INJECTION INTRAMUSCULAR; INTRAVENOUS; SUBCUTANEOUS at 21:51

## 2018-03-31 RX ADMIN — POLYETHYLENE GLYCOL 3350 17 GRAM(S): 17 POWDER, FOR SOLUTION ORAL at 08:31

## 2018-03-31 RX ADMIN — HYDROMORPHONE HYDROCHLORIDE 0.5 MILLIGRAM(S): 2 INJECTION INTRAMUSCULAR; INTRAVENOUS; SUBCUTANEOUS at 21:36

## 2018-03-31 RX ADMIN — Medication 60 MILLIGRAM(S): at 05:27

## 2018-03-31 RX ADMIN — Medication 100 MILLIGRAM(S): at 05:27

## 2018-03-31 RX ADMIN — TACROLIMUS 1 MILLIGRAM(S): 5 CAPSULE ORAL at 11:49

## 2018-03-31 RX ADMIN — HYDROMORPHONE HYDROCHLORIDE 0.5 MILLIGRAM(S): 2 INJECTION INTRAMUSCULAR; INTRAVENOUS; SUBCUTANEOUS at 14:53

## 2018-03-31 RX ADMIN — HYDROMORPHONE HYDROCHLORIDE 0.5 MILLIGRAM(S): 2 INJECTION INTRAMUSCULAR; INTRAVENOUS; SUBCUTANEOUS at 15:16

## 2018-03-31 RX ADMIN — TACROLIMUS 1 MILLIGRAM(S): 5 CAPSULE ORAL at 23:10

## 2018-03-31 NOTE — CHART NOTE - NSCHARTNOTEFT_GEN_A_CORE
NP Note (episodic)     Called by DOUGIE Luo for patient with 2x episodes of vomiting. Patient seen and evaluated, Denies fever, chills, SOB, CP, abdominal pain. Last BM 3/30/18.      Vital Signs Last 24 Hrs  T(C): 36.8 (31 Mar 2018 02:10), Max: 37.3 (31 Mar 2018 00:07)  T(F): 98.2 (31 Mar 2018 02:10), Max: 99.2 (31 Mar 2018 00:07)  HR: 110 (31 Mar 2018 02:10) (98 - 111)  BP: 170/98 (31 Mar 2018 02:10) (133/79 - 171/87)  BP(mean): --  RR: 18 (31 Mar 2018 02:10) (16 - 18)  SpO2: 96% (31 Mar 2018 02:10) (95% - 98%)      Labs:                          9.2    9.61  )-----------( 388      ( 30 Mar 2018 16:08 )             31.7     03-30    136  |  96  |  28<H>  ----------------------------<  112<H>  4.9   |  27  |  5.79<H>    Ca    9.4      30 Mar 2018 15:18        PHYSICAL EXAM:  Constitutional: NAD  HEENT: Normocephalic, EOMI  Neck:  No JVD  Respiratory: CTA B/L, No wheezes  Cardiovascular: S1, S2, RRR, + systolic murmur  Gastrointestinal: BS positive in all 4 quadrants, soft, NT/ND  Extremities: No peripheral edema  Neurological: AAOX3, no focal deficits  Psychiatric: Normal mood, normal affect  : No Augustin      No Known Allergies      Assessment & Plan:    56 y/o male with ESRD on HD awaiting permacath placement s/p chest tube removal for pericardiacal tamponde, projectile vomiting    Stat abdominal xray  stat CMP         >  >  >  >        Follow up with Primary Team in AM. NP Note (episodic)     Called by DOUGIE Luo for patient with 2x episodes of vomiting. Patient seen and evaluated, Denies fever, chills, SOB, CP, abdominal pain. Last BM 3/30/18.      Vital Signs Last 24 Hrs  T(C): 36.8 (31 Mar 2018 02:10), Max: 37.3 (31 Mar 2018 00:07)  T(F): 98.2 (31 Mar 2018 02:10), Max: 99.2 (31 Mar 2018 00:07)  HR: 110 (31 Mar 2018 02:10) (98 - 111)  BP: 170/98 (31 Mar 2018 02:10) (133/79 - 171/87)  BP(mean): --  RR: 18 (31 Mar 2018 02:10) (16 - 18)  SpO2: 96% (31 Mar 2018 02:10) (95% - 98%)      Labs:                          9.2    9.61  )-----------( 388      ( 30 Mar 2018 16:08 )             31.7     03-30    136  |  96  |  28<H>  ----------------------------<  112<H>  4.9   |  27  |  5.79<H>    Ca    9.4      30 Mar 2018 15:18        PHYSICAL EXAM:  Constitutional: NAD  HEENT: Normocephalic, EOMI  Neck:  No JVD  Respiratory: CTA B/L, No wheezes  Cardiovascular: S1, S2, RRR, + systolic murmur  Gastrointestinal: BS positive in all 4 quadrants, soft, NT/ND  Extremities: No peripheral edema  Neurological: AAOX3, no focal deficits  Psychiatric: Normal mood, normal affect  : No Augustin      No Known Allergies      Assessment & Plan:    56 y/o male with ESRD on HD awaiting permacath placement s/p chest tube removal for pericardiacal tamponde, projectile vomiting    Stat abdominal xray  stat CMP   attending aware primary team to follow  Paz Domingo, ANP  [id number]            Follow up with Primary Team in AM.

## 2018-03-31 NOTE — PROGRESS NOTE ADULT - ASSESSMENT
Patient with advanced CKD, s/p Pericardial window via Left thoracotomy failed allograft on low dose Tac/prednisone

## 2018-03-31 NOTE — PROGRESS NOTE ADULT - SUBJECTIVE AND OBJECTIVE BOX
VITAL SIGNS    Telemetry:    Vital Signs Last 24 Hrs  T(C): 37.8 (18 @ 15:25), Max: 37.8 (18 @ 15:25)  T(F): 100 (18 @ 15:25), Max: 100 (18 @ 15:25)  HR: 105 (18 @ 15:25) (105 - 113)  BP: 161/99 (18 @ 15:25) (145/86 - 171/87)  RR: 20 (18 @ 15:25) (17 - 20)  SpO2: 100% (18 @ 15:25) (94% - 100%)             @ 07:01  -   @ 07:00  --------------------------------------------------------  IN: 1320 mL / OUT: 2400 mL / NET: -1080 mL     @ 07:01  -   @ 18:32  --------------------------------------------------------  IN: 480 mL / OUT: 0 mL / NET: 480 mL       Daily     Daily Weight in k.1 (31 Mar 2018 15:25)  Admit Wt: Drug Dosing Weight  Height (cm): 180.3 (27 Mar 2018 05:07)  Weight (kg): 88.4 (27 Mar 2018 05:07)  BMI (kg/m2): 27.2 (27 Mar 2018 05:07)  BSA (m2): 2.09 (27 Mar 2018 05:07)     Daily Weight in k.1 (18 @ 15:25)    [facility]      138  |  98  |  23  ----------------------------<  99  4.3   |  27  |  5.69<H>    Ca    9.3      31 Mar 2018 15:50    TPro  6.6  /  Alb  3.3  /  TBili  0.6  /  DBili  x   /  AST  18  /  ALT  <5<L>  /  AlkPhos  50                                   9.2    9.61  )-----------( 388      ( 30 Mar 2018 16:08 )             31.7                  Bilirubin Total, Serum: 0.6 mg/dL ( @ 02:50)    CAPILLARY BLOOD GLUCOSE              Drains:     MS       [  ]   [  ]            L Pleural [  ]            R Pleural  [  ]            BANG  [  ]           Augustin  [  ]    Pacing Wires      [  ]   Settings:                                  Isolated  [  ]                    CXR:      MEDICATIONS  docusate sodium 100 milliGRAM(s) Oral three times a day  HYDROmorphone  Injectable 0.5 milliGRAM(s) IV Push every 6 hours PRN  NIFEdipine XL 60 milliGRAM(s) Oral daily  oxyCODONE    IR 5 milliGRAM(s) Oral every 6 hours PRN  pantoprazole  Injectable 40 milliGRAM(s) IV Push daily  polyethylene glycol 3350 17 Gram(s) Oral daily  predniSONE   Tablet 5 milliGRAM(s) Oral daily  tacrolimus 1 milliGRAM(s) Oral <User Schedule>      PHYSICAL EXAM      Unable to complete physical exam pt off the floor to HD.  As per RN dressings have remained C/D/I.            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

## 2018-03-31 NOTE — PROGRESS NOTE ADULT - ASSESSMENT
58 yo M hx of ESRD, s/p bilateral renal transplants 2/2 to NSAID use, renal transplantation (father's kidney) 1990s, s/p second transplant (sister's kidney) 2013, HTN, and SBO (small bowel obstruction) who presented initially to establish dialysis in the hospital, in light of rising BUN/ Cr , and symptoms of pain with walking noted at his nephrologists office.  On arrival here, patient complained of SOB and pain while walking. On 3/19 TTE was done which revealed Moderate-large circumferential pericardial effusion. 1.9-2.3 cm posterior and lateral to the LV, 0.7cm at RV, 2.1 cm at RA. There is RA inversion, respiratory variation noted at the Tricuspid valve and IVC measures 2.6cm and collapses 20%. There is no RV diastolic collapse seen on this TTE, however the RV is small at times and the RV EF is elevated, consistent early tamponade. Patient started in daily HD, clinically asymptomatic, repeat TTE on 3/25 revealed Large (> 2cm) circumferential pericardial effusion, largest over the right atrium and postero-lateral left ventricle. There is increased respiratory variation across the mitral valve. The right atrium is buckling. The IVC is dilated with < 50% collapse with sniff. Findings are suggestive of tamponade physiology.  CTS consult called to evaluate patient for pericardiocentesis with subxiphoid pericardial window procedure.   On 03/27/2018 s/p Pericardial Window via Left Thoracotomy   Post op Course: Unremarkable   Pain management on PCA   Renal following on HD   3/28 Maintain Pericardial madhu drain x 1 and Left pleural effusion x 1 à Pleural vac f/u CT in AM   3/29 D/C Pericardial drain; f/u CXR; Maintain Left pleural CT --> SD   3/20 Left CT D/C'd --> F/U CXR

## 2018-03-31 NOTE — PROGRESS NOTE ADULT - ASSESSMENT
cardiac tamponade s/p window/drainage  Failed renal transplant s/p shiley   hypertension  anemia  functional bowel obstruction    continue current care  check limited echo  plan for permacath monday  eventual stress test/MRI  bowel regimen with enema and miralax

## 2018-03-31 NOTE — PROGRESS NOTE ADULT - SUBJECTIVE AND OBJECTIVE BOX
MEDICINE, PROGRESS NOTE [phone]    MEAGAN RAMOS 57y [id number]    Patient seen and examined.  Patient is a 57y old  Male who presents with a chief complaint of My kidney is not working (16 Mar 2018 16:07)  Pt had 2 episodes of projectile vomiting overnight.    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  History of partial pancreatectomy  History of renal transplant: x 2    MEDICATIONS  (STANDING):  docusate sodium 100 milliGRAM(s) Oral three times a day  NIFEdipine XL 60 milliGRAM(s) Oral daily  pantoprazole  Injectable 40 milliGRAM(s) IV Push daily  polyethylene glycol 3350 17 Gram(s) Oral daily  predniSONE   Tablet 5 milliGRAM(s) Oral daily  tacrolimus 1 milliGRAM(s) Oral <User Schedule>    MEDICATIONS  (PRN):  HYDROmorphone  Injectable 0.5 milliGRAM(s) IV Push every 6 hours PRN Severe Pain (7 - 10)  oxyCODONE    IR 5 milliGRAM(s) Oral every 6 hours PRN Moderate Pain (4 - 6)    Allergies    No Known Allergies    Intolerances        PHYSICAL EXAM:  Constitutional: NAD  HEENT: Normocephalic, EOMI  Neck:  No JVD  Respiratory: CTA B/L, No wheezes  Cardiovascular: S1, S2, RRR, + systolic murmur  Gastrointestinal: BS+, soft, NT/ND  Extremities: + peripheral edema  Neurological: AAOX3, no focal deficits  Psychiatric: Normal mood, normal affect  : No Augustin    Vital Signs Last 24 Hrs  T(C): 37.8 (31 Mar 2018 15:25), Max: 37.8 (31 Mar 2018 15:25)  T(F): 100 (31 Mar 2018 15:25), Max: 100 (31 Mar 2018 15:25)  HR: 105 (31 Mar 2018 15:25) (105 - 113)  BP: 161/99 (31 Mar 2018 15:25) (145/86 - 171/87)  BP(mean): --  RR: 20 (31 Mar 2018 15:25) (17 - 20)  SpO2: 100% (31 Mar 2018 15:25) (94% - 100%)  I&O's Summary    30 Mar 2018 07:01  -  31 Mar 2018 07:00  --------------------------------------------------------  IN: 1320 mL / OUT: 2400 mL / NET: -1080 mL    31 Mar 2018 07:01  -  31 Mar 2018 17:59  --------------------------------------------------------  IN: 480 mL / OUT: 0 mL / NET: 480 mL        LABS:                        9.2    9.61  )-----------( 388      ( 30 Mar 2018 16:08 )             31.7     03-31    138  |  98  |  23  ----------------------------<  99  4.3   |  27  |  5.69<H>    Ca    9.3      31 Mar 2018 15:50    TPro  6.6  /  Alb  3.3  /  TBili  0.6  /  DBili  x   /  AST  18  /  ALT  <5<L>  /  AlkPhos  50  03-31  < from: Xray Abdomen 1 View PORTABLE -Urgent (03.31.18 @ 04:37) >  EXAM:  XR ABDOMEN PORTABLE URGENT 1V                            PROCEDURE DATE:  03/31/2018            INTERPRETATION:  EXAM: XR ABDOMEN PORTABLE URGENT 1V     HISTORY: Vomiting r/o SBO.    COMPARISON: None.    TECHNIQUE: AP view of the abdomen.    FINDINGS:  There is a mildly dilated loop of small bowel projecting over the left   upper quadrant. No evidence of free air. Moderate amount of stool   projects over the right colon and rectum. Surgical staples project over   the left upper quadrant and right hemiabdomen.    IMPRESSION:  Mildly dilated loop of small bowel projecting over the left upper   quadrant. CT with IV and oral contrast is suggested for further   evaluation as indicated.    TADEO THOMAS MD., ATTENDING RADIOLOGIST  This document has been electronically signed. Mar 31 2018  5:06PM

## 2018-03-31 NOTE — PROGRESS NOTE ADULT - SUBJECTIVE AND OBJECTIVE BOX
[facility] Division of Kidney Diseases & Hypertension  HEMODIALYSIS NOTE  --------------------------------------------------------------------------------  Chief Complaint: ESRD/Ongoing hemodialysis requirement    24 hour events/subjective: No events overnight     PAST HISTORY  --------------------------------------------------------------------------------  No significant changes to PMH, PSH, FHx, SHx, unless otherwise noted    ALLERGIES & MEDICATIONS  --------------------------------------------------------------------------------  Allergies: No Known Allergies    Standing Inpatient Medications  docusate sodium 100 milliGRAM(s) Oral three times a day  epoetin sherlyn Injectable 27491 Unit(s) IV Push once  NIFEdipine XL 60 milliGRAM(s) Oral daily  pantoprazole  Injectable 40 milliGRAM(s) IV Push daily  polyethylene glycol 3350 17 Gram(s) Oral daily  predniSONE   Tablet 5 milliGRAM(s) Oral daily  tacrolimus 1 milliGRAM(s) Oral <User Schedule>    PRN Inpatient Medications  HYDROmorphone  Injectable 0.5 milliGRAM(s) IV Push every 6 hours PRN  oxyCODONE    IR 5 milliGRAM(s) Oral every 6 hours PRN      REVIEW OF SYSTEMS  --------------------------------------------------------------------------------  Gen: No weight changes, fatigue, fevers/chills, weakness  Skin: No rashes  Head/Eyes/Ears/Mouth: No headache; Normal hearing; Normal vision w/o blurriness; No sinus pain/discomfort, sore throat  Respiratory: No dyspnea, cough, wheezing, hemoptysis  CV: No chest pain, PND, orthopnea  GI: No abdominal pain, diarrhea, constipation, nausea, vomiting, melena, hematochezia  : No increased frequency, dysuria, hematuria, nocturia  MSK: No joint pain/swelling; no back pain; no edema  Neuro: No dizziness/lightheadedness, weakness, seizures, numbness, tingling  Heme: No easy bruising or bleeding  Endo: No heat/cold intolerance  Psych: No significant nervousness, anxiety, stress, depression    All other systems were reviewed and are negative, except as noted.    VITALS/PHYSICAL EXAM  --------------------------------------------------------------------------------  T(C): 37.2 (03-31-18 @ 14:28), Max: 37.4 (03-31-18 @ 04:39)  HR: 108 (03-31-18 @ 14:28) (106 - 113)  BP: 152/89 (03-31-18 @ 14:28) (133/79 - 171/87)  RR: 18 (03-31-18 @ 14:28) (16 - 18)  SpO2: 94% (03-31-18 @ 14:28) (94% - 100%)  Wt(kg): --        03-30-18 @ 07:01  -  03-31-18 @ 07:00  --------------------------------------------------------  IN: 1320 mL / OUT: 2400 mL / NET: -1080 mL    03-31-18 @ 07:01  -  03-31-18 @ 15:16  --------------------------------------------------------  IN: 480 mL / OUT: 0 mL / NET: 480 mL      Physical Exam:  	Gen: NAD, well-appearing  	HEENT: PERRL, supple neck, clear oropharynx  	Pulm: CTA B/L  	CV: RRR, S1S2; no rub  	Back: No spinal or CVA tenderness; no sacral edema  	Abd: +BS, soft, nontender/nondistended  	: No suprapubic tenderness  	UE: Warm, FROM, no clubbing, intact strength; no edema; no asterixis  	LE: Warm, FROM, no clubbing, intact strength; no edema  	Neuro: No focal deficits, intact gait  	Psych: Normal affect and mood  	Skin: Warm, without rashes  	Vascular access:    LABS/STUDIES  --------------------------------------------------------------------------------              9.2    9.61  >-----------<  388      [03-30-18 @ 16:08]              31.7     140  |  99  |  18  ----------------------------<  112      [03-31-18 @ 02:50]  4.2   |  29  |  4.55        Ca     9.4     [03-31-18 @ 02:50]    TPro  6.6  /  Alb  3.3  /  TBili  0.6  /  DBili  x   /  AST  18  /  ALT  <5  /  AlkPhos  50  [03-31-18 @ 02:50] [facility] Division of Kidney Diseases & Hypertension  HEMODIALYSIS NOTE  --------------------------------------------------------------------------------  Chief Complaint: ESRD/Ongoing hemodialysis requirement    24 hour events/subjective: No events overnight     PAST HISTORY  --------------------------------------------------------------------------------  No significant changes to PMH, PSH, FHx, SHx, unless otherwise noted    ALLERGIES & MEDICATIONS  --------------------------------------------------------------------------------  Allergies: No Known Allergies    Standing Inpatient Medications  docusate sodium 100 milliGRAM(s) Oral three times a day  epoetin sherlyn Injectable 26804 Unit(s) IV Push once  NIFEdipine XL 60 milliGRAM(s) Oral daily  pantoprazole  Injectable 40 milliGRAM(s) IV Push daily  polyethylene glycol 3350 17 Gram(s) Oral daily  predniSONE   Tablet 5 milliGRAM(s) Oral daily  tacrolimus 1 milliGRAM(s) Oral <User Schedule>    PRN Inpatient Medications  HYDROmorphone  Injectable 0.5 milliGRAM(s) IV Push every 6 hours PRN  oxyCODONE    IR 5 milliGRAM(s) Oral every 6 hours PRN      REVIEW OF SYSTEMS  --------------------------------------------------------------------------------  Gen: No weight changes, fatigue, fevers/chills, weakness  Skin: No rashes  Head/Eyes/Ears/Mouth: No headache; Normal hearing; Normal vision w/o blurriness; No sinus pain/discomfort, sore throat  Respiratory: No dyspnea, cough, wheezing, hemoptysis  CV: No chest pain, PND, orthopnea  GI: No abdominal pain, diarrhea, constipation, nausea, vomiting, melena, hematochezia  : No increased frequency, dysuria, hematuria, nocturia  MSK: No joint pain/swelling; no back pain; no edema  Neuro: No dizziness/lightheadedness, weakness, seizures, numbness, tingling  Heme: No easy bruising or bleeding  Endo: No heat/cold intolerance  Psych: No significant nervousness, anxiety, stress, depression    All other systems were reviewed and are negative, except as noted.    VITALS/PHYSICAL EXAM  --------------------------------------------------------------------------------  T(C): 37.2 (03-31-18 @ 14:28), Max: 37.4 (03-31-18 @ 04:39)  HR: 108 (03-31-18 @ 14:28) (106 - 113)  BP: 152/89 (03-31-18 @ 14:28) (133/79 - 171/87)  RR: 18 (03-31-18 @ 14:28) (16 - 18)  SpO2: 94% (03-31-18 @ 14:28) (94% - 100%)  Wt(kg): --        03-30-18 @ 07:01  -  03-31-18 @ 07:00  --------------------------------------------------------  IN: 1320 mL / OUT: 2400 mL / NET: -1080 mL    03-31-18 @ 07:01  -  03-31-18 @ 15:16  --------------------------------------------------------  IN: 480 mL / OUT: 0 mL / NET: 480 mL      Physical Exam:  	Gen: NAD, well-appearing, sitting in chair  	HEENT: PERRL   	Pulm: Left base with fine crackles   	CV: RRR, S1S2; no rub  	Back: No spinal or CVA tenderness  	Abd: +BS, soft, nontender/nondistended  	: No suprapubic tenderness  	UE: Warm, FROM, no edema   	LE: Warm, FROM, +2-3 b/l LE edema   	Neuro: No focal deficits, intact gait  	Psych: Normal affect and mood  	Skin: Warm, without rashes  	Vascular access: Right IJ non-tunneled catheter     LABS/STUDIES  --------------------------------------------------------------------------------              9.2    9.61  >-----------<  388      [03-30-18 @ 16:08]              31.7     140  |  99  |  18  ----------------------------<  112      [03-31-18 @ 02:50]  4.2   |  29  |  4.55        Ca     9.4     [03-31-18 @ 02:50]    TPro  6.6  /  Alb  3.3  /  TBili  0.6  /  DBili  x   /  AST  18  /  ALT  <5  /  AlkPhos  50  [03-31-18 @ 02:50]

## 2018-04-01 LAB
ANION GAP SERPL CALC-SCNC: 13 MMOL/L — SIGNIFICANT CHANGE UP (ref 5–17)
BUN SERPL-MCNC: 21 MG/DL — SIGNIFICANT CHANGE UP (ref 7–23)
CALCIUM SERPL-MCNC: 8.8 MG/DL — SIGNIFICANT CHANGE UP (ref 8.4–10.5)
CHLORIDE SERPL-SCNC: 95 MMOL/L — LOW (ref 96–108)
CO2 SERPL-SCNC: 28 MMOL/L — SIGNIFICANT CHANGE UP (ref 22–31)
CREAT SERPL-MCNC: 5.29 MG/DL — HIGH (ref 0.5–1.3)
CULTURE RESULTS: NO GROWTH — SIGNIFICANT CHANGE UP
GLUCOSE SERPL-MCNC: 88 MG/DL — SIGNIFICANT CHANGE UP (ref 70–99)
HCT VFR BLD CALC: 30.8 % — LOW (ref 39–50)
HGB BLD-MCNC: 9.2 G/DL — LOW (ref 13–17)
MCHC RBC-ENTMCNC: 28.8 PG — SIGNIFICANT CHANGE UP (ref 27–34)
MCHC RBC-ENTMCNC: 29.9 GM/DL — LOW (ref 32–36)
MCV RBC AUTO: 96.3 FL — SIGNIFICANT CHANGE UP (ref 80–100)
NRBC # BLD: 0 /100 WBCS — SIGNIFICANT CHANGE UP (ref 0–0)
PLATELET # BLD AUTO: 415 K/UL — HIGH (ref 150–400)
POTASSIUM SERPL-MCNC: 4 MMOL/L — SIGNIFICANT CHANGE UP (ref 3.5–5.3)
POTASSIUM SERPL-SCNC: 4 MMOL/L — SIGNIFICANT CHANGE UP (ref 3.5–5.3)
RBC # BLD: 3.2 M/UL — LOW (ref 4.2–5.8)
RBC # FLD: 17.1 % — HIGH (ref 10.3–14.5)
SODIUM SERPL-SCNC: 136 MMOL/L — SIGNIFICANT CHANGE UP (ref 135–145)
SPECIMEN SOURCE: SIGNIFICANT CHANGE UP
WBC # BLD: 7.19 K/UL — SIGNIFICANT CHANGE UP (ref 3.8–10.5)
WBC # FLD AUTO: 7.19 K/UL — SIGNIFICANT CHANGE UP (ref 3.8–10.5)

## 2018-04-01 RX ADMIN — Medication 100 MILLIGRAM(S): at 23:53

## 2018-04-01 RX ADMIN — PANTOPRAZOLE SODIUM 40 MILLIGRAM(S): 20 TABLET, DELAYED RELEASE ORAL at 11:00

## 2018-04-01 RX ADMIN — HYDROMORPHONE HYDROCHLORIDE 0.5 MILLIGRAM(S): 2 INJECTION INTRAMUSCULAR; INTRAVENOUS; SUBCUTANEOUS at 08:14

## 2018-04-01 RX ADMIN — TACROLIMUS 1 MILLIGRAM(S): 5 CAPSULE ORAL at 23:53

## 2018-04-01 RX ADMIN — Medication 60 MILLIGRAM(S): at 07:07

## 2018-04-01 RX ADMIN — Medication 100 MILLIGRAM(S): at 11:00

## 2018-04-01 RX ADMIN — HYDROMORPHONE HYDROCHLORIDE 0.5 MILLIGRAM(S): 2 INJECTION INTRAMUSCULAR; INTRAVENOUS; SUBCUTANEOUS at 08:44

## 2018-04-01 RX ADMIN — POLYETHYLENE GLYCOL 3350 17 GRAM(S): 17 POWDER, FOR SOLUTION ORAL at 11:01

## 2018-04-01 RX ADMIN — Medication 5 MILLIGRAM(S): at 11:00

## 2018-04-01 RX ADMIN — TACROLIMUS 1 MILLIGRAM(S): 5 CAPSULE ORAL at 11:00

## 2018-04-01 RX ADMIN — HYDROMORPHONE HYDROCHLORIDE 0.5 MILLIGRAM(S): 2 INJECTION INTRAMUSCULAR; INTRAVENOUS; SUBCUTANEOUS at 19:03

## 2018-04-01 RX ADMIN — Medication 100 MILLIGRAM(S): at 07:07

## 2018-04-01 NOTE — PROGRESS NOTE ADULT - ASSESSMENT
cardiac tamponade s/p window/drainage  Failed renal transplant s/p shiley   hypertension  anemia  functional bowel obstruction    pt feels better after enema  tolerating po intake  await limited echo  plan for permacath sulema  eventual stress test  mri abd per urology

## 2018-04-01 NOTE — PROGRESS NOTE ADULT - SUBJECTIVE AND OBJECTIVE BOX
MEDICINE, PROGRESS NOTE [phone]    MEAGAN RAMOS 57y [id number]    Patient seen and examined.  Patient is a 57y old  Male who presents with a chief complaint of My kidney is not working (16 Mar 2018 16:07)  Pt has no current complaints.    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  History of partial pancreatectomy  History of renal transplant: x 2    MEDICATIONS  (STANDING):  docusate sodium 100 milliGRAM(s) Oral three times a day  NIFEdipine XL 60 milliGRAM(s) Oral daily  pantoprazole  Injectable 40 milliGRAM(s) IV Push daily  polyethylene glycol 3350 17 Gram(s) Oral daily  predniSONE   Tablet 5 milliGRAM(s) Oral daily  tacrolimus 1 milliGRAM(s) Oral <User Schedule>    MEDICATIONS  (PRN):  HYDROmorphone  Injectable 0.5 milliGRAM(s) IV Push every 6 hours PRN Severe Pain (7 - 10)  oxyCODONE    IR 5 milliGRAM(s) Oral every 6 hours PRN Moderate Pain (4 - 6)    Allergies    No Known Allergies    Intolerances        PHYSICAL EXAM:  Constitutional: NAD  HEENT: Normocephalic, EOMI  Neck:  No JVD,  Respiratory: CTA B/L, No wheezes  Cardiovascular: S1, S2, RRR, + systolic murmur  Gastrointestinal: BS+, soft, NT/ND  Extremities: No peripheral edema  Neurological: AAOX3, no focal deficits  Psychiatric: Normal mood, normal affect  : No Augustin    Vital Signs Last 24 Hrs  T(C): 37.1 (01 Apr 2018 14:31), Max: 37.1 (01 Apr 2018 14:31)  T(F): 98.7 (01 Apr 2018 14:31), Max: 98.7 (01 Apr 2018 14:31)  HR: 110 (01 Apr 2018 14:31) (86 - 110)  BP: 153/88 (01 Apr 2018 14:31) (153/88 - 166/90)  BP(mean): --  RR: 18 (01 Apr 2018 14:31) (18 - 20)  SpO2: 97% (01 Apr 2018 14:31) (96% - 98%)  I&O's Summary    31 Mar 2018 07:01  -  01 Apr 2018 07:00  --------------------------------------------------------  IN: 720 mL / OUT: 2000 mL / NET: -1280 mL    01 Apr 2018 07:01  -  01 Apr 2018 15:54  --------------------------------------------------------  IN: 240 mL / OUT: 0 mL / NET: 240 mL        LABS:                        9.2    7.19  )-----------( 415      ( 01 Apr 2018 09:25 )             30.8     04-01    136  |  95<L>  |  21  ----------------------------<  88  4.0   |  28  |  5.29<H>    Ca    8.8      01 Apr 2018 07:51    TPro  6.6  /  Alb  3.3  /  TBili  0.6  /  DBili  x   /  AST  18  /  ALT  <5<L>  /  AlkPhos  50  03-31

## 2018-04-02 PROCEDURE — 99232 SBSQ HOSP IP/OBS MODERATE 35: CPT | Mod: GC

## 2018-04-02 RX ADMIN — Medication 60 MILLIGRAM(S): at 05:10

## 2018-04-02 RX ADMIN — HYDROMORPHONE HYDROCHLORIDE 0.5 MILLIGRAM(S): 2 INJECTION INTRAMUSCULAR; INTRAVENOUS; SUBCUTANEOUS at 13:29

## 2018-04-02 RX ADMIN — POLYETHYLENE GLYCOL 3350 17 GRAM(S): 17 POWDER, FOR SOLUTION ORAL at 11:27

## 2018-04-02 RX ADMIN — TACROLIMUS 1 MILLIGRAM(S): 5 CAPSULE ORAL at 11:27

## 2018-04-02 RX ADMIN — HYDROMORPHONE HYDROCHLORIDE 0.5 MILLIGRAM(S): 2 INJECTION INTRAMUSCULAR; INTRAVENOUS; SUBCUTANEOUS at 20:45

## 2018-04-02 RX ADMIN — Medication 5 MILLIGRAM(S): at 11:27

## 2018-04-02 RX ADMIN — HYDROMORPHONE HYDROCHLORIDE 0.5 MILLIGRAM(S): 2 INJECTION INTRAMUSCULAR; INTRAVENOUS; SUBCUTANEOUS at 13:10

## 2018-04-02 RX ADMIN — PANTOPRAZOLE SODIUM 40 MILLIGRAM(S): 20 TABLET, DELAYED RELEASE ORAL at 05:10

## 2018-04-02 RX ADMIN — Medication 100 MILLIGRAM(S): at 05:10

## 2018-04-02 RX ADMIN — Medication 100 MILLIGRAM(S): at 22:20

## 2018-04-02 RX ADMIN — Medication 100 MILLIGRAM(S): at 13:12

## 2018-04-02 RX ADMIN — HYDROMORPHONE HYDROCHLORIDE 0.5 MILLIGRAM(S): 2 INJECTION INTRAMUSCULAR; INTRAVENOUS; SUBCUTANEOUS at 20:07

## 2018-04-02 RX ADMIN — TACROLIMUS 1 MILLIGRAM(S): 5 CAPSULE ORAL at 22:21

## 2018-04-02 NOTE — PROVIDER CONTACT NOTE (OTHER) - REASON
Elevated BP
Patient's saturation
Pt c/o b/l hand spasms. Pt c/o feeling hard bump felt under skin of L-upper arm. Pt request aspirin PO to be d/c and add on furosemide 2 pills of 40mg PO.
Pt refusing 10:30AM tacro lab draw. Pt also continues to refuse all heparin subQ.
clarify Chest Tube orders
patient continues to refuse all heparin subQ.
pt desaturating on room air
Pt vomiting

## 2018-04-02 NOTE — PROGRESS NOTE ADULT - ASSESSMENT
ardiac tamponade s/p window/drainage  Failed renal transplant s/p shiley   hypertension  anemia  functional bowel obstruction- resolved    continue current care  await IR permacath  MRI per urology  await limited echo to assess effusion  eventual stress test  d/c planning once studies/procedures/ HD set up is made

## 2018-04-02 NOTE — PROGRESS NOTE ADULT - SUBJECTIVE AND OBJECTIVE BOX
MEDICINE, PROGRESS NOTE [phone]    MEAGAN RAMOS 57y [id number]    Patient seen and examined.  Patient is a 57y old  Male who presents with a chief complaint of My kidney is not working (16 Mar 2018 16:07)  Pt has no new complaints. Feels well and is ambulating well.    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  History of partial pancreatectomy  History of renal transplant: x 2    MEDICATIONS  (STANDING):  docusate sodium 100 milliGRAM(s) Oral three times a day  NIFEdipine XL 60 milliGRAM(s) Oral daily  pantoprazole  Injectable 40 milliGRAM(s) IV Push daily  polyethylene glycol 3350 17 Gram(s) Oral daily  predniSONE   Tablet 5 milliGRAM(s) Oral daily  tacrolimus 1 milliGRAM(s) Oral <User Schedule>    MEDICATIONS  (PRN):  HYDROmorphone  Injectable 0.5 milliGRAM(s) IV Push every 6 hours PRN Severe Pain (7 - 10)  oxyCODONE    IR 5 milliGRAM(s) Oral every 6 hours PRN Moderate Pain (4 - 6)    Allergies    No Known Allergies    Intolerances        PHYSICAL EXAM:  Constitutional: NAD  HEENT: Normocephalic, EOMI  Neck:  No JVD  Respiratory: CTA B/L, No wheezes  Cardiovascular: S1, S2, RRR, + systolic murmur  Gastrointestinal: BS+, soft, NT/ND  Extremities: + peripheral edema le b/l  Neurological: AAOX3, no focal deficits  Psychiatric: Normal mood, normal affect  : No Augustin    Vital Signs Last 24 Hrs  T(C): 37 (02 Apr 2018 09:08), Max: 37.1 (01 Apr 2018 14:31)  T(F): 98.6 (02 Apr 2018 09:08), Max: 98.7 (01 Apr 2018 14:31)  HR: 100 (02 Apr 2018 09:08) (93 - 110)  BP: 164/97 (02 Apr 2018 09:08) (139/78 - 164/97)  BP(mean): --  RR: 18 (02 Apr 2018 09:08) (18 - 18)  SpO2: 97% (02 Apr 2018 09:08) (94% - 97%)  I&O's Summary    01 Apr 2018 07:01  -  02 Apr 2018 07:00  --------------------------------------------------------  IN: 480 mL / OUT: 625 mL / NET: -145 mL        LABS:                        9.2    7.19  )-----------( 415      ( 01 Apr 2018 09:25 )             30.8     04-01    136  |  95<L>  |  21  ----------------------------<  88  4.0   |  28  |  5.29<H>    Ca    8.8      01 Apr 2018 07:51

## 2018-04-02 NOTE — PROGRESS NOTE ADULT - SUBJECTIVE AND OBJECTIVE BOX
[facility] DIVISION OF KIDNEY DISEASES AND HYPERTENSION -- [phone]   FOLLOW UP NOTE  --------------------------------------------------------------------------------  HPI:        PAST HISTORY  --------------------------------------------------------------------------------  No significant changes to PMH, PSH, FHx, SHx, unless otherwise noted    ALLERGIES & MEDICATIONS  --------------------------------------------------------------------------------  Allergies    No Known Allergies    Intolerances      Standing Inpatient Medications  docusate sodium 100 milliGRAM(s) Oral three times a day  NIFEdipine XL 60 milliGRAM(s) Oral daily  pantoprazole  Injectable 40 milliGRAM(s) IV Push daily  polyethylene glycol 3350 17 Gram(s) Oral daily  predniSONE   Tablet 5 milliGRAM(s) Oral daily  tacrolimus 1 milliGRAM(s) Oral <User Schedule>    PRN Inpatient Medications  HYDROmorphone  Injectable 0.5 milliGRAM(s) IV Push every 6 hours PRN  oxyCODONE    IR 5 milliGRAM(s) Oral every 6 hours PRN      REVIEW OF SYSTEMS  --------------------------------------------------------------------------------  General: no fever  CVS: no chest pain  RESP: no sob, no cough  ABD: no abdominal pain  : no dysuria,  MSK: no edema     VITALS/PHYSICAL EXAM  --------------------------------------------------------------------------------  T(C): 37 (04-02-18 @ 14:28), Max: 37 (04-02-18 @ 09:08)  HR: 108 (04-02-18 @ 14:28) (93 - 108)  BP: 160/94 (04-02-18 @ 14:28) (139/78 - 164/97)  RR: 18 (04-02-18 @ 14:28) (18 - 18)  SpO2: 97% (04-02-18 @ 14:28) (94% - 97%)  Wt(kg): --  Height (cm): 180.3 (04-02-18 @ 08:26)  Weight (kg): 88.4 (04-02-18 @ 08:26)  BMI (kg/m2): 27.2 (04-02-18 @ 08:26)  BSA (m2): 2.09 (04-02-18 @ 08:26)      04-01-18 @ 07:01  -  04-02-18 @ 07:00  --------------------------------------------------------  IN: 480 mL / OUT: 625 mL / NET: -145 mL    04-02-18 @ 07:01  -  04-02-18 @ 16:24  --------------------------------------------------------  IN: 380 mL / OUT: 0 mL / NET: 380 mL      Physical Exam:  	Gen: NAD, sitting in chair   	HEENT: MMM  	Pulm: CTA B/L  	CV: S1S2  	Abd: Soft, +BS  	Ext:  LE edema B/L present                       Neuro: Awake   	Skin: Warm and Dry   	Vascular access: Right non-tunnelled HD catheter     LABS/STUDIES  --------------------------------------------------------------------------------              9.2    7.19  >-----------<  415      [04-01-18 @ 09:25]              30.8     136  |  95  |  21  ----------------------------<  88      [04-01-18 @ 07:51]  4.0   |  28  |  5.29        Ca     8.8     [04-01-18 @ 07:51]            Creatinine Trend:  SCr 5.29 [04-01 @ 07:51]  SCr 5.69 [03-31 @ 15:50]  SCr 4.55 [03-31 @ 02:50]  SCr 5.79 [03-30 @ 15:18]  SCr 4.88 [03-29 @ 13:59]        Iron 24, TIBC 161, %sat 15      [03-20-18 @ 11:31]  Ferritin 250      [03-19-18 @ 20:02]    HBsAb <3.0      [03-17-18 @ 05:35]  HBsAg Nonreact      [03-17-18 @ 05:35]  HBcAb Nonreact      [03-17-18 @ 05:35]  HCV 0.12, Nonreact      [03-17-18 @ 05:35] [facility] DIVISION OF KIDNEY DISEASES AND HYPERTENSION -- [phone]   FOLLOW UP NOTE  --------------------------------------------------------------------------------  24 hr events:  Pt seen and examined. Barbara bragg, chest pain  States dialysis has been going well.     PAST HISTORY  --------------------------------------------------------------------------------  No significant changes to PMH, PSH, FHx, SHx, unless otherwise noted    ALLERGIES & MEDICATIONS  --------------------------------------------------------------------------------  Allergies    No Known Allergies    Intolerances      Standing Inpatient Medications  docusate sodium 100 milliGRAM(s) Oral three times a day  NIFEdipine XL 60 milliGRAM(s) Oral daily  pantoprazole  Injectable 40 milliGRAM(s) IV Push daily  polyethylene glycol 3350 17 Gram(s) Oral daily  predniSONE   Tablet 5 milliGRAM(s) Oral daily  tacrolimus 1 milliGRAM(s) Oral <User Schedule>    PRN Inpatient Medications  HYDROmorphone  Injectable 0.5 milliGRAM(s) IV Push every 6 hours PRN  oxyCODONE    IR 5 milliGRAM(s) Oral every 6 hours PRN      REVIEW OF SYSTEMS  --------------------------------------------------------------------------------  General: no fever  CVS: no chest pain  RESP: no sob, no cough  ABD: no abdominal pain  : no dysuria,  MSK: + edema     VITALS/PHYSICAL EXAM  --------------------------------------------------------------------------------  T(C): 37 (04-02-18 @ 14:28), Max: 37 (04-02-18 @ 09:08)  HR: 108 (04-02-18 @ 14:28) (93 - 108)  BP: 160/94 (04-02-18 @ 14:28) (139/78 - 164/97)  RR: 18 (04-02-18 @ 14:28) (18 - 18)  SpO2: 97% (04-02-18 @ 14:28) (94% - 97%)  Wt(kg): --  Height (cm): 180.3 (04-02-18 @ 08:26)  Weight (kg): 88.4 (04-02-18 @ 08:26)  BMI (kg/m2): 27.2 (04-02-18 @ 08:26)  BSA (m2): 2.09 (04-02-18 @ 08:26)      04-01-18 @ 07:01  -  04-02-18 @ 07:00  --------------------------------------------------------  IN: 480 mL / OUT: 625 mL / NET: -145 mL    04-02-18 @ 07:01  -  04-02-18 @ 16:24  --------------------------------------------------------  IN: 380 mL / OUT: 0 mL / NET: 380 mL      Physical Exam:  	Gen: NAD, sitting in chair   	HEENT: MMM  	Pulm: CTA B/L  	CV: S1S2  	Abd: Soft, +BS  	Ext:  LE edema B/L present                       Neuro: Awake   	Skin: Warm and Dry   	Vascular access: Right non-tunnelled HD catheter     LABS/STUDIES  --------------------------------------------------------------------------------              9.2    7.19  >-----------<  415      [04-01-18 @ 09:25]              30.8     136  |  95  |  21  ----------------------------<  88      [04-01-18 @ 07:51]  4.0   |  28  |  5.29        Ca     8.8     [04-01-18 @ 07:51]            Creatinine Trend:  SCr 5.29 [04-01 @ 07:51]  SCr 5.69 [03-31 @ 15:50]  SCr 4.55 [03-31 @ 02:50]  SCr 5.79 [03-30 @ 15:18]  SCr 4.88 [03-29 @ 13:59]        Iron 24, TIBC 161, %sat 15      [03-20-18 @ 11:31]  Ferritin 250      [03-19-18 @ 20:02]    HBsAb <3.0      [03-17-18 @ 05:35]  HBsAg Nonreact      [03-17-18 @ 05:35]  HBcAb Nonreact      [03-17-18 @ 05:35]  HCV 0.12, Nonreact      [03-17-18 @ 05:35]

## 2018-04-02 NOTE — PROVIDER CONTACT NOTE (OTHER) - ASSESSMENT
Pt A&Ox4, denies pain or discomfort. Not currently nauseous.
Pt A&Ox4, sitting in armchair, wearing reading glasses, denies any pain/discomfort. Pt VS as charted. Pt c/o b/l hand spasms. Pt c/o feeling hard bump felt under skin of L-upper arm.
Pt A&Ox4, wears reading glasses, sitting up in bed. Pt denies any pain/discomfort at this time. Pt R-IJ livan dressing dry & intact.
Pt A&Ox4, wears reading glasses, sitting up in bed. Pt denies any pain/discomfort at this time. Pt R-IJ livan dressing dry & intact.
Pt without "usual" meds of clonodine, furosemide at this time secondary to HD daily.
no acute distress noted when patient returned to the unit, saturation 94% on 3L of oxygen.  (see flow sheet).
no signs of distress, VSS, pca pump on  pt has 2 chest tubes from L lateral chest
spO2 87% on room air, pt denies difficulty breathing, no signs of distress  RR wdl  VSS on 3LO2 via NC

## 2018-04-02 NOTE — PROVIDER CONTACT NOTE (OTHER) - BACKGROUND
Pt originally admitted for emergent dialysis. States that he has not vomited since admission on 3/16. Had one episode last evening however did not notify RN.
56 yo male with kidney transplant failure, h/o HTN.
Kidney transplant failure
Pt 57yoM, direct admit for kidney transplant failure & for HD, [facility]. PMH of 2 kidney transplants, SBO, HTN.
Pt 57yoM, direct admit for kidney transplant failure & for HD, [facility]. PMH of 2 kidney transplants, SBO, HTN.
Pt 57yoM, direct admit for shiley placement. R-IJ HD access placed 3/16/18, had HD 3/16/18. PMH of 2 renal transplants, SBO, renal failure, HTN.
adm with kidney transplant failure, new HD, pericardial effusion
adm with kidney transplant rejection   s/p chest tubes x2 with PCA pump

## 2018-04-02 NOTE — PROVIDER CONTACT NOTE (OTHER) - DATE AND TIME:
02-Apr-2018 06:50
16-Mar-2016 18:40
17-Mar-2018 11:40
18-Mar-2018 10:33
26-Mar-2018 03:02
28-Mar-2018 20:00
29-Mar-2018 00:55
31-Mar-2018 02:00

## 2018-04-02 NOTE — PROVIDER CONTACT NOTE (OTHER) - SITUATION
Pt vomiting, approximately 750cc, yellow in color w/ particles of food. Pt states he was resting in bed and felt suddenly nauseous.
Elevated BP this AM
Informed by RN in IR patient's pulse ox. was in the 80's on room air during procedure. Oxygen 3 liters was applied.
Pt c/o b/l hand spasms. Pt c/o feeling hard bump felt under skin of L-upper arm . Pt request aspirin PO to be d/c and add on furosemide 2 pills of 40mg PO.
Pt has 2 Chest tubes originating from L lateral chest, currently to LWS   orders say to h20 suction, only one order for chest tube, one for mediastinal drain
Pt refusing 10:30AM tacro lab draw. Pt also continues to refuse all heparin subQ.
Pt spO2 87% on room air  placed on 3LO2, spO2 now 94%
patient continues to refuse all heparin subQ.

## 2018-04-02 NOTE — PROVIDER CONTACT NOTE (OTHER) - NAME OF MD/NP/PA/DO NOTIFIED:
Berenice Villarreal NP
KULDEEP Steen
KULDEEP Thayer
KULDEEP Thayer
SHAQ Baez
SHAQ Baez
alan laureano NP
Paz Domingo, NP

## 2018-04-02 NOTE — PROVIDER CONTACT NOTE (OTHER) - ACTION/TREATMENT ORDERED:
Provider to come and assess pt. Stat lab ordered as well as x-ray of abdomen.
Will continue to monitor
Do AM lab draws with tacro lab draw. Give tacro at 11am as ordered. Educate pt on importance of heparin.
Educate pt on importance of heparin. pt has the right to refuse heparin
Repeat BP later this AM.
Will verify medications w/ MD, change orders if needed. Add magnesium & phosphorus labs to check electrolytes. Continue to monitor.
keep both chest tubes to low wall suction as per PA Baez  orders not adjusted at this time.
monitor pt on O2 via NC and continuous spO2 monitoring

## 2018-04-03 LAB
ANION GAP SERPL CALC-SCNC: 17 MMOL/L — SIGNIFICANT CHANGE UP (ref 5–17)
BUN SERPL-MCNC: 41 MG/DL — HIGH (ref 7–23)
CALCIUM SERPL-MCNC: 9.2 MG/DL — SIGNIFICANT CHANGE UP (ref 8.4–10.5)
CHLORIDE SERPL-SCNC: 96 MMOL/L — SIGNIFICANT CHANGE UP (ref 96–108)
CO2 SERPL-SCNC: 24 MMOL/L — SIGNIFICANT CHANGE UP (ref 22–31)
CREAT SERPL-MCNC: 9.56 MG/DL — HIGH (ref 0.5–1.3)
GLUCOSE SERPL-MCNC: 181 MG/DL — HIGH (ref 70–99)
HCT VFR BLD CALC: 30.8 % — LOW (ref 39–50)
HGB BLD-MCNC: 9.3 G/DL — LOW (ref 13–17)
MCHC RBC-ENTMCNC: 29.1 PG — SIGNIFICANT CHANGE UP (ref 27–34)
MCHC RBC-ENTMCNC: 30.2 GM/DL — LOW (ref 32–36)
MCV RBC AUTO: 96.3 FL — SIGNIFICANT CHANGE UP (ref 80–100)
NRBC # BLD: 0 /100 WBCS — SIGNIFICANT CHANGE UP (ref 0–0)
PLATELET # BLD AUTO: 569 K/UL — HIGH (ref 150–400)
POTASSIUM SERPL-MCNC: 5 MMOL/L — SIGNIFICANT CHANGE UP (ref 3.5–5.3)
POTASSIUM SERPL-SCNC: 5 MMOL/L — SIGNIFICANT CHANGE UP (ref 3.5–5.3)
RBC # BLD: 3.2 M/UL — LOW (ref 4.2–5.8)
RBC # FLD: 17.3 % — HIGH (ref 10.3–14.5)
SODIUM SERPL-SCNC: 137 MMOL/L — SIGNIFICANT CHANGE UP (ref 135–145)
WBC # BLD: 8.21 K/UL — SIGNIFICANT CHANGE UP (ref 3.8–10.5)
WBC # FLD AUTO: 8.21 K/UL — SIGNIFICANT CHANGE UP (ref 3.8–10.5)

## 2018-04-03 PROCEDURE — 72195 MRI PELVIS W/O DYE: CPT | Mod: 26

## 2018-04-03 PROCEDURE — 74181 MRI ABDOMEN W/O CONTRAST: CPT | Mod: 26

## 2018-04-03 PROCEDURE — 93308 TTE F-UP OR LMTD: CPT | Mod: 26

## 2018-04-03 PROCEDURE — 36558 INSERT TUNNELED CV CATH: CPT

## 2018-04-03 PROCEDURE — 93321 DOPPLER ECHO F-UP/LMTD STD: CPT | Mod: 26

## 2018-04-03 RX ORDER — HYDROMORPHONE HYDROCHLORIDE 2 MG/ML
0.5 INJECTION INTRAMUSCULAR; INTRAVENOUS; SUBCUTANEOUS ONCE
Qty: 0 | Refills: 0 | Status: DISCONTINUED | OUTPATIENT
Start: 2018-04-03 | End: 2018-04-03

## 2018-04-03 RX ORDER — NIFEDIPINE 30 MG
30 TABLET, EXTENDED RELEASE 24 HR ORAL ONCE
Qty: 0 | Refills: 0 | Status: COMPLETED | OUTPATIENT
Start: 2018-04-03 | End: 2018-04-03

## 2018-04-03 RX ADMIN — HYDROMORPHONE HYDROCHLORIDE 0.5 MILLIGRAM(S): 2 INJECTION INTRAMUSCULAR; INTRAVENOUS; SUBCUTANEOUS at 22:58

## 2018-04-03 RX ADMIN — Medication 100 MILLIGRAM(S): at 21:20

## 2018-04-03 RX ADMIN — Medication 30 MILLIGRAM(S): at 22:57

## 2018-04-03 RX ADMIN — HYDROMORPHONE HYDROCHLORIDE 0.5 MILLIGRAM(S): 2 INJECTION INTRAMUSCULAR; INTRAVENOUS; SUBCUTANEOUS at 15:50

## 2018-04-03 RX ADMIN — Medication 60 MILLIGRAM(S): at 05:41

## 2018-04-03 RX ADMIN — HYDROMORPHONE HYDROCHLORIDE 0.5 MILLIGRAM(S): 2 INJECTION INTRAMUSCULAR; INTRAVENOUS; SUBCUTANEOUS at 07:30

## 2018-04-03 RX ADMIN — TACROLIMUS 1 MILLIGRAM(S): 5 CAPSULE ORAL at 22:57

## 2018-04-03 RX ADMIN — PANTOPRAZOLE SODIUM 40 MILLIGRAM(S): 20 TABLET, DELAYED RELEASE ORAL at 05:41

## 2018-04-03 RX ADMIN — Medication 100 MILLIGRAM(S): at 05:41

## 2018-04-03 RX ADMIN — TACROLIMUS 1 MILLIGRAM(S): 5 CAPSULE ORAL at 13:59

## 2018-04-03 RX ADMIN — Medication 5 MILLIGRAM(S): at 13:59

## 2018-04-03 RX ADMIN — HYDROMORPHONE HYDROCHLORIDE 0.5 MILLIGRAM(S): 2 INJECTION INTRAMUSCULAR; INTRAVENOUS; SUBCUTANEOUS at 06:46

## 2018-04-03 RX ADMIN — HYDROMORPHONE HYDROCHLORIDE 0.5 MILLIGRAM(S): 2 INJECTION INTRAMUSCULAR; INTRAVENOUS; SUBCUTANEOUS at 23:28

## 2018-04-03 RX ADMIN — POLYETHYLENE GLYCOL 3350 17 GRAM(S): 17 POWDER, FOR SOLUTION ORAL at 13:59

## 2018-04-03 RX ADMIN — Medication 100 MILLIGRAM(S): at 13:59

## 2018-04-03 RX ADMIN — HYDROMORPHONE HYDROCHLORIDE 0.5 MILLIGRAM(S): 2 INJECTION INTRAMUSCULAR; INTRAVENOUS; SUBCUTANEOUS at 15:18

## 2018-04-03 NOTE — PROCEDURE NOTE - NSPROCDETAILS_GEN_ALL_CORE
guidewire recovered/lumen(s) aspirated and flushed/sterile dressing applied/sterile technique, catheter placed

## 2018-04-03 NOTE — PROGRESS NOTE ADULT - SUBJECTIVE AND OBJECTIVE BOX
Interventional Radiology     57y Male with PMH as below with ESRD on HD, h/o previously failed kidney transplant now referred to IR for conversion of temporary HD catheter to tunnelled HD catheter.  Pt previously had Right IJ temporary HD catheter placement on 3/16/18 by Dr. Vaca.       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  History of partial pancreatectomy  History of renal transplant: x 2    Allergies    No Known Allergies    Labs:  Complete Blood Count in AM (04.01.18 @ 09:25)    Nucleated RBC: 0 /100 WBCs    WBC Count: 7.19 K/uL    RBC Count: 3.20 M/uL    Hemoglobin: 9.2 g/dL    Hematocrit: 30.8 %    Mean Cell Volume: 96.3 fl    Mean Cell Hemoglobin: 28.8 pg    Mean Cell Hemoglobin Conc: 29.9 gm/dL    Red Cell Distrib Width: 17.1 %    Platelet Count - Automated: 415 K/uL    Basic Metabolic Panel in AM (04.01.18 @ 07:51)    Sodium, Serum: 136 mmol/L    Potassium, Serum: 4.0 mmol/L    Chloride, Serum: 95 mmol/L    Carbon Dioxide, Serum: 28 mmol/L    Anion Gap, Serum: 13 mmol/L    Blood Urea Nitrogen, Serum: 21 mg/dL    Creatinine, Serum: 5.29 mg/dL    Glucose, Serum: 88 mg/dL    Calcium, Total Serum: 8.8 mg/dL    eGFR if Non : 11    eGFR if : 13 mL/min/1.73M2    Prothrombin Time and INR, Plasma (03.27.18 @ 11:19)    Prothrombin Time, Plasma: 12.5    INR: 1.15  Activated Partial Thromboplastin Time (03.27.18 @ 11:19)    Activated Partial Thromboplastin Time: 32.8    After risks, benefits, alternatives discussion pt verbalized understanding and signed informed consent.  Will plan for conversion of temporary HD catheter to Right side tunnelled HD catheter.    Loy Germain, DONAL-C  [facility] [id number]  Ext [phone] Interventional Radiology     57y Male with PMH as below with ESRD on HD, h/o previously failed kidney transplant now referred to IR for conversion of temporary HD catheter to tunnelled HD catheter.  Pt previously had Right IJ temporary HD catheter placement on 3/16/18 by Dr. Vaca.       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  History of partial pancreatectomy  History of renal transplant: x 2    Allergies    No Known Allergies    Labs:  Complete Blood Count in AM (04.01.18 @ 09:25)    Nucleated RBC: 0 /100 WBCs    WBC Count: 7.19 K/uL    RBC Count: 3.20 M/uL    Hemoglobin: 9.2 g/dL    Hematocrit: 30.8 %    Mean Cell Volume: 96.3 fl    Mean Cell Hemoglobin: 28.8 pg    Mean Cell Hemoglobin Conc: 29.9 gm/dL    Red Cell Distrib Width: 17.1 %    Platelet Count - Automated: 415 K/uL    Basic Metabolic Panel in AM (04.01.18 @ 07:51)    Sodium, Serum: 136 mmol/L    Potassium, Serum: 4.0 mmol/L    Chloride, Serum: 95 mmol/L    Carbon Dioxide, Serum: 28 mmol/L    Anion Gap, Serum: 13 mmol/L    Blood Urea Nitrogen, Serum: 21 mg/dL    Creatinine, Serum: 5.29 mg/dL    Glucose, Serum: 88 mg/dL    Calcium, Total Serum: 8.8 mg/dL    eGFR if Non : 11    eGFR if : 13 mL/min/1.73M2    Prothrombin Time and INR, Plasma (03.27.18 @ 11:19)    Prothrombin Time, Plasma: 12.5    INR: 1.15  Activated Partial Thromboplastin Time (03.27.18 @ 11:19)    Activated Partial Thromboplastin Time: 32.8      After risks, benefits, alternatives discussion pt verbalized understanding and signed informed consent.  Will plan for conversion of temporary HD catheter to Right side tunnelled HD catheter.    Loy Germain, DONAL-C  [facility] [id number]  Ext [phone]

## 2018-04-03 NOTE — PROGRESS NOTE ADULT - SUBJECTIVE AND OBJECTIVE BOX
MEDICINE, PROGRESS NOTE [phone]    MEAGAN RAMOS 57y [id number]    Patient seen and examined.  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  History of partial pancreatectomy  History of renal transplant: x 2    MEDICATIONS  (STANDING):  docusate sodium 100 milliGRAM(s) Oral three times a day  NIFEdipine XL 60 milliGRAM(s) Oral daily  NIFEdipine XL 30 milliGRAM(s) Oral once  pantoprazole  Injectable 40 milliGRAM(s) IV Push daily  polyethylene glycol 3350 17 Gram(s) Oral daily  predniSONE   Tablet 5 milliGRAM(s) Oral daily  tacrolimus 1 milliGRAM(s) Oral <User Schedule>    MEDICATIONS  (PRN):  HYDROmorphone  Injectable 0.5 milliGRAM(s) IV Push every 6 hours PRN Severe Pain (7 - 10)  oxyCODONE    IR 5 milliGRAM(s) Oral every 6 hours PRN Moderate Pain (4 - 6)    Allergies    No Known Allergies    Intolerances        PHYSICAL EXAM:  Constitutional: NAD  HEENT: Normocephalic, EOMI  Neck:  No JVD  Respiratory: CTA B/L, No wheezes  Cardiovascular: S1, S2, RRR, + systolic murmur  Gastrointestinal: BS+, soft, NT/ND  Extremities: No peripheral edema  Neurological: AAOX3, no focal deficits  Psychiatric: Normal mood, normal affect  : No Augustin    Vital Signs Last 24 Hrs  T(C): 36.3 (03 Apr 2018 20:40), Max: 37 (03 Apr 2018 05:40)  T(F): 97.4 (03 Apr 2018 20:40), Max: 98.6 (03 Apr 2018 05:40)  HR: 100 (03 Apr 2018 20:40) (92 - 100)  BP: 180/109 (03 Apr 2018 20:40) (143/82 - 180/109)  BP(mean): --  RR: 18 (03 Apr 2018 20:40) (16 - 18)  SpO2: 96% (03 Apr 2018 20:40) (92% - 98%)  I&O's Summary    02 Apr 2018 07:01  -  03 Apr 2018 07:00  --------------------------------------------------------  IN: 980 mL / OUT: 0 mL / NET: 980 mL    03 Apr 2018 07:01  -  03 Apr 2018 21:02  --------------------------------------------------------  IN: 1520 mL / OUT: 2800 mL / NET: -1280 mL        LABS:    04-03    137  |  96  |  41<H>  ----------------------------<  181<H>  5.0   |  24  |  9.56<H>    Ca    9.2      03 Apr 2018 16:55  < from: Limited Transthoracic Echo (04.03.18 @ 05:52) >  Patient name: MEAGAN RAMOS  YOB: 1960   Age: 57 (M)   MR#: [id number]  Study Date: 4/3/2018  Location: [address] [place], [place] [id number]: Melany Finn RDCS  Study quality: Limited  Referring Physician: Peter Watts MD  Blood Pressure: 172/100 mmHg  Height: 180 cm  Weight: 88 kg  BSA: 2.1 m2  ------------------------------------------------------------------------  PROCEDURE: Limited transthoracic echocardiogram with 2-D.  M-Mode and spectral and color flow Doppler.  INDICATION: Cardiac tamponade (I31.4)  ------------------------------------------------------------------------  OBSERVATIONS:  Pericardium/PleuraNo pericardial effusion seen.  ------------------------------------------------------------------------  CONCLUSIONS:  1. No pericardial effusion seen.  ------------------------------------------------------------------------  Confirmed on  4/3/2018 - 10:32:09 by Ben Dixon M.D.  ------------------------------------------------------------------------    < from: MR Abdomen No Cont (04.03.18 @ 00:04) >  EXAM:  MR ABDOMEN                          EXAM:  MR PELVIS                            PROCEDURE DATE:  04/03/2018            INTERPRETATION:  CLINICAL INFORMATION: 57-year-old male with history of   left renal cell carcinoma status post nephrectomy and two failed renal   transplants. Left upper quadrant cystic lesion and indeterminate right   renal lesions seen on recent CT chest. Pretransplant workup.    COMPARISON: CT chest 3/23/2018, CT abdomen/pelvis 1/4/2017, CT   abdomen/pelvis 11/11/2011.    PROCEDURE:   MRI of the abdomen and pelvis was performed without intravenous contrast.    FINDINGS:    LOWER CHEST: Small bilateral pleural effusions, partially loculated on   the left side. Small pericardial effusion.    LIVER: The liver demonstrates decreased signal on the in-phase gradient   echo sequences, compatible with iron deposition. Right hepatic lobe 1.1   cm cyst.  BILE DUCTS: Normal caliber.  GALLBLADDER: Within normal limits.  SPLEEN: Splenectomy.  PANCREAS: Distal pancreatectomy. An indeterminate lesion in the surgical   bed region of the pancreatic tail measures  4.5 x 3.2 cm (8, 20),   previously measured 4.6 x 3.4 cm on CT from 1/4/2017. The lesion   demonstrates restricted diffusion.   ADRENALS: Within normal limits.  KIDNEYS/URETERS: Left nephrectomy. Atrophic right pelvic renal transplant   and left pelvic renal transplant noted. Multiple right renal cysts,   including a 5.2 x 4.8 cm hemorrhagic cyst in the upper pole.  A right   lower pole 3.4 x 2.9 cm lesion demonstrates areas of T1 hyperintense   material, most compatible with hemorrhagic/proteinaceous material. These   lesions are larger compared to prior study.    BLADDER: Within normal limits.  REPRODUCTIVE ORGANS: Prostate gland within normal limits.    BOWEL: Colonic diverticulosis.   PERITONEUM: No ascites.  VESSELS:  Within normal limits.  RETROPERITONEUM: No lymphadenopathy.    ABDOMINAL WALL: Small fat-containing umbilical hernia.      IMPRESSION:     Lesion in the surgical bed in the region of the pancreatic tail   demonstrates restricted diffusion, but is unchanged in size since   1/4/2017. Findings are indeterminate without intravenous contrast.    Complex right renal lesions are most compatible with hemorrhagic cysts,   but evaluation for solid components is limited without intravenous   contrast. Underlying malignancy cannot be excluded.    Mild hepatic iron deposition.    CARMINA EDUARDO M.D., RADIOLOGY FELLOW  This document has been electronically signed.  HERMILA CRUZ M.D., ATTENDING RADIOLOGIST  This document has been electronically signed. Apr  3 2018  3:09PM

## 2018-04-03 NOTE — PROGRESS NOTE ADULT - ASSESSMENT
Cardiac tamponade s/p window/drainage  Failed renal transplant s/p livan and now with tunneled cath  hypertension  anemia  functional bowel obstruction- resolved    continue current care  plan for stress test sulema  procardia 30 mg tonight  d/c planning once hd set up

## 2018-04-04 LAB — SURGICAL PATHOLOGY STUDY: SIGNIFICANT CHANGE UP

## 2018-04-04 PROCEDURE — 78452 HT MUSCLE IMAGE SPECT MULT: CPT | Mod: 26

## 2018-04-04 PROCEDURE — 93016 CV STRESS TEST SUPVJ ONLY: CPT

## 2018-04-04 PROCEDURE — 93018 CV STRESS TEST I&R ONLY: CPT

## 2018-04-04 PROCEDURE — 99232 SBSQ HOSP IP/OBS MODERATE 35: CPT | Mod: GC

## 2018-04-04 RX ORDER — HYDROMORPHONE HYDROCHLORIDE 2 MG/ML
0.5 INJECTION INTRAMUSCULAR; INTRAVENOUS; SUBCUTANEOUS ONCE
Qty: 0 | Refills: 0 | Status: DISCONTINUED | OUTPATIENT
Start: 2018-04-04 | End: 2018-04-04

## 2018-04-04 RX ORDER — NIFEDIPINE 30 MG
30 TABLET, EXTENDED RELEASE 24 HR ORAL AT BEDTIME
Qty: 0 | Refills: 0 | Status: DISCONTINUED | OUTPATIENT
Start: 2018-04-04 | End: 2018-04-07

## 2018-04-04 RX ADMIN — HYDROMORPHONE HYDROCHLORIDE 0.5 MILLIGRAM(S): 2 INJECTION INTRAMUSCULAR; INTRAVENOUS; SUBCUTANEOUS at 14:00

## 2018-04-04 RX ADMIN — Medication 5 MILLIGRAM(S): at 13:44

## 2018-04-04 RX ADMIN — HYDROMORPHONE HYDROCHLORIDE 0.5 MILLIGRAM(S): 2 INJECTION INTRAMUSCULAR; INTRAVENOUS; SUBCUTANEOUS at 14:30

## 2018-04-04 RX ADMIN — Medication 30 MILLIGRAM(S): at 22:08

## 2018-04-04 RX ADMIN — TACROLIMUS 1 MILLIGRAM(S): 5 CAPSULE ORAL at 13:44

## 2018-04-04 RX ADMIN — POLYETHYLENE GLYCOL 3350 17 GRAM(S): 17 POWDER, FOR SOLUTION ORAL at 13:44

## 2018-04-04 RX ADMIN — PANTOPRAZOLE SODIUM 40 MILLIGRAM(S): 20 TABLET, DELAYED RELEASE ORAL at 06:25

## 2018-04-04 RX ADMIN — HYDROMORPHONE HYDROCHLORIDE 0.5 MILLIGRAM(S): 2 INJECTION INTRAMUSCULAR; INTRAVENOUS; SUBCUTANEOUS at 09:43

## 2018-04-04 RX ADMIN — TACROLIMUS 1 MILLIGRAM(S): 5 CAPSULE ORAL at 23:24

## 2018-04-04 RX ADMIN — Medication 100 MILLIGRAM(S): at 22:08

## 2018-04-04 RX ADMIN — Medication 100 MILLIGRAM(S): at 13:44

## 2018-04-04 RX ADMIN — Medication 100 MILLIGRAM(S): at 06:25

## 2018-04-04 RX ADMIN — Medication 60 MILLIGRAM(S): at 06:25

## 2018-04-04 NOTE — PROGRESS NOTE ADULT - SUBJECTIVE AND OBJECTIVE BOX
MEDICINE, PROGRESS NOTE [phone]    MEAGAN RAMOS 57y [id number]    Patient seen and examined.  Patient is a 57y old  Male who presents with a chief complaint of My kidney is not working (16 Mar 2018 16:07)  Pt has no current complaints.    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  History of partial pancreatectomy  History of renal transplant: x 2    MEDICATIONS  (STANDING):  docusate sodium 100 milliGRAM(s) Oral three times a day  NIFEdipine XL 60 milliGRAM(s) Oral daily  pantoprazole  Injectable 40 milliGRAM(s) IV Push daily  polyethylene glycol 3350 17 Gram(s) Oral daily  predniSONE   Tablet 5 milliGRAM(s) Oral daily  tacrolimus 1 milliGRAM(s) Oral <User Schedule>    MEDICATIONS  (PRN):  HYDROmorphone  Injectable 0.5 milliGRAM(s) IV Push every 6 hours PRN Severe Pain (7 - 10)  oxyCODONE    IR 5 milliGRAM(s) Oral every 6 hours PRN Moderate Pain (4 - 6)    Allergies    No Known Allergies    Intolerances        PHYSICAL EXAM:  Constitutional: NAD  HEENT: Normocephalic, EOMI  Neck:  No JVD  Respiratory: CTA B/L, No wheezes  Cardiovascular: S1, S2, RRR, + systolic murmur  Gastrointestinal: BS+, soft, NT/ND  Extremities: No peripheral edema  Neurological: AAOX3, no focal deficits  Psychiatric: Normal mood, normal affect  : No Augustin    Vital Signs Last 24 Hrs  T(C): 36.7 (04 Apr 2018 13:43), Max: 37.2 (04 Apr 2018 00:31)  T(F): 98.1 (04 Apr 2018 13:43), Max: 99 (04 Apr 2018 00:31)  HR: 90 (04 Apr 2018 13:43) (90 - 100)  BP: 136/81 (04 Apr 2018 13:43) (136/81 - 180/109)  BP(mean): --  RR: 18 (04 Apr 2018 13:43) (16 - 18)  SpO2: 97% (04 Apr 2018 13:43) (96% - 97%)  I&O's Summary    03 Apr 2018 07:01  -  04 Apr 2018 07:00  --------------------------------------------------------  IN: 1640 mL / OUT: 2800 mL / NET: -1160 mL    04 Apr 2018 07:01  -  04 Apr 2018 15:35  --------------------------------------------------------  IN: 720 mL / OUT: 0 mL / NET: 720 mL        LABS:                        9.3    8.21  )-----------( 569      ( 03 Apr 2018 21:07 )             30.8     04-03    137  |  96  |  41<H>  ----------------------------<  181<H>  5.0   |  24  |  9.56<H>    Ca    9.2      03 Apr 2018 16:55

## 2018-04-04 NOTE — PROGRESS NOTE ADULT - SUBJECTIVE AND OBJECTIVE BOX
[facility] DIVISION OF KIDNEY DISEASES AND HYPERTENSION -- [phone]   FOLLOW UP NOTE  --------------------------------------------------------------------------------  HPI:  atient with advanced CKD, s/p Pericardial window via Left thoracotomy failed allograft on low dose Tac/prednisone. Pt now dialysis dependent.  Pt s/p right  tunneled catheter on 4/3. Pt last  HD on 4/3  tolerated well. Pt seen and examined at bedside. Pt denies sob, chest pain.     PAST HISTORY  --------------------------------------------------------------------------------  No significant changes to PMH, PSH, FHx, SHx, unless otherwise noted    ALLERGIES & MEDICATIONS  --------------------------------------------------------------------------------  Allergies    No Known Allergies    Intolerances      Standing Inpatient Medications  docusate sodium 100 milliGRAM(s) Oral three times a day  HYDROmorphone  Injectable 0.5 milliGRAM(s) IV Push once  NIFEdipine XL 60 milliGRAM(s) Oral daily  pantoprazole  Injectable 40 milliGRAM(s) IV Push daily  polyethylene glycol 3350 17 Gram(s) Oral daily  predniSONE   Tablet 5 milliGRAM(s) Oral daily  tacrolimus 1 milliGRAM(s) Oral <User Schedule>    PRN Inpatient Medications  HYDROmorphone  Injectable 0.5 milliGRAM(s) IV Push every 6 hours PRN  oxyCODONE    IR 5 milliGRAM(s) Oral every 6 hours PRN      REVIEW OF SYSTEMS  --------------------------------------------------------------------------------  General: no fever  CVS: no chest pain  RESP: no sob, no cough  ABD: no abdominal pain  : no dysuria,  MSK: + edema     VITALS/PHYSICAL EXAM  --------------------------------------------------------------------------------  T(C): 36.7 (04-04-18 @ 13:43), Max: 37.2 (04-04-18 @ 00:31)  HR: 90 (04-04-18 @ 13:43) (90 - 100)  BP: 136/81 (04-04-18 @ 13:43) (136/81 - 180/109)  RR: 18 (04-04-18 @ 13:43) (16 - 18)  SpO2: 97% (04-04-18 @ 13:43) (95% - 97%)  Wt(kg): --        04-03-18 @ 07:01  -  04-04-18 @ 07:00  --------------------------------------------------------  IN: 1640 mL / OUT: 2800 mL / NET: -1160 mL    04-04-18 @ 07:01  -  04-04-18 @ 14:35  --------------------------------------------------------  IN: 480 mL / OUT: 0 mL / NET: 480 mL      Physical Exam:  	Gen: NAD, sitting in chair   	HEENT: MMM  	Pulm: CTA B/L  	CV: S1S2  	Abd: Soft, +BS  	Ext:  LE edema B/L present                       Neuro: Awake   	Skin: Warm and Dry   	Vascular access: Right tunnelled HD catheter , clean , no erythema   LABS/STUDIES  --------------------------------------------------------------------------------              9.3    8.21  >-----------<  569      [04-03-18 @ 21:07]              30.8     137  |  96  |  41  ----------------------------<  181      [04-03-18 @ 16:55]  5.0   |  24  |  9.56        Ca     9.2     [04-03-18 @ 16:55]            Creatinine Trend:  SCr 9.56 [04-03 @ 16:55]  SCr 5.29 [04-01 @ 07:51]  SCr 5.69 [03-31 @ 15:50]  SCr 4.55 [03-31 @ 02:50]  SCr 5.79 [03-30 @ 15:18]        Iron 24, TIBC 161, %sat 15      [03-20-18 @ 11:31]  Ferritin 250      [03-19-18 @ 20:02]    HBsAb <3.0      [03-17-18 @ 05:35]  HBsAg Nonreact      [03-17-18 @ 05:35]  HBcAb Nonreact      [03-17-18 @ 05:35]  HCV 0.12, Nonreact      [03-17-18 @ 05:35]

## 2018-04-05 ENCOUNTER — TRANSCRIPTION ENCOUNTER (OUTPATIENT)
Age: 58
End: 2018-04-05

## 2018-04-05 LAB
ANION GAP SERPL CALC-SCNC: 14 MMOL/L — SIGNIFICANT CHANGE UP (ref 5–17)
BUN SERPL-MCNC: 37 MG/DL — HIGH (ref 7–23)
CALCIUM SERPL-MCNC: 9.2 MG/DL — SIGNIFICANT CHANGE UP (ref 8.4–10.5)
CHLORIDE SERPL-SCNC: 98 MMOL/L — SIGNIFICANT CHANGE UP (ref 96–108)
CO2 SERPL-SCNC: 25 MMOL/L — SIGNIFICANT CHANGE UP (ref 22–31)
CREAT SERPL-MCNC: 8.29 MG/DL — HIGH (ref 0.5–1.3)
GLUCOSE SERPL-MCNC: 131 MG/DL — HIGH (ref 70–99)
HCT VFR BLD CALC: 30.3 % — LOW (ref 39–50)
HGB BLD-MCNC: 8.9 G/DL — LOW (ref 13–17)
MCHC RBC-ENTMCNC: 29.4 GM/DL — LOW (ref 32–36)
MCHC RBC-ENTMCNC: 29.5 PG — SIGNIFICANT CHANGE UP (ref 27–34)
MCV RBC AUTO: 100.3 FL — HIGH (ref 80–100)
NRBC # BLD: 0 /100 WBCS — SIGNIFICANT CHANGE UP (ref 0–0)
PLATELET # BLD AUTO: 602 K/UL — HIGH (ref 150–400)
POTASSIUM SERPL-MCNC: 5.1 MMOL/L — SIGNIFICANT CHANGE UP (ref 3.5–5.3)
POTASSIUM SERPL-SCNC: 5.1 MMOL/L — SIGNIFICANT CHANGE UP (ref 3.5–5.3)
RBC # BLD: 3.02 M/UL — LOW (ref 4.2–5.8)
RBC # FLD: 16.9 % — HIGH (ref 10.3–14.5)
SODIUM SERPL-SCNC: 137 MMOL/L — SIGNIFICANT CHANGE UP (ref 135–145)
WBC # BLD: 8.36 K/UL — SIGNIFICANT CHANGE UP (ref 3.8–10.5)
WBC # FLD AUTO: 8.36 K/UL — SIGNIFICANT CHANGE UP (ref 3.8–10.5)

## 2018-04-05 RX ORDER — PANTOPRAZOLE SODIUM 20 MG/1
40 TABLET, DELAYED RELEASE ORAL
Qty: 0 | Refills: 0 | Status: DISCONTINUED | OUTPATIENT
Start: 2018-04-05 | End: 2018-04-07

## 2018-04-05 RX ADMIN — PANTOPRAZOLE SODIUM 40 MILLIGRAM(S): 20 TABLET, DELAYED RELEASE ORAL at 06:18

## 2018-04-05 RX ADMIN — Medication 60 MILLIGRAM(S): at 06:18

## 2018-04-05 RX ADMIN — POLYETHYLENE GLYCOL 3350 17 GRAM(S): 17 POWDER, FOR SOLUTION ORAL at 13:03

## 2018-04-05 RX ADMIN — HYDROMORPHONE HYDROCHLORIDE 0.5 MILLIGRAM(S): 2 INJECTION INTRAMUSCULAR; INTRAVENOUS; SUBCUTANEOUS at 21:25

## 2018-04-05 RX ADMIN — HYDROMORPHONE HYDROCHLORIDE 0.5 MILLIGRAM(S): 2 INJECTION INTRAMUSCULAR; INTRAVENOUS; SUBCUTANEOUS at 21:06

## 2018-04-05 RX ADMIN — HYDROMORPHONE HYDROCHLORIDE 0.5 MILLIGRAM(S): 2 INJECTION INTRAMUSCULAR; INTRAVENOUS; SUBCUTANEOUS at 13:21

## 2018-04-05 RX ADMIN — TACROLIMUS 1 MILLIGRAM(S): 5 CAPSULE ORAL at 23:35

## 2018-04-05 RX ADMIN — Medication 30 MILLIGRAM(S): at 21:06

## 2018-04-05 RX ADMIN — Medication 100 MILLIGRAM(S): at 06:18

## 2018-04-05 RX ADMIN — Medication 5 MILLIGRAM(S): at 13:03

## 2018-04-05 RX ADMIN — Medication 100 MILLIGRAM(S): at 13:03

## 2018-04-05 RX ADMIN — TACROLIMUS 1 MILLIGRAM(S): 5 CAPSULE ORAL at 13:03

## 2018-04-05 RX ADMIN — Medication 100 MILLIGRAM(S): at 21:06

## 2018-04-05 RX ADMIN — HYDROMORPHONE HYDROCHLORIDE 0.5 MILLIGRAM(S): 2 INJECTION INTRAMUSCULAR; INTRAVENOUS; SUBCUTANEOUS at 00:33

## 2018-04-05 RX ADMIN — HYDROMORPHONE HYDROCHLORIDE 0.5 MILLIGRAM(S): 2 INJECTION INTRAMUSCULAR; INTRAVENOUS; SUBCUTANEOUS at 13:09

## 2018-04-05 RX ADMIN — HYDROMORPHONE HYDROCHLORIDE 0.5 MILLIGRAM(S): 2 INJECTION INTRAMUSCULAR; INTRAVENOUS; SUBCUTANEOUS at 00:03

## 2018-04-05 NOTE — DISCHARGE NOTE ADULT - CARE PLAN
Principal Discharge DX:	End stage kidney disease  Goal:	Maintain baseline renal function  Assessment and plan of treatment:	Continue medications as prescribed  Follow up next week with Dr. Weston  Return for any worsening symptoms  Secondary Diagnosis:	Pericardial tamponade  Goal:	Resolved  Assessment and plan of treatment:	Continue medications as prescribed  Continue dialysis as ordered  Follow up next week with Dr. Ceballos  Return for any worsening symptoms  Secondary Diagnosis:	Pericardial effusion  Goal:	Resolved  Assessment and plan of treatment:	Follow up next week with Dr. Weston  Return for any worsening symptoms  Secondary Diagnosis:	Essential hypertension  Goal:	Controlled blood pressure  Assessment and plan of treatment:	Continue medications as prescribed  Follow up next week with Dr. Weston  Return for any worsening symptoms  Secondary Diagnosis:	Anemia due to chronic kidney disease  Assessment and plan of treatment:	Continue medications as prescribed  Follow up next week with Dr. Weston  Return for any worsening symptoms Principal Discharge DX:	End stage kidney disease  Goal:	Maintain baseline renal function  Assessment and plan of treatment:	Continue medications as prescribed  Hemodialysis at [address]  Monday at 11 AM  Follow up next week with Dr. Weston  Return for any worsening symptoms  Secondary Diagnosis:	Pericardial tamponade  Goal:	Resolved  Assessment and plan of treatment:	Continue medications as prescribed  Continue dialysis as ordered  Follow up next week with Dr. Ceballos  Return for any worsening symptoms  Secondary Diagnosis:	Pericardial effusion  Goal:	Resolved  Assessment and plan of treatment:	Follow up next week with Dr. Weston  Return for any worsening symptoms  Secondary Diagnosis:	Essential hypertension  Goal:	Controlled blood pressure  Assessment and plan of treatment:	Continue medications as prescribed  Follow up next week with Dr. Weston  Return for any worsening symptoms  Secondary Diagnosis:	Anemia due to chronic kidney disease  Assessment and plan of treatment:	Continue medications as prescribed  Follow up next week with Dr. Weston  Return for any worsening symptoms

## 2018-04-05 NOTE — DISCHARGE NOTE ADULT - HOSPITAL COURSE
Cardiac tamponade s/p window/drainage  Failed renal transplant s/p livan and now with tunneled cath  hypertension  anemia  functional bowel obstruction- resolved    continue current care  plan is for HD sulema then d/c and pt is scheduled for outpt hd on Monday.

## 2018-04-05 NOTE — DISCHARGE NOTE ADULT - COMMUNITY RESOURCES
[facility] [address] [place] [address] Dr. Sandra Kennedy [place]. [phone]  1st appointment Monday 4/9 at 11am

## 2018-04-05 NOTE — PROGRESS NOTE ADULT - SUBJECTIVE AND OBJECTIVE BOX
[facility] DIVISION OF KIDNEY DISEASES AND HYPERTENSION -- [phone]   FOLLOW UP NOTE  --------------------------------------------------------------------------------  HPI:  Patient with advanced CKD, s/p Pericardial window via Left thoracotomy failed allograft on low dose Tac/prednisone. Pt now dialysis dependent.  Pt s/p right  tunneled catheter on 4/3. Pt last  HD on 4/3  tolerated well. Pt seen and examined at bedside. Pt denies sob, chest pain.     PAST HISTORY  --------------------------------------------------------------------------------  No significant changes to PMH, PSH, FHx, SHx, unless otherwise noted    ALLERGIES & MEDICATIONS  --------------------------------------------------------------------------------  Allergies    No Known Allergies    Intolerances      Standing Inpatient Medications  docusate sodium 100 milliGRAM(s) Oral three times a day  NIFEdipine XL 60 milliGRAM(s) Oral daily  NIFEdipine XL 30 milliGRAM(s) Oral at bedtime  pantoprazole    Tablet 40 milliGRAM(s) Oral before breakfast  polyethylene glycol 3350 17 Gram(s) Oral daily  predniSONE   Tablet 5 milliGRAM(s) Oral daily  tacrolimus 1 milliGRAM(s) Oral <User Schedule>    PRN Inpatient Medications  HYDROmorphone  Injectable 0.5 milliGRAM(s) IV Push every 6 hours PRN  oxyCODONE    IR 5 milliGRAM(s) Oral every 6 hours PRN      REVIEW OF SYSTEMS  --------------------------------------------------------------------------------  General: no fever  CVS: no chest pain  RESP: no sob, no cough  ABD: no abdominal pain  : no dysuria,  MSK: + edema     VITALS/PHYSICAL EXAM  --------------------------------------------------------------------------------  T(C): 36.8 (04-05-18 @ 08:40), Max: 37.1 (04-05-18 @ 00:06)  HR: 100 (04-05-18 @ 08:40) (90 - 102)  BP: 168/100 (04-05-18 @ 08:40) (136/81 - 168/100)  RR: 17 (04-05-18 @ 08:40) (17 - 18)  SpO2: 97% (04-05-18 @ 08:40) (94% - 97%)  Wt(kg): --        04-04-18 @ 07:01  -  04-05-18 @ 07:00  --------------------------------------------------------  IN: 1200 mL / OUT: 0 mL / NET: 1200 mL      Physical Exam:  	Gen: NAD, sitting in chair   	HEENT: MMM  	Pulm: CTA B/L  	CV: S1S2  	Abd: Soft, +BS  	Ext:  LE edema B/L present                       Neuro: Awake   	Skin: Warm and Dry   	Vascular access: Right tunnelled HD catheter , clean , no erythema    LABS/STUDIES  --------------------------------------------------------------------------------              9.3    8.21  >-----------<  569      [04-03-18 @ 21:07]              30.8     137  |  98  |  37  ----------------------------<  131      [04-05-18 @ 09:16]  5.1   |  25  |  8.29        Ca     9.2     [04-05-18 @ 09:16]            Creatinine Trend:  SCr 8.29 [04-05 @ 09:16]  SCr 9.56 [04-03 @ 16:55]  SCr 5.29 [04-01 @ 07:51]  SCr 5.69 [03-31 @ 15:50]  SCr 4.55 [03-31 @ 02:50]        Iron 24, TIBC 161, %sat 15      [03-20-18 @ 11:31]  Ferritin 250      [03-19-18 @ 20:02]    HBsAb <3.0      [03-17-18 @ 05:35]  HBsAg Nonreact      [03-17-18 @ 05:35]  HBcAb Nonreact      [03-17-18 @ 05:35]  HCV 0.12, Nonreact      [03-17-18 @ 05:35]

## 2018-04-05 NOTE — DISCHARGE NOTE ADULT - MEDICATION SUMMARY - MEDICATIONS TO CHANGE
I will SWITCH the dose or number of times a day I take the medications listed below when I get home from the hospital:    NIFEdipine 60 mg oral tablet, extended release  -- 1 tab(s) by mouth once a day (in the morning)

## 2018-04-05 NOTE — PROGRESS NOTE ADULT - ASSESSMENT
Cardiac tamponade s/p window/drainage  Failed renal transplant s/p livan and now with tunneled cath  hypertension  anemia  functional bowel obstruction- resolved    continue current care  d/c planning once outpt hd is set up

## 2018-04-05 NOTE — DISCHARGE NOTE ADULT - SECONDARY DIAGNOSIS.
Pericardial tamponade Pericardial effusion Essential hypertension Anemia due to chronic kidney disease

## 2018-04-05 NOTE — DISCHARGE NOTE ADULT - PATIENT PORTAL LINK FT
You can access the [facility] Patient Portal, offered by [facility], by registering with the following website: [email or web address]

## 2018-04-05 NOTE — DISCHARGE NOTE ADULT - PLAN OF CARE
Maintain baseline renal function Continue medications as prescribed  Follow up next week with Dr. Weston  Return for any worsening symptoms Resolved Continue medications as prescribed  Continue dialysis as ordered  Follow up next week with Dr. Ceballos  Return for any worsening symptoms Follow up next week with Dr. Weston  Return for any worsening symptoms Controlled blood pressure Continue medications as prescribed  Hemodialysis at [address]  Monday at 11 AM  Follow up next week with Dr. Weston  Return for any worsening symptoms

## 2018-04-05 NOTE — DISCHARGE NOTE ADULT - MEDICATION SUMMARY - MEDICATIONS TO TAKE
I will START or STAY ON the medications listed below when I get home from the hospital:    predniSONE 5 mg oral tablet  -- 1 tab(s) by mouth once a day (in the morning)  -- Indication: For Immunosuppression    aspirin 81 mg oral tablet  -- 1 tab(s) by mouth once a day  -- Indication: For Need for prophylactic measure    HYDROmorphone 2 mg oral tablet  -- 1 tab(s) by mouth every 6 hours, As Needed -Moderate Pain (4 - 6) MDD:4  -- Indication: For Pain    NIFEdipine 30 mg oral tablet, extended release  -- 1 tab(s) by mouth once a day (at bedtime) MDD:1  -- Indication: For HTN (hypertension)    NIFEdipine 60 mg oral tablet, extended release  -- 1 tab(s) by mouth once a day (in the morning)  -- Indication: For HTN (hypertension)    tacrolimus 1 mg oral capsule  -- 1 cap(s) by mouth once a day (in the evening)  -- Indication: For Renal failure    polyethylene glycol 3350 oral powder for reconstitution  -- 17 gram(s) by mouth once a day  -- Indication: For Constipaton    docusate sodium 100 mg oral capsule  -- 1 cap(s) by mouth 3 times a day  -- Indication: For Constipation    Mag-Ox 400 oral tablet  -- 1 tab(s) by mouth once a day (in the morning)  -- Indication: For Electrolyte replacement    Omega-3  -- 1 cap(s) by mouth 2 times a day  -- Indication: For Nutritional supplement    Co Q-10  -- 1 cap(s) by mouth 2 times a day  -- Indication: For Nutritional supplement    pantoprazole 40 mg oral delayed release tablet  -- 1 tab(s) by mouth 2 times a day (before meals)  -- Indication: For Need for prophylactic measure    calcitriol 0.25 mcg oral capsule  -- 1 cap(s) by mouth once a day (in the morning)  -- Indication: For Nutritional supplement    Vitamin D3 2000 intl units oral tablet  -- 1 tab(s) by mouth once a day (in the morning)  -- Indication: For Nutritional supplement    Vitamin C  -- 1 tab(s) by mouth 2 times a week (Mon & Wed)  -- Indication: For Nutritional supplement

## 2018-04-05 NOTE — DISCHARGE NOTE ADULT - INSTRUCTIONS
call for  follow up  appointment  with   primary care   md   renal  md    dialysis  as per md   any severe pain nausea  vomiting fevers  any  problems  call md  call 911 [facility]  EMERGENCY ROOM

## 2018-04-05 NOTE — DISCHARGE NOTE ADULT - CARE PROVIDER_API CALL
Peter Watts), Internal Medicine  [address] [address]  Phone: [phone]  Fax: [phone]    Derik Patton), Internal Medicine; Nephrology  [address] [place] [address]  Phone: [phone]  Fax: [phone]

## 2018-04-05 NOTE — PROGRESS NOTE ADULT - SUBJECTIVE AND OBJECTIVE BOX
MEDICINE, PROGRESS NOTE [phone]    MEAGAN RAMOS 57y [id number]    Patient seen and examined.  Patient is a 57y old  Male who presents with a chief complaint of My kidney is not working (16 Mar 2018 16:07)  Pt has no current complaints.    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  History of partial pancreatectomy  History of renal transplant: x 2    MEDICATIONS  (STANDING):  docusate sodium 100 milliGRAM(s) Oral three times a day  NIFEdipine XL 60 milliGRAM(s) Oral daily  NIFEdipine XL 30 milliGRAM(s) Oral at bedtime  pantoprazole    Tablet 40 milliGRAM(s) Oral before breakfast  polyethylene glycol 3350 17 Gram(s) Oral daily  predniSONE   Tablet 5 milliGRAM(s) Oral daily  tacrolimus 1 milliGRAM(s) Oral <User Schedule>    MEDICATIONS  (PRN):  HYDROmorphone  Injectable 0.5 milliGRAM(s) IV Push every 6 hours PRN Severe Pain (7 - 10)  oxyCODONE    IR 5 milliGRAM(s) Oral every 6 hours PRN Moderate Pain (4 - 6)    Allergies    No Known Allergies    Intolerances        PHYSICAL EXAM:  Constitutional: NAD  HEENT: Normocephalic, EOMI  Neck:  No JVD  Respiratory: CTA B/L, No wheezes  Cardiovascular: S1, S2, RRR, + systolic murmur  Gastrointestinal: BS+, soft, NT/ND  Extremities: No peripheral edema  Neurological: AAOX3, no focal deficits  Psychiatric: Normal mood, normal affect  : No Augustin    Vital Signs Last 24 Hrs  T(C): 36.7 (05 Apr 2018 13:05), Max: 37.1 (05 Apr 2018 00:06)  T(F): 98 (05 Apr 2018 13:05), Max: 98.7 (05 Apr 2018 00:06)  HR: 100 (05 Apr 2018 16:46) (95 - 102)  BP: 159/97 (05 Apr 2018 16:46) (152/90 - 170/98)  BP(mean): --  RR: 18 (05 Apr 2018 13:05) (17 - 18)  SpO2: 96% (05 Apr 2018 16:46) (94% - 100%)  I&O's Summary    04 Apr 2018 07:01  -  05 Apr 2018 07:00  --------------------------------------------------------  IN: 1200 mL / OUT: 0 mL / NET: 1200 mL    05 Apr 2018 07:01  -  05 Apr 2018 19:34  --------------------------------------------------------  IN: 480 mL / OUT: 1500 mL / NET: -1020 mL        LABS:                        8.9    8.36  )-----------( 602      ( 05 Apr 2018 11:26 )             30.3     04-05    137  |  98  |  37<H>  ----------------------------<  131<H>  5.1   |  25  |  8.29<H>    Ca    9.2      05 Apr 2018 09:16

## 2018-04-05 NOTE — DISCHARGE NOTE ADULT - PAIN PRESENT
No Acute liver failure without hepatic coma    Alcohol abuse    Atrial fibrillation, unspecified type    Hepatic encephalopathy    Hernia of abdominal cavity    Umbilical hernia

## 2018-04-05 NOTE — DISCHARGE NOTE ADULT - MEDICATION SUMMARY - MEDICATIONS TO STOP TAKING
I will STOP taking the medications listed below when I get home from the hospital:    Bactrim  mg-160 mg oral tablet  -- 1 tab(s) by mouth 3 times a week (M, W, F)    cloNIDine 0.1 mg oral tablet  -- 1 tab(s) by mouth once a day (in the evening)    Lasix 40 mg oral tablet  -- 1 tab(s) by mouth once a day (in the morning)    Zortress 0.5 mg oral tablet  -- 4 tab(s) by mouth in the morning & 2 tab(s) in the evening    valGANciclovir 450 mg oral tablet  -- 1 tab(s) by mouth every 48 hours

## 2018-04-06 RX ORDER — ACETAMINOPHEN 500 MG
1000 TABLET ORAL ONCE
Qty: 0 | Refills: 0 | Status: DISCONTINUED | OUTPATIENT
Start: 2018-04-06 | End: 2018-04-07

## 2018-04-06 RX ORDER — HYDROMORPHONE HYDROCHLORIDE 2 MG/ML
0.5 INJECTION INTRAMUSCULAR; INTRAVENOUS; SUBCUTANEOUS ONCE
Qty: 0 | Refills: 0 | Status: DISCONTINUED | OUTPATIENT
Start: 2018-04-06 | End: 2018-04-06

## 2018-04-06 RX ORDER — HYDROMORPHONE HYDROCHLORIDE 2 MG/ML
2 INJECTION INTRAMUSCULAR; INTRAVENOUS; SUBCUTANEOUS EVERY 8 HOURS
Qty: 0 | Refills: 0 | Status: DISCONTINUED | OUTPATIENT
Start: 2018-04-06 | End: 2018-04-07

## 2018-04-06 RX ORDER — MENTHOL AND METHYL SALICYLATE 10; 30 G/100G; G/100G
1 STICK TOPICAL THREE TIMES A DAY
Qty: 0 | Refills: 0 | Status: DISCONTINUED | OUTPATIENT
Start: 2018-04-06 | End: 2018-04-07

## 2018-04-06 RX ADMIN — TACROLIMUS 1 MILLIGRAM(S): 5 CAPSULE ORAL at 23:18

## 2018-04-06 RX ADMIN — MENTHOL AND METHYL SALICYLATE 1 APPLICATION(S): 10; 30 STICK TOPICAL at 13:40

## 2018-04-06 RX ADMIN — Medication 100 MILLIGRAM(S): at 13:40

## 2018-04-06 RX ADMIN — TACROLIMUS 1 MILLIGRAM(S): 5 CAPSULE ORAL at 10:59

## 2018-04-06 RX ADMIN — PANTOPRAZOLE SODIUM 40 MILLIGRAM(S): 20 TABLET, DELAYED RELEASE ORAL at 05:06

## 2018-04-06 RX ADMIN — HYDROMORPHONE HYDROCHLORIDE 2 MILLIGRAM(S): 2 INJECTION INTRAMUSCULAR; INTRAVENOUS; SUBCUTANEOUS at 21:30

## 2018-04-06 RX ADMIN — HYDROMORPHONE HYDROCHLORIDE 0.5 MILLIGRAM(S): 2 INJECTION INTRAMUSCULAR; INTRAVENOUS; SUBCUTANEOUS at 11:06

## 2018-04-06 RX ADMIN — HYDROMORPHONE HYDROCHLORIDE 0.5 MILLIGRAM(S): 2 INJECTION INTRAMUSCULAR; INTRAVENOUS; SUBCUTANEOUS at 17:22

## 2018-04-06 RX ADMIN — HYDROMORPHONE HYDROCHLORIDE 0.5 MILLIGRAM(S): 2 INJECTION INTRAMUSCULAR; INTRAVENOUS; SUBCUTANEOUS at 05:25

## 2018-04-06 RX ADMIN — HYDROMORPHONE HYDROCHLORIDE 0.5 MILLIGRAM(S): 2 INJECTION INTRAMUSCULAR; INTRAVENOUS; SUBCUTANEOUS at 05:06

## 2018-04-06 RX ADMIN — Medication 30 MILLIGRAM(S): at 20:43

## 2018-04-06 RX ADMIN — HYDROMORPHONE HYDROCHLORIDE 2 MILLIGRAM(S): 2 INJECTION INTRAMUSCULAR; INTRAVENOUS; SUBCUTANEOUS at 20:43

## 2018-04-06 RX ADMIN — HYDROMORPHONE HYDROCHLORIDE 0.5 MILLIGRAM(S): 2 INJECTION INTRAMUSCULAR; INTRAVENOUS; SUBCUTANEOUS at 17:07

## 2018-04-06 RX ADMIN — Medication 5 MILLIGRAM(S): at 10:59

## 2018-04-06 RX ADMIN — POLYETHYLENE GLYCOL 3350 17 GRAM(S): 17 POWDER, FOR SOLUTION ORAL at 11:02

## 2018-04-06 RX ADMIN — Medication 100 MILLIGRAM(S): at 20:43

## 2018-04-06 RX ADMIN — HYDROMORPHONE HYDROCHLORIDE 0.5 MILLIGRAM(S): 2 INJECTION INTRAMUSCULAR; INTRAVENOUS; SUBCUTANEOUS at 10:51

## 2018-04-06 RX ADMIN — Medication 100 MILLIGRAM(S): at 05:06

## 2018-04-06 RX ADMIN — Medication 60 MILLIGRAM(S): at 05:06

## 2018-04-06 NOTE — PROGRESS NOTE ADULT - SUBJECTIVE AND OBJECTIVE BOX
MEDICINE, PROGRESS NOTE [phone]    MEAGAN RAMOS 57y [id number]    Patient seen and examined.  Patient is a 57y old  Male who presents with a chief complaint of My kidney is not working (16 Mar 2018 16:07)  Pt c/o leg cramps today and didn't want to do HD.    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  History of partial pancreatectomy  History of renal transplant: x 2    MEDICATIONS  (STANDING):  docusate sodium 100 milliGRAM(s) Oral three times a day  NIFEdipine XL 60 milliGRAM(s) Oral daily  NIFEdipine XL 30 milliGRAM(s) Oral at bedtime  pantoprazole    Tablet 40 milliGRAM(s) Oral before breakfast  polyethylene glycol 3350 17 Gram(s) Oral daily  predniSONE   Tablet 5 milliGRAM(s) Oral daily  tacrolimus 1 milliGRAM(s) Oral <User Schedule>    MEDICATIONS  (PRN):  HYDROmorphone  Injectable 0.5 milliGRAM(s) IV Push every 6 hours PRN Severe Pain (7 - 10)  methyl salicylate 14%/menthol 6% Topical Ointment 1 Application(s) Topical three times a day PRN leg pain    Allergies    No Known Allergies    Intolerances        PHYSICAL EXAM:  Constitutional: NAD  HEENT: Normocephalic, EOMI  Neck:  No JVD  Respiratory: CTA B/L, No wheezes  Cardiovascular: S1, S2, RRR, + systolic murmur  Gastrointestinal: BS+, soft, NT/ND  Extremities: + peripheral edema le b/l  Neurological: AAOX3, no focal deficits  Psychiatric: Normal mood, normal affect  : No Augustin    Vital Signs Last 24 Hrs  T(C): 36.7 (06 Apr 2018 10:29), Max: 37 (05 Apr 2018 20:51)  T(F): 98.1 (06 Apr 2018 10:29), Max: 98.6 (05 Apr 2018 20:51)  HR: 73 (06 Apr 2018 11:29) (73 - 99)  BP: 143/71 (06 Apr 2018 11:29) (143/71 - 157/95)  BP(mean): --  RR: 18 (06 Apr 2018 11:29) (18 - 18)  SpO2: 96% (06 Apr 2018 11:29) (96% - 98%)  I&O's Summary    05 Apr 2018 07:01  -  06 Apr 2018 07:00  --------------------------------------------------------  IN: 840 mL / OUT: 1500 mL / NET: -660 mL    06 Apr 2018 07:01  -  06 Apr 2018 17:17  --------------------------------------------------------  IN: 680 mL / OUT: 0 mL / NET: 680 mL        LABS:                        8.9    8.36  )-----------( 602      ( 05 Apr 2018 11:26 )             30.3     04-05    137  |  98  |  37<H>  ----------------------------<  131<H>  5.1   |  25  |  8.29<H>    Ca    9.2      05 Apr 2018 09:16

## 2018-04-06 NOTE — CHART NOTE - NSCHARTNOTEFT_GEN_A_CORE
Follow up note. pt eating well on regular diet. Admitted Kidney transplant failure, now on HD.   Source: Patient [x ]    good appetite    Diet : Regular, renal electrolytes wnl      Patient reports:  x] other: no complaints     PO intake:  < 50% [ ] 50-75% [x ]   % [ ]      Source for PO intake [x ] Patient [ ] family [x ] chart [ ] staff [ ] other     Enteral /Parenteral Nutrition:   NA      Current Weight: Weight (kg): 88.4 (04-02 @ 08:26)  % Weight Change loss 9 lbs noted, fluid shift    Pertinent Medications: MEDICATIONS  (STANDING):  docusate sodium 100 milliGRAM(s) Oral three times a day  NIFEdipine XL 60 milliGRAM(s) Oral daily  NIFEdipine XL 30 milliGRAM(s) Oral at bedtime  pantoprazole    Tablet 40 milliGRAM(s) Oral before breakfast  polyethylene glycol 3350 17 Gram(s) Oral daily  predniSONE   Tablet 5 milliGRAM(s) Oral daily  tacrolimus 1 milliGRAM(s) Oral <User Schedule>    MEDICATIONS  (PRN):  HYDROmorphone  Injectable 0.5 milliGRAM(s) IV Push every 6 hours PRN Severe Pain (7 - 10)  methyl salicylate 14%/menthol 6% Topical Ointment 1 Application(s) Topical three times a day PRN leg pain  oxyCODONE    IR 5 milliGRAM(s) Oral every 6 hours PRN Moderate Pain (4 - 6)    Pertinent Labs:  04-05 Na137 mmol/L Glu 131 mg/dL<H> K+ 5.1 mmol/L Cr  8.29 mg/dL<H> BUN 37 mg/dL<H> 03-31 Alb 3.3 g/dL      Skin: intact    Estimated Needs:   [x ] no change since previous assessment  [ ] recalculated:       Previous Nutrition Diagnosis:     [ ] Inadequate Energy Intake [ ]Inadequate Oral Intake [ ] Excessive Energy Intake     [ ] Underweight [ ] Increased Nutrient Needs [ ] Overweight/Obesity     [ ] Altered GI Function [ ] Unintended Weight Loss [ ] Food & Nutrition Related Knowledge Deficit [ ] Malnutrition          Nutrition Diagnosis is [ ] ongoing  [ ] resolved [ ] not applicable          New Nutrition Diagnosis: [ ] not applicable    [ ] Inadequate Protein Energy Intake [ ]Inadequate Oral Intake [ ] Excessive Energy Intake     [ ] Underweight [ ] Increased Nutrient Needs [ ] Overweight/Obesity     [ ] Altered GI Function [ ] Unintended Weight Loss [ ] Food & Nutrition Related Knowledge Deficit[ ] Limited Adherence to nutrition related recommendations [ ] Malnutrition  [ ] other: Free text       Related to:      As evidenced by:      Interventions:     Recommend    [ ] Change Diet To:    [ ] Nutrition Supplement    [ ] Nutrition Support    [ ] Other:        Monitoring and Evaluation:     [ ] PO intake [ ] Tolerance to diet prescription [ ] weights [ ] follow up per protocol    [ ] other: Follow up note. pt eating well on regular diet. Admitted Kidney transplant failure 2x,, now on HD.   Source: Patient [x ]    good appetite    Diet : Regular, renal electrolytes wnl      Patient reports:  x] other: no complaints     PO intake:  < 50% [ ] 50-75% [x ]   % [ ]      Source for PO intake [x ] Patient [ ] family [x ] chart [ ] staff [ ] other     Enteral /Parenteral Nutrition:   NA      Current Weight: Weight (kg): 88.4 (04-02 @ 08:26)  % Weight Change loss 9 lbs noted, fluid shift    Pertinent Medications: MEDICATIONS  (STANDING):  docusate sodium 100 milliGRAM(s) Oral three times a day  NIFEdipine XL 60 milliGRAM(s) Oral daily  NIFEdipine XL 30 milliGRAM(s) Oral at bedtime  pantoprazole    Tablet 40 milliGRAM(s) Oral before breakfast  polyethylene glycol 3350 17 Gram(s) Oral daily  predniSONE   Tablet 5 milliGRAM(s) Oral daily  tacrolimus 1 milliGRAM(s) Oral <User Schedule>    MEDICATIONS  (PRN):  HYDROmorphone  Injectable 0.5 milliGRAM(s) IV Push every 6 hours PRN Severe Pain (7 - 10)  methyl salicylate 14%/menthol 6% Topical Ointment 1 Application(s) Topical three times a day PRN leg pain  oxyCODONE    IR 5 milliGRAM(s) Oral every 6 hours PRN Moderate Pain (4 - 6)    Pertinent Labs:  04-05 Na137 mmol/L Glu 131 mg/dL<H> K+ 5.1 mmol/L Cr  8.29 mg/dL<H> BUN 37 mg/dL<H> 03-31 Alb 3.3 g/dL      Skin: intact    Estimated Needs:   [x ] no change since previous assessment  [ ] recalculated:       Previous Nutrition Diagnosis:      [x ] Food & Nutrition Related Knowledge Deficit [ ] Malnutrition          Nutrition Diagnosis is [ x] ongoing  [ ] resolved [ ] not applicable          New Nutrition Diagnosis: [x ] not applicable    [ ] Inadequate Protein Energy Intake [ ]Inadequate Oral Intake [ ] Excessive Energy Intake     [ ] Underweight [ ] Increased Nutrient Needs [ ] Overweight/Obesity     [ ] Altered GI Function [ ] Unintended Weight Loss [ ] Food & Nutrition Related Knowledge Deficit[ ] Limited Adherence to nutrition related recommendations [ ] Malnutrition  [ ] other: Free text           Interventions: reinforce diet restrictions    Recommend    [ x]  Diet :  continue Regular diet per MD  [ ] Nutrition Supplement    [ ] Nutrition Support    [ ] Other:        Monitoring and Evaluation:     [ ] PO intake [ x] Tolerance to diet prescription [x] weights [x ] follow up per protocol    [x ] other: reinforce diet adherence

## 2018-04-07 VITALS
OXYGEN SATURATION: 100 % | SYSTOLIC BLOOD PRESSURE: 167 MMHG | DIASTOLIC BLOOD PRESSURE: 89 MMHG | HEART RATE: 98 BPM | TEMPERATURE: 98 F | RESPIRATION RATE: 18 BRPM

## 2018-04-07 LAB
ALBUMIN SERPL ELPH-MCNC: 3.4 G/DL — SIGNIFICANT CHANGE UP (ref 3.3–5)
ALP SERPL-CCNC: 52 U/L — SIGNIFICANT CHANGE UP (ref 40–120)
ALT FLD-CCNC: <5 U/L RC — LOW (ref 10–45)
ANION GAP SERPL CALC-SCNC: 16 MMOL/L — SIGNIFICANT CHANGE UP (ref 5–17)
AST SERPL-CCNC: 10 U/L — SIGNIFICANT CHANGE UP (ref 10–40)
BILIRUB SERPL-MCNC: 0.4 MG/DL — SIGNIFICANT CHANGE UP (ref 0.2–1.2)
BUN SERPL-MCNC: 43 MG/DL — HIGH (ref 7–23)
CALCIUM SERPL-MCNC: 9.5 MG/DL — SIGNIFICANT CHANGE UP (ref 8.4–10.5)
CHLORIDE SERPL-SCNC: 98 MMOL/L — SIGNIFICANT CHANGE UP (ref 96–108)
CO2 SERPL-SCNC: 25 MMOL/L — SIGNIFICANT CHANGE UP (ref 22–31)
CREAT SERPL-MCNC: 8.1 MG/DL — HIGH (ref 0.5–1.3)
GLUCOSE SERPL-MCNC: 106 MG/DL — HIGH (ref 70–99)
HCT VFR BLD CALC: 31.3 % — LOW (ref 39–50)
HGB BLD-MCNC: 9.4 G/DL — LOW (ref 13–17)
MCHC RBC-ENTMCNC: 29.1 PG — SIGNIFICANT CHANGE UP (ref 27–34)
MCHC RBC-ENTMCNC: 30 GM/DL — LOW (ref 32–36)
MCV RBC AUTO: 96.9 FL — SIGNIFICANT CHANGE UP (ref 80–100)
PLATELET # BLD AUTO: 636 K/UL — HIGH (ref 150–400)
POTASSIUM SERPL-MCNC: 5.8 MMOL/L — HIGH (ref 3.5–5.3)
POTASSIUM SERPL-SCNC: 5.8 MMOL/L — HIGH (ref 3.5–5.3)
PROT SERPL-MCNC: 6.7 G/DL — SIGNIFICANT CHANGE UP (ref 6–8.3)
RBC # BLD: 3.23 M/UL — LOW (ref 4.2–5.8)
RBC # FLD: 16.6 % — HIGH (ref 10.3–14.5)
SODIUM SERPL-SCNC: 139 MMOL/L — SIGNIFICANT CHANGE UP (ref 135–145)
WBC # BLD: 10.74 K/UL — HIGH (ref 3.8–10.5)
WBC # FLD AUTO: 10.74 K/UL — HIGH (ref 3.8–10.5)

## 2018-04-07 PROCEDURE — 87640 STAPH A DNA AMP PROBE: CPT

## 2018-04-07 PROCEDURE — 86923 COMPATIBILITY TEST ELECTRIC: CPT

## 2018-04-07 PROCEDURE — 83735 ASSAY OF MAGNESIUM: CPT

## 2018-04-07 PROCEDURE — 80053 COMPREHEN METABOLIC PANEL: CPT

## 2018-04-07 PROCEDURE — 82803 BLOOD GASES ANY COMBINATION: CPT

## 2018-04-07 PROCEDURE — 36600 WITHDRAWAL OF ARTERIAL BLOOD: CPT

## 2018-04-07 PROCEDURE — 87070 CULTURE OTHR SPECIMN AEROBIC: CPT

## 2018-04-07 PROCEDURE — A9500: CPT

## 2018-04-07 PROCEDURE — 93017 CV STRESS TEST TRACING ONLY: CPT

## 2018-04-07 PROCEDURE — 36556 INSERT NON-TUNNEL CV CATH: CPT

## 2018-04-07 PROCEDURE — 72195 MRI PELVIS W/O DYE: CPT

## 2018-04-07 PROCEDURE — 85027 COMPLETE CBC AUTOMATED: CPT

## 2018-04-07 PROCEDURE — P9040: CPT

## 2018-04-07 PROCEDURE — 86704 HEP B CORE ANTIBODY TOTAL: CPT

## 2018-04-07 PROCEDURE — 84100 ASSAY OF PHOSPHORUS: CPT

## 2018-04-07 PROCEDURE — C1752: CPT

## 2018-04-07 PROCEDURE — 71250 CT THORAX DX C-: CPT

## 2018-04-07 PROCEDURE — 85384 FIBRINOGEN ACTIVITY: CPT

## 2018-04-07 PROCEDURE — 97161 PT EVAL LOW COMPLEX 20 MIN: CPT

## 2018-04-07 PROCEDURE — 87205 SMEAR GRAM STAIN: CPT

## 2018-04-07 PROCEDURE — 85610 PROTHROMBIN TIME: CPT

## 2018-04-07 PROCEDURE — 74018 RADEX ABDOMEN 1 VIEW: CPT

## 2018-04-07 PROCEDURE — 82272 OCCULT BLD FECES 1-3 TESTS: CPT

## 2018-04-07 PROCEDURE — 99233 SBSQ HOSP IP/OBS HIGH 50: CPT | Mod: GC

## 2018-04-07 PROCEDURE — 82607 VITAMIN B-12: CPT

## 2018-04-07 PROCEDURE — 87075 CULTR BACTERIA EXCEPT BLOOD: CPT

## 2018-04-07 PROCEDURE — 86705 HEP B CORE ANTIBODY IGM: CPT

## 2018-04-07 PROCEDURE — C1729: CPT

## 2018-04-07 PROCEDURE — 84315 BODY FLUID SPECIFIC GRAVITY: CPT

## 2018-04-07 PROCEDURE — 93005 ELECTROCARDIOGRAM TRACING: CPT

## 2018-04-07 PROCEDURE — 99261: CPT

## 2018-04-07 PROCEDURE — 78452 HT MUSCLE IMAGE SPECT MULT: CPT

## 2018-04-07 PROCEDURE — 86850 RBC ANTIBODY SCREEN: CPT

## 2018-04-07 PROCEDURE — 85730 THROMBOPLASTIN TIME PARTIAL: CPT

## 2018-04-07 PROCEDURE — 87340 HEPATITIS B SURFACE AG IA: CPT

## 2018-04-07 PROCEDURE — 71045 X-RAY EXAM CHEST 1 VIEW: CPT

## 2018-04-07 PROCEDURE — 86706 HEP B SURFACE ANTIBODY: CPT

## 2018-04-07 PROCEDURE — 84484 ASSAY OF TROPONIN QUANT: CPT

## 2018-04-07 PROCEDURE — 83615 LACTATE (LD) (LDH) ENZYME: CPT

## 2018-04-07 PROCEDURE — 87102 FUNGUS ISOLATION CULTURE: CPT

## 2018-04-07 PROCEDURE — 82746 ASSAY OF FOLIC ACID SERUM: CPT

## 2018-04-07 PROCEDURE — 93321 DOPPLER ECHO F-UP/LMTD STD: CPT

## 2018-04-07 PROCEDURE — 89051 BODY FLUID CELL COUNT: CPT

## 2018-04-07 PROCEDURE — 87206 SMEAR FLUORESCENT/ACID STAI: CPT

## 2018-04-07 PROCEDURE — 36558 INSERT TUNNELED CV CATH: CPT

## 2018-04-07 PROCEDURE — 82042 OTHER SOURCE ALBUMIN QUAN EA: CPT

## 2018-04-07 PROCEDURE — 84157 ASSAY OF PROTEIN OTHER: CPT

## 2018-04-07 PROCEDURE — 86901 BLOOD TYPING SEROLOGIC RH(D): CPT

## 2018-04-07 PROCEDURE — 82553 CREATINE MB FRACTION: CPT

## 2018-04-07 PROCEDURE — 76937 US GUIDE VASCULAR ACCESS: CPT

## 2018-04-07 PROCEDURE — 93306 TTE W/DOPPLER COMPLETE: CPT

## 2018-04-07 PROCEDURE — P9047: CPT

## 2018-04-07 PROCEDURE — C1750: CPT

## 2018-04-07 PROCEDURE — 82728 ASSAY OF FERRITIN: CPT

## 2018-04-07 PROCEDURE — 80048 BASIC METABOLIC PNL TOTAL CA: CPT

## 2018-04-07 PROCEDURE — 93970 EXTREMITY STUDY: CPT

## 2018-04-07 PROCEDURE — 87116 MYCOBACTERIA CULTURE: CPT

## 2018-04-07 PROCEDURE — 87015 SPECIMEN INFECT AGNT CONCNTJ: CPT

## 2018-04-07 PROCEDURE — 88305 TISSUE EXAM BY PATHOLOGIST: CPT

## 2018-04-07 PROCEDURE — 74181 MRI ABDOMEN W/O CONTRAST: CPT

## 2018-04-07 PROCEDURE — 88112 CYTOPATH CELL ENHANCE TECH: CPT

## 2018-04-07 PROCEDURE — 80197 ASSAY OF TACROLIMUS: CPT

## 2018-04-07 PROCEDURE — 86803 HEPATITIS C AB TEST: CPT

## 2018-04-07 PROCEDURE — 77001 FLUOROGUIDE FOR VEIN DEVICE: CPT

## 2018-04-07 PROCEDURE — 86900 BLOOD TYPING SEROLOGIC ABO: CPT

## 2018-04-07 PROCEDURE — 82550 ASSAY OF CK (CPK): CPT

## 2018-04-07 PROCEDURE — 93308 TTE F-UP OR LMTD: CPT

## 2018-04-07 PROCEDURE — 87641 MR-STAPH DNA AMP PROBE: CPT

## 2018-04-07 PROCEDURE — C1769: CPT

## 2018-04-07 PROCEDURE — 83550 IRON BINDING TEST: CPT

## 2018-04-07 RX ORDER — EVEROLIMUS 10 MG/1
4 TABLET ORAL
Qty: 0 | Refills: 0 | COMMUNITY

## 2018-04-07 RX ORDER — FUROSEMIDE 40 MG
1 TABLET ORAL
Qty: 0 | Refills: 0 | COMMUNITY

## 2018-04-07 RX ORDER — POLYETHYLENE GLYCOL 3350 17 G/17G
17 POWDER, FOR SOLUTION ORAL
Qty: 0 | Refills: 0 | COMMUNITY
Start: 2018-04-07

## 2018-04-07 RX ORDER — OXYCODONE HYDROCHLORIDE 5 MG/1
5 TABLET ORAL ONCE
Qty: 0 | Refills: 0 | Status: DISCONTINUED | OUTPATIENT
Start: 2018-04-07 | End: 2018-04-07

## 2018-04-07 RX ORDER — HYDROMORPHONE HYDROCHLORIDE 2 MG/ML
2 INJECTION INTRAMUSCULAR; INTRAVENOUS; SUBCUTANEOUS ONCE
Qty: 0 | Refills: 0 | Status: DISCONTINUED | OUTPATIENT
Start: 2018-04-07 | End: 2018-04-07

## 2018-04-07 RX ORDER — ERYTHROPOIETIN 10000 [IU]/ML
0 INJECTION, SOLUTION INTRAVENOUS; SUBCUTANEOUS
Qty: 0 | Refills: 0 | COMMUNITY

## 2018-04-07 RX ORDER — AZTREONAM 2 G
1 VIAL (EA) INJECTION
Qty: 0 | Refills: 0 | COMMUNITY

## 2018-04-07 RX ORDER — DOCUSATE SODIUM 100 MG
1 CAPSULE ORAL
Qty: 0 | Refills: 0 | COMMUNITY
Start: 2018-04-07

## 2018-04-07 RX ORDER — NIFEDIPINE 30 MG
1 TABLET, EXTENDED RELEASE 24 HR ORAL
Qty: 30 | Refills: 0 | OUTPATIENT
Start: 2018-04-07 | End: 2018-05-06

## 2018-04-07 RX ORDER — HYDROMORPHONE HYDROCHLORIDE 2 MG/ML
1 INJECTION INTRAMUSCULAR; INTRAVENOUS; SUBCUTANEOUS
Qty: 20 | Refills: 0 | OUTPATIENT
Start: 2018-04-07

## 2018-04-07 RX ADMIN — Medication 100 MILLIGRAM(S): at 06:03

## 2018-04-07 RX ADMIN — HYDROMORPHONE HYDROCHLORIDE 2 MILLIGRAM(S): 2 INJECTION INTRAMUSCULAR; INTRAVENOUS; SUBCUTANEOUS at 17:30

## 2018-04-07 RX ADMIN — OXYCODONE HYDROCHLORIDE 5 MILLIGRAM(S): 5 TABLET ORAL at 01:00

## 2018-04-07 RX ADMIN — HYDROMORPHONE HYDROCHLORIDE 2 MILLIGRAM(S): 2 INJECTION INTRAMUSCULAR; INTRAVENOUS; SUBCUTANEOUS at 13:01

## 2018-04-07 RX ADMIN — PANTOPRAZOLE SODIUM 40 MILLIGRAM(S): 20 TABLET, DELAYED RELEASE ORAL at 06:03

## 2018-04-07 RX ADMIN — HYDROMORPHONE HYDROCHLORIDE 2 MILLIGRAM(S): 2 INJECTION INTRAMUSCULAR; INTRAVENOUS; SUBCUTANEOUS at 12:31

## 2018-04-07 RX ADMIN — POLYETHYLENE GLYCOL 3350 17 GRAM(S): 17 POWDER, FOR SOLUTION ORAL at 12:00

## 2018-04-07 RX ADMIN — OXYCODONE HYDROCHLORIDE 5 MILLIGRAM(S): 5 TABLET ORAL at 00:28

## 2018-04-07 RX ADMIN — HYDROMORPHONE HYDROCHLORIDE 2 MILLIGRAM(S): 2 INJECTION INTRAMUSCULAR; INTRAVENOUS; SUBCUTANEOUS at 20:08

## 2018-04-07 RX ADMIN — Medication 5 MILLIGRAM(S): at 12:00

## 2018-04-07 RX ADMIN — Medication 100 MILLIGRAM(S): at 17:30

## 2018-04-07 RX ADMIN — Medication 60 MILLIGRAM(S): at 06:07

## 2018-04-07 RX ADMIN — HYDROMORPHONE HYDROCHLORIDE 2 MILLIGRAM(S): 2 INJECTION INTRAMUSCULAR; INTRAVENOUS; SUBCUTANEOUS at 19:38

## 2018-04-07 RX ADMIN — HYDROMORPHONE HYDROCHLORIDE 2 MILLIGRAM(S): 2 INJECTION INTRAMUSCULAR; INTRAVENOUS; SUBCUTANEOUS at 06:03

## 2018-04-07 RX ADMIN — HYDROMORPHONE HYDROCHLORIDE 2 MILLIGRAM(S): 2 INJECTION INTRAMUSCULAR; INTRAVENOUS; SUBCUTANEOUS at 07:00

## 2018-04-07 RX ADMIN — TACROLIMUS 1 MILLIGRAM(S): 5 CAPSULE ORAL at 11:59

## 2018-04-07 RX ADMIN — HYDROMORPHONE HYDROCHLORIDE 2 MILLIGRAM(S): 2 INJECTION INTRAMUSCULAR; INTRAVENOUS; SUBCUTANEOUS at 18:00

## 2018-04-07 NOTE — PROGRESS NOTE ADULT - PROBLEM SELECTOR PLAN 1
ESRD failed transplant. Now HD dependent.   Plan for HD today  MOnitor BMP and BP
Resolved. Recommend TTE on Monday per Dr. Rodriguez.
ESRD failed transplant. Now HD dependent.   Plan for HD today. Pt accepted at [facility] on MWF schedule. Plan for HD tomorrow to place pt on MWF schedule
- MR abdomen/pelvis non-con w/ DWI sequence to assess kidneys for recurrent disease  - f/u CT surgery  - f/u nephrology  - care per primary team
- discussion held with pt and Dr. Goodman regarding rationale behind repeat imaging of the kidney  - given his recent treatment for cardiac tamponade, will defer MR abd/pelvis non-contrast w/ diffusion weighted imaging sequence until his acute issues resolve  - may have MR abd/pelvis as inpatient after his chest tubes are removed and his cardiac condition stabilized or as an outpatient when he is recovered from his acute issues
ESRD failed transplant. Now HD dependent.   Plan for HD today. Pt accepted at [facility] on MWF schedule, refused HD yesterday.  HD today, then possible d/c
ESRD failed transplant. Seen on HD, tolerating treatment well. UF goal 1-2L
Patient with failing renal transplant.    getting HD everyday for uremic pedicardial fluid management.   now on HD via RAUDEL licona. will need it switched to permacath  c/w pred and tacro
Patient with failing renal transplant.   getting HD everyday for presumed uremic pedicardial fluid management.   No change in effusion despite good dialy dialysis - less likely the cause of the effusion  now on HD via RAUDEL l[place]. will need it switched to permacath  c/w pred and tacro
now started on HD. 2nd dialysis today.  Third session for monday.   No need for HD today.
now started on HD. 3rd dialysis today.
s/p Pericardial window via Left thoracotomy   Continue with current medication regimen   Pain management   D/C Pericardial Naseem drain   D/C Left pleural CT--> CXR in AM   HD as per Renal   Continue with medical management as per primary team   Plan of care discussed with attending
s/p Pericardial window via Left thoracotomy   Continue with current medication regimen   Pain management   D/C Pericardial Naseem drain   Maintain Left plueral CT--> Pleural vac --> SD   CXR in AM   HD as per Renal   Continue with medical management as per primary team   Plan of care discussed with attending
s/p Pericardial window via Left thoracotomy   Continue with current medication regimen   Pain management   Maintain MS Naseem drain --> Pleural vac --> SD   HD as per Renal   Continue with medical management as per primary team   Plan of care discussed with attending
Patient with failing renal transplant.   getting HD everyday for presumed uremic pedicardial fluid management.   pericardial effusion now larger despite good dialy dialysis - less likely the cause of the effusion  now on HD via RAUDEL l[place]a. will need it switched to permacath  c/w pred and tacro
ESRD failed transplant. Now HD dependent. Pt received HD yesterday with 2 L fluid removal.   Plan for HD tomorrow

## 2018-04-07 NOTE — PROGRESS NOTE ADULT - PROVIDER SPECIALTY LIST ADULT
Anesthesia
CT Surgery
Cardiology
Internal Medicine
Intervent Radiology
Nephrology
Transplant Medicine
Urology
Nephrology
Transplant Medicine
Internal Medicine
CT Surgery
Nephrology

## 2018-04-07 NOTE — PROGRESS NOTE ADULT - PROBLEM SELECTOR PLAN 3
iron depleted  For venofer 200 mg q5 days.  c/w epo
Failed allograft. Re-started on HD. Continue Tacrolimus and prednisone for now
Failed allograft. Re-started on HD. Continue Tacrolimus and prednisone per transplant nephrology.
iron depleted  For venofer 200 mg q5 days.  c/w epo
iron depleted  For venopher 200 mg q5 days.  c/w epo
will do HD with UF, cont with nifedipine  can increase lasix dosing if needed.
will do HD with UF, cont with nifedipine  can increase lasix dosing if needed.

## 2018-04-07 NOTE — PROGRESS NOTE ADULT - ASSESSMENT
Cardiac tamponade s/p window/drainage  Failed renal transplant s/p livan and now with tunneled cath  hypertension  anemia  functional bowel obstruction- resolved    continue current care  DC planning.

## 2018-04-07 NOTE — PROGRESS NOTE ADULT - SUBJECTIVE AND OBJECTIVE BOX
Patient is a 57y old  Male who presents with a chief complaint of Failed renal transplant/ New hemodialysis (05 Apr 2018 09:22)      SUBJECTIVE / OVERNIGHT EVENTS:   Feels better.  Denies CP/SOB/Palpitation/HA.    MEDICATIONS  (STANDING):  docusate sodium 100 milliGRAM(s) Oral three times a day  NIFEdipine XL 60 milliGRAM(s) Oral daily  NIFEdipine XL 30 milliGRAM(s) Oral at bedtime  pantoprazole    Tablet 40 milliGRAM(s) Oral before breakfast  polyethylene glycol 3350 17 Gram(s) Oral daily  predniSONE   Tablet 5 milliGRAM(s) Oral daily  tacrolimus 1 milliGRAM(s) Oral <User Schedule>    MEDICATIONS  (PRN):  HYDROmorphone   Tablet 2 milliGRAM(s) Oral every 8 hours PRN Moderate Pain (4 - 6)  methyl salicylate 14%/menthol 6% Topical Ointment 1 Application(s) Topical three times a day PRN leg pain        CAPILLARY BLOOD GLUCOSE        I&O's Summary    06 Apr 2018 07:01  -  07 Apr 2018 07:00  --------------------------------------------------------  IN: 1160 mL / OUT: 0 mL / NET: 1160 mL    07 Apr 2018 07:01  -  07 Apr 2018 23:44  --------------------------------------------------------  IN: 1080 mL / OUT: 600 mL / NET: 480 mL        PHYSICAL EXAM:  GENERAL: NAD, well-developed  HEAD:  Atraumatic, Normocephalic  NECK: Supple, No JVD  CHEST/LUNG: Clear to auscultation bilaterally; No wheezing.  HEART: Regular rate and rhythm; No murmurs, rubs, or gallops  ABDOMEN: Soft, Nontender, Nondistended; Bowel sounds present  EXTREMITIES:   No clubbing, cyanosis, or edema  NEUROLOGY: AAO X 3  SKIN: No rashes    LABS:                        9.4    10.74 )-----------( 636      ( 07 Apr 2018 16:43 )             31.3     04-07    139  |  98  |  43<H>  ----------------------------<  106<H>  5.8<H>   |  25  |  8.10<H>    Ca    9.5      07 Apr 2018 14:35    TPro  6.7  /  Alb  3.4  /  TBili  0.4  /  DBili  x   /  AST  10  /  ALT  <5<L>  /  AlkPhos  52  04-07            CAPILLARY BLOOD GLUCOSE                    RADIOLOGY & ADDITIONAL TESTS:    Imaging Personally Reviewed:    Consultant(s) Notes Reviewed:      Care Discussed with Consultants/Other Providers:

## 2018-04-07 NOTE — PROGRESS NOTE ADULT - SUBJECTIVE AND OBJECTIVE BOX
[facility] DIVISION OF KIDNEY DISEASES AND HYPERTENSION -- HEMODIALYSIS NOTE  --------------------------------------------------------------------------------  Chief Complaint: ESRD/Ongoing hemodialysis requirement    24 hour events/subjective:        PAST HISTORY  --------------------------------------------------------------------------------  No significant changes to PMH, PSH, FHx, SHx, unless otherwise noted    ALLERGIES & MEDICATIONS  --------------------------------------------------------------------------------  Allergies    No Known Allergies    Intolerances      Standing Inpatient Medications  docusate sodium 100 milliGRAM(s) Oral three times a day  NIFEdipine XL 60 milliGRAM(s) Oral daily  NIFEdipine XL 30 milliGRAM(s) Oral at bedtime  pantoprazole    Tablet 40 milliGRAM(s) Oral before breakfast  polyethylene glycol 3350 17 Gram(s) Oral daily  predniSONE   Tablet 5 milliGRAM(s) Oral daily  tacrolimus 1 milliGRAM(s) Oral <User Schedule>    PRN Inpatient Medications  HYDROmorphone   Tablet 2 milliGRAM(s) Oral every 8 hours PRN  methyl salicylate 14%/menthol 6% Topical Ointment 1 Application(s) Topical three times a day PRN      REVIEW OF SYSTEMS  --------------------------------------------------------------------------------  Gen: No fatigue, fevers/chills, weakness  Skin: No rashes  Head/Eyes/Ears/Mouth: No headache; Normal hearing; Normal vision w/o blurriness;   Respiratory: No dyspnea, cough, wheezing, hemoptysis  CV: No chest pain, PND, orthopnea  GI: No abdominal pain, diarrhea, constipation, nausea, vomiting, melena, hematochezia  : No increased frequency, dysuria, hematuria, nocturia  MSK:  edema      All other systems were reviewed and are negative, except as noted.    VITALS/PHYSICAL EXAM  --------------------------------------------------------------------------------  T(C): 36.8 (04-07-18 @ 13:55), Max: 37 (04-06-18 @ 19:48)  HR: 98 (04-07-18 @ 13:55) (93 - 99)  BP: 165/92 (04-07-18 @ 13:55) (149/80 - 165/92)  RR: 18 (04-07-18 @ 13:55) (18 - 18)  SpO2: 95% (04-07-18 @ 13:55) (94% - 98%)  Wt(kg): --        04-06-18 @ 07:01  -  04-07-18 @ 07:00  --------------------------------------------------------  IN: 1160 mL / OUT: 0 mL / NET: 1160 mL    04-07-18 @ 07:01  -  04-07-18 @ 15:10  --------------------------------------------------------  IN: 480 mL / OUT: 0 mL / NET: 480 mL      Physical Exam:  	Gen: NAD  	Pulm: CTA B/L  	CV: S1S2  	Abd: Soft, +BS  	Ext:  LE edema B/L present               Neuro: Awake   	Skin: Warm and Dry   	Vascular access: Right tunnelled HD catheter    LABS/STUDIES  --------------------------------------------------------------------------------                Iron 24, TIBC 161, %sat 15      [03-20-18 @ 11:31]  Ferritin 250      [03-19-18 @ 20:02]    HBsAb <3.0      [03-17-18 @ 05:35]  HBsAg Nonreact      [03-17-18 @ 05:35]  HBcAb Nonreact      [03-17-18 @ 05:35]  HCV 0.12, Nonreact      [03-17-18 @ 05:35]

## 2018-04-12 ENCOUNTER — OTHER (OUTPATIENT)
Age: 58
End: 2018-04-12

## 2018-04-16 ENCOUNTER — FORM ENCOUNTER (OUTPATIENT)
Age: 58
End: 2018-04-16

## 2018-04-17 ENCOUNTER — OUTPATIENT (OUTPATIENT)
Dept: OUTPATIENT SERVICES | Facility: HOSPITAL | Age: 58
LOS: 1 days | End: 2018-04-17

## 2018-04-17 ENCOUNTER — APPOINTMENT (OUTPATIENT)
Dept: ULTRASOUND IMAGING | Facility: CLINIC | Age: 58
End: 2018-04-17
Payer: MEDICARE

## 2018-04-17 ENCOUNTER — OUTPATIENT (OUTPATIENT)
Dept: OUTPATIENT SERVICES | Facility: HOSPITAL | Age: 58
LOS: 1 days | End: 2018-04-17
Payer: MEDICARE

## 2018-04-17 ENCOUNTER — APPOINTMENT (OUTPATIENT)
Dept: CV DIAGNOSITCS | Facility: HOSPITAL | Age: 58
End: 2018-04-17
Payer: MEDICARE

## 2018-04-17 DIAGNOSIS — Z94.0 KIDNEY TRANSPLANT STATUS: Chronic | ICD-10-CM

## 2018-04-17 DIAGNOSIS — N18.6 END STAGE RENAL DISEASE: ICD-10-CM

## 2018-04-17 DIAGNOSIS — Z90.411 ACQUIRED PARTIAL ABSENCE OF PANCREAS: Chronic | ICD-10-CM

## 2018-04-17 PROCEDURE — 76700 US EXAM ABDOM COMPLETE: CPT | Mod: 26

## 2018-04-17 PROCEDURE — 76700 US EXAM ABDOM COMPLETE: CPT

## 2018-04-17 PROCEDURE — 93306 TTE W/DOPPLER COMPLETE: CPT | Mod: 26

## 2018-04-19 ENCOUNTER — APPOINTMENT (OUTPATIENT)
Dept: UROLOGY | Facility: CLINIC | Age: 58
End: 2018-04-19
Payer: MEDICARE

## 2018-04-19 DIAGNOSIS — N28.1 CYST OF KIDNEY, ACQUIRED: ICD-10-CM

## 2018-04-19 PROCEDURE — 99214 OFFICE O/P EST MOD 30 MIN: CPT

## 2018-04-20 PROBLEM — N28.1 COMPLEX RENAL CYST: Status: ACTIVE | Noted: 2018-04-20

## 2018-04-25 ENCOUNTER — APPOINTMENT (OUTPATIENT)
Dept: CV DIAGNOSITCS | Facility: HOSPITAL | Age: 58
End: 2018-04-25

## 2018-04-25 LAB
CULTURE RESULTS: SIGNIFICANT CHANGE UP
SPECIMEN SOURCE: SIGNIFICANT CHANGE UP

## 2018-05-03 ENCOUNTER — APPOINTMENT (OUTPATIENT)
Dept: TRANSPLANT | Facility: CLINIC | Age: 58
End: 2018-05-03
Payer: MEDICARE

## 2018-05-03 VITALS
SYSTOLIC BLOOD PRESSURE: 170 MMHG | TEMPERATURE: 98 F | BODY MASS INDEX: 26.28 KG/M2 | HEART RATE: 93 BPM | HEIGHT: 72 IN | RESPIRATION RATE: 16 BRPM | OXYGEN SATURATION: 98 % | DIASTOLIC BLOOD PRESSURE: 91 MMHG | WEIGHT: 194 LBS

## 2018-05-03 DIAGNOSIS — Z85.528 PERSONAL HISTORY OF OTHER MALIGNANT NEOPLASM OF KIDNEY: ICD-10-CM

## 2018-05-03 DIAGNOSIS — D64.9 ANEMIA, UNSPECIFIED: ICD-10-CM

## 2018-05-03 DIAGNOSIS — N18.9 CHRONIC KIDNEY DISEASE, UNSPECIFIED: ICD-10-CM

## 2018-05-03 DIAGNOSIS — D63.1 CHRONIC KIDNEY DISEASE, UNSPECIFIED: ICD-10-CM

## 2018-05-03 PROCEDURE — 99215 OFFICE O/P EST HI 40 MIN: CPT

## 2018-05-03 RX ORDER — VALGANCICLOVIR HYDROCHLORIDE 450 MG/1
450 TABLET ORAL
Qty: 25 | Refills: 1 | Status: DISCONTINUED | COMMUNITY
Start: 2017-11-14 | End: 2018-05-03

## 2018-05-04 ENCOUNTER — APPOINTMENT (OUTPATIENT)
Dept: NEPHROLOGY | Facility: CLINIC | Age: 58
End: 2018-05-04
Payer: MEDICARE

## 2018-05-04 VITALS
RESPIRATION RATE: 16 BRPM | HEIGHT: 72 IN | BODY MASS INDEX: 26.28 KG/M2 | HEART RATE: 96 BPM | DIASTOLIC BLOOD PRESSURE: 84 MMHG | WEIGHT: 194 LBS | SYSTOLIC BLOOD PRESSURE: 156 MMHG | TEMPERATURE: 98 F

## 2018-05-04 DIAGNOSIS — R11.0 NAUSEA: ICD-10-CM

## 2018-05-04 PROCEDURE — 99214 OFFICE O/P EST MOD 30 MIN: CPT

## 2018-05-04 RX ORDER — PANTOPRAZOLE SODIUM 40 MG/1
40 GRANULE, DELAYED RELEASE ORAL TWICE DAILY
Refills: 0 | Status: DISCONTINUED | COMMUNITY
Start: 2017-11-14 | End: 2018-05-04

## 2018-05-04 RX ORDER — CLONIDINE HYDROCHLORIDE 0.1 MG/1
0.1 TABLET ORAL DAILY
Qty: 90 | Refills: 3 | Status: DISCONTINUED | COMMUNITY
Start: 2016-12-02 | End: 2018-05-04

## 2018-05-04 RX ORDER — EPOETIN ALFA 20000 [IU]/ML
20000 SOLUTION INTRAVENOUS; SUBCUTANEOUS
Refills: 0 | Status: DISCONTINUED | COMMUNITY
Start: 2017-11-21 | End: 2018-05-04

## 2018-05-04 RX ORDER — NIFEDIPINE 30 MG/1
30 TABLET, EXTENDED RELEASE ORAL
Qty: 30 | Refills: 3 | Status: DISCONTINUED | COMMUNITY
Start: 2018-03-12 | End: 2018-05-04

## 2018-05-04 RX ORDER — ROSUVASTATIN CALCIUM 5 MG/1
5 TABLET, FILM COATED ORAL
Qty: 90 | Refills: 0 | Status: DISCONTINUED | COMMUNITY
Start: 2017-05-19 | End: 2018-05-04

## 2018-05-04 RX ORDER — FAMOTIDINE 20 MG/1
20 TABLET, FILM COATED ORAL
Qty: 90 | Refills: 0 | Status: DISCONTINUED | COMMUNITY
Start: 2016-12-02 | End: 2018-05-04

## 2018-05-04 RX ORDER — GENTAMICIN SULFATE 1 MG/G
0.1 CREAM TOPICAL
Qty: 15 | Refills: 0 | Status: DISCONTINUED | COMMUNITY
Start: 2018-01-08

## 2018-05-04 RX ORDER — POLYETHYLENE GLYCOL 3350, SODIUM SULFATE, SODIUM CHLORIDE, POTASSIUM CHLORIDE, ASCORBIC ACID, SODIUM ASCORBATE 7.5-2.691G
100 KIT ORAL
Qty: 1 | Refills: 0 | Status: DISCONTINUED | COMMUNITY
Start: 2017-12-06 | End: 2018-05-04

## 2018-05-04 RX ORDER — HYDROMORPHONE HYDROCHLORIDE 2 MG/1
2 TABLET ORAL
Qty: 20 | Refills: 0 | Status: DISCONTINUED | COMMUNITY
Start: 2018-04-07

## 2018-05-07 PROBLEM — R11.0 NAUSEA: Status: ACTIVE | Noted: 2018-05-07

## 2018-05-16 ENCOUNTER — MEDICATION RENEWAL (OUTPATIENT)
Age: 58
End: 2018-05-16

## 2018-05-16 LAB
CULTURE RESULTS: SIGNIFICANT CHANGE UP
SPECIMEN SOURCE: SIGNIFICANT CHANGE UP

## 2018-07-03 ENCOUNTER — APPOINTMENT (OUTPATIENT)
Dept: NEPHROLOGY | Facility: CLINIC | Age: 58
End: 2018-07-03
Payer: MEDICARE

## 2018-07-03 VITALS
SYSTOLIC BLOOD PRESSURE: 167 MMHG | DIASTOLIC BLOOD PRESSURE: 93 MMHG | WEIGHT: 190 LBS | BODY MASS INDEX: 25.73 KG/M2 | TEMPERATURE: 97.6 F | HEIGHT: 72 IN | OXYGEN SATURATION: 96 % | RESPIRATION RATE: 13 BRPM | HEART RATE: 79 BPM

## 2018-07-03 VITALS — SYSTOLIC BLOOD PRESSURE: 160 MMHG | DIASTOLIC BLOOD PRESSURE: 88 MMHG

## 2018-07-03 PROCEDURE — 99214 OFFICE O/P EST MOD 30 MIN: CPT

## 2018-07-03 RX ORDER — CLONIDINE 0.1 MG/24H
0.1 PATCH, EXTENDED RELEASE TRANSDERMAL WEEKLY
Qty: 24 | Refills: 3 | Status: DISCONTINUED | COMMUNITY
Start: 2018-05-04 | End: 2018-07-03

## 2018-07-09 RX ORDER — LOSARTAN POTASSIUM 25 MG/1
25 TABLET, FILM COATED ORAL DAILY
Qty: 90 | Refills: 3 | Status: DISCONTINUED | COMMUNITY
Start: 2018-07-03 | End: 2018-07-09

## 2018-08-14 ENCOUNTER — RX CHANGE (OUTPATIENT)
Age: 58
End: 2018-08-14

## 2018-08-16 ENCOUNTER — INPATIENT (INPATIENT)
Facility: HOSPITAL | Age: 58
LOS: 12 days | Discharge: HOME | DRG: 124 | End: 2018-08-29
Attending: INTERNAL MEDICINE | Admitting: INTERNAL MEDICINE
Payer: MEDICARE

## 2018-08-16 VITALS
SYSTOLIC BLOOD PRESSURE: 202 MMHG | OXYGEN SATURATION: 95 % | RESPIRATION RATE: 18 BRPM | DIASTOLIC BLOOD PRESSURE: 99 MMHG | HEART RATE: 58 BPM | TEMPERATURE: 98 F

## 2018-08-16 DIAGNOSIS — Z90.411 ACQUIRED PARTIAL ABSENCE OF PANCREAS: Chronic | ICD-10-CM

## 2018-08-16 DIAGNOSIS — H53.9 UNSPECIFIED VISUAL DISTURBANCE: ICD-10-CM

## 2018-08-16 DIAGNOSIS — Z94.0 KIDNEY TRANSPLANT STATUS: Chronic | ICD-10-CM

## 2018-08-16 LAB
ALBUMIN SERPL ELPH-MCNC: 3.9 G/DL — SIGNIFICANT CHANGE UP (ref 3.3–5)
ALP SERPL-CCNC: 62 U/L — SIGNIFICANT CHANGE UP (ref 40–120)
ALT FLD-CCNC: 17 U/L — SIGNIFICANT CHANGE UP (ref 10–45)
ANION GAP SERPL CALC-SCNC: 14 MMOL/L — SIGNIFICANT CHANGE UP (ref 5–17)
APTT BLD: 29.9 SEC — SIGNIFICANT CHANGE UP (ref 27.5–37.4)
AST SERPL-CCNC: 17 U/L — SIGNIFICANT CHANGE UP (ref 10–40)
BASOPHILS # BLD AUTO: 0 K/UL — SIGNIFICANT CHANGE UP (ref 0–0.2)
BASOPHILS NFR BLD AUTO: 0.6 % — SIGNIFICANT CHANGE UP (ref 0–2)
BILIRUB SERPL-MCNC: 0.5 MG/DL — SIGNIFICANT CHANGE UP (ref 0.2–1.2)
BUN SERPL-MCNC: 35 MG/DL — HIGH (ref 7–23)
CALCIUM SERPL-MCNC: 9 MG/DL — SIGNIFICANT CHANGE UP (ref 8.4–10.5)
CHLORIDE SERPL-SCNC: 94 MMOL/L — LOW (ref 96–108)
CO2 SERPL-SCNC: 29 MMOL/L — SIGNIFICANT CHANGE UP (ref 22–31)
CREAT SERPL-MCNC: 6.38 MG/DL — HIGH (ref 0.5–1.3)
EOSINOPHIL # BLD AUTO: 0 K/UL — SIGNIFICANT CHANGE UP (ref 0–0.5)
EOSINOPHIL NFR BLD AUTO: 0.5 % — SIGNIFICANT CHANGE UP (ref 0–6)
GLUCOSE SERPL-MCNC: 122 MG/DL — HIGH (ref 70–99)
HCT VFR BLD CALC: 34.8 % — LOW (ref 39–50)
HGB BLD-MCNC: 11.2 G/DL — LOW (ref 13–17)
INR BLD: 1.04 RATIO — SIGNIFICANT CHANGE UP (ref 0.88–1.16)
LYMPHOCYTES # BLD AUTO: 2.4 K/UL — SIGNIFICANT CHANGE UP (ref 1–3.3)
LYMPHOCYTES # BLD AUTO: 30.7 % — SIGNIFICANT CHANGE UP (ref 13–44)
MCHC RBC-ENTMCNC: 27.1 PG — SIGNIFICANT CHANGE UP (ref 27–34)
MCHC RBC-ENTMCNC: 32.2 GM/DL — SIGNIFICANT CHANGE UP (ref 32–36)
MCV RBC AUTO: 84.2 FL — SIGNIFICANT CHANGE UP (ref 80–100)
MONOCYTES # BLD AUTO: 1 K/UL — HIGH (ref 0–0.9)
MONOCYTES NFR BLD AUTO: 12.4 % — SIGNIFICANT CHANGE UP (ref 2–14)
NEUTROPHILS # BLD AUTO: 4.3 K/UL — SIGNIFICANT CHANGE UP (ref 1.8–7.4)
NEUTROPHILS NFR BLD AUTO: 55.8 % — SIGNIFICANT CHANGE UP (ref 43–77)
PLATELET # BLD AUTO: 311 K/UL — SIGNIFICANT CHANGE UP (ref 150–400)
POTASSIUM SERPL-MCNC: 5.2 MMOL/L — SIGNIFICANT CHANGE UP (ref 3.5–5.3)
POTASSIUM SERPL-SCNC: 5.2 MMOL/L — SIGNIFICANT CHANGE UP (ref 3.5–5.3)
PROT SERPL-MCNC: 6.9 G/DL — SIGNIFICANT CHANGE UP (ref 6–8.3)
PROTHROM AB SERPL-ACNC: 11.4 SEC — SIGNIFICANT CHANGE UP (ref 9.8–12.7)
RBC # BLD: 4.13 M/UL — LOW (ref 4.2–5.8)
RBC # FLD: 16.5 % — HIGH (ref 10.3–14.5)
SODIUM SERPL-SCNC: 137 MMOL/L — SIGNIFICANT CHANGE UP (ref 135–145)
WBC # BLD: 7.7 K/UL — SIGNIFICANT CHANGE UP (ref 3.8–10.5)
WBC # FLD AUTO: 7.7 K/UL — SIGNIFICANT CHANGE UP (ref 3.8–10.5)

## 2018-08-16 PROCEDURE — 70450 CT HEAD/BRAIN W/O DYE: CPT | Mod: 26

## 2018-08-16 PROCEDURE — 99285 EMERGENCY DEPT VISIT HI MDM: CPT | Mod: GC

## 2018-08-16 PROCEDURE — 99223 1ST HOSP IP/OBS HIGH 75: CPT

## 2018-08-16 RX ORDER — HYDRALAZINE HCL 50 MG
20 TABLET ORAL ONCE
Qty: 0 | Refills: 0 | Status: COMPLETED | OUTPATIENT
Start: 2018-08-16 | End: 2018-08-16

## 2018-08-16 RX ORDER — TACROLIMUS 5 MG/1
1 CAPSULE ORAL
Qty: 0 | Refills: 0 | COMMUNITY

## 2018-08-16 RX ORDER — MAGNESIUM OXIDE 400 MG ORAL TABLET 241.3 MG
1 TABLET ORAL
Qty: 0 | Refills: 0 | COMMUNITY

## 2018-08-16 RX ORDER — ISOSORBIDE DINITRATE 5 MG/1
5 TABLET ORAL ONCE
Qty: 0 | Refills: 0 | Status: DISCONTINUED | OUTPATIENT
Start: 2018-08-16 | End: 2018-08-16

## 2018-08-16 RX ORDER — ONDANSETRON 8 MG/1
4 TABLET, FILM COATED ORAL ONCE
Qty: 0 | Refills: 0 | Status: COMPLETED | OUTPATIENT
Start: 2018-08-16 | End: 2018-08-16

## 2018-08-16 RX ORDER — NIFEDIPINE 30 MG
1 TABLET, EXTENDED RELEASE 24 HR ORAL
Qty: 0 | Refills: 0 | COMMUNITY

## 2018-08-16 RX ADMIN — Medication 0.1 MILLIGRAM(S): at 21:54

## 2018-08-16 NOTE — H&P ADULT - ASSESSMENT
59yo M w/pmhx of HTN, ESRD s/p renal transplant x 2 but now requiring HD again (T Th Sat), now presenting with accelerated hypertension with hypertensive emergency (w/visual changes).

## 2018-08-16 NOTE — ED PROVIDER NOTE - PROGRESS NOTE DETAILS
Attending MD Sams.  Visual acuity 20:100 each eye individually 20:70 with both eyes.  Pt states that he usually has intact distance vision and only 'needs reading eyeglasses'.  Visual acuity performed uncorrected given lack of correction with distance vision at baseline. Attending MD Sams. PT's visual acuity is markedly worse than his reported baseline.  NO acute findings on full neuro exam, CT head non-actionable.  VIsual changes are now subacute.  Pt endorsed to Dr. Kumar for admission for BP management and possible non-emergent optho/neuro consult.  No unilateral focus to sxs and no associated neuro deficits.  NO eye pain/severe headache.  Stable for admission.

## 2018-08-16 NOTE — H&P ADULT - HISTORY OF PRESENT ILLNESS
Pt is a pleasant 57yo M w/pmhx of HTN, ESRD s/p renal transplant x 2 but now requiring HD again (T Th Sat), now presenting with vision changes x 5 days as well as elevated blood pressures noted as an outpatient. Patient reports that over the last 5 days he has had vision changes including blurred vision in both eyes bilaterally (both near and far vision), as well as having the sensation of a bright light being pointed at his eyes when he is attempting to read or look at things up close. These vision changes have been persistent x 5 days. In addition he has noticed elevated blood pressures to the 180-190s systolic. His diastolic blood pressures have been running the 100s but he reports that during dialysis sessions this can spike. Today at HD he was noted to have a diastolic blood pressure of 131 and the HD center recommended that he come to the hospital for care due to worsening blood pressures and vision changes. Patient has never had any similar visual changes in the past. Denies any current headaches at this time though he reports he does normally get a headache with HD. Patient also reports that his blood pressures had been somewhat difficult to control in recent weeks Pt is a pleasant 57yo M w/pmhx of HTN, ESRD s/p renal transplant x 2 but now requiring HD again (T Th Sat), now presenting with vision changes x 5 days as well as elevated blood pressures noted as an outpatient. Patient reports that over the last 5 days he has had vision changes including blurred vision in both eyes bilaterally (both near and far vision), as well as having the sensation of a bright light being pointed at his eyes when he is attempting to read or look at things up close. These vision changes have been persistent x 5 days. In addition he has noticed elevated blood pressures to the 180-190s systolic. His diastolic blood pressures have been running the 100s but he reports that during dialysis sessions this can spike. Today at HD he was noted to have a diastolic blood pressure of 131 and the HD center recommended that he come to the hospital for care due to worsening blood pressures and vision changes. Patient has never had any similar visual changes in the past. Denies any current headaches at this time though he reports he does normally get a headache with HD. Patient also reports that his blood pressures had been somewhat difficult to control in recent weeks with his systolic blood pressures usually in the 180s. Because of this he was recently trialed on Atenolol but patient reports that he does not usually tolerate beta blockers well and when he attempted to try atenolol about 4 days ago he felt very dizzy/lightheaded. Patient reports similar issues taking labetalol in the past. He was also very briefly stopped off of cozaar x 1 day due to some concerns by HD center regarding potassium levels? but his nephrologist placed him back on cozaar the next day. Patient also reports in the past having nausea/vomiting/diarrhea with use of hydralazine though this was with oral hydralazine, he reports not having had the IV version previously.

## 2018-08-16 NOTE — H&P ADULT - PROBLEM SELECTOR PLAN 1
Ophthalmology consulted and will see patient tonight, have concern for possible hemorrhage vs retinal detachment given vision changes.  Hold aspirin for now pending ophthalmology eval  avoid HSQ pending ophthalmology eval, will use ICDs for dvt ppx for now  Control BP as below

## 2018-08-16 NOTE — ED PROVIDER NOTE - NS ED ROS FT
GENERAL: No fever or chills, EYES: bilateral blurry vision, HEENT: no trouble swallowing or speaking, CARDIAC: no chest pain, PULMONARY: no cough or SOB, GI: no abdominal pain, no nausea, no vomiting, no diarrhea or constipation, : No changes in urination, SKIN: no rashes, NEURO: no headache,  MSK: No joint pain ~Shirley Sam M.D. Resident

## 2018-08-16 NOTE — PATIENT PROFILE ADULT. - TEACHING/LEARNING LEARNING PREFERENCES
skill demonstration/individual instruction/verbal instruction/written material/video/audio/computer/internet

## 2018-08-16 NOTE — ED PROVIDER NOTE - MEDICAL DECISION MAKING DETAILS
59 yo M PMHx renal transplant x 2, currently on dialysis T,R,Sa waiting for third kidney transplant, HTN p/w bilateral blurry vision and white spots in vision x 5 days, DDx: retinal hemorrhage, ICH, will obtain CT head, visual acuity exam, reevaluate

## 2018-08-16 NOTE — ED PROVIDER NOTE - ATTENDING CONTRIBUTION TO CARE
Attending MD Sams.  Agree with above. PT is a 58 yr old male with pmhx of transplant x 2 who is currently on dialysis Tues/Thurs/Sat awaiting his 3rd renal tx.  Pt also has hx of HTN and presents currently to ED with complaint of blurry vision and 'white spots in vision' x 5 days.  Pt's nephrologist has been making changes to his HTN medication regimen recnelty and he is currently on 60 mg Nifedipine BID, Clonidine 0.3 mg oral equivalent of patch, furosemide 80 mg daily, atenolol (unclear dose). PT states that his nephrologist told him to come to ED because of his 'visual changes'.  He endorses mild headache per his baseline headaches without change in nature.  PT denies extremity weakness/numbness/tingling in arms/legs.

## 2018-08-16 NOTE — H&P ADULT - NSHPPHYSICALEXAM_GEN_ALL_CORE
Vital Signs Last 24 Hrs  T(C): 36.6 (16 Aug 2018 23:20), Max: 36.8 (16 Aug 2018 22:29)  T(F): 97.8 (16 Aug 2018 23:20), Max: 98.2 (16 Aug 2018 22:29)  HR: 56 (16 Aug 2018 23:20) (56 - 58)  BP: 197/101 (16 Aug 2018 23:20) (197/101 - 204/104)  BP(mean): --  RR: 18 (16 Aug 2018 23:20) (18 - 19)  SpO2: 96% (16 Aug 2018 23:20) (95% - 96%)    GENERAL: No acute distress, well-developed  HEAD:  Atraumatic, Normocephalic  EYES: EOMI, conjunctiva and sclera clear. Visual acuity checked with vision chart by ED physicians (discussed their eye exam with ED physician Dr. Sam)    ENT: Oral mucosa moist  NECK: Neck supple  CHEST/LUNG: Clear to auscultation bilaterally; No wheeze, no rales, no rhonchi.    HEART: Regular rate and rhythm; No murmurs, rubs, or gallops  ABDOMEN: Soft, Nontender, Nondistended; Bowel sounds present  EXTREMITIES:  No clubbing, cyanosis. 2+ pitting edema in LE b/l pt reports this is stable vs baseline and improved versus some swelling episodes in the past  VASCULAR: Posterior tibialis pulses intact bilaterally  PSYCH: Normal behavior, normal affect  NEUROLOGY: AAOx3  SKIN: grossly warm and dry

## 2018-08-16 NOTE — ED PROVIDER NOTE - PHYSICAL EXAMINATION
Gen: AAOx3, non-toxic  Head: NCAT  HEENT: EOMI, oral mucosa moist, normal conjunctiva  Lung: CTAB, no respiratory distress, no wheezes/rhonchi/rales B/L, speaking in full sentences  CV: RRR, no murmurs, rubs or gallops  Abd: soft, NTND, no guarding  MSK: no visible deformities  Neuro: No focal sensory or motor deficits  Skin: Warm, well perfused, no rash  Psych: normal affect.   ~Shirley Sam M.D. Resident Gen: AAOx3, non-toxic  Head: NCAT  HEENT: EOMI, oral mucosa moist, normal conjunctiva  Eyes: 20/100 OS, 20/100 OD, 20/70 OU  Lung: CTAB, no respiratory distress, no wheezes/rhonchi/rales B/L, speaking in full sentences  CV: RRR, no murmurs, rubs or gallops  Abd: soft, NTND, no guarding  MSK: no visible deformities  Neuro: No focal sensory or motor deficits  Skin: Warm, well perfused, no rash  Psych: normal affect.   ~Shirley Sam M.D. Resident

## 2018-08-16 NOTE — PATIENT PROFILE ADULT. - FUNCTIONAL SCREEN CURRENT LEVEL: COMMUNICATION, MLM
How Severe Are Your Spot(S)?: moderate
What Is The Reason For Today's Visit?: Skin Lesions
(0) understands/communicates without difficulty

## 2018-08-16 NOTE — ED PROVIDER NOTE - OBJECTIVE STATEMENT
59 yo M PMHx renal transplant x 2, currently on dialysis T,R,Sa waiting for third kidney transplant, HTN p/w bilateral blurry vision and white spots in vision x 5 days. Patient's nephrologist has been changing his HTN medications recently and he is currently taking Nifedipine 60 mg BID, Clonidine 0.3 mg, Furosemide 80 mg daily, Atenolol (unknown dose). His doctor told him to go to the ER due to his vision changes. He has a mild headache but states this is typical for him related to dialysis. He denies extremity weakness, numbness/tingling in his arms and legs.

## 2018-08-16 NOTE — H&P ADULT - PROBLEM SELECTOR PLAN 2
Pt with accelerated hypertension with hypertensive emergency (visual changes concerning for damage to eye).  Despite previous side effects with oral hydralazine will trial IV hydralazine to see if patient can tolerate IV form, give zofran with 20mg iv push x 1 of hydralazine.  Will also uptitrate cozaar as patient currently on 25 bid. Will uptitrate to 50 bid though this will take some time to be fully effective.  If blood pressures still without good response may need to consider gtt's.  Patient poorly tolerates beta blockers and is currently bradycardic, susppect that worsened bradycardia may be related to his poor tolerance of beta blockade. Will attempt to use other agents for now.

## 2018-08-16 NOTE — ED ADULT NURSE NOTE - OBJECTIVE STATEMENT
58 y.o M w. PMH of came to the ER w. c/o _____. Pt denies any CP, SOB, n/v, fever, chills, h/a, dizziness, weakness. Pt is axox4, lungs CTA, +bs abdomen soft non-distended, +central pulses, ambulating w. steady gait, safety and comfort mantained, no acute distress noted at this time. 58 y.o M w. PMH of renal transplant x 2 on dialysis, HTN came to the ER w. c/o b/l blurry vision x 5days, states neurolgists changed HTN meds, was sent by PMD to come to ER for CT of the head for vision changes c/o mild h/a but pt thinks its related to dialysis. Pt denies any CP, SOB, n/v, fever, chills, dizziness, weakness. Pt is axox4, lungs CTA, +bs abdomen soft non-distended, +central pulses, ambulating w. steady gait, safety and comfort maintained, no acute distress noted at this time.

## 2018-08-16 NOTE — H&P ADULT - NSHPLABSRESULTS_GEN_ALL_CORE
Labs personally reviewed:                        11.2   7.7   )-----------( 311      ( 16 Aug 2018 21:11 )             34.8       08-16    137  |  94<L>  |  35<H>  ----------------------------<  122<H>  5.2   |  29  |  6.38<H>    Ca    9.0      16 Aug 2018 21:11    TPro  6.9  /  Alb  3.9  /  TBili  0.5  /  DBili  x   /  AST  17  /  ALT  17  /  AlkPhos  62  08-16            LIVER FUNCTIONS - ( 16 Aug 2018 21:11 )  Alb: 3.9 g/dL / Pro: 6.9 g/dL / ALK PHOS: 62 U/L / ALT: 17 U/L / AST: 17 U/L / GGT: x             PT/INR - ( 16 Aug 2018 21:11 )   PT: 11.4 sec;   INR: 1.04 ratio         PTT - ( 16 Aug 2018 21:11 )  PTT:29.9 sec          Imaging personally reviewed-CT head without acute hemorrhage in brain    EKG personally reviewed-sinus bradycardia to 57bpm. Pt does have a biphasic p-wave in V1 but this can be a normal finding.

## 2018-08-17 ENCOUNTER — TRANSCRIPTION ENCOUNTER (OUTPATIENT)
Age: 58
End: 2018-08-17

## 2018-08-17 DIAGNOSIS — N18.6 END STAGE RENAL DISEASE: ICD-10-CM

## 2018-08-17 DIAGNOSIS — H53.9 UNSPECIFIED VISUAL DISTURBANCE: ICD-10-CM

## 2018-08-17 DIAGNOSIS — D64.9 ANEMIA, UNSPECIFIED: ICD-10-CM

## 2018-08-17 DIAGNOSIS — N18.9 CHRONIC KIDNEY DISEASE, UNSPECIFIED: ICD-10-CM

## 2018-08-17 DIAGNOSIS — I10 ESSENTIAL (PRIMARY) HYPERTENSION: ICD-10-CM

## 2018-08-17 DIAGNOSIS — Z94.0 KIDNEY TRANSPLANT STATUS: ICD-10-CM

## 2018-08-17 LAB
ANION GAP SERPL CALC-SCNC: 20 MMOL/L — HIGH (ref 5–17)
BASOPHILS # BLD AUTO: 0.05 K/UL — SIGNIFICANT CHANGE UP (ref 0–0.2)
BASOPHILS NFR BLD AUTO: 0.5 % — SIGNIFICANT CHANGE UP (ref 0–2)
BUN SERPL-MCNC: 44 MG/DL — HIGH (ref 7–23)
CALCIUM SERPL-MCNC: 9.1 MG/DL — SIGNIFICANT CHANGE UP (ref 8.4–10.5)
CHLORIDE SERPL-SCNC: 92 MMOL/L — LOW (ref 96–108)
CO2 SERPL-SCNC: 26 MMOL/L — SIGNIFICANT CHANGE UP (ref 22–31)
CREAT SERPL-MCNC: 7.09 MG/DL — HIGH (ref 0.5–1.3)
EOSINOPHIL # BLD AUTO: 0.08 K/UL — SIGNIFICANT CHANGE UP (ref 0–0.5)
EOSINOPHIL NFR BLD AUTO: 0.7 % — SIGNIFICANT CHANGE UP (ref 0–6)
GLUCOSE SERPL-MCNC: 88 MG/DL — SIGNIFICANT CHANGE UP (ref 70–99)
HCT VFR BLD CALC: 37.3 % — LOW (ref 39–50)
HGB BLD-MCNC: 11.4 G/DL — LOW (ref 13–17)
IMM GRANULOCYTES NFR BLD AUTO: 0.3 % — SIGNIFICANT CHANGE UP (ref 0–1.5)
LYMPHOCYTES # BLD AUTO: 2.67 K/UL — SIGNIFICANT CHANGE UP (ref 1–3.3)
LYMPHOCYTES # BLD AUTO: 24.5 % — SIGNIFICANT CHANGE UP (ref 13–44)
MAGNESIUM SERPL-MCNC: 2.2 MG/DL — SIGNIFICANT CHANGE UP (ref 1.6–2.6)
MCHC RBC-ENTMCNC: 24.7 PG — LOW (ref 27–34)
MCHC RBC-ENTMCNC: 30.6 GM/DL — LOW (ref 32–36)
MCV RBC AUTO: 80.7 FL — SIGNIFICANT CHANGE UP (ref 80–100)
MONOCYTES # BLD AUTO: 1.4 K/UL — HIGH (ref 0–0.9)
MONOCYTES NFR BLD AUTO: 12.9 % — SIGNIFICANT CHANGE UP (ref 2–14)
NEUTROPHILS # BLD AUTO: 6.65 K/UL — SIGNIFICANT CHANGE UP (ref 1.8–7.4)
NEUTROPHILS NFR BLD AUTO: 61.1 % — SIGNIFICANT CHANGE UP (ref 43–77)
PHOSPHATE SERPL-MCNC: 6.3 MG/DL — HIGH (ref 2.5–4.5)
PLATELET # BLD AUTO: 366 K/UL — SIGNIFICANT CHANGE UP (ref 150–400)
POTASSIUM SERPL-MCNC: 5.5 MMOL/L — HIGH (ref 3.5–5.3)
POTASSIUM SERPL-SCNC: 5.5 MMOL/L — HIGH (ref 3.5–5.3)
RBC # BLD: 4.62 M/UL — SIGNIFICANT CHANGE UP (ref 4.2–5.8)
RBC # FLD: 18.3 % — HIGH (ref 10.3–14.5)
SODIUM SERPL-SCNC: 138 MMOL/L — SIGNIFICANT CHANGE UP (ref 135–145)
TACROLIMUS SERPL-MCNC: <2 NG/ML — SIGNIFICANT CHANGE UP
WBC # BLD: 10.88 K/UL — HIGH (ref 3.8–10.5)
WBC # FLD AUTO: 10.88 K/UL — HIGH (ref 3.8–10.5)

## 2018-08-17 PROCEDURE — 93010 ELECTROCARDIOGRAM REPORT: CPT

## 2018-08-17 PROCEDURE — 99223 1ST HOSP IP/OBS HIGH 75: CPT | Mod: GC

## 2018-08-17 RX ORDER — HYDRALAZINE HCL 50 MG
20 TABLET ORAL EVERY 6 HOURS
Qty: 0 | Refills: 0 | Status: DISCONTINUED | OUTPATIENT
Start: 2018-08-17 | End: 2018-08-19

## 2018-08-17 RX ORDER — ACETAMINOPHEN 500 MG
650 TABLET ORAL EVERY 6 HOURS
Qty: 0 | Refills: 0 | Status: DISCONTINUED | OUTPATIENT
Start: 2018-08-17 | End: 2018-08-29

## 2018-08-17 RX ORDER — CHOLECALCIFEROL (VITAMIN D3) 125 MCG
2000 CAPSULE ORAL DAILY
Qty: 0 | Refills: 0 | Status: DISCONTINUED | OUTPATIENT
Start: 2018-08-17 | End: 2018-08-29

## 2018-08-17 RX ORDER — ONDANSETRON 8 MG/1
4 TABLET, FILM COATED ORAL EVERY 6 HOURS
Qty: 0 | Refills: 0 | Status: DISCONTINUED | OUTPATIENT
Start: 2018-08-17 | End: 2018-08-29

## 2018-08-17 RX ORDER — NIFEDIPINE 30 MG
60 TABLET, EXTENDED RELEASE 24 HR ORAL
Qty: 0 | Refills: 0 | Status: DISCONTINUED | OUTPATIENT
Start: 2018-08-17 | End: 2018-08-28

## 2018-08-17 RX ORDER — LOSARTAN POTASSIUM 100 MG/1
1 TABLET, FILM COATED ORAL
Qty: 0 | Refills: 0 | COMMUNITY

## 2018-08-17 RX ORDER — LOSARTAN POTASSIUM 100 MG/1
50 TABLET, FILM COATED ORAL
Qty: 0 | Refills: 0 | Status: DISCONTINUED | OUTPATIENT
Start: 2018-08-17 | End: 2018-08-28

## 2018-08-17 RX ORDER — FUROSEMIDE 40 MG
80 TABLET ORAL ONCE
Qty: 0 | Refills: 0 | Status: DISCONTINUED | OUTPATIENT
Start: 2018-08-17 | End: 2018-08-17

## 2018-08-17 RX ORDER — FUROSEMIDE 40 MG
80 TABLET ORAL ONCE
Qty: 0 | Refills: 0 | Status: COMPLETED | OUTPATIENT
Start: 2018-08-17 | End: 2018-08-17

## 2018-08-17 RX ORDER — ASCORBIC ACID 60 MG
500 TABLET,CHEWABLE ORAL DAILY
Qty: 0 | Refills: 0 | Status: DISCONTINUED | OUTPATIENT
Start: 2018-08-17 | End: 2018-08-29

## 2018-08-17 RX ORDER — TACROLIMUS 5 MG/1
1 CAPSULE ORAL EVERY 12 HOURS
Qty: 0 | Refills: 0 | Status: DISCONTINUED | OUTPATIENT
Start: 2018-08-17 | End: 2018-08-29

## 2018-08-17 RX ORDER — CALCITRIOL 0.5 UG/1
0.25 CAPSULE ORAL DAILY
Qty: 0 | Refills: 0 | Status: DISCONTINUED | OUTPATIENT
Start: 2018-08-17 | End: 2018-08-29

## 2018-08-17 RX ORDER — FUROSEMIDE 40 MG
80 TABLET ORAL DAILY
Qty: 0 | Refills: 0 | Status: DISCONTINUED | OUTPATIENT
Start: 2018-08-17 | End: 2018-08-29

## 2018-08-17 RX ORDER — ASCORBIC ACID 60 MG
1 TABLET,CHEWABLE ORAL
Qty: 0 | Refills: 0 | COMMUNITY

## 2018-08-17 RX ADMIN — Medication 80 MILLIGRAM(S): at 11:04

## 2018-08-17 RX ADMIN — Medication 5 MILLIGRAM(S): at 11:05

## 2018-08-17 RX ADMIN — Medication 20 MILLIGRAM(S): at 00:15

## 2018-08-17 RX ADMIN — Medication 60 MILLIGRAM(S): at 22:30

## 2018-08-17 RX ADMIN — Medication 1 TABLET(S): at 11:05

## 2018-08-17 RX ADMIN — ONDANSETRON 4 MILLIGRAM(S): 8 TABLET, FILM COATED ORAL at 00:15

## 2018-08-17 RX ADMIN — Medication 2000 UNIT(S): at 11:04

## 2018-08-17 RX ADMIN — CALCITRIOL 0.25 MICROGRAM(S): 0.5 CAPSULE ORAL at 11:04

## 2018-08-17 RX ADMIN — Medication 60 MILLIGRAM(S): at 01:29

## 2018-08-17 RX ADMIN — Medication 60 MILLIGRAM(S): at 11:04

## 2018-08-17 RX ADMIN — Medication 1 TABLET(S): at 11:04

## 2018-08-17 RX ADMIN — Medication 80 MILLIGRAM(S): at 18:01

## 2018-08-17 RX ADMIN — LOSARTAN POTASSIUM 50 MILLIGRAM(S): 100 TABLET, FILM COATED ORAL at 18:06

## 2018-08-17 RX ADMIN — TACROLIMUS 1 MILLIGRAM(S): 5 CAPSULE ORAL at 22:30

## 2018-08-17 RX ADMIN — Medication 20 MILLIGRAM(S): at 05:20

## 2018-08-17 RX ADMIN — Medication 500 MILLIGRAM(S): at 11:06

## 2018-08-17 RX ADMIN — TACROLIMUS 1 MILLIGRAM(S): 5 CAPSULE ORAL at 11:05

## 2018-08-17 RX ADMIN — ONDANSETRON 4 MILLIGRAM(S): 8 TABLET, FILM COATED ORAL at 05:20

## 2018-08-17 RX ADMIN — LOSARTAN POTASSIUM 50 MILLIGRAM(S): 100 TABLET, FILM COATED ORAL at 00:29

## 2018-08-17 NOTE — PROGRESS NOTE ADULT - ASSESSMENT
57yo M w/pmhx of HTN, ESRD s/p renal transplant x 2 but now requiring HD again (T Th Sat), now presenting with accelerated hypertension with hypertensive emergency (w/visual changes).      Problem/Plan - 1:  ·  Problem: Visual changes.  Plan: Ophthalmology fu  Hold aspirin for now pending ophthalmology eval  BP control     Problem/Plan - 2:  ·  Problem: Accelerated hypertension.  Plan: management as per cards  improving    Problem/Plan - 3:  ·  Problem: ESRD (end stage renal disease) on dialysis.  Plan: s/p HD today  Renal consult in AM.     Problem/Plan - 4:  ·  Problem: Renal transplant recipient.  Plan: tacrolimus 1mg BID  Check tacrolimus level w/AM bloodwork  Prednisone 5mg po daily.   renal called

## 2018-08-17 NOTE — DISCHARGE NOTE ADULT - CARE PROVIDERS DIRECT ADDRESSES
,DirectAddress_Unknown,[email or web address] ,DirectAddress_Unknown,[email or web address],[email or web address]

## 2018-08-17 NOTE — DISCHARGE NOTE ADULT - MEDICATION SUMMARY - MEDICATIONS TO TAKE
I will START or STAY ON the medications listed below when I get home from the hospital:    predniSONE 5 mg oral tablet  -- 1 tab(s) by mouth once a day (in the morning)  -- Indication: For Renal transplant recipient    butalbital-acetaminophen 50 mg-325 mg oral capsule  -- Butalbital-acetaminophen-caffeine1 tab(s) by mouth once a day  -- Indication: For Ha    losartan 50 mg oral tablet  -- 1 tab(s) by mouth 2 times a day  -- Indication: For Htn    cloNIDine 0.3 mg/24 hr transdermal film, extended release  -- 1 patch by transdermal patch once a week on Saturdays  -- Indication: For Htn    NIFEdipine 60 mg oral tablet, extended release  -- 1 tab(s) by mouth 2 times a day  -- Indication: For Htn    Lasix 80 mg oral tablet  -- 1 tab(s) by mouth once a day  -- Indication: For Htn    Prograf 1 mg oral capsule  -- 1 cap(s) by mouth every 12 hours- 11 am and 11 pm  -- Indication: For Renal transplant recipient    docusate sodium 100 mg oral capsule  -- 1 cap(s) by mouth 2 times a day  -- Indication: For Constipation    polyethylene glycol 3350 oral powder for reconstitution  -- 17 gram(s) by mouth once  -- Indication: For Constipation    Co Q-10  -- 1 cap(s) by mouth 2 times a day  -- Indication: For supplement    Omega-3  -- 1 cap(s) by mouth 2 times a day  -- Indication: For supplement    Bactrim  mg-160 mg oral tablet  -- 1 tab(s) by mouth 3 times a week (M, W, F)  -- Indication: For Renal transplant recipient    minoxidil 2.5 mg oral tablet  -- 2 tab(s) by mouth 2 times a day  -- Indication: For Htn    Nephrocaps oral capsule  -- 1 cap(s) by mouth once a day  -- Indication: For Vitamin    ascorbic acid 500 mg oral tablet  -- 1 tab(s) by mouth once a day  -- Indication: For Vitamin    calcitriol 0.25 mcg oral capsule  -- 1 cap(s) by mouth once a day  -- Indication: For Vitamin    Vitamin D3 2000 intl units oral tablet  -- 1 tab(s) by mouth once a day (in the morning)  -- Indication: For Vitamin I will START or STAY ON the medications listed below when I get home from the hospital:    predniSONE 5 mg oral tablet  -- 1 tab(s) by mouth once a day (in the morning)  -- Indication: For Renal transplant recipient    butalbital-acetaminophen 50 mg-325 mg oral capsule  -- Butalbital-acetaminophen-caffeine1 tab(s) by mouth once a day  -- Indication: For Ha    cloNIDine 0.3 mg/24 hr transdermal film, extended release  -- 1 patch by transdermal patch once a week on Saturdays  -- Indication: For Htn    NIFEdipine 90 mg oral tablet, extended release  -- 1 tab(s) by mouth 2 times a day   -- Indication: For Hypertension    Lasix 80 mg oral tablet  -- 1 tab(s) by mouth once a day  -- Indication: For Htn    Prograf 1 mg oral capsule  -- 1 cap(s) by mouth every 12 hours- 11 am and 11 pm  -- Indication: For Renal transplant recipient    docusate sodium 100 mg oral capsule  -- 1 cap(s) by mouth 2 times a day  -- Indication: For Constipation    polyethylene glycol 3350 oral powder for reconstitution  -- 17 gram(s) by mouth once  -- Indication: For Constipation    Co Q-10  -- 1 cap(s) by mouth 2 times a day  -- Indication: For supplement    Omega-3  -- 1 cap(s) by mouth 2 times a day  -- Indication: For supplement    Bactrim  mg-160 mg oral tablet  -- 1 tab(s) by mouth 3 times a week (M, W, F)  -- Indication: For Renal transplant recipient    minoxidil 2.5 mg oral tablet  -- 2 tab(s) by mouth 2 times a day  -- Indication: For Htn    Nephrocaps oral capsule  -- 1 cap(s) by mouth once a day  -- Indication: For Vitamin    ascorbic acid 500 mg oral tablet  -- 1 tab(s) by mouth once a day  -- Indication: For Vitamin    calcitriol 0.25 mcg oral capsule  -- 1 cap(s) by mouth once a day  -- Indication: For Vitamin    Vitamin D3 2000 intl units oral tablet  -- 1 tab(s) by mouth once a day (in the morning)  -- Indication: For Vitamin I will START or STAY ON the medications listed below when I get home from the hospital:    predniSONE 5 mg oral tablet  -- 1 tab(s) by mouth once a day (in the morning)  -- Indication: For Renal transplant recipient    butalbital-acetaminophen 50 mg-325 mg oral capsule  -- Butalbital-acetaminophen-caffeine1 tab(s) by mouth once a day  -- Indication: For Ha    oxyCODONE 5 mg oral capsule  -- 1 cap(s) by mouth 3 times a week, As Needed post dialysis MDD:1  -- Caution federal law prohibits the transfer of this drug to any person other  than the person for whom it was prescribed.  It is very important that you take or use this exactly as directed.  Do not skip doses or discontinue unless directed by your doctor.  May cause drowsiness.  Alcohol may intensify this effect.  Use care when operating dangerous machinery.  This prescription cannot be refilled.  Using more of this medication than prescribed may cause serious breathing problems.    -- Indication: For pain    cloNIDine 0.3 mg/24 hr transdermal film, extended release  -- 1 patch by transdermal patch once a week on Saturdays  -- Indication: For Htn    NIFEdipine 90 mg oral tablet, extended release  -- 1 tab(s) by mouth 2 times a day   -- Indication: For Hypertension    Lasix 80 mg oral tablet  -- 1 tab(s) by mouth once a day  -- Indication: For Htn    Prograf 1 mg oral capsule  -- 1 cap(s) by mouth every 12 hours- 11 am and 11 pm  -- Indication: For Renal transplant recipient    docusate sodium 100 mg oral capsule  -- 1 cap(s) by mouth 2 times a day  -- Indication: For Constipation    polyethylene glycol 3350 oral powder for reconstitution  -- 17 gram(s) by mouth once  -- Indication: For Constipation    Co Q-10  -- 1 cap(s) by mouth 2 times a day  -- Indication: For supplement    Omega-3  -- 1 cap(s) by mouth 2 times a day  -- Indication: For supplement    Bactrim  mg-160 mg oral tablet  -- 1 tab(s) by mouth 3 times a week (M, W, F)  -- Indication: For Renal transplant recipient    minoxidil 2.5 mg oral tablet  -- 2 tab(s) by mouth 2 times a day  -- Indication: For Htn    Nephrocaps oral capsule  -- 1 cap(s) by mouth once a day  -- Indication: For Vitamin    ascorbic acid 500 mg oral tablet  -- 1 tab(s) by mouth once a day  -- Indication: For Vitamin    calcitriol 0.25 mcg oral capsule  -- 1 cap(s) by mouth once a day  -- Indication: For Vitamin    Vitamin D3 2000 intl units oral tablet  -- 1 tab(s) by mouth once a day (in the morning)  -- Indication: For Vitamin I will START or STAY ON the medications listed below when I get home from the hospital:    predniSONE 5 mg oral tablet  -- 1 tab(s) by mouth once a day (in the morning)  -- Indication: For Renal transplant recipient    butalbital-acetaminophen 50 mg-325 mg oral capsule  -- Butalbital-acetaminophen-caffeine1 tab(s) by mouth once a day  -- Indication: For Ha    oxyCODONE 5 mg oral tablet  -- 1 tab(s) by mouth 3 times a week after dialysis MDD:1  -- Caution federal law prohibits the transfer of this drug to any person other  than the person for whom it was prescribed.  It is very important that you take or use this exactly as directed.  Do not skip doses or discontinue unless directed by your doctor.  May cause drowsiness.  Alcohol may intensify this effect.  Use care when operating dangerous machinery.  This prescription cannot be refilled.  Using more of this medication than prescribed may cause serious breathing problems.    -- Indication: For pain    cloNIDine 0.3 mg/24 hr transdermal film, extended release  -- 1 patch by transdermal patch once a week on Saturdays  -- Indication: For Htn    NIFEdipine 90 mg oral tablet, extended release  -- 1 tab(s) by mouth 2 times a day   -- Indication: For Hypertension    Lasix 80 mg oral tablet  -- 1 tab(s) by mouth once a day  -- Indication: For Htn    Prograf 1 mg oral capsule  -- 1 cap(s) by mouth every 12 hours- 11 am and 11 pm  -- Indication: For Renal transplant recipient    docusate sodium 100 mg oral capsule  -- 1 cap(s) by mouth 2 times a day  -- Indication: For Constipation    polyethylene glycol 3350 oral powder for reconstitution  -- 17 gram(s) by mouth once  -- Indication: For Constipation    Co Q-10  -- 1 cap(s) by mouth 2 times a day  -- Indication: For supplement    Omega-3  -- 1 cap(s) by mouth 2 times a day  -- Indication: For supplement    Bactrim  mg-160 mg oral tablet  -- 1 tab(s) by mouth 3 times a week (M, W, F)  -- Indication: For Renal transplant recipient    minoxidil 2.5 mg oral tablet  -- 2 tab(s) by mouth 2 times a day  -- Indication: For Htn    Nephrocaps oral capsule  -- 1 cap(s) by mouth once a day  -- Indication: For Vitamin    ascorbic acid 500 mg oral tablet  -- 1 tab(s) by mouth once a day  -- Indication: For Vitamin    calcitriol 0.25 mcg oral capsule  -- 1 cap(s) by mouth once a day  -- Indication: For Vitamin    Vitamin D3 2000 intl units oral tablet  -- 1 tab(s) by mouth once a day (in the morning)  -- Indication: For Vitamin

## 2018-08-17 NOTE — DISCHARGE NOTE ADULT - MEDICATION SUMMARY - MEDICATIONS TO STOP TAKING
I will STOP taking the medications listed below when I get home from the hospital:  None I will STOP taking the medications listed below when I get home from the hospital:    Cozaar 25 mg oral tablet  -- 1 tab(s) by mouth 2 times a day

## 2018-08-17 NOTE — DISCHARGE NOTE ADULT - ADDITIONAL INSTRUCTIONS
follow up at the transplant center and the dialysis center after discharge  follow up with PMD after discharge Follow up at the transplant center and the dialysis center after discharge  Follow up with PMD after discharge  Follow up with Opthalmology Follow up at the transplant center and the dialysis center after discharge  Follow up with PMD after discharge  Follow up with Opthalmology  Follow up with neurology for acoustic neuroma surveillance Follow up at the transplant center and the dialysis center after discharge  Follow up with PMD after discharge  Follow up with Opthalmology  Follow up with neurology for acoustic neuroma surveillance and repeat MRI for +dilated superior ophthalmic veins and optic nerve edema seen on MRI

## 2018-08-17 NOTE — DISCHARGE NOTE ADULT - MEDICATION SUMMARY - MEDICATIONS TO CHANGE
I will SWITCH the dose or number of times a day I take the medications listed below when I get home from the hospital:    NIFEdipine 60 mg oral tablet, extended release  -- 1 tab(s) by mouth 2 times a day    Cozaar 25 mg oral tablet  -- 1 tab(s) by mouth 2 times a day I will SWITCH the dose or number of times a day I take the medications listed below when I get home from the hospital:    NIFEdipine 60 mg oral tablet, extended release  -- 1 tab(s) by mouth 2 times a day

## 2018-08-17 NOTE — CONSULT NOTE ADULT - ASSESSMENT
59yo M w/pmhx of HTN, ESRD s/p renal transplant x 2 but now requiring HD again (T Th Sat), now presenting with vision changes x 5 days as well as elevated blood pressures, admitted w/ htn emergency.     1. HTN urgency   High BPs in 200s on admission, and vision changes  now improved  continue current medication regimen  renal consult    2. Blurred vision   still complaining of blurred vision  opthalmology following -  recommending MRI with gadolinium, MRV venography  CT Head negative for any acute hemrrhage or intracranial mass   neuro/ optho f/u       dvt ppx 57yo M w/pmhx of HTN, ESRD s/p renal transplant x 2 but now requiring HD again (T Th Sat), now presenting with vision changes x 5 days as well as elevated blood pressures, admitted w/ htn emergency.     1. HTN urgency   High BPs in 200s on admission, and vision changes  likely in the setting of ESRD, now improved  continue current medication regimen  renal consult    2. Blurred vision   still complaining of blurred vision  opthalmology following -  recommending MRI with gadolinium, MRV venography  CT Head negative for any acute hemorrhage or intracranial mass.   neuro/ optho f/u     3. ESRD  renal consult  continue fluid removal with HD and lasix     dvt ppx

## 2018-08-17 NOTE — CONSULT NOTE ADULT - ASSESSMENT
57 yo M w/ pmhx of HTN, ESRD s/p renal transplant x 2 but now requiring HD  (T Th Sat), presented with vision changes x 5 days as well as elevated blood pressures noted as an outpatient. Patient reports that over the last 5 days he has had vision changes including blurred vision in both eyes bilaterally. Currently being managed for hypertensive urgency. He got last HD yesterday.

## 2018-08-17 NOTE — CHART NOTE - NSCHARTNOTEFT_GEN_A_CORE
C/C: c/o 7/10 frontal headache, "constant" reports having this headache all day today. Tried 1 Fioricet today without much relief.  States that he has had much worse headaches usually after HD. Also c/o ongoing visual disturbances and mild lightheadedness. Just had 2 cups of coffee without relief, has had a good appetite today. Denies neck stiffness, n/v, chest pain, sob.      VITAL SIGNS:   T(C): 36.7 (08-17-18 @ 13:52), Max: 37.4 (08-17-18 @ 05:07)  HR: 61 (08-17-18 @ 20:15)  BP: 159/87 (08-17-18 @ 20:15)  RR: 18 (08-17-18 @ 13:52)  SpO2: 95% (08-17-18 @ 13:52)            LABS:                        11.4   10.88 )-----------( 366      ( 17 Aug 2018 08:14 )             37.3     08-17    138  |  92<L>  |  44<H>  ----------------------------<  88  5.5<H>   |  26  |  7.09<H>    Ca    9.1      17 Aug 2018 07:16  Phos  6.3     08-17  Mg     2.2     08-17    TPro  6.9  /  Alb  3.9  /  TBili  0.5  /  DBili  x   /  AST  17  /  ALT  17  /  AlkPhos  62  08-16  PT/INR - ( 16 Aug 2018 21:11 )   PT: 11.4 sec;   INR: 1.04 ratio    PTT - ( 16 Aug 2018 21:11 )  PTT:29.9 sec             RADIOLOGY RESULTS:  < from: CT Head No Cont (08.16.18 @ 20:46) >    No evidence of acute intracranial hemorrhage, midline shift or CT   evidence of acute territorial infarct.    If the patient's symptoms persist, consider short interval follow-up head   CT or brain MRI if there are no MRI contraindications.        PHYSICAL EXAM:  General: Awake and alert, no acute distress, non-toxic appearance  HEENT: Normocephalic. No nuchal rigidity  CV: S1, S2 present, RRR, no JVD  Pulm: Respirations non-labored, CTA b/l to bases  Ext: b/l lower ext 3+ pitting edema  Neuro: AAO x 3, PERRLA, CN II-XII grossly intact, no neurological deficits    A/P:  This is a 58yMale with h/o SBO, Migraines, HTN, Renal Transplant x 2 on HD admitted for visual changes and HTN urgency now with headaches.  1) r/o Migraines  - Fioricet 1 tab x 1 STAT now  - Worse headaches in the past. If worsening headaches after this dose of Fioricet, will consider STAT CT Head  - MRI brain Pending  - Will call Attending physician if worsening in headaches.            DAWIT LACY-C  Spectra Link # [id number]

## 2018-08-17 NOTE — CONSULT NOTE ADULT - CONSULT REASON
HTN, blurred vision     pmhx of HTN, ESRD s/p renal transplant x 2 but now requiring HD again (T Th Sat)

## 2018-08-17 NOTE — DISCHARGE NOTE ADULT - SECONDARY DIAGNOSIS.
ESRD (end stage renal disease) on dialysis Hypertension, unspecified type Renal transplant recipient Acoustic neuroma

## 2018-08-17 NOTE — DISCHARGE NOTE ADULT - PLAN OF CARE
resolution of symptoms follow up with regular out patient dialysis unit after discharge  follow a renal diet, no concentrated potassium Follow up with your medical doctor to establish long term blood pressure treatment goals. continue tacrolimus and prednisone  follow up with Dr. Patton after discharge Follow up with ophthalmologist or in the [facility] within 1 week of discharge, sooner if symptoms worsen or change. Take medications for your blood pressure as recommended.  Eat a heart healthy diet that is low in saturated fats and salt, and includes whole grains, fruits, vegetables and lean protein   Exercise regularly (consult with your physician or cardiologist first); maintain a heart healthy weight.   If you smoke - quit (A resource to help you stop smoking is the [facility] – phone number [phone].). Continue to follow with your primary physician or cardiologist.   Seek medical help for dizziness, Lightheadedness, Blurry vision, Headache, Chest pain, Shortness of breath  Follow up with your medical doctor to establish long term blood pressure treatment goals. left acoustic neuroma  Follow up with Neu[facility] for outpatient MRI surveillance

## 2018-08-17 NOTE — CONSULT NOTE ADULT - PROBLEM SELECTOR RECOMMENDATION 3
Cont Clonidine Patch 0.3 mG/24Hr 1 patch Topical every 7 days  Furosemide t 80 Mg Oral daily  Losartan 50 mg Oral two times a day  Nifedipine XL 60 mg PO daily

## 2018-08-17 NOTE — CONSULT NOTE ADULT - ATTENDING COMMENTS
Patient seen and examined, agree with the above assessment and plan by KULDEEP Rueda.  Pt presenting hypertensive urgency in the setting of ESRD  SBP now improved, pt asymptomatic  Recent ECHO revealed normal LVEF  Cont current BP meds
57yo M w/pmhx of HTN, ESRD s/p renal transplant x 2 but now requiring HD again (T Th Sat),  presenting with sudden vision changes x 5 days in the setting of elevated  blood pressures. It is binocular. Nonfocal neurological examination. Finding of retinal hemorrhages on ophthalmological evaluation.    The complaint may represent posterior leukoencephalopathy.   Plan:  High BPs in 200s on admission, and vision changes and physical exam by optho noted for retinal hemorrhages. Concern w/ htn emergency w/ htn retinopathy vs PRES  -CTH: no evidence of intracranial hemorrhage, mass, midline shift  -BP control as per primary team and renal  -neuro checks q 4  -MRI head non cont
ESRD on HD last hd yesterday, HTN urgency  BP improved now  Continue his anti-HTN meds  PLan on lasix 80 iv x 1 today to help with kaliuresis  Will plan on HD tomorrow AM

## 2018-08-17 NOTE — DISCHARGE NOTE ADULT - PATIENT PORTAL LINK FT
You can access the [facility] Patient Portal, offered by [facility], by registering with the following website: [email or web address]

## 2018-08-17 NOTE — DISCHARGE NOTE ADULT - CARE PLAN
Principal Discharge DX:	Visual changes  Goal:	resolution of symptoms  Secondary Diagnosis:	ESRD (end stage renal disease) on dialysis  Assessment and plan of treatment:	follow up with regular out patient dialysis unit after discharge  follow a renal diet, no concentrated potassium  Secondary Diagnosis:	Hypertension, unspecified type  Assessment and plan of treatment:	Follow up with your medical doctor to establish long term blood pressure treatment goals.  Secondary Diagnosis:	Renal transplant recipient  Assessment and plan of treatment:	continue tacrolimus and prednisone  follow up with Dr. Patton after discharge Principal Discharge DX:	Visual changes  Goal:	resolution of symptoms  Assessment and plan of treatment:	Follow up with ophthalmologist or in the [facility] within 1 week of discharge, sooner if symptoms worsen or change.  Secondary Diagnosis:	ESRD (end stage renal disease) on dialysis  Assessment and plan of treatment:	follow up with regular out patient dialysis unit after discharge  follow a renal diet, no concentrated potassium  Secondary Diagnosis:	Hypertension, unspecified type  Assessment and plan of treatment:	Take medications for your blood pressure as recommended.  Eat a heart healthy diet that is low in saturated fats and salt, and includes whole grains, fruits, vegetables and lean protein   Exercise regularly (consult with your physician or cardiologist first); maintain a heart healthy weight.   If you smoke - quit (A resource to help you stop smoking is the [facility] – phone number [phone].). Continue to follow with your primary physician or cardiologist.   Seek medical help for dizziness, Lightheadedness, Blurry vision, Headache, Chest pain, Shortness of breath  Follow up with your medical doctor to establish long term blood pressure treatment goals.  Secondary Diagnosis:	Renal transplant recipient  Assessment and plan of treatment:	continue tacrolimus and prednisone  follow up with Dr. Patton after discharge Principal Discharge DX:	Visual changes  Goal:	resolution of symptoms  Assessment and plan of treatment:	Follow up with ophthalmologist or in the [facility] within 1 week of discharge, sooner if symptoms worsen or change.  Secondary Diagnosis:	ESRD (end stage renal disease) on dialysis  Assessment and plan of treatment:	follow up with regular out patient dialysis unit after discharge  follow a renal diet, no concentrated potassium  Secondary Diagnosis:	Hypertension, unspecified type  Assessment and plan of treatment:	Take medications for your blood pressure as recommended.  Eat a heart healthy diet that is low in saturated fats and salt, and includes whole grains, fruits, vegetables and lean protein   Exercise regularly (consult with your physician or cardiologist first); maintain a heart healthy weight.   If you smoke - quit (A resource to help you stop smoking is the [facility] – phone number [phone].). Continue to follow with your primary physician or cardiologist.   Seek medical help for dizziness, Lightheadedness, Blurry vision, Headache, Chest pain, Shortness of breath  Follow up with your medical doctor to establish long term blood pressure treatment goals.  Secondary Diagnosis:	Renal transplant recipient  Assessment and plan of treatment:	continue tacrolimus and prednisone  follow up with Dr. Patton after discharge  Secondary Diagnosis:	Acoustic neuroma  Assessment and plan of treatment:	left acoustic neuroma  Follow up with Neu[facility] for outpatient MRI surveillance

## 2018-08-17 NOTE — CONSULT NOTE ADULT - SUBJECTIVE AND OBJECTIVE BOX
[facility] DIVISION OF KIDNEY DISEASES AND HYPERTENSION -- INITIAL CONSULT NOTE  --------------------------------------------------------------------------------  Nery Higgins  [id number]  --------------------------------------------------------------------------------  HPI:  59yo M w/ pmhx of HTN, ESRD s/p renal transplant x 2 but now requiring HD again (T Th Sat), presneted with vision changes x 5 days as well as elevated blood pressures noted as an outpatient. Patient reports that over the last 5 days he has had vision changes including blurred vision in both eyes bilaterally (both near and far vision), as well as having the sensation of a bright light being pointed at his eyes when he is attempting to read or look at things up close. These vision changes have been persistent x 5 days. In addition he has noticed elevated blood pressures to the 180-190s systolic. His diastolic blood pressures have been running the 100s but he reports that during dialysis sessions this can spike. Today at HD he was noted to have a diastolic blood pressure of 131 and the HD center recommended that he come to the hospital for care due to worsening blood pressures and vision changes. Patient has never had any similar visual changes in the past. Denies any current headaches at this time though he reports he does normally get a headache with HD. Patient also reports that his blood pressures had been somewhat difficult to control in recent weeks with his systolic blood pressures usually in the 180s. Because of this he was recently trialed on Atenolol but patient reports that he does not usually tolerate beta blockers well and when he attempted to try atenolol about 4 days ago he felt very dizzy/lightheaded. Patient reports similar issues taking labetalol in the past. He was also very briefly stopped off of cozaar x 1 day due to some concerns by HD center regarding potassium levels.  Nephrology was consulted of ESRD on HD. His last HD was yesterday.  He currently denies SOB, chest pain, nausea, vomiting, pain abdomen, constipation, diarrhea, dysuria, hematuria, nocturia, dysuria.  He still makes some urine.            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  History of partial pancreatectomy  History of renal transplant: x 2    FAMILY HISTORY:  No pertinent family history in first degree relatives    PAST SOCIAL HISTORY:    ALLERGIES & MEDICATIONS  --------------------------------------------------------------------------------  Allergies    No Known Allergies    Intolerances    beta blockers (Other (Mod to Severe))    Standing Inpatient Medications  ascorbic acid 500 milliGRAM(s) Oral daily  Butalbital, acetaminophen/caffeine 1 Tablet(s) 1 Tablet(s) Oral daily  calcitriol   Capsule 0.25 MICROGram(s) Oral daily  cholecalciferol 2000 Unit(s) Oral daily  cloNIDine Patch 0.3 mG/24Hr(s) 1 patch Topical every 7 days  furosemide    Tablet 80 milliGRAM(s) Oral daily  furosemide   IVPB 80 milliGRAM(s) IV Intermittent once  losartan 50 milliGRAM(s) Oral two times a day  Nephro-martha 1 Tablet(s) Oral daily  NIFEdipine XL 60 milliGRAM(s) Oral <User Schedule>  predniSONE   Tablet 5 milliGRAM(s) Oral daily  tacrolimus 1 milliGRAM(s) Oral every 12 hours  trimethoprim  160 mG/sulfamethoxazole 800 mG 1 Tablet(s) Oral <User Schedule>    PRN Inpatient Medications  acetaminophen   Tablet. 650 milliGRAM(s) Oral every 6 hours PRN  hydrALAZINE Injectable 20 milliGRAM(s) IV Push every 6 hours PRN  ondansetron Injectable 4 milliGRAM(s) IV Push every 6 hours PRN      REVIEW OF SYSTEMS  --------------------------------------------------------------------------------  Constitutional: No Fever,Chills , Fatigue , Weight change   HEENT:c/o  Blurred vision and, Headache, No Runny nose, Sore Throat   Respiratory: No Cough, Wheezing ,Shortness of breath  Cardiovascular: No Chest Pain, Palpitations, MAY, PND, Orthopnea  Gastrointestinal: No Abdominal Pain, Diarrhea, Constipation, Hemorrhoids, Nausea, Vomiting  Genitourinary: No Nocturia, Dysuria, Incontinence  Extremities: No Swelling, Joint Pain  Neurologic:No Focal deficit, Paresthesia,  Syncope  Lymphatic: No Swelling, Lymphadenopathy   Skin: No Rash, Ecchymoses, Wounds, Lesions  Psychiatry: No Depression ,  Suicidal/Homicidal Ideation, Anxiety, Sleep Disturbances        All other systems were reviewed and are negative, except as noted.    VITALS/PHYSICAL EXAM  --------------------------------------------------------------------------------  T(C): 36.7 (08-17-18 @ 13:52), Max: 37.4 (08-17-18 @ 05:07)  HR: 61 (08-17-18 @ 13:52) (56 - 65)  BP: 142/78 (08-17-18 @ 13:52) (132/69 - 204/104)  RR: 18 (08-17-18 @ 13:52) (18 - 19)  SpO2: 95% (08-17-18 @ 13:52) (95% - 99%)  Wt(kg): --    Height (cm): 180.34 (08-16-18 @ 23:20)  Weight (kg): 87 (08-16-18 @ 23:20)  BMI (kg/m2): 26.8 (08-16-18 @ 23:20)  BSA (m2): 2.07 (08-16-18 @ 23:20)  Daily Height in cm: 180.34 (16 Aug 2018 23:20)    Daily   I&O's Summary    16 Aug 2018 07:01  -  17 Aug 2018 07:00  --------------------------------------------------------  IN: 50 mL / OUT: 0 mL / NET: 50 mL    17 Aug 2018 07:01  -  17 Aug 2018 17:09  --------------------------------------------------------  IN: 500 mL / OUT: 0 mL / NET: 500 mL          08-16-18 @ 07:01  -  08-17-18 @ 07:00  --------------------------------------------------------  IN: 50 mL / OUT: 0 mL / NET: 50 mL    08-17-18 @ 07:01  -  08-17-18 @ 17:09  --------------------------------------------------------  IN: 500 mL / OUT: 0 mL / NET: 500 mL        Physical Exam:  Gen: NAD   HEENT: anicteric  Pulm: CTA B/L  CV: RRR, Normal S1 S2  Abd: soft, nontender, nondistended  CNS: AAO x 3, No asterixis  : No Augustin  LE: Warm, b/l pedal edema  Skin: Warm, without rashes  Vascular access: right permcath      LABS/STUDIES  --------------------------------------------------------------------------------              11.4   10.88 >-----------<  366      [08-17-18 @ 08:14]              37.3     138  |  92  |  44  ----------------------------<  88      [08-17-18 @ 07:16]  5.5   |  26  |  7.09        Ca     9.1     [08-17-18 @ 07:16]      Mg     2.2     [08-17-18 @ 07:16]      Phos  6.3     [08-17-18 @ 07:16]    TPro  6.9  /  Alb  3.9  /  TBili  0.5  /  DBili  x   /  AST  17  /  ALT  17  /  AlkPhos  62  [08-16-18 @ 21:11]    PT/INR: PT 11.4 , INR 1.04       [08-16-18 @ 21:11]  PTT: 29.9       [08-16-18 @ 21:11]      Creatinine Trend:  SCr 7.09 [08-17 @ 07:16]  SCr 6.38 [08-16 @ 21:11]        Iron 24, TIBC 161, %sat 15      [03-20-18 @ 11:31]  Ferritin 250      [03-19-18 @ 20:02]    HBsAb <3.0      [03-17-18 @ 05:35]  HBsAg Nonreact      [03-17-18 @ 05:35]  HBcAb Nonreact      [03-17-18 @ 05:35]  HCV 0.12, Nonreact      [03-17-18 @ 05:35]        Radiology  --------------------------------------------------------------------------------    --------------------------------------------------------------------------------  Nery Higgins  [id number]

## 2018-08-17 NOTE — CONSULT NOTE ADULT - PROBLEM SELECTOR RECOMMENDATION 9
On TTS schedule  Patient has hypervolemia, but no uremic symptoms, he does not want HD today  He makes some urine, will give Furosemide 80 mg IVPB once this evening and will dialyze him first shift tomorrow morning  Will continue home HD schedule

## 2018-08-17 NOTE — PROGRESS NOTE ADULT - SUBJECTIVE AND OBJECTIVE BOX
Patient is a 58y old  Male who presents with a chief complaint of Accelerated hypertension/hypertensive emergency/vision changes (17 Aug 2018 16:30)      INTERVAL HPI/OVERNIGHT EVENTS:  T(C): 36.7 (08-17-18 @ 13:52), Max: 37.4 (08-17-18 @ 05:07)  HR: 58 (08-17-18 @ 18:32) (56 - 65)  BP: 143/75 (08-17-18 @ 18:32) (132/69 - 204/104)  RR: 18 (08-17-18 @ 13:52) (18 - 19)  SpO2: 95% (08-17-18 @ 13:52) (95% - 99%)  Wt(kg): --  I&O's Summary    16 Aug 2018 07:01  -  17 Aug 2018 07:00  --------------------------------------------------------  IN: 50 mL / OUT: 0 mL / NET: 50 mL    17 Aug 2018 07:01  -  17 Aug 2018 19:47  --------------------------------------------------------  IN: 500 mL / OUT: 0 mL / NET: 500 mL        LABS:                        11.4   10.88 )-----------( 366      ( 17 Aug 2018 08:14 )             37.3     08-17    138  |  92<L>  |  44<H>  ----------------------------<  88  5.5<H>   |  26  |  7.09<H>    Ca    9.1      17 Aug 2018 07:16  Phos  6.3     08-17  Mg     2.2     08-17    TPro  6.9  /  Alb  3.9  /  TBili  0.5  /  DBili  x   /  AST  17  /  ALT  17  /  AlkPhos  62  08-16    PT/INR - ( 16 Aug 2018 21:11 )   PT: 11.4 sec;   INR: 1.04 ratio         PTT - ( 16 Aug 2018 21:11 )  PTT:29.9 sec    CAPILLARY BLOOD GLUCOSE                MEDICATIONS  (STANDING):  ascorbic acid 500 milliGRAM(s) Oral daily  Butalbital, acetaminophen/caffeine 1 Tablet(s) 1 Tablet(s) Oral daily  calcitriol   Capsule 0.25 MICROGram(s) Oral daily  cholecalciferol 2000 Unit(s) Oral daily  cloNIDine Patch 0.3 mG/24Hr(s) 1 patch Topical every 7 days  furosemide    Tablet 80 milliGRAM(s) Oral daily  losartan 50 milliGRAM(s) Oral two times a day  Nephro-martha 1 Tablet(s) Oral daily  NIFEdipine XL 60 milliGRAM(s) Oral <User Schedule>  predniSONE   Tablet 5 milliGRAM(s) Oral daily  tacrolimus 1 milliGRAM(s) Oral every 12 hours  trimethoprim  160 mG/sulfamethoxazole 800 mG 1 Tablet(s) Oral <User Schedule>    MEDICATIONS  (PRN):  acetaminophen   Tablet. 650 milliGRAM(s) Oral every 6 hours PRN Mild Pain (1 - 3)  hydrALAZINE Injectable 20 milliGRAM(s) IV Push every 6 hours PRN SBP >185  ondansetron Injectable 4 milliGRAM(s) IV Push every 6 hours PRN give with hydralazine pushes          PHYSICAL EXAM:  GENERAL: NAD, well-groomed, well-developed  HEAD:  Atraumatic, Normocephalic  CHEST/LUNG: Clear to percussion bilaterally; No rales, rhonchi, wheezing, or rubs  HEART: Regular rate and rhythm; No murmurs, rubs, or gallops  ABDOMEN: Soft, Nontender, Nondistended; Bowel sounds present  EXTREMITIES:  2+ Peripheral Pulses, No clubbing, cyanosis, or edema  LYMPH: No lymphadenopathy noted  SKIN: No rashes or lesions    Care Discussed with Consultants/Other Providers [ ] YES  [ ] NO

## 2018-08-17 NOTE — CONSULT NOTE ADULT - PROBLEM SELECTOR RECOMMENDATION 2
Continue Prednisone 5mg PO daily  and Tacrolimus 1mg PO BID  Cont Trimethoprim  160 MG/ Sulfamethoxazole 800 MG 1 Tablet daily

## 2018-08-17 NOTE — CHART NOTE - NSCHARTNOTEFT_GEN_A_CORE
NP note - Patient requested that his diet be changed to a regular diet as he prefers butter to margarine, and does want to eat some fruit.  Discussed the need for potassium restriction and the patient assured me that he would maintain potassium levels within normal range.  The patient also reported that he has lost 50 LBS over the last few months by following a sensible diet.  Will trial him on a regular diet and monitor labs.  TIFFANI Noyola [phone]

## 2018-08-17 NOTE — CONSULT NOTE ADULT - ASSESSMENT
57yo M w/pmhx of HTN, ESRD s/p renal transplant x 2 but now requiring HD again (T Th Sat), now presenting with vision changes x 5 days as well as elevated blood pressures, admitted w/ htn emergency. 57yo M w/pmhx of HTN, ESRD s/p renal transplant x 2 but now requiring HD again (T Th Sat), now presenting with vision changes x 5 days as well as elevated blood pressures, admitted w/ htn emergency.     Exam notable for inability to read text.     Plan:  High BPs in 200s on admission, and vision changes and physical exam by optho noted for retinal hemorrhages consistent w/ htn emergency w/ htn retinopathy, however will need to rule optic neuropathy  -CTH: no evidence of intracranial hemorrhage, mass, midline shift  -BP control as per primary team and renal  -neuro checks q 4  -MRI head non cont to assess for inflammation/lesions and MRV  -optho recs    Eufemia Connell  Resident  [phone] 57yo M w/pmhx of HTN, ESRD s/p renal transplant x 2 but now requiring HD again (T Th Sat), now presenting with vision changes x 5 days as well as elevated blood pressures, admitted w/ htn emergency.     Exam notable for inability to read text.     Plan:  High BPs in 200s on admission, and vision changes and physical exam by optho noted for retinal hemorrhages consistent w/ htn emergency w/ htn retinopathy, however will need to rule optic neuropathy  -CTH: no evidence of intracranial hemorrhage, mass, midline shift  -BP control as per primary team and renal  -neuro checks q 4  -MRI head non cont to assess for inflammation/lesions and MRV  -optho recs  -fall precautions    Eufemia Connell  Resident  [phone] 59yo M w/pmhx of HTN, ESRD s/p renal transplant x 2 but now requiring HD again (T Th Sat), now presenting with vision changes x 5 days as well as elevated blood pressures, admitted w/ htn emergency.     Exam notable for inability to read text.     Plan:  High BPs in 200s on admission, and vision changes and physical exam by optho noted for retinal hemorrhages. Concern w/ htn emergency w/ htn retinopathy vs PRES  -CTH: no evidence of intracranial hemorrhage, mass, midline shift  -BP control as per primary team and renal  -neuro checks q 4  -MRI head non cont  -optho recs  -fall precautions    Eufemia Connell  Resident  [phone]

## 2018-08-17 NOTE — DISCHARGE NOTE ADULT - PROVIDER TOKENS
TOKEN:'[id number]' TOKEN:'[id number]:'[id number]'

## 2018-08-17 NOTE — CONSULT NOTE ADULT - SUBJECTIVE AND OBJECTIVE BOX
Neurology Consult Note  HPI:  59yo M w/pmhx of HTN, ESRD s/p renal transplant x 2 but now requiring HD again (T Th Sat), now presenting with vision changes x 5 days as well as elevated blood pressures noted as an outpatient. patient states that he has had blurry vision in both eyes and sensation of white, bright light when he is attempting to read. He has had these symptoms for five days of abrupt onset, states he feels his vision has become slowly progressively worse. States he did have a mild headache, but usually does get headaches after HD sessions, which are much worse than the one he had this time around. No complaints of pain, trauma, fevers/chills, weakness, neck stiffness, N/V. He has never had this before. In addition, he notes he has been having elevated blood pressures to 180s-190s, and on thursday he was noted to have a diastolic blood pressure of 131 during HD, and was told to come to the hospital for high BPs and vision changes. He states he has been having hypertensive episodes, and his renal and medicine team have been working on different BP regimens, and have had recent medication changes, including a trial of atenolol, which was discontinued due to light headed ness, and re-starting cozaar.     Currently, patient states his vision is still blurry, difficult to read text, and sees a bright white light. No pain, no weakness, no headaches currently. He feels his vision is stable, but worse than when he was first admitted.         Allergies    No Known Allergies    Intolerances    beta blockers (Other (Mod to Severe))      MEDICATIONS  (STANDING):  ascorbic acid 500 milliGRAM(s) Oral daily  Butalbital, acetaminophen/caffeine 1 Tablet(s) 1 Tablet(s) Oral daily  calcitriol   Capsule 0.25 MICROGram(s) Oral daily  cholecalciferol 2000 Unit(s) Oral daily  cloNIDine Patch 0.3 mG/24Hr(s) 1 patch Topical every 7 days  furosemide    Tablet 80 milliGRAM(s) Oral daily  losartan 50 milliGRAM(s) Oral two times a day  Nephro-martha 1 Tablet(s) Oral daily  NIFEdipine XL 60 milliGRAM(s) Oral <User Schedule>  predniSONE   Tablet 5 milliGRAM(s) Oral daily  tacrolimus 1 milliGRAM(s) Oral every 12 hours  trimethoprim  160 mG/sulfamethoxazole 800 mG 1 Tablet(s) Oral <User Schedule>    MEDICATIONS  (PRN):  acetaminophen   Tablet. 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  History of partial pancreatectomy  History of renal transplant: x 2      FAMILY HISTORY:  No pertinent family history in first degree relatives      SOCIAL HISTORY: No EtOH, no tobacco    REVIEW OF SYSTEMS:      Height (cm): 180.34 (08-16 @ 23:20)  Weight (kg): 87 (08-16 @ 23:20)  BMI (kg/m2): 26.8 (08-16 @ 23:20)  BSA (m2): 2.07 (08-16 @ 23:20)    T(F): 99.4 (08-17-18 @ 05:07), Max: 99.4 (08-17-18 @ 05:07)  HR: 58 (08-17-18 @ 06:29)  BP: 132/69 (08-17-18 @ 06:29)  RR: 18 (08-17-18 @ 05:07)  SpO2: 99% (08-17-18 @ 05:07)  Wt(kg): --                              11.4   10.88 )-----------( 366      ( 17 Aug 2018 08:14 )             37.3       08-17    138  |  92<L>  |  44<H>  ----------------------------<  88  5.5<H>   |  26  |  7.09<H>    Ca    9.1      17 Aug 2018 07:16  Phos  6.3     08-17  Mg     2.2     08-17    TPro  6.9  /  Alb  3.9  /  TBili  0.5  /  DBili  x   /  AST  17  /  ALT  17  /  AlkPhos  62  08-16      Magnesium, Serum: 2.2 mg/dL (08-17 @ 07:16)  Phosphorus Level, Serum: 6.3 mg/dL (08-17 @ 07:16) Neurology Consult Note  HPI:  59yo M w/pmhx of HTN, ESRD s/p renal transplant x 2 but now requiring HD again (T Th Sat), now presenting with vision changes x 5 days as well as elevated blood pressures noted as an outpatient. patient states that he has had blurry vision in both eyes and sensation of white, bright light when he is attempting to read. He has had these symptoms for five days of abrupt onset, states he feels his vision has become slowly progressively worse. States he did have a mild headache, but usually does get headaches after HD sessions, which are much worse than the one he had this time around. No complaints of pain, trauma, fevers/chills, weakness, neck stiffness, N/V. He has never had this before. In addition, he notes he has been having elevated blood pressures to 180s-190s, and on thursday he was noted to have a diastolic blood pressure of 131 during HD, and was told to come to the hospital for high BPs and vision changes. He states he has been having hypertensive episodes, and his renal and medicine team have been working on different BP regimens, and have had recent medication changes, including a trial of atenolol, which was discontinued due to light headed ness, and re-starting cozaar.     Currently, patient states his vision is still blurry, difficult to read text, and sees a bright white light. No pain, no weakness, no headaches currently. He feels his vision is stable, but worse than when he was first admitted.         Allergies    No Known Allergies    Intolerances    beta blockers (Other (Mod to Severe))      MEDICATIONS  (STANDING):  ascorbic acid 500 milliGRAM(s) Oral daily  Butalbital, acetaminophen/caffeine 1 Tablet(s) 1 Tablet(s) Oral daily  calcitriol   Capsule 0.25 MICROGram(s) Oral daily  cholecalciferol 2000 Unit(s) Oral daily  cloNIDine Patch 0.3 mG/24Hr(s) 1 patch Topical every 7 days  furosemide    Tablet 80 milliGRAM(s) Oral daily  losartan 50 milliGRAM(s) Oral two times a day  Nephro-martha 1 Tablet(s) Oral daily  NIFEdipine XL 60 milliGRAM(s) Oral <User Schedule>  predniSONE   Tablet 5 milliGRAM(s) Oral daily  tacrolimus 1 milliGRAM(s) Oral every 12 hours  trimethoprim  160 mG/sulfamethoxazole 800 mG 1 Tablet(s) Oral <User Schedule>    MEDICATIONS  (PRN):  acetaminophen   Tablet. 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  History of partial pancreatectomy  History of renal transplant: x 2      FAMILY HISTORY:  No pertinent family history in first degree relatives      SOCIAL HISTORY: No EtOH, no tobacco    REVIEW OF SYSTEMS:      Height (cm): 180.34 (08-16 @ 23:20)  Weight (kg): 87 (08-16 @ 23:20)  BMI (kg/m2): 26.8 (08-16 @ 23:20)  BSA (m2): 2.07 (08-16 @ 23:20)    T(F): 99.4 (08-17-18 @ 05:07), Max: 99.4 (08-17-18 @ 05:07)  HR: 58 (08-17-18 @ 06:29)  BP: 132/69 (08-17-18 @ 06:29)  RR: 18 (08-17-18 @ 05:07)  SpO2: 99% (08-17-18 @ 05:07)  Wt(kg): --    Mental status: Awake, a x o x3    Cranial Nerves: Pupils were R>L, round, reactive to light. Extraocular movements were intact. No nystagmus. Facial sensation was intact to light touch. There was no facial asymmetry. The palate was upgoing symmetrically, tongue midline with tremor. Shoulder shrug was full bilaterally. Difficulty reading text on paper, however able to state how many fingers i am holding up in all visual fields.     Motor exam: Bulk and tone were normal. Patient was moving all extremities. Able to lift legs antigravity.  No tremors. No pronator drift.     Reflexes: 1+ in the bilateral upper extremities. 1+ in the bilateral lower extremities. Toes were downgoing bilaterally.     Sensation: Intact to light touch    Coordination: no gross dysmetria    Gait: Not tested.                                11.4   10.88 )-----------( 366      ( 17 Aug 2018 08:14 )             37.3       08-17    138  |  92<L>  |  44<H>  ----------------------------<  88  5.5<H>   |  26  |  7.09<H>    Ca    9.1      17 Aug 2018 07:16  Phos  6.3     08-17  Mg     2.2     08-17    TPro  6.9  /  Alb  3.9  /  TBili  0.5  /  DBili  x   /  AST  17  /  ALT  17  /  AlkPhos  62  08-16      Magnesium, Serum: 2.2 mg/dL (08-17 @ 07:16)  Phosphorus Level, Serum: 6.3 mg/dL (08-17 @ 07:16) Neurology Consult Note  HPI:  59yo M w/pmhx of HTN, ESRD s/p renal transplant x 2 but now requiring HD again (T Th Sat), now presenting with vision changes x 5 days as well as elevated blood pressures noted as an outpatient. patient states that he has had blurry vision in both eyes and sensation of white, bright light when he is attempting to read. He has had these symptoms for five days of abrupt onset, states he feels his vision has become slowly progressively worse. States he did have a mild headache, but usually does get headaches after HD sessions, which are much worse than the one he had this time around. No complaints of pain, trauma, fevers/chills, weakness, neck stiffness, N/V. He has never had this before. In addition, he notes he has been having elevated blood pressures to 180s-190s, and on thursday he was noted to have a diastolic blood pressure of 131 during HD, and was told to come to the hospital for high BPs and vision changes. He states he has been having hypertensive episodes, and his renal and medicine team have been working on different BP regimens, and have had recent medication changes, including a trial of atenolol, which was discontinued due to light headed ness, and re-starting cozaar.    Here, patient was found to have BPs in the 200s, requiring hydralazine IV pushes to control BPs.     Currently, patient states his vision is still blurry, difficult to read text, and sees a bright white light. No pain, no weakness, no headaches currently. He feels his vision is stable, but worse than when he was first admitted.         Allergies    No Known Allergies    Intolerances    beta blockers (Other (Mod to Severe))      MEDICATIONS  (STANDING):  ascorbic acid 500 milliGRAM(s) Oral daily  Butalbital, acetaminophen/caffeine 1 Tablet(s) 1 Tablet(s) Oral daily  calcitriol   Capsule 0.25 MICROGram(s) Oral daily  cholecalciferol 2000 Unit(s) Oral daily  cloNIDine Patch 0.3 mG/24Hr(s) 1 patch Topical every 7 days  furosemide    Tablet 80 milliGRAM(s) Oral daily  losartan 50 milliGRAM(s) Oral two times a day  Nephro-martha 1 Tablet(s) Oral daily  NIFEdipine XL 60 milliGRAM(s) Oral <User Schedule>  predniSONE   Tablet 5 milliGRAM(s) Oral daily  tacrolimus 1 milliGRAM(s) Oral every 12 hours  trimethoprim  160 mG/sulfamethoxazole 800 mG 1 Tablet(s) Oral <User Schedule>    MEDICATIONS  (PRN):  acetaminophen   Tablet. 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  History of partial pancreatectomy  History of renal transplant: x 2      FAMILY HISTORY:  No pertinent family history in first degree relatives      SOCIAL HISTORY: No EtOH, no tobacco    REVIEW OF SYSTEMS:      Height (cm): 180.34 (08-16 @ 23:20)  Weight (kg): 87 (08-16 @ 23:20)  BMI (kg/m2): 26.8 (08-16 @ 23:20)  BSA (m2): 2.07 (08-16 @ 23:20)    T(F): 99.4 (08-17-18 @ 05:07), Max: 99.4 (08-17-18 @ 05:07)  HR: 58 (08-17-18 @ 06:29)  BP: 132/69 (08-17-18 @ 06:29)  RR: 18 (08-17-18 @ 05:07)  SpO2: 99% (08-17-18 @ 05:07)  Wt(kg): --    Mental status: Awake, a x o x3    Cranial Nerves: Pupils were R>L, round, reactive to light. Extraocular movements were intact. No nystagmus. Facial sensation was intact to light touch. There was no facial asymmetry. The palate was upgoing symmetrically, tongue midline with tremor. Shoulder shrug was full bilaterally. Difficulty reading text on paper, however able to state how many fingers i am holding up in all visual fields.     Motor exam: Bulk and tone were normal. Patient was moving all extremities. Able to lift legs antigravity.  No tremors. No pronator drift.     Reflexes: 1+ in the bilateral upper extremities. 1+ in the bilateral lower extremities. Toes were downgoing bilaterally.     Sensation: Intact to light touch    Coordination: no gross dysmetria    Gait: Not tested.                                11.4   10.88 )-----------( 366      ( 17 Aug 2018 08:14 )             37.3       08-17    138  |  92<L>  |  44<H>  ----------------------------<  88  5.5<H>   |  26  |  7.09<H>    Ca    9.1      17 Aug 2018 07:16  Phos  6.3     08-17  Mg     2.2     08-17    TPro  6.9  /  Alb  3.9  /  TBili  0.5  /  DBili  x   /  AST  17  /  ALT  17  /  AlkPhos  62  08-16      Magnesium, Serum: 2.2 mg/dL (08-17 @ 07:16)  Phosphorus Level, Serum: 6.3 mg/dL (08-17 @ 07:16)    < from: CT Head No Cont (08.16.18 @ 20:46) >  FINDINGS:     There is no evidence of mass or acute intracranial hemorrhage. Ventricles   and sulci are normal in size and configuration for the patient's stated   age. No midline shift or other significant mass effect is noted. There is   no CT evidence of acute territorial infarct.     The visualized paranasal sinuses and tympanomastoid spaces are clear.   Orbits and orbital contents are unremarkable.    There is no depressed calvarial fracture.        IMPRESSION:     No evidence of acute intracranial hemorrhage, midline shift or CT   evidence of acute territorial infarct.    If the patient's symptoms persist, consider short interval follow-up head   CT or brain MRI if there are no MRI contraindications.    < end of copied text > Neurology Consult Note  HPI:  59yo M w/pmhx of HTN, ESRD s/p renal transplant x 2 but now requiring HD again (T Th Sat), now presenting with vision changes x 5 days as well as elevated blood pressures noted as an outpatient. patient states that he has had blurry vision in both eyes and sensation of white, bright light when he is attempting to read. He has had these symptoms for five days of abrupt onset, states he feels his vision has become slowly progressively worse. States he did have a mild headache, but usually does get headaches after HD sessions, which are much worse than the one he had this time around. No complaints of pain, trauma, fevers/chills, weakness, neck stiffness, N/V. He has never had this before. In addition, he notes he has been having elevated blood pressures to 180s-190s, and on thursday he was noted to have a diastolic blood pressure of 131 during HD, and was told to come to the hospital for high BPs and vision changes. He states he has been having hypertensive episodes, and his renal and medicine team have been working on different BP regimens, and have had recent medication changes, including a trial of atenolol, which was discontinued due to light headed ness, and re-starting cozaar.    Here, patient was found to have BPs in the 200s, requiring hydralazine IV pushes to control BPs.     Currently, patient states his vision is still blurry, difficult to read text, and sees a bright white light. No pain, no weakness, no headaches currently. He feels his vision is stable, but worse than when he was first admitted.         Allergies    No Known Allergies    Intolerances    beta blockers (Other (Mod to Severe))      MEDICATIONS  (STANDING):  ascorbic acid 500 milliGRAM(s) Oral daily  Butalbital, acetaminophen/caffeine 1 Tablet(s) 1 Tablet(s) Oral daily  calcitriol   Capsule 0.25 MICROGram(s) Oral daily  cholecalciferol 2000 Unit(s) Oral daily  cloNIDine Patch 0.3 mG/24Hr(s) 1 patch Topical every 7 days  furosemide    Tablet 80 milliGRAM(s) Oral daily  losartan 50 milliGRAM(s) Oral two times a day  Nephro-martha 1 Tablet(s) Oral daily  NIFEdipine XL 60 milliGRAM(s) Oral <User Schedule>  predniSONE   Tablet 5 milliGRAM(s) Oral daily  tacrolimus 1 milliGRAM(s) Oral every 12 hours  trimethoprim  160 mG/sulfamethoxazole 800 mG 1 Tablet(s) Oral <User Schedule>    MEDICATIONS  (PRN):  acetaminophen   Tablet. 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  History of partial pancreatectomy  History of renal transplant: x 2      FAMILY HISTORY:  No pertinent family history in first degree relatives      SOCIAL HISTORY: No EtOH, no tobacco    REVIEW OF SYSTEMS:      Height (cm): 180.34 (08-16 @ 23:20)  Weight (kg): 87 (08-16 @ 23:20)  BMI (kg/m2): 26.8 (08-16 @ 23:20)  BSA (m2): 2.07 (08-16 @ 23:20)    T(F): 99.4 (08-17-18 @ 05:07), Max: 99.4 (08-17-18 @ 05:07)  HR: 58 (08-17-18 @ 06:29)  BP: 132/69 (08-17-18 @ 06:29)  RR: 18 (08-17-18 @ 05:07)  SpO2: 99% (08-17-18 @ 05:07)  Wt(kg): --    Mental status: Awake, a x o x3    Cranial Nerves: Pupils were equal, round, reactive to light. Extraocular movements were intact. No nystagmus. Facial sensation was intact to light touch. There was no facial asymmetry. The palate was upgoing symmetrically, tongue midline with tremor. Shoulder shrug was full bilaterally. Difficulty reading text on paper, however able to state how many fingers i am holding up in all visual fields.     Motor exam: Bulk and tone were normal. Patient was moving all extremities. Able to lift legs antigravity.  No tremors. No pronator drift.     Reflexes: 1+ in the bilateral upper extremities. 1+ in the bilateral lower extremities. Toes were downgoing bilaterally.     Sensation: Intact to light touch    Coordination: no gross dysmetria    Gait: Not tested.                                11.4   10.88 )-----------( 366      ( 17 Aug 2018 08:14 )             37.3       08-17    138  |  92<L>  |  44<H>  ----------------------------<  88  5.5<H>   |  26  |  7.09<H>    Ca    9.1      17 Aug 2018 07:16  Phos  6.3     08-17  Mg     2.2     08-17    TPro  6.9  /  Alb  3.9  /  TBili  0.5  /  DBili  x   /  AST  17  /  ALT  17  /  AlkPhos  62  08-16      Magnesium, Serum: 2.2 mg/dL (08-17 @ 07:16)  Phosphorus Level, Serum: 6.3 mg/dL (08-17 @ 07:16)    < from: CT Head No Cont (08.16.18 @ 20:46) >  FINDINGS:     There is no evidence of mass or acute intracranial hemorrhage. Ventricles   and sulci are normal in size and configuration for the patient's stated   age. No midline shift or other significant mass effect is noted. There is   no CT evidence of acute territorial infarct.     The visualized paranasal sinuses and tympanomastoid spaces are clear.   Orbits and orbital contents are unremarkable.    There is no depressed calvarial fracture.        IMPRESSION:     No evidence of acute intracranial hemorrhage, midline shift or CT   evidence of acute territorial infarct.    If the patient's symptoms persist, consider short interval follow-up head   CT or brain MRI if there are no MRI contraindications.    < end of copied text >

## 2018-08-17 NOTE — CONSULT NOTE ADULT - SUBJECTIVE AND OBJECTIVE BOX
CARDIOLOGY CONSULT - Dr. Real     CHIEF COMPLAINT: htn, blurred vision     HPI: 57yo M w/pmhx of HTN, ESRD s/p renal transplant x 2 but now requiring HD again (T Th Sat), now presenting with HTN, vision changes x 5 days. Patient states that he has had blurry vision in both eyes and sensation of white, bright light when he is attempting to read. Pt. states he had noticed elevated blood pressures to the 180-190s systolic. His diastolic blood pressures have been running the 100s but he reports that during dialysis sessions this can spike. Today at HD he was noted to have a diastolic blood pressure of 131 and the HD center recommended that he come to the hospital for care due to worsening blood pressures and vision changes. Pt. denies cp/sob/buck, denies headache, weakness, numbness or tingling of extremities. Still complaining of blurred vision.     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 Mild mitral regurgitation.  2. Normal left ventricular systolic function. No segmental  wall motion abnormalities.  3. Normal right ventricular size and function.  4. Normal pericardium with trace pericardial effusion.    < end of copied text >  < from: Transthoracic Echocardiogram (04.17.18 @ 10:15) >  Ejection Fraction (Teicholtz): 53 %    < end of copied text >    [ ]  Catheterization:   [ ] Stress Test:  	< from: Nuclear Stress Test-Pharmacologic (04.04.18 @ 12:33) >    IMPRESSIONS:Probably Normal Study  * Chest Pain: No chest pain with administration of  Regadenoson.  * Symptom: lightheaded.  * HR Response: Appropriate.  * BP Response: Appropriate.  * Heart Rhythm: Sinus Rhythm - 98 BPM.  * Baseline ECG: Nonspecific ST-T wave abnormality.  * ECG Changes: No significant ischemic ST segment changes  beyond baseline abnormalities.  * Arrhythmia: None.  * Review of raw data shows: The study is of good technical  quality.  * The left ventricle was enlarged. There are large, mild  to moderate defects in inferior and inferolateral walls  that are partially fixed, mostly correct with prone  imaging, and have normal wall motion suggestive of  attenuation artifact.  * Post-stress gated wall motion analysis was performed  (LVEF = 53 %;LVEDV = 147 ml.), revealing normal LV  function.    < end of copied text >      MEDICATIONS:  MEDICATIONS  (STANDING):  ascorbic acid 500 milliGRAM(s) Oral daily  Butalbital, acetaminophen/caffeine 1 Tablet(s) 1 Tablet(s) Oral daily  calcitriol   Capsule 0.25 MICROGram(s) Oral daily  cholecalciferol 2000 Unit(s) Oral daily  cloNIDine Patch 0.3 mG/24Hr(s) 1 patch Topical every 7 days  furosemide    Tablet 80 milliGRAM(s) Oral daily  losartan 50 milliGRAM(s) Oral two times a day  Nephro-martha 1 Tablet(s) Oral daily  NIFEdipine XL 60 milliGRAM(s) Oral <User Schedule>  predniSONE   Tablet 5 milliGRAM(s) Oral daily  tacrolimus 1 milliGRAM(s) Oral every 12 hours  trimethoprim  160 mG/sulfamethoxazole 800 mG 1 Tablet(s) Oral <User Schedule>      FAMILY HISTORY:  No pertinent family history in first degree relatives      SOCIAL HISTORY:    [x] Non-smoker  [ ] Smoker  [ ] Alcohol    Allergies    No Known Allergies    Intolerances    beta blockers (Other (Mod to Severe))  	    REVIEW OF SYSTEMS:  CONSTITUTIONAL: No fever, weight loss, or fatigue  EYES: No eye pain, visual disturbances, or discharge  ENMT:  No difficulty hearing, tinnitus, vertigo; No sinus or throat pain  NECK: No pain or stiffness  RESPIRATORY: No cough, wheezing, chills or hemoptysis; No Shortness of Breath  CARDIOVASCULAR: No chest pain, palpitations, passing out, dizziness, + leg swelling  GASTROINTESTINAL: No abdominal or epigastric pain. No nausea, vomiting, or hematemesis; No diarrhea or constipation. No melena or hematochezia.  GENITOURINARY: No dysuria, frequency, hematuria, or incontinence  NEUROLOGICAL: No headaches, memory loss, loss of strength, numbness, or tremors, +blurry vision   SKIN: No itching, burning, rashes, or lesions   	    [x] All others negative	  [ ] Unable to obtain    PHYSICAL EXAM:  T(C): 36.7 (08-17-18 @ 13:52), Max: 37.4 (08-17-18 @ 05:07)  HR: 61 (08-17-18 @ 13:52) (56 - 65)  BP: 142/78 (08-17-18 @ 13:52) (132/69 - 204/104)  RR: 18 (08-17-18 @ 13:52) (18 - 19)  SpO2: 95% (08-17-18 @ 13:52) (95% - 99%)  Wt(kg): --  I&O's Summary    16 Aug 2018 07:01  -  17 Aug 2018 07:00  --------------------------------------------------------  IN: 50 mL / OUT: 0 mL / NET: 50 mL    17 Aug 2018 07:01  -  17 Aug 2018 14:07  --------------------------------------------------------  IN: 500 mL / OUT: 0 mL / NET: 500 mL        Appearance: Normal	  Psychiatry: A & O x 3, Mood & affect appropriate  HEENT:   Normal oral mucosa, PERRL, EOMI	  Lymphatic: No lymphadenopathy  Cardiovascular: Normal S1 S2,RRR, No JVD, No murmurs  Respiratory: Lungs clear to auscultation	  Gastrointestinal:  Soft, Non-tender, + BS	  Skin: No rashes, No ecchymoses, No cyanosis	  Neurologic: Non-focal  Extremities: Normal range of motion, No clubbing, + b/l le edema   Vascular: Peripheral pulses palpable 2+ bilaterally    TELEMETRY: 	    ECG:  	SB 54  RADIOLOGY:   OTHER: 	  < from: CT Head No Cont (08.16.18 @ 20:46) >    IMPRESSION:     No evidence of acute intracranial hemorrhage, midline shift or CT   evidence of acute territorial infarct.    If the patient's symptoms persist, consider short interval follow-up head   CT or brain MRI if there are no MRI contraindications.    < end of copied text >  	  LABS:	 	    CARDIAC MARKERS:                                  11.4   10.88 )-----------( 366      ( 17 Aug 2018 08:14 )             37.3     08-17    138  |  92<L>  |  44<H>  ----------------------------<  88  5.5<H>   |  26  |  7.09<H>    Ca    9.1      17 Aug 2018 07:16  Phos  6.3     08-17  Mg     2.2     08-17    TPro  6.9  /  Alb  3.9  /  TBili  0.5  /  DBili  x   /  AST  17  /  ALT  17  /  AlkPhos  62  08-16    PT/INR - ( 16 Aug 2018 21:11 )   PT: 11.4 sec;   INR: 1.04 ratio         PTT - ( 16 Aug 2018 21:11 )  PTT:29.9 sec  proBNP:   Lipid Profile:   HgA1c:   TSH:

## 2018-08-17 NOTE — CONSULT NOTE ADULT - SUBJECTIVE AND OBJECTIVE BOX
[facility] Ophthalmology Consult Note    HPI:  Pt is admitted for     PMH: None  Meds: None  POcHx (including surgeries/lasers/trauma):  None  Drops: None  FamHx: None  Social Hx: None  Allergies: NKDA    ROS:  General (neg), Vision (per HPI), Head and Neck (neg), Pulm (neg), CV (neg), GI (neg),  (neg), Musculoskeletal (neg), Skin/Integ (neg), Neuro (neg), Endocrine (neg), Heme (neg), All/Immuno (neg)    Mood and Affect Appropriate ( x ),  Oriented to Time, Place, and Person x 3 ( x )    Ophthalmology Exam    Visual acuity (sc): 20/20 OU  Pupils: PERRL OU, no APD  Ttono: 16 OU  Extraocular movements (EOMs): Full OU, no pain, no diplopia  Confrontational Visual Field (CVF):  Full OU  Color Plates: 12/12 OU    Slit Lamp Exam (SLE)  External:  Flat OU  Lids/Lashes/Lacrimal Ducts: Flat OU    Sclera/Conjunctiva:  W+Q OU  Cornea: Cl OU  Anterior Chamber: D+Q OU   Iris:  Flat OU  Lens:  Cl OU    Fundus Exam: dilated with 1% tropicamide and 2.5% phenylephrine  Approval obtained from primary team for dilation  Patient aware that pupils can remained dilated for at least 4-6 hours  Exam performed with 20D lens    Vitreous: wnl OU  Disc, cup/disc: sharp and pink, 0.4 OU  Macula:  wnl OU  Vessels:  wnl OU  Periphery: wnl OU    Diagnostic Testing:    Assessment:      Plan:      Follow-Up:  Patient should follow up his/her ophthalmologist or in the [facility] Ophthalmology Practice within 1 week of discharge  [address] [place] [id number]  [phone]    S/D/W Dr Monk (attending) [facility] Ophthalmology Consult Note    HPI:  Pt is a 57 y/o M with a PMH of  HTN, ESRD s/p renal transplant x 2, still requiring dialysis 3x / week, admitted to the inpatient medicine floor with hypertension. The pt states that he has experienced visual changes for the last five days. Symptom onset was rapid in course, and consisted of a bright light in the center of vision, the pt states he sees this bright light in both eyes, and it has been associated with blurry vision. The pt denies any pain, red eye, diplopia, curtain like visual obscurations or floaters. The pt uses reading glasses (purchased in 2013), has no ocular history of surgery/procedures, denies hx of trauma.     PMH: AS above   Meds: Nifedipine, Tacrolimus, Clonidine, Lasix, Bactrim, Cozaar, ASA 81, Prednisone 5 mog daily, Calcirtriol,   POcHx (including surgeries/lasers/trauma):  None  Drops: None  FamHx: None  Social Hx: Denies smoking and EtOH use   Allergies: NKDA    ROS:  General (neg), Vision (per HPI), Head and Neck (neg), Pulm (neg), CV (neg), GI (neg),  (neg), Musculoskeletal (neg), Skin/Integ (neg), Neuro (neg), Endocrine (neg), Heme (neg), All/Immuno (neg)    Mood and Affect Appropriate ( x ),  Oriented to Time, Place, and Person x 3 ( x )    Ophthalmology Exam    Visual acuity (cc): 20/40 OD, NI with pinhole, 20/70 OS NI with pinhole,   Pupils: PERRL OU, no APD  Ttono: 12 OU  Extraocular movements (EOMs): Full OU, no pain, no diplopia  Confrontational Visual Field (CVF):  Full OU  Color Plates: 10/12 OD, 9/12 OS, limited by visual acuity, not ability to distinguish colors, did tend to see the right half of two digit numbers more clearly OU     Slit Lamp Exam (SLE)  External:  Flat OU  Lids/Lashes/Lacrimal Ducts: Flat OU    Sclera/Conjunctiva:  W+Q OU  Cornea: Cl OU  Anterior Chamber: D+Q OU   Iris:  Flat OU  Lens:  Cl OU    Fundus Exam: dilated with 1% tropicamide and 2.5% phenylephrine  Approval obtained from primary team for dilation  Patient aware that pupils can remained dilated for at least 4-6 hours  Exam performed with 20D lens    Vitreous: wnl OU  Disc, cup/disc: sharp and pink, 0.4 OU  Macula:  wnl OU  Vessels:  wnl OU  Periphery: wnl OU    Diagnostic Testing:    Assessment:      Plan:      Follow-Up:  Patient should follow up his/her ophthalmologist or in the [facility] Ophthalmology Practice within 1 week of discharge  [address] [place] [address].  [place] [address] [phone]    S/D/W Dr Monk (attending) [facility] Ophthalmology Consult Note    HPI:  Pt is a 59 y/o M with a PMH of  HTN, ESRD s/p renal transplant x 2, still requiring dialysis 3x / week, admitted to the inpatient medicine floor with hypertension. The pt states that he has experienced visual changes for the last five days. Symptom onset was rapid in course, and consisted of a bright light in the center of vision, the pt states he sees this bright light in both eyes, and it has been associated with blurry vision. The pt denies any pain, red eye, diplopia, curtain like visual obscurations or floaters. The pt uses reading glasses (purchased in 2013), has no ocular history of surgery/procedures, denies hx of trauma. Pt denies jaw claudication, scalp tenderness, incontinence, gait instability. Pt has long standing occasional tinnitus.     PMH: AS above   Meds: Nifedipine, Tacrolimus, Clonidine, Lasix, Bactrim, Cozaar, ASA 81, Prednisone 5 mog daily, Calcirtriol,   POcHx (including surgeries/lasers/trauma):  None  Drops: None  FamHx: None  Social Hx: Denies smoking and EtOH use   Allergies: NKDA    ROS:  General (neg), Vision (per HPI), Head and Neck (neg), Pulm (neg), Musculoskeletal (neg), Neuro (tinnitus),     Mood and Affect Appropriate ( x ),  Oriented to Time, Place, and Person x 3 ( x )    Ophthalmology Exam    Visual acuity (cc): 20/40 OD, NI with pinhole, 20/70 OS NI with pinhole,   Pupils: PERRL OU, no APD  Ttono: 12 OU  Extraocular movements (EOMs): Full OU, no pain, no diplopia  Confrontational Visual Field (CVF):  Full OU  Color Plates: 10/12 OD, 9/12 OS, limited by visual acuity, not ability to distinguish colors, did tend to see the right half of two digit numbers more clearly OU     Slit Lamp Exam (SLE)  External:  Flat OU  Lids/Lashes/Lacrimal Ducts: Flat OU    Sclera/Conjunctiva:  W+Q OU  Cornea: Cl OU  Anterior Chamber: D+Q OU   Iris:  Flat OU  Lens:  +1 OU     Fundus Exam: dilated with 1% tropicamide and 2.5% phenylephrine  Approval obtained from primary team for dilation  Patient aware that pupils can remained dilated for at least 4-6 hours  Exam performed with 20D lens    Vitreous: wnl OU  Disc, cup/disc: Bilateral disc edema with blunted disc margins circumferentially and associated disc obscuration bilaterally.   OD: There is inferior disc hemorrhage, and further inferiorly a larger hemorrhage along the inferior arcade.   OS: There is infero-temporally disc hemorrhage.   Macula:  OD: Temporal to the macula, small hemorrhage. OS: wnl   Vessels:  some sclerotic vessels OU.   Periphery: wnl OU      Assessment and Plan:   Bilateral disc edema   - Differential currently includes Hypertensive retinopathy and optic neuropathy, papillitis. CT Head earlier was negative for any acute hemrrhage or intracranial mass  - No history of transient visual obscurations.   - Treatment should be aimed at controlling BP,   - Recommend neurology consult   - Imaging recommended, preferred study is MRI with gadolinium, MRV venography  - Will follow pt during admission     D/W Dr Zhuo Gordon (Chief Resident) [facility] Ophthalmology Consult Note    HPI:  Pt is a 59 y/o M with a PMH of  HTN, ESRD s/p renal transplant x 2, still requiring dialysis 3x / week, admitted to the inpatient medicine floor with hypertension. The pt states that he has experienced visual changes for the last five days. Symptom onset was rapid in course, and consisted of a bright light in the center of vision, the pt states he sees this bright light in both eyes, and it has been associated with blurry vision. The pt denies any pain, red eye, diplopia, curtain like visual obscurations or floaters. The pt uses reading glasses (purchased in 2013), has no ocular history of surgery/procedures, denies hx of trauma. Pt denies jaw claudication, scalp tenderness, incontinence, gait instability. Pt has long standing occasional tinnitus. Pt has had recent difficulty in controlling BP.     PMH: AS above   Meds: Nifedipine, Tacrolimus, Clonidine, Lasix, Bactrim, Cozaar, ASA 81, Prednisone 5 mog daily, Calcirtriol,   POcHx (including surgeries/lasers/trauma):  None  Drops: None  FamHx: None  Social Hx: Denies smoking and EtOH use   Allergies: NKDA    ROS:  General (neg), Vision (per HPI), Head and Neck (neg), Pulm (neg), Musculoskeletal (neg), Neuro (tinnitus),     Mood and Affect Appropriate ( x ),  Oriented to Time, Place, and Person x 3 ( x )    Ophthalmology Exam    Visual acuity (cc): 20/40 OD, NI with pinhole, 20/70 OS NI with pinhole,   Pupils: PERRL OU, no APD  Ttono: 12 OU  Extraocular movements (EOMs): Full OU, no pain, no diplopia  Confrontational Visual Field (CVF):  Full OU  Color Plates: 10/12 OD, 9/12 OS, limited by visual acuity, not ability to distinguish colors, did tend to see the right half of two digit numbers more clearly OU     Slit Lamp Exam (SLE)  External:  Flat OU  Lids/Lashes/Lacrimal Ducts: Flat OU    Sclera/Conjunctiva:  W+Q OU  Cornea: Cl OU  Anterior Chamber: D+Q OU   Iris:  Flat OU  Lens:  +1 OU     Fundus Exam: dilated with 1% tropicamide and 2.5% phenylephrine  Approval obtained from primary team for dilation  Patient aware that pupils can remained dilated for at least 4-6 hours  Exam performed with 20D lens    Vitreous: wnl OU  Disc, cup/disc: Bilateral disc edema with blunted disc margins circumferentially and associated disc obscuration bilaterally.   OD: There is inferior disc hemorrhage, and further inferiorly a larger hemorrhage along the inferior arcade.   OS: There is infero-temporally disc hemorrhage.   Macula:  OD: Temporal to the macula, small hemorrhage. OS: wnl   Vessels:  some sclerotic vessels OU.   Periphery: wnl OU      Assessment and Plan:   Bilateral disc edema   - Differential currently includes Hypertensive retinopathy and optic neuropathy, papillitis.   - CT Head earlier was negative for any acute hemrrhage or intracranial mass  - Pt has had recent difficulty in controlling BP  - No recent history of transient visual obscurations  - Treatment should be aimed at controlling BP,   - Recommend neurology consult   - Imaging recommended, preferred study is MRI with gadolinium, MRV venography  - Will follow pt during admission     D/W Dr Zhou Gordon (Chief Resident)

## 2018-08-17 NOTE — DISCHARGE NOTE ADULT - HOSPITAL COURSE
Pt is a pleasant 57yo M w/pmhx of HTN, ESRD s/p renal transplant x 2 but now requiring HD again (T Th Sat), now presenting with vision changes x 5 days as well as elevated blood pressures noted as an outpatient. Patient reports that over the last 5 days he has had vision changes including blurred vision in both eyes bilaterally (both near and far vision), as well as having the sensation of a bright light being pointed at his eyes when he is attempting to read or look at things up close. These vision changes have been persistent x 5 days. In addition he has noticed elevated blood pressures to the 180-190s systolic. His diastolic blood pressures have been running the 100s but he reports that during dialysis sessions this can spike. Today at HD he was noted to have a diastolic blood pressure of 131 and the HD center recommended that he come to the hospital for care due to worsening blood pressures and vision changes. Patient has never had any similar visual changes in the past. Denies any current headaches at this time though he reports he does normally get a headache with HD. Patient also reports that his blood pressures had been somewhat difficult to control in recent weeks with his systolic blood pressures usually in the 180s. Because of this he was recently trialed on Atenolol but patient reports that he does not usually tolerate beta blockers well and when he attempted to try atenolol about 4 days ago he felt very dizzy/lightheaded. Patient reports similar issues taking labetalol in the past. He was also very briefly stopped off of cozaar x 1 day due to some concerns by HD center regarding potassium levels? but his nephrologist placed him back on cozaar the next day. Patient also reports in the past having nausea/vomiting/diarrhea with use of hydralazine though this was with oral hydralazine, he reports not having had the IV version previously. 59yo M w/pmhx of HTN, ESRD s/p renal transplant x 2 but now requiring HD again (T Th Sat), now presenting with accelerated hypertension with hypertensive emergency (w/visual changes). Seen by Optho, found to have bilateral disc edema with hemorrhages, differential currently includes hypertensive retinopathy in setting of uncontrolled BP vs optic neuropathy vs papillitis vs venous sinus thrombosis vs AV fistula. MRI/MRV noncon with dilatation of bilateral SOV and limited visualization of cavernous sinuses given noncontrast MRV technique. Unable to perform MRI with contrast 2/2 renal issues, CTA recommended but pt refused. Seen by neuro, left acoustic neuroma, advised outpatient MRI surveillance. Seen by cardio, bp control with minoxidil. Seen by renal, HD t,thur,sat. BP improved. d/c home. 59yo M w/pmhx of HTN, ESRD s/p renal transplant x 2 but now requiring HD again (T Th Sat), now presenting with accelerated hypertension with hypertensive emergency (w/visual changes). Seen by Optho, found to have bilateral disc edema with hemorrhages, differential currently includes hypertensive retinopathy in setting of uncontrolled BP vs optic neuropathy vs papillitis vs venous sinus thrombosis vs AV fistula. MRI/MRV noncon with dilatation of bilateral SOV and limited visualization of cavernous sinuses given noncontrast MRV technique. Unable to perform MRI with contrast 2/2 renal issues, CTA recommended but pt refused. Seen by neuro, left acoustic neuroma, advised outpatient MRI surveillance. Seen by cardio, bp control with minoxidil. Seen by renal, HD t,thur,sat. . Off losartan and  higher dose of procardia. BP improved;  d/c home.

## 2018-08-17 NOTE — CONSULT NOTE ADULT - PROVIDER SPECIALTY LIST ADULT
From: Perez Love  To: Lauryn Negro MD  Sent: 6/26/2018 9:37 AM CDT  Subject: Depo    Dr. Perez,     I have noticed I seem to be gaining a lot of weight. I am wondering if it is being caused by my Depo shot. Do you have any recommendations for switching birth control methods? Do you think my weight gain is an issue?    Thank you in advance for your help.   Neurology

## 2018-08-17 NOTE — DISCHARGE NOTE ADULT - CARE PROVIDER_API CALL
Peter Watts), Medicine  [address] [address]  Phone: [phone]  Fax: [phone]    Derik Patton), Internal Medicine; Nephrology  [address] [place] [address]  Phone: [phone]  Fax: [phone] Peter Watts), Medicine  [address] [facility]  [address]  [place] [address]  Phone: [phone]  Fax: [phone]    Derik Patton), Internal Medicine; Nephrology  [address] [place] [address]  Phone: [phone]  Fax: [phone]    Rishi Charles), Neurology  [address] [facility] [address]  [place] [address]  Phone: [phone]  Fax: [phone]

## 2018-08-17 NOTE — DISCHARGE NOTE ADULT - NS AS DC FOLLOWUP STROKE INST
Influenza vaccination (VIS Pub Date: August 7, 2015)/Smoking Cessation Influenza vaccination (VIS Pub Date: August 7, 2015)

## 2018-08-18 LAB
HAV IGM SER-ACNC: SIGNIFICANT CHANGE UP
HBV CORE IGM SER-ACNC: SIGNIFICANT CHANGE UP
HBV SURFACE AG SER-ACNC: SIGNIFICANT CHANGE UP
HCV AB S/CO SERPL IA: 0.14 S/CO — SIGNIFICANT CHANGE UP
HCV AB SERPL-IMP: SIGNIFICANT CHANGE UP
TACROLIMUS SERPL-MCNC: 4 NG/ML — SIGNIFICANT CHANGE UP

## 2018-08-18 PROCEDURE — 90935 HEMODIALYSIS ONE EVALUATION: CPT | Mod: GC

## 2018-08-18 PROCEDURE — 70551 MRI BRAIN STEM W/O DYE: CPT | Mod: 26

## 2018-08-18 RX ADMIN — TACROLIMUS 1 MILLIGRAM(S): 5 CAPSULE ORAL at 23:03

## 2018-08-18 RX ADMIN — TACROLIMUS 1 MILLIGRAM(S): 5 CAPSULE ORAL at 11:20

## 2018-08-18 RX ADMIN — Medication 1 PATCH: at 11:17

## 2018-08-18 RX ADMIN — LOSARTAN POTASSIUM 50 MILLIGRAM(S): 100 TABLET, FILM COATED ORAL at 17:18

## 2018-08-18 RX ADMIN — Medication 60 MILLIGRAM(S): at 11:19

## 2018-08-18 RX ADMIN — Medication 1 TABLET(S): at 11:21

## 2018-08-18 RX ADMIN — CALCITRIOL 0.25 MICROGRAM(S): 0.5 CAPSULE ORAL at 11:20

## 2018-08-18 RX ADMIN — Medication 500 MILLIGRAM(S): at 11:21

## 2018-08-18 RX ADMIN — Medication 2000 UNIT(S): at 11:20

## 2018-08-18 RX ADMIN — Medication 5 MILLIGRAM(S): at 05:14

## 2018-08-18 RX ADMIN — Medication 20 MILLIGRAM(S): at 20:30

## 2018-08-18 RX ADMIN — Medication 80 MILLIGRAM(S): at 05:14

## 2018-08-18 RX ADMIN — ONDANSETRON 4 MILLIGRAM(S): 8 TABLET, FILM COATED ORAL at 20:30

## 2018-08-18 RX ADMIN — LOSARTAN POTASSIUM 50 MILLIGRAM(S): 100 TABLET, FILM COATED ORAL at 05:14

## 2018-08-18 RX ADMIN — Medication 60 MILLIGRAM(S): at 23:03

## 2018-08-18 NOTE — PROGRESS NOTE ADULT - SUBJECTIVE AND OBJECTIVE BOX
S: patient examined this evening, still reports blurry vision OU. Feels about the same since admission. reports improvement in BP    Mood and Affect Appropriate ( x ),  Oriented to Time, Place, and Person x 3 ( x )    Ophthalmology Exam    Visual acuity (cc): OD 20/40, OS 20/70 PH 20/50   Pupils: PERRL OU, no APD  Ttono: 12 OU  Extraocular movements (EOMs): Full OU, no pain, no diplopia  Confrontational Visual Field (CVF):  Full OU  Color Plates: OD 4/7, OS 3/7     Slit Lamp Exam (SLE)  External:  Flat OU  Lids/Lashes/Lacrimal Ducts: Flat OU    Sclera/Conjunctiva:  W+Q OU  Cornea: Cl OU  Anterior Chamber: D+Q OU   Iris:  Flat OU  Lens: NS OU     Assessment and Plan:   57yo M found to have bilateral disc edema with hemorrhages, differential currently includes Hypertensive retinopathy and optic neuropathy, papillitis. Pt has had recent difficulty in controlling BP. CT Head negative for any acute hemorrhage or intracranial mass, MRI pending. VA today stable. PERRL, no APD.   - c/w strict BP control  - rest of care per primary team  - f/u MRI with gadolinium, MRV venography  - ophtho will follow continue to follow

## 2018-08-18 NOTE — PROGRESS NOTE ADULT - ASSESSMENT
57 yo gentleman w/pmhx of HTN, ESRD s/p renal transplant x 2 but now requiring HD again (T Th Sat),  presenting with sudden vision changes x 5 days in the setting of elevated  blood pressures. It is binocular. Nonfocal neurological examination. Finding of retinal hemorrhages on ophthalmological evaluation.   -CTH: no evidence of intracranial hemorrhage, mass, midline shift    Concern w/ htn emergency: htn retinopathy vs PRES, posterior reversible leukoencephalopathy syndrome.     Plan:    -MRI brain   -Optimize BP control as per primary team and renal  -neuro checks q 4  -Close observation  Reviewed above with pt at bedside.

## 2018-08-18 NOTE — PROGRESS NOTE ADULT - ASSESSMENT
Patient with failed renal allograft, ESRD, on hemodialysis, HTN, bilateral visul symptoms under ophthalmology evaluation.

## 2018-08-18 NOTE — PROGRESS NOTE ADULT - SUBJECTIVE AND OBJECTIVE BOX
Patient is a 58y old  Male who presents with a chief complaint of Accelerated hypertension/hypertensive emergency/vision changes (17 Aug 2018 16:30)      INTERVAL HPI/OVERNIGHT EVENTS:  T(C): 36.6 (08-18-18 @ 17:16), Max: 36.9 (08-18-18 @ 04:52)  HR: 62 (08-18-18 @ 17:16) (55 - 62)  BP: 179/91 (08-18-18 @ 17:16) (154/87 - 192/105)  RR: 18 (08-18-18 @ 17:16) (17 - 18)  SpO2: 98% (08-18-18 @ 17:16) (95% - 98%)  Wt(kg): --  I&O's Summary    17 Aug 2018 07:01  -  18 Aug 2018 07:00  --------------------------------------------------------  IN: 500 mL / OUT: 0 mL / NET: 500 mL    18 Aug 2018 07:01  -  18 Aug 2018 18:52  --------------------------------------------------------  IN: 0 mL / OUT: 2000 mL / NET: -2000 mL        LABS:                        11.4   10.88 )-----------( 366      ( 17 Aug 2018 08:14 )             37.3     08-17    138  |  92<L>  |  44<H>  ----------------------------<  88  5.5<H>   |  26  |  7.09<H>    Ca    9.1      17 Aug 2018 07:16  Phos  6.3     08-17  Mg     2.2     08-17    TPro  6.9  /  Alb  3.9  /  TBili  0.5  /  DBili  x   /  AST  17  /  ALT  17  /  AlkPhos  62  08-16    PT/INR - ( 16 Aug 2018 21:11 )   PT: 11.4 sec;   INR: 1.04 ratio         PTT - ( 16 Aug 2018 21:11 )  PTT:29.9 sec    CAPILLARY BLOOD GLUCOSE                MEDICATIONS  (STANDING):  ascorbic acid 500 milliGRAM(s) Oral daily  Butalbital, acetaminophen/caffeine 1 Tablet(s) 1 Tablet(s) Oral daily  calcitriol   Capsule 0.25 MICROGram(s) Oral daily  cholecalciferol 2000 Unit(s) Oral daily  cloNIDine Patch 0.3 mG/24Hr(s) 1 patch Topical every 7 days  furosemide    Tablet 80 milliGRAM(s) Oral daily  losartan 50 milliGRAM(s) Oral two times a day  Nephro-martha 1 Tablet(s) Oral daily  NIFEdipine XL 60 milliGRAM(s) Oral <User Schedule>  predniSONE   Tablet 5 milliGRAM(s) Oral daily  tacrolimus 1 milliGRAM(s) Oral every 12 hours  trimethoprim  160 mG/sulfamethoxazole 800 mG 1 Tablet(s) Oral <User Schedule>    MEDICATIONS  (PRN):  acetaminophen   Tablet. 650 milliGRAM(s) Oral every 6 hours PRN Mild Pain (1 - 3)  hydrALAZINE Injectable 20 milliGRAM(s) IV Push every 6 hours PRN SBP >185  ondansetron Injectable 4 milliGRAM(s) IV Push every 6 hours PRN give with hydralazine pushes          PHYSICAL EXAM:  GENERAL: NAD, well-groomed, well-developed  HEAD:  Atraumatic, Normocephalic  CHEST/LUNG: Clear to percussion bilaterally; No rales, rhonchi, wheezing, or rubs  HEART: Regular rate and rhythm; No murmurs, rubs, or gallops  ABDOMEN: Soft, Nontender, Nondistended; Bowel sounds present  EXTREMITIES:  2+ Peripheral Pulses, No clubbing, cyanosis, or edema  LYMPH: No lymphadenopathy noted  SKIN: No rashes or lesions    Care Discussed with Consultants/Other Providers [+ ] YES  [ ] NO

## 2018-08-18 NOTE — PROGRESS NOTE ADULT - SUBJECTIVE AND OBJECTIVE BOX
Neurology Follow up Note    Subjective:  No new complaints, still with vision complaints, seeing blurry and central bright area 'with both eyes'  Awaiting MRI    Medications:  acetaminophen   Tablet. 650 milliGRAM(s) Oral every 6 hours PRN  ascorbic acid 500 milliGRAM(s) Oral daily  Butalbital, acetaminophen/caffeine 1 Tablet(s) 1 Tablet(s) Oral daily  calcitriol   Capsule 0.25 MICROGram(s) Oral daily  cholecalciferol 2000 Unit(s) Oral daily  cloNIDine Patch 0.3 mG/24Hr(s) 1 patch Topical every 7 days  furosemide    Tablet 80 milliGRAM(s) Oral daily  hydrALAZINE Injectable 20 milliGRAM(s) IV Push every 6 hours PRN  losartan 50 milliGRAM(s) Oral two times a day  Nephro-martha 1 Tablet(s) Oral daily  NIFEdipine XL 60 milliGRAM(s) Oral <User Schedule>  ondansetron Injectable 4 milliGRAM(s) IV Push every 6 hours PRN  predniSONE   Tablet 5 milliGRAM(s) Oral daily  tacrolimus 1 milliGRAM(s) Oral every 12 hours  trimethoprim  160 mG/sulfamethoxazole 800 mG 1 Tablet(s) Oral <User Schedule>      Labs:  CBC Full  -  ( 17 Aug 2018 08:14 )  WBC Count : 10.88 K/uL  Hemoglobin : 11.4 g/dL  Hematocrit : 37.3 %  Platelet Count - Automated : 366 K/uL  Mean Cell Volume : 80.7 fl  Mean Cell Hemoglobin : 24.7 pg  Mean Cell Hemoglobin Concentration : 30.6 gm/dL  Auto Neutrophil # : 6.65 K/uL  Auto Lymphocyte # : 2.67 K/uL  Auto Monocyte # : 1.40 K/uL  Auto Eosinophil # : 0.08 K/uL  Auto Basophil # : 0.05 K/uL  Auto Neutrophil % : 61.1 %  Auto Lymphocyte % : 24.5 %  Auto Monocyte % : 12.9 %  Auto Eosinophil % : 0.7 %  Auto Basophil % : 0.5 %    08-17    138  |  92<L>  |  44<H>  ----------------------------<  88  5.5<H>   |  26  |  7.09<H>    Ca    9.1      17 Aug 2018 07:16  Phos  6.3     08-17  Mg     2.2     08-17  TPro  6.9  /  Alb  3.9  /  TBili  0.5  /  DBili  x   /  AST  17  /  ALT  17  /  AlkPhos  62  08-16  LIVER FUNCTIONS - ( 16 Aug 2018 21:11 )  Alb: 3.9 g/dL / Pro: 6.9 g/dL / ALK PHOS: 62 U/L / ALT: 17 U/L / AST: 17 U/L / GGT: x         PT/INR - ( 16 Aug 2018 21:11 )   PT: 11.4 sec;   INR: 1.04 ratio    PTT - ( 16 Aug 2018 21:11 )  PTT:29.9 sec    Vitals:  Vital Signs Last 24 Hrs  T(C): 36.7 (18 Aug 2018 12:45), Max: 36.9 (18 Aug 2018 04:52)  T(F): 98 (18 Aug 2018 12:45), Max: 98.4 (18 Aug 2018 04:52)  HR: 55 (18 Aug 2018 12:45) (55 - 61)  BP: 192/105 (18 Aug 2018 12:45) (143/75 - 192/105)  BP(mean): --  RR: 17 (18 Aug 2018 12:45) (17 - 18)  SpO2: 95% (18 Aug 2018 12:45) (95% - 98%)    Cranial Nerves: Pupils were equal, round, reactive to light. VFFC. Extraocular movements were intact. No nystagmus. Facial sensation was intact to light touch. There was no facial asymmetry. The palate was upgoing symmetrically, tongue midline with tremor. Shoulder shrug was full bilaterally.     Motor exam: Bulk and tone were normal. Patient was moving all extremities. Able to lift legs antigravity.  No tremors. No pronator drift.     Reflexes: 1+ in the bilateral upper extremities. 1+ in the bilateral lower extremities. Toes were downgoing bilaterally.     Sensation: Intact to light touch    Coordination: no gross dysmetria    Gait: Not tested.    Phosphorus Level, Serum: 6.3 mg/dL (08-17 @ 07:16)    < from: CT Head No Cont (08.16.18 @ 20:46) >  FINDINGS:     There is no evidence of mass or acute intracranial hemorrhage. Ventricles   and sulci are normal in size and configuration for the patient's stated   age. No midline shift or other significant mass effect is noted. There is   no CT evidence of acute territorial infarct.     The visualized paranasal sinuses and tympanomastoid spaces are clear.   Orbits and orbital contents are unremarkable.    There is no depressed calvarial fracture.    IMPRESSION:     No evidence of acute intracranial hemorrhage, midline shift or CT   evidence of acute territorial infarct.    If the patient's symptoms persist, consider short interval follow-up head   CT or brain MRI if there are no MRI contraindications.

## 2018-08-18 NOTE — PROGRESS NOTE ADULT - SUBJECTIVE AND OBJECTIVE BOX
--------------------------------------------------------------------------------  Chief Complaint: ESRD, failed transplant on hemodialysis, admitted with visual symptoms, HTN, now on dialysis    24 hour events/subjective: Reviewed        PAST HISTORY  --------------------------------------------------------------------------------  No significant changes to PMH, PSH, FHx, SHx, unless otherwise noted    ALLERGIES & MEDICATIONS  --------------------------------------------------------------------------------  Allergies    No Known Allergies    Intolerances    beta blockers (Other (Mod to Severe))    Standing Inpatient Medications  ascorbic acid 500 milliGRAM(s) Oral daily  Butalbital, acetaminophen/caffeine 1 Tablet(s) 1 Tablet(s) Oral daily  calcitriol   Capsule 0.25 MICROGram(s) Oral daily  cholecalciferol 2000 Unit(s) Oral daily  cloNIDine Patch 0.3 mG/24Hr(s) 1 patch Topical every 7 days  furosemide    Tablet 80 milliGRAM(s) Oral daily  losartan 50 milliGRAM(s) Oral two times a day  Nephro-martha 1 Tablet(s) Oral daily  NIFEdipine XL 60 milliGRAM(s) Oral <User Schedule>  predniSONE   Tablet 5 milliGRAM(s) Oral daily  tacrolimus 1 milliGRAM(s) Oral every 12 hours  trimethoprim  160 mG/sulfamethoxazole 800 mG 1 Tablet(s) Oral <User Schedule>    PRN Inpatient Medications  acetaminophen   Tablet. 650 milliGRAM(s) Oral every 6 hours PRN  hydrALAZINE Injectable 20 milliGRAM(s) IV Push every 6 hours PRN  ondansetron Injectable 4 milliGRAM(s) IV Push every 6 hours PRN      REVIEW OF SYSTEMS  Vision: Has blurry vision    All other systems were reviewed and are negative, except as noted.    VITALS/PHYSICAL EXAM  --------------------------------------------------------------------------------  T(C): 36.7 (08-18-18 @ 12:45), Max: 36.9 (08-18-18 @ 04:52)  HR: 55 (08-18-18 @ 12:45) (55 - 61)  BP: 192/105 (08-18-18 @ 12:45) (143/75 - 192/105)  RR: 17 (08-18-18 @ 12:45) (17 - 18)  SpO2: 95% (08-18-18 @ 12:45) (95% - 98%)  Wt(kg): --  Height (cm): 180.34 (08-16-18 @ 23:20)  Weight (kg): 87 (08-16-18 @ 23:20)  BMI (kg/m2): 26.8 (08-16-18 @ 23:20)  BSA (m2): 2.07 (08-16-18 @ 23:20)      08-17-18 @ 07:01  -  08-18-18 @ 07:00  --------------------------------------------------------  IN: 500 mL / OUT: 0 mL / NET: 500 mL      Physical Exam:  	Gen: NAD, well-appearing  	HEENT: PERRL, supple neck, clear oropharynx  	Pulm: CTA B/L  	CV: RRR, S1S2; no rub  	Back: No spinal or CVA tenderness; no sacral edema  Neuro: Visual symptoms, reports reduced acuity bilaterally  	I/j catheter functioning well      LABS/STUDIES  --------------------------------------------------------------------------------              11.4   10.88 >-----------<  366      [08-17-18 @ 08:14]              37.3     138  |  92  |  44  ----------------------------<  88      [08-17-18 @ 07:16]  5.5   |  26  |  7.09        Ca     9.1     [08-17-18 @ 07:16]      Mg     2.2     [08-17-18 @ 07:16]      Phos  6.3     [08-17-18 @ 07:16]    TPro  6.9  /  Alb  3.9  /  TBili  0.5  /  DBili  x   /  AST  17  /  ALT  17  /  AlkPhos  62  [08-16-18 @ 21:11]    PT/INR: PT 11.4 , INR 1.04       [08-16-18 @ 21:11]  PTT: 29.9       [08-16-18 @ 21:11]      Creatinine Trend:  SCr 7.09 [08-17 @ 07:16]  SCr 6.38 [08-16 @ 21:11]    Tacrolimus (), Serum: <2.0 ng/mL (08-17 @ 08:02)                Iron 24, TIBC 161, %sat 15      [03-20-18 @ 11:31]  Ferritin 250      [03-19-18 @ 20:02]

## 2018-08-19 LAB
ANION GAP SERPL CALC-SCNC: 17 MMOL/L — SIGNIFICANT CHANGE UP (ref 5–17)
BUN SERPL-MCNC: 49 MG/DL — HIGH (ref 7–23)
CALCIUM SERPL-MCNC: 9.3 MG/DL — SIGNIFICANT CHANGE UP (ref 8.4–10.5)
CHLORIDE SERPL-SCNC: 96 MMOL/L — SIGNIFICANT CHANGE UP (ref 96–108)
CO2 SERPL-SCNC: 26 MMOL/L — SIGNIFICANT CHANGE UP (ref 22–31)
CREAT SERPL-MCNC: 7.42 MG/DL — HIGH (ref 0.5–1.3)
GLUCOSE SERPL-MCNC: 142 MG/DL — HIGH (ref 70–99)
HCT VFR BLD CALC: 35.4 % — LOW (ref 39–50)
HGB BLD-MCNC: 11 G/DL — LOW (ref 13–17)
MCHC RBC-ENTMCNC: 25.8 PG — LOW (ref 27–34)
MCHC RBC-ENTMCNC: 31.1 GM/DL — LOW (ref 32–36)
MCV RBC AUTO: 83.1 FL — SIGNIFICANT CHANGE UP (ref 80–100)
PLATELET # BLD AUTO: 378 K/UL — SIGNIFICANT CHANGE UP (ref 150–400)
POTASSIUM SERPL-MCNC: 5.4 MMOL/L — HIGH (ref 3.5–5.3)
POTASSIUM SERPL-SCNC: 5.4 MMOL/L — HIGH (ref 3.5–5.3)
RBC # BLD: 4.26 M/UL — SIGNIFICANT CHANGE UP (ref 4.2–5.8)
RBC # FLD: 18.7 % — HIGH (ref 10.3–14.5)
SODIUM SERPL-SCNC: 139 MMOL/L — SIGNIFICANT CHANGE UP (ref 135–145)
TACROLIMUS SERPL-MCNC: 4 NG/ML — SIGNIFICANT CHANGE UP
WBC # BLD: 10.63 K/UL — HIGH (ref 3.8–10.5)
WBC # FLD AUTO: 10.63 K/UL — HIGH (ref 3.8–10.5)

## 2018-08-19 PROCEDURE — 99232 SBSQ HOSP IP/OBS MODERATE 35: CPT

## 2018-08-19 PROCEDURE — 90935 HEMODIALYSIS ONE EVALUATION: CPT | Mod: GC

## 2018-08-19 RX ORDER — MINOXIDIL 10 MG
2.5 TABLET ORAL AT BEDTIME
Qty: 0 | Refills: 0 | Status: DISCONTINUED | OUTPATIENT
Start: 2018-08-19 | End: 2018-08-22

## 2018-08-19 RX ORDER — HYDRALAZINE HCL 50 MG
25 TABLET ORAL THREE TIMES A DAY
Qty: 0 | Refills: 0 | Status: DISCONTINUED | OUTPATIENT
Start: 2018-08-19 | End: 2018-08-19

## 2018-08-19 RX ORDER — HYDROMORPHONE HYDROCHLORIDE 2 MG/ML
1 INJECTION INTRAMUSCULAR; INTRAVENOUS; SUBCUTANEOUS ONCE
Qty: 0 | Refills: 0 | Status: DISCONTINUED | OUTPATIENT
Start: 2018-08-19 | End: 2018-08-19

## 2018-08-19 RX ORDER — MINOXIDIL 10 MG
2.5 TABLET ORAL
Qty: 0 | Refills: 0 | Status: DISCONTINUED | OUTPATIENT
Start: 2018-08-19 | End: 2018-08-19

## 2018-08-19 RX ADMIN — Medication 1 TABLET(S): at 16:46

## 2018-08-19 RX ADMIN — TACROLIMUS 1 MILLIGRAM(S): 5 CAPSULE ORAL at 22:54

## 2018-08-19 RX ADMIN — Medication 80 MILLIGRAM(S): at 10:57

## 2018-08-19 RX ADMIN — LOSARTAN POTASSIUM 50 MILLIGRAM(S): 100 TABLET, FILM COATED ORAL at 17:13

## 2018-08-19 RX ADMIN — HYDROMORPHONE HYDROCHLORIDE 1 MILLIGRAM(S): 2 INJECTION INTRAMUSCULAR; INTRAVENOUS; SUBCUTANEOUS at 17:41

## 2018-08-19 RX ADMIN — Medication 20 MILLIGRAM(S): at 12:34

## 2018-08-19 RX ADMIN — TACROLIMUS 1 MILLIGRAM(S): 5 CAPSULE ORAL at 10:54

## 2018-08-19 RX ADMIN — Medication 1 TABLET(S): at 17:16

## 2018-08-19 RX ADMIN — Medication 2000 UNIT(S): at 10:52

## 2018-08-19 RX ADMIN — HYDROMORPHONE HYDROCHLORIDE 1 MILLIGRAM(S): 2 INJECTION INTRAMUSCULAR; INTRAVENOUS; SUBCUTANEOUS at 17:11

## 2018-08-19 RX ADMIN — Medication 60 MILLIGRAM(S): at 16:50

## 2018-08-19 RX ADMIN — Medication 1 TABLET(S): at 10:54

## 2018-08-19 RX ADMIN — ONDANSETRON 4 MILLIGRAM(S): 8 TABLET, FILM COATED ORAL at 12:46

## 2018-08-19 RX ADMIN — Medication 2.5 MILLIGRAM(S): at 20:55

## 2018-08-19 RX ADMIN — Medication 60 MILLIGRAM(S): at 10:54

## 2018-08-19 RX ADMIN — Medication 5 MILLIGRAM(S): at 10:53

## 2018-08-19 RX ADMIN — LOSARTAN POTASSIUM 50 MILLIGRAM(S): 100 TABLET, FILM COATED ORAL at 06:35

## 2018-08-19 RX ADMIN — Medication 500 MILLIGRAM(S): at 10:52

## 2018-08-19 RX ADMIN — CALCITRIOL 0.25 MICROGRAM(S): 0.5 CAPSULE ORAL at 10:55

## 2018-08-19 RX ADMIN — HYDROMORPHONE HYDROCHLORIDE 1 MILLIGRAM(S): 2 INJECTION INTRAMUSCULAR; INTRAVENOUS; SUBCUTANEOUS at 23:28

## 2018-08-19 NOTE — PROGRESS NOTE ADULT - SUBJECTIVE AND OBJECTIVE BOX
[facility] DIVISION OF KIDNEY DISEASES AND HYPERTENSION -- FOLLOW UP NOTE  --------------------------------------------------------------------------------  Nery Higgins  [id number]  --------------------------------------------------------------------------------  Chief Complaint: ESRD on HD, Hypertensive urgency    24 hour events/subjective: Seen and examined at the bedside. Denies new complaints. Underwent MRI head yesterday        PAST HISTORY  --------------------------------------------------------------------------------  No significant changes to PMH, PSH, FHx, SHx, unless otherwise noted    ALLERGIES & MEDICATIONS  --------------------------------------------------------------------------------  Allergies    No Known Allergies    Intolerances    beta blockers (Other (Mod to Severe))    Standing Inpatient Medications  ascorbic acid 500 milliGRAM(s) Oral daily  Butalbital, acetaminophen/caffeine 1 Tablet(s) 1 Tablet(s) Oral daily  calcitriol   Capsule 0.25 MICROGram(s) Oral daily  cholecalciferol 2000 Unit(s) Oral daily  cloNIDine Patch 0.3 mG/24Hr(s) 1 patch Topical every 7 days  furosemide    Tablet 80 milliGRAM(s) Oral daily  losartan 50 milliGRAM(s) Oral two times a day  Nephro-martha 1 Tablet(s) Oral daily  NIFEdipine XL 60 milliGRAM(s) Oral <User Schedule>  predniSONE   Tablet 5 milliGRAM(s) Oral daily  tacrolimus 1 milliGRAM(s) Oral every 12 hours  trimethoprim  160 mG/sulfamethoxazole 800 mG 1 Tablet(s) Oral <User Schedule>    PRN Inpatient Medications  acetaminophen   Tablet. 650 milliGRAM(s) Oral every 6 hours PRN  hydrALAZINE Injectable 20 milliGRAM(s) IV Push every 6 hours PRN  ondansetron Injectable 4 milliGRAM(s) IV Push every 6 hours PRN      REVIEW OF SYSTEMS  --------------------------------------------------------------------------------  Review Of Systems:  Constitutional:No Fever,  Chills,  Fatigue, Weight change   HEENT: Continues to c/o blurring of vision  Respiratory:No Cough, Wheezing, Shortness of breath  Cardiovascular: No Chest Pain, Palpitations, MAY, PND, Orthopnea  Gastrointestinal:No Nausea, Vomiting, Abdominal Pain,  Diarrhea, Constipation, Hemorrhoids  Genitourinary:No  Nocturia, Hematuria,  Dysuria, Incontinence, Foam in urine  Extremities:  No Swelling , Joint Pain  Neurologic:  No Focal deficit, Paresthesias, Syncope  Lymphatic:   No Lymphadenopathy   Skin: No Rash,  Ecchymoses , Wounds, Lesions  Psychiatry: Denies Depression,  Suicidal/Homicidal Ideation, Anxiety, Sleep Disturbances    All other systems were reviewed and are negative, except as noted.    VITALS/PHYSICAL EXAM  --------------------------------------------------------------------------------  T(C): 36.5 (18 @ 05:00), Max: 37 (18 @ 20:42)  HR: 63 (18 @ 20:42) (55 - 63)  BP: 170/95 (18 @ 06:39) (170/95 - 198/100)  RR: 18 (18 @ 05:00) (17 - 18)  SpO2: 95% (18 @ 05:00) (95% - 98%)  Wt(kg): --      Daily     Daily Weight in k.5 (18 Aug 2018 14:20)  I&O's Summary    18 Aug 2018 07:01  -  19 Aug 2018 07:00  --------------------------------------------------------  IN: 50 mL / OUT: 2000 mL / NET: -1950 mL          18 @ 07:01  -  18 @ 07:00  --------------------------------------------------------  IN: 50 mL / OUT: 2000 mL / NET: -1950 mL        Physical Exam:  Gen: NAD  HEENT: anicteric  Pulm: CTA B/L   CVS: RRR,  Normal S1 S2  Abd: soft, nontender, nondistended  Neuro: AAO x3, no asterixis   Back: No dependent edema  : No tanika  LE: Warm, no edema  Skin: Warm, without rashes  Vascular access: Permcath right IJ    LABS/STUDIES  --------------------------------------------------------------------------------                Creatinine Trend:  SCr 7.09 [ @ 07:16]  SCr 6.38 [ @ 21:11]        Iron 24, TIBC 161, %sat 15      [18 @ 11:31]  Ferritin 250      [18 @ 20:02]    HBsAg Nonreact      [18 @ 19:01]  HCV 0.14, Nonreact      [18 @ 19:01]        Radiology  --------------------------------------------------------------------------------    --------------------------------------------------------------------------------  Nery Higgins  [id number]

## 2018-08-19 NOTE — PROGRESS NOTE ADULT - ASSESSMENT
57 yo M w/ pmhx of HTN, ESRD s/p renal transplant x 2 but now requiring HD  (T Th Sat), presented with vision changes x 5 days as well as elevated blood pressures noted as an outpatient. Patient reports that over the last 5 days he has had vision changes including blurred vision in both eyes bilaterally. Currently being managed for hypertensive urgency.

## 2018-08-19 NOTE — PROGRESS NOTE ADULT - SUBJECTIVE AND OBJECTIVE BOX
Patient examined yesterday evening, please see previous note for details.     MRI/MRV noncon reviewed today    IMPRESSION:    No evidence for acute infarct, acute hemorrhage, dural venous sinus   thrombosis, or hydrocephalus.    Marked dilatation of the bilateral superior ophthalmic veins is noted. A   cavernous carotid fistula or other etiology can't be excluded. A   follow-up brain MRA is recommended for further workup. Evaluation of the   cavernous sinuses limited with noncontrast MRV technique. This could also   be further evaluated with CTA/CTV followed by hemodialysis.    Nonspecific patchy edema involves both optic nerves.    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 Evaluation is limited by   lack of intravenous contrast. Serial imaging follow-up over time is   recommended to monitor for stability and to exclude other etiologies.    Assessment and Plan:   59yo M found to have bilateral disc edema with hemorrhages, differential currently includes hypertensive retinopathy in setting of uncontrolled BP vs optic neuropathy vs papillitis vs venous sinus thrombosis vs AV fistula. MRI/MRV noncon with dilatation of bilateral SOV and limited visualization of cavernous sinuses given noncontrast MRV technique. VA last evening was stable. PERRL, no APD. IOP wnl.  - c/w strict BP control  - recommend CTA/CTV before next HD session for further evaluation  - rest of care per primary team  - ophtho will follow continue to follow    Case discussed with Dr. Mcrae (neuro-ophthalmology attending)

## 2018-08-19 NOTE — PROGRESS NOTE ADULT - ASSESSMENT
57 yo gentleman w/pmhx of HTN, ESRD s/p renal transplant x 2 but now requiring HD again (T Th Sat),  presenting with sudden vision changes x 5 days in the setting of elevated  blood pressures. It is binocular. Nonfocal neurological examination. Finding of retinal hemorrhages on ophthalmological evaluation.   -CTH: no evidence of intracranial hemorrhage, mass, midline shift    Concern w/ htn emergency: htn retinopathy/optic neuropathy vs PRES, posterior reversible leukoencephalopathy syndrome.   MRI brain no acute stroke, no PRES. +dilated superior ophthalmic veins and optic nerve edema ? cavernous carotid fistula  Discussed with ophthalmology, feel cavernous carotid fistula not clinically likely in setting of bilaterality and clinical presentation. Will hold off on CTA/CTV    Plan:    -Continue optimize BP control as per primary team and renal  - Ophthalmology reevaluatio  - neuro checks q 4  -Close observation  - Supportive care  - Also reviewed left acoustic neuroma with pt, advised outpatient MRI surveillance.  Reviewed above with pt at bedside, parents, ophtho and renal attending.

## 2018-08-19 NOTE — PROGRESS NOTE ADULT - SUBJECTIVE AND OBJECTIVE BOX
Neurology Follow up Note    Subjective:  Reports vision blurrier after received hydralazine  MRI brain no acute infarct, no evidence of PRES  marked dilation of superior ophthalmic veins and abnormal optic nerve edema  ?carotid cavernous fistula, MRV limited but no clear cavernous sinus abnormality    Medications:  acetaminophen   Tablet. 650 milliGRAM(s) Oral every 6 hours PRN  ascorbic acid 500 milliGRAM(s) Oral daily  Butalbital, acetaminophen/caffeine 1 Tablet(s) 1 Tablet(s) Oral daily  calcitriol   Capsule 0.25 MICROGram(s) Oral daily  cholecalciferol 2000 Unit(s) Oral daily  cloNIDine Patch 0.3 mG/24Hr(s) 1 patch Topical every 7 days  furosemide    Tablet 80 milliGRAM(s) Oral daily  HYDROmorphone  Injectable 1 milliGRAM(s) IV Push once PRN  losartan 50 milliGRAM(s) Oral two times a day  minoxidil 2.5 milliGRAM(s) Oral at bedtime  Nephro-martha 1 Tablet(s) Oral daily  NIFEdipine XL 60 milliGRAM(s) Oral <User Schedule>  ondansetron Injectable 4 milliGRAM(s) IV Push every 6 hours PRN  predniSONE   Tablet 5 milliGRAM(s) Oral daily  tacrolimus 1 milliGRAM(s) Oral every 12 hours  trimethoprim  160 mG/sulfamethoxazole 800 mG 1 Tablet(s) Oral <User Schedule>    Labs:  CBC Full  -  ( 19 Aug 2018 13:50 )  WBC Count : 10.63 K/uL  Hemoglobin : 11.0 g/dL  Hematocrit : 35.4 %  Platelet Count - Automated : 378 K/uL  Mean Cell Volume : 83.1 fl  Mean Cell Hemoglobin : 25.8 pg  Mean Cell Hemoglobin Concentration : 31.1 gm/dL  Auto Neutrophil # : x  Auto Lymphocyte # : x  Auto Monocyte # : x  Auto Eosinophil # : x  Auto Basophil # : x  Auto Neutrophil % : x  Auto Lymphocyte % : x  Auto Monocyte % : x  Auto Eosinophil % : x  Auto Basophil % : x    08-19    139  |  96  |  49<H>  ----------------------------<  142<H>  5.4<H>   |  26  |  7.42<H>    Ca    9.3      19 Aug 2018 12:38    Vitals:  Vital Signs Last 24 Hrs  T(C): 36.7 (19 Aug 2018 16:33), Max: 37.2 (19 Aug 2018 10:35)  T(F): 98 (19 Aug 2018 16:33), Max: 98.9 (19 Aug 2018 10:35)  HR: 77 (19 Aug 2018 16:33) (61 - 77)  BP: 197/94 (19 Aug 2018 16:33) (170/95 - 198/100)  BP(mean): --  RR: 18 (19 Aug 2018 16:33) (17 - 18)  SpO2: 96% (19 Aug 2018 16:33) (95% - 98%)    Cranial Nerves: Pupils were equal, round, reactive to light. No APD. VFFC. VA OS 20/70, OD 20/50. Extraocular movements were intact. No nystagmus. Facial sensation was intact to light touch. There was no facial asymmetry. The palate was upgoing symmetrically, tongue midline with tremor. Shoulder shrug was full bilaterally.     Motor exam: Bulk and tone were normal. Patient was moving all extremities. Able to lift legs antigravity.  No tremors. No pronator drift.     Reflexes: 1+ in the bilateral upper extremities. 1+ in the bilateral lower extremities. Toes were downgoing bilaterally.     Sensation: Intact to light touch    Coordination: no gross dysmetria    Gait: Not tested.    Phosphorus Level, Serum: 6.3 mg/dL (08-17 @ 07:16)    < from: CT Head No Cont (08.16.18 @ 20:46) >  FINDINGS:     There is no evidence of mass or acute intracranial hemorrhage. Ventricles   and sulci are normal in size and configuration for the patient's stated   age. No midline shift or other significant mass effect is noted. There is   no CT evidence of acute territorial infarct.     The visualized paranasal sinuses and tympanomastoid spaces are clear.   Orbits and orbital contents are unremarkable.    There is no depressed calvarial fracture.    IMPRESSION:     No evidence of acute intracranial hemorrhage, midline shift or CT   evidence of acute territorial infarct.    If the patient's symptoms persist, consider short interval follow-up head   CT or brain MRI if there are no MRI contraindications.  EXAM:  MR VENOGRAM BRAIN                          EXAM:  MR BRAIN                            PROCEDURE DATE:  08/18/2018            INTERPRETATION:  History: Blurry vision. Optic disc edema bilaterally.   Elevated creatinine levels. End-stage renaldisease on hemodialysis.    Description: Noncontrast MRI studies of the brain and orbits, and a   noncontrast brain MRV (with noncontrast 2-D time-of-flight technique)   were performed.    Comparison is made to the head CT study from 08/16/2018.    The brain MRI was performed with sagittal T1, axial SPGR, T2, FLAIR,   gradient, SWI, and diffusion-weighted series. The orbital MRI was   performed with thin section high-resolution axial/coronal T2 fat   saturation series.    The study is limited by motion, especially on the orbital series.    Marked dilatation of the bilateral superior ophthalmic veins is noted.   Bilateral eyelid edema and mild bilateral proptosis are noted. Vague   increased T2 signal involves the orbital segments of both optic nerves,   best appreciated on the axial thin section T2 orbital series, consistent   with nonspecific edema.    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 Evaluation is limited by   lack of intravenous contrast. Serial imaging follow-up over time is   recommended to monitor for stability and to exclude other etiologies.    There is no evidence for acute infarct, acute hemorrhage, or   hydrocephalus. Mild chronic white matter changes are noted with   associated patchy increased T2 and FLAIR signal.    On the MRV, there is no evidence of dural venous sinus thrombosis.   Evaluation of the cavernous sinuses is quite limited with noncontrast MRV   technique.    IMPRESSION:    No evidence for acute infarct, acute hemorrhage, dural venous sinus   thrombosis, or hydrocephalus.    Marked dilatation of the bilateral superior ophthalmic veins is noted. A   cavernous carotid fistula or other etiology can't be excluded. A   follow-up brain MRA is recommended for further workup. Evaluation of the   cavernous sinuses limited with noncontrast MRV technique. This could also   be further evaluated with CTA/CTV followed by hemodialysis.    Nonspecific patchy edema involves both optic nerves.    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 Evaluation is limited by   lack of intravenous contrast. Serial imaging follow-up over time is   recommended to monitor for stability and to exclude other etiologies.      ADAN ALVARADO M.D., ATTENDING RADIOLOGIST      MRV; On the MRV, there is no evidence of dural venous sinus thrombosis.   Evaluation of the cavernous sinuses is quite limited with noncontrast MRV   technique.

## 2018-08-20 LAB
ANION GAP SERPL CALC-SCNC: 15 MMOL/L — SIGNIFICANT CHANGE UP (ref 5–17)
BUN SERPL-MCNC: 38 MG/DL — HIGH (ref 7–23)
CALCIUM SERPL-MCNC: 9.7 MG/DL — SIGNIFICANT CHANGE UP (ref 8.4–10.5)
CHLORIDE SERPL-SCNC: 96 MMOL/L — SIGNIFICANT CHANGE UP (ref 96–108)
CO2 SERPL-SCNC: 26 MMOL/L — SIGNIFICANT CHANGE UP (ref 22–31)
CREAT SERPL-MCNC: 6.84 MG/DL — HIGH (ref 0.5–1.3)
GLUCOSE SERPL-MCNC: 105 MG/DL — HIGH (ref 70–99)
HCT VFR BLD CALC: 36.5 % — LOW (ref 39–50)
HGB BLD-MCNC: 11.5 G/DL — LOW (ref 13–17)
MCHC RBC-ENTMCNC: 25.5 PG — LOW (ref 27–34)
MCHC RBC-ENTMCNC: 31.5 GM/DL — LOW (ref 32–36)
MCV RBC AUTO: 80.9 FL — SIGNIFICANT CHANGE UP (ref 80–100)
PLATELET # BLD AUTO: 336 K/UL — SIGNIFICANT CHANGE UP (ref 150–400)
POTASSIUM SERPL-MCNC: 5.6 MMOL/L — HIGH (ref 3.5–5.3)
POTASSIUM SERPL-SCNC: 5.6 MMOL/L — HIGH (ref 3.5–5.3)
RBC # BLD: 4.51 M/UL — SIGNIFICANT CHANGE UP (ref 4.2–5.8)
RBC # FLD: 18.9 % — HIGH (ref 10.3–14.5)
SODIUM SERPL-SCNC: 137 MMOL/L — SIGNIFICANT CHANGE UP (ref 135–145)
TACROLIMUS SERPL-MCNC: 2.3 NG/ML — SIGNIFICANT CHANGE UP
WBC # BLD: 8.94 K/UL — SIGNIFICANT CHANGE UP (ref 3.8–10.5)
WBC # FLD AUTO: 8.94 K/UL — SIGNIFICANT CHANGE UP (ref 3.8–10.5)

## 2018-08-20 PROCEDURE — 99221 1ST HOSP IP/OBS SF/LOW 40: CPT

## 2018-08-20 PROCEDURE — 99232 SBSQ HOSP IP/OBS MODERATE 35: CPT | Mod: GC

## 2018-08-20 RX ADMIN — Medication 1 TABLET(S): at 10:54

## 2018-08-20 RX ADMIN — TACROLIMUS 1 MILLIGRAM(S): 5 CAPSULE ORAL at 23:03

## 2018-08-20 RX ADMIN — LOSARTAN POTASSIUM 50 MILLIGRAM(S): 100 TABLET, FILM COATED ORAL at 17:43

## 2018-08-20 RX ADMIN — Medication 60 MILLIGRAM(S): at 10:56

## 2018-08-20 RX ADMIN — TACROLIMUS 1 MILLIGRAM(S): 5 CAPSULE ORAL at 10:55

## 2018-08-20 RX ADMIN — Medication 2.5 MILLIGRAM(S): at 23:03

## 2018-08-20 RX ADMIN — LOSARTAN POTASSIUM 50 MILLIGRAM(S): 100 TABLET, FILM COATED ORAL at 05:16

## 2018-08-20 RX ADMIN — Medication 80 MILLIGRAM(S): at 10:54

## 2018-08-20 RX ADMIN — Medication 5 MILLIGRAM(S): at 10:56

## 2018-08-20 RX ADMIN — Medication 500 MILLIGRAM(S): at 10:56

## 2018-08-20 RX ADMIN — Medication 60 MILLIGRAM(S): at 23:03

## 2018-08-20 RX ADMIN — Medication 2000 UNIT(S): at 10:55

## 2018-08-20 RX ADMIN — HYDROMORPHONE HYDROCHLORIDE 1 MILLIGRAM(S): 2 INJECTION INTRAMUSCULAR; INTRAVENOUS; SUBCUTANEOUS at 00:10

## 2018-08-20 RX ADMIN — Medication 1 TABLET(S): at 10:55

## 2018-08-20 RX ADMIN — CALCITRIOL 0.25 MICROGRAM(S): 0.5 CAPSULE ORAL at 10:57

## 2018-08-20 NOTE — PROGRESS NOTE ADULT - SUBJECTIVE AND OBJECTIVE BOX
Patient is a 58y old  Male who presents with a chief complaint of Accelerated hypertension/hypertensive emergency/vision changes (17 Aug 2018 16:30)      SUBJECTIVE / OVERNIGHT EVENTS:   Feels better.  Denies CP/SOB/Palpitation/HA.    MEDICATIONS  (STANDING):  ascorbic acid 500 milliGRAM(s) Oral daily  Butalbital, acetaminophen/caffeine 1 Tablet(s) 1 Tablet(s) Oral daily  calcitriol   Capsule 0.25 MICROGram(s) Oral daily  cholecalciferol 2000 Unit(s) Oral daily  cloNIDine Patch 0.3 mG/24Hr(s) 1 patch Topical every 7 days  furosemide    Tablet 80 milliGRAM(s) Oral daily  losartan 50 milliGRAM(s) Oral two times a day  minoxidil 2.5 milliGRAM(s) Oral at bedtime  Nephro-martha 1 Tablet(s) Oral daily  NIFEdipine XL 60 milliGRAM(s) Oral <User Schedule>  predniSONE   Tablet 5 milliGRAM(s) Oral daily  tacrolimus 1 milliGRAM(s) Oral every 12 hours  trimethoprim  160 mG/sulfamethoxazole 800 mG 1 Tablet(s) Oral <User Schedule>    MEDICATIONS  (PRN):  acetaminophen   Tablet. 650 milliGRAM(s) Oral every 6 hours PRN Mild Pain (1 - 3)  ondansetron Injectable 4 milliGRAM(s) IV Push every 6 hours PRN give with hydralazine pushes        CAPILLARY BLOOD GLUCOSE        I&O's Summary    19 Aug 2018 07:01  -  20 Aug 2018 07:00  --------------------------------------------------------  IN: 240 mL / OUT: 1501 mL / NET: -1261 mL    20 Aug 2018 07:01  -  20 Aug 2018 14:50  --------------------------------------------------------  IN: 120 mL / OUT: 0 mL / NET: 120 mL        PHYSICAL EXAM:  GENERAL: NAD, well-developed  HEAD:  Atraumatic, Normocephalic  NECK: Supple, No JVD  CHEST/LUNG: Clear to auscultation bilaterally; No wheezing.  HEART: Regular rate and rhythm; No murmurs, rubs, or gallops  ABDOMEN: Soft, Nontender, Nondistended; Bowel sounds present  EXTREMITIES:   No clubbing, cyanosis, or edema  NEUROLOGY: AAO X 3  SKIN: No rashes    LABS:                        11.0   10.63 )-----------( 378      ( 19 Aug 2018 13:50 )             35.4     08-19    139  |  96  |  49<H>  ----------------------------<  142<H>  5.4<H>   |  26  |  7.42<H>    Ca    9.3      19 Aug 2018 12:38              CAPILLARY BLOOD GLUCOSE                    RADIOLOGY & ADDITIONAL TESTS:    Imaging Personally Reviewed:    Consultant(s) Notes Reviewed:      Care Discussed with Consultants/Other Providers:

## 2018-08-20 NOTE — PROGRESS NOTE ADULT - ASSESSMENT
59yo M w/pmhx of HTN, ESRD s/p renal transplant x 2 but now requiring HD again (T Th Sat), now presenting with vision changes x 5 days as well as elevated blood pressures, admitted w/ htn emergency.     1. HTN urgency   likely in the setting of ESRD  still with elevated readings, continue current medication regimen  minoxidil 2.5 mg added yesterday   would up-titrate losartan to 100mg daily   renal f/u     2. Blurred vision   MRI brain no acute stroke, no PRES. ? +dilated superior ophthalmic veins and optic nerve edema  opthalmology / neurology f/u     3. ESRD  renal f/u   continue fluid removal with HD and lasix     dvt ppx

## 2018-08-20 NOTE — PROGRESS NOTE ADULT - SUBJECTIVE AND OBJECTIVE BOX
[facility] Division of Kidney Diseases & Hypertension  FOLLOW UP NOTE  [phone]--------------------------------------------------------------------------------  Chief Complaint: ESRD      24 hour events/subjective: Patient seen and examined at bedside. Denies any complaints at this time.         PAST HISTORY  --------------------------------------------------------------------------------  No significant changes to PMH, PSH, FHx, SHx, unless otherwise noted    ALLERGIES & MEDICATIONS  --------------------------------------------------------------------------------  Allergies    No Known Allergies    Intolerances    beta blockers (Other (Mod to Severe))    Standing Inpatient Medications  ascorbic acid 500 milliGRAM(s) Oral daily  Butalbital, acetaminophen/caffeine 1 Tablet(s) 1 Tablet(s) Oral daily  calcitriol   Capsule 0.25 MICROGram(s) Oral daily  cholecalciferol 2000 Unit(s) Oral daily  cloNIDine Patch 0.3 mG/24Hr(s) 1 patch Topical every 7 days  furosemide    Tablet 80 milliGRAM(s) Oral daily  losartan 50 milliGRAM(s) Oral two times a day  minoxidil 2.5 milliGRAM(s) Oral at bedtime  Nephro-martha 1 Tablet(s) Oral daily  NIFEdipine XL 60 milliGRAM(s) Oral <User Schedule>  predniSONE   Tablet 5 milliGRAM(s) Oral daily  tacrolimus 1 milliGRAM(s) Oral every 12 hours  trimethoprim  160 mG/sulfamethoxazole 800 mG 1 Tablet(s) Oral <User Schedule>    PRN Inpatient Medications  acetaminophen   Tablet. 650 milliGRAM(s) Oral every 6 hours PRN  ondansetron Injectable 4 milliGRAM(s) IV Push every 6 hours PRN      REVIEW OF SYSTEMS  --------------------------------------------------------------------------------  Gen: No  fevers/chills  Skin: No rashes  Head/Eyes/Ears/Mouth: No headache; Normal hearing; Normal vision w/o blurriness  Respiratory: No dyspnea, cough, wheezing, hemoptysis  CV: No chest pain, PND, orthopnea  GI: No abdominal pain, diarrhea, constipation, nausea, vomiting  : No increased frequency, dysuria, hematuria, nocturia  MSK: No joint pain/swelling; no back pain; no edema  Neuro: No dizziness/lightheadedness, weakness, seizures, numbness, tingling      All other systems were reviewed and are negative, except as noted.    VITALS/PHYSICAL EXAM  --------------------------------------------------------------------------------  T(C): 37.1 (08-20-18 @ 11:51), Max: 37.1 (08-19-18 @ 20:04)  HR: 64 (08-20-18 @ 11:51) (63 - 80)  BP: 175/88 (08-20-18 @ 11:51) (171/98 - 197/94)  RR: 18 (08-20-18 @ 11:51) (18 - 18)  SpO2: 96% (08-20-18 @ 11:51) (95% - 97%)  Wt(kg): --        08-19-18 @ 07:01  -  08-20-18 @ 07:00  --------------------------------------------------------  IN: 240 mL / OUT: 1501 mL / NET: -1261 mL    08-20-18 @ 07:01  -  08-20-18 @ 11:51  --------------------------------------------------------  IN: 120 mL / OUT: 0 mL / NET: 120 mL      Physical Exam:  	  Gen: NAD  HEENT: anicteric  Pulm: CTA B/L   CVS: RRR,  Normal S1 S2  Abd: soft, nontender, nondistended  Neuro: AAO x3, no asterixis   : No tanika  LE: Warm, edema +  Skin: Warm, without rashes  Vascular access: Right IJ tunneled HD catheter present.       LABS/STUDIES  --------------------------------------------------------------------------------              11.0   10.63 >-----------<  378      [08-19-18 @ 13:50]              35.4     139  |  96  |  49  ----------------------------<  142      [08-19-18 @ 12:38]  5.4   |  26  |  7.42        Ca     9.3     [08-19-18 @ 12:38]            Creatinine Trend:  SCr 7.42 [08-19 @ 12:38]  SCr 7.09 [08-17 @ 07:16]  SCr 6.38 [08-16 @ 21:11]          HBsAg Nonreact      [08-18-18 @ 19:01]  HCV 0.14, Nonreact      [08-18-18 @ 19:01]

## 2018-08-20 NOTE — PROGRESS NOTE ADULT - SUBJECTIVE AND OBJECTIVE BOX
[facility] Ophthalmology Follow Up Note    PENDING ATTENDING REVIEW    HPI summary: 59 y/o M with a PMH of  HTN, ESRD s/p renal transplant x 2, still requiring dialysis 3x / week, admitted to the inpatient medicine floor with hypertension. Ophtho consulted for one week of visual changes described as bright light in center of vision OU. Exam notable for bilateral disc edema.   Imaging performed:   MRV (without barby):     Mood and Affect Appropriate ( x ),  Oriented to Time, Place, and Person x 3 ( x )    Ophthalmology Exam    Visual acuity (cc): 20/40-2 OD, NI with pinhole, 20/70-1 OS NI with pinhole,   Pupils: PERRL OU, no APD  Ttono: 10 OU  Extraocular movements (EOMs): Full OU, no pain, no diplopia  Confrontational Visual Field (CVF):  Full OU  Color Plates: 10/12 OD, 9/12 OS, limited by visual acuity, not ability to distinguish colors, of note, patient reports seeing right half of two digit numbers more clearly OU     Slit Lamp Exam (SLE)  External:  Flat OU  Lids/Lashes/Lacrimal Ducts: Flat OU    Sclera/Conjunctiva:  W+Q OU  Cornea: Cl OU  Anterior Chamber: D+Q OU   Iris:  Flat OU  Lens:  +1 OU     Fundus Exam: dilated with 1% tropicamide and 2.5% phenylephrine  Approval obtained from primary team for dilation  Patient aware that pupils can remained dilated for at least 4-6 hours  Exam performed with 20D lens    Vitreous: wnl OU  Disc, cup/disc: Bilateral disc edema with blunted disc margins circumferentially and associated disc obscuration bilaterally.   OD: There is inferior disc hemorrhage, and further inferiorly a larger hemorrhage along the inferior arcade.   OS: There is infero-temporally disc hemorrhage.   Macula:  OD: Temporal to the macula, small hemorrhage. OS: wnl   Vessels:  some sclerotic vessels OU.   Periphery: wnl OU  < from: CT Head No Cont (08.16.18 @ 20:46) >    EXAM:  CT BRAIN                            PROCEDURE DATE:  08/16/2018            INTERPRETATION:  CLINICAL INFORMATION: Vision change    TECHNIQUE: Noncontrast CT examination of the head was performed using   contiguous 5 mm CT images.    COMPARISON: There are no prior studies available for comparison.      FINDINGS:     There is no evidence of mass or acute intracranial hemorrhage. Ventricles   and sulci are normal in size and configuration for the patient's stated   age. No midline shift or other significant mass effect is noted. There is   no CT evidence of acute territorial infarct.     The visualized paranasal sinuses and tympanomastoid spaces are clear.   Orbits and orbital contents are unremarkable.    There is no depressed calvarial fracture.        IMPRESSION:     No evidence of acute intracranial hemorrhage, midline shift or CT   evidence of acute territorial infarct.    If the patient's symptoms persist, consider short interval follow-up head   CT or brain MRI if there are no MRI contraindications.                    TED BARTHOLOMEW M.D., ATTENDING RADIOLOGIST  This document has been electronically signed. Aug 16 2018  8:52PM                < end of copied text >    < from: MR Venogram Head No Cont (08.18.18 @ 19:45) >    EXAM:  MR VENOGRAM BRAIN                          EXAM:  MR BRAIN                            PROCEDURE DATE:  08/18/2018            INTERPRETATION:  History: Blurry vision. Optic disc edema bilaterally.   Elevated creatinine levels. End-stage renaldisease on hemodialysis.    Description: Noncontrast MRI studies of the brain and orbits, and a   noncontrast brain MRV (with noncontrast 2-D time-of-flight technique)   were performed.    Comparison is made to the head CT study from 08/16/2018.    The brain MRI was performed with sagittal T1, axial SPGR, T2, FLAIR,   gradient, SWI, and diffusion-weighted series. The orbital MRI was   performed with thin section high-resolution axial/coronal T2 fat   saturation series.    The study is limited by motion, especially on the orbital series.    Marked dilatation of the bilateral superior ophthalmic veins is noted.   Bilateral eyelid edema and mild bilateral proptosis are noted. Vague   increased T2 signal involves the orbital segments of both optic nerves,   best appreciated on the axial thin section T2 orbital series, consistent   with nonspecific edema.    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 Evaluation is limited by   lack of intravenous contrast. Serial imaging follow-up over time is   recommended to monitor for stability and to exclude other etiologies.    There is no evidence for acute infarct, acute hemorrhage, or   hydrocephalus. Mild chronic white matter changes are noted with   associated patchy increased T2 and FLAIR signal.    On the MRV, there is no evidence of dural venous sinus thrombosis.   Evaluation of the cavernous sinuses is quite limited with noncontrast MRV   technique.    IMPRESSION:    No evidence for acute infarct, acute hemorrhage, dural venous sinus   thrombosis, or hydrocephalus.    Marked dilatation of the bilateral superior ophthalmic veins is noted. A   cavernous carotid fistula or other etiology can't be excluded. A   follow-up brain MRA is recommended for further workup. Evaluation of the   cavernous sinuses limited with noncontrast MRV technique. This could also   be further evaluated with CTA/CTV followed by hemodialysis.    Nonspecific patchy edema involves both optic nerves.    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 Evaluation is limited by   lack of intravenous contrast. Serial imaging follow-up over time is   recommended to monitor for stability and to exclude other etiologies.                    ADAN ALVARADO M.D., ATTENDING RADIOLOGIST  This document has been electronically signed. Aug 19 2018  9:29AM                < end of copied text >      A/P. 58 y.o M with bilateral disc edema. Ddx includes HTN retinopathy and optic neuropathy/papillitis. /101 on 8/16, still ranging in the 180's systolic.   - Neurology consulted, recs reviewed; reports b.l fistula unlikely and would consider monitoring with BP control   - Unable to perform MRI with gadolinium, MRV venography 2/2 renal issues   - Will follow pt during admission; pending attending vanessa [facility] Ophthalmology Follow Up Note      HPI summary: 59 y/o M with a PMH of  HTN, ESRD s/p renal transplant x 2, still requiring dialysis 3x / week, admitted to the inpatient medicine floor with hypertension. Ophtho consulted for one week of visual changes described as bright light in center of vision OU. Exam notable for bilateral disc edema.   Imaging performed:     Mood and Affect Appropriate ( x ),  Oriented to Time, Place, and Person x 3 ( x )    Ophthalmology Exam    Visual acuity (cc): 20/40-2 OD, NI with pinhole, 20/70-1 OS NI with pinhole,   Pupils: PERRL OU, no APD  Ttono: 10 OU  Extraocular movements (EOMs): Full OU, no pain, no diplopia  Confrontational Visual Field (CVF):  Full OU  Color Plates: 10/12 OD, 9/12 OS, limited by visual acuity, not ability to distinguish colors, of note, patient reports seeing right half of two digit numbers more clearly OU     Slit Lamp Exam (SLE)  External:  Flat OU  Lids/Lashes/Lacrimal Ducts: Flat OU    Sclera/Conjunctiva:  W+Q OU  Cornea: Cl OU  Anterior Chamber: D+Q OU   Iris:  Flat OU  Lens:  +1 OU     Fundus Exam: dilated with 1% tropicamide and 2.5% phenylephrine  Approval obtained from primary team for dilation  Patient aware that pupils can remained dilated for at least 4-6 hours  Exam performed with 20D lens    Vitreous: wnl OU  Disc, cup/disc: Bilateral disc edema with blunted disc margins circumferentially and associated disc obscuration bilaterally.   OD: There is inferior disc hemorrhage, and further inferiorly a larger hemorrhage along the inferior arcade.   OS: There is infero-temporally disc hemorrhage.   Macula:  OD: Temporal to the macula, small hemorrhage. OS: wnl   Vessels:  some sclerotic vessels OU.   Periphery: wnl OU  < from: CT Head No Cont (08.16.18 @ 20:46) >    EXAM:  CT BRAIN                            PROCEDURE DATE:  08/16/2018            INTERPRETATION:  CLINICAL INFORMATION: Vision change    TECHNIQUE: Noncontrast CT examination of the head was performed using   contiguous 5 mm CT images.    COMPARISON: There are no prior studies available for comparison.      FINDINGS:     There is no evidence of mass or acute intracranial hemorrhage. Ventricles   and sulci are normal in size and configuration for the patient's stated   age. No midline shift or other significant mass effect is noted. There is   no CT evidence of acute territorial infarct.     The visualized paranasal sinuses and tympanomastoid spaces are clear.   Orbits and orbital contents are unremarkable.    There is no depressed calvarial fracture.        IMPRESSION:     No evidence of acute intracranial hemorrhage, midline shift or CT   evidence of acute territorial infarct.    If the patient's symptoms persist, consider short interval follow-up head   CT or brain MRI if there are no MRI contraindications.      TED BARTHOLOMEW M.D., ATTENDING RADIOLOGIST  This document has been electronically signed. Aug 16 2018  8:52PM                < end of copied text >    < from: MR Venogram Head No Cont (08.18.18 @ 19:45) >    EXAM:  MR VENOGRAM BRAIN                          EXAM:  MR BRAIN                            PROCEDURE DATE:  08/18/2018            INTERPRETATION:  History: Blurry vision. Optic disc edema bilaterally.   Elevated creatinine levels. End-stage renaldisease on hemodialysis.    Description: Noncontrast MRI studies of the brain and orbits, and a   noncontrast brain MRV (with noncontrast 2-D time-of-flight technique)   were performed.    Comparison is made to the head CT study from 08/16/2018.    The brain MRI was performed with sagittal T1, axial SPGR, T2, FLAIR,   gradient, SWI, and diffusion-weighted series. The orbital MRI was   performed with thin section high-resolution axial/coronal T2 fat   saturation series.    The study is limited by motion, especially on the orbital series.    Marked dilatation of the bilateral superior ophthalmic veins is noted.   Bilateral eyelid edema and mild bilateral proptosis are noted. Vague   increased T2 signal involves the orbital segments of both optic nerves,   best appreciated on the axial thin section T2 orbital series, consistent   with nonspecific edema.    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 Evaluation is limited by   lack of intravenous contrast. Serial imaging follow-up over time is   recommended to monitor for stability and to exclude other etiologies.    There is no evidence for acute infarct, acute hemorrhage, or   hydrocephalus. Mild chronic white matter changes are noted with   associated patchy increased T2 and FLAIR signal.    On the MRV, there is no evidence of dural venous sinus thrombosis.   Evaluation of the cavernous sinuses is quite limited with noncontrast MRV   technique.    IMPRESSION:    No evidence for acute infarct, acute hemorrhage, dural venous sinus   thrombosis, or hydrocephalus.    Marked dilatation of the bilateral superior ophthalmic veins is noted. A   cavernous carotid fistula or other etiology can't be excluded. A   follow-up brain MRA is recommended for further workup. Evaluation of the   cavernous sinuses limited with noncontrast MRV technique. This could also   be further evaluated with CTA/CTV followed by hemodialysis.    Nonspecific patchy edema involves both optic nerves.    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 Evaluation is limited by   lack of intravenous contrast. Serial imaging follow-up over time is   recommended to monitor for stability and to exclude other etiologies.                    ADAN ALVARADO M.D., ATTENDING RADIOLOGIST  This document has been electronically signed. Aug 19 2018  9:29AM                < end of copied text >      A/P. 58 y.o M with bilateral disc edema. Ddx includes HTN retinopathy and optic neuropathy/papillitis. /101 on 8/16, still ranging in the 180's systolic.   - Neurology consulted, recs reviewed; reports b.l fistula unlikely and would consider monitoring with BP control   - Unable to perform MRI with gadolinium, MRV venography 2/2 renal issues;  - Highly recommend obtaining CT venogram with contrast in setting of imagining with proptosis and ophthalmic vein dilation; uncommon in isolated hypertensive etiology; must rule out cavernous sinus pathology; please review risks of contrast study with nephrology   - Will follow pt during admission    s/d/w Dr. Monk  d/w Dr. Mcrae (Neuro-ophtho) [facility] Ophthalmology Follow Up Note      HPI summary: 57 y/o M with a PMH of  HTN, ESRD s/p renal transplant x 2, still requiring dialysis 3x / week, admitted to the inpatient medicine floor with hypertension. Ophtho consulted for one week of visual changes described as bright light in center of vision OU. Exam notable for bilateral disc edema.   Imaging performed:     Mood and Affect Appropriate ( x ),  Oriented to Time, Place, and Person x 3 ( x )    Ophthalmology Exam    Visual acuity (cc): 20/40-2 OD, NI with pinhole, 20/70-1 OS NI with pinhole,   Pupils: PERRL OU, no APD  Ttono: 10 OU  Extraocular movements (EOMs): Full OU, no pain, no diplopia  Confrontational Visual Field (CVF):  Full OU  Color Plates: 10/12 OD, 9/12 OS, limited by visual acuity, not ability to distinguish colors, of note, patient reports seeing right half of two digit numbers more clearly OU     Slit Lamp Exam (SLE)  External:  Flat OU  Lids/Lashes/Lacrimal Ducts: Flat OU    Sclera/Conjunctiva:  W+Q OU  Cornea: Cl OU  Anterior Chamber: D+Q OU   Iris:  Flat OU  Lens:  +1 OU     Fundus Exam: dilated with 1% tropicamide and 2.5% phenylephrine  Approval obtained from primary team for dilation  Patient aware that pupils can remained dilated for at least 4-6 hours  Exam performed with 20D lens    Vitreous: wnl OU  Disc, cup/disc: Bilateral disc edema with blunted disc margins circumferentially and associated disc obscuration bilaterally.   OD: There is inferior disc hemorrhage, and further inferiorly a larger hemorrhage along the inferior arcade.   OS: There is infero-temporally disc hemorrhage.   Macula:  OD: Temporal to the macula, small hemorrhage. OS: wnl   Vessels:  some sclerotic vessels OU.   Periphery: wnl OU  < from: CT Head No Cont (08.16.18 @ 20:46) >    EXAM:  CT BRAIN                            PROCEDURE DATE:  08/16/2018            INTERPRETATION:  CLINICAL INFORMATION: Vision change    TECHNIQUE: Noncontrast CT examination of the head was performed using   contiguous 5 mm CT images.    COMPARISON: There are no prior studies available for comparison.      FINDINGS:     There is no evidence of mass or acute intracranial hemorrhage. Ventricles   and sulci are normal in size and configuration for the patient's stated   age. No midline shift or other significant mass effect is noted. There is   no CT evidence of acute territorial infarct.     The visualized paranasal sinuses and tympanomastoid spaces are clear.   Orbits and orbital contents are unremarkable.    There is no depressed calvarial fracture.        IMPRESSION:     No evidence of acute intracranial hemorrhage, midline shift or CT   evidence of acute territorial infarct.    If the patient's symptoms persist, consider short interval follow-up head   CT or brain MRI if there are no MRI contraindications.      TED BARTHOLOMEW M.D., ATTENDING RADIOLOGIST  This document has been electronically signed. Aug 16 2018  8:52PM                < end of copied text >    < from: MR Venogram Head No Cont (08.18.18 @ 19:45) >    EXAM:  MR VENOGRAM BRAIN                          EXAM:  MR BRAIN                            PROCEDURE DATE:  08/18/2018            INTERPRETATION:  History: Blurry vision. Optic disc edema bilaterally.   Elevated creatinine levels. End-stage renaldisease on hemodialysis.    Description: Noncontrast MRI studies of the brain and orbits, and a   noncontrast brain MRV (with noncontrast 2-D time-of-flight technique)   were performed.    Comparison is made to the head CT study from 08/16/2018.    The brain MRI was performed with sagittal T1, axial SPGR, T2, FLAIR,   gradient, SWI, and diffusion-weighted series. The orbital MRI was   performed with thin section high-resolution axial/coronal T2 fat   saturation series.    The study is limited by motion, especially on the orbital series.    Marked dilatation of the bilateral superior ophthalmic veins is noted.   Bilateral eyelid edema and mild bilateral proptosis are noted. Vague   increased T2 signal involves the orbital segments of both optic nerves,   best appreciated on the axial thin section T2 orbital series, consistent   with nonspecific edema.    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 Evaluation is limited by   lack of intravenous contrast. Serial imaging follow-up over time is   recommended to monitor for stability and to exclude other etiologies.    There is no evidence for acute infarct, acute hemorrhage, or   hydrocephalus. Mild chronic white matter changes are noted with   associated patchy increased T2 and FLAIR signal.    On the MRV, there is no evidence of dural venous sinus thrombosis.   Evaluation of the cavernous sinuses is quite limited with noncontrast MRV   technique.    IMPRESSION:    No evidence for acute infarct, acute hemorrhage, dural venous sinus   thrombosis, or hydrocephalus.    Marked dilatation of the bilateral superior ophthalmic veins is noted. A   cavernous carotid fistula or other etiology can't be excluded. A   follow-up brain MRA is recommended for further workup. Evaluation of the   cavernous sinuses limited with noncontrast MRV technique. This could also   be further evaluated with CTA/CTV followed by hemodialysis.    Nonspecific patchy edema involves both optic nerves.    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 Evaluation is limited by   lack of intravenous contrast. Serial imaging follow-up over time is   recommended to monitor for stability and to exclude other etiologies.                    ADAN ALVARADO M.D., ATTENDING RADIOLOGIST  This document has been electronically signed. Aug 19 2018  9:29AM                < end of copied text >      A/P. 58 y.o M with bilateral disc edema, unclear etiology.  Pt with elevated BP, thus disc swelling could indicate Stage 4 HTN retinopathy, however don't usually see dilated superior ophthalmic veins with HTN retinopathy.  Concern for pathology in the cavernous sinus or AV fistula.  MRV without contrast demonstrated no dural venous thrombosis, however it was without contrast and would have better visualization of cavernous sinus and internal carotid with CTA/CTV.  /101 on 8/16, still ranging in the 180's systolic.   - Neurology consulted, recs reviewed; reports b/l fistula unlikely and would consider monitoring with BP control   - Unable to perform MRI with gadolinium, MRV venography 2/2 renal issues;  - Highly recommend obtaining CTV/CTA with contrast in setting of imaging with proptosis and ophthalmic vein dilation; uncommon in isolated hypertensive etiology; must rule out cavernous sinus pathology; please review risks of contrast study with nephrology   - Will follow pt during admission  - Findings and plan discussed with patient and primary team  - Findings discussed with and plan per radiology/Dr. Mcrae    s/d/w Dr. Monk  d/w Dr. Mcrae (Neuro-ophtho)

## 2018-08-20 NOTE — PROGRESS NOTE ADULT - ASSESSMENT
57 yo gentleman w/pmhx of HTN, ESRD s/p renal transplant x 2 but now requiring HD again (T Th Sat),  presenting with sudden vision changes x 5 days in the setting of elevated  blood pressures. It is binocular. Nonfocal neurological examination. Finding of retinal hemorrhages on ophthalmological evaluation.   -CTH: no evidence of intracranial hemorrhage, mass, midline shift    Concern w/ htn emergency: htn retinopathy/optic neuropathy    MRI brain no acute stroke, no PRES. ? +dilated superior ophthalmic veins and optic nerve edema ? rule out cavernous carotid fistula  initial ophthalmology eval feel cavernous carotid fistula not clinically likely in setting of bilaterality and clinical presentation. after resident d/w Dr. Mcrae, CTA/V to be considered    Clinically, suspicion of bilateral CC fistula very unlikely.  Pt demonstrates reservation about CT contrast and prefers study not to be done.  From neuro perspective, it is reasonable to continue clinical observation with BP control    Plan:    -Continue optimize BP control as per primary team and renal  - Ophthalmology follow up  - neuro checks q 4  -Close observation  - Supportive care  - Also reviewed left acoustic neuroma with pt, advised outpatient MRI surveillance.  Reviewed above with pt at bedside,

## 2018-08-20 NOTE — PROGRESS NOTE ADULT - ASSESSMENT
59 yo M w/ pmhx of HTN, ESRD s/p renal transplant x 2 but now requiring HD  (T Th Sat), presented with vision changes x 5 days as well as elevated blood pressures noted as an outpatient. Patient reports that over the last 5 days he has had vision changes including blurred vision in both eyes bilaterally. Currently being managed for hypertensive urgency.

## 2018-08-20 NOTE — PROGRESS NOTE ADULT - SUBJECTIVE AND OBJECTIVE BOX
Neurology Follow up Note    Subjective:  no new issues.  vision stable.  has some headache which is baseline after HD.  no facial numbness.  no double vision.  no pain behind eyes    Medications:  acetaminophen   Tablet. 650 milliGRAM(s) Oral every 6 hours PRN  ascorbic acid 500 milliGRAM(s) Oral daily  Butalbital, acetaminophen/caffeine 1 Tablet(s) 1 Tablet(s) Oral daily  calcitriol   Capsule 0.25 MICROGram(s) Oral daily  cholecalciferol 2000 Unit(s) Oral daily  cloNIDine Patch 0.3 mG/24Hr(s) 1 patch Topical every 7 days  furosemide    Tablet 80 milliGRAM(s) Oral daily  HYDROmorphone  Injectable 1 milliGRAM(s) IV Push once PRN  losartan 50 milliGRAM(s) Oral two times a day  minoxidil 2.5 milliGRAM(s) Oral at bedtime  Nephro-martha 1 Tablet(s) Oral daily  NIFEdipine XL 60 milliGRAM(s) Oral <User Schedule>  ondansetron Injectable 4 milliGRAM(s) IV Push every 6 hours PRN  predniSONE   Tablet 5 milliGRAM(s) Oral daily  tacrolimus 1 milliGRAM(s) Oral every 12 hours  trimethoprim  160 mG/sulfamethoxazole 800 mG 1 Tablet(s) Oral <User Schedule>    Labs:  CBC Full  -  ( 19 Aug 2018 13:50 )  WBC Count : 10.63 K/uL  Hemoglobin : 11.0 g/dL  Hematocrit : 35.4 %  Platelet Count - Automated : 378 K/uL  Mean Cell Volume : 83.1 fl  Mean Cell Hemoglobin : 25.8 pg  Mean Cell Hemoglobin Concentration : 31.1 gm/dL  Auto Neutrophil # : x  Auto Lymphocyte # : x  Auto Monocyte # : x  Auto Eosinophil # : x  Auto Basophil # : x  Auto Neutrophil % : x  Auto Lymphocyte % : x  Auto Monocyte % : x  Auto Eosinophil % : x  Auto Basophil % : x    08-19    139  |  96  |  49<H>  ----------------------------<  142<H>  5.4<H>   |  26  |  7.42<H>    Ca    9.3      19 Aug 2018 12:38    Vitals:  Vital Signs Last 24 Hrs  T(C): 36.7 (19 Aug 2018 16:33), Max: 37.2 (19 Aug 2018 10:35)  T(F): 98 (19 Aug 2018 16:33), Max: 98.9 (19 Aug 2018 10:35)  HR: 77 (19 Aug 2018 16:33) (61 - 77)  BP: 197/94 (19 Aug 2018 16:33) (170/95 - 198/100)  BP(mean): --  RR: 18 (19 Aug 2018 16:33) (17 - 18)  SpO2: 96% (19 Aug 2018 16:33) (95% - 98%)    Cranial Nerves: Pupils were equal, round, reactive to light. No APD. VFFC. VA OS 20/70, OD 20/50. Extraocular movements were intact. No nystagmus. Facial sensation was intact to light touch. There was no facial asymmetry. The palate was upgoing symmetrically, tongue midline with tremor. Shoulder shrug was full bilaterally.     Motor exam: Bulk and tone were normal. Patient was moving all extremities. Able to lift legs antigravity.  No tremors. No pronator drift.     Reflexes: 1+ in the bilateral upper extremities. 1+ in the bilateral lower extremities. Toes were downgoing bilaterally.     Sensation: Intact to light touch    Coordination: no gross dysmetria    Gait: Not tested.    Phosphorus Level, Serum: 6.3 mg/dL (08-17 @ 07:16)    < from: CT Head No Cont (08.16.18 @ 20:46) >  FINDINGS:     There is no evidence of mass or acute intracranial hemorrhage. Ventricles   and sulci are normal in size and configuration for the patient's stated   age. No midline shift or other significant mass effect is noted. There is   no CT evidence of acute territorial infarct.     The visualized paranasal sinuses and tympanomastoid spaces are clear.   Orbits and orbital contents are unremarkable.    There is no depressed calvarial fracture.    IMPRESSION:     No evidence of acute intracranial hemorrhage, midline shift or CT   evidence of acute territorial infarct.    If the patient's symptoms persist, consider short interval follow-up head   CT or brain MRI if there are no MRI contraindications.  EXAM:  MR VENOGRAM BRAIN                          EXAM:  MR BRAIN                            PROCEDURE DATE:  08/18/2018            INTERPRETATION:  History: Blurry vision. Optic disc edema bilaterally.   Elevated creatinine levels. End-stage renaldisease on hemodialysis.    Description: Noncontrast MRI studies of the brain and orbits, and a   noncontrast brain MRV (with noncontrast 2-D time-of-flight technique)   were performed.    Comparison is made to the head CT study from 08/16/2018.    The brain MRI was performed with sagittal T1, axial SPGR, T2, FLAIR,   gradient, SWI, and diffusion-weighted series. The orbital MRI was   performed with thin section high-resolution axial/coronal T2 fat   saturation series.    The study is limited by motion, especially on the orbital series.    Marked dilatation of the bilateral superior ophthalmic veins is noted.   Bilateral eyelid edema and mild bilateral proptosis are noted. Vague   increased T2 signal involves the orbital segments of both optic nerves,   best appreciated on the axial thin section T2 orbital series, consistent   with nonspecific edema.    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 Evaluation is limited by   lack of intravenous contrast. Serial imaging follow-up over time is   recommended to monitor for stability and to exclude other etiologies.    There is no evidence for acute infarct, acute hemorrhage, or   hydrocephalus. Mild chronic white matter changes are noted with   associated patchy increased T2 and FLAIR signal.    On the MRV, there is no evidence of dural venous sinus thrombosis.   Evaluation of the cavernous sinuses is quite limited with noncontrast MRV   technique.    IMPRESSION:    No evidence for acute infarct, acute hemorrhage, dural venous sinus   thrombosis, or hydrocephalus.    Marked dilatation of the bilateral superior ophthalmic veins is noted. A   cavernous carotid fistula or other etiology can't be excluded. A   follow-up brain MRA is recommended for further workup. Evaluation of the   cavernous sinuses limited with noncontrast MRV technique. This could also   be further evaluated with CTA/CTV followed by hemodialysis.    Nonspecific patchy edema involves both optic nerves.    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 Evaluation is limited by   lack of intravenous contrast. Serial imaging follow-up over time is   recommended to monitor for stability and to exclude other etiologies.      ADAN ALVARADO M.D., ATTENDING RADIOLOGIST      MRV; On the MRV, there is no evidence of dural venous sinus thrombosis.   Evaluation of the cavernous sinuses is quite limited with noncontrast MRV   technique.

## 2018-08-20 NOTE — PROGRESS NOTE ADULT - ASSESSMENT
59yo M w/pmhx of HTN, ESRD s/p renal transplant x 2 but now requiring HD again (T Th Sat), now presenting with accelerated hypertension with hypertensive emergency (w/visual changes).      Problem/Plan - 1:  ·  Problem: Visual changes.  Plan: Ophthalmology fu  BP control   Started on minoxidil from yesterday. will up titrate as needed    Problem/Plan - 2:  ·  Problem: Accelerated hypertension.  Plan: management as per cards  renal fu    Problem/Plan - 3:  ·  Problem: ESRD (end stage renal disease) on dialysis.  Plan:   Renal f/up appreciated    Problem/Plan - 4:  ·  Problem: Renal transplant recipient.  Plan: tacrolimus 1mg BID  Prednisone 5mg po daily.   renal fu

## 2018-08-20 NOTE — PROGRESS NOTE ADULT - SUBJECTIVE AND OBJECTIVE BOX
CARDIOLOGY FOLLOW UP - Dr. Real    CC pt. still with blurred vision  no cp/sob       PHYSICAL EXAM:  T(C): 37.1 (08-20-18 @ 04:52), Max: 37.2 (08-19-18 @ 10:35)  HR: 64 (08-20-18 @ 04:52) (61 - 80)  BP: 180/95 (08-20-18 @ 04:52) (171/98 - 197/94)  RR: 18 (08-20-18 @ 04:52) (17 - 18)  SpO2: 95% (08-20-18 @ 04:52) (95% - 97%)  Wt(kg): --  I&O's Summary    19 Aug 2018 07:01  -  20 Aug 2018 07:00  --------------------------------------------------------  IN: 240 mL / OUT: 1501 mL / NET: -1261 mL        Appearance: Normal	  Cardiovascular: Normal S1 S2,RRR, No JVD, No murmurs  Respiratory: Lungs clear to auscultation	  Gastrointestinal:  Soft, Non-tender, + BS	  Extremities: Normal range of motion, No clubbing, b/l +2 LE edema         MEDICATIONS  (STANDING):  ascorbic acid 500 milliGRAM(s) Oral daily  Butalbital, acetaminophen/caffeine 1 Tablet(s) 1 Tablet(s) Oral daily  calcitriol   Capsule 0.25 MICROGram(s) Oral daily  cholecalciferol 2000 Unit(s) Oral daily  cloNIDine Patch 0.3 mG/24Hr(s) 1 patch Topical every 7 days  furosemide    Tablet 80 milliGRAM(s) Oral daily  losartan 50 milliGRAM(s) Oral two times a day  minoxidil 2.5 milliGRAM(s) Oral at bedtime  Nephro-martha 1 Tablet(s) Oral daily  NIFEdipine XL 60 milliGRAM(s) Oral <User Schedule>  predniSONE   Tablet 5 milliGRAM(s) Oral daily  tacrolimus 1 milliGRAM(s) Oral every 12 hours  trimethoprim  160 mG/sulfamethoxazole 800 mG 1 Tablet(s) Oral <User Schedule>      TELEMETRY: 	    ECG:  	  RADIOLOGY:   DIAGNOSTIC TESTING:  [ ] Echocardiogram:  [ ]  Catheterization:  [ ] Stress Test:    OTHER: 	  < from: MR Venogram Head No Cont (08.18.18 @ 19:45) >  No evidence for acute infarct, acute hemorrhage, dural venous sinus   thrombosis, or hydrocephalus.    Marked dilatation of the bilateral superior ophthalmic veins is noted. A   cavernous carotid fistula or other etiology can't be excluded. A   follow-up brain MRA is recommended for further workup. Evaluation of the   cavernous sinuses limited with noncontrast MRV technique. This could also   be further evaluated with CTA/CTV followed by hemodialysis.    Nonspecific patchy edema involves both optic nerves.    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 Evaluation is limited by   lack of intravenous contrast. Serial imaging follow-up over time is   recommended to monitor for stability and to exclude other etiologies.    < end of copied text >    LABS:	 	                                11.0   10.63 )-----------( 378      ( 19 Aug 2018 13:50 )             35.4     08-19    139  |  96  |  49<H>  ----------------------------<  142<H>  5.4<H>   |  26  |  7.42<H>    Ca    9.3      19 Aug 2018 12:38

## 2018-08-21 LAB
ANION GAP SERPL CALC-SCNC: 18 MMOL/L — HIGH (ref 5–17)
BUN SERPL-MCNC: 65 MG/DL — HIGH (ref 7–23)
CALCIUM SERPL-MCNC: 9.4 MG/DL — SIGNIFICANT CHANGE UP (ref 8.4–10.5)
CHLORIDE SERPL-SCNC: 96 MMOL/L — SIGNIFICANT CHANGE UP (ref 96–108)
CO2 SERPL-SCNC: 24 MMOL/L — SIGNIFICANT CHANGE UP (ref 22–31)
CREAT SERPL-MCNC: 8.88 MG/DL — HIGH (ref 0.5–1.3)
GLUCOSE SERPL-MCNC: 127 MG/DL — HIGH (ref 70–99)
POTASSIUM SERPL-MCNC: 6.1 MMOL/L — HIGH (ref 3.5–5.3)
POTASSIUM SERPL-SCNC: 6.1 MMOL/L — HIGH (ref 3.5–5.3)
SODIUM SERPL-SCNC: 138 MMOL/L — SIGNIFICANT CHANGE UP (ref 135–145)
TACROLIMUS SERPL-MCNC: 2.2 NG/ML — SIGNIFICANT CHANGE UP

## 2018-08-21 PROCEDURE — 99232 SBSQ HOSP IP/OBS MODERATE 35: CPT

## 2018-08-21 PROCEDURE — 99232 SBSQ HOSP IP/OBS MODERATE 35: CPT | Mod: GC

## 2018-08-21 RX ORDER — CHLORHEXIDINE GLUCONATE 213 G/1000ML
1 SOLUTION TOPICAL
Qty: 0 | Refills: 0 | Status: DISCONTINUED | OUTPATIENT
Start: 2018-08-21 | End: 2018-08-29

## 2018-08-21 RX ORDER — HYDROMORPHONE HYDROCHLORIDE 2 MG/ML
1 INJECTION INTRAMUSCULAR; INTRAVENOUS; SUBCUTANEOUS ONCE
Qty: 0 | Refills: 0 | Status: DISCONTINUED | OUTPATIENT
Start: 2018-08-21 | End: 2018-08-21

## 2018-08-21 RX ADMIN — Medication 80 MILLIGRAM(S): at 11:02

## 2018-08-21 RX ADMIN — LOSARTAN POTASSIUM 50 MILLIGRAM(S): 100 TABLET, FILM COATED ORAL at 17:42

## 2018-08-21 RX ADMIN — Medication 5 MILLIGRAM(S): at 11:02

## 2018-08-21 RX ADMIN — Medication 2000 UNIT(S): at 11:02

## 2018-08-21 RX ADMIN — Medication 2.5 MILLIGRAM(S): at 21:21

## 2018-08-21 RX ADMIN — TACROLIMUS 1 MILLIGRAM(S): 5 CAPSULE ORAL at 23:14

## 2018-08-21 RX ADMIN — LOSARTAN POTASSIUM 50 MILLIGRAM(S): 100 TABLET, FILM COATED ORAL at 05:32

## 2018-08-21 RX ADMIN — HYDROMORPHONE HYDROCHLORIDE 1 MILLIGRAM(S): 2 INJECTION INTRAMUSCULAR; INTRAVENOUS; SUBCUTANEOUS at 18:11

## 2018-08-21 RX ADMIN — HYDROMORPHONE HYDROCHLORIDE 1 MILLIGRAM(S): 2 INJECTION INTRAMUSCULAR; INTRAVENOUS; SUBCUTANEOUS at 17:41

## 2018-08-21 RX ADMIN — TACROLIMUS 1 MILLIGRAM(S): 5 CAPSULE ORAL at 11:02

## 2018-08-21 RX ADMIN — Medication 500 MILLIGRAM(S): at 11:03

## 2018-08-21 RX ADMIN — Medication 1 TABLET(S): at 11:02

## 2018-08-21 RX ADMIN — Medication 60 MILLIGRAM(S): at 23:14

## 2018-08-21 RX ADMIN — Medication 60 MILLIGRAM(S): at 11:02

## 2018-08-21 RX ADMIN — CALCITRIOL 0.25 MICROGRAM(S): 0.5 CAPSULE ORAL at 11:02

## 2018-08-21 NOTE — PROGRESS NOTE ADULT - ASSESSMENT
57yo M w/pmhx of HTN, ESRD s/p renal transplant x 2 but now requiring HD again (T Th Sat), now presenting with vision changes x 5 days as well as elevated blood pressures, admitted w/ htn emergency.     1. HTN urgency   likely in the setting of ESRD  still with elevated readings, continue current medication regimen  minoxidil 2.5 mg added, would up-titrate   renal f/u     2. Blurred vision   MRI brain no acute stroke, no PRES. ? +dilated superior ophthalmic veins and optic nerve edema  opthalmology / neurology f/u     3. ESRD  renal f/u   continue fluid removal with HD and lasix     dvt ppx

## 2018-08-21 NOTE — PROGRESS NOTE ADULT - SUBJECTIVE AND OBJECTIVE BOX
[facility] Division of Kidney Diseases & Hypertension  FOLLOW UP NOTE  [phone]--------------------------------------------------------------------------------  Chief Complaint: ESRD     24 hour events/subjective: Patient seen and examined today at bedside. Denies any complaints at this time.         PAST HISTORY  --------------------------------------------------------------------------------  No significant changes to PMH, PSH, FHx, SHx, unless otherwise noted    ALLERGIES & MEDICATIONS  --------------------------------------------------------------------------------  Allergies    No Known Allergies    Intolerances    beta blockers (Other (Mod to Severe))    Standing Inpatient Medications  ascorbic acid 500 milliGRAM(s) Oral daily  Butalbital, acetaminophen/caffeine 1 Tablet(s) 1 Tablet(s) Oral daily  calcitriol   Capsule 0.25 MICROGram(s) Oral daily  chlorhexidine 4% Liquid 1 Application(s) Topical <User Schedule>  cholecalciferol 2000 Unit(s) Oral daily  cloNIDine Patch 0.3 mG/24Hr(s) 1 patch Topical every 7 days  furosemide    Tablet 80 milliGRAM(s) Oral daily  losartan 50 milliGRAM(s) Oral two times a day  minoxidil 2.5 milliGRAM(s) Oral at bedtime  Nephro-martha 1 Tablet(s) Oral daily  NIFEdipine XL 60 milliGRAM(s) Oral <User Schedule>  predniSONE   Tablet 5 milliGRAM(s) Oral daily  tacrolimus 1 milliGRAM(s) Oral every 12 hours  trimethoprim  160 mG/sulfamethoxazole 800 mG 1 Tablet(s) Oral <User Schedule>    PRN Inpatient Medications  acetaminophen   Tablet. 650 milliGRAM(s) Oral every 6 hours PRN  ondansetron Injectable 4 milliGRAM(s) IV Push every 6 hours PRN      REVIEW OF SYSTEMS  --------------------------------------------------------------------------------  Gen: No  fevers/chills  Skin: No rashes  Head/Eyes/Ears/Mouth: No headache; Normal hearing; Normal vision w/o blurriness  Respiratory: No dyspnea, cough, wheezing, hemoptysis  CV: No chest pain, PND, orthopnea  GI: No abdominal pain, diarrhea, constipation, nausea, vomiting  : No increased frequency, dysuria, hematuria, nocturia  MSK: No joint pain/swelling; no back pain; no edema  Neuro: No dizziness/lightheadedness, weakness, seizures, numbness, tingling      All other systems were reviewed and are negative, except as noted.    VITALS/PHYSICAL EXAM  --------------------------------------------------------------------------------  T(C): 37.2 (08-21-18 @ 11:49), Max: 37.2 (08-21-18 @ 11:49)  HR: 70 (08-21-18 @ 11:49) (61 - 74)  BP: 173/93 (08-21-18 @ 11:49) (163/93 - 173/93)  RR: 18 (08-21-18 @ 11:49) (18 - 18)  SpO2: 95% (08-21-18 @ 11:49) (93% - 97%)  Wt(kg): --        08-20-18 @ 07:01  -  08-21-18 @ 07:00  --------------------------------------------------------  IN: 520 mL / OUT: 0 mL / NET: 520 mL    08-21-18 @ 07:01  -  08-21-18 @ 13:48  --------------------------------------------------------  IN: 600 mL / OUT: 0 mL / NET: 600 mL      Physical Exam:  	  Gen: NAD  HEENT: anicteric  Pulm: CTA B/L   CVS: RRR,  Normal S1 S2  Abd: soft, nontender, nondistended  Neuro: AAO x3, no asterixis   : No tanika  LE: Warm, edema +  Skin: Warm, without rashes  Vascular access: Right IJ tunneled HD catheter present.       LABS/STUDIES  --------------------------------------------------------------------------------              11.5   8.94  >-----------<  336      [08-20-18 @ 16:09]              36.5     137  |  96  |  38  ----------------------------<  105      [08-20-18 @ 14:41]  5.6   |  26  |  6.84        Ca     9.7     [08-20-18 @ 14:41]            Creatinine Trend:  SCr 6.84 [08-20 @ 14:41]  SCr 7.42 [08-19 @ 12:38]  SCr 7.09 [08-17 @ 07:16]  SCr 6.38 [08-16 @ 21:11]          HBsAg Nonreact      [08-18-18 @ 19:01]  HCV 0.14, Nonreact      [08-18-18 @ 19:01]

## 2018-08-21 NOTE — PROGRESS NOTE ADULT - ASSESSMENT
57 yo gentleman w/pmhx of HTN, ESRD s/p renal transplant x 2 but now requiring HD again (T Th Sat),  presenting with sudden vision changes x 5 days in the setting of elevated  blood pressures. It is binocular. Nonfocal neurological examination. Finding of retinal hemorrhages on ophthalmological evaluation.   -CTH: no evidence of intracranial hemorrhage, mass, midline shift    Concern w/ htn emergency: htn retinopathy/optic neuropathy    MRI brain no acute stroke, no PRES. ? +dilated superior ophthalmic veins and optic nerve edema ? rule out cavernous carotid fistula  initial ophthalmology eval feel cavernous carotid fistula not clinically likely in setting of bilaterality and clinical presentation. after resident d/w Dr. Mcrae, CTA/V to be considered    Clinically, suspicion of bilateral CC fistula very unlikely.  Pt demonstrates reservation about CT contrast and prefers study not to be done.  From neuro perspective, it is reasonable to continue clinical observation with BP control.   Vision appears stable on bedside exam.    Plan:    - Continue optimize BP control as per primary team and renal  - Ophthalmology highly recommending CTA/CTV with contrast. Discussed at length with pt. Risks and benefits as per ophtho and nephrology.  - neuro checks q 4  - Close observation  - Supportive care  - Also reviewed left acoustic neuroma with pt, advised outpatient MRI surveillance.  Reviewed above with pt at bedside,

## 2018-08-21 NOTE — PROGRESS NOTE ADULT - SUBJECTIVE AND OBJECTIVE BOX
[facility] Ophthalmology Follow Up Note      HPI summary: 59 y/o M with a PMH of  HTN, ESRD s/p renal transplant x 2, still requiring dialysis 3x / week, admitted to the inpatient medicine floor with hypertension. Ophtho consulted for one week of visual changes described as bright light in center of vision OU. Exam notable for bilateral disc edema.   Imaging performed:     Mood and Affect Appropriate ( x ),  Oriented to Time, Place, and Person x 3 ( x )    Ophthalmology Exam    Visual acuity (cc): 20/40-2 OD, NI with pinhole, 20/70-1 OS NI with pinhole,   Pupils: PERRL OU, no APD  Ttono: 10 OU  Extraocular movements (EOMs): Full OU, no pain, no diplopia  Confrontational Visual Field (CVF):  Full OU  Color Plates: 10/12 OD, 9/12 OS, limited by visual acuity, not ability to distinguish colors, of note, patient reports seeing right half of two digit numbers more clearly OU     Slit Lamp Exam (SLE)  External:  Flat OU  Lids/Lashes/Lacrimal Ducts: Flat OU    Sclera/Conjunctiva:  W+Q OU  Cornea: Cl OU  Anterior Chamber: D+Q OU   Iris:  Flat OU  Lens:  +1 OU     Fundus Exam: dilated with 1% tropicamide and 2.5% phenylephrine  Approval obtained from primary team for dilation  Patient aware that pupils can remained dilated for at least 4-6 hours  Exam performed with 20D lens    Vitreous: wnl OU  Disc, cup/disc: Bilateral disc edema with blunted disc margins circumferentially and associated disc obscuration bilaterally.   OD: There is inferior disc hemorrhage, and further inferiorly a larger hemorrhage along the inferior arcade.   OS: There is infero-temporally disc hemorrhage.   Macula:  OD: Temporal to the macula, small hemorrhage. OS: wnl   Vessels:  some sclerotic vessels OU.   Periphery: wnl OU        A/P. 58 y.o M with bilateral disc edema of unclear etiology. Pt with elevated BP, thus disc swelling could indicate Stage 4 HTN retinopathy, however don't usually see dilated superior ophthalmic veins with HTN retinopathy.  Concern for pathology in the cavernous sinus or AV fistula, confirmed as per discussion with neuro-radiology.  MRV without contrast demonstrated no dural venous thrombosis, however it was without contrast and would have better visualization of cavernous sinus and internal carotid with CTA/CTV.  /101 on 8/16, still ranging in the 180's systolic, now improving in the 170s. Unable to perform MRI with gadolinium, MRV venography 2/2 renal issues.  - Highly recommend obtaining CTV/CTA with contrast in setting of imaging with proptosis and ophthalmic vein dilation; uncommon in isolated hypertensive etiology; must rule out cavernous sinus pathology; discussed concern with patient- Please order both CTV and CTA  - Discussed with nephrology; no contraindication to contrast in setting of ESRD, concern for volume overload; would recommend CT performed Wednesday night or Thursday AM with HD on Thursday AM.   - Will follow pt during admission  - Findings and plan discussed with patient and primary team  - Findings discussed with and plan per radiology/Dr. Mcrae    s/d/w Dr. Monk [facility] Ophthalmology Follow Up Note    S: Patient examined bedside, patient feels vision is improving; not at baseline     Mood and Affect Appropriate ( x ),  Oriented to Time, Place, and Person x 3 ( x )    Ophthalmology Exam    Visual acuity (cc): 20/40-2 OD, NI with pinhole, 20/60 OS NI with pinhole,   Pupils: PERRL OU, no APD  Ttono: 10 OU  Extraocular movements (EOMs): Full OU, no pain, no diplopia  Confrontational Visual Field (CVF):  Full OU  Color Plates: 10/12 OD, 9/12 OS, limited by visual acuity, not ability to distinguish colors, of note, patient reports seeing right half of two digit numbers more clearly OU     Slit Lamp Exam (SLE)  External:  Flat OU  Lids/Lashes/Lacrimal Ducts: Flat OU    Sclera/Conjunctiva:  W+Q OU  Cornea: Cl OU  Anterior Chamber: D+Q OU   Iris:  Flat OU  Lens:  +1 OU     DFE on 8/20:  Vitreous: wnl OU  Disc, cup/disc: Bilateral disc edema with blunted disc margins circumferentially and associated disc obscuration bilaterally.   OD: There is inferior disc hemorrhage, and further inferiorly a larger hemorrhage along the inferior arcade.   OS: There is infero-temporally disc hemorrhage.   Macula:  OD: Temporal to the macula, small hemorrhage. OS: wnl   Vessels:  some sclerotic vessels OU.   Periphery: wnl OU        A/P. 58 y.o M with bilateral disc edema of unclear etiology. Pt with elevated BP, thus disc swelling could indicate Stage 4 HTN retinopathy, however don't usually see dilated superior ophthalmic veins with HTN retinopathy.  Concern for pathology in the cavernous sinus or AV fistula, confirmed as per discussion with neuro-radiology.  MRV without contrast demonstrated no dural venous thrombosis, however it was without contrast and would have better visualization of cavernous sinus and internal carotid with CTA/CTV.  /101 on 8/16, still ranging in the 180's systolic, now improving in the 170s. Unable to perform MRI with gadolinium, MRV venography 2/2 renal issues.  - Highly recommend obtaining CTV/CTA with contrast in setting of imaging with proptosis and ophthalmic vein dilation; uncommon in isolated hypertensive etiology; must rule out cavernous sinus pathology; discussed concern with patient- Please order both CTV and CTA  - Discussed with nephrology; no contraindication to contrast in setting of ESRD, concern for volume overload; would recommend CT performed Wednesday night or Thursday AM with HD on Thursday AM.   - Will follow pt during admission  - Findings and plan discussed with patient and primary team  - Findings discussed with and plan per radiology/Dr. Mcrae    s/d/w Dr. Monk [facility] Ophthalmology Follow Up Note    S: Patient examined bedside, patient feels vision is improving; not at baseline     Mood and Affect Appropriate ( x ),  Oriented to Time, Place, and Person x 3 ( x )    Ophthalmology Exam    Visual acuity (cc): 20/40-2 OD, NI with pinhole, 20/60 OS NI with pinhole,   Pupils: PERRL OU, no APD  Ttono: 10 OU  Extraocular movements (EOMs): Full OU, no pain, no diplopia  Confrontational Visual Field (CVF):  Full OU  Color Plates: 10/12 OD, 9/12 OS, limited by visual acuity, not ability to distinguish colors, of note, patient reports seeing right half of two digit numbers more clearly OU     Slit Lamp Exam (SLE)  External:  Flat OU  Lids/Lashes/Lacrimal Ducts: Flat OU    Sclera/Conjunctiva:  W+Q OU  Cornea: Cl OU  Anterior Chamber: D+Q OU   Iris:  Flat OU  Lens:  +1NS OU     DFE on 8/20:  Vitreous: wnl OU  Disc, cup/disc: Bilateral disc edema with blunted disc margins circumferentially and associated disc obscuration bilaterally.   OD: There is inferior disc hemorrhage, and further inferiorly a larger hemorrhage along the inferior arcade.   OS: There is infero-temporally disc hemorrhage.   Macula:  OD: Temporal to the macula, small hemorrhage. OS: wnl   Vessels:  some sclerotic vessels OU.   Periphery: wnl OU        A/P. 58 y.o M with bilateral disc edema of unclear etiology. Pt with elevated BP, thus disc swelling could indicate Stage 4 HTN retinopathy, however don't usually see dilated superior ophthalmic veins with HTN retinopathy.  Concern for pathology in the cavernous sinus or AV fistula, confirmed as per discussion with neuro-radiology.  MRV without contrast demonstrated no dural venous thrombosis, however it was without contrast and would have better visualization of cavernous sinus and internal carotid with CTA/CTV.  /101 on 8/16, still ranging in the 180's systolic, now improving in the 170s. Unable to perform MRI with gadolinium, MRV venography 2/2 renal issues.  - Highly recommend obtaining CTV/CTA with contrast in setting of imaging with proptosis and ophthalmic vein dilation; uncommon in isolated hypertensive etiology; must rule out cavernous sinus pathology; discussed concern with patient- Please order both CTV and CTA  - Discussed with nephrology; no contraindication to contrast in setting of ESRD, concern for volume overload; would recommend CT performed Wednesday night or Thursday AM with HD on Thursday AM.   - Will follow pt during admission  - Findings and plan discussed with patient and primary team  - Findings discussed with and plan per radiology/Dr. Mcrae    s/d/w Dr. Monk

## 2018-08-21 NOTE — PROGRESS NOTE ADULT - SUBJECTIVE AND OBJECTIVE BOX
Neurology Follow Up  Subjective:  No new complaints, feels vision back to baseline when presented after transient worsening after hydralazine over weekend  Seen at HD, mild headache with dialysis. No eye pain, no eye redness, no hx of trauma.     Medications:  acetaminophen   Tablet. 650 milliGRAM(s) Oral every 6 hours PRN  ascorbic acid 500 milliGRAM(s) Oral daily  Butalbital, acetaminophen/caffeine 1 Tablet(s) 1 Tablet(s) Oral daily  calcitriol   Capsule 0.25 MICROGram(s) Oral daily  chlorhexidine 4% Liquid 1 Application(s) Topical <User Schedule>  cholecalciferol 2000 Unit(s) Oral daily  cloNIDine Patch 0.3 mG/24Hr(s) 1 patch Topical every 7 days  furosemide    Tablet 80 milliGRAM(s) Oral daily  losartan 50 milliGRAM(s) Oral two times a day  minoxidil 2.5 milliGRAM(s) Oral at bedtime  Nephro-martha 1 Tablet(s) Oral daily  NIFEdipine XL 60 milliGRAM(s) Oral <User Schedule>  ondansetron Injectable 4 milliGRAM(s) IV Push every 6 hours PRN  predniSONE   Tablet 5 milliGRAM(s) Oral daily  tacrolimus 1 milliGRAM(s) Oral every 12 hours  trimethoprim  160 mG/sulfamethoxazole 800 mG 1 Tablet(s) Oral <User Schedule>    Labs:  CBC Full  -  ( 20 Aug 2018 16:09 )  WBC Count : 8.94 K/uL  Hemoglobin : 11.5 g/dL  Hematocrit : 36.5 %  Platelet Count - Automated : 336 K/uL  Mean Cell Volume : 80.9 fl  Mean Cell Hemoglobin : 25.5 pg  Mean Cell Hemoglobin Concentration : 31.5 gm/dL  Auto Neutrophil # : x  Auto Lymphocyte # : x  Auto Monocyte # : x  Auto Eosinophil # : x  Auto Basophil # : x  Auto Neutrophil % : x  Auto Lymphocyte % : x  Auto Monocyte % : x  Auto Eosinophil % : x  Auto Basophil % : x  08-21  138  |  96  |  65<H>  ----------------------------<  127<H>  6.1<H>   |  24  |  8.88<H>  Ca    9.4      21 Aug 2018 13:47  CAPILLARY BLOOD GLUCOSE    Vitals:  Vital Signs Last 24 Hrs  T(C): 36.9 (21 Aug 2018 13:30), Max: 37.2 (21 Aug 2018 11:49)  T(F): 98.5 (21 Aug 2018 13:30), Max: 98.9 (21 Aug 2018 11:49)  HR: 65 (21 Aug 2018 13:30) (61 - 74)  BP: 179/95 (21 Aug 2018 13:30) (163/93 - 179/95)  BP(mean): --  RR: 18 (21 Aug 2018 13:30) (18 - 18)  SpO2: 96% (21 Aug 2018 13:30) (93% - 97%)    Cranial Nerves: Pupils were equal, round, reactive to light. No APD. VFFC. VA OS 20/70, OD 20/50-2. Extraocular movements were intact. No nystagmus. Facial sensation was intact to light touch. There was no facial asymmetry. The palate was upgoing symmetrically, tongue midline with tremor. Shoulder shrug was full bilaterally.     Motor exam: Bulk and tone were normal. Patient was moving all extremities. Able to lift legs antigravity.  No tremors. No pronator drift.     Reflexes: 1+ in the bilateral upper extremities. 1+ in the bilateral lower extremities. Toes were downgoing bilaterally.     Sensation: Intact to light touch    Coordination: no gross dysmetria    Gait: Not tested.    Phosphorus Level, Serum: 6.3 mg/dL (08-17 @ 07:16)    < from: CT Head No Cont (08.16.18 @ 20:46) >  FINDINGS:     There is no evidence of mass or acute intracranial hemorrhage. Ventricles   and sulci are normal in size and configuration for the patient's stated   age. No midline shift or other significant mass effect is noted. There is   no CT evidence of acute territorial infarct.     The visualized paranasal sinuses and tympanomastoid spaces are clear.   Orbits and orbital contents are unremarkable.    There is no depressed calvarial fracture.    IMPRESSION:     No evidence of acute intracranial hemorrhage, midline shift or CT   evidence of acute territorial infarct.    If the patient's symptoms persist, consider short interval follow-up head   CT or brain MRI if there are no MRI contraindications.  EXAM:  MR VENOGRAM BRAIN                          EXAM:  MR BRAIN                            PROCEDURE DATE:  08/18/2018            INTERPRETATION:  History: Blurry vision. Optic disc edema bilaterally.   Elevated creatinine levels. End-stage renaldisease on hemodialysis.    Description: Noncontrast MRI studies of the brain and orbits, and a   noncontrast brain MRV (with noncontrast 2-D time-of-flight technique)   were performed.    Comparison is made to the head CT study from 08/16/2018.    The brain MRI was performed with sagittal T1, axial SPGR, T2, FLAIR,   gradient, SWI, and diffusion-weighted series. The orbital MRI was   performed with thin section high-resolution axial/coronal T2 fat   saturation series.    The study is limited by motion, especially on the orbital series.    Marked dilatation of the bilateral superior ophthalmic veins is noted.   Bilateral eyelid edema and mild bilateral proptosis are noted. Vague   increased T2 signal involves the orbital segments of both optic nerves,   best appreciated on the axial thin section T2 orbital series, consistent   with nonspecific edema.    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 Evaluation is limited by   lack of intravenous contrast. Serial imaging follow-up over time is   recommended to monitor for stability and to exclude other etiologies.    There is no evidence for acute infarct, acute hemorrhage, or   hydrocephalus. Mild chronic white matter changes are noted with   associated patchy increased T2 and FLAIR signal.    On the MRV, there is no evidence of dural venous sinus thrombosis.   Evaluation of the cavernous sinuses is quite limited with noncontrast MRV   technique.    IMPRESSION:    No evidence for acute infarct, acute hemorrhage, dural venous sinus   thrombosis, or hydrocephalus.    Marked dilatation of the bilateral superior ophthalmic veins is noted. A   cavernous carotid fistula or other etiology can't be excluded. A   follow-up brain MRA is recommended for further workup. Evaluation of the   cavernous sinuses limited with noncontrast MRV technique. This could also   be further evaluated with CTA/CTV followed by hemodialysis.    Nonspecific patchy edema involves both optic nerves.    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 Evaluation is limited by   lack of intravenous contrast. Serial imaging follow-up over time is   recommended to monitor for stability and to exclude other etiologies.      ADAN ALVARADO M.D., ATTENDING RADIOLOGIST      MRV; On the MRV, there is no evidence of dural venous sinus thrombosis.   Evaluation of the cavernous sinuses is quite limited with noncontrast MRV   technique.

## 2018-08-21 NOTE — PROGRESS NOTE ADULT - SUBJECTIVE AND OBJECTIVE BOX
CARDIOLOGY FOLLOW UP - Dr. Real    CC no cp/sob       PHYSICAL EXAM:  T(C): 37.2 (08-21-18 @ 11:49), Max: 37.2 (08-21-18 @ 11:49)  HR: 70 (08-21-18 @ 11:49) (61 - 74)  BP: 173/93 (08-21-18 @ 11:49) (163/93 - 173/93)  RR: 18 (08-21-18 @ 11:49) (18 - 18)  SpO2: 95% (08-21-18 @ 11:49) (93% - 97%)  Wt(kg): --  I&O's Summary    20 Aug 2018 07:01  -  21 Aug 2018 07:00  --------------------------------------------------------  IN: 520 mL / OUT: 0 mL / NET: 520 mL    21 Aug 2018 07:01  -  21 Aug 2018 12:03  --------------------------------------------------------  IN: 300 mL / OUT: 0 mL / NET: 300 mL        Appearance: Normal	  Cardiovascular: Normal S1 S2,RRR, No JVD, No murmurs  Respiratory: Lungs clear to auscultation	  Gastrointestinal:  Soft, Non-tender, + BS	  Extremities: Normal range of motion, No clubbing, b/l LE edema       MEDICATIONS  (STANDING):  ascorbic acid 500 milliGRAM(s) Oral daily  Butalbital, acetaminophen/caffeine 1 Tablet(s) 1 Tablet(s) Oral daily  calcitriol   Capsule 0.25 MICROGram(s) Oral daily  cholecalciferol 2000 Unit(s) Oral daily  cloNIDine Patch 0.3 mG/24Hr(s) 1 patch Topical every 7 days  furosemide    Tablet 80 milliGRAM(s) Oral daily  losartan 50 milliGRAM(s) Oral two times a day  minoxidil 2.5 milliGRAM(s) Oral at bedtime  Nephro-martha 1 Tablet(s) Oral daily  NIFEdipine XL 60 milliGRAM(s) Oral <User Schedule>  predniSONE   Tablet 5 milliGRAM(s) Oral daily  tacrolimus 1 milliGRAM(s) Oral every 12 hours  trimethoprim  160 mG/sulfamethoxazole 800 mG 1 Tablet(s) Oral <User Schedule>      TELEMETRY: 	    ECG:  	  RADIOLOGY:   DIAGNOSTIC TESTING:  [ ] Echocardiogram:  [ ]  Catheterization:  [ ] Stress Test:    OTHER: 	    LABS:	 	                                11.5   8.94  )-----------( 336      ( 20 Aug 2018 16:09 )             36.5     08-20    137  |  96  |  38<H>  ----------------------------<  105<H>  5.6<H>   |  26  |  6.84<H>    Ca    9.7      20 Aug 2018 14:41

## 2018-08-21 NOTE — PROGRESS NOTE ADULT - SUBJECTIVE AND OBJECTIVE BOX
Patient is a 58y old  Male who presents with a chief complaint of Accelerated hypertension/hypertensive emergency/vision changes (17 Aug 2018 16:30)      INTERVAL HPI/OVERNIGHT EVENTS:  T(C): 36.8 (08-21-18 @ 16:30), Max: 37.2 (08-21-18 @ 11:49)  HR: 68 (08-21-18 @ 16:30) (63 - 74)  BP: 173/97 (08-21-18 @ 16:30) (163/93 - 179/95)  RR: 18 (08-21-18 @ 16:30) (18 - 18)  SpO2: 96% (08-21-18 @ 16:30) (93% - 97%)  Wt(kg): --  I&O's Summary    20 Aug 2018 07:01  -  21 Aug 2018 07:00  --------------------------------------------------------  IN: 520 mL / OUT: 0 mL / NET: 520 mL    21 Aug 2018 07:01  -  21 Aug 2018 18:30  --------------------------------------------------------  IN: 600 mL / OUT: 2000 mL / NET: -1400 mL        LABS:                        11.5   8.94  )-----------( 336      ( 20 Aug 2018 16:09 )             36.5     08-21    138  |  96  |  65<H>  ----------------------------<  127<H>  6.1<H>   |  24  |  8.88<H>    Ca    9.4      21 Aug 2018 13:47          CAPILLARY BLOOD GLUCOSE                MEDICATIONS  (STANDING):  ascorbic acid 500 milliGRAM(s) Oral daily  Butalbital, acetaminophen/caffeine 1 Tablet(s) 1 Tablet(s) Oral daily  calcitriol   Capsule 0.25 MICROGram(s) Oral daily  chlorhexidine 4% Liquid 1 Application(s) Topical <User Schedule>  cholecalciferol 2000 Unit(s) Oral daily  cloNIDine Patch 0.3 mG/24Hr(s) 1 patch Topical every 7 days  furosemide    Tablet 80 milliGRAM(s) Oral daily  losartan 50 milliGRAM(s) Oral two times a day  minoxidil 2.5 milliGRAM(s) Oral at bedtime  Nephro-martha 1 Tablet(s) Oral daily  NIFEdipine XL 60 milliGRAM(s) Oral <User Schedule>  predniSONE   Tablet 5 milliGRAM(s) Oral daily  tacrolimus 1 milliGRAM(s) Oral every 12 hours  trimethoprim  160 mG/sulfamethoxazole 800 mG 1 Tablet(s) Oral <User Schedule>    MEDICATIONS  (PRN):  acetaminophen   Tablet. 650 milliGRAM(s) Oral every 6 hours PRN Mild Pain (1 - 3)  ondansetron Injectable 4 milliGRAM(s) IV Push every 6 hours PRN give with hydralazine pushes          PHYSICAL EXAM:  GENERAL: NAD, well-groomed, well-developed  HEAD:  Atraumatic, Normocephalic  CHEST/LUNG: Clear to percussion bilaterally; No rales, rhonchi, wheezing, or rubs  HEART: Regular rate and rhythm; No murmurs, rubs, or gallops  ABDOMEN: Soft, Nontender, Nondistended; Bowel sounds present  EXTREMITIES:  2+ Peripheral Pulses, No clubbing, cyanosis, or edema  LYMPH: No lymphadenopathy noted  SKIN: No rashes or lesions    Care Discussed with Consultants/Other Providers [ ] YES  [ ] NO

## 2018-08-22 PROCEDURE — 99232 SBSQ HOSP IP/OBS MODERATE 35: CPT

## 2018-08-22 RX ORDER — MINOXIDIL 10 MG
5 TABLET ORAL AT BEDTIME
Qty: 0 | Refills: 0 | Status: DISCONTINUED | OUTPATIENT
Start: 2018-08-22 | End: 2018-08-24

## 2018-08-22 RX ADMIN — LOSARTAN POTASSIUM 50 MILLIGRAM(S): 100 TABLET, FILM COATED ORAL at 17:10

## 2018-08-22 RX ADMIN — Medication 80 MILLIGRAM(S): at 11:02

## 2018-08-22 RX ADMIN — TACROLIMUS 1 MILLIGRAM(S): 5 CAPSULE ORAL at 23:12

## 2018-08-22 RX ADMIN — Medication 5 MILLIGRAM(S): at 11:02

## 2018-08-22 RX ADMIN — Medication 60 MILLIGRAM(S): at 11:02

## 2018-08-22 RX ADMIN — LOSARTAN POTASSIUM 50 MILLIGRAM(S): 100 TABLET, FILM COATED ORAL at 05:24

## 2018-08-22 RX ADMIN — Medication 1 TABLET(S): at 11:02

## 2018-08-22 RX ADMIN — Medication 5 MILLIGRAM(S): at 22:00

## 2018-08-22 RX ADMIN — CALCITRIOL 0.25 MICROGRAM(S): 0.5 CAPSULE ORAL at 11:01

## 2018-08-22 RX ADMIN — CHLORHEXIDINE GLUCONATE 1 APPLICATION(S): 213 SOLUTION TOPICAL at 15:30

## 2018-08-22 RX ADMIN — TACROLIMUS 1 MILLIGRAM(S): 5 CAPSULE ORAL at 11:01

## 2018-08-22 RX ADMIN — Medication 500 MILLIGRAM(S): at 11:01

## 2018-08-22 RX ADMIN — Medication 2000 UNIT(S): at 11:01

## 2018-08-22 RX ADMIN — Medication 60 MILLIGRAM(S): at 23:12

## 2018-08-22 RX ADMIN — Medication 1 TABLET(S): at 11:01

## 2018-08-22 NOTE — DIETITIAN INITIAL EVALUATION ADULT. - NS AS NUTRI INTERV ED CONTENT
Pt able to teach back on renal diet, foods high in potassium/sodium/phosphorous, states he was exposed to diet education from '[facility] study group'. RD availability made known to pt.

## 2018-08-22 NOTE — PROGRESS NOTE ADULT - SUBJECTIVE AND OBJECTIVE BOX
CARDIOLOGY FOLLOW UP - Dr. Real    CC no cp/sob       PHYSICAL EXAM:  T(C): 37.3 (08-22-18 @ 05:16), Max: 37.3 (08-22-18 @ 05:16)  HR: 78 (08-22-18 @ 05:16) (64 - 78)  BP: 170/93 (08-22-18 @ 05:16) (170/93 - 180/95)  RR: 17 (08-22-18 @ 05:16) (17 - 18)  SpO2: 96% (08-22-18 @ 05:16) (95% - 98%)  Wt(kg): --  I&O's Summary    21 Aug 2018 07:01  -  22 Aug 2018 07:00  --------------------------------------------------------  IN: 600 mL / OUT: 2300 mL / NET: -1700 mL    22 Aug 2018 07:01  -  22 Aug 2018 10:55  --------------------------------------------------------  IN: 200 mL / OUT: 0 mL / NET: 200 mL        Appearance: Normal	  Cardiovascular: Normal S1 S2,RRR, No JVD, No murmurs  Respiratory: Lungs clear to auscultation	  Gastrointestinal:  Soft, Non-tender, + BS	  Extremities: Normal range of motion, No clubbing, b/l le edema         MEDICATIONS  (STANDING):  ascorbic acid 500 milliGRAM(s) Oral daily  Butalbital, acetaminophen/caffeine 1 Tablet(s) 1 Tablet(s) Oral daily  calcitriol   Capsule 0.25 MICROGram(s) Oral daily  chlorhexidine 4% Liquid 1 Application(s) Topical <User Schedule>  cholecalciferol 2000 Unit(s) Oral daily  cloNIDine Patch 0.3 mG/24Hr(s) 1 patch Topical every 7 days  furosemide    Tablet 80 milliGRAM(s) Oral daily  losartan 50 milliGRAM(s) Oral two times a day  minoxidil 2.5 milliGRAM(s) Oral at bedtime  Nephro-martha 1 Tablet(s) Oral daily  NIFEdipine XL 60 milliGRAM(s) Oral <User Schedule>  predniSONE   Tablet 5 milliGRAM(s) Oral daily  tacrolimus 1 milliGRAM(s) Oral every 12 hours  trimethoprim  160 mG/sulfamethoxazole 800 mG 1 Tablet(s) Oral <User Schedule>      TELEMETRY: 	    ECG:  	  RADIOLOGY:   DIAGNOSTIC TESTING:  [ ] Echocardiogram:  [ ]  Catheterization:  [ ] Stress Test:    OTHER: 	    LABS:	 	                                11.5   8.94  )-----------( 336      ( 20 Aug 2018 16:09 )             36.5     08-21    138  |  96  |  65<H>  ----------------------------<  127<H>  6.1<H>   |  24  |  8.88<H>    Ca    9.4      21 Aug 2018 13:47

## 2018-08-22 NOTE — PROGRESS NOTE ADULT - SUBJECTIVE AND OBJECTIVE BOX
[facility] Ophthalmology Follow Up Note    S: Patient examined bedside this PM, patient reports vision improving. Denies headaches or pain.     Mood and Affect Appropriate ( x ),  Oriented to Time, Place, and Person x 3 ( x )    Ophthalmology Exam    Visual acuity (cc): 20/40-2, PH20/30 OD, 20/60 OS PHNI,   Pupils: PERRL OU, no APD  Ttono: 12 OU  Extraocular movements (EOMs): Full OU, no pain, no diplopia  Confrontational Visual Field (CVF):  Full OU  Color Plates: 12/12 OU    Slit Lamp Exam (SLE)  External:  Flat OU  Lids/Lashes/Lacrimal Ducts: Flat OU    Sclera/Conjunctiva:  W+Q OU  Cornea: Cl OU  Anterior Chamber: D+Q OU   Iris:  Flat OU  Lens:  +1 OU     DFE on 8/20:  Vitreous: wnl OU  Disc, cup/disc: Bilateral disc edema with blunted disc margins circumferentially and associated disc obscuration bilaterally.   OD: There is inferior disc hemorrhage, and further inferiorly a larger hemorrhage along the inferior arcade.   OS: There is infero-temporally disc hemorrhage.   Macula:  OD: Temporal to the macula, small hemorrhage. OS: wnl   Vessels:  some sclerotic vessels OU.   Periphery: wnl OU        A/P. 58 y.o M with bilateral disc edema. ddx 2/2 HTN retinopathy versus cavernous sinus thrombosis. 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 Concern for pathology in the cavernous sinus or AV fistula, confirmed as per discussion with neuro-radiology.  MRV without contrast demonstrated no dural venous thrombosis, however it was without contrast and would have better visualization of cavernous sinus and internal carotid with CTA/CTV.  /101 on 8/16, still ranging in the 180's systolic, now improving in the 170s. Unable to perform MRI with gadolinium, MRV venography 2/2 renal issues.  - Discussed extensively with patient; recommend obtaining CTV/CTA with contrast in setting of MR with findings suggestive of etiology other than HTN retinopathy. (proptosis and ophthalmic vein dilation; uncommon in isolated hypertensive etiology and must rule out cavernous sinus pathology) Reviewed with patient that the case was discussed with nephrology; no contraindication to contrast in setting of ESRD, concern for volume overload; would therefore recommend CT performed prior to HD.   Patient expressed an understanding and wishes to defer contrast studies at this time.   - Will assess patient for interval change if remains admitted; please notify ophthalmology on call if any acute changes  - Outpatient follow up stressed to patient to monitor for improvement or need for further work up and assessment  - Findings and plan discussed with patient and primary team    Follow-Up:  Patient should follow up his/her ophthalmologist or in the [facility] Practice within 1 week of discharge, sooner if symptoms worsen or change.  Neuro-ophthalmology   [address] [place] [id number] [phone]    Primary team; please make an appointment for the patient prior to discharge to ensure proper outpatient follow up and compliance     d/w Dr. Monk [facility] Ophthalmology Follow Up Note    S: Patient examined bedside this PM, patient reports vision improving. Denies headaches or pain.     Mood and Affect Appropriate ( x ),  Oriented to Time, Place, and Person x 3 ( x )    Ophthalmology Exam    Visual acuity (cc): 20/40-2, PH20/30 OD, 20/60 OS PH NI   Pupils: PERRL OU, no APD OU  Ttono: 12 OU  Extraocular movements (EOMs): Full OU, no pain, no diplopia  Confrontational Visual Field (CVF):  Full OU  Color Plates: 12/12 OU    Slit Lamp Exam (SLE)  External:  Flat OU  Lids/Lashes/Lacrimal Ducts: Flat OU    Sclera/Conjunctiva:  W+Q OU  Cornea: Cl OU  Anterior Chamber: D+Q OU   Iris:  Flat OU  Lens: NS OU     DFE on 8/20:  Vitreous: wnl OU  Disc, cup/disc: Bilateral disc edema with blunted disc margins circumferentially and associated disc obscuration bilaterally.   OD: There is inferior disc hemorrhage, and further inferiorly a larger hemorrhage along the inferior arcade.   OS: There is infero-temporally disc hemorrhage.   Macula:  OD: Temporal to the macula, small hemorrhage. OS: wnl   Vessels:  some sclerotic vessels OU.   Periphery: wnl OU        A/P. 58 y.o M with bilateral disc edema. ddx 2/2 HTN retinopathy versus cavernous sinus thrombosis. 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 Concern for pathology in the cavernous sinus or AV fistula, confirmed as per discussion with neuro-radiology.  MRV without contrast demonstrated no dural venous thrombosis, however it was without contrast and would have better visualization of cavernous sinus and internal carotid with CTA/CTV.  /101 on 8/16, still ranging in the 180's systolic, now improving in the 170s. Unable to perform MRI with gadolinium, MRV venography 2/2 renal issues.  - Discussed extensively with patient; recommend obtaining CTV/CTA with contrast in setting of MR with findings suggestive of etiology other than HTN retinopathy. (proptosis and ophthalmic vein dilation; uncommon in isolated hypertensive etiology and must rule out cavernous sinus pathology or possible AV fistula) Reviewed with patient that the case was discussed with nephrology; no contraindication to contrast in setting of ESRD, concern for volume overload; would therefore recommend CT performed prior to HD.   Patient expressed an understanding and wishes to defer contrast studies at this time.  Pt understands the risk of not having the imaging which includes a delayed diagnosis in brain pathology which could lead to a worsening of his medical condition.  - Will assess patient for interval change if remains admitted; please notify ophthalmology on call if any acute changes  - Outpatient follow up stressed to patient to monitor for improvement or need for further work up and assessment  - Findings and plan discussed with patient and primary team    Follow-Up:  Patient should follow up his/her ophthalmologist or in the [facility] within 1 week of discharge, sooner if symptoms worsen or change.  Neuro-ophthalmology   [address] [place] [address] [id number] [phone]    Primary team; please make an appointment for the patient prior to discharge to ensure proper outpatient follow up and compliance     d/w Dr. Monk

## 2018-08-22 NOTE — PROGRESS NOTE ADULT - ASSESSMENT
57 yo gentleman w/pmhx of HTN, ESRD s/p renal transplant x 2 but now requiring HD again (T Th Sat),  presenting with sudden vision changes x 5 days in the setting of elevated  blood pressures. It is binocular. Nonfocal neurological examination. Finding of retinal hemorrhages on ophthalmological evaluation.   -CTH: no evidence of intracranial hemorrhage, mass, midline shift    Concern w/ htn emergency: htn retinopathy/optic neuropathy    MRI brain no acute stroke, no PRES. ? +dilated superior ophthalmic veins and optic nerve edema ? rule out cavernous carotid fistula  initial ophthalmology eval feel cavernous carotid fistula not clinically likely in setting of bilaterality and clinical presentation. after resident d/w Dr. Mcrae, CTA/V to be considered    Clinically, suspicion of bilateral CC fistula very unlikely.  Pt demonstrates reservation about CT contrast and prefers study not to be done.  From neuro perspective, it is reasonable to continue clinical observation with BP control.   Vision appears stable on bedside exam.    Plan:    - Continue optimize BP control as per primary team and renal  - Ophthalmology highly recommending CTA/CTV with contrast. Discussed at length with pt. Risks and benefits as per ophtho and nephrology. For now pt deferring  - neuro checks q 4  - Close observation  - Supportive care  - Also reviewed left acoustic neuroma with pt, advised outpatient MRI surveillance.  Reviewed above with pt at bedside,

## 2018-08-22 NOTE — PROGRESS NOTE ADULT - SUBJECTIVE AND OBJECTIVE BOX
Neurology Follow Up  Subjective:  vision the same.  no new c/o.  no headache except after HD    Medications:  acetaminophen   Tablet. 650 milliGRAM(s) Oral every 6 hours PRN  ascorbic acid 500 milliGRAM(s) Oral daily  Butalbital, acetaminophen/caffeine 1 Tablet(s) 1 Tablet(s) Oral daily  calcitriol   Capsule 0.25 MICROGram(s) Oral daily  chlorhexidine 4% Liquid 1 Application(s) Topical <User Schedule>  cholecalciferol 2000 Unit(s) Oral daily  cloNIDine Patch 0.3 mG/24Hr(s) 1 patch Topical every 7 days  furosemide    Tablet 80 milliGRAM(s) Oral daily  losartan 50 milliGRAM(s) Oral two times a day  minoxidil 2.5 milliGRAM(s) Oral at bedtime  Nephro-martha 1 Tablet(s) Oral daily  NIFEdipine XL 60 milliGRAM(s) Oral <User Schedule>  ondansetron Injectable 4 milliGRAM(s) IV Push every 6 hours PRN  predniSONE   Tablet 5 milliGRAM(s) Oral daily  tacrolimus 1 milliGRAM(s) Oral every 12 hours  trimethoprim  160 mG/sulfamethoxazole 800 mG 1 Tablet(s) Oral <User Schedule>    Labs:  CBC Full  -  ( 20 Aug 2018 16:09 )  WBC Count : 8.94 K/uL  Hemoglobin : 11.5 g/dL  Hematocrit : 36.5 %  Platelet Count - Automated : 336 K/uL  Mean Cell Volume : 80.9 fl  Mean Cell Hemoglobin : 25.5 pg  Mean Cell Hemoglobin Concentration : 31.5 gm/dL  Auto Neutrophil # : x  Auto Lymphocyte # : x  Auto Monocyte # : x  Auto Eosinophil # : x  Auto Basophil # : x  Auto Neutrophil % : x  Auto Lymphocyte % : x  Auto Monocyte % : x  Auto Eosinophil % : x  Auto Basophil % : x  08-21  138  |  96  |  65<H>  ----------------------------<  127<H>  6.1<H>   |  24  |  8.88<H>  Ca    9.4      21 Aug 2018 13:47  CAPILLARY BLOOD GLUCOSE    Vitals:  Vital Signs Last 24 Hrs  T(C): 36.9 (21 Aug 2018 13:30), Max: 37.2 (21 Aug 2018 11:49)  T(F): 98.5 (21 Aug 2018 13:30), Max: 98.9 (21 Aug 2018 11:49)  HR: 65 (21 Aug 2018 13:30) (61 - 74)  BP: 179/95 (21 Aug 2018 13:30) (163/93 - 179/95)  BP(mean): --  RR: 18 (21 Aug 2018 13:30) (18 - 18)  SpO2: 96% (21 Aug 2018 13:30) (93% - 97%)    Cranial Nerves: Pupils were equal, round, reactive to light. No APD. VFFC. VA OS 20/70, OD 20/50-2. Extraocular movements were intact. No nystagmus. Facial sensation was intact to light touch. There was no facial asymmetry. The palate was upgoing symmetrically, tongue midline with tremor. Shoulder shrug was full bilaterally.     Motor exam: Bulk and tone were normal. Patient was moving all extremities. Able to lift legs antigravity.  No tremors. No pronator drift.     Reflexes: 1+ in the bilateral upper extremities. 1+ in the bilateral lower extremities. Toes were downgoing bilaterally.     Sensation: Intact to light touch    Coordination: no gross dysmetria    Gait: Not tested.    Phosphorus Level, Serum: 6.3 mg/dL (08-17 @ 07:16)    < from: CT Head No Cont (08.16.18 @ 20:46) >  FINDINGS:     There is no evidence of mass or acute intracranial hemorrhage. Ventricles   and sulci are normal in size and configuration for the patient's stated   age. No midline shift or other significant mass effect is noted. There is   no CT evidence of acute territorial infarct.     The visualized paranasal sinuses and tympanomastoid spaces are clear.   Orbits and orbital contents are unremarkable.    There is no depressed calvarial fracture.    IMPRESSION:     No evidence of acute intracranial hemorrhage, midline shift or CT   evidence of acute territorial infarct.    If the patient's symptoms persist, consider short interval follow-up head   CT or brain MRI if there are no MRI contraindications.  EXAM:  MR VENOGRAM BRAIN                          EXAM:  MR BRAIN                            PROCEDURE DATE:  08/18/2018            INTERPRETATION:  History: Blurry vision. Optic disc edema bilaterally.   Elevated creatinine levels. End-stage renaldisease on hemodialysis.    Description: Noncontrast MRI studies of the brain and orbits, and a   noncontrast brain MRV (with noncontrast 2-D time-of-flight technique)   were performed.    Comparison is made to the head CT study from 08/16/2018.    The brain MRI was performed with sagittal T1, axial SPGR, T2, FLAIR,   gradient, SWI, and diffusion-weighted series. The orbital MRI was   performed with thin section high-resolution axial/coronal T2 fat   saturation series.    The study is limited by motion, especially on the orbital series.    Marked dilatation of the bilateral superior ophthalmic veins is noted.   Bilateral eyelid edema and mild bilateral proptosis are noted. Vague   increased T2 signal involves the orbital segments of both optic nerves,   best appreciated on the axial thin section T2 orbital series, consistent   with nonspecific edema.    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 Evaluation is limited by   lack of intravenous contrast. Serial imaging follow-up over time is   recommended to monitor for stability and to exclude other etiologies.    There is no evidence for acute infarct, acute hemorrhage, or   hydrocephalus. Mild chronic white matter changes are noted with   associated patchy increased T2 and FLAIR signal.    On the MRV, there is no evidence of dural venous sinus thrombosis.   Evaluation of the cavernous sinuses is quite limited with noncontrast MRV   technique.    IMPRESSION:    No evidence for acute infarct, acute hemorrhage, dural venous sinus   thrombosis, or hydrocephalus.    Marked dilatation of the bilateral superior ophthalmic veins is noted. A   cavernous carotid fistula or other etiology can't be excluded. A   follow-up brain MRA is recommended for further workup. Evaluation of the   cavernous sinuses limited with noncontrast MRV technique. This could also   be further evaluated with CTA/CTV followed by hemodialysis.    Nonspecific patchy edema involves both optic nerves.    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 Evaluation is limited by   lack of intravenous contrast. Serial imaging follow-up over time is   recommended to monitor for stability and to exclude other etiologies.      ADAN ALVARADO M.D., ATTENDING RADIOLOGIST      MRV; On the MRV, there is no evidence of dural venous sinus thrombosis.   Evaluation of the cavernous sinuses is quite limited with noncontrast MRV   technique.

## 2018-08-22 NOTE — DIETITIAN INITIAL EVALUATION ADULT. - OTHER INFO
Nutrition consult received. Pt reports some intentional wt loss with 'sensible diet' and healthier lifestyle, reports usual current dry wt as 84kg (185 pounds). Per previous RD notes, pt wt was documented as 191.5 pounds (11/1/2017), 198.4 pounds - 2/2 fluid shift (3/22/2018), current wt is 186.7 pounds - stable from reported current dry wt. Pt with good appetite, observed 100% po intake of breakfast at visit. No GI distress, +BM yesterday. Pt denies chewing/swallowing difficulties. NKFA, but avoids MSG. Pt takes nephrocaps, CoQ10, omega 3, calcium and vitamin D3 supplements. Noted pt currently with HTN and ESRD receiving Regular diet per pt' request. Pt states he has been making appropriate renal restrictions himself, states he has been avoiding foods high in sodium/potassium. Noted pt current with hyperkalemia and HTN, attributes the elevated potassium to taking Cozaar. Reviewed pt's meal choices from yesterday which contained roasted tomato soup and roasted potato - all high in potassium - discussed with NP.

## 2018-08-22 NOTE — DIETITIAN INITIAL EVALUATION ADULT. - NS AS NUTRI INTERV MEALS SNACK
Pt requesting to remain on regular diet despite elevated blood pressure and renal indices - discussed with NP. RD remains available. Pt requesting to remain on regular diet despite elevated blood pressure and renal indices. Pt agrees to try no concentrated potassium and low sodium diet only if he is allowed to have apple, grape, strawberry, low sodium Kosher vegetable soups with his meals - foods with moderate amount of potassium. Pt aware, but still requesting and only will try dietary restrictions if these food items are allowed- discussed with NP to add as provider to RN. RD remains available.

## 2018-08-22 NOTE — DIETITIAN INITIAL EVALUATION ADULT. - ADHERENCE
Pt states he has been following strict renal diet PTA, avoiding high sodium/potassium/phosphorous foods, able to teach back on renal diet restriction.

## 2018-08-22 NOTE — PROGRESS NOTE ADULT - ASSESSMENT
59yo M w/pmhx of HTN, ESRD s/p renal transplant x 2 but now requiring HD again (T Th Sat), now presenting with vision changes x 5 days as well as elevated blood pressures, admitted w/ htn emergency.     1. HTN urgency   likely in the setting of ESRD  still with elevated readings, continue current medication regimen  would up-titrate minoxidil  renal f/u     2. Blurred vision   MRI brain no acute stroke, no PRES. ? +dilated superior ophthalmic veins and optic nerve edema  opthalmology / neurology f/u     3. ESRD  renal f/u   continue fluid removal with HD and lasix     dvt ppx

## 2018-08-22 NOTE — PROGRESS NOTE ADULT - ASSESSMENT
59yo M w/pmhx of HTN, ESRD s/p renal transplant x 2 but now requiring HD again (T Th Sat), now presenting with accelerated hypertension with hypertensive emergency (w/visual changes).      Problem/Plan - 1:  ·  Problem: Visual changes.  Plan: Ophthalmology fu  BP control   Minoxidil  Pt refused CTA/CTV brain.    Problem/Plan - 2:  ·  Problem: Accelerated hypertension.  Plan: management as per cards  renal fu    Problem/Plan - 3:  ·  Problem: ESRD (end stage renal disease) on dialysis.  Plan:   Renal f/up appreciated    Problem/Plan - 4:  ·  Problem: Renal transplant recipient.  Plan: tacrolimus 1mg BID  Prednisone 5mg po daily.   renal fu

## 2018-08-22 NOTE — DIETITIAN INITIAL EVALUATION ADULT. - ENERGY NEEDS
Height: 71 inches, Weight: 186.7 pounds  BMI: 26 kg/m2 IBW: 172 pounds (+/-10%), %IBW: 109%  Pertinent Info: Per chart, 57 y/o male with PMH failed renal transplant 2 x now with ESRD on HD, admitted with HTN and visual disturbance. +1 bilateral feet edema, no pressure ulcers noted at this time.

## 2018-08-22 NOTE — PROGRESS NOTE ADULT - SUBJECTIVE AND OBJECTIVE BOX
Patient is a 58y old  Male who presents with a chief complaint of Accelerated hypertension/hypertensive emergency/vision changes (17 Aug 2018 16:30)      SUBJECTIVE / OVERNIGHT EVENTS:   Feels better.  Denies CP/SOB/Palpitation/HA.    MEDICATIONS  (STANDING):  ascorbic acid 500 milliGRAM(s) Oral daily  Butalbital, acetaminophen/caffeine 1 Tablet(s) 1 Tablet(s) Oral daily  calcitriol   Capsule 0.25 MICROGram(s) Oral daily  chlorhexidine 4% Liquid 1 Application(s) Topical <User Schedule>  cholecalciferol 2000 Unit(s) Oral daily  cloNIDine Patch 0.3 mG/24Hr(s) 1 patch Topical every 7 days  furosemide    Tablet 80 milliGRAM(s) Oral daily  losartan 50 milliGRAM(s) Oral two times a day  minoxidil 5 milliGRAM(s) Oral at bedtime  Nephro-martha 1 Tablet(s) Oral daily  NIFEdipine XL 60 milliGRAM(s) Oral <User Schedule>  predniSONE   Tablet 5 milliGRAM(s) Oral daily  tacrolimus 1 milliGRAM(s) Oral every 12 hours  trimethoprim  160 mG/sulfamethoxazole 800 mG 1 Tablet(s) Oral <User Schedule>    MEDICATIONS  (PRN):  acetaminophen   Tablet. 650 milliGRAM(s) Oral every 6 hours PRN Mild Pain (1 - 3)  ondansetron Injectable 4 milliGRAM(s) IV Push every 6 hours PRN give with hydralazine pushes        CAPILLARY BLOOD GLUCOSE        I&O's Summary    21 Aug 2018 07:01  -  22 Aug 2018 07:00  --------------------------------------------------------  IN: 600 mL / OUT: 2300 mL / NET: -1700 mL    22 Aug 2018 07:01  -  22 Aug 2018 22:29  --------------------------------------------------------  IN: 440 mL / OUT: 0 mL / NET: 440 mL        PHYSICAL EXAM:  GENERAL: NAD, well-developed  HEAD:  Atraumatic, Normocephalic  NECK: Supple, No JVD  CHEST/LUNG: Clear to auscultation bilaterally; No wheezing.  HEART: Regular rate and rhythm; No murmurs, rubs, or gallops  ABDOMEN: Soft, Nontender, Nondistended; Bowel sounds present  EXTREMITIES:   No clubbing, cyanosis, or edema  NEUROLOGY: AAO X 3  SKIN: No rashes    LABS:    08-21    138  |  96  |  65<H>  ----------------------------<  127<H>  6.1<H>   |  24  |  8.88<H>    Ca    9.4      21 Aug 2018 13:47              CAPILLARY BLOOD GLUCOSE                    RADIOLOGY & ADDITIONAL TESTS:    Imaging Personally Reviewed:    Consultant(s) Notes Reviewed:      Care Discussed with Consultants/Other Providers:

## 2018-08-23 LAB
ANION GAP SERPL CALC-SCNC: 18 MMOL/L — HIGH (ref 5–17)
BUN SERPL-MCNC: 66 MG/DL — HIGH (ref 7–23)
CALCIUM SERPL-MCNC: 9.7 MG/DL — SIGNIFICANT CHANGE UP (ref 8.4–10.5)
CHLORIDE SERPL-SCNC: 94 MMOL/L — LOW (ref 96–108)
CO2 SERPL-SCNC: 24 MMOL/L — SIGNIFICANT CHANGE UP (ref 22–31)
CREAT SERPL-MCNC: 8.93 MG/DL — HIGH (ref 0.5–1.3)
GLUCOSE SERPL-MCNC: 132 MG/DL — HIGH (ref 70–99)
HCT VFR BLD CALC: 33.1 % — LOW (ref 39–50)
HGB BLD-MCNC: 10.3 G/DL — LOW (ref 13–17)
MCHC RBC-ENTMCNC: 25.1 PG — LOW (ref 27–34)
MCHC RBC-ENTMCNC: 31.1 GM/DL — LOW (ref 32–36)
MCV RBC AUTO: 80.7 FL — SIGNIFICANT CHANGE UP (ref 80–100)
PLATELET # BLD AUTO: 365 K/UL — SIGNIFICANT CHANGE UP (ref 150–400)
POTASSIUM SERPL-MCNC: 5.7 MMOL/L — HIGH (ref 3.5–5.3)
POTASSIUM SERPL-SCNC: 5.7 MMOL/L — HIGH (ref 3.5–5.3)
RBC # BLD: 4.1 M/UL — LOW (ref 4.2–5.8)
RBC # FLD: 19.1 % — HIGH (ref 10.3–14.5)
SODIUM SERPL-SCNC: 136 MMOL/L — SIGNIFICANT CHANGE UP (ref 135–145)
TACROLIMUS SERPL-MCNC: <2 NG/ML — SIGNIFICANT CHANGE UP
WBC # BLD: 10.65 K/UL — HIGH (ref 3.8–10.5)
WBC # FLD AUTO: 10.65 K/UL — HIGH (ref 3.8–10.5)

## 2018-08-23 PROCEDURE — 90935 HEMODIALYSIS ONE EVALUATION: CPT | Mod: GC

## 2018-08-23 RX ORDER — HYDROMORPHONE HYDROCHLORIDE 2 MG/ML
1 INJECTION INTRAMUSCULAR; INTRAVENOUS; SUBCUTANEOUS ONCE
Qty: 0 | Refills: 0 | Status: DISCONTINUED | OUTPATIENT
Start: 2018-08-23 | End: 2018-08-23

## 2018-08-23 RX ORDER — DOCUSATE SODIUM 100 MG
100 CAPSULE ORAL ONCE
Qty: 0 | Refills: 0 | Status: COMPLETED | OUTPATIENT
Start: 2018-08-23 | End: 2018-08-23

## 2018-08-23 RX ADMIN — Medication 2000 UNIT(S): at 14:09

## 2018-08-23 RX ADMIN — Medication 80 MILLIGRAM(S): at 14:08

## 2018-08-23 RX ADMIN — Medication 5 MILLIGRAM(S): at 21:00

## 2018-08-23 RX ADMIN — Medication 5 MILLIGRAM(S): at 14:08

## 2018-08-23 RX ADMIN — TACROLIMUS 1 MILLIGRAM(S): 5 CAPSULE ORAL at 11:04

## 2018-08-23 RX ADMIN — Medication 1 TABLET(S): at 14:11

## 2018-08-23 RX ADMIN — CHLORHEXIDINE GLUCONATE 1 APPLICATION(S): 213 SOLUTION TOPICAL at 14:13

## 2018-08-23 RX ADMIN — HYDROMORPHONE HYDROCHLORIDE 1 MILLIGRAM(S): 2 INJECTION INTRAMUSCULAR; INTRAVENOUS; SUBCUTANEOUS at 14:30

## 2018-08-23 RX ADMIN — Medication 60 MILLIGRAM(S): at 14:08

## 2018-08-23 RX ADMIN — Medication 500 MILLIGRAM(S): at 14:08

## 2018-08-23 RX ADMIN — HYDROMORPHONE HYDROCHLORIDE 1 MILLIGRAM(S): 2 INJECTION INTRAMUSCULAR; INTRAVENOUS; SUBCUTANEOUS at 14:06

## 2018-08-23 RX ADMIN — LOSARTAN POTASSIUM 50 MILLIGRAM(S): 100 TABLET, FILM COATED ORAL at 05:14

## 2018-08-23 RX ADMIN — HYDROMORPHONE HYDROCHLORIDE 1 MILLIGRAM(S): 2 INJECTION INTRAMUSCULAR; INTRAVENOUS; SUBCUTANEOUS at 17:33

## 2018-08-23 RX ADMIN — HYDROMORPHONE HYDROCHLORIDE 1 MILLIGRAM(S): 2 INJECTION INTRAMUSCULAR; INTRAVENOUS; SUBCUTANEOUS at 18:10

## 2018-08-23 RX ADMIN — LOSARTAN POTASSIUM 50 MILLIGRAM(S): 100 TABLET, FILM COATED ORAL at 17:09

## 2018-08-23 RX ADMIN — TACROLIMUS 1 MILLIGRAM(S): 5 CAPSULE ORAL at 23:14

## 2018-08-23 RX ADMIN — CALCITRIOL 0.25 MICROGRAM(S): 0.5 CAPSULE ORAL at 14:10

## 2018-08-23 RX ADMIN — Medication 100 MILLIGRAM(S): at 23:16

## 2018-08-23 RX ADMIN — ONDANSETRON 4 MILLIGRAM(S): 8 TABLET, FILM COATED ORAL at 19:47

## 2018-08-23 RX ADMIN — Medication 60 MILLIGRAM(S): at 17:49

## 2018-08-23 NOTE — PROGRESS NOTE ADULT - SUBJECTIVE AND OBJECTIVE BOX
[facility] DIVISION OF KIDNEY DISEASES AND HYPERTENSION   --------------------------------------------------------------------------------  Chief Complaint: ESRD/Ongoing hemodialysis requirement    24 hour events/subjective:    ESRD seen on HD. No headaches    PAST HISTORY  --------------------------------------------------------------------------------  No significant changes to PMH, PSH, FHx, SHx, unless otherwise noted    ALLERGIES & MEDICATIONS  --------------------------------------------------------------------------------  Allergies    No Known Allergies    Intolerances    beta blockers (Other (Mod to Severe))    Standing Inpatient Medications  ascorbic acid 500 milliGRAM(s) Oral daily  Butalbital, acetaminophen/caffeine 1 Tablet(s) 1 Tablet(s) Oral daily  calcitriol   Capsule 0.25 MICROGram(s) Oral daily  chlorhexidine 4% Liquid 1 Application(s) Topical <User Schedule>  cholecalciferol 2000 Unit(s) Oral daily  cloNIDine Patch 0.3 mG/24Hr(s) 1 patch Topical every 7 days  furosemide    Tablet 80 milliGRAM(s) Oral daily  losartan 50 milliGRAM(s) Oral two times a day  minoxidil 5 milliGRAM(s) Oral at bedtime  Nephro-martha 1 Tablet(s) Oral daily  NIFEdipine XL 60 milliGRAM(s) Oral <User Schedule>  predniSONE   Tablet 5 milliGRAM(s) Oral daily  tacrolimus 1 milliGRAM(s) Oral every 12 hours  trimethoprim  160 mG/sulfamethoxazole 800 mG 1 Tablet(s) Oral <User Schedule>    PRN Inpatient Medications  acetaminophen   Tablet. 650 milliGRAM(s) Oral every 6 hours PRN  ondansetron Injectable 4 milliGRAM(s) IV Push every 6 hours PRN      REVIEW OF SYSTEMS  --------------------------------------------------------------------------------  no headaches    All other systems were reviewed and are negative, except as noted.    VITALS/PHYSICAL EXAM  --------------------------------------------------------------------------------  T(C): 36.7 (08-23-18 @ 13:48), Max: 36.8 (08-22-18 @ 20:58)  HR: 83 (08-23-18 @ 13:48) (69 - 83)  BP: 177/96 (08-23-18 @ 13:48) (170/93 - 180/93)  RR: 18 (08-23-18 @ 13:48) (18 - 18)  SpO2: 97% (08-23-18 @ 13:48) (95% - 98%)  Wt(kg): --        08-22-18 @ 07:01  -  08-23-18 @ 07:00  --------------------------------------------------------  IN: 440 mL / OUT: 0 mL / NET: 440 mL    08-23-18 @ 07:01  -  08-23-18 @ 16:44  --------------------------------------------------------  IN: 900 mL / OUT: 0 mL / NET: 900 mL      Physical Exam:  	Gen: NAD, well-appearing  	Pulm: CTA B/L  	CV: RRR, S1S2; no rub  	Abd: +BS, soft, nontender/nondistended  	: No suprapubic tenderness  	LE: Warm, FROM, no clubbing, intact strength; 2+ edema  	Skin: Warm, without rashes  	Vascular access: avf    LABS/STUDIES  --------------------------------------------------------------------------------              10.3   10.65 >-----------<  365      [08-23-18 @ 14:19]              33.1     136  |  94  |  66  ----------------------------<  132      [08-23-18 @ 10:46]  5.7   |  24  |  8.93        Ca     9.7     [08-23-18 @ 10:46]            Iron 24, TIBC 161, %sat 15      [03-20-18 @ 11:31]  Ferritin 250      [03-19-18 @ 20:02]    HBsAg Nonreact      [08-18-18 @ 19:01]  HCV 0.14, Nonreact      [08-18-18 @ 19:01]

## 2018-08-23 NOTE — PROGRESS NOTE ADULT - SUBJECTIVE AND OBJECTIVE BOX
CARDIOLOGY FOLLOW UP - Dr. Real    CC no cp/sob       PHYSICAL EXAM:  T(C): 36.6 (08-23-18 @ 04:55), Max: 36.8 (08-22-18 @ 20:58)  HR: 75 (08-23-18 @ 04:55) (69 - 83)  BP: 179/90 (08-23-18 @ 04:55) (173/95 - 180/93)  RR: 18 (08-23-18 @ 04:55) (17 - 18)  SpO2: 95% (08-23-18 @ 04:55) (95% - 97%)  Wt(kg): --  I&O's Summary    22 Aug 2018 07:01  -  23 Aug 2018 07:00  --------------------------------------------------------  IN: 440 mL / OUT: 0 mL / NET: 440 mL        Appearance: Normal	  Cardiovascular: Normal S1 S2,RRR, No JVD, No murmurs  Respiratory: Lungs clear to auscultation	  Gastrointestinal:  Soft, Non-tender, + BS	  Extremities: Normal range of motion, No clubbing, b/l le edema       MEDICATIONS  (STANDING):  ascorbic acid 500 milliGRAM(s) Oral daily  Butalbital, acetaminophen/caffeine 1 Tablet(s) 1 Tablet(s) Oral daily  calcitriol   Capsule 0.25 MICROGram(s) Oral daily  chlorhexidine 4% Liquid 1 Application(s) Topical <User Schedule>  cholecalciferol 2000 Unit(s) Oral daily  cloNIDine Patch 0.3 mG/24Hr(s) 1 patch Topical every 7 days  furosemide    Tablet 80 milliGRAM(s) Oral daily  losartan 50 milliGRAM(s) Oral two times a day  minoxidil 5 milliGRAM(s) Oral at bedtime  Nephro-martha 1 Tablet(s) Oral daily  NIFEdipine XL 60 milliGRAM(s) Oral <User Schedule>  predniSONE   Tablet 5 milliGRAM(s) Oral daily  tacrolimus 1 milliGRAM(s) Oral every 12 hours  trimethoprim  160 mG/sulfamethoxazole 800 mG 1 Tablet(s) Oral <User Schedule>      TELEMETRY: 	    ECG:  	  RADIOLOGY:   DIAGNOSTIC TESTING:  [ ] Echocardiogram:  [ ]  Catheterization:  [ ] Stress Test:    OTHER: 	    LABS:	 	            08-21    138  |  96  |  65<H>  ----------------------------<  127<H>  6.1<H>   |  24  |  8.88<H>    Ca    9.4      21 Aug 2018 13:47

## 2018-08-23 NOTE — PROGRESS NOTE ADULT - ASSESSMENT
57yo M w/pmhx of HTN, ESRD s/p renal transplant x 2 but now requiring HD again (T Th Sat), now presenting with vision changes x 5 days as well as elevated blood pressures, admitted w/ htn emergency.     1. HTN urgency   likely in the setting of ESRD  still with elevated readings, continue current medication regimen  minoxidil increased yesterday, would continue to up-titrate   renal f/u     2. Blurred vision   MRI brain no acute stroke, no PRES. ? +dilated superior ophthalmic veins and optic nerve edema  opthalmology / neurology f/u     3. ESRD  renal f/u   continue fluid removal with HD and lasix     dvt ppx

## 2018-08-24 PROCEDURE — 99232 SBSQ HOSP IP/OBS MODERATE 35: CPT | Mod: GC

## 2018-08-24 RX ORDER — HYDROMORPHONE HYDROCHLORIDE 2 MG/ML
1 INJECTION INTRAMUSCULAR; INTRAVENOUS; SUBCUTANEOUS ONCE
Qty: 0 | Refills: 0 | Status: DISCONTINUED | OUTPATIENT
Start: 2018-08-24 | End: 2018-08-24

## 2018-08-24 RX ORDER — MINOXIDIL 10 MG
5 TABLET ORAL
Qty: 0 | Refills: 0 | Status: DISCONTINUED | OUTPATIENT
Start: 2018-08-24 | End: 2018-08-29

## 2018-08-24 RX ADMIN — Medication 5 MILLIGRAM(S): at 18:22

## 2018-08-24 RX ADMIN — Medication 1 TABLET(S): at 11:13

## 2018-08-24 RX ADMIN — Medication 80 MILLIGRAM(S): at 11:14

## 2018-08-24 RX ADMIN — HYDROMORPHONE HYDROCHLORIDE 1 MILLIGRAM(S): 2 INJECTION INTRAMUSCULAR; INTRAVENOUS; SUBCUTANEOUS at 05:37

## 2018-08-24 RX ADMIN — Medication 500 MILLIGRAM(S): at 11:13

## 2018-08-24 RX ADMIN — CHLORHEXIDINE GLUCONATE 1 APPLICATION(S): 213 SOLUTION TOPICAL at 14:57

## 2018-08-24 RX ADMIN — Medication 2000 UNIT(S): at 11:13

## 2018-08-24 RX ADMIN — LOSARTAN POTASSIUM 50 MILLIGRAM(S): 100 TABLET, FILM COATED ORAL at 18:22

## 2018-08-24 RX ADMIN — Medication 1 TABLET(S): at 11:14

## 2018-08-24 RX ADMIN — Medication 5 MILLIGRAM(S): at 11:13

## 2018-08-24 RX ADMIN — CALCITRIOL 0.25 MICROGRAM(S): 0.5 CAPSULE ORAL at 11:14

## 2018-08-24 RX ADMIN — Medication 60 MILLIGRAM(S): at 23:47

## 2018-08-24 RX ADMIN — HYDROMORPHONE HYDROCHLORIDE 1 MILLIGRAM(S): 2 INJECTION INTRAMUSCULAR; INTRAVENOUS; SUBCUTANEOUS at 05:20

## 2018-08-24 RX ADMIN — TACROLIMUS 1 MILLIGRAM(S): 5 CAPSULE ORAL at 11:13

## 2018-08-24 RX ADMIN — LOSARTAN POTASSIUM 50 MILLIGRAM(S): 100 TABLET, FILM COATED ORAL at 05:49

## 2018-08-24 RX ADMIN — ONDANSETRON 4 MILLIGRAM(S): 8 TABLET, FILM COATED ORAL at 05:17

## 2018-08-24 RX ADMIN — Medication 60 MILLIGRAM(S): at 11:13

## 2018-08-24 NOTE — PROGRESS NOTE ADULT - SUBJECTIVE AND OBJECTIVE BOX
Neurology Follow Up  Subjective:  vision the same.  had HA after HD    Medications:  acetaminophen   Tablet. 650 milliGRAM(s) Oral every 6 hours PRN  ascorbic acid 500 milliGRAM(s) Oral daily  Butalbital, acetaminophen/caffeine 1 Tablet(s) 1 Tablet(s) Oral daily  calcitriol   Capsule 0.25 MICROGram(s) Oral daily  chlorhexidine 4% Liquid 1 Application(s) Topical <User Schedule>  cholecalciferol 2000 Unit(s) Oral daily  cloNIDine Patch 0.3 mG/24Hr(s) 1 patch Topical every 7 days  furosemide    Tablet 80 milliGRAM(s) Oral daily  losartan 50 milliGRAM(s) Oral two times a day  minoxidil 2.5 milliGRAM(s) Oral at bedtime  Nephro-martha 1 Tablet(s) Oral daily  NIFEdipine XL 60 milliGRAM(s) Oral <User Schedule>  ondansetron Injectable 4 milliGRAM(s) IV Push every 6 hours PRN  predniSONE   Tablet 5 milliGRAM(s) Oral daily  tacrolimus 1 milliGRAM(s) Oral every 12 hours  trimethoprim  160 mG/sulfamethoxazole 800 mG 1 Tablet(s) Oral <User Schedule>    Labs:  CBC Full  -  ( 20 Aug 2018 16:09 )  WBC Count : 8.94 K/uL  Hemoglobin : 11.5 g/dL  Hematocrit : 36.5 %  Platelet Count - Automated : 336 K/uL  Mean Cell Volume : 80.9 fl  Mean Cell Hemoglobin : 25.5 pg  Mean Cell Hemoglobin Concentration : 31.5 gm/dL  Auto Neutrophil # : x  Auto Lymphocyte # : x  Auto Monocyte # : x  Auto Eosinophil # : x  Auto Basophil # : x  Auto Neutrophil % : x  Auto Lymphocyte % : x  Auto Monocyte % : x  Auto Eosinophil % : x  Auto Basophil % : x  08-21  138  |  96  |  65<H>  ----------------------------<  127<H>  6.1<H>   |  24  |  8.88<H>  Ca    9.4      21 Aug 2018 13:47  CAPILLARY BLOOD GLUCOSE    Vitals:  Vital Signs Last 24 Hrs  T(C): 36.9 (21 Aug 2018 13:30), Max: 37.2 (21 Aug 2018 11:49)  T(F): 98.5 (21 Aug 2018 13:30), Max: 98.9 (21 Aug 2018 11:49)  HR: 65 (21 Aug 2018 13:30) (61 - 74)  BP: 179/95 (21 Aug 2018 13:30) (163/93 - 179/95)  BP(mean): --  RR: 18 (21 Aug 2018 13:30) (18 - 18)  SpO2: 96% (21 Aug 2018 13:30) (93% - 97%)    Cranial Nerves: Pupils were equal, round, reactive to light. No APD. VFFC. VA OS 20/70, OD 20/50-2. Extraocular movements were intact. No nystagmus. Facial sensation was intact to light touch. There was no facial asymmetry. The palate was upgoing symmetrically, tongue midline with tremor. Shoulder shrug was full bilaterally.     Motor exam: Bulk and tone were normal. Patient was moving all extremities. Able to lift legs antigravity.  No tremors. No pronator drift.     Reflexes: 1+ in the bilateral upper extremities. 1+ in the bilateral lower extremities. Toes were downgoing bilaterally.     Sensation: Intact to light touch    Coordination: no gross dysmetria    Gait: Not tested.    Phosphorus Level, Serum: 6.3 mg/dL (08-17 @ 07:16)    < from: CT Head No Cont (08.16.18 @ 20:46) >  FINDINGS:     There is no evidence of mass or acute intracranial hemorrhage. Ventricles   and sulci are normal in size and configuration for the patient's stated   age. No midline shift or other significant mass effect is noted. There is   no CT evidence of acute territorial infarct.     The visualized paranasal sinuses and tympanomastoid spaces are clear.   Orbits and orbital contents are unremarkable.    There is no depressed calvarial fracture.    IMPRESSION:     No evidence of acute intracranial hemorrhage, midline shift or CT   evidence of acute territorial infarct.    If the patient's symptoms persist, consider short interval follow-up head   CT or brain MRI if there are no MRI contraindications.  EXAM:  MR VENOGRAM BRAIN                          EXAM:  MR BRAIN                            PROCEDURE DATE:  08/18/2018            INTERPRETATION:  History: Blurry vision. Optic disc edema bilaterally.   Elevated creatinine levels. End-stage renaldisease on hemodialysis.    Description: Noncontrast MRI studies of the brain and orbits, and a   noncontrast brain MRV (with noncontrast 2-D time-of-flight technique)   were performed.    Comparison is made to the head CT study from 08/16/2018.    The brain MRI was performed with sagittal T1, axial SPGR, T2, FLAIR,   gradient, SWI, and diffusion-weighted series. The orbital MRI was   performed with thin section high-resolution axial/coronal T2 fat   saturation series.    The study is limited by motion, especially on the orbital series.    Marked dilatation of the bilateral superior ophthalmic veins is noted.   Bilateral eyelid edema and mild bilateral proptosis are noted. Vague   increased T2 signal involves the orbital segments of both optic nerves,   best appreciated on the axial thin section T2 orbital series, consistent   with nonspecific edema.    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 Evaluation is limited by   lack of intravenous contrast. Serial imaging follow-up over time is   recommended to monitor for stability and to exclude other etiologies.    There is no evidence for acute infarct, acute hemorrhage, or   hydrocephalus. Mild chronic white matter changes are noted with   associated patchy increased T2 and FLAIR signal.    On the MRV, there is no evidence of dural venous sinus thrombosis.   Evaluation of the cavernous sinuses is quite limited with noncontrast MRV   technique.    IMPRESSION:    No evidence for acute infarct, acute hemorrhage, dural venous sinus   thrombosis, or hydrocephalus.    Marked dilatation of the bilateral superior ophthalmic veins is noted. A   cavernous carotid fistula or other etiology can't be excluded. A   follow-up brain MRA is recommended for further workup. Evaluation of the   cavernous sinuses limited with noncontrast MRV technique. This could also   be further evaluated with CTA/CTV followed by hemodialysis.    Nonspecific patchy edema involves both optic nerves.    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 Evaluation is limited by   lack of intravenous contrast. Serial imaging follow-up over time is   recommended to monitor for stability and to exclude other etiologies.      ADAN ALVARADO M.D., ATTENDING RADIOLOGIST      MRV; On the MRV, there is no evidence of dural venous sinus thrombosis.   Evaluation of the cavernous sinuses is quite limited with noncontrast MRV   technique.

## 2018-08-24 NOTE — CHART NOTE - NSCHARTNOTEFT_GEN_A_CORE
Called by RN pt c/o severe headache.   Pt seen and examined at bedside. Pt is sitting up in chair at bedside, A&Ox3.   Per pt it is the same headache he gets after dialysis that he's had since yesterday.   Pt denies any visual changes, or weakness.   Denies any chest pain, SOB, palpitations, fever, or chills.   Dilaudid 1mg given as pt stating it helped him yesterday.   Will continue to monitor closely and inform primary team this morning.     Britt Lawson ANP-BC  [id number] Called by RN pt c/o severe headache.   Pt seen and examined at bedside. Pt is sitting up in chair at bedside, A&Ox3.   Per pt it is the same headache he gets after dialysis that he's had since yesterday.   Blood pressure has improved to 172/96.  Pt denies any visual changes, or weakness.   Denies any chest pain, SOB, palpitations, fever, or chills.   Dilaudid 1mg given as pt stating it helped him yesterday.   Will continue to monitor closely and inform primary team this morning.     Britt Lawson ANP-BC  [id number]

## 2018-08-24 NOTE — PROGRESS NOTE ADULT - SUBJECTIVE AND OBJECTIVE BOX
[facility] Ophthalmology Follow Up     S: Patient examined bedside, reports vision stable. Reports headache last night, now resolved.   Mood and Affect Appropriate ( x ),  Oriented to Time, Place, and Person x 3 ( x )    Ophthalmology Exam    Visual acuity (cc): 20/60-2, PH20/40 OD, 20/60 OS PH NI   Pupils: PERRL OU, no APD OU  Ttono: 14 OU  Extraocular movements (EOMs): Full OU, no pain, no diplopia  Confrontational Visual Field (CVF):  Full OU    Slit Lamp Exam (SLE)  External:  Flat OU  Lids/Lashes/Lacrimal Ducts: Flat OU    Sclera/Conjunctiva:  W+Q OU  Cornea: Cl OU  Anterior Chamber: D+Q OU   Iris:  Flat OU  Lens: NS OU     DFE on 8/20:  Vitreous: wnl OU  Disc, cup/disc: Bilateral disc edema with blunted disc margins circumferentially and associated disc obscuration bilaterally.   OD: inferior disc hemorrhage, and further inferiorly a larger hemorrhage along the inferior arcade.   OS:  infero-temporally disc hemorrhage.   Macula:  OD: Temporal to the macula, small hemorrhage. OS: wnl   Vessels:  some sclerotic vessels OU.   Periphery: wnl OU    A/P. 58 y.o M with bilateral disc edema. ddx 2/2 HTN retinopathy versus cavernous sinus thrombosis.   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 Concern for pathology in the cavernous sinus or AV fistula, confirmed as per discussion with neuro-radiology. Patient expressed an understanding and still wishes to defer contrast studies at this time.  Pt understands the risk of not having the imaging which includes a delayed diagnosis in brain pathology which could lead to a worsening of his medical condition. Patient reports visual acuity stable.  - Continue management as per primary team  - Will assess patient for interval change if remains admitted; please notify ophthalmology on call if any acute changes  - Outpatient follow up stressed to patient to monitor for improvement or need for further work up and assessment  - Findings and plan discussed with patient and primary team    Follow-Up:  Patient should follow up his/her ophthalmologist or in the [facility] within 1 week of discharge, sooner if symptoms worsen or change.  Neuro-ophthalmology   [address] [place] [address].  [place] [address]  [phone]    Primary team; please make an appointment for the patient prior to discharge to ensure proper outpatient follow up and compliance

## 2018-08-24 NOTE — PROGRESS NOTE ADULT - ASSESSMENT
57yo M w/pmhx of HTN, ESRD s/p renal transplant x 2 but now requiring HD again (T Th Sat), now presenting with accelerated hypertension with hypertensive emergency (w/visual changes).      Problem/Plan - 1:  ·  Problem: Visual changes.  Plan: Ophthalmology fu  BP control   Minoxidil 5 mg po twice a day  Pt refused CTA/CTV brain.    Problem/Plan - 2:  ·  Problem: Accelerated hypertension.  Plan: management as per cards  renal fu    Problem/Plan - 3:  ·  Problem: ESRD (end stage renal disease) on dialysis.  Plan:   Renal f/up appreciated    Problem/Plan - 4:  ·  Problem: Renal transplant recipient.  Plan: tacrolimus 1mg BID  Prednisone 5mg po daily.   renal fu

## 2018-08-24 NOTE — PROGRESS NOTE ADULT - SUBJECTIVE AND OBJECTIVE BOX
CARDIOLOGY FOLLOW UP - Dr. Real    CC no chest pain or sob   headache subsided   vision the same      PHYSICAL EXAM:  T(C): 36.7 (08-24-18 @ 11:30), Max: 36.9 (08-23-18 @ 13:06)  HR: 75 (08-24-18 @ 11:30) (71 - 88)  BP: 165/83 (08-24-18 @ 11:30) (165/83 - 211/110)  RR: 18 (08-24-18 @ 11:30) (18 - 18)  SpO2: 98% (08-24-18 @ 11:30) (96% - 98%)  Wt(kg): --  I&O's Summary    23 Aug 2018 07:01  -  24 Aug 2018 07:00  --------------------------------------------------------  IN: 2080 mL / OUT: 3000 mL / NET: -920 mL        Appearance: Normal	  Cardiovascular: Normal S1 S2,RRR, No JVD, No murmurs  Respiratory: Lungs clear to auscultation	  Gastrointestinal:  Soft, Non-tender, + BS	  Extremities: Normal range of motion, No clubbing, cyanosis or edema        MEDICATIONS  (STANDING):  ascorbic acid 500 milliGRAM(s) Oral daily  Butalbital, acetaminophen/caffeine 1 Tablet(s) 1 Tablet(s) Oral daily  calcitriol   Capsule 0.25 MICROGram(s) Oral daily  chlorhexidine 4% Liquid 1 Application(s) Topical <User Schedule>  cholecalciferol 2000 Unit(s) Oral daily  cloNIDine Patch 0.3 mG/24Hr(s) 1 patch Topical every 7 days  furosemide    Tablet 80 milliGRAM(s) Oral daily  losartan 50 milliGRAM(s) Oral two times a day  minoxidil 5 milliGRAM(s) Oral two times a day  Nephro-martha 1 Tablet(s) Oral daily  NIFEdipine XL 60 milliGRAM(s) Oral <User Schedule>  predniSONE   Tablet 5 milliGRAM(s) Oral daily  tacrolimus 1 milliGRAM(s) Oral every 12 hours  trimethoprim  160 mG/sulfamethoxazole 800 mG 1 Tablet(s) Oral <User Schedule>      TELEMETRY: 	    ECG:  	  RADIOLOGY:   DIAGNOSTIC TESTING:  [ ] Echocardiogram:  [ ]  Catheterization:  [ ] Stress Test:    OTHER: 	    LABS:	 	                                10.3   10.65 )-----------( 365      ( 23 Aug 2018 14:19 )             33.1     08-23    136  |  94<L>  |  66<H>  ----------------------------<  132<H>  5.7<H>   |  24  |  8.93<H>    Ca    9.7      23 Aug 2018 10:46

## 2018-08-24 NOTE — PROGRESS NOTE ADULT - ASSESSMENT
57 yo gentleman w/pmhx of HTN, ESRD s/p renal transplant x 2 but now requiring HD again (T Th Sat),  presenting with sudden vision changes x 5 days in the setting of elevated  blood pressures. It is binocular. Nonfocal neurological examination. Finding of retinal hemorrhages on ophthalmological evaluation.   -CTH: no evidence of intracranial hemorrhage, mass, midline shift    Concern w/ htn emergency: htn retinopathy/optic neuropathy    MRI brain no acute stroke, no PRES. ? +dilated superior ophthalmic veins and optic nerve edema ? rule out cavernous carotid fistula  initial ophthalmology eval feel cavernous carotid fistula not clinically likely in setting of bilaterality and clinical presentation. after resident d/w Dr. Mcrae, CTA/V to be considered    Clinically, suspicion of bilateral CC fistula very unlikely.  Pt demonstrates reservation about CT contrast and prefers study not to be done.  From neuro perspective, it is reasonable to continue clinical observation with BP control.   Vision appears stable on bedside exam.    Plan:    - Continue optimize BP control as per primary team and renal  - Ophthalmology highly recommending CTA/CTV with contrast. Discussed at length with pt. Risks and benefits as per ophtho and nephrology. For now pt deferring  - neuro has been stable  - Supportive care  - Also reviewed left acoustic neuroma with pt, advised outpatient MRI surveillance.  - at this point no neuro objection to dc home with close outpt f/u  Reviewed above with pt at bedside,

## 2018-08-24 NOTE — PROGRESS NOTE ADULT - ASSESSMENT
echo 4/17/18: EF 53, normal LV fx, mild MR     a/p    57yo M w/pmhx of HTN, ESRD s/p renal transplant x 2 but now requiring HD again (T Th Sat), now presenting with vision changes x 5 days as well as elevated blood pressures, admitted w/ htn emergency.     1. HTN urgency   likely in the setting of ESRD  bp slowly improving today   minoxidil increased to 5 mg BID today   renal f/u     2. Blurred vision   MRI brain no acute stroke, no PRES. ? +dilated superior ophthalmic veins and optic nerve edema  opthalmology / neurology f/u     3. ESRD  renal f/u   continue fluid removal with HD and lasix     dvt ppx echo 4/17/18: EF 53, normal LV fx, mild MR     a/p    57yo M w/pmhx of HTN, ESRD s/p renal transplant x 2 but now requiring HD again (T Th Sat), now presenting with vision changes x 5 days as well as elevated blood pressures, admitted w/ htn urgency     1. HTN urgency   likely in the setting of ESRD  bp slowly improving today   minoxidil increased to 5 mg BID today   renal f/u     2. Blurred vision   MRI brain no acute stroke, no PRES. ? +dilated superior ophthalmic veins and optic nerve edema  opthalmology / neurology f/u     3. ESRD  renal f/u   continue fluid removal with HD and lasix     dvt ppx

## 2018-08-24 NOTE — PROGRESS NOTE ADULT - SUBJECTIVE AND OBJECTIVE BOX
Patient is a 58y old  Male who presents with a chief complaint of Accelerated hypertension/hypertensive emergency/vision changes (17 Aug 2018 16:30)      SUBJECTIVE / OVERNIGHT EVENTS:   Feels better.  Denies CP/SOB/Palpitation/HA.    MEDICATIONS  (STANDING):  ascorbic acid 500 milliGRAM(s) Oral daily  Butalbital, acetaminophen/caffeine 1 Tablet(s) 1 Tablet(s) Oral daily  calcitriol   Capsule 0.25 MICROGram(s) Oral daily  chlorhexidine 4% Liquid 1 Application(s) Topical <User Schedule>  cholecalciferol 2000 Unit(s) Oral daily  cloNIDine Patch 0.3 mG/24Hr(s) 1 patch Topical every 7 days  furosemide    Tablet 80 milliGRAM(s) Oral daily  losartan 50 milliGRAM(s) Oral two times a day  minoxidil 5 milliGRAM(s) Oral two times a day  Nephro-martha 1 Tablet(s) Oral daily  NIFEdipine XL 60 milliGRAM(s) Oral <User Schedule>  predniSONE   Tablet 5 milliGRAM(s) Oral daily  tacrolimus 1 milliGRAM(s) Oral every 12 hours  trimethoprim  160 mG/sulfamethoxazole 800 mG 1 Tablet(s) Oral <User Schedule>    MEDICATIONS  (PRN):  acetaminophen   Tablet. 650 milliGRAM(s) Oral every 6 hours PRN Mild Pain (1 - 3)  ondansetron Injectable 4 milliGRAM(s) IV Push every 6 hours PRN give with hydralazine pushes        CAPILLARY BLOOD GLUCOSE        I&O's Summary    23 Aug 2018 07:01  -  24 Aug 2018 07:00  --------------------------------------------------------  IN: 2080 mL / OUT: 3000 mL / NET: -920 mL    24 Aug 2018 07:01  -  24 Aug 2018 20:22  --------------------------------------------------------  IN: 240 mL / OUT: 0 mL / NET: 240 mL        PHYSICAL EXAM:  GENERAL: NAD, well-developed  HEAD:  Atraumatic, Normocephalic  NECK: Supple, No JVD  CHEST/LUNG: Clear to auscultation bilaterally; No wheezing.  HEART: Regular rate and rhythm; No murmurs, rubs, or gallops  ABDOMEN: Soft, Nontender, Nondistended; Bowel sounds present  EXTREMITIES:   No clubbing, cyanosis, or edema  NEUROLOGY: AAO X 3  SKIN: No rashes    LABS:                        10.3   10.65 )-----------( 365      ( 23 Aug 2018 14:19 )             33.1     08-23    136  |  94<L>  |  66<H>  ----------------------------<  132<H>  5.7<H>   |  24  |  8.93<H>    Ca    9.7      23 Aug 2018 10:46              CAPILLARY BLOOD GLUCOSE                    RADIOLOGY & ADDITIONAL TESTS:    Imaging Personally Reviewed:    Consultant(s) Notes Reviewed:      Care Discussed with Consultants/Other Providers:

## 2018-08-25 LAB
ANION GAP SERPL CALC-SCNC: 15 MMOL/L — SIGNIFICANT CHANGE UP (ref 5–17)
BASOPHILS # BLD AUTO: 0.02 K/UL — SIGNIFICANT CHANGE UP (ref 0–0.2)
BASOPHILS NFR BLD AUTO: 0.2 % — SIGNIFICANT CHANGE UP (ref 0–2)
BUN SERPL-MCNC: 57 MG/DL — HIGH (ref 7–23)
CALCIUM SERPL-MCNC: 9.2 MG/DL — SIGNIFICANT CHANGE UP (ref 8.4–10.5)
CHLORIDE SERPL-SCNC: 95 MMOL/L — LOW (ref 96–108)
CO2 SERPL-SCNC: 27 MMOL/L — SIGNIFICANT CHANGE UP (ref 22–31)
CREAT SERPL-MCNC: 8.82 MG/DL — HIGH (ref 0.5–1.3)
EOSINOPHIL # BLD AUTO: 0.12 K/UL — SIGNIFICANT CHANGE UP (ref 0–0.5)
EOSINOPHIL NFR BLD AUTO: 1.4 % — SIGNIFICANT CHANGE UP (ref 0–6)
GLUCOSE SERPL-MCNC: 143 MG/DL — HIGH (ref 70–99)
HCT VFR BLD CALC: 32.8 % — LOW (ref 39–50)
HGB BLD-MCNC: 10.3 G/DL — LOW (ref 13–17)
IMM GRANULOCYTES NFR BLD AUTO: 0.2 % — SIGNIFICANT CHANGE UP (ref 0–1.5)
LYMPHOCYTES # BLD AUTO: 2.66 K/UL — SIGNIFICANT CHANGE UP (ref 1–3.3)
LYMPHOCYTES # BLD AUTO: 30.7 % — SIGNIFICANT CHANGE UP (ref 13–44)
MCHC RBC-ENTMCNC: 25.3 PG — LOW (ref 27–34)
MCHC RBC-ENTMCNC: 31.4 GM/DL — LOW (ref 32–36)
MCV RBC AUTO: 80.6 FL — SIGNIFICANT CHANGE UP (ref 80–100)
MONOCYTES # BLD AUTO: 1.16 K/UL — HIGH (ref 0–0.9)
MONOCYTES NFR BLD AUTO: 13.4 % — SIGNIFICANT CHANGE UP (ref 2–14)
NEUTROPHILS # BLD AUTO: 4.68 K/UL — SIGNIFICANT CHANGE UP (ref 1.8–7.4)
NEUTROPHILS NFR BLD AUTO: 54.1 % — SIGNIFICANT CHANGE UP (ref 43–77)
PLATELET # BLD AUTO: 404 K/UL — HIGH (ref 150–400)
POTASSIUM SERPL-MCNC: 5.9 MMOL/L — HIGH (ref 3.5–5.3)
POTASSIUM SERPL-SCNC: 5.9 MMOL/L — HIGH (ref 3.5–5.3)
RBC # BLD: 4.07 M/UL — LOW (ref 4.2–5.8)
RBC # FLD: 18.6 % — HIGH (ref 10.3–14.5)
SODIUM SERPL-SCNC: 137 MMOL/L — SIGNIFICANT CHANGE UP (ref 135–145)
TACROLIMUS SERPL-MCNC: 2.6 NG/ML — SIGNIFICANT CHANGE UP
WBC # BLD: 8.66 K/UL — SIGNIFICANT CHANGE UP (ref 3.8–10.5)
WBC # FLD AUTO: 8.66 K/UL — SIGNIFICANT CHANGE UP (ref 3.8–10.5)

## 2018-08-25 PROCEDURE — 99233 SBSQ HOSP IP/OBS HIGH 50: CPT | Mod: GC

## 2018-08-25 RX ORDER — HYDROMORPHONE HYDROCHLORIDE 2 MG/ML
1 INJECTION INTRAMUSCULAR; INTRAVENOUS; SUBCUTANEOUS ONCE
Qty: 0 | Refills: 0 | Status: DISCONTINUED | OUTPATIENT
Start: 2018-08-25 | End: 2018-08-25

## 2018-08-25 RX ORDER — PANTOPRAZOLE SODIUM 20 MG/1
40 TABLET, DELAYED RELEASE ORAL ONCE
Qty: 0 | Refills: 0 | Status: COMPLETED | OUTPATIENT
Start: 2018-08-25 | End: 2018-08-25

## 2018-08-25 RX ORDER — DOCUSATE SODIUM 100 MG
100 CAPSULE ORAL
Qty: 0 | Refills: 0 | Status: DISCONTINUED | OUTPATIENT
Start: 2018-08-25 | End: 2018-08-29

## 2018-08-25 RX ADMIN — Medication 500 MILLIGRAM(S): at 11:42

## 2018-08-25 RX ADMIN — Medication 1 PATCH: at 11:58

## 2018-08-25 RX ADMIN — Medication 2000 UNIT(S): at 11:42

## 2018-08-25 RX ADMIN — CALCITRIOL 0.25 MICROGRAM(S): 0.5 CAPSULE ORAL at 11:42

## 2018-08-25 RX ADMIN — Medication 60 MILLIGRAM(S): at 23:22

## 2018-08-25 RX ADMIN — Medication 5 MILLIGRAM(S): at 11:42

## 2018-08-25 RX ADMIN — ONDANSETRON 4 MILLIGRAM(S): 8 TABLET, FILM COATED ORAL at 23:22

## 2018-08-25 RX ADMIN — TACROLIMUS 1 MILLIGRAM(S): 5 CAPSULE ORAL at 11:42

## 2018-08-25 RX ADMIN — HYDROMORPHONE HYDROCHLORIDE 1 MILLIGRAM(S): 2 INJECTION INTRAMUSCULAR; INTRAVENOUS; SUBCUTANEOUS at 17:57

## 2018-08-25 RX ADMIN — TACROLIMUS 1 MILLIGRAM(S): 5 CAPSULE ORAL at 23:22

## 2018-08-25 RX ADMIN — Medication 1 TABLET(S): at 11:42

## 2018-08-25 RX ADMIN — PANTOPRAZOLE SODIUM 40 MILLIGRAM(S): 20 TABLET, DELAYED RELEASE ORAL at 06:48

## 2018-08-25 RX ADMIN — CHLORHEXIDINE GLUCONATE 1 APPLICATION(S): 213 SOLUTION TOPICAL at 11:58

## 2018-08-25 RX ADMIN — Medication 5 MILLIGRAM(S): at 06:16

## 2018-08-25 RX ADMIN — ONDANSETRON 4 MILLIGRAM(S): 8 TABLET, FILM COATED ORAL at 17:12

## 2018-08-25 RX ADMIN — Medication 80 MILLIGRAM(S): at 11:42

## 2018-08-25 RX ADMIN — TACROLIMUS 1 MILLIGRAM(S): 5 CAPSULE ORAL at 00:56

## 2018-08-25 RX ADMIN — Medication 1 PATCH: at 11:42

## 2018-08-25 RX ADMIN — Medication 60 MILLIGRAM(S): at 11:42

## 2018-08-25 RX ADMIN — LOSARTAN POTASSIUM 50 MILLIGRAM(S): 100 TABLET, FILM COATED ORAL at 17:12

## 2018-08-25 RX ADMIN — Medication 5 MILLIGRAM(S): at 17:12

## 2018-08-25 RX ADMIN — LOSARTAN POTASSIUM 50 MILLIGRAM(S): 100 TABLET, FILM COATED ORAL at 06:16

## 2018-08-25 NOTE — PROGRESS NOTE ADULT - SUBJECTIVE AND OBJECTIVE BOX
[facility] DIVISION OF KIDNEY DISEASES AND HYPERTENSION -- HEMODIALYSIS NOTE  --------------------------------------------------------------------------------  Chief Complaint: ESRD/Ongoing hemodialysis requirement    24 hour events/subjective:  seen during HD, tolerating well.  no acute complaint.      PAST HISTORY  --------------------------------------------------------------------------------  No significant changes to PMH, PSH, FHx, SHx, unless otherwise noted    ALLERGIES & MEDICATIONS  --------------------------------------------------------------------------------  Allergies    No Known Allergies    Intolerances    beta blockers (Other (Mod to Severe))    Standing Inpatient Medications  ascorbic acid 500 milliGRAM(s) Oral daily  Butalbital, acetaminophen/caffeine 1 Tablet(s) 1 Tablet(s) Oral daily  calcitriol   Capsule 0.25 MICROGram(s) Oral daily  chlorhexidine 4% Liquid 1 Application(s) Topical <User Schedule>  cholecalciferol 2000 Unit(s) Oral daily  cloNIDine Patch 0.3 mG/24Hr(s) 1 patch Topical every 7 days  docusate sodium 100 milliGRAM(s) Oral two times a day  furosemide    Tablet 80 milliGRAM(s) Oral daily  losartan 50 milliGRAM(s) Oral two times a day  minoxidil 5 milliGRAM(s) Oral two times a day  Nephro-martha 1 Tablet(s) Oral daily  NIFEdipine XL 60 milliGRAM(s) Oral <User Schedule>  predniSONE   Tablet 5 milliGRAM(s) Oral daily  tacrolimus 1 milliGRAM(s) Oral every 12 hours  trimethoprim  160 mG/sulfamethoxazole 800 mG 1 Tablet(s) Oral <User Schedule>    PRN Inpatient Medications  acetaminophen   Tablet. 650 milliGRAM(s) Oral every 6 hours PRN  ondansetron Injectable 4 milliGRAM(s) IV Push every 6 hours PRN      REVIEW OF SYSTEMS  --------------------------------------------------------------------------------  Gen: No weight changes, fatigue, fevers/chills, weakness  Skin: No rashes  Head/Eyes/Ears/Mouth: No headache; Normal hearing; Normal vision w/o blurriness; No sinus pain/discomfort, sore throat  Respiratory: No dyspnea, cough, wheezing, hemoptysis  CV: No chest pain, PND, orthopnea  GI: No abdominal pain, diarrhea, constipation, nausea, vomiting, melena, hematochezia  : No increased frequency, dysuria, hematuria, nocturia  MSK: No joint pain/swelling; no back pain; no edema  Neuro: No dizziness/lightheadedness, weakness, seizures, numbness, tingling  Heme: No easy bruising or bleeding  Endo: No heat/cold intolerance  Psych: No significant nervousness, anxiety, stress, depression    All other systems were reviewed and are negative, except as noted.    VITALS/PHYSICAL EXAM  --------------------------------------------------------------------------------  T(C): 37.1 (08-25-18 @ 12:44), Max: 37.1 (08-25-18 @ 12:44)  HR: 77 (08-25-18 @ 12:44) (76 - 79)  BP: 173/90 (08-25-18 @ 12:44) (150/76 - 173/90)  RR: 18 (08-25-18 @ 03:55) (18 - 18)  SpO2: 95% (08-25-18 @ 03:55) (95% - 98%)  Wt(kg): --        08-24-18 @ 07:01  -  08-25-18 @ 07:00  --------------------------------------------------------  IN: 240 mL / OUT: 0 mL / NET: 240 mL    08-25-18 @ 07:01  -  08-25-18 @ 15:22  --------------------------------------------------------  IN: 200 mL / OUT: 0 mL / NET: 200 mL      Physical Exam:  	Gen: NAD, well-appearing  	HEENT: PERRL, supple neck, clear oropharynx  	Pulm: CTA B/L  	CV: RRR, S1S2; no rub  	Back: No spinal or CVA tenderness; no sacral edema  	Abd: +BS, soft, nontender/nondistended  	: No suprapubic tenderness  	UE: Warm, FROM, no clubbing, intact strength; no edema; no asterixis  	LE: Warm, FROM, no clubbing, intact strength; no edema  	Neuro: No focal deficits, intact gait  	Psych: Normal affect and mood  	Skin: Warm, without rashes  	Vascular access:    LABS/STUDIES  --------------------------------------------------------------------------------    137  |  95  |  57  ----------------------------<  143      [08-25-18 @ 13:41]  5.9   |  27  |  8.82        Ca     9.2     [08-25-18 @ 13:41]            Iron 24, TIBC 161, %sat 15      [03-20-18 @ 11:31]  Ferritin 250      [03-19-18 @ 20:02]    HBsAg Nonreact      [08-18-18 @ 19:01]  HCV 0.14, Nonreact      [08-18-18 @ 19:01]

## 2018-08-25 NOTE — PROGRESS NOTE ADULT - SUBJECTIVE AND OBJECTIVE BOX
CARDIOLOGY FOLLOW UP - Dr. Real    CC no cp/sob       PHYSICAL EXAM:  T(C): 36.9 (08-25-18 @ 03:55), Max: 36.9 (08-25-18 @ 03:55)  HR: 79 (08-25-18 @ 03:55) (75 - 79)  BP: 155/80 (08-25-18 @ 03:55) (150/76 - 165/83)  RR: 18 (08-25-18 @ 03:55) (18 - 18)  SpO2: 95% (08-25-18 @ 03:55) (95% - 98%)  Wt(kg): --  I&O's Summary    24 Aug 2018 07:01  -  25 Aug 2018 07:00  --------------------------------------------------------  IN: 240 mL / OUT: 0 mL / NET: 240 mL        Appearance: Normal	  Cardiovascular: Normal S1 S2,RRR, No JVD, No murmurs  Respiratory: Lungs clear to auscultation	  Gastrointestinal:  Soft, Non-tender, + BS	  Extremities: Normal range of motion, No clubbing, cyanosis or edema        MEDICATIONS  (STANDING):  ascorbic acid 500 milliGRAM(s) Oral daily  Butalbital, acetaminophen/caffeine 1 Tablet(s) 1 Tablet(s) Oral daily  calcitriol   Capsule 0.25 MICROGram(s) Oral daily  chlorhexidine 4% Liquid 1 Application(s) Topical <User Schedule>  cholecalciferol 2000 Unit(s) Oral daily  cloNIDine Patch 0.3 mG/24Hr(s) 1 patch Topical every 7 days  docusate sodium 100 milliGRAM(s) Oral two times a day  furosemide    Tablet 80 milliGRAM(s) Oral daily  losartan 50 milliGRAM(s) Oral two times a day  minoxidil 5 milliGRAM(s) Oral two times a day  Nephro-martha 1 Tablet(s) Oral daily  NIFEdipine XL 60 milliGRAM(s) Oral <User Schedule>  predniSONE   Tablet 5 milliGRAM(s) Oral daily  tacrolimus 1 milliGRAM(s) Oral every 12 hours  trimethoprim  160 mG/sulfamethoxazole 800 mG 1 Tablet(s) Oral <User Schedule>      TELEMETRY: off tele  	    ECG:  	  RADIOLOGY:   DIAGNOSTIC TESTING:  [ ] Echocardiogram:  [ ]  Catheterization:  [ ] Stress Test:    OTHER: 	    LABS:	 	                                10.3   10.65 )-----------( 365      ( 23 Aug 2018 14:19 )             33.1     08-23    136  |  94<L>  |  66<H>  ----------------------------<  132<H>  5.7<H>   |  24  |  8.93<H>    Ca    9.7      23 Aug 2018 10:46

## 2018-08-25 NOTE — PROGRESS NOTE ADULT - SUBJECTIVE AND OBJECTIVE BOX
Patient is a 58y old  Male who presents with a chief complaint of Accelerated hypertension/hypertensive emergency/vision changes (17 Aug 2018 16:30)      SUBJECTIVE / OVERNIGHT EVENTS:   Feels better.  Denies CP/SOB/Palpitation/HA.    MEDICATIONS  (STANDING):  ascorbic acid 500 milliGRAM(s) Oral daily  Butalbital, acetaminophen/caffeine 1 Tablet(s) 1 Tablet(s) Oral daily  calcitriol   Capsule 0.25 MICROGram(s) Oral daily  chlorhexidine 4% Liquid 1 Application(s) Topical <User Schedule>  cholecalciferol 2000 Unit(s) Oral daily  cloNIDine Patch 0.3 mG/24Hr(s) 1 patch Topical every 7 days  docusate sodium 100 milliGRAM(s) Oral two times a day  furosemide    Tablet 80 milliGRAM(s) Oral daily  losartan 50 milliGRAM(s) Oral two times a day  minoxidil 5 milliGRAM(s) Oral two times a day  Nephro-martha 1 Tablet(s) Oral daily  NIFEdipine XL 60 milliGRAM(s) Oral <User Schedule>  predniSONE   Tablet 5 milliGRAM(s) Oral daily  tacrolimus 1 milliGRAM(s) Oral every 12 hours  trimethoprim  160 mG/sulfamethoxazole 800 mG 1 Tablet(s) Oral <User Schedule>    MEDICATIONS  (PRN):  acetaminophen   Tablet. 650 milliGRAM(s) Oral every 6 hours PRN Mild Pain (1 - 3)  ondansetron Injectable 4 milliGRAM(s) IV Push every 6 hours PRN give with hydralazine pushes        CAPILLARY BLOOD GLUCOSE        I&O's Summary    24 Aug 2018 07:01  -  25 Aug 2018 07:00  --------------------------------------------------------  IN: 240 mL / OUT: 0 mL / NET: 240 mL    25 Aug 2018 07:01  -  25 Aug 2018 15:07  --------------------------------------------------------  IN: 200 mL / OUT: 0 mL / NET: 200 mL        PHYSICAL EXAM:  GENERAL: NAD, well-developed  HEAD:  Atraumatic, Normocephalic  NECK: Supple, No JVD  CHEST/LUNG: Clear to auscultation bilaterally; No wheezing.  HEART: Regular rate and rhythm; No murmurs, rubs, or gallops  ABDOMEN: Soft, Nontender, Nondistended; Bowel sounds present  EXTREMITIES:   No clubbing, cyanosis, or edema  NEUROLOGY: AAO X 3  SKIN: No rashes    LABS:    08-25    137  |  95<L>  |  57<H>  ----------------------------<  143<H>  5.9<H>   |  27  |  8.82<H>    Ca    9.2      25 Aug 2018 13:41              CAPILLARY BLOOD GLUCOSE                    RADIOLOGY & ADDITIONAL TESTS:    Imaging Personally Reviewed:    Consultant(s) Notes Reviewed:      Care Discussed with Consultants/Other Providers:

## 2018-08-25 NOTE — PROGRESS NOTE ADULT - ASSESSMENT
echo 4/17/18: EF 53, normal LV fx, mild MR     a/p    59yo M w/pmhx of HTN, ESRD s/p renal transplant x 2 but now requiring HD again (T Th Sat), now presenting with vision changes x 5 days as well as elevated blood pressures, admitted w/ htn urgency     1. HTN urgency   likely in the setting of ESRD  bp improved   continue minoxidil 5mg BID   renal f/u     2. Blurred vision   MRI brain no acute stroke, no PRES. ? +dilated superior ophthalmic veins and optic nerve edema  opthalmology / neurology f/u     3. ESRD  renal f/u   continue fluid removal with HD and lasix     dvt ppx   d/c planning per primary team echo 4/17/18: EF 53, normal LV fx, mild MR     a/p    57yo M w/pmhx of HTN, ESRD s/p renal transplant x 2 but now requiring HD again (T Th Sat), now presenting with vision changes x 5 days as well as elevated blood pressures, admitted w/ htn urgency     1. HTN urgency   likely in the setting of ESRD  bp improved   continue current medication regimen   renal f/u     2. Blurred vision   MRI brain no acute stroke, no PRES. ? +dilated superior ophthalmic veins and optic nerve edema  opthalmology / neurology f/u     3. ESRD  renal f/u   continue fluid removal with HD and lasix     dvt ppx   d/c planning per primary team

## 2018-08-25 NOTE — PROGRESS NOTE ADULT - ASSESSMENT
59yo M w/pmhx of HTN, ESRD s/p renal transplant x 2 but now requiring HD again (T Th Sat), now presenting with accelerated hypertension with hypertensive emergency (w/visual changes).      Problem/Plan - 1:  ·  Problem: Visual changes.  Plan: Ophthalmology fu  BP control   Minoxidil 5 mg po twice a day  Pt refused CTA/CTV brain.    Problem/Plan - 2:  ·  Problem: Accelerated hypertension.  Plan: management as per cards  renal fu    Problem/Plan - 3:  ·  Problem: ESRD (end stage renal disease) on dialysis.  Plan:   Renal f/up appreciated    Problem/Plan - 4:  ·  Problem: Renal transplant recipient.  Plan: tacrolimus 1mg BID  Prednisone 5mg po daily.   renal fu

## 2018-08-26 RX ORDER — HYDROMORPHONE HYDROCHLORIDE 2 MG/ML
0.5 INJECTION INTRAMUSCULAR; INTRAVENOUS; SUBCUTANEOUS ONCE
Qty: 0 | Refills: 0 | Status: DISCONTINUED | OUTPATIENT
Start: 2018-08-26 | End: 2018-08-26

## 2018-08-26 RX ORDER — CLOTRIMAZOLE AND BETAMETHASONE DIPROPIONATE 10; .5 MG/G; MG/G
1 CREAM TOPICAL
Qty: 0 | Refills: 0 | Status: DISCONTINUED | OUTPATIENT
Start: 2018-08-26 | End: 2018-08-29

## 2018-08-26 RX ADMIN — Medication 5 MILLIGRAM(S): at 12:02

## 2018-08-26 RX ADMIN — Medication 1 TABLET(S): at 12:02

## 2018-08-26 RX ADMIN — LOSARTAN POTASSIUM 50 MILLIGRAM(S): 100 TABLET, FILM COATED ORAL at 05:06

## 2018-08-26 RX ADMIN — HYDROMORPHONE HYDROCHLORIDE 0.5 MILLIGRAM(S): 2 INJECTION INTRAMUSCULAR; INTRAVENOUS; SUBCUTANEOUS at 00:25

## 2018-08-26 RX ADMIN — Medication 500 MILLIGRAM(S): at 12:02

## 2018-08-26 RX ADMIN — TACROLIMUS 1 MILLIGRAM(S): 5 CAPSULE ORAL at 12:02

## 2018-08-26 RX ADMIN — CALCITRIOL 0.25 MICROGRAM(S): 0.5 CAPSULE ORAL at 12:02

## 2018-08-26 RX ADMIN — HYDROMORPHONE HYDROCHLORIDE 0.5 MILLIGRAM(S): 2 INJECTION INTRAMUSCULAR; INTRAVENOUS; SUBCUTANEOUS at 00:55

## 2018-08-26 RX ADMIN — Medication 60 MILLIGRAM(S): at 12:02

## 2018-08-26 RX ADMIN — Medication 5 MILLIGRAM(S): at 05:05

## 2018-08-26 RX ADMIN — LOSARTAN POTASSIUM 50 MILLIGRAM(S): 100 TABLET, FILM COATED ORAL at 17:58

## 2018-08-26 RX ADMIN — Medication 60 MILLIGRAM(S): at 23:14

## 2018-08-26 RX ADMIN — Medication 5 MILLIGRAM(S): at 17:58

## 2018-08-26 RX ADMIN — CLOTRIMAZOLE AND BETAMETHASONE DIPROPIONATE 1 APPLICATION(S): 10; .5 CREAM TOPICAL at 17:58

## 2018-08-26 RX ADMIN — TACROLIMUS 1 MILLIGRAM(S): 5 CAPSULE ORAL at 23:13

## 2018-08-26 RX ADMIN — Medication 80 MILLIGRAM(S): at 12:02

## 2018-08-26 RX ADMIN — CHLORHEXIDINE GLUCONATE 1 APPLICATION(S): 213 SOLUTION TOPICAL at 17:59

## 2018-08-26 RX ADMIN — Medication 100 MILLIGRAM(S): at 05:06

## 2018-08-26 RX ADMIN — Medication 100 MILLIGRAM(S): at 17:58

## 2018-08-26 RX ADMIN — Medication 2000 UNIT(S): at 12:02

## 2018-08-26 NOTE — PROGRESS NOTE ADULT - SUBJECTIVE AND OBJECTIVE BOX
CARDIOLOGY FOLLOW UP - Dr. Real    CC no cp/sob       PHYSICAL EXAM:  T(C): 36.9 (08-26-18 @ 04:50), Max: 37.1 (08-25-18 @ 12:44)  HR: 83 (08-26-18 @ 04:50) (77 - 93)  BP: 158/83 (08-26-18 @ 04:50) (144/74 - 173/90)  RR: 18 (08-26-18 @ 04:50) (18 - 18)  SpO2: 97% (08-26-18 @ 04:50) (96% - 98%)  Wt(kg): --  I&O's Summary    25 Aug 2018 07:01  -  26 Aug 2018 07:00  --------------------------------------------------------  IN: 1420 mL / OUT: 2300 mL / NET: -880 mL    26 Aug 2018 07:01  -  26 Aug 2018 11:21  --------------------------------------------------------  IN: 200 mL / OUT: 0 mL / NET: 200 mL        Appearance: Normal	  Cardiovascular: Normal S1 S2,RRR, No JVD, No murmurs  Respiratory: Lungs clear to auscultation	  Gastrointestinal:  Soft, Non-tender, + BS	  Extremities: Normal range of motion, No clubbing, b/l le edema       MEDICATIONS  (STANDING):  ascorbic acid 500 milliGRAM(s) Oral daily  Butalbital, acetaminophen/caffeine 1 Tablet(s) 1 Tablet(s) Oral daily  calcitriol   Capsule 0.25 MICROGram(s) Oral daily  chlorhexidine 4% Liquid 1 Application(s) Topical <User Schedule>  cholecalciferol 2000 Unit(s) Oral daily  cloNIDine Patch 0.3 mG/24Hr(s) 1 patch Topical every 7 days  docusate sodium 100 milliGRAM(s) Oral two times a day  furosemide    Tablet 80 milliGRAM(s) Oral daily  losartan 50 milliGRAM(s) Oral two times a day  minoxidil 5 milliGRAM(s) Oral two times a day  Nephro-martha 1 Tablet(s) Oral daily  NIFEdipine XL 60 milliGRAM(s) Oral <User Schedule>  predniSONE   Tablet 5 milliGRAM(s) Oral daily  tacrolimus 1 milliGRAM(s) Oral every 12 hours  trimethoprim  160 mG/sulfamethoxazole 800 mG 1 Tablet(s) Oral <User Schedule>      TELEMETRY: 	    ECG:  	  RADIOLOGY:   DIAGNOSTIC TESTING:  [ ] Echocardiogram:  [ ]  Catheterization:  [ ] Stress Test:    OTHER: 	    LABS:	 	                                10.3   8.66  )-----------( 404      ( 25 Aug 2018 15:05 )             32.8     08-25    137  |  95<L>  |  57<H>  ----------------------------<  143<H>  5.9<H>   |  27  |  8.82<H>    Ca    9.2      25 Aug 2018 13:41

## 2018-08-26 NOTE — PROGRESS NOTE ADULT - SUBJECTIVE AND OBJECTIVE BOX
Patient is a 58y old  Male who presents with a chief complaint of Accelerated hypertension/hypertensive emergency/vision changes (17 Aug 2018 16:30)      SUBJECTIVE / OVERNIGHT EVENTS:   Feels better.  Denies CP/SOB/Palpitation/HA.    MEDICATIONS  (STANDING):  ascorbic acid 500 milliGRAM(s) Oral daily  Butalbital, acetaminophen/caffeine 1 Tablet(s) 1 Tablet(s) Oral daily  calcitriol   Capsule 0.25 MICROGram(s) Oral daily  chlorhexidine 4% Liquid 1 Application(s) Topical <User Schedule>  cholecalciferol 2000 Unit(s) Oral daily  cloNIDine Patch 0.3 mG/24Hr(s) 1 patch Topical every 7 days  docusate sodium 100 milliGRAM(s) Oral two times a day  furosemide    Tablet 80 milliGRAM(s) Oral daily  losartan 50 milliGRAM(s) Oral two times a day  minoxidil 5 milliGRAM(s) Oral two times a day  Nephro-martha 1 Tablet(s) Oral daily  NIFEdipine XL 60 milliGRAM(s) Oral <User Schedule>  predniSONE   Tablet 5 milliGRAM(s) Oral daily  tacrolimus 1 milliGRAM(s) Oral every 12 hours  trimethoprim  160 mG/sulfamethoxazole 800 mG 1 Tablet(s) Oral <User Schedule>    MEDICATIONS  (PRN):  acetaminophen   Tablet. 650 milliGRAM(s) Oral every 6 hours PRN Mild Pain (1 - 3)  ondansetron Injectable 4 milliGRAM(s) IV Push every 6 hours PRN give with hydralazine pushes        CAPILLARY BLOOD GLUCOSE        I&O's Summary    25 Aug 2018 07:01  -  26 Aug 2018 07:00  --------------------------------------------------------  IN: 1420 mL / OUT: 2300 mL / NET: -880 mL    26 Aug 2018 07:01  -  26 Aug 2018 11:19  --------------------------------------------------------  IN: 200 mL / OUT: 0 mL / NET: 200 mL        PHYSICAL EXAM:  GENERAL: NAD, well-developed  HEAD:  Atraumatic, Normocephalic  NECK: Supple, No JVD  CHEST/LUNG: Clear to auscultation bilaterally; No wheezing.  HEART: Regular rate and rhythm; No murmurs, rubs, or gallops  ABDOMEN: Soft, Nontender, Nondistended; Bowel sounds present  EXTREMITIES:   No clubbing, cyanosis, or edema  NEUROLOGY: AAO X 3  SKIN: No rashes    LABS:                        10.3   8.66  )-----------( 404      ( 25 Aug 2018 15:05 )             32.8     08-25    137  |  95<L>  |  57<H>  ----------------------------<  143<H>  5.9<H>   |  27  |  8.82<H>    Ca    9.2      25 Aug 2018 13:41              CAPILLARY BLOOD GLUCOSE                    RADIOLOGY & ADDITIONAL TESTS:    Imaging Personally Reviewed:    Consultant(s) Notes Reviewed:      Care Discussed with Consultants/Other Providers:

## 2018-08-26 NOTE — PROGRESS NOTE ADULT - ASSESSMENT
echo 4/17/18: EF 53, normal LV fx, mild MR     a/p    59yo M w/pmhx of HTN, ESRD s/p renal transplant x 2 but now requiring HD again (T Th Sat), now presenting with vision changes x 5 days as well as elevated blood pressures, admitted w/ htn urgency     1. HTN urgency   likely in the setting of ESRD  bp improved   continue current medication regimen   renal f/u     2. Blurred vision   MRI brain no acute stroke, no PRES. ? +dilated superior ophthalmic veins and optic nerve edema  opthalmology / neurology f/u     3. ESRD  renal f/u   continue fluid removal with HD and lasix     dvt ppx   d/c planning per primary team

## 2018-08-27 RX ORDER — POLYETHYLENE GLYCOL 3350 17 G/17G
17 POWDER, FOR SOLUTION ORAL ONCE
Qty: 0 | Refills: 0 | Status: DISCONTINUED | OUTPATIENT
Start: 2018-08-27 | End: 2018-08-28

## 2018-08-27 RX ORDER — CALCITRIOL 0.5 UG/1
1 CAPSULE ORAL
Qty: 0 | Refills: 0 | COMMUNITY

## 2018-08-27 RX ORDER — CALCITRIOL 0.5 UG/1
1 CAPSULE ORAL
Qty: 0 | Refills: 0 | COMMUNITY
Start: 2018-08-27

## 2018-08-27 RX ORDER — ASCORBIC ACID 60 MG
1 TABLET,CHEWABLE ORAL
Qty: 0 | Refills: 0 | COMMUNITY
Start: 2018-08-27

## 2018-08-27 RX ORDER — ASCORBIC ACID 60 MG
1 TABLET,CHEWABLE ORAL
Qty: 0 | Refills: 0 | COMMUNITY

## 2018-08-27 RX ADMIN — LOSARTAN POTASSIUM 50 MILLIGRAM(S): 100 TABLET, FILM COATED ORAL at 05:04

## 2018-08-27 RX ADMIN — Medication 100 MILLIGRAM(S): at 05:04

## 2018-08-27 RX ADMIN — TACROLIMUS 1 MILLIGRAM(S): 5 CAPSULE ORAL at 23:22

## 2018-08-27 RX ADMIN — Medication 1 TABLET(S): at 11:34

## 2018-08-27 RX ADMIN — Medication 5 MILLIGRAM(S): at 11:34

## 2018-08-27 RX ADMIN — Medication 5 MILLIGRAM(S): at 05:04

## 2018-08-27 RX ADMIN — CALCITRIOL 0.25 MICROGRAM(S): 0.5 CAPSULE ORAL at 11:34

## 2018-08-27 RX ADMIN — Medication 60 MILLIGRAM(S): at 11:34

## 2018-08-27 RX ADMIN — CLOTRIMAZOLE AND BETAMETHASONE DIPROPIONATE 1 APPLICATION(S): 10; .5 CREAM TOPICAL at 06:38

## 2018-08-27 RX ADMIN — Medication 500 MILLIGRAM(S): at 11:34

## 2018-08-27 RX ADMIN — Medication 60 MILLIGRAM(S): at 23:22

## 2018-08-27 RX ADMIN — CHLORHEXIDINE GLUCONATE 1 APPLICATION(S): 213 SOLUTION TOPICAL at 17:13

## 2018-08-27 RX ADMIN — Medication 5 MILLIGRAM(S): at 17:15

## 2018-08-27 RX ADMIN — LOSARTAN POTASSIUM 50 MILLIGRAM(S): 100 TABLET, FILM COATED ORAL at 17:15

## 2018-08-27 RX ADMIN — Medication 2000 UNIT(S): at 11:34

## 2018-08-27 RX ADMIN — Medication 100 MILLIGRAM(S): at 17:15

## 2018-08-27 RX ADMIN — TACROLIMUS 1 MILLIGRAM(S): 5 CAPSULE ORAL at 11:34

## 2018-08-27 RX ADMIN — Medication 80 MILLIGRAM(S): at 11:34

## 2018-08-27 NOTE — PROGRESS NOTE ADULT - ASSESSMENT
echo 4/17/18: EF 53, normal LV fx, mild MR     a/p    57yo M w/pmhx of HTN, ESRD s/p renal transplant x 2 but now requiring HD again (T Th Sat), now presenting with vision changes x 5 days as well as elevated blood pressures, admitted w/ htn urgency     1. HTN urgency   likely in the setting of ESRD  bp improved   continue current medication regimen   renal f/u     2. Blurred vision   MRI brain no acute stroke, no PRES. ? +dilated superior ophthalmic veins and optic nerve edema  opthalmology / neurology f/u     3. ESRD  renal f/u   continue fluid removal with HD and lasix     dvt ppx   d/c planning per primary team

## 2018-08-27 NOTE — PROGRESS NOTE ADULT - ASSESSMENT
57yo M w/pmhx of HTN, ESRD s/p renal transplant x 2 but now requiring HD again (T Th Sat), now presenting with accelerated hypertension with hypertensive emergency (w/visual changes).      Problem/Plan - 1:  ·  Problem: Visual changes.  Plan:   BP control   Minoxidil 5 mg po twice a day      Problem/Plan - 2:  ·  Problem: Accelerated hypertension.  Plan: management as per cards  renal fu    Problem/Plan - 3:  ·  Problem: ESRD (end stage renal disease) on dialysis.  Plan:   Renal f/up appreciated    Problem/Plan - 4:  ·  Problem: Renal transplant recipient.  Plan: tacrolimus 1mg BID  Prednisone 5mg po daily.   renal fu

## 2018-08-27 NOTE — PROGRESS NOTE ADULT - SUBJECTIVE AND OBJECTIVE BOX
Neurology Follow Up  Subjective:  vision the same.  had HA after HD now better    Medications:  acetaminophen   Tablet. 650 milliGRAM(s) Oral every 6 hours PRN  ascorbic acid 500 milliGRAM(s) Oral daily  Butalbital, acetaminophen/caffeine 1 Tablet(s) 1 Tablet(s) Oral daily  calcitriol   Capsule 0.25 MICROGram(s) Oral daily  chlorhexidine 4% Liquid 1 Application(s) Topical <User Schedule>  cholecalciferol 2000 Unit(s) Oral daily  cloNIDine Patch 0.3 mG/24Hr(s) 1 patch Topical every 7 days  clotrimazole/betamethasone Cream 1 Application(s) Topical two times a day  docusate sodium 100 milliGRAM(s) Oral two times a day  furosemide    Tablet 80 milliGRAM(s) Oral daily  losartan 50 milliGRAM(s) Oral two times a day  minoxidil 5 milliGRAM(s) Oral two times a day  Nephro-martha 1 Tablet(s) Oral daily  NIFEdipine XL 60 milliGRAM(s) Oral <User Schedule>  ondansetron Injectable 4 milliGRAM(s) IV Push every 6 hours PRN  predniSONE   Tablet 5 milliGRAM(s) Oral daily  tacrolimus 1 milliGRAM(s) Oral every 12 hours  trimethoprim  160 mG/sulfamethoxazole 800 mG 1 Tablet(s) Oral <User Schedule>      Labs:  CBC Full  -  ( 25 Aug 2018 15:05 )  WBC Count : 8.66 K/uL  Hemoglobin : 10.3 g/dL  Hematocrit : 32.8 %  Platelet Count - Automated : 404 K/uL  Mean Cell Volume : 80.6 fl  Mean Cell Hemoglobin : 25.3 pg  Mean Cell Hemoglobin Concentration : 31.4 gm/dL  Auto Neutrophil # : 4.68 K/uL  Auto Lymphocyte # : 2.66 K/uL  Auto Monocyte # : 1.16 K/uL  Auto Eosinophil # : 0.12 K/uL  Auto Basophil # : 0.02 K/uL  Auto Neutrophil % : 54.1 %  Auto Lymphocyte % : 30.7 %  Auto Monocyte % : 13.4 %  Auto Eosinophil % : 1.4 %  Auto Basophil % : 0.2 %    08-25    137  |  95<L>  |  57<H>  ----------------------------<  143<H>  5.9<H>   |  27  |  8.82<H>    Ca    9.2      25 Aug 2018 13:41      CAPILLARY BLOOD GLUCOSE                  Vitals:  Vital Signs Last 24 Hrs  T(C): 36.7 (27 Aug 2018 04:59), Max: 36.7 (26 Aug 2018 13:24)  T(F): 98.1 (27 Aug 2018 04:59), Max: 98.1 (26 Aug 2018 13:24)  HR: 80 (27 Aug 2018 04:59) (75 - 87)  BP: 157/74 (27 Aug 2018 04:59) (134/79 - 157/74)  BP(mean): --  RR: 18 (27 Aug 2018 04:59) (18 - 18)  SpO2: 96% (27 Aug 2018 04:59) (96% - 98%)    MS: aa0x3, nl attention, language  Cranial Nerves: Pupils were equal, round, reactive to light. No APD. VFFC. Extraocular movements were intact. No nystagmus. Facial sensation was intact to light touch. There was no facial asymmetry. The palate was upgoing symmetrically, tongue midline with tremor. Shoulder shrug was full bilaterally.     Motor exam: Bulk and tone were normal. Patient was moving all extremities. Able to lift legs antigravity.  No tremors. No pronator drift.     Reflexes: 1+ in the bilateral upper extremities. 1+ in the bilateral lower extremities. Toes were downgoing bilaterally.     Sensation: Intact to light touch    Coordination: no gross dysmetria    Gait: Not tested.    Phosphorus Level, Serum: 6.3 mg/dL (08-17 @ 07:16)    < from: CT Head No Cont (08.16.18 @ 20:46) >  FINDINGS:     There is no evidence of mass or acute intracranial hemorrhage. Ventricles   and sulci are normal in size and configuration for the patient's stated   age. No midline shift or other significant mass effect is noted. There is   no CT evidence of acute territorial infarct.     The visualized paranasal sinuses and tympanomastoid spaces are clear.   Orbits and orbital contents are unremarkable.    There is no depressed calvarial fracture.    IMPRESSION:     No evidence of acute intracranial hemorrhage, midline shift or CT   evidence of acute territorial infarct.    If the patient's symptoms persist, consider short interval follow-up head   CT or brain MRI if there are no MRI contraindications.  EXAM:  MR VENOGRAM BRAIN                          EXAM:  MR BRAIN                            PROCEDURE DATE:  08/18/2018            INTERPRETATION:  History: Blurry vision. Optic disc edema bilaterally.   Elevated creatinine levels. End-stage renaldisease on hemodialysis.    Description: Noncontrast MRI studies of the brain and orbits, and a   noncontrast brain MRV (with noncontrast 2-D time-of-flight technique)   were performed.    Comparison is made to the head CT study from 08/16/2018.    The brain MRI was performed with sagittal T1, axial SPGR, T2, FLAIR,   gradient, SWI, and diffusion-weighted series. The orbital MRI was   performed with thin section high-resolution axial/coronal T2 fat   saturation series.    The study is limited by motion, especially on the orbital series.    Marked dilatation of the bilateral superior ophthalmic veins is noted.   Bilateral eyelid edema and mild bilateral proptosis are noted. Vague   increased T2 signal involves the orbital segments of both optic nerves,   best appreciated on the axial thin section T2 orbital series, consistent   with nonspecific edema.    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 Evaluation is limited by   lack of intravenous contrast. Serial imaging follow-up over time is   recommended to monitor for stability and to exclude other etiologies.    There is no evidence for acute infarct, acute hemorrhage, or   hydrocephalus. Mild chronic white matter changes are noted with   associated patchy increased T2 and FLAIR signal.    On the MRV, there is no evidence of dural venous sinus thrombosis.   Evaluation of the cavernous sinuses is quite limited with noncontrast MRV   technique.    IMPRESSION:    No evidence for acute infarct, acute hemorrhage, dural venous sinus   thrombosis, or hydrocephalus.    Marked dilatation of the bilateral superior ophthalmic veins is noted. A   cavernous carotid fistula or other etiology can't be excluded. A   follow-up brain MRA is recommended for further workup. Evaluation of the   cavernous sinuses limited with noncontrast MRV technique. This could also   be further evaluated with CTA/CTV followed by hemodialysis.    Nonspecific patchy edema involves both optic nerves.    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 Evaluation is limited by   lack of intravenous contrast. Serial imaging follow-up over time is   recommended to monitor for stability and to exclude other etiologies.      ADAN ALVARADO M.D., ATTENDING RADIOLOGIST      MRV; On the MRV, there is no evidence of dural venous sinus thrombosis.   Evaluation of the cavernous sinuses is quite limited with noncontrast MRV   technique.

## 2018-08-27 NOTE — PROGRESS NOTE ADULT - SUBJECTIVE AND OBJECTIVE BOX
CARDIOLOGY FOLLOW UP - Dr. Real    CC no chest pain or sob       PHYSICAL EXAM:  T(C): 36.6 (08-27-18 @ 11:43), Max: 36.7 (08-26-18 @ 20:57)  HR: 85 (08-27-18 @ 11:43) (75 - 85)  BP: 136/67 (08-27-18 @ 11:43) (135/74 - 157/74)  RR: 18 (08-27-18 @ 11:43) (18 - 18)  SpO2: 98% (08-27-18 @ 11:43) (96% - 98%)  Wt(kg): --  I&O's Summary    26 Aug 2018 07:01  -  27 Aug 2018 07:00  --------------------------------------------------------  IN: 700 mL / OUT: 0 mL / NET: 700 mL    27 Aug 2018 07:01  -  27 Aug 2018 15:57  --------------------------------------------------------  IN: 400 mL / OUT: 0 mL / NET: 400 mL        Appearance: Normal	  Cardiovascular: Normal S1 S2,RRR, No JVD, No murmurs  Respiratory: Lungs clear to auscultation	  Gastrointestinal:  Soft, Non-tender, + BS	  Extremities: Normal range of motion, No clubbing,+ 2 bl LE edema         MEDICATIONS  (STANDING):  ascorbic acid 500 milliGRAM(s) Oral daily  Butalbital, acetaminophen/caffeine 1 Tablet(s) 1 Tablet(s) Oral daily  calcitriol   Capsule 0.25 MICROGram(s) Oral daily  chlorhexidine 4% Liquid 1 Application(s) Topical <User Schedule>  cholecalciferol 2000 Unit(s) Oral daily  cloNIDine Patch 0.3 mG/24Hr(s) 1 patch Topical every 7 days  clotrimazole/betamethasone Cream 1 Application(s) Topical two times a day  docusate sodium 100 milliGRAM(s) Oral two times a day  furosemide    Tablet 80 milliGRAM(s) Oral daily  losartan 50 milliGRAM(s) Oral two times a day  minoxidil 5 milliGRAM(s) Oral two times a day  Nephro-martha 1 Tablet(s) Oral daily  NIFEdipine XL 60 milliGRAM(s) Oral <User Schedule>  polyethylene glycol 3350 17 Gram(s) Oral once  predniSONE   Tablet 5 milliGRAM(s) Oral daily  tacrolimus 1 milliGRAM(s) Oral every 12 hours  trimethoprim  160 mG/sulfamethoxazole 800 mG 1 Tablet(s) Oral <User Schedule>      TELEMETRY: 	    ECG:  	  RADIOLOGY:   DIAGNOSTIC TESTING:  [ ] Echocardiogram:  [ ]  Catheterization:  [ ] Stress Test:    OTHER: 	    LABS:

## 2018-08-27 NOTE — PROGRESS NOTE ADULT - ASSESSMENT
57 yo gentleman w/pmhx of HTN, ESRD s/p renal transplant x 2 but now requiring HD again (T Th Sat),  presenting with sudden vision changes x 5 days in the setting of elevated  blood pressures. It is binocular. Nonfocal neurological examination. Finding of retinal hemorrhages on ophthalmological evaluation.   -CTH: no evidence of intracranial hemorrhage, mass, midline shift    Concern w/ htn emergency: htn retinopathy/optic neuropathy    MRI brain no acute stroke, no PRES. ? +dilated superior ophthalmic veins and optic nerve edema ? rule out cavernous carotid fistula  initial ophthalmology eval feel cavernous carotid fistula not clinically likely in setting of bilaterality and clinical presentation (no redness of eye, proptosis)  . after resident d/w Dr. Mcrae, CTA/V to be considered    Clinically, suspicion of bilateral CC fistula very unlikely.  Pt demonstrates reservation about CT contrast and prefers study not to be done.  From neuro perspective, it is reasonable to continue clinical observation with BP control.   Vision appears stable on bedside exam.    Plan:    - Continue optimize BP control as per primary team and renal  - Ophthalmology highly recommending CTA/CTV with contrast. Discussed at length with pt. Risks and benefits as per ophtho and nephrology. For now pt deferring, can repeat mrs as outpt  - neuro has been stable  - Supportive care  - Also reviewed left acoustic neuroma with pt, advised outpatient MRI surveillance.  - at this point no neuro objection to dc home with close outpt f/u  d/w pt

## 2018-08-27 NOTE — PROGRESS NOTE ADULT - SUBJECTIVE AND OBJECTIVE BOX
Patient is a 58y old  Male who presents with a chief complaint of Accelerated hypertension/hypertensive emergency/vision changes (17 Aug 2018 16:30)      SUBJECTIVE / OVERNIGHT EVENTS:   Feels better.  Denies CP/SOB/Palpitation/HA.    MEDICATIONS  (STANDING):  ascorbic acid 500 milliGRAM(s) Oral daily  Butalbital, acetaminophen/caffeine 1 Tablet(s) 1 Tablet(s) Oral daily  calcitriol   Capsule 0.25 MICROGram(s) Oral daily  chlorhexidine 4% Liquid 1 Application(s) Topical <User Schedule>  cholecalciferol 2000 Unit(s) Oral daily  cloNIDine Patch 0.3 mG/24Hr(s) 1 patch Topical every 7 days  clotrimazole/betamethasone Cream 1 Application(s) Topical two times a day  docusate sodium 100 milliGRAM(s) Oral two times a day  furosemide    Tablet 80 milliGRAM(s) Oral daily  losartan 50 milliGRAM(s) Oral two times a day  minoxidil 5 milliGRAM(s) Oral two times a day  Nephro-martha 1 Tablet(s) Oral daily  NIFEdipine XL 60 milliGRAM(s) Oral <User Schedule>  polyethylene glycol 3350 17 Gram(s) Oral once  predniSONE   Tablet 5 milliGRAM(s) Oral daily  tacrolimus 1 milliGRAM(s) Oral every 12 hours  trimethoprim  160 mG/sulfamethoxazole 800 mG 1 Tablet(s) Oral <User Schedule>    MEDICATIONS  (PRN):  acetaminophen   Tablet. 650 milliGRAM(s) Oral every 6 hours PRN Mild Pain (1 - 3)  ondansetron Injectable 4 milliGRAM(s) IV Push every 6 hours PRN give with hydralazine pushes        CAPILLARY BLOOD GLUCOSE        I&O's Summary    26 Aug 2018 07:01  -  27 Aug 2018 07:00  --------------------------------------------------------  IN: 700 mL / OUT: 0 mL / NET: 700 mL    27 Aug 2018 07:01  -  27 Aug 2018 21:16  --------------------------------------------------------  IN: 400 mL / OUT: 0 mL / NET: 400 mL        PHYSICAL EXAM:  GENERAL: NAD, well-developed  HEAD:  Atraumatic, Normocephalic  NECK: Supple, No JVD  CHEST/LUNG: Clear to auscultation bilaterally; No wheezing.  HEART: Regular rate and rhythm; No murmurs, rubs, or gallops  ABDOMEN: Soft, Nontender, Nondistended; Bowel sounds present  EXTREMITIES:   No clubbing, cyanosis, or edema  NEUROLOGY: AAO X 3  SKIN: No rashes    LABS:                  CAPILLARY BLOOD GLUCOSE                    RADIOLOGY & ADDITIONAL TESTS:    Imaging Personally Reviewed:    Consultant(s) Notes Reviewed:      Care Discussed with Consultants/Other Providers:

## 2018-08-28 DIAGNOSIS — E87.5 HYPERKALEMIA: ICD-10-CM

## 2018-08-28 LAB
ANION GAP SERPL CALC-SCNC: 14 MMOL/L — SIGNIFICANT CHANGE UP (ref 5–17)
ANION GAP SERPL CALC-SCNC: 20 MMOL/L — HIGH (ref 5–17)
BUN SERPL-MCNC: 40 MG/DL — HIGH (ref 7–23)
BUN SERPL-MCNC: 79 MG/DL — HIGH (ref 7–23)
CALCIUM SERPL-MCNC: 9.4 MG/DL — SIGNIFICANT CHANGE UP (ref 8.4–10.5)
CALCIUM SERPL-MCNC: 9.5 MG/DL — SIGNIFICANT CHANGE UP (ref 8.4–10.5)
CHLORIDE SERPL-SCNC: 94 MMOL/L — LOW (ref 96–108)
CHLORIDE SERPL-SCNC: 96 MMOL/L — SIGNIFICANT CHANGE UP (ref 96–108)
CO2 SERPL-SCNC: 23 MMOL/L — SIGNIFICANT CHANGE UP (ref 22–31)
CO2 SERPL-SCNC: 28 MMOL/L — SIGNIFICANT CHANGE UP (ref 22–31)
CREAT SERPL-MCNC: 10.52 MG/DL — HIGH (ref 0.5–1.3)
CREAT SERPL-MCNC: 6.68 MG/DL — HIGH (ref 0.5–1.3)
GLUCOSE SERPL-MCNC: 118 MG/DL — HIGH (ref 70–99)
GLUCOSE SERPL-MCNC: 134 MG/DL — HIGH (ref 70–99)
HCT VFR BLD CALC: 30.2 % — LOW (ref 39–50)
HGB BLD-MCNC: 9.4 G/DL — LOW (ref 13–17)
MAGNESIUM SERPL-MCNC: 2.5 MG/DL — SIGNIFICANT CHANGE UP (ref 1.6–2.6)
MCHC RBC-ENTMCNC: 25.1 PG — LOW (ref 27–34)
MCHC RBC-ENTMCNC: 31.1 GM/DL — LOW (ref 32–36)
MCV RBC AUTO: 80.7 FL — SIGNIFICANT CHANGE UP (ref 80–100)
PHOSPHATE SERPL-MCNC: 7.3 MG/DL — HIGH (ref 2.5–4.5)
PLATELET # BLD AUTO: 375 K/UL — SIGNIFICANT CHANGE UP (ref 150–400)
POTASSIUM SERPL-MCNC: 6.1 MMOL/L — HIGH (ref 3.5–5.3)
POTASSIUM SERPL-MCNC: 6.6 MMOL/L — CRITICAL HIGH (ref 3.5–5.3)
POTASSIUM SERPL-SCNC: 6.1 MMOL/L — HIGH (ref 3.5–5.3)
POTASSIUM SERPL-SCNC: 6.6 MMOL/L — CRITICAL HIGH (ref 3.5–5.3)
RBC # BLD: 3.74 M/UL — LOW (ref 4.2–5.8)
RBC # FLD: 19.3 % — HIGH (ref 10.3–14.5)
SODIUM SERPL-SCNC: 137 MMOL/L — SIGNIFICANT CHANGE UP (ref 135–145)
SODIUM SERPL-SCNC: 138 MMOL/L — SIGNIFICANT CHANGE UP (ref 135–145)
TACROLIMUS SERPL-MCNC: <2 NG/ML — SIGNIFICANT CHANGE UP
WBC # BLD: 9.28 K/UL — SIGNIFICANT CHANGE UP (ref 3.8–10.5)
WBC # FLD AUTO: 9.28 K/UL — SIGNIFICANT CHANGE UP (ref 3.8–10.5)

## 2018-08-28 PROCEDURE — 90935 HEMODIALYSIS ONE EVALUATION: CPT | Mod: GC

## 2018-08-28 RX ORDER — ASPIRIN/CALCIUM CARB/MAGNESIUM 324 MG
1 TABLET ORAL
Qty: 0 | Refills: 0 | COMMUNITY

## 2018-08-28 RX ORDER — LOSARTAN POTASSIUM 100 MG/1
1 TABLET, FILM COATED ORAL
Qty: 60 | Refills: 0 | OUTPATIENT
Start: 2018-08-28 | End: 2018-09-26

## 2018-08-28 RX ORDER — LOSARTAN POTASSIUM 100 MG/1
1 TABLET, FILM COATED ORAL
Qty: 0 | Refills: 0 | COMMUNITY
Start: 2018-08-28

## 2018-08-28 RX ORDER — HYDROMORPHONE HYDROCHLORIDE 2 MG/ML
1 INJECTION INTRAMUSCULAR; INTRAVENOUS; SUBCUTANEOUS ONCE
Qty: 0 | Refills: 0 | Status: DISCONTINUED | OUTPATIENT
Start: 2018-08-28 | End: 2018-08-28

## 2018-08-28 RX ORDER — NIFEDIPINE 30 MG
90 TABLET, EXTENDED RELEASE 24 HR ORAL
Qty: 0 | Refills: 0 | Status: DISCONTINUED | OUTPATIENT
Start: 2018-08-28 | End: 2018-08-29

## 2018-08-28 RX ORDER — POLYETHYLENE GLYCOL 3350 17 G/17G
17 POWDER, FOR SOLUTION ORAL
Qty: 0 | Refills: 0 | COMMUNITY
Start: 2018-08-28

## 2018-08-28 RX ORDER — LOSARTAN POTASSIUM 100 MG/1
1 TABLET, FILM COATED ORAL
Qty: 0 | Refills: 0 | COMMUNITY

## 2018-08-28 RX ORDER — MINOXIDIL 10 MG
2 TABLET ORAL
Qty: 0 | Refills: 0 | COMMUNITY
Start: 2018-08-28

## 2018-08-28 RX ORDER — MINOXIDIL 10 MG
2 TABLET ORAL
Qty: 120 | Refills: 0 | OUTPATIENT
Start: 2018-08-28 | End: 2018-09-26

## 2018-08-28 RX ORDER — HYDROMORPHONE HYDROCHLORIDE 2 MG/ML
0.5 INJECTION INTRAMUSCULAR; INTRAVENOUS; SUBCUTANEOUS ONCE
Qty: 0 | Refills: 0 | Status: DISCONTINUED | OUTPATIENT
Start: 2018-08-28 | End: 2018-08-28

## 2018-08-28 RX ORDER — DOCUSATE SODIUM 100 MG
1 CAPSULE ORAL
Qty: 0 | Refills: 0 | COMMUNITY
Start: 2018-08-28

## 2018-08-28 RX ADMIN — CHLORHEXIDINE GLUCONATE 1 APPLICATION(S): 213 SOLUTION TOPICAL at 13:31

## 2018-08-28 RX ADMIN — CLOTRIMAZOLE AND BETAMETHASONE DIPROPIONATE 1 APPLICATION(S): 10; .5 CREAM TOPICAL at 05:14

## 2018-08-28 RX ADMIN — Medication 500 MILLIGRAM(S): at 13:31

## 2018-08-28 RX ADMIN — CALCITRIOL 0.25 MICROGRAM(S): 0.5 CAPSULE ORAL at 13:31

## 2018-08-28 RX ADMIN — Medication 2000 UNIT(S): at 13:31

## 2018-08-28 RX ADMIN — Medication 90 MILLIGRAM(S): at 22:42

## 2018-08-28 RX ADMIN — Medication 5 MILLIGRAM(S): at 17:45

## 2018-08-28 RX ADMIN — TACROLIMUS 1 MILLIGRAM(S): 5 CAPSULE ORAL at 11:35

## 2018-08-28 RX ADMIN — Medication 80 MILLIGRAM(S): at 13:36

## 2018-08-28 RX ADMIN — Medication 100 MILLIGRAM(S): at 17:44

## 2018-08-28 RX ADMIN — HYDROMORPHONE HYDROCHLORIDE 1 MILLIGRAM(S): 2 INJECTION INTRAMUSCULAR; INTRAVENOUS; SUBCUTANEOUS at 22:52

## 2018-08-28 RX ADMIN — Medication 60 MILLIGRAM(S): at 13:31

## 2018-08-28 RX ADMIN — Medication 1 TABLET(S): at 13:31

## 2018-08-28 RX ADMIN — TACROLIMUS 1 MILLIGRAM(S): 5 CAPSULE ORAL at 22:42

## 2018-08-28 RX ADMIN — HYDROMORPHONE HYDROCHLORIDE 0.5 MILLIGRAM(S): 2 INJECTION INTRAMUSCULAR; INTRAVENOUS; SUBCUTANEOUS at 13:49

## 2018-08-28 RX ADMIN — Medication 100 MILLIGRAM(S): at 05:13

## 2018-08-28 RX ADMIN — Medication 5 MILLIGRAM(S): at 13:30

## 2018-08-28 RX ADMIN — CLOTRIMAZOLE AND BETAMETHASONE DIPROPIONATE 1 APPLICATION(S): 10; .5 CREAM TOPICAL at 17:44

## 2018-08-28 RX ADMIN — HYDROMORPHONE HYDROCHLORIDE 0.5 MILLIGRAM(S): 2 INJECTION INTRAMUSCULAR; INTRAVENOUS; SUBCUTANEOUS at 15:18

## 2018-08-28 RX ADMIN — Medication 5 MILLIGRAM(S): at 05:13

## 2018-08-28 RX ADMIN — HYDROMORPHONE HYDROCHLORIDE 1 MILLIGRAM(S): 2 INJECTION INTRAMUSCULAR; INTRAVENOUS; SUBCUTANEOUS at 19:03

## 2018-08-28 RX ADMIN — HYDROMORPHONE HYDROCHLORIDE 1 MILLIGRAM(S): 2 INJECTION INTRAMUSCULAR; INTRAVENOUS; SUBCUTANEOUS at 19:33

## 2018-08-28 RX ADMIN — LOSARTAN POTASSIUM 50 MILLIGRAM(S): 100 TABLET, FILM COATED ORAL at 05:13

## 2018-08-28 NOTE — PROGRESS NOTE ADULT - PROBLEM SELECTOR PLAN 1
Patient with h/o failed renal allograft and ESRD, on hemodialysis TTS schedule.   HD today, tolerating well.
Patient with failed renal allograft, ESRD, on hemodialysis, HTN, bilateral visual symptoms under ophthalmology evaluation.  On TTS schedule  Patient has hypervolemia, but no uremic symptoms, underwent HD yesterday,  Will dialyze again today to achieve euvolemia  Will continue home HD schedule that
Patient with h/o failed renal allograft and ESRD, on hemodialysis TTS schedule.   HD today, tolerating well.
Patient with h/o failed renal allograft and ESRD, on hemodialysis TTS schedule.   Increase uF as tolerated to optimize his volume status
Patient with h/o failed renal allograft and ESRD, on hemodialysis TTS schedule. Last HD was done on 8/19. Labs reviewed from today .Will arrange for maintenance HD today. Monitor BMP daily.
Patient with h/o failed renal allograft and ESRD, on hemodialysis TTS schedule. Patient had HD session on 8/18 and 8/19 as well. Labs reviewed from today and are acceptable. Patient does not appear to be in fluid overload. No plan for HD today. Will arrange for maintenance HD tomorrow. Monitor BMP daily.

## 2018-08-28 NOTE — PROGRESS NOTE ADULT - PROBLEM SELECTOR PLAN 3
Continue Prednisone 5mg PO daily  and Tacrolimus 1mg PO BID  Cont Trimethoprim  160 MG/ Sulfamethoxazole 800 MG 1 Tablet daily.  FK levels < 2.0. Pt needs FK trough 30 mins prior to AM dose.  Increase dose if trough persistently < 2.
BP elevated; pt however does not wish to remove more than 1.5 kg.  Increase Nifedipine to 90 mg daily.
BP noted to be elevated but has improved from yesterday. Will arrange for HD with UF goal of 2-2.5 kg as tolerated. Continue with current antihypertensives. Monitor BP.
BP noted to be elevated but has improved from yesterday. Will arrange for HD with UF goal of 2-2.5 kg as tolerated. Continue with current antihypertensives. Monitor BP.
BP noted to be elevated today. Patient started on minoxidil 2.5 mg HS at bedtime yesterday night. Patient had UF done on 8/18 and 8/19 but BP is still elevated. Continue with current antihypertensives for now. Check renin and aldosterone level. Monitor BP.
Cont Clonidine Patch 0.3 mG/24Hr 1 patch Topical every 7 days  Furosemide t 80 Mg Oral daily  Losartan 50 mg Oral two times a day  Nifedipine XL 60 mg PO daily.

## 2018-08-28 NOTE — CHART NOTE - NSCHARTNOTEFT_GEN_A_CORE
Called by RN with lab results. K 6.6. Hemodialysis patient, currently in HD. BMP ordered post HD. Discussed with Dr. Kumar, no need for post BMP, d/c home after HD if BP is stable.

## 2018-08-28 NOTE — PROGRESS NOTE ADULT - SUBJECTIVE AND OBJECTIVE BOX
CARDIOLOGY FOLLOW UP - Dr. Real    CC no chest pain or sob        PHYSICAL EXAM:  T(C): 36.8 (08-28-18 @ 09:12), Max: 37 (08-28-18 @ 04:49)  HR: 74 (08-28-18 @ 09:12) (74 - 78)  BP: 154/84 (08-28-18 @ 09:12) (131/73 - 154/84)  RR: 18 (08-28-18 @ 09:12) (17 - 18)  SpO2: 100% (08-28-18 @ 09:12) (97% - 100%)  Wt(kg): --  I&O's Summary    27 Aug 2018 07:01  -  28 Aug 2018 07:00  --------------------------------------------------------  IN: 400 mL / OUT: 0 mL / NET: 400 mL    28 Aug 2018 07:01  -  28 Aug 2018 13:25  --------------------------------------------------------  IN: 500 mL / OUT: 0 mL / NET: 500 mL        Appearance: Normal	  Cardiovascular: Normal S1 S2,RRR, No JVD, No murmurs  Respiratory: Lungs clear to auscultation	  Gastrointestinal:  Soft, Non-tender, + BS	  Extremities: Normal range of motion, No clubbing, cyanosis or edema        MEDICATIONS  (STANDING):  ascorbic acid 500 milliGRAM(s) Oral daily  Butalbital, acetaminophen/caffeine 1 Tablet(s) 1 Tablet(s) Oral daily  calcitriol   Capsule 0.25 MICROGram(s) Oral daily  chlorhexidine 4% Liquid 1 Application(s) Topical <User Schedule>  cholecalciferol 2000 Unit(s) Oral daily  cloNIDine Patch 0.3 mG/24Hr(s) 1 patch Topical every 7 days  clotrimazole/betamethasone Cream 1 Application(s) Topical two times a day  docusate sodium 100 milliGRAM(s) Oral two times a day  furosemide    Tablet 80 milliGRAM(s) Oral daily  losartan 50 milliGRAM(s) Oral two times a day  minoxidil 5 milliGRAM(s) Oral two times a day  Nephro-martha 1 Tablet(s) Oral daily  NIFEdipine XL 60 milliGRAM(s) Oral <User Schedule>  polyethylene glycol 3350 17 Gram(s) Oral once  predniSONE   Tablet 5 milliGRAM(s) Oral daily  tacrolimus 1 milliGRAM(s) Oral every 12 hours  trimethoprim  160 mG/sulfamethoxazole 800 mG 1 Tablet(s) Oral <User Schedule>      TELEMETRY: 	    ECG:  	  RADIOLOGY:   DIAGNOSTIC TESTING:  [ ] Echocardiogram:  [ ]  Catheterization:  [ ] Stress Test:    OTHER: 	    LABS:	 	                                9.4    9.28  )-----------( 375      ( 28 Aug 2018 11:09 )             30.2     08-28    137  |  94<L>  |  79<H>  ----------------------------<  134<H>  6.6<HH>   |  23  |  10.52<H>    Ca    9.4      28 Aug 2018 09:42  Phos  7.3     08-28  Mg     2.5     08-28

## 2018-08-28 NOTE — PROGRESS NOTE ADULT - SUBJECTIVE AND OBJECTIVE BOX
Patient is a 58y old  Male who presents with a chief complaint of Accelerated hypertension/hypertensive emergency/vision changes (17 Aug 2018 16:30)      SUBJECTIVE / OVERNIGHT EVENTS:   Feels better.  Denies CP/SOB/Palpitation/HA.    MEDICATIONS  (STANDING):  ascorbic acid 500 milliGRAM(s) Oral daily  Butalbital, acetaminophen/caffeine 1 Tablet(s) 1 Tablet(s) Oral daily  calcitriol   Capsule 0.25 MICROGram(s) Oral daily  chlorhexidine 4% Liquid 1 Application(s) Topical <User Schedule>  cholecalciferol 2000 Unit(s) Oral daily  cloNIDine Patch 0.3 mG/24Hr(s) 1 patch Topical every 7 days  clotrimazole/betamethasone Cream 1 Application(s) Topical two times a day  docusate sodium 100 milliGRAM(s) Oral two times a day  furosemide    Tablet 80 milliGRAM(s) Oral daily  losartan 50 milliGRAM(s) Oral two times a day  minoxidil 5 milliGRAM(s) Oral two times a day  Nephro-martha 1 Tablet(s) Oral daily  NIFEdipine XL 60 milliGRAM(s) Oral <User Schedule>  polyethylene glycol 3350 17 Gram(s) Oral once  predniSONE   Tablet 5 milliGRAM(s) Oral daily  tacrolimus 1 milliGRAM(s) Oral every 12 hours  trimethoprim  160 mG/sulfamethoxazole 800 mG 1 Tablet(s) Oral <User Schedule>    MEDICATIONS  (PRN):  acetaminophen   Tablet. 650 milliGRAM(s) Oral every 6 hours PRN Mild Pain (1 - 3)  ondansetron Injectable 4 milliGRAM(s) IV Push every 6 hours PRN give with hydralazine pushes        CAPILLARY BLOOD GLUCOSE        I&O's Summary    27 Aug 2018 07:01  -  28 Aug 2018 07:00  --------------------------------------------------------  IN: 400 mL / OUT: 0 mL / NET: 400 mL    28 Aug 2018 07:01  -  28 Aug 2018 12:24  --------------------------------------------------------  IN: 500 mL / OUT: 0 mL / NET: 500 mL        PHYSICAL EXAM:  GENERAL: NAD, well-developed  HEAD:  Atraumatic, Normocephalic  NECK: Supple, No JVD  CHEST/LUNG: Clear to auscultation bilaterally; No wheezing.  HEART: Regular rate and rhythm; No murmurs, rubs, or gallops  ABDOMEN: Soft, Nontender, Nondistended; Bowel sounds present  EXTREMITIES:   No clubbing, cyanosis, or edema  NEUROLOGY: AAO X 3  SKIN: No rashes    LABS:                        9.4    9.28  )-----------( 375      ( 28 Aug 2018 11:09 )             30.2     08-28    137  |  94<L>  |  79<H>  ----------------------------<  134<H>  6.6<HH>   |  23  |  10.52<H>    Ca    9.4      28 Aug 2018 09:42  Phos  7.3     08-28  Mg     2.5     08-28              CAPILLARY BLOOD GLUCOSE                    RADIOLOGY & ADDITIONAL TESTS:    Imaging Personally Reviewed:    Consultant(s) Notes Reviewed:      Care Discussed with Consultants/Other Providers:

## 2018-08-28 NOTE — PROVIDER CONTACT NOTE (OTHER) - DATE AND TIME:
16-Aug-2018 21:00
16-Aug-2018 00:30
17-Aug-2018 05:10
18-Aug-2018 22:25
19-Aug-2018 20:55
23-Aug-2018 06:50
23-Aug-2018 17:05
24-Aug-2018 12:30
28-Aug-2018

## 2018-08-28 NOTE — PROVIDER CONTACT NOTE (OTHER) - ASSESSMENT
pt is axox4, stable
AxOx3
VS WDL. No s&s
axox4, Asymptomatic
axox4, asymptomatic
axox4, denies headache
axox4, headache
pt a&ox4, denies pain/nausea, ambulates independently, walks frequently
pt alert, c/o of headache, does not want to take tylenol IV, requests diluadid

## 2018-08-28 NOTE — PROGRESS NOTE ADULT - SUBJECTIVE AND OBJECTIVE BOX
SUBJECTIVE: No new neurologic events overnight.  No new neurologic complaints.    vision same  no double vision  no face numbness    Medications:  MEDICATIONS  (STANDING):  ascorbic acid 500 milliGRAM(s) Oral daily  Butalbital, acetaminophen/caffeine 1 Tablet(s) 1 Tablet(s) Oral daily  calcitriol   Capsule 0.25 MICROGram(s) Oral daily  chlorhexidine 4% Liquid 1 Application(s) Topical <User Schedule>  cholecalciferol 2000 Unit(s) Oral daily  cloNIDine Patch 0.3 mG/24Hr(s) 1 patch Topical every 7 days  clotrimazole/betamethasone Cream 1 Application(s) Topical two times a day  docusate sodium 100 milliGRAM(s) Oral two times a day  furosemide    Tablet 80 milliGRAM(s) Oral daily  losartan 50 milliGRAM(s) Oral two times a day  minoxidil 5 milliGRAM(s) Oral two times a day  Nephro-martha 1 Tablet(s) Oral daily  NIFEdipine XL 60 milliGRAM(s) Oral <User Schedule>  polyethylene glycol 3350 17 Gram(s) Oral once  predniSONE   Tablet 5 milliGRAM(s) Oral daily  tacrolimus 1 milliGRAM(s) Oral every 12 hours  trimethoprim  160 mG/sulfamethoxazole 800 mG 1 Tablet(s) Oral <User Schedule>    MEDICATIONS  (PRN):  acetaminophen   Tablet. 650 milliGRAM(s) Oral every 6 hours PRN Mild Pain (1 - 3)  ondansetron Injectable 4 milliGRAM(s) IV Push every 6 hours PRN give with hydralazine pushes      Labs:  CBC Full  -  ( 28 Aug 2018 11:09 )  WBC Count : 9.28 K/uL  Hemoglobin : 9.4 g/dL  Hematocrit : 30.2 %  Platelet Count - Automated : 375 K/uL  Mean Cell Volume : 80.7 fl  Mean Cell Hemoglobin : 25.1 pg  Mean Cell Hemoglobin Concentration : 31.1 gm/dL  Auto Neutrophil # : x  Auto Lymphocyte # : x  Auto Monocyte # : x  Auto Eosinophil # : x  Auto Basophil # : x  Auto Neutrophil % : x  Auto Lymphocyte % : x  Auto Monocyte % : x  Auto Eosinophil % : x  Auto Basophil % : x    08-28    137  |  94<L>  |  79<H>  ----------------------------<  134<H>  6.6<HH>   |  23  |  10.52<H>    Ca    9.4      28 Aug 2018 09:42  Phos  7.3     08-28  Mg     2.5     08-28      CAPILLARY BLOOD GLUCOSE                Vitals:  Vital Signs Last 24 Hrs  T(C): 36.8 (28 Aug 2018 09:12), Max: 37 (28 Aug 2018 04:49)  T(F): 98.2 (28 Aug 2018 09:12), Max: 98.6 (28 Aug 2018 04:49)  HR: 74 (28 Aug 2018 09:12) (74 - 78)  BP: 154/84 (28 Aug 2018 09:12) (131/73 - 154/84)  BP(mean): --  RR: 18 (28 Aug 2018 09:12) (17 - 18)  SpO2: 100% (28 Aug 2018 09:12) (97% - 100%)        MS: aa0x3, nl attention, language  Cranial Nerves: Pupils were equal, round, reactive to light. No APD. VFFC. Extraocular movements were intact. No nystagmus. Facial sensation was intact to light touch. There was no facial asymmetry. The palate was upgoing symmetrically, tongue midline with tremor. Shoulder shrug was full bilaterally.     Motor exam: Bulk and tone were normal. Patient was moving all extremities. Able to lift legs antigravity.  No tremors. No pronator drift.     Reflexes: 1+ in the bilateral upper extremities. 1+ in the bilateral lower extremities. Toes were downgoing bilaterally.     Sensation: Intact to light touch    Coordination: no gross dysmetria    Gait: Not tested.    Phosphorus Level, Serum: 6.3 mg/dL (08-17 @ 07:16)    < from: CT Head No Cont (08.16.18 @ 20:46) >  FINDINGS:     There is no evidence of mass or acute intracranial hemorrhage. Ventricles   and sulci are normal in size and configuration for the patient's stated   age. No midline shift or other significant mass effect is noted. There is   no CT evidence of acute territorial infarct.     The visualized paranasal sinuses and tympanomastoid spaces are clear.   Orbits and orbital contents are unremarkable.    There is no depressed calvarial fracture.    IMPRESSION:     No evidence of acute intracranial hemorrhage, midline shift or CT   evidence of acute territorial infarct.    If the patient's symptoms persist, consider short interval follow-up head   CT or brain MRI if there are no MRI contraindications.  EXAM:  MR VENOGRAM BRAIN                          EXAM:  MR BRAIN                            PROCEDURE DATE:  08/18/2018            INTERPRETATION:  History: Blurry vision. Optic disc edema bilaterally.   Elevated creatinine levels. End-stage renaldisease on hemodialysis.    Description: Noncontrast MRI studies of the brain and orbits, and a   noncontrast brain MRV (with noncontrast 2-D time-of-flight technique)   were performed.    Comparison is made to the head CT study from 08/16/2018.    The brain MRI was performed with sagittal T1, axial SPGR, T2, FLAIR,   gradient, SWI, and diffusion-weighted series. The orbital MRI was   performed with thin section high-resolution axial/coronal T2 fat   saturation series.    The study is limited by motion, especially on the orbital series.    Marked dilatation of the bilateral superior ophthalmic veins is noted.   Bilateral eyelid edema and mild bilateral proptosis are noted. Vague   increased T2 signal involves the orbital segments of both optic nerves,   best appreciated on the axial thin section T2 orbital series, consistent   with nonspecific edema.    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 Evaluation is limited by   lack of intravenous contrast. Serial imaging follow-up over time is   recommended to monitor for stability and to exclude other etiologies.    There is no evidence for acute infarct, acute hemorrhage, or   hydrocephalus. Mild chronic white matter changes are noted with   associated patchy increased T2 and FLAIR signal.    On the MRV, there is no evidence of dural venous sinus thrombosis.   Evaluation of the cavernous sinuses is quite limited with noncontrast MRV   technique.    IMPRESSION:    No evidence for acute infarct, acute hemorrhage, dural venous sinus   thrombosis, or hydrocephalus.    Marked dilatation of the bilateral superior ophthalmic veins is noted. A   cavernous carotid fistula or other etiology can't be excluded. A   follow-up brain MRA is recommended for further workup. Evaluation of the   cavernous sinuses limited with noncontrast MRV technique. This could also   be further evaluated with CTA/CTV followed by hemodialysis.    Nonspecific patchy edema involves both optic nerves.    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 Evaluation is limited by   lack of intravenous contrast. Serial imaging follow-up over time is   recommended to monitor for stability and to exclude other etiologies.      ADAN ALVARADO M.D., ATTENDING RADIOLOGIST      MRV; On the MRV, there is no evidence of dural venous sinus thrombosis.   Evaluation of the cavernous sinuses is quite limited with noncontrast MRV   technique.

## 2018-08-28 NOTE — PROGRESS NOTE ADULT - ASSESSMENT
59 yo gentleman w/pmhx of HTN, ESRD s/p renal transplant x 2 but now requiring HD again (T Th Sat),  presenting with sudden vision changes x 5 days in the setting of elevated  blood pressures. It is binocular. Nonfocal neurological examination. Finding of retinal hemorrhages on ophthalmological evaluation.   -CTH: no evidence of intracranial hemorrhage, mass, midline shift    Concern w/ htn emergency: htn retinopathy/optic neuropathy    MRI brain no acute stroke, no PRES. ? +dilated superior ophthalmic veins and optic nerve edema ? rule out cavernous carotid fistula  initial ophthalmology eval feel cavernous carotid fistula not clinically likely in setting of bilaterality and clinical presentation (no redness of eye, proptosis)  . after resident d/w Dr. Mcrae, CTA/V to be considered    Clinically, suspicion of bilateral CC fistula very unlikely.  Pt demonstrates reservation about CT contrast and prefers study not to be done.  From neuro perspective, it is reasonable to continue clinical observation with BP control.   Vision appears stable on bedside exam.    Plan:    - Continue optimize BP control as per primary team and renal  - Ophthalmology highly recommending CTA/CTV with contrast. Discussed at length with pt. Risks and benefits as per ophtho and nephrology. For now pt deferring, can repeat mrs as outpt  - neuro has been stable  - Supportive care  - Also reviewed left acoustic neuroma with pt, advised outpatient MRI surveillance.  - at this point no neuro objection to dc home with close outpt f/u  d/w pt

## 2018-08-28 NOTE — PROGRESS NOTE ADULT - SUBJECTIVE AND OBJECTIVE BOX
[facility] Division of Kidney Diseases & Hypertension  FOLLOW UP NOTE  [phone]--------------------------------------------------------------------------------  Chief Complaint: ESRD    24 hour events/subjective:    Patient seen during HD today.  No complaints.         PAST HISTORY  --------------------------------------------------------------------------------  No significant changes to PMH, PSH, FHx, SHx, unless otherwise noted    ALLERGIES & MEDICATIONS  --------------------------------------------------------------------------------  Allergies    No Known Allergies    Intolerances    beta blockers (Other (Mod to Severe))    Standing Inpatient Medications  ascorbic acid 500 milliGRAM(s) Oral daily  Butalbital, acetaminophen/caffeine 1 Tablet(s) 1 Tablet(s) Oral daily  calcitriol   Capsule 0.25 MICROGram(s) Oral daily  chlorhexidine 4% Liquid 1 Application(s) Topical <User Schedule>  cholecalciferol 2000 Unit(s) Oral daily  cloNIDine Patch 0.3 mG/24Hr(s) 1 patch Topical every 7 days  clotrimazole/betamethasone Cream 1 Application(s) Topical two times a day  docusate sodium 100 milliGRAM(s) Oral two times a day  furosemide    Tablet 80 milliGRAM(s) Oral daily  losartan 50 milliGRAM(s) Oral two times a day  minoxidil 5 milliGRAM(s) Oral two times a day  Nephro-martha 1 Tablet(s) Oral daily  NIFEdipine XL 60 milliGRAM(s) Oral <User Schedule>  polyethylene glycol 3350 17 Gram(s) Oral once  predniSONE   Tablet 5 milliGRAM(s) Oral daily  tacrolimus 1 milliGRAM(s) Oral every 12 hours  trimethoprim  160 mG/sulfamethoxazole 800 mG 1 Tablet(s) Oral <User Schedule>    PRN Inpatient Medications  acetaminophen   Tablet. 650 milliGRAM(s) Oral every 6 hours PRN  ondansetron Injectable 4 milliGRAM(s) IV Push every 6 hours PRN      REVIEW OF SYSTEMS  --------------------------------------------------------------------------------  Gen: No  fevers/chills  Skin: No rashes  Head/Eyes/Ears/Mouth: No headache; Normal hearing; Normal vision w/o blurriness  Respiratory: No dyspnea, cough, wheezing, hemoptysis  CV: No chest pain, PND, orthopnea  GI: No abdominal pain, diarrhea, constipation, nausea, vomiting  : No increased frequency, dysuria, hematuria, nocturia  MSK: No joint pain/swelling; no back pain; no edema  Neuro: No dizziness/lightheadedness, weakness, seizures, numbness, tingling      All other systems were reviewed and are negative, except as noted.    VITALS/PHYSICAL EXAM  --------------------------------------------------------------------------------  T(C): 36.8 (08-28-18 @ 09:12), Max: 37 (08-28-18 @ 04:49)  HR: 74 (08-28-18 @ 09:12) (74 - 78)  BP: 154/84 (08-28-18 @ 09:12) (131/73 - 154/84)  RR: 18 (08-28-18 @ 09:12) (17 - 18)  SpO2: 100% (08-28-18 @ 09:12) (97% - 100%)  Wt(kg): --        08-27-18 @ 07:01  -  08-28-18 @ 07:00  --------------------------------------------------------  IN: 400 mL / OUT: 0 mL / NET: 400 mL    08-28-18 @ 07:01  -  08-28-18 @ 12:40  --------------------------------------------------------  IN: 500 mL / OUT: 0 mL / NET: 500 mL      Physical Exam:  	  Gen: NAD, well-appearing  	Pulm: CTA B/L  	CV: RRR, S1S2; no rub  	Abd: +BS, soft, nontender/nondistended  	: No suprapubic tenderness  	LE: Warm, FROM, no clubbing, intact strength; 2+ edema  	Skin: Warm, without rashes  	Vascular access: avf      LABS/STUDIES  --------------------------------------------------------------------------------              9.4    9.28  >-----------<  375      [08-28-18 @ 11:09]              30.2     137  |  94  |  79  ----------------------------<  134      [08-28-18 @ 09:42]  6.6   |  23  |  10.52        Ca     9.4     [08-28-18 @ 09:42]      Mg     2.5     [08-28-18 @ 09:42]      Phos  7.3     [08-28-18 @ 09:42]            Creatinine Trend:  SCr 10.52 [08-28 @ 09:42]  SCr 8.82 [08-25 @ 13:41]  SCr 8.93 [08-23 @ 10:46]  SCr 8.88 [08-21 @ 13:47]  SCr 6.84 [08-20 @ 14:41]

## 2018-08-28 NOTE — PROVIDER CONTACT NOTE (OTHER) - BACKGROUND
pt had Headache in the past,
HTN crisis
HTN, Kidney Transplant
HTN, Kidney transplant
KIdney transplant, HTN
Kidney transplant, HTN
Patient admitted with Visual disturbance. Pawel of ESRD
pt admit with HTN and visual disturbance, med hx of renal transplant
pt admit with HTN crisis, visual disturbance, med hx of renal transplant

## 2018-08-28 NOTE — PROVIDER CONTACT NOTE (CRITICAL VALUE NOTIFICATION) - BACKGROUND
HTN (hypertension)  (PMH) Renal failure  (PMH) SBO (small bowel obstruction)  (Principal DC/DX) Visual changes  (Problem/DX) Chronic kidney disease-mineral and bone disorder  (Problem/DX) Anemia  (Problem/DX) Hypertension, unspecified type  (Problem/DX) Renal transplant recipient

## 2018-08-28 NOTE — PROGRESS NOTE ADULT - ASSESSMENT
echo 4/17/18: EF 53, normal LV fx, mild MR     a/p    57yo M w/pmhx of HTN, ESRD s/p renal transplant x 2 but now requiring HD again (T Th Sat), now presenting with vision changes x 5 days as well as elevated blood pressures, admitted w/ htn urgency     1. HTN urgency   likely in the setting of ESRD  bp improved   continue current medication regimen   renal f/u     2. Blurred vision   MRI brain no acute stroke, no PRES. ? +dilated superior ophthalmic veins and optic nerve edema  opthalmology / neurology f/u     3. ESRD  hyperkalemia, renal f/u   continue fluid removal with HD and lasix     dvt ppx   d/c planning per primary team

## 2018-08-28 NOTE — PROGRESS NOTE ADULT - ASSESSMENT
57yo M w/pmhx of HTN, ESRD s/p renal transplant x 2 but now requiring HD again (T Th Sat), now presenting with accelerated hypertension with hypertensive emergency (w/visual changes).      Problem/Plan - 1:  ·  Problem: Visual changes.  Plan:   BP stable  Minoxidil 5 mg po twice a day      Problem/Plan - 2:  ·  Problem: Accelerated hypertension.  Plan: management as per cards  renal fu    Problem/Plan - 3:  ·  Problem: ESRD (end stage renal disease) on dialysis.  Plan:   Renal f/up appreciated    Problem/Plan - 4:  ·  Problem: Renal transplant recipient.  Plan: tacrolimus 1mg BID  Prednisone 5mg po daily.   renal fu

## 2018-08-28 NOTE — PROGRESS NOTE ADULT - PROBLEM SELECTOR PROBLEM 5
Anemia
Chronic kidney disease-mineral and bone disorder

## 2018-08-28 NOTE — PROVIDER CONTACT NOTE (OTHER) - REASON
BP -197/101
BP- 185/100
BP- 190/100
BP- 198/100 manually
Potassium level 6.1
pt BP trending high
pt refused PAS box
pt's BP manual 204/106
pt c/o Migraine Headach

## 2018-08-28 NOTE — PROVIDER CONTACT NOTE (OTHER) - SITUATION
pt admitted for high BP. renal failure
Admission vital signs- BP -197/101
K+ 6.1, gal of ESRD,
Routine vital signs - BP- 185/100 manually
pt BP trending high 179/80
pt refused PAS box
pt's BP manual 204/106, pt has headache, vomited just now, denies nausea, rest of vs stable, headache started after dialysis
routine vital signs - BP- 190/100
routine vital signs - BP- 198/100 manually

## 2018-08-28 NOTE — PROGRESS NOTE ADULT - PROBLEM SELECTOR PLAN 2
Patient noted to have recurrent episodes of hyperkalemia during hospital stay. Patient is currently on losartan 50 mg BID. Recommend to discontinue ARB. Low potassium diet advised. Monitor serum K level.
Continue Prednisone 5mg PO daily  and Tacrolimus 1mg PO BID  Cont Trimethoprim  160 MG/ Sulfamethoxazole 800 MG 1 Tablet daily.
Continue Prednisone 5mg PO daily  and Tacrolimus 1mg PO BID  Cont Trimethoprim  160 MG/ Sulfamethoxazole 800 MG 1 Tablet daily.  FK levels < 2.0. Pt needs FK trough 30 mins prior to AM dose.  Increase dose if trough persistently < 2.

## 2018-08-28 NOTE — PROGRESS NOTE ADULT - PROBLEM SELECTOR PLAN 4
BP elevated but has improved since admission. Recommend to discontinue losartan due to recurrent episodes of hyperkalemia. If BP is elevated, then can increase nifedipine to 90 mg daily. Monitor BP. BP elevated but has improved since admission. Recommend to discontinue losartan due to recurrent episodes of hyperkalemia.   Continue minoxidil. Monitor BP.

## 2018-08-28 NOTE — PROGRESS NOTE ADULT - PROBLEM SELECTOR PROBLEM 2
Hyperkalemia
Renal transplant recipient

## 2018-08-28 NOTE — PROGRESS NOTE ADULT - PROBLEM SELECTOR PROBLEM 3
Renal transplant recipient
Visual changes
Hypertension, unspecified type

## 2018-08-28 NOTE — PROVIDER CONTACT NOTE (OTHER) - ACTION/TREATMENT ORDERED:
another dose of his own medicine was given with good result.
Hydralazine 20 mg ivp
Hydralazine 20 mg ivp
Hydralazine 20 mg ivp x 1
Minoxidil 2.5 mg po given.
No treatment ordered. Monitor VS, lab values and I/O's
give Bp Meds as ordered
losartan and dilaudid IV 1 mg given as ordered, will continue to monitor BP
education provided, pt still refusing

## 2018-08-28 NOTE — PROGRESS NOTE ADULT - PROBLEM SELECTOR PLAN 5
Hb low but stable.  Monitor H&H.
Cont calcitriol Capsule 0.25 mg PO daily  Cholecalciferol 2000 Units Oral daily.

## 2018-08-29 VITALS
HEART RATE: 86 BPM | OXYGEN SATURATION: 98 % | RESPIRATION RATE: 18 BRPM | DIASTOLIC BLOOD PRESSURE: 76 MMHG | SYSTOLIC BLOOD PRESSURE: 145 MMHG | TEMPERATURE: 99 F

## 2018-08-29 PROCEDURE — 99261: CPT

## 2018-08-29 PROCEDURE — 83735 ASSAY OF MAGNESIUM: CPT

## 2018-08-29 PROCEDURE — 84100 ASSAY OF PHOSPHORUS: CPT

## 2018-08-29 PROCEDURE — 85730 THROMBOPLASTIN TIME PARTIAL: CPT

## 2018-08-29 PROCEDURE — 99285 EMERGENCY DEPT VISIT HI MDM: CPT

## 2018-08-29 PROCEDURE — 80074 ACUTE HEPATITIS PANEL: CPT

## 2018-08-29 PROCEDURE — 80053 COMPREHEN METABOLIC PANEL: CPT

## 2018-08-29 PROCEDURE — 85610 PROTHROMBIN TIME: CPT

## 2018-08-29 PROCEDURE — 70450 CT HEAD/BRAIN W/O DYE: CPT

## 2018-08-29 PROCEDURE — 70544 MR ANGIOGRAPHY HEAD W/O DYE: CPT

## 2018-08-29 PROCEDURE — 93005 ELECTROCARDIOGRAM TRACING: CPT

## 2018-08-29 PROCEDURE — 80197 ASSAY OF TACROLIMUS: CPT

## 2018-08-29 PROCEDURE — 85027 COMPLETE CBC AUTOMATED: CPT

## 2018-08-29 PROCEDURE — 70551 MRI BRAIN STEM W/O DYE: CPT

## 2018-08-29 PROCEDURE — 80048 BASIC METABOLIC PNL TOTAL CA: CPT

## 2018-08-29 RX ORDER — OXYCODONE HYDROCHLORIDE 5 MG/1
1 TABLET ORAL
Qty: 10 | Refills: 0 | OUTPATIENT
Start: 2018-08-29 | End: 2018-09-27

## 2018-08-29 RX ORDER — NIFEDIPINE 30 MG
1 TABLET, EXTENDED RELEASE 24 HR ORAL
Qty: 60 | Refills: 0 | OUTPATIENT
Start: 2018-08-29 | End: 2018-09-27

## 2018-08-29 RX ORDER — NIFEDIPINE 30 MG
1 TABLET, EXTENDED RELEASE 24 HR ORAL
Qty: 0 | Refills: 0 | COMMUNITY

## 2018-08-29 RX ORDER — PANTOPRAZOLE SODIUM 20 MG/1
40 TABLET, DELAYED RELEASE ORAL ONCE
Qty: 0 | Refills: 0 | Status: COMPLETED | OUTPATIENT
Start: 2018-08-29 | End: 2018-08-29

## 2018-08-29 RX ADMIN — TACROLIMUS 1 MILLIGRAM(S): 5 CAPSULE ORAL at 11:03

## 2018-08-29 RX ADMIN — Medication 1 TABLET(S): at 11:03

## 2018-08-29 RX ADMIN — Medication 2000 UNIT(S): at 11:03

## 2018-08-29 RX ADMIN — PANTOPRAZOLE SODIUM 40 MILLIGRAM(S): 20 TABLET, DELAYED RELEASE ORAL at 05:44

## 2018-08-29 RX ADMIN — Medication 5 MILLIGRAM(S): at 11:04

## 2018-08-29 RX ADMIN — Medication 500 MILLIGRAM(S): at 11:03

## 2018-08-29 RX ADMIN — CLOTRIMAZOLE AND BETAMETHASONE DIPROPIONATE 1 APPLICATION(S): 10; .5 CREAM TOPICAL at 05:08

## 2018-08-29 RX ADMIN — Medication 100 MILLIGRAM(S): at 05:08

## 2018-08-29 RX ADMIN — Medication 5 MILLIGRAM(S): at 05:08

## 2018-08-29 RX ADMIN — CALCITRIOL 0.25 MICROGRAM(S): 0.5 CAPSULE ORAL at 11:03

## 2018-08-29 RX ADMIN — Medication 80 MILLIGRAM(S): at 11:03

## 2018-08-29 RX ADMIN — Medication 90 MILLIGRAM(S): at 11:03

## 2018-08-29 NOTE — PROGRESS NOTE ADULT - SUBJECTIVE AND OBJECTIVE BOX
CARDIOLOGY FOLLOW UP - Dr. Real    CC no chest pain or sob       PHYSICAL EXAM:  T(C): 37 (08-29-18 @ 11:00), Max: 37.2 (08-29-18 @ 04:32)  HR: 86 (08-29-18 @ 11:00) (82 - 90)  BP: 145/76 (08-29-18 @ 11:00) (143/70 - 162/75)  RR: 18 (08-29-18 @ 11:00) (18 - 18)  SpO2: 98% (08-29-18 @ 11:00) (98% - 100%)  Wt(kg): --  I&O's Summary    28 Aug 2018 07:01  -  29 Aug 2018 07:00  --------------------------------------------------------  IN: 1740 mL / OUT: 2300 mL / NET: -560 mL    29 Aug 2018 07:01  -  29 Aug 2018 11:56  --------------------------------------------------------  IN: 200 mL / OUT: 0 mL / NET: 200 mL        Appearance: Normal	  Cardiovascular: Normal S1 S2,RRR, No JVD, No murmurs  Respiratory: Lungs clear to auscultation	  Gastrointestinal:  Soft, Non-tender, + BS	  Extremities: Normal range of motion, No clubbing, cyanosis or edema        MEDICATIONS  (STANDING):  ascorbic acid 500 milliGRAM(s) Oral daily  Butalbital, acetaminophen/caffeine 1 Tablet(s) 1 Tablet(s) Oral daily  calcitriol   Capsule 0.25 MICROGram(s) Oral daily  chlorhexidine 4% Liquid 1 Application(s) Topical <User Schedule>  cholecalciferol 2000 Unit(s) Oral daily  cloNIDine Patch 0.3 mG/24Hr(s) 1 patch Topical every 7 days  clotrimazole/betamethasone Cream 1 Application(s) Topical two times a day  docusate sodium 100 milliGRAM(s) Oral two times a day  furosemide    Tablet 80 milliGRAM(s) Oral daily  minoxidil 5 milliGRAM(s) Oral two times a day  Nephro-martha 1 Tablet(s) Oral daily  NIFEdipine XL 90 milliGRAM(s) Oral <User Schedule>  predniSONE   Tablet 5 milliGRAM(s) Oral daily  tacrolimus 1 milliGRAM(s) Oral every 12 hours  trimethoprim  160 mG/sulfamethoxazole 800 mG 1 Tablet(s) Oral <User Schedule>      TELEMETRY: 	    ECG:  	  RADIOLOGY:   DIAGNOSTIC TESTING:  [ ] Echocardiogram:  [ ]  Catheterization:  [ ] Stress Test:    OTHER: 	    LABS:	 	                                9.4    9.28  )-----------( 375      ( 28 Aug 2018 11:09 )             30.2     08-28    138  |  96  |  40<H>  ----------------------------<  118<H>  6.1<H>   |  28  |  6.68<H>    Ca    9.5      28 Aug 2018 16:41  Phos  7.3     08-28  Mg     2.5     08-28 CARDIOLOGY FOLLOW UP - Dr. Real    CC no chest pain or sob       PHYSICAL EXAM:  T(C): 37 (08-29-18 @ 11:00), Max: 37.2 (08-29-18 @ 04:32)  HR: 86 (08-29-18 @ 11:00) (82 - 90)  BP: 145/76 (08-29-18 @ 11:00) (143/70 - 162/75)  RR: 18 (08-29-18 @ 11:00) (18 - 18)  SpO2: 98% (08-29-18 @ 11:00) (98% - 100%)  Wt(kg): --  I&O's Summary    28 Aug 2018 07:01  -  29 Aug 2018 07:00  --------------------------------------------------------  IN: 1740 mL / OUT: 2300 mL / NET: -560 mL    29 Aug 2018 07:01  -  29 Aug 2018 11:56  --------------------------------------------------------  IN: 200 mL / OUT: 0 mL / NET: 200 mL        Appearance: Normal	  Cardiovascular: Normal S1 S2,RRR, No JVD, No murmurs  Respiratory: Lungs clear to auscultation	  Gastrointestinal:  Soft, Non-tender, + BS	  Extremities: Normal range of motion,bl LE ++ edema        MEDICATIONS  (STANDING):  ascorbic acid 500 milliGRAM(s) Oral daily  Butalbital, acetaminophen/caffeine 1 Tablet(s) 1 Tablet(s) Oral daily  calcitriol   Capsule 0.25 MICROGram(s) Oral daily  chlorhexidine 4% Liquid 1 Application(s) Topical <User Schedule>  cholecalciferol 2000 Unit(s) Oral daily  cloNIDine Patch 0.3 mG/24Hr(s) 1 patch Topical every 7 days  clotrimazole/betamethasone Cream 1 Application(s) Topical two times a day  docusate sodium 100 milliGRAM(s) Oral two times a day  furosemide    Tablet 80 milliGRAM(s) Oral daily  minoxidil 5 milliGRAM(s) Oral two times a day  Nephro-martha 1 Tablet(s) Oral daily  NIFEdipine XL 90 milliGRAM(s) Oral <User Schedule>  predniSONE   Tablet 5 milliGRAM(s) Oral daily  tacrolimus 1 milliGRAM(s) Oral every 12 hours  trimethoprim  160 mG/sulfamethoxazole 800 mG 1 Tablet(s) Oral <User Schedule>      TELEMETRY: 	    ECG:  	  RADIOLOGY:   DIAGNOSTIC TESTING:  [ ] Echocardiogram:  [ ]  Catheterization:  [ ] Stress Test:    OTHER: 	    LABS:	 	                                9.4    9.28  )-----------( 375      ( 28 Aug 2018 11:09 )             30.2     08-28    138  |  96  |  40<H>  ----------------------------<  118<H>  6.1<H>   |  28  |  6.68<H>    Ca    9.5      28 Aug 2018 16:41  Phos  7.3     08-28  Mg     2.5     08-28

## 2018-08-29 NOTE — PROGRESS NOTE ADULT - PROVIDER SPECIALTY LIST ADULT
Cardiology
Hospitalist
Hospitalist
Internal Medicine
Nephrology
Neurology
Ophthalmology
Hospitalist
Cardiology
Internal Medicine
Nephrology

## 2018-08-29 NOTE — PROGRESS NOTE ADULT - ATTENDING COMMENTS
Discussed extensively with patient; recommend obtaining CTV/CTA with contrast in setting of MR with findings suggestive of etiology other than HTN retinopathy. (proptosis and ophthalmic vein dilation; uncommon in isolated hypertensive etiology and must rule out cavernous sinus pathology or possible AV fistula) Reviewed with patient that the case was discussed with nephrology; no contraindication to contrast in setting of ESRD, concern for volume overload; would therefore recommend CT performed prior to HD.   Patient expressed an understanding and wishes to defer contrast studies at this time.  Pt understands the risk of not having the imaging which includes a delayed diagnosis in brain pathology which could lead to a worsening of his medical condition.  - Will assess patient for interval change if remains admitted; please notify ophthalmology on call if any acute changes  - Outpatient follow up stressed to patient to monitor for improvement or need for further work up and assessment  - Findings and plan discussed with patient and primary team    Follow-Up:  Patient should follow up his/her ophthalmologist or in the [facility] within 1 week of discharge, sooner if symptoms worsen or change.    Gabbie Monk MD
Agree with above NP note.  cv stable  bp control per renal
Agree with above NP note.  cv stable  bp control per renal
Agree with above NP note.  cv stable  bp tx per renal
Agree with above NP note.  cv stable  cont current tx  bp acceptable
Agree with above NP note.  cv stable  cont current tx  bp still elevated  hydral stopped  renal f/u
Agree with above NP note.  cv stable  cont current tx  bp still elevated  titrate minoxidil  renal f/u
Agree with above NP note.  cv stable  cont current tx  bp still elevated, increase minoxidil  renal f/u
Agree with above NP note.  cv stable  bp control per renal  dc planning
Agree with above NP note.  cv stable  sbp improved  med titration per renal   neuro w/u for vision changes
Agree with above NP note.  cv stable  sbp improved  med titration per renal   neuro w/u for vision changes
I have interviewed and examined the patient and reviewed the residents note including the history, exam, assessment, and plan.  I agree with the residents assessment and plan.    58 y.o M with bilateral disc edema of unclear etiology. Pt with elevated BP, thus disc swelling could indicate Stage 4 HTN retinopathy, however don't usually see dilated superior ophthalmic veins with HTN retinopathy.  Concern for pathology in the cavernous sinus or AV fistula, confirmed as per discussion with neuro-radiology.  MRV without contrast demonstrated no dural venous thrombosis, however it was without contrast and would have better visualization of cavernous sinus and internal carotid with CTA/CTV.  /101 on 8/16, still ranging in the 180's systolic, now improving in the 170s. Unable to perform MRI with gadolinium, MRV venography 2/2 renal issues.  - Highly recommend obtaining CTV/CTA with contrast in setting of imaging with proptosis and ophthalmic vein dilation; uncommon in isolated hypertensive etiology; must rule out cavernous sinus pathology; discussed concern with patient- Please order both CTV and CTA  - Discussed with nephrology; no contraindication to contrast in setting of ESRD, concern for volume overload; would recommend CT performed Wednesday night or Thursday AM with HD on Thursday AM.   - Will follow pt during admission  - Findings and plan discussed with patient and primary team  - Findings discussed with and plan per radiology/Dr. Thor Monk MD
I have interviewed and examined the patient and reviewed the residents note including the history, exam, assessment, and plan.  I agree with the residents assessment and plan.    58 y.o M with bilateral disc edema, unclear etiology.  Pt with elevated BP, thus disc swelling could indicate Stage 4 HTN retinopathy, however don't usually see dilated superior ophthalmic veins with HTN retinopathy.  Concern for pathology in the cavernous sinus or AV fistula.  MRV without contrast demonstrated no dural venous thrombosis, however it was without contrast and would have better visualization of cavernous sinus and internal carotid with CTA/CTV.  /101 on 8/16, still ranging in the 180's systolic.   - Neurology consulted, recs reviewed; reports b/l fistula unlikely and would consider monitoring with BP control   - Unable to perform MRI with gadolinium, MRV venography 2/2 renal issues;  - Highly recommend obtaining CTV/CTA with contrast in setting of imaging with proptosis and ophthalmic vein dilation; uncommon in isolated hypertensive etiology; must rule out cavernous sinus pathology; please review risks of contrast study with nephrology   - Will follow pt during admission  - Findings and plan discussed with patient and primary team  - Findings discussed with and plan per radiology/Dr. Thor Monk MD
I have seen the patient and reviewed dialysis prescription and flow sheet. Dialysis access is functioning well. Patient is tolerating dialysis well with no acute symptoms or distress. Dialysis prescription has been adjusted for optimized control of volume status, uremia and electrolytes. Management of additional metabolic abnormalities/anemia will continue to be addressed on follow up.  Will dialyze on Sunday to achieve euvolemia.  Reviewed blood pressure medications, factors affecting blood pressure control. Awaiting MRI, ophthalmology follow up
ESRD failed transplant, HTN urgency  Plan on increased UF at HD today  Volume removal will help with HTN As well  Trend BP and increase minoxidil if needed
ESRD failed transplant, HTN urgency  Plan on increased UF at HD today. pt initially did not want to have longer HD treatment  I advised that he stay for his 3.5 hours and increase his UF goal  Volume removal will help with HTN As well  Trend BP and increase minoxidil if needed
ESRD, HTN  Volume overload  Had HD sat and sunday  Minoxidil started  would titrate up as needed  HD tomorrow and increase UF tomorrow for volume control
ESRD, failed RT  1.  ESRD--HD TIW  2.  Renal transplant--low dose prograf, prednisone  3.  Hypertension--volume, med optimization  4.  Renal osteodystrophy--Vit D
I have seen the patient and reviewed dialysis prescription and flow sheet. Dialysis access is functioning well. Patient is tolerating dialysis well with no acute symptoms or distress. Dialysis prescription has been adjusted for optimized control of volume status, uremia and electrolytes. Management of additional metabolic abnormalities/anemia will continue to be addressed on follow up.  Poorly controlled blood pressure- Suggest to add minoxidil 2.5 mg PO at night for blood pressure control  Discussed with patient, house staff
ESRD, failed RT  Seen on dialysis. sleeping and comfortable  Failed Renal transplant--low dose prograf, prednisone  Hypertension-improved  Hyperkalemia- stop the losartan bc of persistent hyperkalemia  Renal osteodystrophy--Vit D

## 2018-08-31 ENCOUNTER — APPOINTMENT (OUTPATIENT)
Dept: NEPHROLOGY | Facility: CLINIC | Age: 58
End: 2018-08-31
Payer: MEDICARE

## 2018-08-31 VITALS
DIASTOLIC BLOOD PRESSURE: 78 MMHG | TEMPERATURE: 97.8 F | BODY MASS INDEX: 27.27 KG/M2 | RESPIRATION RATE: 14 BRPM | SYSTOLIC BLOOD PRESSURE: 163 MMHG | HEART RATE: 91 BPM | HEIGHT: 72 IN | OXYGEN SATURATION: 98 % | WEIGHT: 201.3 LBS

## 2018-08-31 VITALS — SYSTOLIC BLOOD PRESSURE: 160 MMHG | DIASTOLIC BLOOD PRESSURE: 80 MMHG

## 2018-08-31 DIAGNOSIS — R51 HEADACHE: ICD-10-CM

## 2018-08-31 PROCEDURE — 99215 OFFICE O/P EST HI 40 MIN: CPT

## 2018-08-31 RX ORDER — CLONIDINE 0.2 MG/24H
0.2 PATCH, EXTENDED RELEASE TRANSDERMAL
Qty: 12 | Refills: 0 | Status: DISCONTINUED | COMMUNITY
Start: 2018-05-23

## 2018-08-31 RX ORDER — ATENOLOL 50 MG/1
50 TABLET ORAL
Qty: 2 | Refills: 2 | Status: DISCONTINUED | COMMUNITY
Start: 2018-08-14 | End: 2018-08-31

## 2018-08-31 RX ORDER — ASPIRIN ENTERIC COATED TABLETS 81 MG 81 MG/1
81 TABLET, DELAYED RELEASE ORAL DAILY
Refills: 11 | Status: ACTIVE | COMMUNITY
Start: 2018-08-31

## 2018-08-31 RX ORDER — CALCIUM ACETATE 667 MG
667 TABLET ORAL 3 TIMES DAILY
Qty: 180 | Refills: 3 | Status: DISCONTINUED | COMMUNITY
Start: 2018-01-22 | End: 2018-08-31

## 2018-08-31 RX ORDER — OXYCODONE 5 MG/1
5 TABLET ORAL
Qty: 10 | Refills: 0 | Status: DISCONTINUED | COMMUNITY
Start: 2018-08-29

## 2018-08-31 RX ORDER — FUROSEMIDE 80 MG/1
80 TABLET ORAL
Qty: 180 | Refills: 0 | Status: DISCONTINUED | COMMUNITY
Start: 2018-07-09 | End: 2018-08-31

## 2018-08-31 RX ORDER — NIFEDIPINE 90 MG/1
90 TABLET, EXTENDED RELEASE ORAL
Qty: 60 | Refills: 0 | Status: DISCONTINUED | COMMUNITY
Start: 2018-08-29

## 2018-09-10 ENCOUNTER — INBOUND DOCUMENT (OUTPATIENT)
Age: 58
End: 2018-09-10

## 2018-09-12 ENCOUNTER — INBOUND DOCUMENT (OUTPATIENT)
Age: 58
End: 2018-09-12

## 2018-09-21 ENCOUNTER — APPOINTMENT (OUTPATIENT)
Dept: NEPHROLOGY | Facility: CLINIC | Age: 58
End: 2018-09-21
Payer: MEDICARE

## 2018-09-21 VITALS
BODY MASS INDEX: 28.58 KG/M2 | HEIGHT: 69 IN | DIASTOLIC BLOOD PRESSURE: 79 MMHG | WEIGHT: 193 LBS | TEMPERATURE: 98.2 F | SYSTOLIC BLOOD PRESSURE: 154 MMHG | OXYGEN SATURATION: 94 % | HEART RATE: 80 BPM | RESPIRATION RATE: 12 BRPM

## 2018-09-21 DIAGNOSIS — N18.6 END STAGE RENAL DISEASE: ICD-10-CM

## 2018-09-21 DIAGNOSIS — Z99.2 END STAGE RENAL DISEASE: ICD-10-CM

## 2018-09-21 PROCEDURE — 99215 OFFICE O/P EST HI 40 MIN: CPT

## 2018-09-21 RX ORDER — HYDRALAZINE HYDROCHLORIDE 25 MG/1
25 TABLET ORAL TWICE DAILY
Qty: 60 | Refills: 0 | Status: DISCONTINUED | COMMUNITY
Start: 2018-07-09 | End: 2018-09-21

## 2018-10-01 ENCOUNTER — INPATIENT (INPATIENT)
Facility: HOSPITAL | Age: 58
LOS: 7 days | Discharge: HOME | DRG: 308 | End: 2018-10-09
Attending: INTERNAL MEDICINE | Admitting: INTERNAL MEDICINE
Payer: MEDICARE

## 2018-10-01 VITALS
TEMPERATURE: 99 F | HEART RATE: 160 BPM | SYSTOLIC BLOOD PRESSURE: 129 MMHG | OXYGEN SATURATION: 92 % | DIASTOLIC BLOOD PRESSURE: 76 MMHG | RESPIRATION RATE: 20 BRPM

## 2018-10-01 DIAGNOSIS — I48.91 UNSPECIFIED ATRIAL FIBRILLATION: ICD-10-CM

## 2018-10-01 DIAGNOSIS — Z90.411 ACQUIRED PARTIAL ABSENCE OF PANCREAS: Chronic | ICD-10-CM

## 2018-10-01 DIAGNOSIS — Z94.0 KIDNEY TRANSPLANT STATUS: Chronic | ICD-10-CM

## 2018-10-01 LAB
ALBUMIN SERPL ELPH-MCNC: 4.1 G/DL — SIGNIFICANT CHANGE UP (ref 3.3–5)
ALP SERPL-CCNC: 59 U/L — SIGNIFICANT CHANGE UP (ref 40–120)
ALT FLD-CCNC: 15 U/L — SIGNIFICANT CHANGE UP (ref 10–45)
ANION GAP SERPL CALC-SCNC: 20 MMOL/L — HIGH (ref 5–17)
APTT BLD: 31.3 SEC — SIGNIFICANT CHANGE UP (ref 27.5–37.4)
AST SERPL-CCNC: 11 U/L — SIGNIFICANT CHANGE UP (ref 10–40)
BASOPHILS # BLD AUTO: 0 K/UL — SIGNIFICANT CHANGE UP (ref 0–0.2)
BASOPHILS NFR BLD AUTO: 0.3 % — SIGNIFICANT CHANGE UP (ref 0–2)
BILIRUB SERPL-MCNC: 1.2 MG/DL — SIGNIFICANT CHANGE UP (ref 0.2–1.2)
BUN SERPL-MCNC: 54 MG/DL — HIGH (ref 7–23)
CA-I BLD-SCNC: 1.13 MMOL/L — SIGNIFICANT CHANGE UP (ref 1.12–1.3)
CALCIUM SERPL-MCNC: 9.4 MG/DL — SIGNIFICANT CHANGE UP (ref 8.4–10.5)
CHLORIDE SERPL-SCNC: 91 MMOL/L — LOW (ref 96–108)
CO2 SERPL-SCNC: 25 MMOL/L — SIGNIFICANT CHANGE UP (ref 22–31)
CREAT SERPL-MCNC: 7.59 MG/DL — HIGH (ref 0.5–1.3)
EOSINOPHIL # BLD AUTO: 0 K/UL — SIGNIFICANT CHANGE UP (ref 0–0.5)
EOSINOPHIL NFR BLD AUTO: 0.2 % — SIGNIFICANT CHANGE UP (ref 0–6)
GLUCOSE SERPL-MCNC: 94 MG/DL — SIGNIFICANT CHANGE UP (ref 70–99)
HCT VFR BLD CALC: 29.4 % — LOW (ref 39–50)
HGB BLD-MCNC: 9.5 G/DL — LOW (ref 13–17)
INR BLD: 1.14 RATIO — SIGNIFICANT CHANGE UP (ref 0.88–1.16)
LYMPHOCYTES # BLD AUTO: 1.8 K/UL — SIGNIFICANT CHANGE UP (ref 1–3.3)
LYMPHOCYTES # BLD AUTO: 18.6 % — SIGNIFICANT CHANGE UP (ref 13–44)
MAGNESIUM SERPL-MCNC: 2 MG/DL — SIGNIFICANT CHANGE UP (ref 1.6–2.6)
MCHC RBC-ENTMCNC: 27.1 PG — SIGNIFICANT CHANGE UP (ref 27–34)
MCHC RBC-ENTMCNC: 32.2 GM/DL — SIGNIFICANT CHANGE UP (ref 32–36)
MCV RBC AUTO: 84.3 FL — SIGNIFICANT CHANGE UP (ref 80–100)
MONOCYTES # BLD AUTO: 0.8 K/UL — SIGNIFICANT CHANGE UP (ref 0–0.9)
MONOCYTES NFR BLD AUTO: 9 % — SIGNIFICANT CHANGE UP (ref 2–14)
NEUTROPHILS # BLD AUTO: 6.8 K/UL — SIGNIFICANT CHANGE UP (ref 1.8–7.4)
NEUTROPHILS NFR BLD AUTO: 71.9 % — SIGNIFICANT CHANGE UP (ref 43–77)
NT-PROBNP SERPL-SCNC: HIGH PG/ML (ref 0–300)
PHOSPHATE SERPL-MCNC: 5.3 MG/DL — HIGH (ref 2.5–4.5)
PLATELET # BLD AUTO: 386 K/UL — SIGNIFICANT CHANGE UP (ref 150–400)
POTASSIUM SERPL-MCNC: 4.7 MMOL/L — SIGNIFICANT CHANGE UP (ref 3.5–5.3)
POTASSIUM SERPL-SCNC: 4.7 MMOL/L — SIGNIFICANT CHANGE UP (ref 3.5–5.3)
PROT SERPL-MCNC: 7.2 G/DL — SIGNIFICANT CHANGE UP (ref 6–8.3)
PROTHROM AB SERPL-ACNC: 12.4 SEC — SIGNIFICANT CHANGE UP (ref 9.8–12.7)
RBC # BLD: 3.49 M/UL — LOW (ref 4.2–5.8)
RBC # FLD: 17.3 % — HIGH (ref 10.3–14.5)
SODIUM SERPL-SCNC: 136 MMOL/L — SIGNIFICANT CHANGE UP (ref 135–145)
TROPONIN T, HIGH SENSITIVITY RESULT: 155 NG/L — HIGH (ref 0–51)
TROPONIN T, HIGH SENSITIVITY RESULT: 177 NG/L — HIGH (ref 0–51)
WBC # BLD: 9.4 K/UL — SIGNIFICANT CHANGE UP (ref 3.8–10.5)
WBC # FLD AUTO: 9.4 K/UL — SIGNIFICANT CHANGE UP (ref 3.8–10.5)

## 2018-10-01 PROCEDURE — 99291 CRITICAL CARE FIRST HOUR: CPT | Mod: GC

## 2018-10-01 PROCEDURE — 71250 CT THORAX DX C-: CPT | Mod: 26

## 2018-10-01 PROCEDURE — 71045 X-RAY EXAM CHEST 1 VIEW: CPT | Mod: 26

## 2018-10-01 PROCEDURE — 93010 ELECTROCARDIOGRAM REPORT: CPT

## 2018-10-01 RX ORDER — UBIDECARENONE 100 MG
1 CAPSULE ORAL
Qty: 0 | Refills: 0 | COMMUNITY

## 2018-10-01 RX ORDER — FUROSEMIDE 40 MG
40 TABLET ORAL ONCE
Qty: 0 | Refills: 0 | Status: COMPLETED | OUTPATIENT
Start: 2018-10-01 | End: 2018-10-01

## 2018-10-01 RX ORDER — CALCITRIOL 0.5 UG/1
0.25 CAPSULE ORAL
Qty: 0 | Refills: 0 | Status: DISCONTINUED | OUTPATIENT
Start: 2018-10-01 | End: 2018-10-03

## 2018-10-01 RX ORDER — NIFEDIPINE 30 MG
60 TABLET, EXTENDED RELEASE 24 HR ORAL DAILY
Qty: 0 | Refills: 0 | Status: DISCONTINUED | OUTPATIENT
Start: 2018-10-01 | End: 2018-10-02

## 2018-10-01 RX ORDER — OMEGA-3 ACID ETHYL ESTERS 1 G
1 CAPSULE ORAL
Qty: 0 | Refills: 0 | COMMUNITY

## 2018-10-01 RX ORDER — ASPIRIN/CALCIUM CARB/MAGNESIUM 324 MG
81 TABLET ORAL DAILY
Qty: 0 | Refills: 0 | Status: DISCONTINUED | OUTPATIENT
Start: 2018-10-01 | End: 2018-10-09

## 2018-10-01 RX ORDER — METOPROLOL TARTRATE 50 MG
5 TABLET ORAL ONCE
Qty: 0 | Refills: 0 | Status: DISCONTINUED | OUTPATIENT
Start: 2018-10-01 | End: 2018-10-01

## 2018-10-01 RX ORDER — CHOLECALCIFEROL (VITAMIN D3) 125 MCG
1000 CAPSULE ORAL DAILY
Qty: 0 | Refills: 0 | Status: DISCONTINUED | OUTPATIENT
Start: 2018-10-01 | End: 2018-10-03

## 2018-10-01 RX ORDER — MINOXIDIL 10 MG
2.5 TABLET ORAL DAILY
Qty: 0 | Refills: 0 | Status: DISCONTINUED | OUTPATIENT
Start: 2018-10-01 | End: 2018-10-02

## 2018-10-01 RX ORDER — CHOLECALCIFEROL (VITAMIN D3) 125 MCG
1 CAPSULE ORAL
Qty: 0 | Refills: 0 | COMMUNITY

## 2018-10-01 RX ORDER — TACROLIMUS 5 MG/1
1 CAPSULE ORAL EVERY 12 HOURS
Qty: 0 | Refills: 0 | Status: DISCONTINUED | OUTPATIENT
Start: 2018-10-01 | End: 2018-10-08

## 2018-10-01 RX ORDER — FUROSEMIDE 40 MG
80 TABLET ORAL DAILY
Qty: 0 | Refills: 0 | Status: DISCONTINUED | OUTPATIENT
Start: 2018-10-01 | End: 2018-10-09

## 2018-10-01 RX ORDER — HEPARIN SODIUM 5000 [USP'U]/ML
INJECTION INTRAVENOUS; SUBCUTANEOUS
Qty: 25000 | Refills: 0 | Status: DISCONTINUED | OUTPATIENT
Start: 2018-10-01 | End: 2018-10-02

## 2018-10-01 RX ORDER — ASCORBIC ACID 60 MG
500 TABLET,CHEWABLE ORAL DAILY
Qty: 0 | Refills: 0 | Status: DISCONTINUED | OUTPATIENT
Start: 2018-10-01 | End: 2018-10-09

## 2018-10-01 RX ORDER — TACROLIMUS 5 MG/1
1 CAPSULE ORAL ONCE
Qty: 0 | Refills: 0 | Status: DISCONTINUED | OUTPATIENT
Start: 2018-10-01 | End: 2018-10-04

## 2018-10-01 RX ADMIN — Medication 40 MILLIGRAM(S): at 22:02

## 2018-10-01 NOTE — H&P ADULT - ASSESSMENT
58 m with  A. Flutter- rate control, AC with Heparin, cardiac enzymes, telemetry, Cardiology evaluation Dr. Real  ESRD- HD, Nephrology evaluation called.  CTa chest refused. CT chest pending   HTN control  d/w patient/family at bedside  Further action as per clinical course   Johan Sauer MD pager [phone]

## 2018-10-01 NOTE — ED PROVIDER NOTE - PROGRESS NOTE DETAILS
Pt with 2 failed renal transplants, started HD for first time 3/2018 Called over by CT tech as patient was refusing CT contrast, I explained risks/benefits to patient given concern for PE, patient understands and wishes to proceed with CT scan without contrast. Called over by CT tech as patient was refusing CT contrast, I explained risks/benefits to patient given concern for PE, patient understands and wishes to proceed with CT scan without contrast. Admitting team was notified. Will obtain weight on patient and start AC as per admitting team Pt received 2 doses of OV Cardizem, one of dose of PO cardizem. ALso received Lasix 40mg. Pt remained CP free in ED. EKG showed Afib/flutter with rates from 150s-120s. Pt spontaneously converted to NSR at rate 83. Pt was initiated on heparin gtt per request of admitting team for new afib. Pt received 2 doses of IV Cardizem, one of dose of PO cardizem. ALso received Lasix 40mg. Pt remained CP free in ED. EKG showed Afib/flutter with rates from 150s-120s. Pt spontaneously converted to NSR at rate 83. Pt was initiated on heparin gtt per request of admitting team for new afib. Dr. Staples:  Pt received 2 doses of IV Cardizem, one of dose of PO cardizem. ALso received Lasix 40mg. Pt remained CP free in ED. EKG showed Afib/flutter with rates from 150s-120s. Pt spontaneously converted to NSR at rate 83. Pt was initiated on heparin gtt per request of admitting team for new afib.

## 2018-10-01 NOTE — H&P ADULT - NSHPSOCIALHISTORY_GEN_ALL_CORE
Social History:    Marital Status:  (   )    ( x  ) Single    (   )    (  )   Occupation:   Lives with: (  ) alone  (  ) children   (  ) spouse   (  ) parents  ( x ) other    Substance Use (street drugs): (x  ) never used  (  ) other:  Tobacco Usage:  (  x ) never smoked   (   ) former smoker   (   ) current smoker  (     ) pack years  (        ) last cigarette date  Alcohol Usage: denies    (     ) Advanced Directives: (     ) None    (      ) DNR    (     ) DNI    (     ) Health Care Proxy:

## 2018-10-01 NOTE — ED PROVIDER NOTE - ATTENDING CONTRIBUTION TO CARE
58y M hx of ESRD s/p 2 failed renal transplants, continues on Tacro and prednisone, currently on HD (T,Th,Sa), HTN BIBEMS from HD with c/o SOB, rapid HR. Pt states that he missed HD Saturday and had ~2.5L taken off today. States that he was feeling slightly SOB when he arrived at HD today. STates he sat for whole session and that near end of session became nauseated and vomited x2. States that this rarely happens to him at HD. He also notes that near end of session his HR went up to 160 at which time he noted a worsening of his SOB. He denies Chest pain or pressure during episode or presently. He dose note some BL LE edema but states not much more than his usual. No recent fevers, cough, abd pain. No known hx of Afib/flutter. Pt noted to be hypoxic to 88% on RA on arrival. Pt states no hx of O2 requirement. Oxygenation improves to 92 on 3L, though states he is more comfortable on 4L with sat of 96%.   Gen: WNWD NAD  HEENT: NCAT EOMI   Neck: supple  CV: Tachy irreg  Lung: CTA BL but dec at BL bases   Abd: +BS soft obese NTND  Ext: wwp, palp pulses, FROMx4, trace BL LE edema   Neuro: A&Ox3, CN grossly intact, sensation intact, motor 5/5 throughout  AP: 58y M hx of ESRD s/p 2 failed renal transplants, continues on Tacro and prednisone, currently on HD (T,Th,Sa), HTN BIBEMS from HD with c/o SOB, rapid HR. EKG shows rapid afib/flutter. Pt missed session of HD Saturday and had been feeling SOB, concern for volume overload/CHF picture possibly contributing to arrhythmia vs electrolye abn vs PE. Will obtain CXR, labs. Pt will need rate control, however will avoid Beta blocker given concern for possible heart failure/volume status. D/w pt cardio Dr Real dig vs cardizem and he recommends cardizem. Cardizem 10mg IV bolus given with some improvement in rate, will give PO now as well.

## 2018-10-01 NOTE — ED PROVIDER NOTE - PHYSICAL EXAMINATION
VITALS: reviewed  GEN: NAD, A & O x 4  HEAD/EYES: NCAT, PERRL, EOMI, anicteric sclerae, no conjunctival pallor  ENT: mucus membranes moist, oropharynx WNL, trachea midline, no JVD  RESP: lungs CTA with equal breath sounds bilaterally, chest wall nontender and atraumatic  CV: irregular rate and rhythm; distal pulses intact and symmetric bilaterally  ABDOMEN: normoactive bowel sounds, soft, nondistended, nontender, no palpable masses  : no CVAT  MSK: extremities atraumatic and nontender, no edema, no asymmetry. the back is without midline or lateral tenderness, there is no spinal deformity or stepoff and the back is ranged painlessly. the neck has no midline tenderness, deformity, or stepoff, and is ranged painlessly.  SKIN: warm, dry, no rash, no bruising, no cyanosis. color appropriate for ethnicity  NEURO: alert, mentating appropriately, no facial asymmetry. gross sensation, motor, coordination are intact  PSYCH: Affect appropriate

## 2018-10-01 NOTE — ED PROVIDER NOTE - OBJECTIVE STATEMENT
57 yo M hx ESRD s/p 2 failed renal transplants on tacrolimus and prednisone 5 mg, started HD 3/2018 (T,Th, S still urinates 3 times a day, awaiting third transplant), presenting for shortness of breath increasing in the past few days. He reports episode of chest pain radiating to back today at HD with associated nausea/vomiting that resolved after episode of vomiting. Denies fevers/chills/cough/increased leg swelling. + palpitations. Last echo/stress test several months ago which was normal at that time. -2.5 L today at HD (HD today for missed HD on Sat). 59 yo M hx ESRD s/p 2 failed renal transplants on tacrolimus and prednisone 5 mg, started HD 3/2018 (T,Th, S still urinates 3 times a day, awaiting third transplant), presenting for shortness of breath increasing in the past few days. He reports brief episode of chest pain radiating to back today at HD with associated nausea/vomiting that resolved after episode of vomiting. No CP currently. Denies fevers/chills/cough/increased leg swelling. + palpitations. Last echo/stress test several months ago which was normal at that time. -2.5 L today at HD (HD today for missed HD on Sat).

## 2018-10-01 NOTE — H&P ADULT - NSHPLABSRESULTS_GEN_ALL_CORE
9.5    9.4   )-----------( 386      ( 01 Oct 2018 20:01 )             29.4       10-01    136  |  91<L>  |  54<H>  ----------------------------<  94  4.7   |  25  |  7.59<H>    Ca    9.4      01 Oct 2018 20:01  Phos  5.3     10-01  Mg     2.0     10-01    TPro  7.2  /  Alb  4.1  /  TBili  1.2  /  DBili  x   /  AST  11  /  ALT  15  /  AlkPhos  59  10-01                      Lactate Trend            CAPILLARY BLOOD GLUCOSE    < from: Xray Chest 1 View- PORTABLE-Urgent (10.01.18 @ 20:18) >      INTERPRETATION:  small left pleural effusion. bibasilar opacities may   represent atelectasis, infection, or edema.    < end of copied text >            EKG A Flutter NSST/T

## 2018-10-01 NOTE — H&P ADULT - NSHPPHYSICALEXAM_GEN_ALL_CORE
PHYSICAL EXAMINATION:  Vital Signs Last 24 Hrs  T(C): 37.2 (01 Oct 2018 19:44), Max: 37.2 (01 Oct 2018 19:31)  T(F): 98.9 (01 Oct 2018 19:44), Max: 99 (01 Oct 2018 19:31)  HR: 102 (01 Oct 2018 22:09) (102 - 166)  BP: 121/103 (01 Oct 2018 22:09) (121/103 - 148/104)  BP(mean): --  RR: 18 (01 Oct 2018 22:09) (18 - 22)  SpO2: 94% (01 Oct 2018 22:09) (92% - 100%)  CAPILLARY BLOOD GLUCOSE          GENERAL: NAD, well-groomed, well-developed  HEAD:  atraumatic, normocephalic  EYES: sclera anicteric  ENMT: mucous membranes moist  NECK: supple, No JVD  CHEST/LUNG: clear to auscultation bilaterally; no rales, rhonchi, or wheezing b/l  HEART: irregular/regular tachy S1, S2  ABDOMEN: BS+, soft, ND, NT   EXTREMITIES:  2+ bipedal LEs  NEURO: awake, alert, interactive; moves all extremities  SKIN: no rashes or lesions

## 2018-10-01 NOTE — H&P ADULT - HISTORY OF PRESENT ILLNESS
57 yo M hx ESRD s/p 2 failed renal transplants on tacrolimus and prednisone 5 mg, started HD 3/2018 (T,Th, S still urinates 3 times a day, awaiting third transplant), presenting for shortness of breath increasing in the past few days. He reports episode of chest pain radiating to back today at HD with associated nausea/vomiting that resolved after episode of vomiting. Denies fevers/chills/cough/increased leg swelling. + palpitations. Last echo/stress test several months ago which was normal at that time. -2.5 L today at HD (HD today for missed HD on Sat

## 2018-10-01 NOTE — ED ADULT NURSE NOTE - OBJECTIVE STATEMENT
57 y/o male with history of kidney transplant x 2 presents to ED by EMS from dialysis reporting upper back pain that started with 20 minutes of dialysis left. Reports sudden onset nausea and one episode of emesis. Patient reports pain subsided but then returned 30 minutes later. On arrival to ED patient denying pain at this time. Patient presents with HR in the 160s and 90% on RA. Denies CP, SOB and dizziness. Peripheral pulses present and equal bilaterally. Skin warm, dry and appropriate color for ethnicity.

## 2018-10-01 NOTE — ED PROVIDER NOTE - NS ED ROS FT
CONST: no fevers, no chills, no trauma  EYES: no pain, no visual disturbances  ENT: no sore throat, no epistaxis, no rhinorrhea, no hearing changes  CV: no chest pain, + palpitations, no orthopnea, no extremity pain or swelling  RESP: + shortness of breath, no cough, no sputum, no pleurisy, no wheezing  ABD: no abdominal pain, no nausea, no vomiting, no diarrhea, no black or bloody stool  : no dysuria, no hematuria, no frequency, no urgency  MSK: no back pain, no neck pain, no extremity pain  NEURO: no headache, no sensory disturbances, no focal weakness, no dizziness  HEME: no easy bleeding or bruising  SKIN: no diaphoresis, no rash

## 2018-10-01 NOTE — ED ADULT NURSE NOTE - NSIMPLEMENTINTERV_GEN_ALL_ED
Implemented All Universal Safety Interventions:  [place] to call system. Call bell, personal items and telephone within reach. Instruct patient to call for assistance. Room bathroom lighting operational. Non-slip footwear when patient is off stretcher. Physically safe environment: no spills, clutter or unnecessary equipment. Stretcher in lowest position, wheels locked, appropriate side rails in place.

## 2018-10-01 NOTE — ED PROVIDER NOTE - CHIEF COMPLAINT
The patient is a 58y Male complaining of The patient is a 58y Male complaining of shortness of breath

## 2018-10-01 NOTE — H&P ADULT - NSHPREVIEWOFSYSTEMS_GEN_ALL_CORE
REVIEW OF SYSTEMS:    CONSTITUTIONAL: No weakness, fevers or chills  EYES/ENT: No visual changes;  No vertigo or throat pain   NECK: No pain or stiffness  RESPIRATORY: No cough, wheezing, hemoptysis; + shortness of breath  CARDIOVASCULAR: No chest pain or palpitations  GASTROINTESTINAL: No abdominal or epigastric pain. No nausea, vomiting, or hematemesis; No diarrhea or constipation. No melena or hematochezia.  GENITOURINARY: No dysuria, frequency or hematuria  NEUROLOGICAL: No numbness or weakness  SKIN: No itching, burning, rashes, or lesions   All other review of systems is negative unless indicated above.

## 2018-10-01 NOTE — ED PROVIDER NOTE - ST/T WAVE
hyperacute T wave V2 hyperacute T waves persist, no ST seg changes T wave flattening lateral leads , no st seg elev or dep

## 2018-10-01 NOTE — ED PROVIDER NOTE - MEDICAL DECISION MAKING DETAILS
EKG, troponin, BNP, cxr, cbc, cmp, ACS r/o, r/o fluid overload, r/o pneumonia, cardiology c/s, likely admit EKG, troponin, BNP, cxr, cbc, cmp, ACS r/o, r/o fluid overload, r/o pneumonia, cardiology c/s, likely admit  Bel: See attending statement below

## 2018-10-02 DIAGNOSIS — Z94.0 KIDNEY TRANSPLANT STATUS: ICD-10-CM

## 2018-10-02 DIAGNOSIS — I10 ESSENTIAL (PRIMARY) HYPERTENSION: ICD-10-CM

## 2018-10-02 DIAGNOSIS — N18.9 CHRONIC KIDNEY DISEASE, UNSPECIFIED: ICD-10-CM

## 2018-10-02 DIAGNOSIS — N18.6 END STAGE RENAL DISEASE: ICD-10-CM

## 2018-10-02 LAB
ANION GAP SERPL CALC-SCNC: 15 MMOL/L — SIGNIFICANT CHANGE UP (ref 5–17)
APTT BLD: 32.2 SEC — SIGNIFICANT CHANGE UP (ref 27.5–37.4)
APTT BLD: 53.9 SEC — HIGH (ref 27.5–37.4)
APTT BLD: 62.2 SEC — HIGH (ref 27.5–37.4)
BUN SERPL-MCNC: 70 MG/DL — HIGH (ref 7–23)
CALCIUM SERPL-MCNC: 9.4 MG/DL — SIGNIFICANT CHANGE UP (ref 8.4–10.5)
CHLORIDE SERPL-SCNC: 92 MMOL/L — LOW (ref 96–108)
CK MB BLD-MCNC: 2.6 % — SIGNIFICANT CHANGE UP (ref 0–3.5)
CK MB CFR SERPL CALC: 2.9 NG/ML — SIGNIFICANT CHANGE UP (ref 0–6.7)
CK SERPL-CCNC: 112 U/L — SIGNIFICANT CHANGE UP (ref 30–200)
CO2 SERPL-SCNC: 28 MMOL/L — SIGNIFICANT CHANGE UP (ref 22–31)
CREAT SERPL-MCNC: 9.42 MG/DL — HIGH (ref 0.5–1.3)
GLUCOSE SERPL-MCNC: 109 MG/DL — HIGH (ref 70–99)
HCT VFR BLD CALC: 28.5 % — LOW (ref 39–50)
HCT VFR BLD CALC: 29.1 % — LOW (ref 39–50)
HGB BLD-MCNC: 9.1 G/DL — LOW (ref 13–17)
HGB BLD-MCNC: 9.4 G/DL — LOW (ref 13–17)
INR BLD: 1.25 RATIO — HIGH (ref 0.88–1.16)
MCHC RBC-ENTMCNC: 26.9 PG — LOW (ref 27–34)
MCHC RBC-ENTMCNC: 27.5 PG — SIGNIFICANT CHANGE UP (ref 27–34)
MCHC RBC-ENTMCNC: 31.9 GM/DL — LOW (ref 32–36)
MCHC RBC-ENTMCNC: 32.3 GM/DL — SIGNIFICANT CHANGE UP (ref 32–36)
MCV RBC AUTO: 84.3 FL — SIGNIFICANT CHANGE UP (ref 80–100)
MCV RBC AUTO: 85.2 FL — SIGNIFICANT CHANGE UP (ref 80–100)
PLATELET # BLD AUTO: 375 K/UL — SIGNIFICANT CHANGE UP (ref 150–400)
PLATELET # BLD AUTO: 414 K/UL — HIGH (ref 150–400)
POTASSIUM SERPL-MCNC: 5.6 MMOL/L — HIGH (ref 3.5–5.3)
POTASSIUM SERPL-SCNC: 5.6 MMOL/L — HIGH (ref 3.5–5.3)
PROTHROM AB SERPL-ACNC: 13.6 SEC — HIGH (ref 9.8–12.7)
RBC # BLD: 3.38 M/UL — LOW (ref 4.2–5.8)
RBC # BLD: 3.41 M/UL — LOW (ref 4.2–5.8)
RBC # FLD: 16.4 % — HIGH (ref 10.3–14.5)
RBC # FLD: 18.3 % — HIGH (ref 10.3–14.5)
SODIUM SERPL-SCNC: 135 MMOL/L — SIGNIFICANT CHANGE UP (ref 135–145)
TACROLIMUS SERPL-MCNC: <2 NG/ML — SIGNIFICANT CHANGE UP
TROPONIN T, HIGH SENSITIVITY RESULT: 197 NG/L — HIGH (ref 0–51)
TROPONIN T, HIGH SENSITIVITY RESULT: 221 NG/L — HIGH (ref 0–51)
TSH SERPL-MCNC: 6.46 UIU/ML — HIGH (ref 0.27–4.2)
WBC # BLD: 8.9 K/UL — SIGNIFICANT CHANGE UP (ref 3.8–10.5)
WBC # BLD: 9.11 K/UL — SIGNIFICANT CHANGE UP (ref 3.8–10.5)
WBC # FLD AUTO: 8.9 K/UL — SIGNIFICANT CHANGE UP (ref 3.8–10.5)
WBC # FLD AUTO: 9.11 K/UL — SIGNIFICANT CHANGE UP (ref 3.8–10.5)

## 2018-10-02 PROCEDURE — 99222 1ST HOSP IP/OBS MODERATE 55: CPT | Mod: GC

## 2018-10-02 RX ORDER — DOXERCALCIFEROL 2.5 UG/1
1 CAPSULE ORAL
Qty: 0 | Refills: 0 | Status: DISCONTINUED | OUTPATIENT
Start: 2018-10-02 | End: 2018-10-05

## 2018-10-02 RX ORDER — HEPARIN SODIUM 5000 [USP'U]/ML
3500 INJECTION INTRAVENOUS; SUBCUTANEOUS EVERY 6 HOURS
Qty: 0 | Refills: 0 | Status: DISCONTINUED | OUTPATIENT
Start: 2018-10-02 | End: 2018-10-08

## 2018-10-02 RX ORDER — ERYTHROPOIETIN 10000 [IU]/ML
3800 INJECTION, SOLUTION INTRAVENOUS; SUBCUTANEOUS
Qty: 0 | Refills: 0 | Status: DISCONTINUED | OUTPATIENT
Start: 2018-10-02 | End: 2018-10-03

## 2018-10-02 RX ORDER — OXYCODONE HYDROCHLORIDE 5 MG/1
5 TABLET ORAL ONCE
Qty: 0 | Refills: 0 | Status: DISCONTINUED | OUTPATIENT
Start: 2018-10-02 | End: 2018-10-02

## 2018-10-02 RX ORDER — INFLUENZA VIRUS VACCINE 15; 15; 15; 15 UG/.5ML; UG/.5ML; UG/.5ML; UG/.5ML
0.5 SUSPENSION INTRAMUSCULAR ONCE
Qty: 0 | Refills: 0 | Status: DISCONTINUED | OUTPATIENT
Start: 2018-10-02 | End: 2018-10-09

## 2018-10-02 RX ORDER — HEPARIN SODIUM 5000 [USP'U]/ML
INJECTION INTRAVENOUS; SUBCUTANEOUS
Qty: 25000 | Refills: 0 | Status: DISCONTINUED | OUTPATIENT
Start: 2018-10-02 | End: 2018-10-08

## 2018-10-02 RX ORDER — HEPARIN SODIUM 5000 [USP'U]/ML
7000 INJECTION INTRAVENOUS; SUBCUTANEOUS EVERY 6 HOURS
Qty: 0 | Refills: 0 | Status: DISCONTINUED | OUTPATIENT
Start: 2018-10-02 | End: 2018-10-08

## 2018-10-02 RX ORDER — OXYCODONE AND ACETAMINOPHEN 5; 325 MG/1; MG/1
1 TABLET ORAL ONCE
Qty: 0 | Refills: 0 | Status: DISCONTINUED | OUTPATIENT
Start: 2018-10-02 | End: 2018-10-02

## 2018-10-02 RX ADMIN — Medication 81 MILLIGRAM(S): at 11:34

## 2018-10-02 RX ADMIN — HEPARIN SODIUM 1600 UNIT(S)/HR: 5000 INJECTION INTRAVENOUS; SUBCUTANEOUS at 02:52

## 2018-10-02 RX ADMIN — HEPARIN SODIUM 3500 UNIT(S): 5000 INJECTION INTRAVENOUS; SUBCUTANEOUS at 11:36

## 2018-10-02 RX ADMIN — TACROLIMUS 1 MILLIGRAM(S): 5 CAPSULE ORAL at 23:36

## 2018-10-02 RX ADMIN — Medication 5 MILLIGRAM(S): at 11:34

## 2018-10-02 RX ADMIN — TACROLIMUS 1 MILLIGRAM(S): 5 CAPSULE ORAL at 00:07

## 2018-10-02 RX ADMIN — Medication 500 MILLIGRAM(S): at 11:34

## 2018-10-02 RX ADMIN — HEPARIN SODIUM 1800 UNIT(S)/HR: 5000 INJECTION INTRAVENOUS; SUBCUTANEOUS at 11:32

## 2018-10-02 RX ADMIN — Medication 80 MILLIGRAM(S): at 06:22

## 2018-10-02 RX ADMIN — OXYCODONE HYDROCHLORIDE 5 MILLIGRAM(S): 5 TABLET ORAL at 06:52

## 2018-10-02 RX ADMIN — Medication 1 TABLET(S): at 23:36

## 2018-10-02 RX ADMIN — HEPARIN SODIUM 7000 UNIT(S): 5000 INJECTION INTRAVENOUS; SUBCUTANEOUS at 02:51

## 2018-10-02 RX ADMIN — TACROLIMUS 1 MILLIGRAM(S): 5 CAPSULE ORAL at 11:34

## 2018-10-02 RX ADMIN — HEPARIN SODIUM 1800 UNIT(S)/HR: 5000 INJECTION INTRAVENOUS; SUBCUTANEOUS at 17:53

## 2018-10-02 RX ADMIN — Medication 1000 UNIT(S): at 11:34

## 2018-10-02 NOTE — CONSULT NOTE ADULT - PROBLEM SELECTOR RECOMMENDATION 9
Pt. with ESRD on HD three times a week (TTS). Last HD was on 10/1/18 via RIJ tunneled catheter, complicated by development of atrial flutter with rapid ventricular rate. Pt. clinically stable. Labs reviewed. Will arrange for maintenance HD tomorrow. Monitor labs

## 2018-10-02 NOTE — CONSULT NOTE ADULT - ASSESSMENT
57 yo M with PMHx of ESRD on HD TIW (TTS) and previous history of two renal transplants is admitted for atrial flutter. Nephrology consulted in regards to HD management.

## 2018-10-02 NOTE — CONSULT NOTE ADULT - SUBJECTIVE AND OBJECTIVE BOX
CARDIOLOGY CONSULT - Dr. Real     CHIEF COMPLAINT: SOB, cp     HPI:  59 yo M hx ESRD s/p 2 failed renal transplants on tacrolimus and prednisone 5 mg, started HD 3/2018 (T,Th, S still urinates 3 times a day, awaiting third transplant), presenting for shortness of breath increasing in the past few days. He reports episode of chest pain radiating to back today at HD with associated nausea/vomiting that resolved after episode of vomiting. Denies fevers/chills/cough/increased leg swelling. + palpitations, EKG on arrival to ED revealing AFLUTTER, now remains NSR. Last echo/stress test several months ago which was normal at that time. -2.5 L today at HD (HD today for missed HD on Sat.       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 Mild mitral regurgitation.  2. Normal left ventricular systolic function. No segmental  wall motion abnormalities.  3. Normal right ventricular size and function.  4. Normal pericardium with trace pericardial effusion.    < end of copied text >    < from: Transthoracic Echocardiogram (04.17.18 @ 10:15) >  Ejection Fraction (Teicholtz): 53 %    < end of copied text >  [ ]  Catheterization:   [ ] Stress Test:  	< from: Nuclear Stress Test-Pharmacologic (04.04.18 @ 12:33) >  IMPRESSIONS:Probably Normal Study  * Chest Pain: No chest pain with administration of  Regadenoson.  * Symptom: lightheaded.  * HR Response: Appropriate.  * BP Response: Appropriate.  * Heart Rhythm: Sinus Rhythm - 98 BPM.  * Baseline ECG: Nonspecific ST-T wave abnormality.  * ECG Changes: No significant ischemic ST segment changes  beyond baseline abnormalities.  * Arrhythmia: None.  * Review of raw data shows: The study is of good technical  quality.  * The left ventricle was enlarged. There are large, mild  to moderate defects in inferior and inferolateral walls  that are partially fixed, mostly correct with prone  imaging, and have normal wall motion suggestive of  attenuation artifact.  * Post-stress gated wall motion analysis was performed  (LVEF = 53 %;LVEDV = 147 ml.), revealing normal LV  function.    < end of copied text >      MEDICATIONS:  MEDICATIONS  (STANDING):  ascorbic acid 500 milliGRAM(s) Oral daily  aspirin enteric coated 81 milliGRAM(s) Oral daily  calcitriol   Capsule 0.25 MICROGram(s) Oral <User Schedule>  cholecalciferol 1000 Unit(s) Oral daily  cloNIDine Patch 0.3 mG/24Hr(s) 1 patch Transdermal every 7 days  diltiazem    Tablet 30 milliGRAM(s) Oral every 6 hours  diltiazem Injectable 10 milliGRAM(s) IV Push once  furosemide    Tablet 80 milliGRAM(s) Oral daily  heparin  Infusion.  Unit(s)/Hr (16 mL/Hr) IV Continuous <Continuous>  influenza   Vaccine 0.5 milliLiter(s) IntraMuscular once  minoxidil 2.5 milliGRAM(s) Oral daily  Nephro-martha 1 Tablet(s) Oral daily  NIFEdipine XL 60 milliGRAM(s) Oral daily  predniSONE   Tablet 5 milliGRAM(s) Oral daily  tacrolimus 1 milliGRAM(s) Oral every 12 hours  tacrolimus 1 milliGRAM(s) Oral once  trimethoprim  160 mG/sulfamethoxazole 800 mG 1 Tablet(s) Oral every 48 hours      FAMILY HISTORY:  No pertinent family history in first degree relatives      SOCIAL HISTORY:    [x ] Non-smoker  [ ] Smoker  [ ] Alcohol    Allergies    No Known Allergies    Intolerances    beta blockers (Other (Mod to Severe))  	    REVIEW OF SYSTEMS:  CONSTITUTIONAL: No fever, weight loss, or fatigue  EYES: No eye pain, visual disturbances, or discharge  ENMT:  No difficulty hearing, tinnitus, vertigo; No sinus or throat pain  NECK: No pain or stiffness  RESPIRATORY: No cough, wheezing, chills or hemoptysis; + Shortness of Breath  CARDIOVASCULAR: + chest pain, + palpitations, passing out, dizziness, or leg swelling  GASTROINTESTINAL: No abdominal or epigastric pain. No nausea, vomiting, or hematemesis; No diarrhea or constipation. No melena or hematochezia.  GENITOURINARY: No dysuria, frequency, hematuria, or incontinence  NEUROLOGICAL: No headaches, memory loss, loss of strength, numbness, or tremors  SKIN: No itching, burning, rashes, or lesions   	    [x] All others negative	  [ ] Unable to obtain    PHYSICAL EXAM:  T(C): 36.9 (10-02-18 @ 08:27), Max: 37.2 (10-01-18 @ 19:31)  HR: 87 (10-02-18 @ 11:35) (81 - 166)  BP: 138/79 (10-02-18 @ 11:35) (121/103 - 148/104)  RR: 19 (10-02-18 @ 08:27) (16 - 22)  SpO2: 94% (10-02-18 @ 08:27) (92% - 100%)  Wt(kg): --  I&O's Summary      Appearance: Normal	  Psychiatry: A & O x 3, Mood & affect appropriate  HEENT:   Normal oral mucosa, PERRL, EOMI	  Lymphatic: No lymphadenopathy  Cardiovascular: Normal S1 S2,RRR, No JVD, No murmurs  Respiratory: Lungs clear to auscultation	  Gastrointestinal:  Soft, Non-tender, + BS	  Skin: No rashes, No ecchymoses, No cyanosis	  Neurologic: Non-focal  Extremities: Normal range of motion, No clubbing, cyanosis or edema  Vascular: Peripheral pulses palpable 2+ bilaterally    TELEMETRY: 	    ECG:  	  RADIOLOGY: < from: CT Chest No Cont (10.01.18 @ 21:55) >  IMPRESSION:   1.  Pulmonary edema.  2.  Bilateral pleural effusions.    < end of copied text >    OTHER: 	  	  LABS:	 	    CARDIAC MARKERS:                                  9.1    9.11  )-----------( 414      ( 02 Oct 2018 12:25 )             28.5     10-02    135  |  92<L>  |  70<H>  ----------------------------<  109<H>  5.6<H>   |  28  |  9.42<H>    Ca    9.4      02 Oct 2018 10:22  Phos  5.3     10-01  Mg     2.0     10-01    TPro  7.2  /  Alb  4.1  /  TBili  1.2  /  DBili  x   /  AST  11  /  ALT  15  /  AlkPhos  59  10-01    PT/INR - ( 02 Oct 2018 01:35 )   PT: 13.6 sec;   INR: 1.25 ratio         PTT - ( 02 Oct 2018 10:22 )  PTT:53.9 sec  proBNP: Serum Pro-Brain Natriuretic Peptide: 79376 pg/mL (10-01 @ 20:01)    Lipid Profile:   HgA1c:   TSH: CARDIOLOGY CONSULT - Dr. Real     CHIEF COMPLAINT: SOB, cp     HPI:  57 yo M hx ESRD s/p 2 failed renal transplants on tacrolimus and prednisone 5 mg, started HD 3/2018 (T,Th, S still urinates 3 times a day, awaiting third transplant), presenting for shortness of breath increasing in the past few days. He reports episode of chest pain radiating to back today at HD with associated nausea/vomiting that resolved after episode of vomiting. Denies fevers/chills/cough/increased leg swelling. + palpitations, EKG on arrival to ED revealing AFLUTTER, now remains NSR. Last echo/stress test several months ago which was normal at that time. -2.5 L today at HD (HD today for missed HD on Sat.       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 Mild mitral regurgitation.  2. Normal left ventricular systolic function. No segmental  wall motion abnormalities.  3. Normal right ventricular size and function.  4. Normal pericardium with trace pericardial effusion.    < end of copied text >    < from: Transthoracic Echocardiogram (04.17.18 @ 10:15) >  Ejection Fraction (Teicholtz): 53 %    < end of copied text >  [ ]  Catheterization:   [ ] Stress Test:  	< from: Nuclear Stress Test-Pharmacologic (04.04.18 @ 12:33) >  IMPRESSIONS:Probably Normal Study  * Chest Pain: No chest pain with administration of  Regadenoson.  * Symptom: lightheaded.  * HR Response: Appropriate.  * BP Response: Appropriate.  * Heart Rhythm: Sinus Rhythm - 98 BPM.  * Baseline ECG: Nonspecific ST-T wave abnormality.  * ECG Changes: No significant ischemic ST segment changes  beyond baseline abnormalities.  * Arrhythmia: None.  * Review of raw data shows: The study is of good technical  quality.  * The left ventricle was enlarged. There are large, mild  to moderate defects in inferior and inferolateral walls  that are partially fixed, mostly correct with prone  imaging, and have normal wall motion suggestive of  attenuation artifact.  * Post-stress gated wall motion analysis was performed  (LVEF = 53 %;LVEDV = 147 ml.), revealing normal LV  function.    < end of copied text >      MEDICATIONS:  MEDICATIONS  (STANDING):  ascorbic acid 500 milliGRAM(s) Oral daily  aspirin enteric coated 81 milliGRAM(s) Oral daily  calcitriol   Capsule 0.25 MICROGram(s) Oral <User Schedule>  cholecalciferol 1000 Unit(s) Oral daily  cloNIDine Patch 0.3 mG/24Hr(s) 1 patch Transdermal every 7 days  diltiazem    Tablet 30 milliGRAM(s) Oral every 6 hours  diltiazem Injectable 10 milliGRAM(s) IV Push once  furosemide    Tablet 80 milliGRAM(s) Oral daily  heparin  Infusion.  Unit(s)/Hr (16 mL/Hr) IV Continuous <Continuous>  influenza   Vaccine 0.5 milliLiter(s) IntraMuscular once  minoxidil 2.5 milliGRAM(s) Oral daily  Nephro-martha 1 Tablet(s) Oral daily  NIFEdipine XL 60 milliGRAM(s) Oral daily  predniSONE   Tablet 5 milliGRAM(s) Oral daily  tacrolimus 1 milliGRAM(s) Oral every 12 hours  tacrolimus 1 milliGRAM(s) Oral once  trimethoprim  160 mG/sulfamethoxazole 800 mG 1 Tablet(s) Oral every 48 hours      FAMILY HISTORY:  No pertinent family history in first degree relatives      SOCIAL HISTORY:    [x ] Non-smoker  [ ] Smoker  [ ] Alcohol    Allergies    No Known Allergies    Intolerances    beta blockers (Other (Mod to Severe))  	    REVIEW OF SYSTEMS:  CONSTITUTIONAL: No fever, weight loss, or fatigue  EYES: No eye pain, visual disturbances, or discharge  ENMT:  No difficulty hearing, tinnitus, vertigo; No sinus or throat pain  NECK: No pain or stiffness  RESPIRATORY: No cough, wheezing, chills or hemoptysis; + Shortness of Breath  CARDIOVASCULAR: + chest pain, + palpitations, passing out, dizziness, or leg swelling  GASTROINTESTINAL: No abdominal or epigastric pain. No nausea, vomiting, or hematemesis; No diarrhea or constipation. No melena or hematochezia.  GENITOURINARY: No dysuria, frequency, hematuria, or incontinence  NEUROLOGICAL: No headaches, memory loss, loss of strength, numbness, or tremors  SKIN: No itching, burning, rashes, or lesions   	    [x] All others negative	  [ ] Unable to obtain    PHYSICAL EXAM:  T(C): 36.9 (10-02-18 @ 08:27), Max: 37.2 (10-01-18 @ 19:31)  HR: 87 (10-02-18 @ 11:35) (81 - 166)  BP: 138/79 (10-02-18 @ 11:35) (121/103 - 148/104)  RR: 19 (10-02-18 @ 08:27) (16 - 22)  SpO2: 94% (10-02-18 @ 08:27) (92% - 100%)  Wt(kg): --  I&O's Summary      Appearance: Normal	  Psychiatry: A & O x 3, Mood & affect appropriate  HEENT:   Normal oral mucosa, PERRL, EOMI	  Lymphatic: No lymphadenopathy  Cardiovascular: Normal S1 S2,RRR, No JVD, No murmurs  Respiratory: Lungs clear to auscultation	  Gastrointestinal:  Soft, Non-tender, + BS	  Skin: No rashes, No ecchymoses, No cyanosis	  Neurologic: Non-focal  Extremities: Normal range of motion, No clubbing, B/L le edema   Vascular: Peripheral pulses palpable 2+ bilaterally    TELEMETRY: NSR 	    ECG:  	Initial EKG - AFLUTTER 121  Repeat EKG - AFLUTTER 135  Repeat EKG - NSR 83  RADIOLOGY: < from: CT Chest No Cont (10.01.18 @ 21:55) >  IMPRESSION:   1.  Pulmonary edema.  2.  Bilateral pleural effusions.    < end of copied text >    OTHER: 	  	  LABS:	 	    CARDIAC MARKERS:                                  9.1    9.11  )-----------( 414      ( 02 Oct 2018 12:25 )             28.5     10-02    135  |  92<L>  |  70<H>  ----------------------------<  109<H>  5.6<H>   |  28  |  9.42<H>    Ca    9.4      02 Oct 2018 10:22  Phos  5.3     10-01  Mg     2.0     10-01    TPro  7.2  /  Alb  4.1  /  TBili  1.2  /  DBili  x   /  AST  11  /  ALT  15  /  AlkPhos  59  10-01    PT/INR - ( 02 Oct 2018 01:35 )   PT: 13.6 sec;   INR: 1.25 ratio         PTT - ( 02 Oct 2018 10:22 )  PTT:53.9 sec  proBNP: Serum Pro-Brain Natriuretic Peptide: 01361 pg/mL (10-01 @ 20:01)    Lipid Profile:   HgA1c:   TSH:

## 2018-10-02 NOTE — PATIENT PROFILE ADULT. - TEACHING/LEARNING LEARNING PREFERENCES
computer/internet/verbal instruction/video/individual instruction/skill demonstration/audio/written material

## 2018-10-02 NOTE — CONSULT NOTE ADULT - SUBJECTIVE AND OBJECTIVE BOX
[facility] DIVISION OF KIDNEY DISEASES AND HYPERTENSION -- INITIAL CONSULT NOTE  --------------------------------------------------------------------------------  HPI: 59 yo M with PMHx of ESRD on HD and history of two kidney transplants admitted for symptomatic atrial flutter. Patient had completed most of his HD treatment at [facility] on 10/1/18 when he suddenly developed shortness of breath and a rapid heart rate. Patient was then sent to ER for further evaluation. Nephrology is consulted for management of ESRD and HD.    Patient seen and examined in the ER. Patient states that many nephrologists round on him in [facility], such as Dr. Dawson. Patient receives HD thrice weekly, typically on TTS schedule, however had been dialyzed on 10/1/18 due to scheduling conflicts. Patient started HD in 3/2018 via [facility] tunneled cathter, and has since been tolerating treatment well up until 10/1/18. Patient is maintained on immunosuppressive therapy with prednisone 5mg PO daily and tacrolimus 1mg PO BID. The patient currently has no symptoms, and denies CP, SOB, N/V/F/C.     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  History of partial pancreatectomy  History of renal transplant: x 2    FAMILY HISTORY:  No pertinent family history in first degree relatives  Denies family history of kidney disease    PAST SOCIAL HISTORY: Denies alcohol and tobacco use    ALLERGIES & MEDICATIONS  --------------------------------------------------------------------------------  Allergies    No Known Allergies    Intolerances    beta blockers (Other (Mod to Severe))    Standing Inpatient Medications  ascorbic acid 500 milliGRAM(s) Oral daily  aspirin enteric coated 81 milliGRAM(s) Oral daily  calcitriol   Capsule 0.25 MICROGram(s) Oral <User Schedule>  cholecalciferol 1000 Unit(s) Oral daily  cloNIDine Patch 0.3 mG/24Hr(s) 1 patch Transdermal every 7 days  diltiazem    Tablet 30 milliGRAM(s) Oral every 6 hours  diltiazem Injectable 10 milliGRAM(s) IV Push once  doxercalciferol Injectable 1 MICROGram(s) IV Push <User Schedule>  epoetin sherlyn Injectable 3800 Unit(s) IV Push <User Schedule>  furosemide    Tablet 80 milliGRAM(s) Oral daily  heparin  Infusion.  Unit(s)/Hr IV Continuous <Continuous>  influenza   Vaccine 0.5 milliLiter(s) IntraMuscular once  Nephro-martha 1 Tablet(s) Oral daily  predniSONE   Tablet 5 milliGRAM(s) Oral daily  tacrolimus 1 milliGRAM(s) Oral every 12 hours  tacrolimus 1 milliGRAM(s) Oral once  trimethoprim  160 mG/sulfamethoxazole 800 mG 1 Tablet(s) Oral every 48 hours    PRN Inpatient Medications  heparin  Injectable 7000 Unit(s) IV Push every 6 hours PRN  heparin  Injectable 3500 Unit(s) IV Push every 6 hours PRN    REVIEW OF SYSTEMS  --------------------------------------------------------------------------------  Gen: No fatigue  Skin: No rashes  Respiratory: No dyspnea  CV: No chest pain  GI: No abdominal pain  : No increased frequency  MSK: + edema b/l LE  Neuro: No dizziness/lightheadedness  Heme: No easy bruising    All other systems were reviewed and are negative, except as noted.    VITALS/PHYSICAL EXAM  --------------------------------------------------------------------------------  T(C): 36.9 (10-02-18 @ 08:27), Max: 37.2 (10-01-18 @ 19:31)  HR: 78 (10-02-18 @ 15:43) (78 - 166)  BP: 149/78 (10-02-18 @ 15:43) (121/103 - 149/78)  RR: 20 (10-02-18 @ 15:43) (16 - 22)  SpO2: 98% (10-02-18 @ 15:43) (92% - 100%)  Wt(kg): --    Weight (kg): 89.9 (10-01-18 @ 23:07)    Physical Exam:  	Gen: NAD, well-appearing  	HEENT: Supple neck  	Pulm: CTA B/L  	CV: RRR, S1S2; no rub  	Abd: +BS, soft, nontender/nondistended  	: No suprapubic tenderness  	LE: +1 pitting edema b/l LE  	Skin: Warm, without rashes  	Vascular access: RIJ tunneled catheter, site without bleeding.    LABS/STUDIES  --------------------------------------------------------------------------------              9.1    9.11  >-----------<  414      [10-02-18 @ 12:25]              28.5     135  |  92  |  70  ----------------------------<  109      [10-02-18 @ 10:22]  5.6   |  28  |  9.42        Ca     9.4     [10-02-18 @ 10:22]      iCa    1.13     [10-01 @ 20:02]      Mg     2.0     [10-01-18 @ 20:01]      Phos  5.3     [10-01-18 @ 20:01]    TPro  7.2  /  Alb  4.1  /  TBili  1.2  /  DBili  x   /  AST  11  /  ALT  15  /  AlkPhos  59  [10-01-18 @ 20:01]    PT/INR: PT 13.6 , INR 1.25       [10-02-18 @ 01:35]  PTT: 62.2       [10-02-18 @ 17:34]          [10-02-18 @ 01:35]    Creatinine Trend:  SCr 9.42 [10-02 @ 10:22]  SCr 7.59 [10-01 @ 20:01]        Iron 24, TIBC 161, %sat 15      [03-20-18 @ 11:31]  Ferritin 250      [03-19-18 @ 20:02]  TSH 6.46      [10-02-18 @ 12:25]    HBsAb <3.0      [03-17-18 @ 05:35]  HBsAg Nonreact      [08-18-18 @ 19:01]  HBcAb Nonreact      [03-17-18 @ 05:35]  HCV 0.14, Nonreact      [08-18-18 @ 19:01]

## 2018-10-02 NOTE — CONSULT NOTE ADULT - PROBLEM SELECTOR RECOMMENDATION 5
Serum phosphorus within range. Continue Hectorol with HD. Pt. on phosphate binders with meals at home. Low phosphorus diet. Monitor serum phosphorus

## 2018-10-02 NOTE — CONSULT NOTE ADULT - CONSULT REASON
sob, cp    hx ESRD s/p 2 failed renal transplants on tacrolimus and prednisone 5 mg, started HD 3/2018 (T,Th, S still urinates 3 times a day, awaiting third transplant)

## 2018-10-02 NOTE — CONSULT NOTE ADULT - PROBLEM SELECTOR RECOMMENDATION 2
Patient with history of two kidney transplants. Patient on tacrolimus 1mg BID and prednisone 5mg daily. Recommend daily tacrolimus trough levels (30 minutes before scheduled dose)

## 2018-10-02 NOTE — PROGRESS NOTE ADULT - ASSESSMENT
58 m with  NELY [place]- rate control, AC with Heparin, telemetry, Cardiology evaluation Dr. Real  ESRD- HD, Nephrology evaluation called.   Discontinue Minoxidil as per Nephrology.  HTN control  Patient requesting Regular Diet.  d/w patient   Johan Sauer MD pager [phone]

## 2018-10-02 NOTE — CONSULT NOTE ADULT - ASSESSMENT
echo 4/17/18: EF 53, normal LV fx, mild MR     a/p     57yo M w/pmhx of HTN, ESRD s/p renal transplant x 2 but now requiring HD again (T Th Sat) presenting with SOB, chest discomfort found to be in Aflutter.     1. echo 4/17/18: EF 53, normal LV fx, mild MR     a/p     57yo M w/pmhx of HTN, ESRD s/p renal transplant x 2 but now requiring HD again (T Th Sat) presenting with SOB, chest discomfort found to be in Aflutter.     1. Aflutter  presenting with new onset Aflutter from dialysis center   converted to NSR, remains NSR on tele   continue bb for rate control   pending echo to evaluate LV function, valvular disease  CHADS 2 - on heparin gtt, bridge to coumadin pending echo results     2. HTN  bp stable   continue current medication regimen     3. ESRD  renal f/u   continue fluid removal with HD and lasix     dvt ppx

## 2018-10-02 NOTE — ED ADULT NURSE REASSESSMENT NOTE - NS ED NURSE REASSESS COMMENT FT1
Patient resting comfortably in bed on cardiac monitor. VSS. Awaiting repeat aPTT results for heparin administration. RN will continue to monitor.

## 2018-10-02 NOTE — CONSULT NOTE ADULT - PROBLEM SELECTOR RECOMMENDATION 4
Patient with anemia in the setting of ESRD. Hemoglobin below target range. Continue MERYL treatment with HD. Monitor hemoglobin

## 2018-10-03 LAB
ANION GAP SERPL CALC-SCNC: 20 MMOL/L — HIGH (ref 5–17)
APTT BLD: 143.9 SEC — CRITICAL HIGH (ref 27.5–37.4)
APTT BLD: 39.1 SEC — HIGH (ref 27.5–37.4)
APTT BLD: 62.1 SEC — HIGH (ref 27.5–37.4)
BUN SERPL-MCNC: 85 MG/DL — HIGH (ref 7–23)
CALCIUM SERPL-MCNC: 9.3 MG/DL — SIGNIFICANT CHANGE UP (ref 8.4–10.5)
CHLORIDE SERPL-SCNC: 89 MMOL/L — LOW (ref 96–108)
CO2 SERPL-SCNC: 25 MMOL/L — SIGNIFICANT CHANGE UP (ref 22–31)
CREAT SERPL-MCNC: 11.12 MG/DL — HIGH (ref 0.5–1.3)
GLUCOSE SERPL-MCNC: 138 MG/DL — HIGH (ref 70–99)
HCT VFR BLD CALC: 27.6 % — LOW (ref 39–50)
HGB BLD-MCNC: 9.1 G/DL — LOW (ref 13–17)
MAGNESIUM SERPL-MCNC: 2.3 MG/DL — SIGNIFICANT CHANGE UP (ref 1.6–2.6)
MCHC RBC-ENTMCNC: 27.9 PG — SIGNIFICANT CHANGE UP (ref 27–34)
MCHC RBC-ENTMCNC: 32.8 GM/DL — SIGNIFICANT CHANGE UP (ref 32–36)
MCV RBC AUTO: 84.9 FL — SIGNIFICANT CHANGE UP (ref 80–100)
PLATELET # BLD AUTO: 363 K/UL — SIGNIFICANT CHANGE UP (ref 150–400)
POTASSIUM SERPL-MCNC: 5.7 MMOL/L — HIGH (ref 3.5–5.3)
POTASSIUM SERPL-SCNC: 5.7 MMOL/L — HIGH (ref 3.5–5.3)
RBC # BLD: 3.26 M/UL — LOW (ref 4.2–5.8)
RBC # FLD: 16 % — HIGH (ref 10.3–14.5)
SODIUM SERPL-SCNC: 134 MMOL/L — LOW (ref 135–145)
TACROLIMUS SERPL-MCNC: 3.1 NG/ML — SIGNIFICANT CHANGE UP
WBC # BLD: 9 K/UL — SIGNIFICANT CHANGE UP (ref 3.8–10.5)
WBC # FLD AUTO: 9 K/UL — SIGNIFICANT CHANGE UP (ref 3.8–10.5)

## 2018-10-03 PROCEDURE — 90935 HEMODIALYSIS ONE EVALUATION: CPT | Mod: GC

## 2018-10-03 RX ORDER — OXYCODONE HYDROCHLORIDE 5 MG/1
5 TABLET ORAL ONCE
Qty: 0 | Refills: 0 | Status: DISCONTINUED | OUTPATIENT
Start: 2018-10-03 | End: 2018-10-03

## 2018-10-03 RX ORDER — ERYTHROPOIETIN 10000 [IU]/ML
4000 INJECTION, SOLUTION INTRAVENOUS; SUBCUTANEOUS
Qty: 0 | Refills: 0 | Status: DISCONTINUED | OUTPATIENT
Start: 2018-10-03 | End: 2018-10-05

## 2018-10-03 RX ORDER — WARFARIN SODIUM 2.5 MG/1
5 TABLET ORAL ONCE
Qty: 0 | Refills: 0 | Status: COMPLETED | OUTPATIENT
Start: 2018-10-03 | End: 2018-10-03

## 2018-10-03 RX ORDER — POLYETHYLENE GLYCOL 3350 17 G/17G
17 POWDER, FOR SOLUTION ORAL ONCE
Qty: 0 | Refills: 0 | Status: COMPLETED | OUTPATIENT
Start: 2018-10-03 | End: 2018-10-03

## 2018-10-03 RX ADMIN — OXYCODONE HYDROCHLORIDE 5 MILLIGRAM(S): 5 TABLET ORAL at 23:10

## 2018-10-03 RX ADMIN — TACROLIMUS 1 MILLIGRAM(S): 5 CAPSULE ORAL at 23:11

## 2018-10-03 RX ADMIN — HEPARIN SODIUM 0 UNIT(S)/HR: 5000 INJECTION INTRAVENOUS; SUBCUTANEOUS at 12:48

## 2018-10-03 RX ADMIN — Medication 1 TABLET(S): at 23:11

## 2018-10-03 RX ADMIN — OXYCODONE HYDROCHLORIDE 5 MILLIGRAM(S): 5 TABLET ORAL at 14:40

## 2018-10-03 RX ADMIN — TACROLIMUS 1 MILLIGRAM(S): 5 CAPSULE ORAL at 13:19

## 2018-10-03 RX ADMIN — HEPARIN SODIUM 1500 UNIT(S)/HR: 5000 INJECTION INTRAVENOUS; SUBCUTANEOUS at 14:17

## 2018-10-03 RX ADMIN — Medication 5 MILLIGRAM(S): at 13:19

## 2018-10-03 RX ADMIN — Medication 80 MILLIGRAM(S): at 05:06

## 2018-10-03 RX ADMIN — OXYCODONE HYDROCHLORIDE 5 MILLIGRAM(S): 5 TABLET ORAL at 13:43

## 2018-10-03 RX ADMIN — HEPARIN SODIUM 1900 UNIT(S)/HR: 5000 INJECTION INTRAVENOUS; SUBCUTANEOUS at 21:16

## 2018-10-03 RX ADMIN — OXYCODONE HYDROCHLORIDE 5 MILLIGRAM(S): 5 TABLET ORAL at 23:40

## 2018-10-03 RX ADMIN — ERYTHROPOIETIN 4000 UNIT(S): 10000 INJECTION, SOLUTION INTRAVENOUS; SUBCUTANEOUS at 10:02

## 2018-10-03 RX ADMIN — POLYETHYLENE GLYCOL 3350 17 GRAM(S): 17 POWDER, FOR SOLUTION ORAL at 13:45

## 2018-10-03 RX ADMIN — Medication 500 MILLIGRAM(S): at 13:19

## 2018-10-03 RX ADMIN — WARFARIN SODIUM 5 MILLIGRAM(S): 2.5 TABLET ORAL at 23:10

## 2018-10-03 RX ADMIN — HEPARIN SODIUM 1800 UNIT(S)/HR: 5000 INJECTION INTRAVENOUS; SUBCUTANEOUS at 00:50

## 2018-10-03 RX ADMIN — DOXERCALCIFEROL 1 MICROGRAM(S): 2.5 CAPSULE ORAL at 10:10

## 2018-10-03 RX ADMIN — Medication 1 TABLET(S): at 13:19

## 2018-10-03 RX ADMIN — Medication 81 MILLIGRAM(S): at 13:19

## 2018-10-03 RX ADMIN — HEPARIN SODIUM 7000 UNIT(S): 5000 INJECTION INTRAVENOUS; SUBCUTANEOUS at 21:20

## 2018-10-03 NOTE — PROGRESS NOTE ADULT - ASSESSMENT
echo 4/17/18: EF 53, normal LV fx, mild MR     a/p     59yo M w/pmhx of HTN, ESRD s/p renal transplant x 2 but now requiring HD again (T Th Sat) presenting with SOB, chest discomfort found to be in Aflutter.     1. Aflutter  presenting with new onset Aflutter from dialysis center   converted to NSR, remains NSR on tele   continue ccb ro rate control   pending echo to evaluate LV function, valvular disease  CHADS 2 - on heparin gtt, bridge to coumadin pending echo results     2. HTN  pt s/p minoxidil taper   procardia changed to cardizem   continue to monitor Bp    3. ESRD  renal f/u   continue fluid removal with HD and lasix     dvt ppx

## 2018-10-03 NOTE — PROGRESS NOTE ADULT - ASSESSMENT
58 m with  A. Flutter now in SR- rate control with Cardizem, AC with Heparin/ bridge to Coumadin with goal of INR 2-3  Telemetry, Cardiology evaluation noted.   ESRD- HD, Nephrology evaluation called.   Off Minoxidil as per Nephrology.  HTN control  Patient requesting Regular Diet.  d/w patient   Johan Sauer MD pager [phone]

## 2018-10-04 LAB
ANION GAP SERPL CALC-SCNC: 16 MMOL/L — SIGNIFICANT CHANGE UP (ref 5–17)
APTT BLD: 104.9 SEC — HIGH (ref 27.5–37.4)
APTT BLD: 48 SEC — HIGH (ref 27.5–37.4)
APTT BLD: 82.3 SEC — HIGH (ref 27.5–37.4)
APTT BLD: 86.3 SEC — HIGH (ref 27.5–37.4)
BUN SERPL-MCNC: 54 MG/DL — HIGH (ref 7–23)
CALCIUM SERPL-MCNC: 10.1 MG/DL — SIGNIFICANT CHANGE UP (ref 8.4–10.5)
CHLORIDE SERPL-SCNC: 91 MMOL/L — LOW (ref 96–108)
CO2 SERPL-SCNC: 26 MMOL/L — SIGNIFICANT CHANGE UP (ref 22–31)
CREAT SERPL-MCNC: 7.9 MG/DL — HIGH (ref 0.5–1.3)
GLUCOSE SERPL-MCNC: 105 MG/DL — HIGH (ref 70–99)
HCT VFR BLD CALC: 31.7 % — LOW (ref 39–50)
HGB BLD-MCNC: 10.1 G/DL — LOW (ref 13–17)
INR BLD: 1.12 RATIO — SIGNIFICANT CHANGE UP (ref 0.88–1.16)
MAGNESIUM SERPL-MCNC: 2.2 MG/DL — SIGNIFICANT CHANGE UP (ref 1.6–2.6)
MCHC RBC-ENTMCNC: 27.5 PG — SIGNIFICANT CHANGE UP (ref 27–34)
MCHC RBC-ENTMCNC: 31.7 GM/DL — LOW (ref 32–36)
MCV RBC AUTO: 86.9 FL — SIGNIFICANT CHANGE UP (ref 80–100)
PHOSPHATE SERPL-MCNC: 8.6 MG/DL — HIGH (ref 2.5–4.5)
PLATELET # BLD AUTO: 395 K/UL — SIGNIFICANT CHANGE UP (ref 150–400)
POTASSIUM SERPL-MCNC: 6 MMOL/L — HIGH (ref 3.5–5.3)
POTASSIUM SERPL-SCNC: 6 MMOL/L — HIGH (ref 3.5–5.3)
PROTHROM AB SERPL-ACNC: 12.1 SEC — SIGNIFICANT CHANGE UP (ref 9.8–12.7)
RBC # BLD: 3.65 M/UL — LOW (ref 4.2–5.8)
RBC # FLD: 16.4 % — HIGH (ref 10.3–14.5)
SODIUM SERPL-SCNC: 133 MMOL/L — LOW (ref 135–145)
WBC # BLD: 9.7 K/UL — SIGNIFICANT CHANGE UP (ref 3.8–10.5)
WBC # FLD AUTO: 9.7 K/UL — SIGNIFICANT CHANGE UP (ref 3.8–10.5)

## 2018-10-04 PROCEDURE — 90935 HEMODIALYSIS ONE EVALUATION: CPT | Mod: GC

## 2018-10-04 RX ORDER — PANTOPRAZOLE SODIUM 20 MG/1
40 TABLET, DELAYED RELEASE ORAL
Qty: 0 | Refills: 0 | Status: DISCONTINUED | OUTPATIENT
Start: 2018-10-05 | End: 2018-10-09

## 2018-10-04 RX ORDER — SENNA PLUS 8.6 MG/1
2 TABLET ORAL AT BEDTIME
Qty: 0 | Refills: 0 | Status: DISCONTINUED | OUTPATIENT
Start: 2018-10-04 | End: 2018-10-09

## 2018-10-04 RX ORDER — DOCUSATE SODIUM 100 MG
100 CAPSULE ORAL
Qty: 0 | Refills: 0 | Status: DISCONTINUED | OUTPATIENT
Start: 2018-10-04 | End: 2018-10-09

## 2018-10-04 RX ORDER — WARFARIN SODIUM 2.5 MG/1
5 TABLET ORAL ONCE
Qty: 0 | Refills: 0 | Status: COMPLETED | OUTPATIENT
Start: 2018-10-04 | End: 2018-10-04

## 2018-10-04 RX ORDER — OXYCODONE HYDROCHLORIDE 5 MG/1
5 TABLET ORAL ONCE
Qty: 0 | Refills: 0 | Status: DISCONTINUED | OUTPATIENT
Start: 2018-10-04 | End: 2018-10-04

## 2018-10-04 RX ADMIN — TACROLIMUS 1 MILLIGRAM(S): 5 CAPSULE ORAL at 23:15

## 2018-10-04 RX ADMIN — OXYCODONE HYDROCHLORIDE 5 MILLIGRAM(S): 5 TABLET ORAL at 19:19

## 2018-10-04 RX ADMIN — SENNA PLUS 2 TABLET(S): 8.6 TABLET ORAL at 21:33

## 2018-10-04 RX ADMIN — Medication 1 TABLET(S): at 23:15

## 2018-10-04 RX ADMIN — TACROLIMUS 1 MILLIGRAM(S): 5 CAPSULE ORAL at 11:13

## 2018-10-04 RX ADMIN — Medication 5 MILLIGRAM(S): at 11:13

## 2018-10-04 RX ADMIN — HEPARIN SODIUM 2100 UNIT(S)/HR: 5000 INJECTION INTRAVENOUS; SUBCUTANEOUS at 09:13

## 2018-10-04 RX ADMIN — HEPARIN SODIUM 1900 UNIT(S)/HR: 5000 INJECTION INTRAVENOUS; SUBCUTANEOUS at 14:23

## 2018-10-04 RX ADMIN — Medication 80 MILLIGRAM(S): at 05:11

## 2018-10-04 RX ADMIN — Medication 81 MILLIGRAM(S): at 11:14

## 2018-10-04 RX ADMIN — HEPARIN SODIUM 1900 UNIT(S)/HR: 5000 INJECTION INTRAVENOUS; SUBCUTANEOUS at 21:35

## 2018-10-04 RX ADMIN — Medication 100 MILLIGRAM(S): at 18:23

## 2018-10-04 RX ADMIN — WARFARIN SODIUM 5 MILLIGRAM(S): 2.5 TABLET ORAL at 21:33

## 2018-10-04 RX ADMIN — Medication 500 MILLIGRAM(S): at 11:14

## 2018-10-04 RX ADMIN — HEPARIN SODIUM 2100 UNIT(S)/HR: 5000 INJECTION INTRAVENOUS; SUBCUTANEOUS at 03:49

## 2018-10-04 RX ADMIN — OXYCODONE HYDROCHLORIDE 5 MILLIGRAM(S): 5 TABLET ORAL at 18:23

## 2018-10-04 NOTE — PROVIDER CONTACT NOTE (OTHER) - ASSESSMENT
Patient asymptomatic. Patient Alert and Oriented times Four. Patient denies chest pain, shortness of breath, headache, dizziness and lightheadedness. Patient's Vital Signs: Temperature 98.4 F Oral, Heart Rate 74, Blood Pressure 164/91 electronic, Blood Pressure 158/78 manual, Respiratory Rate 18 and O2 Saturation 95% Room Air. Patient's Heart Rhythm is Normal Sinus Rhythm on the cardiac monitor.

## 2018-10-04 NOTE — PROGRESS NOTE ADULT - PROBLEM SELECTOR PLAN 1
Patient to receive urgent HD today d/t hyperkalemia of 6 found today (10/4/18). Will then be placed back on TTS schedule. Monitor labs

## 2018-10-04 NOTE — PROGRESS NOTE ADULT - ASSESSMENT
58 m with  A. Flutter now in SR- rate control with Cardizem, AC with Heparin/ bridge to Coumadin with goal of INR 2-3  Telemetry, Cardiology follow noted.   ESRD- HD today for hyperkalemia.   Off Minoxidil as per Nephrology.  HTN control  Patient requesting Regular Diet.  Johan Sauer MD pager [phone]

## 2018-10-04 NOTE — PROGRESS NOTE ADULT - ASSESSMENT
echo 4/17/18: EF 53, normal LV fx, mild MR     a/p     59yo M w/pmhx of HTN, ESRD s/p renal transplant x 2 but now requiring HD again (T Th Sat) presenting with SOB, chest discomfort found to be in Aflutter.     1. Aflutter  presenting with new onset Aflutter from dialysis center   converted to NSR, remains NSR on tele   continue ccb for rate control   pending echo to evaluate LV function, valvular disease  CHADS 2 - on heparin gtt, bridge to coumadin     2. HTN  pt s/p minoxidil taper   bp improved on increased dose of Cardizem   continue to monitor Bp    3. ESRD  renal f/u   continue fluid removal with HD and lasix     dvt ppx

## 2018-10-04 NOTE — PROGRESS NOTE ADULT - PROBLEM SELECTOR PLAN 4
Patient with anemia in the setting of ESRD. Hemoglobin in target range. Continue MERYL. Monitor hemoglobin

## 2018-10-05 LAB
ANION GAP SERPL CALC-SCNC: 13 MMOL/L — SIGNIFICANT CHANGE UP (ref 5–17)
APTT BLD: 68.6 SEC — HIGH (ref 27.5–37.4)
BUN SERPL-MCNC: 35 MG/DL — HIGH (ref 7–23)
CALCIUM SERPL-MCNC: 9.3 MG/DL — SIGNIFICANT CHANGE UP (ref 8.4–10.5)
CHLORIDE SERPL-SCNC: 92 MMOL/L — LOW (ref 96–108)
CO2 SERPL-SCNC: 28 MMOL/L — SIGNIFICANT CHANGE UP (ref 22–31)
CREAT SERPL-MCNC: 5.61 MG/DL — HIGH (ref 0.5–1.3)
GLUCOSE SERPL-MCNC: 141 MG/DL — HIGH (ref 70–99)
HCT VFR BLD CALC: 27.3 % — LOW (ref 39–50)
HGB BLD-MCNC: 8.8 G/DL — LOW (ref 13–17)
INR BLD: 1.2 RATIO — HIGH (ref 0.88–1.16)
MAGNESIUM SERPL-MCNC: 2 MG/DL — SIGNIFICANT CHANGE UP (ref 1.6–2.6)
MCHC RBC-ENTMCNC: 28.3 PG — SIGNIFICANT CHANGE UP (ref 27–34)
MCHC RBC-ENTMCNC: 32.3 GM/DL — SIGNIFICANT CHANGE UP (ref 32–36)
MCV RBC AUTO: 87.6 FL — SIGNIFICANT CHANGE UP (ref 80–100)
PHOSPHATE SERPL-MCNC: 7 MG/DL — HIGH (ref 2.5–4.5)
PLATELET # BLD AUTO: 364 K/UL — SIGNIFICANT CHANGE UP (ref 150–400)
POTASSIUM SERPL-MCNC: 5.3 MMOL/L — SIGNIFICANT CHANGE UP (ref 3.5–5.3)
POTASSIUM SERPL-SCNC: 5.3 MMOL/L — SIGNIFICANT CHANGE UP (ref 3.5–5.3)
PROTHROM AB SERPL-ACNC: 13.1 SEC — HIGH (ref 9.8–12.7)
RBC # BLD: 3.12 M/UL — LOW (ref 4.2–5.8)
RBC # FLD: 16.5 % — HIGH (ref 10.3–14.5)
SODIUM SERPL-SCNC: 133 MMOL/L — LOW (ref 135–145)
WBC # BLD: 9.2 K/UL — SIGNIFICANT CHANGE UP (ref 3.8–10.5)
WBC # FLD AUTO: 9.2 K/UL — SIGNIFICANT CHANGE UP (ref 3.8–10.5)

## 2018-10-05 PROCEDURE — 93306 TTE W/DOPPLER COMPLETE: CPT | Mod: 26

## 2018-10-05 RX ORDER — OXYCODONE HYDROCHLORIDE 5 MG/1
5 TABLET ORAL ONCE
Qty: 0 | Refills: 0 | Status: DISCONTINUED | OUTPATIENT
Start: 2018-10-05 | End: 2018-10-05

## 2018-10-05 RX ORDER — ERYTHROPOIETIN 10000 [IU]/ML
4000 INJECTION, SOLUTION INTRAVENOUS; SUBCUTANEOUS
Qty: 0 | Refills: 0 | Status: DISCONTINUED | OUTPATIENT
Start: 2018-10-05 | End: 2018-10-09

## 2018-10-05 RX ORDER — DOXERCALCIFEROL 2.5 UG/1
1 CAPSULE ORAL
Qty: 0 | Refills: 0 | Status: DISCONTINUED | OUTPATIENT
Start: 2018-10-05 | End: 2018-10-09

## 2018-10-05 RX ORDER — POLYETHYLENE GLYCOL 3350 17 G/17G
17 POWDER, FOR SOLUTION ORAL ONCE
Qty: 0 | Refills: 0 | Status: COMPLETED | OUTPATIENT
Start: 2018-10-05 | End: 2018-10-05

## 2018-10-05 RX ORDER — PETROLATUM,WHITE
1 JELLY (GRAM) TOPICAL DAILY
Qty: 0 | Refills: 0 | Status: DISCONTINUED | OUTPATIENT
Start: 2018-10-05 | End: 2018-10-09

## 2018-10-05 RX ORDER — WARFARIN SODIUM 2.5 MG/1
10 TABLET ORAL ONCE
Qty: 0 | Refills: 0 | Status: COMPLETED | OUTPATIENT
Start: 2018-10-05 | End: 2018-10-05

## 2018-10-05 RX ADMIN — Medication 81 MILLIGRAM(S): at 11:45

## 2018-10-05 RX ADMIN — Medication 1 TABLET(S): at 23:56

## 2018-10-05 RX ADMIN — Medication 1 TABLET(S): at 13:58

## 2018-10-05 RX ADMIN — OXYCODONE HYDROCHLORIDE 5 MILLIGRAM(S): 5 TABLET ORAL at 06:44

## 2018-10-05 RX ADMIN — Medication 5 MILLIGRAM(S): at 11:45

## 2018-10-05 RX ADMIN — Medication 1 APPLICATION(S): at 13:53

## 2018-10-05 RX ADMIN — HEPARIN SODIUM 1900 UNIT(S)/HR: 5000 INJECTION INTRAVENOUS; SUBCUTANEOUS at 04:38

## 2018-10-05 RX ADMIN — TACROLIMUS 1 MILLIGRAM(S): 5 CAPSULE ORAL at 11:45

## 2018-10-05 RX ADMIN — SENNA PLUS 2 TABLET(S): 8.6 TABLET ORAL at 21:24

## 2018-10-05 RX ADMIN — OXYCODONE HYDROCHLORIDE 5 MILLIGRAM(S): 5 TABLET ORAL at 06:14

## 2018-10-05 RX ADMIN — TACROLIMUS 1 MILLIGRAM(S): 5 CAPSULE ORAL at 23:56

## 2018-10-05 RX ADMIN — Medication 80 MILLIGRAM(S): at 05:48

## 2018-10-05 RX ADMIN — WARFARIN SODIUM 10 MILLIGRAM(S): 2.5 TABLET ORAL at 21:26

## 2018-10-05 RX ADMIN — Medication 100 MILLIGRAM(S): at 05:48

## 2018-10-05 RX ADMIN — PANTOPRAZOLE SODIUM 40 MILLIGRAM(S): 20 TABLET, DELAYED RELEASE ORAL at 05:48

## 2018-10-05 RX ADMIN — Medication 100 MILLIGRAM(S): at 17:57

## 2018-10-05 RX ADMIN — POLYETHYLENE GLYCOL 3350 17 GRAM(S): 17 POWDER, FOR SOLUTION ORAL at 13:54

## 2018-10-05 RX ADMIN — Medication 500 MILLIGRAM(S): at 11:45

## 2018-10-05 NOTE — DIETITIAN INITIAL EVALUATION ADULT. - ADHERENCE
Pt reports following renal diet PTA, aware of high phosphorus foods, high potassium foods, and avoids high sodium foods./good

## 2018-10-05 NOTE — DIETITIAN INITIAL EVALUATION ADULT. - ENERGY NEEDS
Height: 71 inches, Weight: 197 pounds (10/5, standing), 194 pounds (10/3, post-HD)  BMI: 27.5 kg/m2 IBW: 172 pounds (+/-10%), %IBW: 115%  Pertinent Info: 2+ edema to R/L feet and R/L legs noted, no pressure injuries noted at this time in nursing flow sheet.  Other pertinent info: Pt is 58yoM admitted for SOB, new A-flutter on AC, plan to bridge to coumadin. PMH significant for ESRD on HD since 3/2018, s/p 2 failed renal transplants and planned for third transplant (no date scheduled yet). Noted hyperkalemia yesterday, now WNL. Pt with hyperphosphatemia noted today 10/5.

## 2018-10-05 NOTE — PROGRESS NOTE ADULT - ASSESSMENT
echo 4/17/18: EF 53, normal LV fx, mild MR     a/p     59yo M w/pmhx of HTN, ESRD s/p renal transplant x 2 but now requiring HD again (T Th Sat) presenting with SOB, chest discomfort found to be in Aflutter.     1. Aflutter  presenting with new onset Aflutter from dialysis center   converted to NSR, remains NSR on tele   continue ccb for rate control   pending echo to evaluate LV function, valvular disease  CHADS 2 - on heparin gtt, bridge to coumadin     2. HTN  pt s/p minoxidil taper   bp noted to be elevated prior to am meds   continue to trend, can up - titrate Cardizem if needed     3. ESRD  renal f/u   continue fluid removal with HD and lasix     dvt ppx echo 4/17/18: EF 53, normal LV fx, mild MR     a/p     57yo M w/pmhx of HTN, ESRD s/p renal transplant x 2 but now requiring HD again (T Th Sat) presenting with SOB, chest discomfort found to be in Aflutter.     1. Aflutter  presenting with new onset Aflutter from dialysis center   converted to NSR, remains NSR on tele   continue ccb for rate control   pending echo to evaluate LV function, valvular disease  CHADS 2 - on heparin gtt, bridge to coumadin     2. HTN  pt s/p minoxidil taper   bp elevated  increase cardizem to 90mg q6hr    3. ESRD  renal f/u   continue fluid removal with HD and lasix     dvt ppx

## 2018-10-05 NOTE — DIETITIAN INITIAL EVALUATION ADULT. - OTHER INFO
Pt seen on [facility] for nutrition consult. Pt reports good po intake PTA and as in-patient. Pt is very familiar with menu and provides food preferences. Per pt preference, he is on a regular diet and not a renal diet. Pt reports adhering to renal restrictions on his own and making appropriate food choices. Pt with hx of intentional wt loss last 3-4 years as he is living a healthier lifestyle. Per chart, pt was 186.7 lbs. on 8/22/18. Current wt 197.3 lbs. on 10/5, pt with wt fluctuations this admit due to fluid shifts. Pt notes this is within his usual range. Pt declines need for additional renal diet education, provided coumadin education. Pt made aware RD remains available. Pt seen on [facility] for nutrition consult. Pt reports good po intake PTA and as in-patient. Pt is very familiar with menu and provides food preferences. Per pt preference, he is on a regular diet and not a renal diet. Pt reports adhering to renal restrictions on his own and makes appropriate food choices. Pt with hx of intentional wt loss last 3-4 years as he is living a healthier lifestyle. Per chart, pt was 186.7 lbs. on 8/22/18 after which pt reports he gained some wt. Current wt 197.3 lbs. on 10/5, pt with wt fluctuations this admit due to fluid shifts. Pt notes this is within his usual range. Pt declines need for additional renal diet education, provided coumadin education, pt able to verbalize understanding and teach back. Pt made aware RD remains available.

## 2018-10-05 NOTE — PROGRESS NOTE ADULT - ASSESSMENT
58 m with  A. Flutter now in SR- rate control with Cardizem, AC with Heparin/ bridge to Coumadin with goal of INR 2-3  Telemetry, Cardiology follow noted.   ESRD- continue HD.   Off Minoxidil as per Nephrology.  HTN control  Regular Diet as per patient request .  Johan Sauer MD pager [phone]

## 2018-10-05 NOTE — DIETITIAN INITIAL EVALUATION ADULT. - ORAL INTAKE PTA
good/pt reports good po intake PTA, consumes healthful, balanced diet. NKFA reported. Pt endorses nephrocaps, coQ10, omega 3, calcium, and vitamin D3 supplementation PTA.

## 2018-10-05 NOTE — DIETITIAN INITIAL EVALUATION ADULT. - NS AS NUTRI INTERV MEALS SNACK
Continue to provide current diet order, per pt preference regular diet. Continue to monitor electrolytes. Encouraged adequate protein intake. Pt food preferences obtained and will be honored as feasible./General/healthful diet

## 2018-10-05 NOTE — DIETITIAN INITIAL EVALUATION ADULT. - NS AS NUTRI INTERV ED CONTENT
Pt declined need to review renal diet education. Able to verbalize components of renal diet and guidelines and teach back. Coumadin education provided including: Coumadin/Vitamin K interaction, vitamin k points and serving sizes, and consistent vitamin K intake by using Taking Warfarin Safely Booklet which was provided as well. Discussed vitamin K vegetable intake together with potassium content and phosphorus content of items./Priority modifications/Purpose of the nutrition education

## 2018-10-06 DIAGNOSIS — E87.5 HYPERKALEMIA: ICD-10-CM

## 2018-10-06 LAB
ANION GAP SERPL CALC-SCNC: 16 MMOL/L — SIGNIFICANT CHANGE UP (ref 5–17)
APTT BLD: 145.9 SEC — CRITICAL HIGH (ref 27.5–37.4)
APTT BLD: 70.9 SEC — HIGH (ref 27.5–37.4)
BUN SERPL-MCNC: 55 MG/DL — HIGH (ref 7–23)
CALCIUM SERPL-MCNC: 9.6 MG/DL — SIGNIFICANT CHANGE UP (ref 8.4–10.5)
CHLORIDE SERPL-SCNC: 90 MMOL/L — LOW (ref 96–108)
CO2 SERPL-SCNC: 27 MMOL/L — SIGNIFICANT CHANGE UP (ref 22–31)
CREAT SERPL-MCNC: 7.91 MG/DL — HIGH (ref 0.5–1.3)
GLUCOSE SERPL-MCNC: 125 MG/DL — HIGH (ref 70–99)
HBV SURFACE AB SER-ACNC: 6.9 MIU/ML — LOW
HBV SURFACE AG SER-ACNC: SIGNIFICANT CHANGE UP
HCT VFR BLD CALC: 28.3 % — LOW (ref 39–50)
HCV AB S/CO SERPL IA: 0.14 S/CO — SIGNIFICANT CHANGE UP
HCV AB SERPL-IMP: SIGNIFICANT CHANGE UP
HGB BLD-MCNC: 9.2 G/DL — LOW (ref 13–17)
INR BLD: 1.23 RATIO — HIGH (ref 0.88–1.16)
MAGNESIUM SERPL-MCNC: 2.1 MG/DL — SIGNIFICANT CHANGE UP (ref 1.6–2.6)
MCHC RBC-ENTMCNC: 28 PG — SIGNIFICANT CHANGE UP (ref 27–34)
MCHC RBC-ENTMCNC: 32.3 GM/DL — SIGNIFICANT CHANGE UP (ref 32–36)
MCV RBC AUTO: 86.5 FL — SIGNIFICANT CHANGE UP (ref 80–100)
PHOSPHATE SERPL-MCNC: 8.3 MG/DL — HIGH (ref 2.5–4.5)
PLATELET # BLD AUTO: 399 K/UL — SIGNIFICANT CHANGE UP (ref 150–400)
POTASSIUM SERPL-MCNC: 5.6 MMOL/L — HIGH (ref 3.5–5.3)
POTASSIUM SERPL-SCNC: 5.6 MMOL/L — HIGH (ref 3.5–5.3)
PROTHROM AB SERPL-ACNC: 13.5 SEC — HIGH (ref 9.8–12.7)
RBC # BLD: 3.27 M/UL — LOW (ref 4.2–5.8)
RBC # FLD: 16.5 % — HIGH (ref 10.3–14.5)
SODIUM SERPL-SCNC: 133 MMOL/L — LOW (ref 135–145)
WBC # BLD: 10.3 K/UL — SIGNIFICANT CHANGE UP (ref 3.8–10.5)
WBC # FLD AUTO: 10.3 K/UL — SIGNIFICANT CHANGE UP (ref 3.8–10.5)

## 2018-10-06 PROCEDURE — 90935 HEMODIALYSIS ONE EVALUATION: CPT

## 2018-10-06 RX ORDER — HYDRALAZINE HCL 50 MG
5 TABLET ORAL ONCE
Qty: 0 | Refills: 0 | Status: COMPLETED | OUTPATIENT
Start: 2018-10-06 | End: 2018-10-06

## 2018-10-06 RX ORDER — ONDANSETRON 8 MG/1
4 TABLET, FILM COATED ORAL ONCE
Qty: 0 | Refills: 0 | Status: COMPLETED | OUTPATIENT
Start: 2018-10-06 | End: 2018-10-06

## 2018-10-06 RX ORDER — ACETAMINOPHEN 500 MG
650 TABLET ORAL ONCE
Qty: 0 | Refills: 0 | Status: COMPLETED | OUTPATIENT
Start: 2018-10-06 | End: 2018-10-06

## 2018-10-06 RX ORDER — OXYCODONE HYDROCHLORIDE 5 MG/1
5 TABLET ORAL EVERY 6 HOURS
Qty: 0 | Refills: 0 | Status: DISCONTINUED | OUTPATIENT
Start: 2018-10-06 | End: 2018-10-09

## 2018-10-06 RX ORDER — AMLODIPINE BESYLATE 2.5 MG/1
5 TABLET ORAL ONCE
Qty: 0 | Refills: 0 | Status: COMPLETED | OUTPATIENT
Start: 2018-10-06 | End: 2018-10-06

## 2018-10-06 RX ORDER — WARFARIN SODIUM 2.5 MG/1
10 TABLET ORAL ONCE
Qty: 0 | Refills: 0 | Status: COMPLETED | OUTPATIENT
Start: 2018-10-06 | End: 2018-10-06

## 2018-10-06 RX ORDER — HYDROMORPHONE HYDROCHLORIDE 2 MG/ML
1 INJECTION INTRAMUSCULAR; INTRAVENOUS; SUBCUTANEOUS ONCE
Qty: 0 | Refills: 0 | Status: DISCONTINUED | OUTPATIENT
Start: 2018-10-06 | End: 2018-10-06

## 2018-10-06 RX ADMIN — ERYTHROPOIETIN 4000 UNIT(S): 10000 INJECTION, SOLUTION INTRAVENOUS; SUBCUTANEOUS at 09:26

## 2018-10-06 RX ADMIN — HEPARIN SODIUM 0 UNIT(S)/HR: 5000 INJECTION INTRAVENOUS; SUBCUTANEOUS at 10:09

## 2018-10-06 RX ADMIN — Medication 80 MILLIGRAM(S): at 06:03

## 2018-10-06 RX ADMIN — Medication 500 MILLIGRAM(S): at 13:03

## 2018-10-06 RX ADMIN — HEPARIN SODIUM 1600 UNIT(S)/HR: 5000 INJECTION INTRAVENOUS; SUBCUTANEOUS at 18:43

## 2018-10-06 RX ADMIN — Medication 81 MILLIGRAM(S): at 13:03

## 2018-10-06 RX ADMIN — Medication 2.5 MILLIGRAM(S): at 23:53

## 2018-10-06 RX ADMIN — HYDROMORPHONE HYDROCHLORIDE 1 MILLIGRAM(S): 2 INJECTION INTRAMUSCULAR; INTRAVENOUS; SUBCUTANEOUS at 17:24

## 2018-10-06 RX ADMIN — OXYCODONE HYDROCHLORIDE 5 MILLIGRAM(S): 5 TABLET ORAL at 21:41

## 2018-10-06 RX ADMIN — TACROLIMUS 1 MILLIGRAM(S): 5 CAPSULE ORAL at 11:02

## 2018-10-06 RX ADMIN — HYDROMORPHONE HYDROCHLORIDE 1 MILLIGRAM(S): 2 INJECTION INTRAMUSCULAR; INTRAVENOUS; SUBCUTANEOUS at 18:00

## 2018-10-06 RX ADMIN — Medication 100 MILLIGRAM(S): at 17:25

## 2018-10-06 RX ADMIN — AMLODIPINE BESYLATE 5 MILLIGRAM(S): 2.5 TABLET ORAL at 22:26

## 2018-10-06 RX ADMIN — Medication 5 MILLIGRAM(S): at 13:03

## 2018-10-06 RX ADMIN — ONDANSETRON 4 MILLIGRAM(S): 8 TABLET, FILM COATED ORAL at 21:20

## 2018-10-06 RX ADMIN — WARFARIN SODIUM 10 MILLIGRAM(S): 2.5 TABLET ORAL at 21:41

## 2018-10-06 RX ADMIN — OXYCODONE HYDROCHLORIDE 5 MILLIGRAM(S): 5 TABLET ORAL at 22:32

## 2018-10-06 RX ADMIN — OXYCODONE HYDROCHLORIDE 5 MILLIGRAM(S): 5 TABLET ORAL at 14:26

## 2018-10-06 RX ADMIN — SENNA PLUS 2 TABLET(S): 8.6 TABLET ORAL at 23:52

## 2018-10-06 RX ADMIN — Medication 1 PATCH: at 13:03

## 2018-10-06 RX ADMIN — DOXERCALCIFEROL 1 MICROGRAM(S): 2.5 CAPSULE ORAL at 09:26

## 2018-10-06 RX ADMIN — Medication 100 MILLIGRAM(S): at 06:04

## 2018-10-06 RX ADMIN — Medication 1 APPLICATION(S): at 13:04

## 2018-10-06 RX ADMIN — TACROLIMUS 1 MILLIGRAM(S): 5 CAPSULE ORAL at 23:52

## 2018-10-06 RX ADMIN — Medication 1 TABLET(S): at 23:52

## 2018-10-06 RX ADMIN — HEPARIN SODIUM 1600 UNIT(S)/HR: 5000 INJECTION INTRAVENOUS; SUBCUTANEOUS at 11:22

## 2018-10-06 RX ADMIN — PANTOPRAZOLE SODIUM 40 MILLIGRAM(S): 20 TABLET, DELAYED RELEASE ORAL at 06:03

## 2018-10-06 RX ADMIN — OXYCODONE HYDROCHLORIDE 5 MILLIGRAM(S): 5 TABLET ORAL at 15:30

## 2018-10-06 NOTE — PROGRESS NOTE ADULT - ASSESSMENT
58 m with  A. Flutter now in SR- rate control with Cardizem, continue AC with Heparin/ bridge to Coumadin with goal of INR 2-3  Telemetry, Cardiology follow noted.   ESRD- continue HD.   Off Minoxidil as per Nephrology.  HTN control  Pain control  Regular Diet as per patient request .  Johan Sauer MD pager [phone]

## 2018-10-06 NOTE — PROGRESS NOTE ADULT - PROBLEM SELECTOR PLAN 4
BP elevated. UF goal with HD increased. Monitor BP. May need to optimize BP meds if BP remains elevated post HD. Low salt diet

## 2018-10-06 NOTE — PROGRESS NOTE ADULT - PROBLEM SELECTOR PLAN 1
receiving urgent HD today d/t hyperkalemia of 5.6 found today (10/6/18). Tolerating HD well, UF goal increased as noted to have elevated BP and LE edema. Monitor BMP

## 2018-10-06 NOTE — PROVIDER CONTACT NOTE (OTHER) - ASSESSMENT
Patient hypertensive 222/100, HR 78, temp 98.1, rr 19 96% no room air. Denies cp, palpitations, sob. C/o nausea & headache/ migraine 10/10

## 2018-10-06 NOTE — CHART NOTE - NSCHARTNOTEFT_GEN_A_CORE
[facility]  Patient is a 58 year old male who presents with a chief complaint of shortness of breath. (06 Oct 2018 15:28)       Event Summary:  Called by RN to report patient with hypertension - manual BP of 222/100.  Patient seen and examined at the bedside.  He is alert and states that his only complaint is pain (same pain that he has after HD).  Does not endorse any other complaints at this time.  Denies dizziness, headache, chest pain, palpitations, shortness of breath, abdominal pain, nausea, vomiting and diarrhea.       Vital Signs Last 24 Hrs  T(C): 36.7 (06 Oct 2018 21:12), Max: 36.9 (06 Oct 2018 08:40)  T(F): 98.1 (06 Oct 2018 21:12), Max: 98.4 (06 Oct 2018 08:40)  HR: 77 (06 Oct 2018 22:11) (73 - 78)  BP: 228/110 (06 Oct 2018 22:11) (164/84 - 228/110)  RR: 19 (06 Oct 2018 21:12) (18 - 19)  SpO2: 96% (06 Oct 2018 21:12) (95% - 96%)      PHYSICAL EXAM:  GENERAL: Sitting up, in no acute distress  PSYCH: AAOx4  HEART: +S1/S2.  Regular rate and rhythm.  No murmurs, rubs or gallops  CHEST/LUNG: Breath sounds clear to auscultation bilaterally.  No wheezes, rales, rhonchi or crackles  ABDOMEN: Bowel sounds present in all 4 quadrants.  Soft, Nontender, Nondistended      HPI:  Patient is a 58 year old male with a PMHx of ESRD (S/P 2 failed renal transplants - on Tacrolimus and Prednisone 5 mg) and started HD 3/2018 who presented for shortness of breath. (01 Oct 2018 22:39)  Now with hypertension urgency.      PLAN: Hypertension urgency  - Initially ordered IV Hydralazine, however patient states he had a severe reaction.  Per patient, he also reports that he cannot tolerate beta-blockers  - PM dose of Cardizem and pain medication administered   - Repeat BP noted to be higher than previous - 228/110  - Discussed with Dr. Real.  Recommending Amlodipine 5mg x1 dose now  - Will re-assess BP  - Will continue to monitor closely  - Primary team to follow up in AM      #[id number]  Mary Delgado PA-C  Medicine Department [facility]  Patient is a 58 year old male who presents with a chief complaint of shortness of breath. (06 Oct 2018 15:28)       Event Summary:  Called by RN to report patient with hypertension - manual BP of 222/100.  Patient seen and examined at the bedside.  He is alert and states that his only complaint is pain (same pain that he has after HD).  Does not endorse any other complaints at this time.  Denies dizziness, headache, chest pain, palpitations, shortness of breath, abdominal pain, nausea, vomiting and diarrhea.       Vital Signs Last 24 Hrs  T(C): 36.7 (06 Oct 2018 21:12), Max: 36.9 (06 Oct 2018 08:40)  T(F): 98.1 (06 Oct 2018 21:12), Max: 98.4 (06 Oct 2018 08:40)  HR: 77 (06 Oct 2018 22:11) (73 - 78)  BP: 228/110 (06 Oct 2018 22:11) (164/84 - 228/110)  RR: 19 (06 Oct 2018 21:12) (18 - 19)  SpO2: 96% (06 Oct 2018 21:12) (95% - 96%)      PHYSICAL EXAM:  GENERAL: Sitting up, in no acute distress  PSYCH: AAOx4  HEART: +S1/S2.  Regular rate and rhythm.  No murmurs, rubs or gallops  CHEST/LUNG: Breath sounds clear to auscultation bilaterally.  No wheezes, rales, rhonchi or crackles  ABDOMEN: Bowel sounds present in all 4 quadrants.  Soft, Nontender, Nondistended      HPI:  Patient is a 58 year old male with a PMHx of ESRD (S/P 2 failed renal transplants - on Tacrolimus and Prednisone 5 mg) and started HD 3/2018 who presented for shortness of breath. (01 Oct 2018 22:39)  Now with hypertension urgency.      PLAN: Hypertension urgency  - Initially ordered IV Hydralazine, however patient states he had a severe reaction.  Per patient, he also reports that he cannot tolerate beta-blockers  - PM dose of Cardizem and pain medication administered   - Repeat BP noted to be higher than previous - 228/110  - Discussed with Dr. Real.  Recommending Amlodipine 5mg x1 dose now as patient is limited with BP medications that he can tolerate  - Will re-assess BP  - Will continue to monitor closely  - Primary team to follow up in AM      #[id number]  Mary Delgado PA-C  Medicine Department [facility]  Patient is a 58 year old male who presents with a chief complaint of shortness of breath. (06 Oct 2018 15:28)       Event Summary:  Called by RN to report patient with hypertension - manual BP of 222/100.  Patient seen and examined at the bedside.  He is alert and states that his only complaint is pain (same pain that he has after HD).  Does not endorse any other complaints at this time.  Denies dizziness, headache, chest pain, palpitations, shortness of breath, abdominal pain, nausea, vomiting and diarrhea.       Vital Signs Last 24 Hrs  T(C): 36.7 (06 Oct 2018 21:12), Max: 36.9 (06 Oct 2018 08:40)  T(F): 98.1 (06 Oct 2018 21:12), Max: 98.4 (06 Oct 2018 08:40)  HR: 77 (06 Oct 2018 22:11) (73 - 78)  BP: 228/110 (06 Oct 2018 22:11) (164/84 - 228/110)  RR: 19 (06 Oct 2018 21:12) (18 - 19)  SpO2: 96% (06 Oct 2018 21:12) (95% - 96%)      PHYSICAL EXAM:  GENERAL: Sitting up, in no acute distress  PSYCH: AAOx4  HEART: +S1/S2.  Regular rate and rhythm.  No murmurs, rubs or gallops  CHEST/LUNG: Breath sounds clear to auscultation bilaterally.  No wheezes, rales, rhonchi or crackles  ABDOMEN: Bowel sounds present in all 4 quadrants.  Soft, Nontender, Nondistended      HPI:  Patient is a 58 year old male with a PMHx of ESRD (S/P 2 failed renal transplants - on Tacrolimus and Prednisone 5 mg) and started HD 3/2018 who presented for shortness of breath. (01 Oct 2018 22:39)  Now with hypertension urgency.      PLAN: Hypertension urgency  - Initially ordered IV Hydralazine, however patient states he had a severe reaction.  Per patient, he also reports that he cannot tolerate beta-blockers  - PM dose of Cardizem and pain medication administered   - Repeat BP noted to be higher than previous - 228/110  - Discussed with Dr. Real.  Recommending Amlodipine 5mg x1 dose now as patient is limited with BP medications that he can tolerate  - Will re-assess BP  - Will continue to monitor closely  - Primary team to follow up in AM      #[id number]  Mary Delgado PA-C  Medicine Department      ADDENDUM:    PLAN: Hypertension urgency  - Repeat BP noted to be 218/116  - Discussed with Dr. Real.  Recommending IV Vasotec 2.5mg x1 dose and if persistently hypertensive can add nitro paste  - Will re-assess BP  - Will continue to monitor closely  - Primary team to follow up in AM      #[id number]  Mary Delgado PA-C  Medicine Department [facility]  Patient is a 58 year old male who presents with a chief complaint of shortness of breath. (06 Oct 2018 15:28)       Event Summary:  Called by RN to report patient with hypertension - manual BP of 222/100.  Patient seen and examined at the bedside.  He is alert and states that his only complaint is pain (same pain that he has after HD).  Does not endorse any other complaints at this time.  Denies dizziness, headache, vision change, chest pain, palpitations, shortness of breath, abdominal pain, nausea, vomiting and diarrhea.       Vital Signs Last 24 Hrs  T(C): 36.7 (06 Oct 2018 21:12), Max: 36.9 (06 Oct 2018 08:40)  T(F): 98.1 (06 Oct 2018 21:12), Max: 98.4 (06 Oct 2018 08:40)  HR: 77 (06 Oct 2018 22:11) (73 - 78)  BP: 228/110 (06 Oct 2018 22:11) (164/84 - 228/110)  RR: 19 (06 Oct 2018 21:12) (18 - 19)  SpO2: 96% (06 Oct 2018 21:12) (95% - 96%)      PHYSICAL EXAM:  GENERAL: Sitting up, in no acute distress  PSYCH: AAOx4  HEART: +S1/S2.  Regular rate and rhythm.  No murmurs, rubs or gallops  CHEST/LUNG: Breath sounds clear to auscultation bilaterally.  No wheezes, rales, rhonchi or crackles  ABDOMEN: Bowel sounds present in all 4 quadrants.  Soft, Nontender, Nondistended      HPI:  Patient is a 58 year old male with a PMHx of ESRD (S/P 2 failed renal transplants - on Tacrolimus and Prednisone 5 mg) and started HD 3/2018 who presented for shortness of breath. (01 Oct 2018 22:39)  Now with hypertension urgency.      PLAN: Hypertension urgency  - Initially ordered IV Hydralazine, however patient states he had a severe reaction.  Per patient, he also reports that he cannot tolerate beta-blockers  - PM dose of Cardizem and pain medication administered   - Repeat BP noted to be higher than previous - 228/110  - Discussed with Dr. Real.  Recommending Amlodipine 5mg x1 dose now as patient is limited with BP medications that he can tolerate  - Will re-assess BP  - Will continue to monitor closely  - Primary team to follow up in AM      #[id number]  Mary Delgado PA-C  Medicine Department      ADDENDUM:    PLAN: Hypertension urgency  - Repeat BP noted to be 218/116  - Discussed with Dr. Real.  Recommending IV Vasotec 2.5mg x1 dose and if persistently hypertensive can add nitro paste  - Will re-assess BP  - Will continue to monitor closely  - Primary team to follow up in AM      #[id number]  Mary Delgado PA-C  Medicine Department [facility]  Patient is a 58 year old male who presents with a chief complaint of shortness of breath. (06 Oct 2018 15:28)       Event Summary:  Called by RN to report patient with hypertension - manual BP of 222/100.  Patient seen and examined at the bedside.  He is alert and states that his only complaint is pain (same pain that he has after HD).  Does not endorse any other complaints at this time.  Denies dizziness, headache, vision change, chest pain, palpitations, shortness of breath, abdominal pain, nausea, vomiting and diarrhea.       Vital Signs Last 24 Hrs  T(C): 36.7 (06 Oct 2018 21:12), Max: 36.9 (06 Oct 2018 08:40)  T(F): 98.1 (06 Oct 2018 21:12), Max: 98.4 (06 Oct 2018 08:40)  HR: 77 (06 Oct 2018 22:11) (73 - 78)  BP: 228/110 (06 Oct 2018 22:11) (164/84 - 228/110)  RR: 19 (06 Oct 2018 21:12) (18 - 19)  SpO2: 96% (06 Oct 2018 21:12) (95% - 96%)      PHYSICAL EXAM:  GENERAL: Sitting up, in no acute distress  PSYCH: AAOx4  HEART: +S1/S2.  Regular rate and rhythm.  No murmurs, rubs or gallops  CHEST/LUNG: Breath sounds clear to auscultation bilaterally.  No wheezes, rales, rhonchi or crackles  ABDOMEN: Bowel sounds present in all 4 quadrants.  Soft, Nontender, Nondistended      HPI:  Patient is a 58 year old male with a PMHx of ESRD (S/P 2 failed renal transplants - on Tacrolimus and Prednisone 5 mg) and started HD 3/2018 who presented for shortness of breath. (01 Oct 2018 22:39)  Now with hypertension urgency.      PLAN: Hypertension urgency  - Initially ordered IV Hydralazine, however patient states he had a severe reaction.  Per patient, he also reports that he cannot tolerate beta-blockers  - PM dose of Cardizem and pain medication administered   - Repeat BP noted to be higher than previous - 228/110  - Discussed with Dr. Real.  Recommending Amlodipine 5mg x1 dose now as patient is limited with BP medications that he can tolerate  - Will re-assess BP  - Will continue to monitor closely  - Primary team to follow up in AM      #[id number]  Mray Delgado PA-C  Medicine Department      ADDENDUM:    PLAN: Hypertension urgency  - Repeat BP noted to be 218/116  - Discussed with Dr. Real.  Recommending IV Vasotec 2.5mg x1 dose and if persistently hypertensive can add nitro paste  - Will re-assess BP  - Will continue to monitor closely  - Primary team to follow up in AM      #[id number]  Mary Delgado PA-C  Medicine Department      ADDENDUM:    PLAN: Hypertension urgency  - Patient still with elevated BP despite administration of BP medication and pain medication  - MICU consulted.  Recommendations pending  - CCU consulted.  Recommendations pending  - Will continue to monitor closely  - Primary team to follow up in AM      #[id number]  Mary Delgado PA-C  Medicine Department [facility]  Patient is a 58 year old male who presents with a chief complaint of shortness of breath. (06 Oct 2018 15:28)       Event Summary:  Called by RN to report patient with hypertension - manual BP of 222/100.  Patient seen and examined at the bedside.  He is alert and states that his only complaint is pain (same pain that he has after HD).  Does not endorse any other complaints at this time.  Denies dizziness, headache, vision change, chest pain, palpitations, shortness of breath, abdominal pain, nausea, vomiting and diarrhea.       Vital Signs Last 24 Hrs  T(C): 36.7 (06 Oct 2018 21:12), Max: 36.9 (06 Oct 2018 08:40)  T(F): 98.1 (06 Oct 2018 21:12), Max: 98.4 (06 Oct 2018 08:40)  HR: 77 (06 Oct 2018 22:11) (73 - 78)  BP: 228/110 (06 Oct 2018 22:11) (164/84 - 228/110)  RR: 19 (06 Oct 2018 21:12) (18 - 19)  SpO2: 96% (06 Oct 2018 21:12) (95% - 96%)      PHYSICAL EXAM:  GENERAL: Sitting up, in no acute distress  PSYCH: AAOx4  HEART: +S1/S2.  Regular rate and rhythm.  No murmurs, rubs or gallops  CHEST/LUNG: Breath sounds clear to auscultation bilaterally.  No wheezes, rales, rhonchi or crackles  ABDOMEN: Bowel sounds present in all 4 quadrants.  Soft, Nontender, Nondistended      HPI:  Patient is a 58 year old male with a PMHx of ESRD (S/P 2 failed renal transplants - on Tacrolimus and Prednisone 5 mg) and started HD 3/2018 who presented for shortness of breath. (01 Oct 2018 22:39)  Now with hypertension urgency.      PLAN: Hypertension urgency  - Initially ordered IV Hydralazine, however patient states he had a severe reaction.  Per patient, he also reports that he cannot tolerate beta-blockers  - PM dose of Cardizem and pain medication administered   - Repeat BP noted to be higher than previous - 228/110  - Discussed with Dr. Real.  Recommending Amlodipine 5mg x1 dose now as patient is limited with BP medications that he can tolerate  - Will re-assess BP  - Will continue to monitor closely  - Primary team to follow up in AM      #[id number]  Mary Delgado PA-C  Medicine Department      ADDENDUM:    PLAN: Hypertension urgency  - Repeat BP noted to be 218/116  - Discussed with Dr. Real.  Recommending IV Vasotec 2.5mg x1 dose and if persistently hypertensive can add nitro paste  - Will re-assess BP  - Will continue to monitor closely  - Primary team to follow up in AM      #[id number]  Mary Delgado PA-C  Medicine Department      ADDENDUM:    PLAN: Hypertension urgency  - Patient still with elevated BP despite administration of BP medication and pain medication  - MICU consulted and CCU consulted.  Recommending Labetalol 100mg x1 dose now.  If no response, may require drip  - BP improved to 178/90 S/P Labetalol administration  - Neuro checks q4 hours ordered  - Will continue to monitor closely  - Primary team to follow up in AM      #[id number]  Mary Delgado PA-C  Medicine Department

## 2018-10-06 NOTE — PROGRESS NOTE ADULT - ASSESSMENT
58 m with  A. Flutter now in SR- rate control with Cardizem, AC with Heparin/ bridge to Coumadin with goal of INR 2-3  Telemetry, Cardiology follow noted.   ESRD- HD today for hyperkalemia.   Off Minoxidil as per Nephrology.  HTN control  Patient requesting Regular Diet.  Johan Sauer MD pager [phone]

## 2018-10-06 NOTE — PROGRESS NOTE ADULT - ASSESSMENT
echo 4/17/18: EF 53, normal LV fx, mild MR     a/p     57yo M w/pmhx of HTN, ESRD s/p renal transplant x 2 but now requiring HD again (T Th Sat) presenting with SOB, chest discomfort found to be in Aflutter.     1. Parox. Aflutter  remains in sr  continue ccb for rate control   echo with nl lv fxn   CHADS 2 - on heparin gtt, bridge to coumadin     2. HTN  pt s/p minoxidil taper   bp elevated  cont cardizem 90mg q6hr    3. ESRD  renal f/u   continue fluid removal with HD and lasix     dvt ppx

## 2018-10-06 NOTE — CHART NOTE - NSCHARTNOTEFT_GEN_A_CORE
Informed by Floor RN fo elevated .9.  Patient currently off the floor at dialysis.  Spoke with Dialysis RN - no signs or symptoms of bleeding - following protocol.  New weight to be obtained upon patient's arrival to the floor.    Darling Rehman NP  [phone]

## 2018-10-07 LAB
ANION GAP SERPL CALC-SCNC: 16 MMOL/L — SIGNIFICANT CHANGE UP (ref 5–17)
ANION GAP SERPL CALC-SCNC: 16 MMOL/L — SIGNIFICANT CHANGE UP (ref 5–17)
APTT BLD: 78.6 SEC — HIGH (ref 27.5–37.4)
BUN SERPL-MCNC: 32 MG/DL — HIGH (ref 7–23)
BUN SERPL-MCNC: 38 MG/DL — HIGH (ref 7–23)
CALCIUM SERPL-MCNC: 9.8 MG/DL — SIGNIFICANT CHANGE UP (ref 8.4–10.5)
CALCIUM SERPL-MCNC: 9.8 MG/DL — SIGNIFICANT CHANGE UP (ref 8.4–10.5)
CHLORIDE SERPL-SCNC: 91 MMOL/L — LOW (ref 96–108)
CHLORIDE SERPL-SCNC: 92 MMOL/L — LOW (ref 96–108)
CO2 SERPL-SCNC: 24 MMOL/L — SIGNIFICANT CHANGE UP (ref 22–31)
CO2 SERPL-SCNC: 24 MMOL/L — SIGNIFICANT CHANGE UP (ref 22–31)
CREAT SERPL-MCNC: 6.35 MG/DL — HIGH (ref 0.5–1.3)
CREAT SERPL-MCNC: 7.22 MG/DL — HIGH (ref 0.5–1.3)
GLUCOSE SERPL-MCNC: 111 MG/DL — HIGH (ref 70–99)
GLUCOSE SERPL-MCNC: 163 MG/DL — HIGH (ref 70–99)
HCT VFR BLD CALC: 29.5 % — LOW (ref 39–50)
HGB BLD-MCNC: 9.6 G/DL — LOW (ref 13–17)
INR BLD: 1.42 RATIO — HIGH (ref 0.88–1.16)
MCHC RBC-ENTMCNC: 28.1 PG — SIGNIFICANT CHANGE UP (ref 27–34)
MCHC RBC-ENTMCNC: 32.5 GM/DL — SIGNIFICANT CHANGE UP (ref 32–36)
MCV RBC AUTO: 86.5 FL — SIGNIFICANT CHANGE UP (ref 80–100)
PLATELET # BLD AUTO: 413 K/UL — HIGH (ref 150–400)
POTASSIUM SERPL-MCNC: 5.5 MMOL/L — HIGH (ref 3.5–5.3)
POTASSIUM SERPL-MCNC: 6.3 MMOL/L — CRITICAL HIGH (ref 3.5–5.3)
POTASSIUM SERPL-SCNC: 5.5 MMOL/L — HIGH (ref 3.5–5.3)
POTASSIUM SERPL-SCNC: 6.3 MMOL/L — CRITICAL HIGH (ref 3.5–5.3)
PROTHROM AB SERPL-ACNC: 15.5 SEC — HIGH (ref 9.8–12.7)
RBC # BLD: 3.41 M/UL — LOW (ref 4.2–5.8)
RBC # FLD: 16.4 % — HIGH (ref 10.3–14.5)
SODIUM SERPL-SCNC: 131 MMOL/L — LOW (ref 135–145)
SODIUM SERPL-SCNC: 132 MMOL/L — LOW (ref 135–145)
WBC # BLD: 12.3 K/UL — HIGH (ref 3.8–10.5)
WBC # FLD AUTO: 12.3 K/UL — HIGH (ref 3.8–10.5)

## 2018-10-07 RX ORDER — SODIUM POLYSTYRENE SULFONATE 4.1 MEQ/G
30 POWDER, FOR SUSPENSION ORAL ONCE
Qty: 0 | Refills: 0 | Status: COMPLETED | OUTPATIENT
Start: 2018-10-07 | End: 2018-10-07

## 2018-10-07 RX ORDER — LABETALOL HCL 100 MG
100 TABLET ORAL ONCE
Qty: 0 | Refills: 0 | Status: COMPLETED | OUTPATIENT
Start: 2018-10-07 | End: 2018-10-07

## 2018-10-07 RX ORDER — ONDANSETRON 8 MG/1
4 TABLET, FILM COATED ORAL ONCE
Qty: 0 | Refills: 0 | Status: COMPLETED | OUTPATIENT
Start: 2018-10-07 | End: 2018-10-07

## 2018-10-07 RX ORDER — LABETALOL HCL 100 MG
200 TABLET ORAL EVERY 12 HOURS
Qty: 0 | Refills: 0 | Status: DISCONTINUED | OUTPATIENT
Start: 2018-10-07 | End: 2018-10-09

## 2018-10-07 RX ORDER — WARFARIN SODIUM 2.5 MG/1
5 TABLET ORAL ONCE
Qty: 0 | Refills: 0 | Status: COMPLETED | OUTPATIENT
Start: 2018-10-07 | End: 2018-10-07

## 2018-10-07 RX ORDER — HYDROMORPHONE HYDROCHLORIDE 2 MG/ML
0.5 INJECTION INTRAMUSCULAR; INTRAVENOUS; SUBCUTANEOUS ONCE
Qty: 0 | Refills: 0 | Status: DISCONTINUED | OUTPATIENT
Start: 2018-10-07 | End: 2018-10-07

## 2018-10-07 RX ADMIN — PANTOPRAZOLE SODIUM 40 MILLIGRAM(S): 20 TABLET, DELAYED RELEASE ORAL at 08:13

## 2018-10-07 RX ADMIN — Medication 100 MILLIGRAM(S): at 13:42

## 2018-10-07 RX ADMIN — TACROLIMUS 1 MILLIGRAM(S): 5 CAPSULE ORAL at 23:38

## 2018-10-07 RX ADMIN — Medication 200 MILLIGRAM(S): at 23:42

## 2018-10-07 RX ADMIN — WARFARIN SODIUM 5 MILLIGRAM(S): 2.5 TABLET ORAL at 23:42

## 2018-10-07 RX ADMIN — ONDANSETRON 4 MILLIGRAM(S): 8 TABLET, FILM COATED ORAL at 09:57

## 2018-10-07 RX ADMIN — Medication 80 MILLIGRAM(S): at 08:13

## 2018-10-07 RX ADMIN — HEPARIN SODIUM 1600 UNIT(S)/HR: 5000 INJECTION INTRAVENOUS; SUBCUTANEOUS at 01:43

## 2018-10-07 RX ADMIN — Medication 1 TABLET(S): at 23:42

## 2018-10-07 RX ADMIN — HYDROMORPHONE HYDROCHLORIDE 0.5 MILLIGRAM(S): 2 INJECTION INTRAMUSCULAR; INTRAVENOUS; SUBCUTANEOUS at 02:30

## 2018-10-07 RX ADMIN — TACROLIMUS 1 MILLIGRAM(S): 5 CAPSULE ORAL at 11:45

## 2018-10-07 RX ADMIN — HYDROMORPHONE HYDROCHLORIDE 0.5 MILLIGRAM(S): 2 INJECTION INTRAMUSCULAR; INTRAVENOUS; SUBCUTANEOUS at 01:51

## 2018-10-07 RX ADMIN — Medication 100 MILLIGRAM(S): at 08:13

## 2018-10-07 RX ADMIN — Medication 200 MILLIGRAM(S): at 10:35

## 2018-10-07 RX ADMIN — OXYCODONE HYDROCHLORIDE 5 MILLIGRAM(S): 5 TABLET ORAL at 23:42

## 2018-10-07 RX ADMIN — Medication 5 MILLIGRAM(S): at 11:45

## 2018-10-07 RX ADMIN — Medication 100 MILLIGRAM(S): at 03:56

## 2018-10-07 RX ADMIN — Medication 1 APPLICATION(S): at 08:14

## 2018-10-07 RX ADMIN — SENNA PLUS 2 TABLET(S): 8.6 TABLET ORAL at 23:38

## 2018-10-07 RX ADMIN — Medication 500 MILLIGRAM(S): at 08:13

## 2018-10-07 RX ADMIN — Medication 81 MILLIGRAM(S): at 08:13

## 2018-10-07 NOTE — PROVIDER CONTACT NOTE (OTHER) - RECOMMENDATIONS
Assess patient. Review patient's orders & lab results. Order medication for nausea and blood pressure.

## 2018-10-07 NOTE — CONSULT NOTE ADULT - ASSESSMENT
Patient is a 59 yo M with PMHx of ESRD on HD, hx of two failed kidney transplants admitted for symptomatic atrial flutter with worsening severe asymptomatic HTN.    #HTN urgency: Asymptomatic, likely in the setting of ESRD and wean from minoxidil. Not responding to high dose CCB or IV enalaprilat. Patient states that he would be willing to try labetolol and confirmed he never had SOB, rash or signs of allergic reaction to medication.  -recommend trial of labetolol PO 100mg BID   -Monitor BP 1 hour after dose given  -Monitor BP, titrate labetolol as tolerated  -Neuro checks  -If no response, may need nitro on cardene gtt

## 2018-10-07 NOTE — PROVIDER CONTACT NOTE (CRITICAL VALUE NOTIFICATION) - ASSESSMENT
Patient's Lab Potassium, Serum result is 6.3.
pt is a+ox4, no s/s of bleeding noted
Patient has an active order for Heparin Infusion Titration: Use Full Anticoagulation Nomogram. Patient's at Hemodialysis. Patient sent to dialysis with Heparin Infusion infusing at 18 mL/Hr as ordered. Patient's Lab Activated Partial Thromboplastin Time Result is 143.9. Rosmery Menjivar at Dialysis notified and aware and adjusted Heparin Infusion as per Nomogram. As per Rosmery Menjivar, Lab Activated Partial Thromboplastin Time was drawn before dialysis was started. No bleeding noted. Patient free from signs and symptoms of bleeding.

## 2018-10-07 NOTE — PROVIDER CONTACT NOTE (OTHER) - REASON
Blood Pressure.
Lab Results.
Labs.
Nausea. Blood Pressure.
Patient hypertensive 222/100
Pt c/o head and leg pain.
bp 204/101
manual bp 198/98
APTT 39.1

## 2018-10-07 NOTE — PROVIDER CONTACT NOTE (MEDICATION) - ACTION/TREATMENT ORDERED:
NP aware & reviewed all of patient's orders and lab results. NP consulted with Hospital Nephrology MD and NP ordered to document patient refusing PO Kayexalate.

## 2018-10-07 NOTE — PROVIDER CONTACT NOTE (CRITICAL VALUE NOTIFICATION) - RECOMMENDATIONS
Assess patient. Review patient's orders and lab results. Follow Nomogram.
Assess patient. Review patient's orders, lab results & plan of care. Consult with Nephrology.

## 2018-10-07 NOTE — CONSULT NOTE ADULT - ASSESSMENT
57 yo M with PMHx of ESRD on HD and history of two kidney transplants admitted for symptomatic atrial flutter with worsening severe asymptomatic HTN.    Severe asymptomatic HTN in setting of ESRD and wean from minoxidil not responding to high dose CCB or IV enalaprilat with intolerance to hydralazine and b-blocker previously. Patient states that he would be willing to try labetolol and confirmed he never had SOB, rash or signs of allergic reaction to medication.  -recommend trial of labetolol PO 100mg BID with repeat BP 1 hour later then vitals q4  -monitor with neuro check  -if continue increase or non response to labetolol may require IV nicardipine

## 2018-10-07 NOTE — PROVIDER CONTACT NOTE (CRITICAL VALUE NOTIFICATION) - SITUATION
Patient's Lab Basic Metabolic Panel Resulted.
aptt 145.9
Patient's Lab Activated Partial Thromboplastin Time resulted.

## 2018-10-07 NOTE — PROVIDER CONTACT NOTE (MEDICATION) - ASSESSMENT
Patient has an active order for PO Kayexalate 30 Grams once. Patient educated on the importance of PO Kayexalate and that Lab Potassium, Serum result is 5.5. Patient verbalizes understanding of education but continues to refuse PO Kayexalate. Patient states that his outpatient Nephrology MD told him that as long as his Lab Potassium, Serum is not above 6 it is okay.

## 2018-10-07 NOTE — PROVIDER CONTACT NOTE (OTHER) - ACTION/TREATMENT ORDERED:
Patient has an active order for Heparin Infusion Titration: Use Full Anticoagulation Nomogram. Will continue to monitor pt.
MD notified and aware, give cardizem 90 mg po stat, apply clonidine patch stat, continue to monitor
NP aware & assessing patient & reviewed all orders & labs. NP ordered Zofran. Cardiology consulted & assessing patient. NP ordered Labetalol & first dose to be given now. Patient's nausea resolved.
NP aware & reviewed all of patient's orders, lab results & history. NP ordered to document patient refusing lab draw and to administer PO Tacrolimus 1 mg as ordered and as scheduled.
Oxycodone given for headache without much relief, zofran administered for nausea with relief. IVP hydralazine ordered but pt reports that he has severe hypotensive episodes when given hydralazine.
PA aware & reviewed patient's Lab Basic Metabolic Panel Results, all other lab results & orders. Nephrology consulted. Hemodialysis was ordered.
PA aware and reviewed all of patient's orders and lab results. No new orders at this time. Continue to monitor.
SHAQ Bowden ordered Oxycodone 5mg. Med given as ordered. Will continue to monitor pt.
NP notified and aware, give cardizem 90 mg po STAT, continue to monitor

## 2018-10-07 NOTE — CONSULT NOTE ADULT - SUBJECTIVE AND OBJECTIVE BOX
Patient seen and evaluated at bedside    Chief Complaint:    HPI:  59 yo M hx ESRD s/p 2 failed renal transplants on tacrolimus and prednisone 5 mg, started HD 3/2018 (T,Th, S still urinates 3 times a day, awaiting third transplant), presenting for shortness of breath increasing in the past few days. Patient has been increasingly HTN over hospital stay. Per patient was having severe reactions to minoxidil and has been weaning off.     Patient's minodixil was dc'd on admission in the ED. Per patient he has become dizzy/lightheaded with hydralazine.  He also states that he was on labetolol previously and was feeling "spacey", but no SOB, wheezing, rash with the medication.     Patient has had 3 dialysis sessions with most recent 10/6 2.9L. He states he started getting a headache after dialysis today which is common. He has chronic vision changes that have not changed since previously evaluated on admission 1 mo ago. Patient received enalaprilate at 2300 on 10/6, given 1 dose of amlodipine today, increased diltiazem on 10/5 to 90mg q6 however remained HTN 190s-220/90s-110s. He has SOB when lying down which has improved since admission. He otherwise denies weakness, chest pain, worsening vision, confusion, N/V, diarrhea, dysuria.     PMH:   SBO (small bowel obstruction)  HTN (hypertension)  Renal failure    PSH:   History of partial pancreatectomy  History of renal transplant    Medications:   AQUAPHOR (petrolatum Ointment) 1 Application(s) Topical daily  ascorbic acid 500 milliGRAM(s) Oral daily  aspirin enteric coated 81 milliGRAM(s) Oral daily  cloNIDine Patch 0.3 mG/24Hr(s) 1 patch Transdermal every 7 days  diltiazem    Tablet 90 milliGRAM(s) Oral every 6 hours  docusate sodium 100 milliGRAM(s) Oral two times a day  doxercalciferol Injectable 1 MICROGram(s) IV Push <User Schedule>  epoetin sherlyn Injectable 4000 Unit(s) IV Push <User Schedule>  furosemide    Tablet 80 milliGRAM(s) Oral daily  heparin  Infusion.  Unit(s)/Hr IV Continuous <Continuous>  heparin  Injectable 7000 Unit(s) IV Push every 6 hours PRN  heparin  Injectable 3500 Unit(s) IV Push every 6 hours PRN  influenza   Vaccine 0.5 milliLiter(s) IntraMuscular once  Nephro-martha 1 Tablet(s) Oral daily  oxyCODONE    IR 5 milliGRAM(s) Oral every 6 hours PRN  pantoprazole    Tablet 40 milliGRAM(s) Oral before breakfast  predniSONE   Tablet 5 milliGRAM(s) Oral daily  senna 2 Tablet(s) Oral at bedtime  tacrolimus 1 milliGRAM(s) Oral every 12 hours  trimethoprim  160 mG/sulfamethoxazole 800 mG 1 Tablet(s) Oral every 48 hours      Allergies:  beta blockers (Other (Mod to Severe))  No Known Allergies      FAMILY HISTORY:  No pertinent family history in first degree relatives      Social History:  Smoking History:  Alcohol Use:  Drug Use:    Review of Systems:  REVIEW OF SYSTEMS:  CONSTITUTIONAL: No weakness, fevers or chills  EYES/ENT: No visual changes;  No dysphagia  NECK: No pain or stiffness  RESPIRATORY: No cough, wheezing, hemoptysis; No shortness of breath  CARDIOVASCULAR: No chest pain or palpitations; No lower extremity edema  GASTROINTESTINAL: No abdominal or epigastric pain. No nausea, vomiting, or hematemesis; No diarrhea or constipation. No melena or hematochezia.  BACK: No back pain  GENITOURINARY: No dysuria, frequency or hematuria  NEUROLOGICAL: No numbness or weakness  SKIN: No itching, burning, rashes, or lesions   All other review of systems is negative unless indicated above.    Physical Exam:  T(F): 98.2 (10-07), Max: 98.4 (10-06)  HR: 76 (10-07) (73 - 83)  BP: 178/90 (10-07) (164/84 - 228/110)  RR: 18 (10-07)  SpO2: 100% (10-07)  GENERAL: No acute distress, well-developed  HEAD:  Atraumatic, Normocephalic  ENT: EOMI, PERRLA, conjunctiva and sclera clear, Neck supple, No JVD, moist mucosa  CHEST/LUNG: Clear to auscultation bilaterally; No wheeze, equal breath sounds bilaterally   BACK: No spinal tenderness  HEART: Regular rate and rhythm; No murmurs, rubs, or gallops  ABDOMEN: Soft, Nontender, Nondistended; Bowel sounds present  EXTREMITIES:  No clubbing, cyanosis, or edema  PSYCH: Nl behavior, nl affect  NEUROLOGY: AAOx3, non-focal, cranial nerves intact  SKIN: Normal color, No rashes or lesions  LINES:    Cardiovascular Diagnostic Testing:    ECG: Personally reviewed    Echo:    Stress Testing:    Cath:    Interpretation of Telemetry:    Imaging:    Labs: Personally reviewed                        9.2    10.3  )-----------( 399      ( 06 Oct 2018 09:24 )             28.3     10-06    133<L>  |  90<L>  |  55<H>  ----------------------------<  125<H>  5.6<H>   |  27  |  7.91<H>    Ca    9.6      06 Oct 2018 09:24  Phos  8.3     10-06  Mg     2.1     10-06      PT/INR - ( 06 Oct 2018 09:25 )   PT: 13.5 sec;   INR: 1.23 ratio         PTT - ( 07 Oct 2018 00:36 )  PTT:78.6 sec      Serum Pro-Brain Natriuretic Peptide: 30506 pg/mL (10-01 @ 20:01)        Thyroid Stimulating Hormone, Serum: 6.46 uIU/mL (10-02 @ 12:25)

## 2018-10-07 NOTE — PROVIDER CONTACT NOTE (OTHER) - ASSESSMENT
Patient Alert and Oriented times Four. Patient denies chest pain, shortness of breath, headache, dizziness and lightheadedness. Patient states that usually up to 48 hours post dialysis he suffers from nausea. Patient's Heart Rhythm is Normal Sinus Rhythm on the cardiac monitor. Patient's Heart Rate 75 and Blood Pressure 171/98 electronic.

## 2018-10-07 NOTE — PROGRESS NOTE ADULT - ASSESSMENT
echo 4/17/18: EF 53, normal LV fx, mild MR     a/p     59yo M w/pmhx of HTN, ESRD s/p renal transplant x 2 but now requiring HD again (T Th Sat) presenting with SOB, chest discomfort found to be in Aflutter.     1. Parox. Afldrew  remains in sr  continue ccb for rate control   echo with nl lv fxn   CHADS 2 - on heparin gtt, bridge to coumadin     2. HTN  pt s/p minoxidil taper   bp elevated  cont cardizem 90mg q6hr  cont clon  add labetalol 200mg bid  pt with "intolerances" to many anti-htn meds  willing top try labetalol, tolerated dose yesterday     3. ESRD  renal f/u   continue fluid removal with HD and lasix     dvt ppx

## 2018-10-07 NOTE — CONSULT NOTE ADULT - ATTENDING COMMENTS
Patient's chart reviewed and discussed with resident.  Consulted for hypertensive urgency this evening.  Patient with elevated BPs throughout the day, and seem to have been steadily increasing during this hospitalization.  Unclear if this is related to d/c of minoxidil.  HD appears to have been ineffective in lowering BP.  Patient is awake and alert, complains of mild headache that is typical for him post-HD.  He otherwise is asymptomatic.  This evening we recommended labetalol po, which has resulted in acceptable lowering of BP.  There is no need for ICU at this time.  Would encourage that the daytime team (med/renal/cards) make a plan for escalating his BP meds to achieve their BP goal.
Patient seen and examined, agree with the above assessment and plan by KULDEEP Rueda.  pt known to us from prior admissions presenting with aflutter w RVR that began during HD  Pt now in NSR  CV stable  BP stable  Plan for hep to coumadin for CVA ppx  cont current BP meds
ESRD on HD  Sent in for Afib  On heparin gtt  Rate controlled  Plan on HD tomorrow

## 2018-10-07 NOTE — PROVIDER CONTACT NOTE (MEDICATION) - BACKGROUND
Patient admitted for Atrial Fibrillation, Chronic Kidney Disease, Anemia, Hypertension, Renal Transplant Recipient, End Stage Renal Diease on Dialysis.

## 2018-10-07 NOTE — PROVIDER CONTACT NOTE (OTHER) - DATE AND TIME:
03-Oct-2018 21:40
04-Oct-2018 09:20
04-Oct-2018 12:15
06-Oct-2018 08:00
06-Oct-2018 12:56
06-Oct-2018 21:25
07-Oct-2018 09:53
07-Oct-2018 11:00
03-Oct-2018 21:20

## 2018-10-07 NOTE — PROVIDER CONTACT NOTE (OTHER) - NAME OF MD/NP/PA/DO NOTIFIED:
Kal NEW
MD Cristiane
Mary NEW
Nilam Nichols.
Nilam Nichols.
Ori, NP
Reuben Chandler.
Reuben Chandler.
Kal NEW

## 2018-10-07 NOTE — PROVIDER CONTACT NOTE (OTHER) - BACKGROUND
Patient admitted for Atrial Fibrillation, Chronic Kidney Disease, Anemia, Hypertension, Renal Transplant Recipient, End Stage Renal Diease on Dialysis.
Patient c/o SOB & CP, on heparin gtt, ESRD on dialysis via permacath
Pt admitted for afib.
pt admitted for SOB and chest pain
pt admitted for SOB and chest pain, hx: ESRD

## 2018-10-07 NOTE — PROVIDER CONTACT NOTE (CRITICAL VALUE NOTIFICATION) - BACKGROUND
Patient admitted for Atrial Fibrillation, Chronic Kidney Disease, Anemia, Hypertension, Renal Transplant Recipient, End Stage Renal Diease on Dialysis.
pt admitted for SOB ans chest pain, hx: ESRD
Patient admitted for Atrial Fibrillation, Chronic Kidney Disease, Anemia, Hypertension, Renal Transplant Recipient, End Stage Renal Diease on Dialysis.

## 2018-10-07 NOTE — PROGRESS NOTE ADULT - ASSESSMENT
58 m with  A. Flutter now in SR- rate control with Cardizem, AC with Heparin/ bridge to Coumadin with goal of INR 2-3  Telemetry, Cardiology follow noted.   ESRD- HD today for hyperkalemia.   Off Minoxidil as per Nephrology.  HTN control  Patient requesting Regular Diet.  Johan Sauer MD pager [phone]

## 2018-10-07 NOTE — PROVIDER CONTACT NOTE (OTHER) - ASSESSMENT
Patient has an active order for Lab Tacrolimus, Serum ordered for 07-Oct-2018 at 06:00. Patient has an active order for PO Tacrolimus 1 mg every 12 hours and next dose scheduled for 11:00 on 07-Oct-2018. Patient refusing Lab Tacrolimus, Serum to be drawn. Patient educated on the benefits of Lab Tacrolimus, Serum and patient verbalizes understanding of the importance of Lab Tacrolimus, Serum but continues to refuse Lab Tacrolimus, Serum to be drawn.

## 2018-10-07 NOTE — PROVIDER CONTACT NOTE (CRITICAL VALUE NOTIFICATION) - ACTION/TREATMENT ORDERED:
PA aware and reviewed all of patient's orders and lab results. PA ordered to follow Nomogram. Heparin Infusion paused for 60 minutes as per Nomogram and will continue to follow Nomogram post 60 minutes.
heparin gtt nomogram protocol followed, NP notified and aware, please weigh pt, continue to monitor
NP aware & reviewed all of patient's lab results, orders & plan of care. Patient has been refusing all medications that will assist with decreasing serum potassium. NP consulted with Nephrology. MD Higgins from Nephrology at patient's bedside assessing patient and educating patient and considering ordering Hemodialysis.

## 2018-10-07 NOTE — PROVIDER CONTACT NOTE (OTHER) - SITUATION
APTT 39.1
Patient complaining of nausea. Patient states that he feels like he needs to vomit & patient attempting to vomit. Patient's Blood Pressure 171/98 electronic.
Patient hypertensive 222/100, HR 78, temp 98.1, rr 19 96% no room air. Denies cp, palpitations, sob. C/o nausea & headache/ migraine 10/10
Patient refusing Lab Tacrolimus, Serum to be drawn.
Patient's Blood Pressure 164/91 electronic.
Patient's Lab Basic Metabolic Panel Resulted.
Pt c/o head and leg pain.
bp 204/101
manual bp 198/98

## 2018-10-07 NOTE — PROGRESS NOTE ADULT - ASSESSMENT
58 m with  A. Flutter now in SR- rate control with Cardizem, continue AC with Heparin/ bridge to Coumadin with goal of INR 2-3  Telemetry, Cardiology follow noted.   ESRD- continue HD.  Hyperkalemia- HD, Kayexalate   Off Minoxidil as per Nephrology.  HTN control. Hypertensive urgency resolved. Tolerating Labetalol.  Pain control  Regular Diet as per patient request .  Johan Sauer MD pager [phone]

## 2018-10-07 NOTE — CONSULT NOTE ADULT - SUBJECTIVE AND OBJECTIVE BOX
CHIEF COMPLAINT: SOB    HPI:  59 yo M with PMHx of ESRD on HD and history of two kidney transplants admitted for symptomatic atrial flutter. Patient had completed most of his HD treatment at [facility] on 10/1/18 when he suddenly developed shortness of breath and a rapid heart rate. Patient was then sent to ER for further evaluation. Patient has been increasingly HTN over hospital stay. Per patient was having severe reactions to minoxidil and has been weaning off. In ED on admission patient's minodixil was dcd. Per patient he has had reactions to hydralazine where he becomes dizzy and light headed and was told by his nephrologist to avoid the medication. He also states that he was on labetolol previously and was feeling "spacey", but no SOB, wheezing, rash with the medication.   Patient has had 3 dialysis sessions with most recent 10/6 2.9L out per chart. He states he started getting a headache after dialysis today which is common. He has chronic vision changes that have not changed since previously evaluated on admission 1 mo ago. Patient received enalaprilate at 2300 on 10/6, given 1 dose of amlodipine today, increased diltiazem on 10/5 to 90mg q6 however remained HTN 190s-220./90s-110s. He has SOB when lying down which has improved since admission. He otherwise denies weakness, chest pain, worsening vision, confusion, N/V, diarrhea, dysuria.       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  History of partial pancreatectomy  History of renal transplant: x 2      FAMILY HISTORY:  No pertinent family history in first degree relatives      SOCIAL HISTORY:  Smoking: NA  EtOH Use: NA      Allergies    No Known Allergies    Intolerances    beta blockers (Other (Mod to Severe))      HOME MEDICATIONS:    REVIEW OF SYSTEMS:  Constitutional:   Eyes:  ENT:  CV:  Resp:  GI:  :  MSK:  Integumentary:  Neurological:  Psychiatric:  Endocrine:  Hematologic/Lymphatic:  Allergic/Immunologic:  [ X] All other systems negative  [ ] Unable to assess ROS because ________    OBJECTIVE:  ICU Vital Signs Last 24 Hrs  T(C): 36.7 (06 Oct 2018 21:12), Max: 36.9 (06 Oct 2018 08:40)  T(F): 98.1 (06 Oct 2018 21:12), Max: 98.4 (06 Oct 2018 08:40)  HR: 83 (07 Oct 2018 03:31) (73 - 83)  BP: 201/91 (07 Oct 2018 03:31) (164/84 - 228/110)  BP(mean): --  ABP: --  ABP(mean): --  RR: 19 (06 Oct 2018 21:12) (18 - 19)  SpO2: 96% (06 Oct 2018 21:12) (95% - 96%)        10-05 @ 07:01  -  10-06 @ 07:00  --------------------------------------------------------  IN: 730 mL / OUT: 0 mL / NET: 730 mL    10-06 @ 07:01  -  10-07 @ 03:58  --------------------------------------------------------  IN: 360 mL / OUT: 2900 mL / NET: -2540 mL      CAPILLARY BLOOD GLUCOSE          PHYSICAL EXAM:  General: Sitting comfortably in bed, no acute distress  HEENT: no pharyngeal erythema, PEERLA  Lymph Nodes: no cervical lymphadenopathy  Neck: No JVD, R shiley in place  Respiratory: no respiratory distress, fine crackles in bases bilaterally  Cardiovascular: normal rate/rhyhtm, regular, no murmurs rubs or gallops  Abdomen: no TTP, normoactive BS  Extremities: 2+ LE edema w/ mild TTP bilaterally  Skin: no rashes  Neurological: CN II-XII grossly intact, 5/5 upper/LE  Psychiatry: normal affect    HOSPITAL MEDICATIONS:  MEDICATIONS  (STANDING):  AQUAPHOR (petrolatum Ointment) 1 Application(s) Topical daily  ascorbic acid 500 milliGRAM(s) Oral daily  aspirin enteric coated 81 milliGRAM(s) Oral daily  cloNIDine Patch 0.3 mG/24Hr(s) 1 patch Transdermal every 7 days  diltiazem    Tablet 90 milliGRAM(s) Oral every 6 hours  docusate sodium 100 milliGRAM(s) Oral two times a day  doxercalciferol Injectable 1 MICROGram(s) IV Push <User Schedule>  epoetin sherlyn Injectable 4000 Unit(s) IV Push <User Schedule>  furosemide    Tablet 80 milliGRAM(s) Oral daily  heparin  Infusion.  Unit(s)/Hr (16 mL/Hr) IV Continuous <Continuous>  influenza   Vaccine 0.5 milliLiter(s) IntraMuscular once  Nephro-martha 1 Tablet(s) Oral daily  pantoprazole    Tablet 40 milliGRAM(s) Oral before breakfast  predniSONE   Tablet 5 milliGRAM(s) Oral daily  senna 2 Tablet(s) Oral at bedtime  tacrolimus 1 milliGRAM(s) Oral every 12 hours  trimethoprim  160 mG/sulfamethoxazole 800 mG 1 Tablet(s) Oral every 48 hours    MEDICATIONS  (PRN):  heparin  Injectable 7000 Unit(s) IV Push every 6 hours PRN For aPTT less than 40  heparin  Injectable 3500 Unit(s) IV Push every 6 hours PRN For aPTT between 40 - 57  oxyCODONE    IR 5 milliGRAM(s) Oral every 6 hours PRN Moderate Pain (4 - 6)/Severe Pain (7 - 10)      LABS:                        9.2    10.3  )-----------( 399      ( 06 Oct 2018 09:24 )             28.3     10-06    133<L>  |  90<L>  |  55<H>  ----------------------------<  125<H>  5.6<H>   |  27  |  7.91<H>    Ca    9.6      06 Oct 2018 09:24  Phos  8.3     10-06  Mg     2.1     10-06      PT/INR - ( 06 Oct 2018 09:25 )   PT: 13.5 sec;   INR: 1.23 ratio         PTT - ( 07 Oct 2018 00:36 )  PTT:78.6 sec

## 2018-10-08 LAB
ANION GAP SERPL CALC-SCNC: 11 MMOL/L — SIGNIFICANT CHANGE UP (ref 5–17)
APTT BLD: 84.9 SEC — HIGH (ref 27.5–37.4)
BUN SERPL-MCNC: 20 MG/DL — SIGNIFICANT CHANGE UP (ref 7–23)
CALCIUM SERPL-MCNC: 9.5 MG/DL — SIGNIFICANT CHANGE UP (ref 8.4–10.5)
CHLORIDE SERPL-SCNC: 95 MMOL/L — LOW (ref 96–108)
CO2 SERPL-SCNC: 28 MMOL/L — SIGNIFICANT CHANGE UP (ref 22–31)
CREAT SERPL-MCNC: 4.75 MG/DL — HIGH (ref 0.5–1.3)
GLUCOSE SERPL-MCNC: 119 MG/DL — HIGH (ref 70–99)
HCT VFR BLD CALC: 29.6 % — LOW (ref 39–50)
HGB BLD-MCNC: 9.2 G/DL — LOW (ref 13–17)
INR BLD: 2.53 RATIO — HIGH (ref 0.88–1.16)
MCHC RBC-ENTMCNC: 27 PG — SIGNIFICANT CHANGE UP (ref 27–34)
MCHC RBC-ENTMCNC: 31.2 GM/DL — LOW (ref 32–36)
MCV RBC AUTO: 86.4 FL — SIGNIFICANT CHANGE UP (ref 80–100)
PLATELET # BLD AUTO: 420 K/UL — HIGH (ref 150–400)
POTASSIUM SERPL-MCNC: 4.6 MMOL/L — SIGNIFICANT CHANGE UP (ref 3.5–5.3)
POTASSIUM SERPL-SCNC: 4.6 MMOL/L — SIGNIFICANT CHANGE UP (ref 3.5–5.3)
PROTHROM AB SERPL-ACNC: 27.8 SEC — HIGH (ref 9.8–12.7)
RBC # BLD: 3.43 M/UL — LOW (ref 4.2–5.8)
RBC # FLD: 16.8 % — HIGH (ref 10.3–14.5)
SODIUM SERPL-SCNC: 134 MMOL/L — LOW (ref 135–145)
TACROLIMUS SERPL-MCNC: 7.6 NG/ML — SIGNIFICANT CHANGE UP
WBC # BLD: 9 K/UL — SIGNIFICANT CHANGE UP (ref 3.8–10.5)
WBC # FLD AUTO: 9 K/UL — SIGNIFICANT CHANGE UP (ref 3.8–10.5)

## 2018-10-08 PROCEDURE — 99232 SBSQ HOSP IP/OBS MODERATE 35: CPT | Mod: GC

## 2018-10-08 RX ORDER — TACROLIMUS 5 MG/1
0.5 CAPSULE ORAL EVERY 12 HOURS
Qty: 0 | Refills: 0 | Status: DISCONTINUED | OUTPATIENT
Start: 2018-10-08 | End: 2018-10-09

## 2018-10-08 RX ORDER — POLYETHYLENE GLYCOL 3350 17 G/17G
17 POWDER, FOR SOLUTION ORAL DAILY
Qty: 0 | Refills: 0 | Status: DISCONTINUED | OUTPATIENT
Start: 2018-10-08 | End: 2018-10-09

## 2018-10-08 RX ORDER — WARFARIN SODIUM 2.5 MG/1
2.5 TABLET ORAL ONCE
Qty: 0 | Refills: 0 | Status: COMPLETED | OUTPATIENT
Start: 2018-10-08 | End: 2018-10-08

## 2018-10-08 RX ADMIN — OXYCODONE HYDROCHLORIDE 5 MILLIGRAM(S): 5 TABLET ORAL at 00:12

## 2018-10-08 RX ADMIN — WARFARIN SODIUM 2.5 MILLIGRAM(S): 2.5 TABLET ORAL at 23:12

## 2018-10-08 RX ADMIN — Medication 200 MILLIGRAM(S): at 09:23

## 2018-10-08 RX ADMIN — Medication 500 MILLIGRAM(S): at 10:56

## 2018-10-08 RX ADMIN — Medication 80 MILLIGRAM(S): at 05:22

## 2018-10-08 RX ADMIN — Medication 1 TABLET(S): at 23:12

## 2018-10-08 RX ADMIN — Medication 5 MILLIGRAM(S): at 10:55

## 2018-10-08 RX ADMIN — HEPARIN SODIUM 1600 UNIT(S)/HR: 5000 INJECTION INTRAVENOUS; SUBCUTANEOUS at 02:04

## 2018-10-08 RX ADMIN — Medication 100 MILLIGRAM(S): at 05:22

## 2018-10-08 RX ADMIN — Medication 1 APPLICATION(S): at 13:07

## 2018-10-08 RX ADMIN — Medication 200 MILLIGRAM(S): at 23:12

## 2018-10-08 RX ADMIN — PANTOPRAZOLE SODIUM 40 MILLIGRAM(S): 20 TABLET, DELAYED RELEASE ORAL at 09:10

## 2018-10-08 RX ADMIN — Medication 100 MILLIGRAM(S): at 19:02

## 2018-10-08 RX ADMIN — Medication 1 TABLET(S): at 10:55

## 2018-10-08 RX ADMIN — Medication 81 MILLIGRAM(S): at 10:55

## 2018-10-08 RX ADMIN — POLYETHYLENE GLYCOL 3350 17 GRAM(S): 17 POWDER, FOR SOLUTION ORAL at 10:56

## 2018-10-08 RX ADMIN — TACROLIMUS 0.5 MILLIGRAM(S): 5 CAPSULE ORAL at 11:18

## 2018-10-08 RX ADMIN — TACROLIMUS 0.5 MILLIGRAM(S): 5 CAPSULE ORAL at 23:12

## 2018-10-08 NOTE — PROGRESS NOTE ADULT - PROBLEM SELECTOR PLAN 2
Received urgent HD on 10/7/18. Monitor potassium level. Advise for low potassium diet. Will adjust tacrolimus dosage (see below).

## 2018-10-08 NOTE — PROGRESS NOTE ADULT - PROBLEM SELECTOR PLAN 3
Patient found to have mildly elevated tacrolimus trough level of 7.6 on 10/8/18. Supratherapeutic levels may also cause elevations in serum potassium by its actions on the kidneys. Will decrease tacrolimus dosage to 0.5mg BID. Monitor tacrolimus trough levels (30 minutes prior to AM dose)

## 2018-10-08 NOTE — PROGRESS NOTE ADULT - ASSESSMENT
58 m with  A. Flutter now in SR- rate control with Cardizem, AC with Heparin/ bridge to Coumadin with goal of INR 2-3  Telemetry, Cardiology follow noted.   ESRD- HD today for hyperkalemia.   Off Minoxidil as per Nephrology.  HTN control  Patient requesting Regular Diet.  Johan Sauer MD pager [phone]

## 2018-10-08 NOTE — PROGRESS NOTE ADULT - ASSESSMENT
58 m with  A. Flutter now in SR- rate control with Cardizem, continue AC with Coumadin with goal of INR 2-3  Telemetry, Cardiology follow noted.   ESRD- continue HD.  Hyperkalemia- HD, Kayexalate   Off Minoxidil as per Nephrology.  HTN control. Hypertensive urgency resolved. Tolerating Labetalol.  Pain control  Regular Diet as per patient request .  DCP home after HD tomorrow if stable.  Johan Sauer MD pager [phone]

## 2018-10-08 NOTE — PROGRESS NOTE ADULT - ASSESSMENT
echo 4/17/18: EF 53, normal LV fx, mild MR   echo 10/5/18: EF 62%, mild MR, severely dilated left atrium, normal LV sys function     a/p     57yo M w/pmhx of HTN, ESRD s/p renal transplant x 2 but now requiring HD again (T Th Sat) presenting with SOB, chest discomfort found to be in Aflutter.     1. Parox. Aflutter  remains in sr, continue ccb for rate control   echo with nl lv fxn   CHADS 2 - coumadin per INR level     2. HTN  elevated bp likely secondary to s/p minoxidil taper / ESRD  bp now slowly improving, continue with  labetalol 200mg bid  cont cardizem 90mg q6hr and cloNIDine Patch  trend BP    3. ESRD  renal f/u   continue fluid removal with HD and lasix     dvt ppx

## 2018-10-09 ENCOUNTER — TRANSCRIPTION ENCOUNTER (OUTPATIENT)
Age: 58
End: 2018-10-09

## 2018-10-09 VITALS
OXYGEN SATURATION: 97 % | DIASTOLIC BLOOD PRESSURE: 89 MMHG | RESPIRATION RATE: 18 BRPM | TEMPERATURE: 98 F | SYSTOLIC BLOOD PRESSURE: 166 MMHG | HEART RATE: 64 BPM

## 2018-10-09 LAB
INR BLD: 2.33 RATIO — HIGH (ref 0.88–1.16)
PROTHROM AB SERPL-ACNC: 25.6 SEC — HIGH (ref 9.8–12.7)

## 2018-10-09 PROCEDURE — 90935 HEMODIALYSIS ONE EVALUATION: CPT | Mod: GC

## 2018-10-09 PROCEDURE — 99232 SBSQ HOSP IP/OBS MODERATE 35: CPT

## 2018-10-09 RX ORDER — WARFARIN SODIUM 2.5 MG/1
5 TABLET ORAL ONCE
Qty: 0 | Refills: 0 | Status: DISCONTINUED | OUTPATIENT
Start: 2018-10-09 | End: 2018-10-09

## 2018-10-09 RX ORDER — LABETALOL HCL 100 MG
1 TABLET ORAL
Qty: 60 | Refills: 0 | OUTPATIENT
Start: 2018-10-09 | End: 2018-11-07

## 2018-10-09 RX ORDER — CHOLECALCIFEROL (VITAMIN D3) 125 MCG
1 CAPSULE ORAL
Qty: 0 | Refills: 0 | COMMUNITY

## 2018-10-09 RX ORDER — MINOXIDIL 10 MG
2 TABLET ORAL
Qty: 0 | Refills: 0 | COMMUNITY

## 2018-10-09 RX ORDER — NIFEDIPINE 30 MG
1 TABLET, EXTENDED RELEASE 24 HR ORAL
Qty: 0 | Refills: 0 | COMMUNITY

## 2018-10-09 RX ORDER — ERYTHROPOIETIN 10000 [IU]/ML
4000 INJECTION, SOLUTION INTRAVENOUS; SUBCUTANEOUS
Qty: 0 | Refills: 0 | COMMUNITY
Start: 2018-10-09

## 2018-10-09 RX ORDER — TACROLIMUS 5 MG/1
1 CAPSULE ORAL
Qty: 60 | Refills: 0 | OUTPATIENT
Start: 2018-10-09 | End: 2018-11-07

## 2018-10-09 RX ORDER — DOXERCALCIFEROL 2.5 UG/1
1 CAPSULE ORAL
Qty: 0 | Refills: 0 | COMMUNITY
Start: 2018-10-09

## 2018-10-09 RX ORDER — INFLUENZA VIRUS VACCINE 15; 15; 15; 15 UG/.5ML; UG/.5ML; UG/.5ML; UG/.5ML
0.5 SUSPENSION INTRAMUSCULAR ONCE
Qty: 0 | Refills: 0 | Status: COMPLETED | OUTPATIENT
Start: 2018-10-09 | End: 2018-10-09

## 2018-10-09 RX ORDER — OXYCODONE HYDROCHLORIDE 5 MG/1
1 TABLET ORAL
Qty: 10 | Refills: 0 | OUTPATIENT

## 2018-10-09 RX ORDER — CYPROHEPTADINE HYDROCHLORIDE 4 MG/1
1 TABLET ORAL
Qty: 0 | Refills: 0 | COMMUNITY

## 2018-10-09 RX ORDER — WARFARIN SODIUM 2.5 MG/1
1 TABLET ORAL
Qty: 30 | Refills: 0 | OUTPATIENT
Start: 2018-10-09 | End: 2018-11-07

## 2018-10-09 RX ORDER — PROPRANOLOL HCL 160 MG
1 CAPSULE, EXTENDED RELEASE 24HR ORAL
Qty: 0 | Refills: 0 | COMMUNITY

## 2018-10-09 RX ORDER — TACROLIMUS 5 MG/1
1 CAPSULE ORAL
Qty: 0 | Refills: 0 | COMMUNITY

## 2018-10-09 RX ORDER — OXYCODONE HYDROCHLORIDE 5 MG/1
1 TABLET ORAL
Qty: 12 | Refills: 0 | OUTPATIENT
Start: 2018-10-09 | End: 2018-10-11

## 2018-10-09 RX ADMIN — ERYTHROPOIETIN 4000 UNIT(S): 10000 INJECTION, SOLUTION INTRAVENOUS; SUBCUTANEOUS at 10:37

## 2018-10-09 RX ADMIN — Medication 5 MILLIGRAM(S): at 12:43

## 2018-10-09 RX ADMIN — TACROLIMUS 0.5 MILLIGRAM(S): 5 CAPSULE ORAL at 12:39

## 2018-10-09 RX ADMIN — Medication 1 APPLICATION(S): at 12:42

## 2018-10-09 RX ADMIN — Medication 80 MILLIGRAM(S): at 07:14

## 2018-10-09 RX ADMIN — Medication 200 MILLIGRAM(S): at 12:38

## 2018-10-09 RX ADMIN — INFLUENZA VIRUS VACCINE 0.5 MILLILITER(S): 15; 15; 15; 15 SUSPENSION INTRAMUSCULAR at 16:46

## 2018-10-09 RX ADMIN — Medication 500 MILLIGRAM(S): at 12:40

## 2018-10-09 RX ADMIN — Medication 100 MILLIGRAM(S): at 06:12

## 2018-10-09 RX ADMIN — DOXERCALCIFEROL 1 MICROGRAM(S): 2.5 CAPSULE ORAL at 10:37

## 2018-10-09 RX ADMIN — Medication 81 MILLIGRAM(S): at 12:40

## 2018-10-09 RX ADMIN — PANTOPRAZOLE SODIUM 40 MILLIGRAM(S): 20 TABLET, DELAYED RELEASE ORAL at 06:12

## 2018-10-09 NOTE — CHART NOTE - NSCHARTNOTEFT_GEN_A_CORE
Request from Dr. Sauer to facilitate patient discharge. Medication reconciliation reviewed, revised, and resolved with Dr. Sauer who had medically cleared patient for discharge with follow-up as advised. Please refer to discharge note for detailed hospital course. Patient is currently stable for discharge to home at this time.    Reuben Sarah PA-C  Department of Medicine  #[id number]

## 2018-10-09 NOTE — DISCHARGE NOTE ADULT - CARE PLAN
Principal Discharge DX:	Atrial flutter, paroxysmal  Goal:	Controlled Heart Rate  Assessment and plan of treatment:	You are currently back in normal rhythm  Atrial fibrillation/flutter is the most common heart rhythm problem.  The condition puts you at risk for has stroke and heart attack  It helps if you control your blood pressure, not drink more than 1-2 alcohol drinks per day, cut down on caffeine, getting treatment for over active thyroid gland, and get regular exercise  Call your doctor if you feel your heart racing or beating unusually, chest tightness or pain, lightheaded, faint, shortness of breath especially with exercise  It is important to take your heart medication as prescribed  You are on anticoagulation which is very important to take as directed - you need blood work (INR) to monitor drug levels  Secondary Diagnosis:	Essential hypertension  Goal:	Stable  Assessment and plan of treatment:	Take medications for your blood pressure as recommended.  Eat a heart healthy diet that is low in saturated fats and salt, and includes whole grains, fruits, vegetables and lean protein   Exercise regularly (consult with your physician or cardiologist first); maintain a heart healthy weight.   If you smoke - quit (A resource to help you stop smoking is the [facility] – phone number 954-303-7589.). Continue to follow with your primary physician or cardiologist.   Seek medical help for dizziness, Lightheadedness, Blurry vision, Headache, Chest pain, Shortness of breath  Follow up with your medical doctor to establish long term blood pressure treatment goals.  Secondary Diagnosis:	ESRD (end stage renal disease) on dialysis  Goal:	Continue Dialysis  Assessment and plan of treatment:	Continue Dialysis as scheduled  Continue medications as prescribed  Avoid taking (NSAIDs) - (ex: Ibuprofen, Advil, Celebrex, Naprosyn)  Avoid taking any nephrotoxic agents (can harm kidneys) - Intravenous contrast for diagnostic testing, combination cold medications.  Have all medications adjusted for your renal function by your Health Care Provider.  Blood pressure control is important.  Take all medication as prescribed. Principal Discharge DX:	Atrial flutter, paroxysmal  Goal:	Controlled Heart Rate  Assessment and plan of treatment:	You are currently back in normal rhythm  Atrial fibrillation/flutter is the most common heart rhythm problem.  The condition puts you at risk for has stroke and heart attack  It helps if you control your blood pressure, not drink more than 1-2 alcohol drinks per day, cut down on caffeine, getting treatment for over active thyroid gland, and get regular exercise  Call your doctor if you feel your heart racing or beating unusually, chest tightness or pain, lightheaded, faint, shortness of breath especially with exercise  It is important to take your heart medication as prescribed  You are on anticoagulation which is very important to take as directed - you need blood work (INR) to monitor drug levels  Secondary Diagnosis:	Essential hypertension  Goal:	Stable  Assessment and plan of treatment:	Take medications for your blood pressure as recommended.  Eat a heart healthy diet that is low in saturated fats and salt, and includes whole grains, fruits, vegetables and lean protein   Exercise regularly (consult with your physician or cardiologist first); maintain a heart healthy weight.   If you smoke - quit (A resource to help you stop smoking is the [facility] – phone number [phone].). Continue to follow with your primary physician or cardiologist.   Seek medical help for dizziness, Lightheadedness, Blurry vision, Headache, Chest pain, Shortness of breath  Follow up with your medical doctor to establish long term blood pressure treatment goals.  Secondary Diagnosis:	ESRD (end stage renal disease) on dialysis  Goal:	Continue Dialysis  Assessment and plan of treatment:	Continue Dialysis as scheduled  Continue medications as prescribed  Avoid taking (NSAIDs) - (ex: Ibuprofen, Advil, Celebrex, Naprosyn)  Avoid taking any nephrotoxic agents (can harm kidneys) - Intravenous contrast for diagnostic testing, combination cold medications.  Have all medications adjusted for your renal function by your Health Care Provider.  Blood pressure control is important.  Take all medication as prescribed.  Secondary Diagnosis:	Renal transplant recipient  Goal:	Outpatient follow up  Assessment and plan of treatment:	Continue medications as prescribed  Need a repeat tacrolimus level next week to further adjust tacrolimus dosing Principal Discharge DX:	Atrial flutter, paroxysmal  Goal:	Controlled Heart Rate  Assessment and plan of treatment:	You are currently back in normal rhythm  Follow up with with INR check in 2 days with Dialysis on Thursday 10/11/18 and fax results to Dr. Real office ([phone]) for further dosing of coumadin as necessary  Atrial fibrillation/flutter is the most common heart rhythm problem.  The condition puts you at risk for has stroke and heart attack  It helps if you control your blood pressure, not drink more than 1-2 alcohol drinks per day, cut down on caffeine, getting treatment for over active thyroid gland, and get regular exercise  Call your doctor if you feel your heart racing or beating unusually, chest tightness or pain, lightheaded, faint, shortness of breath especially with exercise  It is important to take your heart medication as prescribed  You are on anticoagulation which is very important to take as directed - you need blood work (INR) to monitor drug levels  Secondary Diagnosis:	Essential hypertension  Goal:	Stable  Assessment and plan of treatment:	Take medications for your blood pressure as recommended.  Eat a heart healthy diet that is low in saturated fats and salt, and includes whole grains, fruits, vegetables and lean protein   Exercise regularly (consult with your physician or cardiologist first); maintain a heart healthy weight.   If you smoke - quit (A resource to help you stop smoking is the [facility] – phone number [phone].). Continue to follow with your primary physician or cardiologist.   Seek medical help for dizziness, Lightheadedness, Blurry vision, Headache, Chest pain, Shortness of breath  Follow up with your medical doctor to establish long term blood pressure treatment goals.  Secondary Diagnosis:	ESRD (end stage renal disease) on dialysis  Goal:	Continue Dialysis  Assessment and plan of treatment:	Continue Dialysis as scheduled  Continue medications as prescribed  Avoid taking (NSAIDs) - (ex: Ibuprofen, Advil, Celebrex, Naprosyn)  Avoid taking any nephrotoxic agents (can harm kidneys) - Intravenous contrast for diagnostic testing, combination cold medications.  Have all medications adjusted for your renal function by your Health Care Provider.  Blood pressure control is important.  Take all medication as prescribed.  Secondary Diagnosis:	Renal transplant recipient  Goal:	Outpatient follow up  Assessment and plan of treatment:	Continue medications as prescribed  Need a repeat tacrolimus level next week to further adjust tacrolimus dosing by Dr. Patton

## 2018-10-09 NOTE — DISCHARGE NOTE ADULT - CARE PROVIDER_API CALL
Derik Patton), Internal Medicine; Nephrology  [facility] [place] [address]  Phone: [phone]  Fax: [phone]    Ryan Real), Cardiology; Internal Medicine  [address] [facility]  [address]  [place] [address]  Phone: [phone]  Fax: [phone]

## 2018-10-09 NOTE — PROGRESS NOTE ADULT - ATTENDING COMMENTS
Agree with above NP note.  cv stable  cont current tx  coumadin  monitor bp
Agree with above NP note.  cv stable  cont current tx  hep to coumadin  monitor bp
Agree with above NP note.  cv stable  in sr  cont ccb  cont a/c  await echo
agree with NP note above  pt remains in NSR  increase cardizem dose  hep to coumadin for CVA ppx
Agree with above NP note.  cv stable  remains in sr  cont a/c per inr   bp tx as ordered
ESRD on HD  Sent in for Aflutter  On heparin gtt  Rate controlled  Tolerating HD so far UF 1-2kg
ESRD with hyperkalemia and had another HD yesterday  Decrease tac to 0.5 bid  Low K diet   Off all other K risk meds  Off heparin now and and on coumadin  K today ok   Next HD tomorrow
Seen on HD  No complaints  Decreased tac to 0.5 BID  Coumadin for Afib/ flutter  Hyperkalemia - low K diet

## 2018-10-09 NOTE — DISCHARGE NOTE ADULT - COMMUNITY RESOURCES
[facility] [phone] has been notified of resumption of HD for tomorrow. [facility] [phone] has been notified of resumption of HD for Thursday.

## 2018-10-09 NOTE — PROGRESS NOTE ADULT - SUBJECTIVE AND OBJECTIVE BOX
Patient is a 58y old  Male who presents with a chief complaint of Visual disturbance (02 Oct 2018 08:08)      SUBJECTIVE / OVERNIGHT EVENTS: Comfortable without new complaints.   Review of Systems  chest pain no  palpitations no  sob no  nausea no  headache no    MEDICATIONS  (STANDING):  ascorbic acid 500 milliGRAM(s) Oral daily  aspirin enteric coated 81 milliGRAM(s) Oral daily  calcitriol   Capsule 0.25 MICROGram(s) Oral <User Schedule>  cholecalciferol 1000 Unit(s) Oral daily  cloNIDine Patch 0.3 mG/24Hr(s) 1 patch Transdermal every 7 days  diltiazem    Tablet 30 milliGRAM(s) Oral every 6 hours  diltiazem Injectable 10 milliGRAM(s) IV Push once  furosemide    Tablet 80 milliGRAM(s) Oral daily  heparin  Infusion.  Unit(s)/Hr (16 mL/Hr) IV Continuous <Continuous>  influenza   Vaccine 0.5 milliLiter(s) IntraMuscular once  minoxidil 2.5 milliGRAM(s) Oral daily  Nephro-martha 1 Tablet(s) Oral daily  NIFEdipine XL 60 milliGRAM(s) Oral daily  predniSONE   Tablet 5 milliGRAM(s) Oral daily  tacrolimus 1 milliGRAM(s) Oral every 12 hours  tacrolimus 1 milliGRAM(s) Oral once  trimethoprim  160 mG/sulfamethoxazole 800 mG 1 Tablet(s) Oral every 48 hours    MEDICATIONS  (PRN):  heparin  Injectable 7000 Unit(s) IV Push every 6 hours PRN For aPTT less than 40  heparin  Injectable 3500 Unit(s) IV Push every 6 hours PRN For aPTT between 40 - 57      Vital Signs Last 24 Hrs  T(C): 36.9 (02 Oct 2018 08:27), Max: 37.2 (01 Oct 2018 19:31)  T(F): 98.5 (02 Oct 2018 08:27), Max: 99 (01 Oct 2018 19:31)  HR: 87 (02 Oct 2018 11:35) (81 - 166)  BP: 138/79 (02 Oct 2018 11:35) (121/103 - 148/104)  BP(mean): --  RR: 19 (02 Oct 2018 08:27) (16 - 22)  SpO2: 94% (02 Oct 2018 08:27) (92% - 100%)    PHYSICAL EXAM:  GENERAL: NAD, well-developed  HEAD:  Atraumatic, Normocephalic  EYES: EOMI, PERRLA, conjunctiva and sclera clear  NECK: Supple, No JVD  CHEST/LUNG: Clear to auscultation bilaterally; No wheeze  HEART: Regular rate and rhythm; No murmurs, rubs, or gallops  ABDOMEN: Soft, Nontender, Nondistended; Bowel sounds present  EXTREMITIES:  1+ bipedal edema  PSYCH: AAOx3  NEUROLOGY: non-focal  SKIN: No rashes or lesions    LABS:                        9.1    9.11  )-----------( 414      ( 02 Oct 2018 12:25 )             28.5     10-02    135  |  92<L>  |  70<H>  ----------------------------<  109<H>  5.6<H>   |  28  |  9.42<H>    Ca    9.4      02 Oct 2018 10:22  Phos  5.3     10-01  Mg     2.0     10-01    TPro  7.2  /  Alb  4.1  /  TBili  1.2  /  DBili  x   /  AST  11  /  ALT  15  /  AlkPhos  59  10-01    PT/INR - ( 02 Oct 2018 01:35 )   PT: 13.6 sec;   INR: 1.25 ratio         PTT - ( 02 Oct 2018 10:22 )  PTT:53.9 sec  CARDIAC MARKERS ( 02 Oct 2018 01:35 )  x     / x     / 112 U/L / x     / 2.9 ng/mL      Telemetry SR      RADIOLOGY & ADDITIONAL TESTS:    Imaging Personally Reviewed:  < from: CT Chest No Cont (10.01.18 @ 21:55) >  IMPRESSION:   1.  Pulmonary edema.  2.  Bilateral pleural effusions.    < end of copied text >    Consultant(s) Notes Reviewed:      Care Discussed with Consultants/Other Providers:
Patient is a 58y old  Male who presents with a chief complaint of sob (07 Oct 2018 10:51)      SUBJECTIVE / OVERNIGHT EVENTS: feels better.  Review of Systems  chest pain no  palpitations no  sob no  nausea no  headache no    MEDICATIONS  (STANDING):  AQUAPHOR (petrolatum Ointment) 1 Application(s) Topical daily  ascorbic acid 500 milliGRAM(s) Oral daily  aspirin enteric coated 81 milliGRAM(s) Oral daily  cloNIDine Patch 0.3 mG/24Hr(s) 1 patch Transdermal every 7 days  diltiazem    Tablet 90 milliGRAM(s) Oral every 6 hours  docusate sodium 100 milliGRAM(s) Oral two times a day  doxercalciferol Injectable 1 MICROGram(s) IV Push <User Schedule>  epoetin sherlyn Injectable 4000 Unit(s) IV Push <User Schedule>  furosemide    Tablet 80 milliGRAM(s) Oral daily  heparin  Infusion.  Unit(s)/Hr (16 mL/Hr) IV Continuous <Continuous>  influenza   Vaccine 0.5 milliLiter(s) IntraMuscular once  labetalol 200 milliGRAM(s) Oral every 12 hours  Nephro-martha 1 Tablet(s) Oral daily  pantoprazole    Tablet 40 milliGRAM(s) Oral before breakfast  predniSONE   Tablet 5 milliGRAM(s) Oral daily  senna 2 Tablet(s) Oral at bedtime  tacrolimus 1 milliGRAM(s) Oral every 12 hours  trimethoprim  160 mG/sulfamethoxazole 800 mG 1 Tablet(s) Oral every 48 hours    MEDICATIONS  (PRN):  heparin  Injectable 7000 Unit(s) IV Push every 6 hours PRN For aPTT less than 40  heparin  Injectable 3500 Unit(s) IV Push every 6 hours PRN For aPTT between 40 - 57  oxyCODONE    IR 5 milliGRAM(s) Oral every 6 hours PRN Moderate Pain (4 - 6)/Severe Pain (7 - 10)      Vital Signs Last 24 Hrs  T(C): 36.7 (07 Oct 2018 13:38), Max: 36.8 (07 Oct 2018 04:34)  T(F): 98 (07 Oct 2018 13:38), Max: 98.2 (07 Oct 2018 04:34)  HR: 69 (07 Oct 2018 13:38) (69 - 83)  BP: 145/82 (07 Oct 2018 13:38) (145/82 - 228/110)  BP(mean): --  RR: 18 (07 Oct 2018 13:38) (18 - 19)  SpO2: 96% (07 Oct 2018 13:38) (96% - 100%)    PHYSICAL EXAM:  GENERAL: NAD, well-developed  HEAD:  Atraumatic, Normocephalic  EYES: EOMI, PERRLA, conjunctiva and sclera clear  NECK: Supple, No JVD  CHEST/LUNG: Clear to auscultation bilaterally; No wheeze  HEART: Regular rate and rhythm; No murmurs, rubs, or gallops  ABDOMEN: Soft, Nontender, Nondistended; Bowel sounds present  EXTREMITIES:  2+ Peripheral Pulses, No clubbing, cyanosis, or edema  PSYCH: AAOx3  NEUROLOGY: non-focal  SKIN: No rashes or lesions    LABS:                        9.6    12.3  )-----------( 413      ( 07 Oct 2018 06:06 )             29.5     10-07    131<L>  |  91<L>  |  32<H>  ----------------------------<  111<H>  5.5<H>   |  24  |  6.35<H>    Ca    9.8      07 Oct 2018 06:06  Phos  8.3     10-06  Mg     2.1     10-06      PT/INR - ( 07 Oct 2018 00:36 )   PT: 15.5 sec;   INR: 1.42 ratio         PTT - ( 07 Oct 2018 00:36 )  PTT:78.6 sec            RADIOLOGY & ADDITIONAL TESTS:    Imaging Personally Reviewed:    Consultant(s) Notes Reviewed:      Care Discussed with Consultants/Other Providers:
CARDIOLOGY FOLLOW UP - Dr. Real    CC no chest pain or sob        PHYSICAL EXAM:  T(C): 36.5 (10-09-18 @ 08:30), Max: 36.8 (10-08-18 @ 12:40)  HR: 67 (10-09-18 @ 08:30) (60 - 71)  BP: 173/94 (10-09-18 @ 08:30) (136/74 - 173/94)  RR: 18 (10-09-18 @ 08:30) (18 - 19)  SpO2: 98% (10-09-18 @ 08:30) (96% - 98%)  Wt(kg): --  I&O's Summary    08 Oct 2018 07:01  -  09 Oct 2018 07:00  --------------------------------------------------------  IN: 1060 mL / OUT: 0 mL / NET: 1060 mL        Appearance: Normal	  Cardiovascular: Normal S1 S2,RRR, No JVD, No murmurs  Respiratory: Lungs clear to auscultation	  Gastrointestinal:  Soft, Non-tender, + BS	  Extremities: Normal range of motion, N+ 2 bl LE  edema        MEDICATIONS  (STANDING):  AQUAPHOR (petrolatum Ointment) 1 Application(s) Topical daily  ascorbic acid 500 milliGRAM(s) Oral daily  aspirin enteric coated 81 milliGRAM(s) Oral daily  cloNIDine Patch 0.3 mG/24Hr(s) 1 patch Transdermal every 7 days  diltiazem    Tablet 90 milliGRAM(s) Oral every 6 hours  docusate sodium 100 milliGRAM(s) Oral two times a day  doxercalciferol Injectable 1 MICROGram(s) IV Push <User Schedule>  epoetin sherlyn Injectable 4000 Unit(s) IV Push <User Schedule>  furosemide    Tablet 80 milliGRAM(s) Oral daily  influenza   Vaccine 0.5 milliLiter(s) IntraMuscular once  labetalol 200 milliGRAM(s) Oral every 12 hours  Nephro-martha 1 Tablet(s) Oral daily  pantoprazole    Tablet 40 milliGRAM(s) Oral before breakfast  predniSONE   Tablet 5 milliGRAM(s) Oral daily  senna 2 Tablet(s) Oral at bedtime  tacrolimus 0.5 milliGRAM(s) Oral every 12 hours  trimethoprim  160 mG/sulfamethoxazole 800 mG 1 Tablet(s) Oral <User Schedule>      TELEMETRY: NSR 	    ECG:  	  RADIOLOGY:   DIAGNOSTIC TESTING:  [ ] Echocardiogram:  [ ]  Catheterization:  [ ] Stress Test:    OTHER: 	    LABS:	 	                                9.2    9.0   )-----------( 420      ( 08 Oct 2018 01:42 )             29.6     10-08    134<L>  |  95<L>  |  20  ----------------------------<  119<H>  4.6   |  28  |  4.75<H>    Ca    9.5      08 Oct 2018 01:31      PT/INR - ( 09 Oct 2018 09:53 )   PT: 25.6 sec;   INR: 2.33 ratio         PTT - ( 08 Oct 2018 01:42 )  PTT:84.9 sec
CARDIOLOGY FOLLOW UP - Dr. Real    CC no chest pain, sob or headaches   feels generally better today   events noted     PHYSICAL EXAM:  T(C): 36.8 (10-08-18 @ 04:35), Max: 36.8 (10-07-18 @ 20:00)  HR: 68 (10-08-18 @ 04:35) (61 - 69)  BP: 170/90 (10-08-18 @ 09:32) (145/82 - 175/86)  RR: 18 (10-08-18 @ 04:35) (18 - 20)  SpO2: 96% (10-08-18 @ 04:35) (95% - 100%)  Wt(kg): --  I&O's Summary    07 Oct 2018 07:01  -  08 Oct 2018 07:00  --------------------------------------------------------  IN: 1134 mL / OUT: 2100 mL / NET: -966 mL    08 Oct 2018 07:01  -  08 Oct 2018 10:36  --------------------------------------------------------  IN: 240 mL / OUT: 0 mL / NET: 240 mL        Appearance: Normal	  Cardiovascular: Normal S1 S2,RRR   Respiratory: Lungs clear to auscultation	  Gastrointestinal:  Soft, Non-tender, + BS	  Extremities: Normal range of motion, bl + 2 LE edema        MEDICATIONS  (STANDING):  AQUAPHOR (petrolatum Ointment) 1 Application(s) Topical daily  ascorbic acid 500 milliGRAM(s) Oral daily  aspirin enteric coated 81 milliGRAM(s) Oral daily  cloNIDine Patch 0.3 mG/24Hr(s) 1 patch Transdermal every 7 days  diltiazem    Tablet 90 milliGRAM(s) Oral every 6 hours  docusate sodium 100 milliGRAM(s) Oral two times a day  doxercalciferol Injectable 1 MICROGram(s) IV Push <User Schedule>  epoetin sherlyn Injectable 4000 Unit(s) IV Push <User Schedule>  furosemide    Tablet 80 milliGRAM(s) Oral daily  influenza   Vaccine 0.5 milliLiter(s) IntraMuscular once  labetalol 200 milliGRAM(s) Oral every 12 hours  Nephro-martha 1 Tablet(s) Oral daily  pantoprazole    Tablet 40 milliGRAM(s) Oral before breakfast  predniSONE   Tablet 5 milliGRAM(s) Oral daily  senna 2 Tablet(s) Oral at bedtime  tacrolimus 1 milliGRAM(s) Oral every 12 hours  trimethoprim  160 mG/sulfamethoxazole 800 mG 1 Tablet(s) Oral <User Schedule>      TELEMETRY: NSR 	    ECG:  	  RADIOLOGY:   DIAGNOSTIC TESTING:  [ ] Echocardiogram:    < from: Transthoracic Echocardiogram (10.05.18 @ 06:50) >  EF (Teicholtz): 62 %  Doppler Peak Velocity (m/sec): AoV=1.4  ------------------------------------------------------------------------  Observations:  Mitral Valve: Tethered mitral valve leaflets with normal  opening. Mild mitral regurgitation.  Aortic Valve/Aorta: Calcified trileaflet aortic valve with  normal opening. Peak transaortic valve gradient equals 8 mm  Hg. Peak left ventricular outflow tract gradient equals 7  mm Hg.  Aortic Root: 3.7 cm.  Left Atrium: Severely dilated left atrium.  LA volume index  = 61 cc/m2.  Left Ventricle: Normal left ventricular systolic function.  No segmental wall motion abnormalities. Mild left  ventricular enlargement.  Right Heart: Normal right atrium. Normal right ventricular  size and function. Normal tricuspid valve. Minimal  tricuspid regurgitation. Normal pulmonic valve.  Pericardium/Pleura: Normal pericardium with trace  pericardial effusion.  Left pleural effusion.  Hemodynamic: Estimated right atrial pressure is 8 mm Hg.  Inadequate tricuspid regurgitation Doppler envelope  precludes estimation of RVSP.  ------------------------------------------------------------------------  Conclusions:  1. Tethered mitral valve leaflets with normal opening. Mild  mitral regurgitation.  2. Calcified trileaflet aortic valve with normal opening.  3. Severely dilated left atrium.  LA volume index = 61  cc/m2.  4. Mild left ventricular enlargement.  5. Normal left ventricular systolic function. No segmental  wall motion abnormalities.  6. Normal right ventricular size and function.  ------------------------------------------------------------------------  Confirmed on  10/5/2018 - 11:10:55 by Jaxon Yepez MD  ------------------------------------------------------------------------    < end of copied text >  [ ]  Catheterization:  [ ] Stress Test:    OTHER: 	    LABS:	 	                                9.2    9.0   )-----------( 420      ( 08 Oct 2018 01:42 )             29.6     10-08    134<L>  |  95<L>  |  20  ----------------------------<  119<H>  4.6   |  28  |  4.75<H>    Ca    9.5      08 Oct 2018 01:31      PT/INR - ( 08 Oct 2018 01:42 )   PT: 27.8 sec;   INR: 2.53 ratio         PTT - ( 08 Oct 2018 01:42 )  PTT:84.9 sec
CARDIOLOGY FOLLOW UP - Dr. Real    CC no cp/sob       PHYSICAL EXAM:  T(C): 36.6 (10-04-18 @ 04:49), Max: 36.8 (10-03-18 @ 13:14)  HR: 71 (10-04-18 @ 04:49) (71 - 90)  BP: 154/78 (10-04-18 @ 04:49) (154/78 - 178/60)  RR: 18 (10-04-18 @ 04:49) (18 - 19)  SpO2: 97% (10-04-18 @ 04:49) (95% - 97%)  Wt(kg): --  I&O's Summary    03 Oct 2018 07:01  -  04 Oct 2018 07:00  --------------------------------------------------------  IN: 2507.2 mL / OUT: 2000 mL / NET: 507.2 mL        Appearance: Normal	  Cardiovascular: Normal S1 S2,RRR, No JVD, No murmurs  Respiratory: Lungs clear to auscultation	  Gastrointestinal:  Soft, Non-tender, + BS	  Extremities: Normal range of motion, No clubbing, b/l le edema         MEDICATIONS  (STANDING):  ascorbic acid 500 milliGRAM(s) Oral daily  aspirin enteric coated 81 milliGRAM(s) Oral daily  cloNIDine Patch 0.3 mG/24Hr(s) 1 patch Transdermal every 7 days  diltiazem    Tablet 60 milliGRAM(s) Oral every 6 hours  doxercalciferol Injectable 1 MICROGram(s) IV Push <User Schedule>  epoetin sherlyn Injectable 4000 Unit(s) IV Push <User Schedule>  furosemide    Tablet 80 milliGRAM(s) Oral daily  heparin  Infusion.  Unit(s)/Hr (16 mL/Hr) IV Continuous <Continuous>  influenza   Vaccine 0.5 milliLiter(s) IntraMuscular once  Nephro-martha 1 Tablet(s) Oral daily  predniSONE   Tablet 5 milliGRAM(s) Oral daily  tacrolimus 1 milliGRAM(s) Oral every 12 hours  tacrolimus 1 milliGRAM(s) Oral once  trimethoprim  160 mG/sulfamethoxazole 800 mG 1 Tablet(s) Oral every 48 hours      TELEMETRY:  NSR 	    ECG:  	  RADIOLOGY:   DIAGNOSTIC TESTING:  [ ] Echocardiogram:  [ ]  Catheterization:  [ ] Stress Test:    OTHER: 	    LABS:	 	                                10.1   9.7   )-----------( 395      ( 04 Oct 2018 06:45 )             31.7     10-04    133<L>  |  91<L>  |  54<H>  ----------------------------<  105<H>  6.0<H>   |  26  |  7.90<H>    Ca    10.1      04 Oct 2018 06:45  Phos  8.6     10-04  Mg     2.2     10-04      PT/INR - ( 04 Oct 2018 06:45 )   PT: 12.1 sec;   INR: 1.12 ratio         PTT - ( 04 Oct 2018 06:45 )  PTT:86.3 sec
CARDIOLOGY FOLLOW UP - Dr. Real    CC no cp/sob       PHYSICAL EXAM:  T(C): 36.9 (10-05-18 @ 04:49), Max: 36.9 (10-04-18 @ 12:05)  HR: 76 (10-05-18 @ 04:49) (71 - 81)  BP: 169/85 (10-05-18 @ 04:49) (142/87 - 174/105)  RR: 18 (10-05-18 @ 04:49) (18 - 18)  SpO2: 99% (10-05-18 @ 04:49) (93% - 100%)  Wt(kg): --  I&O's Summary    04 Oct 2018 07:01  -  05 Oct 2018 07:00  --------------------------------------------------------  IN: 1952.8 mL / OUT: 2000 mL / NET: -47.2 mL        Appearance: Normal	  Cardiovascular: Normal S1 S2,RRR, No JVD, No murmurs  Respiratory: Lungs clear to auscultation	  Gastrointestinal:  Soft, Non-tender, + BS	  Extremities: Normal range of motion, No clubbing, +b/l le edema         MEDICATIONS  (STANDING):  ascorbic acid 500 milliGRAM(s) Oral daily  aspirin enteric coated 81 milliGRAM(s) Oral daily  cloNIDine Patch 0.3 mG/24Hr(s) 1 patch Transdermal every 7 days  diltiazem    Tablet 60 milliGRAM(s) Oral every 6 hours  docusate sodium 100 milliGRAM(s) Oral two times a day  doxercalciferol Injectable 1 MICROGram(s) IV Push <User Schedule>  epoetin sherlyn Injectable 4000 Unit(s) IV Push <User Schedule>  furosemide    Tablet 80 milliGRAM(s) Oral daily  heparin  Infusion.  Unit(s)/Hr (16 mL/Hr) IV Continuous <Continuous>  influenza   Vaccine 0.5 milliLiter(s) IntraMuscular once  Nephro-martha 1 Tablet(s) Oral daily  pantoprazole    Tablet 40 milliGRAM(s) Oral before breakfast  predniSONE   Tablet 5 milliGRAM(s) Oral daily  senna 2 Tablet(s) Oral at bedtime  tacrolimus 1 milliGRAM(s) Oral every 12 hours  trimethoprim  160 mG/sulfamethoxazole 800 mG 1 Tablet(s) Oral every 48 hours      TELEMETRY:  NSR 	    ECG:  	  RADIOLOGY:   DIAGNOSTIC TESTING:  [ ] Echocardiogram:  [ ]  Catheterization:  [ ] Stress Test:    OTHER: 	    LABS:	 	                                8.8    9.2   )-----------( 364      ( 05 Oct 2018 06:05 )             27.3     10-05    133<L>  |  92<L>  |  35<H>  ----------------------------<  141<H>  5.3   |  28  |  5.61<H>    Ca    9.3      05 Oct 2018 06:06  Phos  7.0     10-05  Mg     2.0     10-05      PT/INR - ( 05 Oct 2018 06:05 )   PT: 13.1 sec;   INR: 1.20 ratio         PTT - ( 05 Oct 2018 04:02 )  PTT:68.6 sec
CARDIOLOGY FOLLOW UP - Dr. Real    CC no cp/sob   remains NSR       PHYSICAL EXAM:  T(C): 36.6 (10-03-18 @ 08:40), Max: 36.8 (10-02-18 @ 20:09)  HR: 81 (10-03-18 @ 08:40) (75 - 82)  BP: 169/101 (10-03-18 @ 08:40) (149/78 - 169/101)  RR: 20 (10-03-18 @ 08:40) (20 - 20)  SpO2: 92% (10-03-18 @ 08:40) (92% - 98%)  Wt(kg): --  I&O's Summary    02 Oct 2018 07:01  -  03 Oct 2018 07:00  --------------------------------------------------------  IN: 516 mL / OUT: 50 mL / NET: 466 mL    03 Oct 2018 07:01  -  03 Oct 2018 11:50  --------------------------------------------------------  IN: 360 mL / OUT: 0 mL / NET: 360 mL        Appearance: Normal	  Cardiovascular: Normal S1 S2,RRR, No JVD, No murmurs  Respiratory: Lungs clear to auscultation	  Gastrointestinal:  Soft, Non-tender, + BS	  Extremities: Normal range of motion, No clubbing, + b/l le edema         MEDICATIONS  (STANDING):  ascorbic acid 500 milliGRAM(s) Oral daily  aspirin enteric coated 81 milliGRAM(s) Oral daily  cloNIDine Patch 0.3 mG/24Hr(s) 1 patch Transdermal every 7 days  diltiazem    Tablet 30 milliGRAM(s) Oral every 6 hours  diltiazem Injectable 10 milliGRAM(s) IV Push once  doxercalciferol Injectable 1 MICROGram(s) IV Push <User Schedule>  epoetin sherlyn Injectable 4000 Unit(s) IV Push <User Schedule>  furosemide    Tablet 80 milliGRAM(s) Oral daily  heparin  Infusion.  Unit(s)/Hr (16 mL/Hr) IV Continuous <Continuous>  influenza   Vaccine 0.5 milliLiter(s) IntraMuscular once  Nephro-martha 1 Tablet(s) Oral daily  predniSONE   Tablet 5 milliGRAM(s) Oral daily  tacrolimus 1 milliGRAM(s) Oral every 12 hours  tacrolimus 1 milliGRAM(s) Oral once  trimethoprim  160 mG/sulfamethoxazole 800 mG 1 Tablet(s) Oral every 48 hours      TELEMETRY: NSR 70-80	    ECG:  	  RADIOLOGY:   DIAGNOSTIC TESTING:  [ ] Echocardiogram:  [ ]  Catheterization:  [ ] Stress Test:    OTHER: 	    LABS:	 	                                9.1    9.0   )-----------( 363      ( 03 Oct 2018 09:37 )             27.6     10-03    134<L>  |  89<L>  |  85<H>  ----------------------------<  138<H>  5.7<H>   |  25  |  11.12<H>    Ca    9.3      03 Oct 2018 09:37  Phos  5.3     10-01  Mg     2.3     10-03    TPro  7.2  /  Alb  4.1  /  TBili  1.2  /  DBili  x   /  AST  11  /  ALT  15  /  AlkPhos  59  10-01    PT/INR - ( 02 Oct 2018 01:35 )   PT: 13.6 sec;   INR: 1.25 ratio         PTT - ( 03 Oct 2018 00:21 )  PTT:62.1 sec
CARDIOLOGY FOLLOW UP NOTE - DR. VALERIO    Subjective:    no cp, sob  \  PHYSICAL EXAM:  T(C): 36.8 (10-06-18 @ 12:56), Max: 36.9 (10-05-18 @ 20:24)  HR: 75 (10-06-18 @ 12:56) (70 - 76)  BP: 204/101 (10-06-18 @ 12:56) (172/86 - 204/101)  RR: 19 (10-06-18 @ 12:56) (18 - 19)  SpO2: 96% (10-06-18 @ 12:56) (95% - 98%)  Wt(kg): --  I&O's Summary    05 Oct 2018 07:01  -  06 Oct 2018 07:00  --------------------------------------------------------  IN: 730 mL / OUT: 0 mL / NET: 730 mL    06 Oct 2018 07:01  -  06 Oct 2018 15:28  --------------------------------------------------------  IN: 0 mL / OUT: 2900 mL / NET: -2900 mL        Appearance: Normal	  Cardiovascular: Normal S1 S2,RRR, No JVD, No murmurs  Respiratory: Lungs clear to auscultation	  Gastrointestinal:  Soft, Non-tender, + BS	  Extremities: Normal range of motion, No clubbing, cyanosis or edema  Vascular: Peripheral pulses palpable 2+ bilaterally    MEDICATIONS  (STANDING):  AQUAPHOR (petrolatum Ointment) 1 Application(s) Topical daily  ascorbic acid 500 milliGRAM(s) Oral daily  aspirin enteric coated 81 milliGRAM(s) Oral daily  cloNIDine Patch 0.3 mG/24Hr(s) 1 patch Transdermal every 7 days  diltiazem    Tablet 90 milliGRAM(s) Oral every 6 hours  docusate sodium 100 milliGRAM(s) Oral two times a day  doxercalciferol Injectable 1 MICROGram(s) IV Push <User Schedule>  epoetin sherlyn Injectable 4000 Unit(s) IV Push <User Schedule>  furosemide    Tablet 80 milliGRAM(s) Oral daily  heparin  Infusion.  Unit(s)/Hr (16 mL/Hr) IV Continuous <Continuous>  influenza   Vaccine 0.5 milliLiter(s) IntraMuscular once  Nephro-martha 1 Tablet(s) Oral daily  pantoprazole    Tablet 40 milliGRAM(s) Oral before breakfast  predniSONE   Tablet 5 milliGRAM(s) Oral daily  senna 2 Tablet(s) Oral at bedtime  tacrolimus 1 milliGRAM(s) Oral every 12 hours  trimethoprim  160 mG/sulfamethoxazole 800 mG 1 Tablet(s) Oral every 48 hours  warfarin 10 milliGRAM(s) Oral once      TELEMETRY: 	    ECG:  	  RADIOLOGY:   DIAGNOSTIC TESTING:  [ ] Echocardiogram:  [ ] Catheterization:  [ ] Stress Test:    OTHER: 	    LABS:	 	    CARDIAC MARKERS:                                9.2    10.3  )-----------( 399      ( 06 Oct 2018 09:24 )             28.3     10-06    133<L>  |  90<L>  |  55<H>  ----------------------------<  125<H>  5.6<H>   |  27  |  7.91<H>    Ca    9.6      06 Oct 2018 09:24  Phos  8.3     10-06  Mg     2.1     10-06      proBNP:   PT/INR - ( 06 Oct 2018 09:25 )   PT: 13.5 sec;   INR: 1.23 ratio         PTT - ( 06 Oct 2018 09:25 )  PTT:145.9 sec  Lipid Profile:   HgA1c:
CARDIOLOGY FOLLOW UP NOTE - DR. VALERIO    Subjective:    no headache  no cp, sob  events noted  sbp elevated req iv meds    PHYSICAL EXAM:  T(C): 36.8 (10-07-18 @ 04:34), Max: 36.8 (10-06-18 @ 12:56)  HR: 75 (10-07-18 @ 09:53) (72 - 83)  BP: 171/98 (10-07-18 @ 09:53) (163/87 - 228/110)  RR: 18 (10-07-18 @ 04:34) (18 - 19)  SpO2: 100% (10-07-18 @ 04:34) (96% - 100%)  Wt(kg): --  I&O's Summary    06 Oct 2018 07:01  -  07 Oct 2018 07:00  --------------------------------------------------------  IN: 360 mL / OUT: 2900 mL / NET: -2540 mL    07 Oct 2018 07:01  -  07 Oct 2018 10:51  --------------------------------------------------------  IN: 240 mL / OUT: 0 mL / NET: 240 mL        Appearance: Normal	  Cardiovascular: Normal S1 S2,RRR, No JVD, No murmurs  Respiratory: Lungs clear to auscultation	  Gastrointestinal:  Soft, Non-tender, + BS	  Extremities: Normal range of motion, No clubbing, cyanosis or edema  Vascular: Peripheral pulses palpable 2+ bilaterally    MEDICATIONS  (STANDING):  AQUAPHOR (petrolatum Ointment) 1 Application(s) Topical daily  ascorbic acid 500 milliGRAM(s) Oral daily  aspirin enteric coated 81 milliGRAM(s) Oral daily  cloNIDine Patch 0.3 mG/24Hr(s) 1 patch Transdermal every 7 days  diltiazem    Tablet 90 milliGRAM(s) Oral every 6 hours  docusate sodium 100 milliGRAM(s) Oral two times a day  doxercalciferol Injectable 1 MICROGram(s) IV Push <User Schedule>  epoetin sherlyn Injectable 4000 Unit(s) IV Push <User Schedule>  furosemide    Tablet 80 milliGRAM(s) Oral daily  heparin  Infusion.  Unit(s)/Hr (16 mL/Hr) IV Continuous <Continuous>  influenza   Vaccine 0.5 milliLiter(s) IntraMuscular once  labetalol 200 milliGRAM(s) Oral every 12 hours  Nephro-martha 1 Tablet(s) Oral daily  pantoprazole    Tablet 40 milliGRAM(s) Oral before breakfast  predniSONE   Tablet 5 milliGRAM(s) Oral daily  senna 2 Tablet(s) Oral at bedtime  tacrolimus 1 milliGRAM(s) Oral every 12 hours  trimethoprim  160 mG/sulfamethoxazole 800 mG 1 Tablet(s) Oral every 48 hours      TELEMETRY: 	    ECG:  	  RADIOLOGY:   DIAGNOSTIC TESTING:  [ ] Echocardiogram:  [ ] Catheterization:  [ ] Stress Test:    OTHER: 	    LABS:	 	    CARDIAC MARKERS:                                9.6    12.3  )-----------( 413      ( 07 Oct 2018 06:06 )             29.5     10-07    131<L>  |  91<L>  |  32<H>  ----------------------------<  111<H>  5.5<H>   |  24  |  6.35<H>    Ca    9.8      07 Oct 2018 06:06  Phos  8.3     10-06  Mg     2.1     10-06      proBNP:   PT/INR - ( 07 Oct 2018 00:36 )   PT: 15.5 sec;   INR: 1.42 ratio         PTT - ( 07 Oct 2018 00:36 )  PTT:78.6 sec  Lipid Profile:   HgA1c:
[facility] DIVISION OF KIDNEY DISEASES AND HYPERTENSION -- PROGRESS NOTE    Chief complaint: ESRD on HD    24 hour events/subjective: seen on maintenance HD today        PAST HISTORY  --------------------------------------------------------------------------------  No significant changes to PMH, PSH, FHx, SHx, unless otherwise noted    ALLERGIES & MEDICATIONS  --------------------------------------------------------------------------------  Allergies    No Known Allergies    Intolerances    beta blockers (Other (Mod to Severe))    Standing Inpatient Medications  acetaminophen   Tablet .. 650 milliGRAM(s) Oral once  AQUAPHOR (petrolatum Ointment) 1 Application(s) Topical daily  ascorbic acid 500 milliGRAM(s) Oral daily  aspirin enteric coated 81 milliGRAM(s) Oral daily  cloNIDine Patch 0.3 mG/24Hr(s) 1 patch Transdermal every 7 days  diltiazem    Tablet 90 milliGRAM(s) Oral every 6 hours  docusate sodium 100 milliGRAM(s) Oral two times a day  doxercalciferol Injectable 1 MICROGram(s) IV Push <User Schedule>  epoetin sherlyn Injectable 4000 Unit(s) IV Push <User Schedule>  furosemide    Tablet 80 milliGRAM(s) Oral daily  heparin  Infusion.  Unit(s)/Hr IV Continuous <Continuous>  influenza   Vaccine 0.5 milliLiter(s) IntraMuscular once  Nephro-martha 1 Tablet(s) Oral daily  pantoprazole    Tablet 40 milliGRAM(s) Oral before breakfast  predniSONE   Tablet 5 milliGRAM(s) Oral daily  senna 2 Tablet(s) Oral at bedtime  tacrolimus 1 milliGRAM(s) Oral every 12 hours  trimethoprim  160 mG/sulfamethoxazole 800 mG 1 Tablet(s) Oral every 48 hours  warfarin 10 milliGRAM(s) Oral once    PRN Inpatient Medications  heparin  Injectable 7000 Unit(s) IV Push every 6 hours PRN  heparin  Injectable 3500 Unit(s) IV Push every 6 hours PRN      REVIEW OF SYSTEMS  --------------------------------------------------------------------------------  Constitutional: [ ] Fever [ ] Chills [ ] Fatigue [ ] Weight change   HEENT: [ ] Blurred vision [ ] Eye Pain [ ] Headache [ ] Runny nose [ ] Sore Throat   Respiratory: [ ] Cough [ ] Wheezing [ ] Shortness of breath  Cardiovascular: [ ] Chest Pain [ ] Palpitations [ ] MAY [ ] PND [ ] Orthopnea  Gastrointestinal: [ ] Abdominal Pain [ ] Diarrhea [ ] Constipation [ ] Hemorrhoids [ ] Nausea [ ] Vomiting  Genitourinary: [ ] Nocturia [ ] Dysuria [ ] Incontinence  Extremities: [x ] Swelling [ ] Joint Pain  Neurologic: [ ] Focal deficit [ ] Paresthesias [ ] Syncope  Lymphatic: [ ] Swelling [ ] Lymphadenopathy   Skin: [ ] Rash [ ] Ecchymoses [ ] Wounds [ ] Lesions  Psychiatry: [ ] Depression [ ] Suicidal/Homicidal Ideation [ ] Anxiety [ ] Sleep Disturbances  [x ] 10 point review of systems is otherwise negative except as mentioned above              [ ]Unable to obtain due to   All other systems were reviewed and are negative, except as noted.    VITALS/PHYSICAL EXAM  --------------------------------------------------------------------------------  T(C): 36.9 (10-06-18 @ 08:40), Max: 36.9 (10-05-18 @ 12:42)  HR: 73 (10-06-18 @ 08:40) (70 - 76)  BP: 192/93 (10-06-18 @ 08:40) (172/86 - 198/98)  RR: 18 (10-06-18 @ 08:40) (18 - 18)  SpO2: 95% (10-06-18 @ 08:40) (95% - 98%)  Wt(kg): --        10-05-18 @ 07:01  -  10-06-18 @ 07:00  --------------------------------------------------------  IN: 730 mL / OUT: 0 mL / NET: 730 mL      Physical Exam:  	Gen: NAD, well-appearing  	HEENT: on room air  	Pulm: CTA B/L  	CV: normal S1S2; no rub  	Abd: soft                      Back : No sacral edema  	: No sagastume  	LE: bilateral LE pitting edema  	Skin: Warm, without rashes  	Vascular access: RIJ tunnelled HD catheter    LABS/STUDIES  --------------------------------------------------------------------------------              9.2    10.3  >-----------<  399      [10-06-18 @ 09:24]              28.3     133  |  90  |  55  ----------------------------<  125      [10-06-18 @ 09:24]  5.6   |  27  |  7.91        Ca     9.6     [10-06-18 @ 09:24]      Mg     2.1     [10-06-18 @ 09:24]      Phos  8.3     [10-06-18 @ 09:24]      PT/INR: PT 13.5 , INR 1.23       [10-06-18 @ 09:25]  PTT: 145.9      [10-06-18 @ 09:25]      Creatinine Trend:  SCr 7.91 [10-06 @ 09:24]  SCr 5.61 [10-05 @ 06:06]  SCr 7.90 [10-04 @ 06:45]  SCr 11.12 [10-03 @ 09:37]  SCr 9.42 [10-02 @ 10:22]        Iron 24, TIBC 161, %sat 15      [03-20-18 @ 11:31]  Ferritin 250      [03-19-18 @ 20:02]  TSH 6.46      [10-02-18 @ 12:25]
[facility] DIVISION OF KIDNEY DISEASES AND HYPERTENSION -- FOLLOW UP NOTE  --------------------------------------------------------------------------------  Chief Complaint: ESRD/Ongoing hemodialysis requirement    24 hour events/subjective: Seen and examined at the bedside this AM. Over weekend underwent dialysis for elevated serum potassium. Also states that he had elevated blood pressures on Saturday night that improved with labetalol. Patient has no complaints currently, and denies CP, SOB, N/V/F/C.    PAST HISTORY  --------------------------------------------------------------------------------  No significant changes to PMH, PSH, FHx, SHx, unless otherwise noted    ALLERGIES & MEDICATIONS  --------------------------------------------------------------------------------  Allergies    No Known Allergies    Intolerances    beta blockers (Other (Mod to Severe))    Standing Inpatient Medications  AQUAPHOR (petrolatum Ointment) 1 Application(s) Topical daily  ascorbic acid 500 milliGRAM(s) Oral daily  aspirin enteric coated 81 milliGRAM(s) Oral daily  cloNIDine Patch 0.3 mG/24Hr(s) 1 patch Transdermal every 7 days  diltiazem    Tablet 90 milliGRAM(s) Oral every 6 hours  docusate sodium 100 milliGRAM(s) Oral two times a day  doxercalciferol Injectable 1 MICROGram(s) IV Push <User Schedule>  epoetin sherlyn Injectable 4000 Unit(s) IV Push <User Schedule>  furosemide    Tablet 80 milliGRAM(s) Oral daily  influenza   Vaccine 0.5 milliLiter(s) IntraMuscular once  labetalol 200 milliGRAM(s) Oral every 12 hours  Nephro-martha 1 Tablet(s) Oral daily  pantoprazole    Tablet 40 milliGRAM(s) Oral before breakfast  predniSONE   Tablet 5 milliGRAM(s) Oral daily  senna 2 Tablet(s) Oral at bedtime  tacrolimus 0.5 milliGRAM(s) Oral every 12 hours  trimethoprim  160 mG/sulfamethoxazole 800 mG 1 Tablet(s) Oral <User Schedule>    REVIEW OF SYSTEMS  --------------------------------------------------------------------------------  All other systems were reviewed and are negative, except as noted.    VITALS/PHYSICAL EXAM  --------------------------------------------------------------------------------  T(C): 36.8 (10-08-18 @ 04:35), Max: 36.8 (10-07-18 @ 20:00)  HR: 68 (10-08-18 @ 04:35) (61 - 69)  BP: 150/880 (10-08-18 @ 11:00) (145/82 - 175/86)  RR: 18 (10-08-18 @ 04:35) (18 - 20)  SpO2: 96% (10-08-18 @ 04:35) (95% - 100%)  Wt(kg): --    10-07-18 @ 07:01  -  10-08-18 @ 07:00  --------------------------------------------------------  IN: 1134 mL / OUT: 2100 mL / NET: -966 mL    10-08-18 @ 07:01  -  10-08-18 @ 11:19  --------------------------------------------------------  IN: 240 mL / OUT: 0 mL / NET: 240 mL    Physical Exam:  	Gen: NAD, well-appearing  	HEENT: supple neck  	Pulm: CTA B/L  	CV: RRR, S1S2; no rub  	Abd: +BS, soft, nontender/nondistended  	LE: Warm, FROM, 1+edema most pronounced in ankles  	Skin: Warm, without rashes  	Vascular access: Right IJ tunneled HD catheter    LABS/STUDIES  --------------------------------------------------------------------------------              9.2    9.0   >-----------<  420      [10-08-18 @ 01:42]              29.6     134  |  95  |  20  ----------------------------<  119      [10-08-18 @ 01:31]  4.6   |  28  |  4.75        Ca     9.5     [10-08-18 @ 01:31]      PT/INR: PT 27.8 , INR 2.53       [10-08-18 @ 01:42]  PTT: 84.9       [10-08-18 @ 01:42]    Creatinine Trend:  SCr 4.75 [10-08 @ 01:31]  SCr 7.22 [10-07 @ 17:30]  SCr 6.35 [10-07 @ 06:06]  SCr 7.91 [10-06 @ 09:24]  SCr 5.61 [10-05 @ 06:06]
[facility] Division of Kidney Diseases & Hypertension  HEMODIALYSIS NOTE  --------------------------------------------------------------------------------  Chief Complaint: ESRD/Ongoing hemodialysis requirement    24 hour events/subjective: Seen and examined at the bedside. Found to have elevated potassium. He was advised medical treatment but refused. He agreed to urgent hemodialysis.        PAST HISTORY  --------------------------------------------------------------------------------  No significant changes to PMH, PSH, FHx, SHx, unless otherwise noted    ALLERGIES & MEDICATIONS  --------------------------------------------------------------------------------  Allergies    No Known Allergies    Intolerances    beta blockers (Other (Mod to Severe))    Standing Inpatient Medications  AQUAPHOR (petrolatum Ointment) 1 Application(s) Topical daily  ascorbic acid 500 milliGRAM(s) Oral daily  aspirin enteric coated 81 milliGRAM(s) Oral daily  cloNIDine Patch 0.3 mG/24Hr(s) 1 patch Transdermal every 7 days  diltiazem    Tablet 90 milliGRAM(s) Oral every 6 hours  docusate sodium 100 milliGRAM(s) Oral two times a day  doxercalciferol Injectable 1 MICROGram(s) IV Push <User Schedule>  epoetin sherlyn Injectable 4000 Unit(s) IV Push <User Schedule>  furosemide    Tablet 80 milliGRAM(s) Oral daily  heparin  Infusion.  Unit(s)/Hr IV Continuous <Continuous>  influenza   Vaccine 0.5 milliLiter(s) IntraMuscular once  labetalol 200 milliGRAM(s) Oral every 12 hours  Nephro-martha 1 Tablet(s) Oral daily  pantoprazole    Tablet 40 milliGRAM(s) Oral before breakfast  predniSONE   Tablet 5 milliGRAM(s) Oral daily  senna 2 Tablet(s) Oral at bedtime  tacrolimus 1 milliGRAM(s) Oral every 12 hours  trimethoprim  160 mG/sulfamethoxazole 800 mG 1 Tablet(s) Oral <User Schedule>  warfarin 5 milliGRAM(s) Oral once    PRN Inpatient Medications  heparin  Injectable 7000 Unit(s) IV Push every 6 hours PRN  heparin  Injectable 3500 Unit(s) IV Push every 6 hours PRN  oxyCODONE    IR 5 milliGRAM(s) Oral every 6 hours PRN      REVIEW OF SYSTEMS  --------------------------------------------------------------------------------  Gen: No weight changes, fatigue, fevers/chills, weakness  Skin: No rashes  Head/Eyes/Ears/Mouth: No headache; Normal hearing; Normal vision w/o blurriness; No sinus pain/discomfort, sore throat  Respiratory: No dyspnea, cough, wheezing, hemoptysis  CV: No chest pain, PND, orthopnea  GI: No abdominal pain, diarrhea, constipation, nausea, vomiting, melena, hematochezia  : No increased frequency, dysuria, hematuria, nocturia  MSK: No joint pain/swelling; no back pain; no edema  Neuro: No dizziness/lightheadedness, weakness, seizures, numbness, tingling  Heme: No easy bruising or bleeding  Endo: No heat/cold intolerance  Psych: No significant nervousness, anxiety, stress, depression    All other systems were reviewed and are negative, except as noted.    VITALS/PHYSICAL EXAM  --------------------------------------------------------------------------------  T(C): 36.7 (10-07-18 @ 13:38), Max: 36.8 (10-07-18 @ 04:34)  HR: 69 (10-07-18 @ 13:38) (69 - 83)  BP: 145/82 (10-07-18 @ 13:38) (145/82 - 228/110)  RR: 18 (10-07-18 @ 13:38) (18 - 19)  SpO2: 96% (10-07-18 @ 13:38) (96% - 100%)  Wt(kg): --        10-06-18 @ 07:01  -  10-07-18 @ 07:00  --------------------------------------------------------  IN: 360 mL / OUT: 2900 mL / NET: -2540 mL    10-07-18 @ 07:01  -  10-07-18 @ 19:40  --------------------------------------------------------  IN: 792 mL / OUT: 0 mL / NET: 792 mL      Physical Exam:  	Gen: NAD, well-appearing  	HEENT: PERRL, supple neck, clear oropharynx  	Pulm: CTA B/L  	CV: RRR, S1S2; no rub  	Back: No spinal or CVA tenderness; no sacral edema  	Abd: +BS, soft, nontender/nondistended  	: No suprapubic tenderness  	UE: Warm, FROM, no clubbing, intact strength; no edema; no asterixis  	LE: Warm, b/l pedal edema++  	Neuro: No focal deficits, intact gait  	Psych: Normal affect and mood  	Skin: Warm, without rashes  	Vascular access: Right IJ Tunneled catheter    LABS/STUDIES  --------------------------------------------------------------------------------              9.6    12.3  >-----------<  413      [10-07-18 @ 06:06]              29.5     132  |  92  |  38  ----------------------------<  163      [10-07-18 @ 17:30]  6.3   |  24  |  7.22        Ca     9.8     [10-07-18 @ 17:30]      Mg     2.1     [10-06-18 @ 09:24]      Phos  8.3     [10-06-18 @ 09:24]      PT/INR: PT 15.5 , INR 1.42       [10-07-18 @ 00:36]  PTT: 78.6       [10-07-18 @ 00:36]      TSH 6.46      [10-02-18 @ 12:25]    HBsAb 6.9      [10-06-18 @ 10:23]  HBsAg Nonreact      [10-06-18 @ 10:23]  HCV 0.14, Nonreact      [10-06-18 @ 10:23]
[facility] DIVISION OF KIDNEY DISEASES AND HYPERTENSION   --------------------------------------------------------------------------------  Chief Complaint: ESRD/Ongoing hemodialysis requirement    24 hour events/subjective:  Seen on HD  denies SOB or palpitations    PAST HISTORY  --------------------------------------------------------------------------------  No significant changes to PMH, PSH, FHx, SHx, unless otherwise noted    ALLERGIES & MEDICATIONS  --------------------------------------------------------------------------------  Allergies    No Known Allergies    Intolerances    beta blockers (Other (Mod to Severe))    Standing Inpatient Medications  ascorbic acid 500 milliGRAM(s) Oral daily  aspirin enteric coated 81 milliGRAM(s) Oral daily  cloNIDine Patch 0.3 mG/24Hr(s) 1 patch Transdermal every 7 days  diltiazem    Tablet 30 milliGRAM(s) Oral every 6 hours  diltiazem Injectable 10 milliGRAM(s) IV Push once  doxercalciferol Injectable 1 MICROGram(s) IV Push <User Schedule>  epoetin sherlyn Injectable 4000 Unit(s) IV Push <User Schedule>  furosemide    Tablet 80 milliGRAM(s) Oral daily  heparin  Infusion.  Unit(s)/Hr IV Continuous <Continuous>  influenza   Vaccine 0.5 milliLiter(s) IntraMuscular once  Nephro-martha 1 Tablet(s) Oral daily  predniSONE   Tablet 5 milliGRAM(s) Oral daily  tacrolimus 1 milliGRAM(s) Oral every 12 hours  tacrolimus 1 milliGRAM(s) Oral once  trimethoprim  160 mG/sulfamethoxazole 800 mG 1 Tablet(s) Oral every 48 hours    PRN Inpatient Medications  heparin  Injectable 7000 Unit(s) IV Push every 6 hours PRN  heparin  Injectable 3500 Unit(s) IV Push every 6 hours PRN      REVIEW OF SYSTEMS  --------------------------------------------------------------------------------  All other systems were reviewed and are negative, except as noted.    VITALS/PHYSICAL EXAM  --------------------------------------------------------------------------------  T(C): 36.6 (10-03-18 @ 08:40), Max: 36.8 (10-02-18 @ 20:09)  HR: 81 (10-03-18 @ 08:40) (75 - 82)  BP: 169/101 (10-03-18 @ 08:40) (149/78 - 169/101)  RR: 20 (10-03-18 @ 08:40) (20 - 20)  SpO2: 92% (10-03-18 @ 08:40) (92% - 98%)  Wt(kg): --    Weight (kg): 89.9 (10-01-18 @ 23:07)      10-02-18 @ 07:01  -  10-03-18 @ 07:00  --------------------------------------------------------  IN: 516 mL / OUT: 50 mL / NET: 466 mL    10-03-18 @ 07:01  -  10-03-18 @ 12:20  --------------------------------------------------------  IN: 360 mL / OUT: 0 mL / NET: 360 mL      Physical Exam:  	Gen: NAD, well-appearing  	HEENT: PERRL, supple neck,  	Pulm: CTA B/L  	CV: RRR, S1S2; no rub  	Abd: +BS, soft, nontender/nondistended  	LE: Warm, FROM, 1+edema  	Skin: Warm, without rashes  	Vascular access: tunnel cath    LABS/STUDIES  --------------------------------------------------------------------------------              9.1    9.0   >-----------<  363      [10-03-18 @ 09:37]              27.6     134  |  89  |  85  ----------------------------<  138      [10-03-18 @ 09:37]  5.7   |  25  |  11.12        Ca     9.3     [10-03-18 @ 09:37]      iCa    1.13     [10-01 @ 20:02]      Mg     2.3     [10-03-18 @ 09:37]      Phos  5.3     [10-01-18 @ 20:01]    TPro  7.2  /  Alb  4.1  /  TBili  1.2  /  DBili  x   /  AST  11  /  ALT  15  /  AlkPhos  59  [10-01-18 @ 20:01]    PT/INR: PT 13.6 , INR 1.25       [10-02-18 @ 01:35]  PTT: 62.1       [10-03-18 @ 00:21]          [10-02-18 @ 01:35]    Iron 24, TIBC 161, %sat 15      [03-20-18 @ 11:31]  Ferritin 250      [03-19-18 @ 20:02]  TSH 6.46      [10-02-18 @ 12:25]
[facility] DIVISION OF KIDNEY DISEASES AND HYPERTENSION -- FOLLOW UP NOTE  --------------------------------------------------------------------------------  Chief Complaint: ESRD/Ongoing hemodialysis requirement    24 hour events/subjective: Seen and examined at the bedside this AM prior to maintenance HD. Patient states that he feels well, but is interested in PUF for fluid removal sometime this week. Denies CP, SOB, N/V/F/C.    PAST HISTORY  --------------------------------------------------------------------------------  No significant changes to PMH, PSH, FHx, SHx, unless otherwise noted    ALLERGIES & MEDICATIONS  --------------------------------------------------------------------------------  Allergies    No Known Allergies    Intolerances    beta blockers (Other (Mod to Severe))    Standing Inpatient Medications  AQUAPHOR (petrolatum Ointment) 1 Application(s) Topical daily  ascorbic acid 500 milliGRAM(s) Oral daily  aspirin enteric coated 81 milliGRAM(s) Oral daily  cloNIDine Patch 0.3 mG/24Hr(s) 1 patch Transdermal every 7 days  diltiazem    Tablet 90 milliGRAM(s) Oral every 6 hours  docusate sodium 100 milliGRAM(s) Oral two times a day  doxercalciferol Injectable 1 MICROGram(s) IV Push <User Schedule>  epoetin sherlyn Injectable 4000 Unit(s) IV Push <User Schedule>  furosemide    Tablet 80 milliGRAM(s) Oral daily  influenza   Vaccine 0.5 milliLiter(s) IntraMuscular once  labetalol 200 milliGRAM(s) Oral every 12 hours  Nephro-martha 1 Tablet(s) Oral daily  pantoprazole    Tablet 40 milliGRAM(s) Oral before breakfast  predniSONE   Tablet 5 milliGRAM(s) Oral daily  senna 2 Tablet(s) Oral at bedtime  tacrolimus 0.5 milliGRAM(s) Oral every 12 hours  trimethoprim  160 mG/sulfamethoxazole 800 mG 1 Tablet(s) Oral <User Schedule>    REVIEW OF SYSTEMS  --------------------------------------------------------------------------------  All other systems were reviewed and are negative, except as noted.    VITALS/PHYSICAL EXAM  --------------------------------------------------------------------------------  T(C): 36.7 (10-09-18 @ 04:23), Max: 36.8 (10-08-18 @ 12:40)  HR: 68 (10-09-18 @ 04:23) (60 - 71)  BP: 151/86 (10-09-18 @ 04:23) (136/74 - 170/90)  RR: 19 (10-09-18 @ 04:23) (18 - 19)  SpO2: 98% (10-09-18 @ 04:23) (96% - 98%)  Wt(kg): --    10-08-18 @ 07:01  -  10-09-18 @ 07:00  --------------------------------------------------------  IN: 1060 mL / OUT: 0 mL / NET: 1060 mL    Physical Exam:  	Gen: NAD, well-appearing  	HEENT: supple neck  	Pulm: CTA B/L  	CV: RRR, S1S2; no rub  	Abd: +BS, soft, nontender/nondistended  	LE: Warm, FROM, 1+edema most pronounced in ankles  	Skin: Warm, without rashes  	Vascular access: Right IJ tunneled HD catheter    LABS/STUDIES  --------------------------------------------------------------------------------              9.2    9.0   >-----------<  420      [10-08-18 @ 01:42]              29.6     134  |  95  |  20  ----------------------------<  119      [10-08-18 @ 01:31]  4.6   |  28  |  4.75        Ca     9.5     [10-08-18 @ 01:31]    Creatinine Trend:  SCr 4.75 [10-08 @ 01:31]  SCr 7.22 [10-07 @ 17:30]  SCr 6.35 [10-07 @ 06:06]  SCr 7.91 [10-06 @ 09:24]  SCr 5.61 [10-05 @ 06:06]
Patient is a 58y old  Male who presents with a chief complaint of sob (03 Oct 2018 12:19)      SUBJECTIVE / OVERNIGHT EVENTS: Comfortable without new complaints.   Review of Systems  chest pain no  palpitations no  sob no  nausea no  headache no    MEDICATIONS  (STANDING):  ascorbic acid 500 milliGRAM(s) Oral daily  aspirin enteric coated 81 milliGRAM(s) Oral daily  cloNIDine Patch 0.3 mG/24Hr(s) 1 patch Transdermal every 7 days  diltiazem    Tablet 30 milliGRAM(s) Oral once  diltiazem    Tablet 60 milliGRAM(s) Oral every 6 hours  doxercalciferol Injectable 1 MICROGram(s) IV Push <User Schedule>  epoetin sherlyn Injectable 4000 Unit(s) IV Push <User Schedule>  furosemide    Tablet 80 milliGRAM(s) Oral daily  heparin  Infusion.  Unit(s)/Hr (16 mL/Hr) IV Continuous <Continuous>  influenza   Vaccine 0.5 milliLiter(s) IntraMuscular once  Nephro-martha 1 Tablet(s) Oral daily  predniSONE   Tablet 5 milliGRAM(s) Oral daily  tacrolimus 1 milliGRAM(s) Oral every 12 hours  tacrolimus 1 milliGRAM(s) Oral once  trimethoprim  160 mG/sulfamethoxazole 800 mG 1 Tablet(s) Oral every 48 hours  warfarin 5 milliGRAM(s) Oral once    MEDICATIONS  (PRN):  heparin  Injectable 7000 Unit(s) IV Push every 6 hours PRN For aPTT less than 40  heparin  Injectable 3500 Unit(s) IV Push every 6 hours PRN For aPTT between 40 - 57      Vital Signs Last 24 Hrs  T(C): 36.8 (03 Oct 2018 13:14), Max: 36.8 (02 Oct 2018 20:09)  T(F): 98.3 (03 Oct 2018 13:14), Max: 98.3 (02 Oct 2018 20:09)  HR: 90 (03 Oct 2018 16:40) (75 - 90)  BP: 166/84 (03 Oct 2018 16:40) (156/86 - 178/60)  BP(mean): --  RR: 18 (03 Oct 2018 13:14) (18 - 20)  SpO2: 96% (03 Oct 2018 13:14) (92% - 97%)    PHYSICAL EXAM:  GENERAL: NAD, well-developed  HEAD:  Atraumatic, Normocephalic  EYES: EOMI, PERRLA, conjunctiva and sclera clear  NECK: Supple, No JVD  CHEST/LUNG: Clear to auscultation bilaterally; No wheeze  HEART: Regular rate and rhythm; No murmurs, rubs, or gallops  ABDOMEN: Soft, Nontender, Nondistended; Bowel sounds present  EXTREMITIES:  2+ Peripheral Pulses, No clubbing, cyanosis, or edema  PSYCH: AAOx3  NEUROLOGY: non-focal  SKIN: No rashes or lesions    LABS:                        9.1    9.0   )-----------( 363      ( 03 Oct 2018 09:37 )             27.6     10-03    134<L>  |  89<L>  |  85<H>  ----------------------------<  138<H>  5.7<H>   |  25  |  11.12<H>    Ca    9.3      03 Oct 2018 09:37  Phos  5.3     10-01  Mg     2.3     10-03    TPro  7.2  /  Alb  4.1  /  TBili  1.2  /  DBili  x   /  AST  11  /  ALT  15  /  AlkPhos  59  10-01    PT/INR - ( 02 Oct 2018 01:35 )   PT: 13.6 sec;   INR: 1.25 ratio         PTT - ( 03 Oct 2018 10:18 )  PTT:143.9 sec  CARDIAC MARKERS ( 02 Oct 2018 01:35 )  x     / x     / 112 U/L / x     / 2.9 ng/mL      Telemetry SR      RADIOLOGY & ADDITIONAL TESTS:    Imaging Personally Reviewed:    Consultant(s) Notes Reviewed:      Care Discussed with Consultants/Other Providers:
Patient is a 58y old  Male who presents with a chief complaint of sob (04 Oct 2018 10:22)      SUBJECTIVE / OVERNIGHT EVENTS: No new complaints.   Review of Systems  chest pain no  palpitations no  sob no  nausea no  headache no    MEDICATIONS  (STANDING):  ascorbic acid 500 milliGRAM(s) Oral daily  aspirin enteric coated 81 milliGRAM(s) Oral daily  cloNIDine Patch 0.3 mG/24Hr(s) 1 patch Transdermal every 7 days  diltiazem    Tablet 60 milliGRAM(s) Oral every 6 hours  doxercalciferol Injectable 1 MICROGram(s) IV Push <User Schedule>  epoetin sherlyn Injectable 4000 Unit(s) IV Push <User Schedule>  furosemide    Tablet 80 milliGRAM(s) Oral daily  heparin  Infusion.  Unit(s)/Hr (16 mL/Hr) IV Continuous <Continuous>  influenza   Vaccine 0.5 milliLiter(s) IntraMuscular once  Nephro-martha 1 Tablet(s) Oral daily  predniSONE   Tablet 5 milliGRAM(s) Oral daily  tacrolimus 1 milliGRAM(s) Oral every 12 hours  tacrolimus 1 milliGRAM(s) Oral once  trimethoprim  160 mG/sulfamethoxazole 800 mG 1 Tablet(s) Oral every 48 hours  warfarin 5 milliGRAM(s) Oral once    MEDICATIONS  (PRN):  heparin  Injectable 7000 Unit(s) IV Push every 6 hours PRN For aPTT less than 40  heparin  Injectable 3500 Unit(s) IV Push every 6 hours PRN For aPTT between 40 - 57      Vital Signs Last 24 Hrs  T(C): 36.9 (04 Oct 2018 13:20), Max: 36.9 (04 Oct 2018 12:05)  T(F): 98.4 (04 Oct 2018 13:20), Max: 98.4 (04 Oct 2018 12:05)  HR: 77 (04 Oct 2018 13:20) (71 - 90)  BP: 142/87 (04 Oct 2018 13:20) (142/87 - 172/90)  BP(mean): --  RR: 18 (04 Oct 2018 13:20) (18 - 18)  SpO2: 93% (04 Oct 2018 13:20) (93% - 97%)    PHYSICAL EXAM:  GENERAL: NAD, well-developed  HEAD:  Atraumatic, Normocephalic  EYES: EOMI, PERRLA, conjunctiva and sclera clear  NECK: Supple, No JVD  CHEST/LUNG: Clear to auscultation bilaterally; No wheeze  HEART: Regular rate and rhythm; No murmurs, rubs, or gallops  ABDOMEN: Soft, Nontender, Nondistended; Bowel sounds present  EXTREMITIES:  2+ Peripheral Pulses, No clubbing, cyanosis, or edema  PSYCH: AAOx3  NEUROLOGY: non-focal  SKIN: No rashes or lesions    LABS:                        10.1   9.7   )-----------( 395      ( 04 Oct 2018 06:45 )             31.7     10-04    133<L>  |  91<L>  |  54<H>  ----------------------------<  105<H>  6.0<H>   |  26  |  7.90<H>    Ca    10.1      04 Oct 2018 06:45  Phos  8.6     10-04  Mg     2.2     10-04      PT/INR - ( 04 Oct 2018 06:45 )   PT: 12.1 sec;   INR: 1.12 ratio         PTT - ( 04 Oct 2018 13:41 )  PTT:104.9 sec            RADIOLOGY & ADDITIONAL TESTS:    Imaging Personally Reviewed:    Consultant(s) Notes Reviewed:      Care Discussed with Consultants/Other Providers:
Patient is a 58y old  Male who presents with a chief complaint of sob (05 Oct 2018 10:39)      SUBJECTIVE / OVERNIGHT EVENTS: Comfortable without new complaints.   Review of Systems  chest pain no  palpitations no  sob no  nausea no  headache no    MEDICATIONS  (STANDING):  AQUAPHOR (petrolatum Ointment) 1 Application(s) Topical daily  ascorbic acid 500 milliGRAM(s) Oral daily  aspirin enteric coated 81 milliGRAM(s) Oral daily  cloNIDine Patch 0.3 mG/24Hr(s) 1 patch Transdermal every 7 days  diltiazem    Tablet 90 milliGRAM(s) Oral every 6 hours  docusate sodium 100 milliGRAM(s) Oral two times a day  doxercalciferol Injectable 1 MICROGram(s) IV Push <User Schedule>  epoetin sherlyn Injectable 4000 Unit(s) IV Push <User Schedule>  furosemide    Tablet 80 milliGRAM(s) Oral daily  heparin  Infusion.  Unit(s)/Hr (16 mL/Hr) IV Continuous <Continuous>  influenza   Vaccine 0.5 milliLiter(s) IntraMuscular once  Nephro-martha 1 Tablet(s) Oral daily  pantoprazole    Tablet 40 milliGRAM(s) Oral before breakfast  predniSONE   Tablet 5 milliGRAM(s) Oral daily  senna 2 Tablet(s) Oral at bedtime  tacrolimus 1 milliGRAM(s) Oral every 12 hours  trimethoprim  160 mG/sulfamethoxazole 800 mG 1 Tablet(s) Oral every 48 hours  warfarin 10 milliGRAM(s) Oral once    MEDICATIONS  (PRN):  heparin  Injectable 7000 Unit(s) IV Push every 6 hours PRN For aPTT less than 40  heparin  Injectable 3500 Unit(s) IV Push every 6 hours PRN For aPTT between 40 - 57      Vital Signs Last 24 Hrs  T(C): 36.9 (05 Oct 2018 12:42), Max: 36.9 (05 Oct 2018 04:49)  T(F): 98.4 (05 Oct 2018 12:42), Max: 98.5 (05 Oct 2018 04:49)  HR: 76 (05 Oct 2018 17:49) (74 - 81)  BP: 172/86 (05 Oct 2018 17:56) (161/81 - 178/92)  BP(mean): --  RR: 18 (05 Oct 2018 12:42) (18 - 18)  SpO2: 95% (05 Oct 2018 12:42) (95% - 99%)    PHYSICAL EXAM:  GENERAL: NAD, well-developed  HEAD:  Atraumatic, Normocephalic  EYES: EOMI, PERRLA, conjunctiva and sclera clear  NECK: Supple, No JVD  CHEST/LUNG: Clear to auscultation bilaterally; No wheeze  HEART: Regular rate and rhythm; No murmurs, rubs, or gallops  ABDOMEN: Soft, Nontender, Nondistended; Bowel sounds present  EXTREMITIES:  2+bipedal edema  PSYCH: AAOx3  NEUROLOGY: non-focal  SKIN: No rashes or lesions    LABS:                        8.8    9.2   )-----------( 364      ( 05 Oct 2018 06:05 )             27.3     10-05    133<L>  |  92<L>  |  35<H>  ----------------------------<  141<H>  5.3   |  28  |  5.61<H>    Ca    9.3      05 Oct 2018 06:06  Phos  7.0     10-05  Mg     2.0     10-05      PT/INR - ( 05 Oct 2018 06:05 )   PT: 13.1 sec;   INR: 1.20 ratio         PTT - ( 05 Oct 2018 04:02 )  PTT:68.6 sec            RADIOLOGY & ADDITIONAL TESTS:    Imaging Personally Reviewed:    Consultant(s) Notes Reviewed:      Care Discussed with Consultants/Other Providers:
Patient is a 58y old  Male who presents with a chief complaint of sob (06 Oct 2018 15:28)      SUBJECTIVE / OVERNIGHT EVENTS: No new complaints.   Review of Systems  chest pain no  palpitations no  sob no  nausea no  headache no    MEDICATIONS  (STANDING):  AQUAPHOR (petrolatum Ointment) 1 Application(s) Topical daily  ascorbic acid 500 milliGRAM(s) Oral daily  aspirin enteric coated 81 milliGRAM(s) Oral daily  cloNIDine Patch 0.3 mG/24Hr(s) 1 patch Transdermal every 7 days  diltiazem    Tablet 90 milliGRAM(s) Oral every 6 hours  docusate sodium 100 milliGRAM(s) Oral two times a day  doxercalciferol Injectable 1 MICROGram(s) IV Push <User Schedule>  epoetin sherlyn Injectable 4000 Unit(s) IV Push <User Schedule>  furosemide    Tablet 80 milliGRAM(s) Oral daily  heparin  Infusion.  Unit(s)/Hr (16 mL/Hr) IV Continuous <Continuous>  influenza   Vaccine 0.5 milliLiter(s) IntraMuscular once  Nephro-martha 1 Tablet(s) Oral daily  pantoprazole    Tablet 40 milliGRAM(s) Oral before breakfast  predniSONE   Tablet 5 milliGRAM(s) Oral daily  senna 2 Tablet(s) Oral at bedtime  tacrolimus 1 milliGRAM(s) Oral every 12 hours  trimethoprim  160 mG/sulfamethoxazole 800 mG 1 Tablet(s) Oral every 48 hours  warfarin 10 milliGRAM(s) Oral once    MEDICATIONS  (PRN):  heparin  Injectable 7000 Unit(s) IV Push every 6 hours PRN For aPTT less than 40  heparin  Injectable 3500 Unit(s) IV Push every 6 hours PRN For aPTT between 40 - 57  oxyCODONE    IR 5 milliGRAM(s) Oral every 6 hours PRN Moderate Pain (4 - 6)/Severe Pain (7 - 10)      Vital Signs Last 24 Hrs  T(C): 36.8 (06 Oct 2018 12:56), Max: 36.9 (05 Oct 2018 20:24)  T(F): 98.3 (06 Oct 2018 12:56), Max: 98.4 (05 Oct 2018 20:24)  HR: 75 (06 Oct 2018 12:56) (70 - 76)  BP: 164/84 (06 Oct 2018 16:00) (164/84 - 204/101)  BP(mean): --  RR: 19 (06 Oct 2018 12:56) (18 - 19)  SpO2: 96% (06 Oct 2018 12:56) (95% - 98%)    PHYSICAL EXAM:  GENERAL: NAD, well-developed  HEAD:  Atraumatic, Normocephalic  EYES: EOMI, PERRLA, conjunctiva and sclera clear  NECK: Supple, No JVD  CHEST/LUNG: Clear to auscultation bilaterally; No wheeze  HEART: Regular rate and rhythm; No murmurs, rubs, or gallops  ABDOMEN: Soft, Nontender, Nondistended; Bowel sounds present  EXTREMITIES:  2+ Peripheral Pulses, No clubbing, cyanosis, or edema  PSYCH: AAOx3  NEUROLOGY: non-focal  SKIN: No rashes or lesions    LABS:                        9.2    10.3  )-----------( 399      ( 06 Oct 2018 09:24 )             28.3     10-06    133<L>  |  90<L>  |  55<H>  ----------------------------<  125<H>  5.6<H>   |  27  |  7.91<H>    Ca    9.6      06 Oct 2018 09:24  Phos  8.3     10-06  Mg     2.1     10-06      PT/INR - ( 06 Oct 2018 09:25 )   PT: 13.5 sec;   INR: 1.23 ratio         PTT - ( 06 Oct 2018 09:25 )  PTT:145.9 sec      Telemetry SR 70      RADIOLOGY & ADDITIONAL TESTS:    Imaging Personally Reviewed:    Consultant(s) Notes Reviewed:      Care Discussed with Consultants/Other Providers:
Patient is a 58y old  Male who presents with a chief complaint of sob (08 Oct 2018 11:19)      SUBJECTIVE / OVERNIGHT EVENTS: Comfortable without new complaints.   Review of Systems  chest pain no  palpitations no  sob no  nausea no  headache no    MEDICATIONS  (STANDING):  AQUAPHOR (petrolatum Ointment) 1 Application(s) Topical daily  ascorbic acid 500 milliGRAM(s) Oral daily  aspirin enteric coated 81 milliGRAM(s) Oral daily  cloNIDine Patch 0.3 mG/24Hr(s) 1 patch Transdermal every 7 days  diltiazem    Tablet 90 milliGRAM(s) Oral every 6 hours  docusate sodium 100 milliGRAM(s) Oral two times a day  doxercalciferol Injectable 1 MICROGram(s) IV Push <User Schedule>  epoetin sherlyn Injectable 4000 Unit(s) IV Push <User Schedule>  furosemide    Tablet 80 milliGRAM(s) Oral daily  influenza   Vaccine 0.5 milliLiter(s) IntraMuscular once  labetalol 200 milliGRAM(s) Oral every 12 hours  Nephro-martah 1 Tablet(s) Oral daily  pantoprazole    Tablet 40 milliGRAM(s) Oral before breakfast  predniSONE   Tablet 5 milliGRAM(s) Oral daily  senna 2 Tablet(s) Oral at bedtime  tacrolimus 0.5 milliGRAM(s) Oral every 12 hours  trimethoprim  160 mG/sulfamethoxazole 800 mG 1 Tablet(s) Oral <User Schedule>    MEDICATIONS  (PRN):  oxyCODONE    IR 5 milliGRAM(s) Oral every 6 hours PRN Moderate Pain (4 - 6)/Severe Pain (7 - 10)  polyethylene glycol 3350 17 Gram(s) Oral daily PRN Constipation      Vital Signs Last 24 Hrs  T(C): 36.8 (08 Oct 2018 12:40), Max: 36.8 (07 Oct 2018 20:00)  T(F): 98.2 (08 Oct 2018 12:40), Max: 98.2 (07 Oct 2018 20:00)  HR: 60 (08 Oct 2018 12:40) (60 - 68)  BP: 136/74 (08 Oct 2018 12:40) (136/74 - 175/86)  BP(mean): 62 (08 Oct 2018 11:00) (62 - 62)  RR: 18 (08 Oct 2018 12:40) (18 - 20)  SpO2: 96% (08 Oct 2018 12:40) (95% - 100%)    PHYSICAL EXAM:  GENERAL: NAD, well-developed  HEAD:  Atraumatic, Normocephalic  EYES: EOMI, PERRLA, conjunctiva and sclera clear  NECK: Supple, No JVD  CHEST/LUNG: Clear to auscultation bilaterally; No wheeze  HEART: Regular rate and rhythm; No murmurs, rubs, or gallops  ABDOMEN: Soft, Nontender, Nondistended; Bowel sounds present  EXTREMITIES:  2+bipedal edema  PSYCH: AAOx3  NEUROLOGY: non-focal  SKIN: No rashes or lesions    LABS:                        9.2    9.0   )-----------( 420      ( 08 Oct 2018 01:42 )             29.6     10-08    134<L>  |  95<L>  |  20  ----------------------------<  119<H>  4.6   |  28  |  4.75<H>    Ca    9.5      08 Oct 2018 01:31      PT/INR - ( 08 Oct 2018 01:42 )   PT: 27.8 sec;   INR: 2.53 ratio         PTT - ( 08 Oct 2018 01:42 )  PTT:84.9 sec            RADIOLOGY & ADDITIONAL TESTS:    Imaging Personally Reviewed:    Consultant(s) Notes Reviewed:      Care Discussed with Consultants/Other Providers:
Patient is a 58y old  Male who presents with a chief complaint of sob (09 Oct 2018 10:46)      SUBJECTIVE / OVERNIGHT EVENTS: Comfortable without new complaints.   Review of Systems  chest pain no  palpitations no  sob no  nausea no  headache no    MEDICATIONS  (STANDING):  AQUAPHOR (petrolatum Ointment) 1 Application(s) Topical daily  ascorbic acid 500 milliGRAM(s) Oral daily  aspirin enteric coated 81 milliGRAM(s) Oral daily  cloNIDine Patch 0.3 mG/24Hr(s) 1 patch Transdermal every 7 days  diltiazem    Tablet 90 milliGRAM(s) Oral every 6 hours  docusate sodium 100 milliGRAM(s) Oral two times a day  doxercalciferol Injectable 1 MICROGram(s) IV Push <User Schedule>  epoetin sherlyn Injectable 4000 Unit(s) IV Push <User Schedule>  furosemide    Tablet 80 milliGRAM(s) Oral daily  influenza   Vaccine 0.5 milliLiter(s) IntraMuscular once  labetalol 200 milliGRAM(s) Oral every 12 hours  Nephro-martha 1 Tablet(s) Oral daily  pantoprazole    Tablet 40 milliGRAM(s) Oral before breakfast  predniSONE   Tablet 5 milliGRAM(s) Oral daily  senna 2 Tablet(s) Oral at bedtime  tacrolimus 0.5 milliGRAM(s) Oral every 12 hours  trimethoprim  160 mG/sulfamethoxazole 800 mG 1 Tablet(s) Oral <User Schedule>    MEDICATIONS  (PRN):  oxyCODONE    IR 5 milliGRAM(s) Oral every 6 hours PRN Moderate Pain (4 - 6)/Severe Pain (7 - 10)  polyethylene glycol 3350 17 Gram(s) Oral daily PRN Constipation      Vital Signs Last 24 Hrs  T(C): 36.7 (09 Oct 2018 12:48), Max: 36.7 (08 Oct 2018 20:56)  T(F): 98 (09 Oct 2018 12:48), Max: 98.1 (08 Oct 2018 20:56)  HR: 64 (09 Oct 2018 12:48) (64 - 71)  BP: 166/89 (09 Oct 2018 12:48) (147/62 - 173/94)  BP(mean): --  RR: 18 (09 Oct 2018 12:48) (18 - 19)  SpO2: 97% (09 Oct 2018 12:48) (97% - 99%)    PHYSICAL EXAM:  GENERAL: NAD, well-developed  HEAD:  Atraumatic, Normocephalic  EYES: EOMI, PERRLA, conjunctiva and sclera clear  NECK: Supple, No JVD  CHEST/LUNG: Clear to auscultation bilaterally; No wheeze  HEART: Regular rate and rhythm; No murmurs, rubs, or gallops  ABDOMEN: Soft, Nontender, Nondistended; Bowel sounds present  EXTREMITIES:  2+ Peripheral Pulses, No clubbing, cyanosis, or edema  PSYCH: AAOx3  NEUROLOGY: non-focal  SKIN: No rashes or lesions    LABS:                        9.2    9.0   )-----------( 420      ( 08 Oct 2018 01:42 )             29.6     10-08    134<L>  |  95<L>  |  20  ----------------------------<  119<H>  4.6   |  28  |  4.75<H>    Ca    9.5      08 Oct 2018 01:31      PT/INR - ( 09 Oct 2018 09:53 )   PT: 25.6 sec;   INR: 2.33 ratio         PTT - ( 08 Oct 2018 01:42 )  PTT:84.9 sec            RADIOLOGY & ADDITIONAL TESTS:    Imaging Personally Reviewed:    Consultant(s) Notes Reviewed:      Care Discussed with Consultants/Other Providers:
[facility] DIVISION OF KIDNEY DISEASES AND HYPERTENSION -- FOLLOW UP NOTE  --------------------------------------------------------------------------------  Chief Complaint: ESRD/Ongoing hemodialysis requirement    24 hour events/subjective: Patient seen and examined at bedside. Patient found to have hyperkalemia with labs this AM. Patient to have urgent HD today. Denies CP, SOB, palpitations, N/V/F/C.    PAST HISTORY  --------------------------------------------------------------------------------  No significant changes to PMH, PSH, FHx, SHx, unless otherwise noted    ALLERGIES & MEDICATIONS  --------------------------------------------------------------------------------  Allergies    No Known Allergies    Intolerances    beta blockers (Other (Mod to Severe))    Standing Inpatient Medications  ascorbic acid 500 milliGRAM(s) Oral daily  aspirin enteric coated 81 milliGRAM(s) Oral daily  cloNIDine Patch 0.3 mG/24Hr(s) 1 patch Transdermal every 7 days  diltiazem    Tablet 60 milliGRAM(s) Oral every 6 hours  doxercalciferol Injectable 1 MICROGram(s) IV Push <User Schedule>  epoetin sherlyn Injectable 4000 Unit(s) IV Push <User Schedule>  furosemide    Tablet 80 milliGRAM(s) Oral daily  heparin  Infusion.  Unit(s)/Hr IV Continuous <Continuous>  influenza   Vaccine 0.5 milliLiter(s) IntraMuscular once  Nephro-martha 1 Tablet(s) Oral daily  predniSONE   Tablet 5 milliGRAM(s) Oral daily  tacrolimus 1 milliGRAM(s) Oral every 12 hours  tacrolimus 1 milliGRAM(s) Oral once  trimethoprim  160 mG/sulfamethoxazole 800 mG 1 Tablet(s) Oral every 48 hours  warfarin 5 milliGRAM(s) Oral once    REVIEW OF SYSTEMS  --------------------------------------------------------------------------------  All other systems were reviewed and are negative, except as noted.    VITALS/PHYSICAL EXAM  --------------------------------------------------------------------------------  T(C): 36.9 (10-04-18 @ 13:20), Max: 36.9 (10-04-18 @ 12:05)  HR: 77 (10-04-18 @ 13:20) (71 - 90)  BP: 142/87 (10-04-18 @ 13:20) (142/87 - 172/90)  RR: 18 (10-04-18 @ 13:20) (18 - 18)  SpO2: 93% (10-04-18 @ 13:20) (93% - 97%)  Wt(kg): --    10-03-18 @ 07:01  -  10-04-18 @ 07:00  --------------------------------------------------------  IN: 2507.2 mL / OUT: 2000 mL / NET: 507.2 mL    10-04-18 @ 07:01  -  10-04-18 @ 14:48  --------------------------------------------------------  IN: 960 mL / OUT: 0 mL / NET: 960 mL    Physical Exam:  	Gen: NAD, well-appearing  	HEENT: PERRL, supple neck,  	Pulm: CTA B/L  	CV: RRR, S1S2; no rub  	Abd: +BS, soft, nontender/nondistended  	LE: Warm, FROM, 1+edema most pronounced in ankles  	Skin: Warm, without rashes  	Vascular access: Right IJ tunneled HD catheter    LABS/STUDIES  --------------------------------------------------------------------------------              10.1   9.7   >-----------<  395      [10-04-18 @ 06:45]              31.7     133  |  91  |  54  ----------------------------<  105      [10-04-18 @ 06:45]  6.0   |  26  |  7.90        Ca     10.1     [10-04-18 @ 06:45]      Mg     2.2     [10-04-18 @ 06:45]      Phos  8.6     [10-04-18 @ 06:45]    Creatinine Trend:  SCr 7.90 [10-04 @ 06:45]  SCr 11.12 [10-03 @ 09:37]  SCr 9.42 [10-02 @ 10:22]  SCr 7.59 [10-01 @ 20:01]

## 2018-10-09 NOTE — DISCHARGE NOTE ADULT - MEDICATION SUMMARY - MEDICATIONS TO STOP TAKING
I will STOP taking the medications listed below when I get home from the hospital:    calcitriol 0.25 mcg oral capsule  -- 1 cap(s) by mouth 3 times a week (Karina Oh, Sat)    minoxidil 2.5 mg oral tablet  -- 2 tab(s) by mouth once a day (in the evening)    NIFEdipine 60 mg oral tablet, extended release  -- 1 tab(s) by mouth 2 times a day    Vitamin D3 2000 intl units oral tablet  -- 1 tab(s) by mouth 3 times a week (Karina Oh & Sat)    propranolol 10 mg oral tablet  -- 1 tab(s) by mouth 2 times a day

## 2018-10-09 NOTE — DISCHARGE NOTE ADULT - PLAN OF CARE
Controlled Heart Rate You are currently back in normal rhythm  Atrial fibrillation/flutter is the most common heart rhythm problem.  The condition puts you at risk for has stroke and heart attack  It helps if you control your blood pressure, not drink more than 1-2 alcohol drinks per day, cut down on caffeine, getting treatment for over active thyroid gland, and get regular exercise  Call your doctor if you feel your heart racing or beating unusually, chest tightness or pain, lightheaded, faint, shortness of breath especially with exercise  It is important to take your heart medication as prescribed  You are on anticoagulation which is very important to take as directed - you need blood work (INR) to monitor drug levels Stable Take medications for your blood pressure as recommended.  Eat a heart healthy diet that is low in saturated fats and salt, and includes whole grains, fruits, vegetables and lean protein   Exercise regularly (consult with your physician or cardiologist first); maintain a heart healthy weight.   If you smoke - quit (A resource to help you stop smoking is the [facility] – phone number [phone].). Continue to follow with your primary physician or cardiologist.   Seek medical help for dizziness, Lightheadedness, Blurry vision, Headache, Chest pain, Shortness of breath  Follow up with your medical doctor to establish long term blood pressure treatment goals. Continue Dialysis Continue Dialysis as scheduled  Continue medications as prescribed  Avoid taking (NSAIDs) - (ex: Ibuprofen, Advil, Celebrex, Naprosyn)  Avoid taking any nephrotoxic agents (can harm kidneys) - Intravenous contrast for diagnostic testing, combination cold medications.  Have all medications adjusted for your renal function by your Health Care Provider.  Blood pressure control is important.  Take all medication as prescribed. Outpatient follow up Continue medications as prescribed  Need a repeat tacrolimus level next week to further adjust tacrolimus dosing You are currently back in normal rhythm  Follow up with with INR check in 2 days with Dialysis on Thursday 10/11/18 and fax results to Dr. Real office ([phone]) for further dosing of coumadin as necessary  Atrial fibrillation/flutter is the most common heart rhythm problem.  The condition puts you at risk for has stroke and heart attack  It helps if you control your blood pressure, not drink more than 1-2 alcohol drinks per day, cut down on caffeine, getting treatment for over active thyroid gland, and get regular exercise  Call your doctor if you feel your heart racing or beating unusually, chest tightness or pain, lightheaded, faint, shortness of breath especially with exercise  It is important to take your heart medication as prescribed  You are on anticoagulation which is very important to take as directed - you need blood work (INR) to monitor drug levels Continue medications as prescribed  Need a repeat tacrolimus level next week to further adjust tacrolimus dosing by Dr. Patton

## 2018-10-09 NOTE — PROGRESS NOTE ADULT - PROBLEM SELECTOR PLAN 3
Patient found to have mildly elevated tacrolimus trough level of 7.6 on 10/8/18, possibly causing hyperkalemia. Tacrolimus dosage adjusted to 0.5mg BID. Monitor tacrolimus trough levels (30 minutes prior to AM dose)

## 2018-10-09 NOTE — PROGRESS NOTE ADULT - ASSESSMENT
58 m with  A. Flutter now in SR- rate control with Cardizem, continue AC with Coumadin 5 mg daily with goal of INR 2-3  Telemetry, Cardiology follow noted.   ESRD- continue HD.  Hyperkalemia- HD, Kayexalate   Off Minoxidil as per Nephrology.  HTN control. Hypertensive urgency resolved. Tolerating Labetalol.  Pain control  Regular Diet as per patient request .  DC home. Follow with PMD/Cardiology/ Nephrology in 2-3 days. after HD tomorrow if stable.  Johan Sauer MD pager [phone]

## 2018-10-09 NOTE — DISCHARGE NOTE ADULT - PATIENT PORTAL LINK FT
You can access the [facility] Patient Portal, offered by [facility], by registering with the following website: [email or web address]

## 2018-10-09 NOTE — DISCHARGE NOTE ADULT - HOSPITAL COURSE
to be done 57yo M w/pmhx of HTN, ESRD s/p renal transplant x 2 but now requiring HD again (T Th Sat) presenting with SOB, chest discomfort found to be in Aflutter. Pt seen by cardiology – treated with medical management. Pt converted to NSR. Pt bridge from hep gtt to Coumadin. Echo with nl lv fxn. Pt with HTN urgency – resolved s/p medication adjustments. Seen by nephrology for ESRD - Continued HD, received urgent HD on 10/7 for hyperkalemia – now resolved. Patient medically cleared for discharge with PCP/Cardiology/Nephrology follow up as outpatient.

## 2018-10-09 NOTE — PROGRESS NOTE ADULT - ASSESSMENT
58 m with  A. Flutter now in SR- rate control with Cardizem, AC with Heparin/ bridge to Coumadin with goal of INR 2-3  Telemetry, Cardiology follow noted.   ESRD- HD today for hyperkalemia.   Off Minoxidil as per Nephrology.  HTN control  Patient requesting Regular Diet.  Johan Sauer MD pager [phone]

## 2018-10-09 NOTE — PROGRESS NOTE ADULT - PROVIDER SPECIALTY LIST ADULT
Cardiology
Internal Medicine
Nephrology
Internal Medicine
Internal Medicine

## 2018-10-09 NOTE — PROGRESS NOTE ADULT - ASSESSMENT
echo 4/17/18: EF 53, normal LV fx, mild MR   echo 10/5/18: EF 62%, mild MR, severely dilated left atrium, normal LV sys function     a/p     57yo M w/pmhx of HTN, ESRD s/p renal transplant x 2 but now requiring HD again (T Th Sat) presenting with SOB, chest discomfort found to be in Aflutter.     1. Parox. Aflutter  remains in sr, continue ccb for rate control   echo with nl lv fxn   CHADS 2 - coumadin per INR level     2. HTN  elevated bp likely secondary to s/p minoxidil taper / ESRD  bp overall  improving, continue with  labetalol 200mg bid  cont cardizem 90mg q6hr and cloNIDine Patch  trend BP    3. ESRD  renal f/u   continue fluid removal with HD and lasix     dvt ppx   dc planning per primary team   outpt f/u for 10/25/18 at 1:30 pm echo 4/17/18: EF 53, normal LV fx, mild MR   echo 10/5/18: EF 62%, mild MR, severely dilated left atrium, normal LV sys function     a/p     57yo M w/pmhx of HTN, ESRD s/p renal transplant x 2 but now requiring HD again (T Th Sat) presenting with SOB, chest discomfort found to be in Aflutter.     1. Parox. Aflutter  remains in sr, continue ccb for rate control   echo with nl lv fxn   CHADS 2 - coumadin per INR level     2. HTN  elevated bp likely secondary to s/p minoxidil taper / ESRD  bp overall  improving, continue with  labetalol 200mg bid  change change cardizem to  mg daily, continue with cloNIDine Patch  trend BP    3. ESRD  renal f/u   continue fluid removal with HD and lasix     dvt ppx   dc planning per primary team, will need INR check with next HD session on ( 10/11/18)  outpt f/u for 10/25/18 at 1:30 pm

## 2018-10-09 NOTE — DISCHARGE NOTE ADULT - MEDICATION SUMMARY - MEDICATIONS TO TAKE
I will START or STAY ON the medications listed below when I get home from the hospital:    predniSONE 5 mg oral tablet  -- 1 tab(s) by mouth once a day (in the morning)  -- Indication: For Renal transplant recipient    butalbital-acetaminophen 50 mg-325 mg oral capsule  -- Butalbital-acetaminophen-caffeine1 tab(s) by mouth once a day  -- Indication: For Headache    Aspir 81 oral delayed release tablet  -- 1 tab(s) by mouth once a day  -- Indication: For Cardiac Health    oxyCODONE 5 mg oral tablet  -- 1 tab(s) by mouth every 6 hours, As Needed  -for severe pain after dialysis (Tues, Thurs & Sat) MDD:4  -- Caution federal law prohibits the transfer of this drug to any person other  than the person for whom it was prescribed.  It is very important that you take or use this exactly as directed.  Do not skip doses or discontinue unless directed by your doctor.  May cause drowsiness.  Alcohol may intensify this effect.  Use care when operating dangerous machinery.  This prescription cannot be refilled.  Using more of this medication than prescribed may cause serious breathing problems.    -- Indication: For Severe Pain    cloNIDine 0.3 mg/24 hr transdermal film, extended release  -- 1 patch by transdermal patch once a week on Saturdays  -- Indication: For Essential hypertension    Cardizem  mg/24 hours oral tablet, extended release  -- 1 tab(s) by mouth once a day   -- It is very important that you take or use this exactly as directed.  Do not skip doses or discontinue unless directed by your doctor.  Some non-prescription drugs may aggravate your condition.  Read all labels carefully.  If a warning appears, check with your doctor before taking.  Swallow whole.  Do not crush.    -- Indication: For Atrial flutter, paroxysmal    warfarin 5 mg oral tablet  -- 1 tab(s) by mouth once a day (at bedtime)   -- Indication: For Atrial flutter, paroxysmal    labetalol 200 mg oral tablet  -- 1 tab(s) by mouth every 12 hours  -- Indication: For Essential hypertension    Lasix 80 mg oral tablet  -- 1 tab(s) by mouth once a day  -- Indication: For ESRD (end stage renal disease) on dialysis    epoetin sherlyn  -- 4000 unit(s) intravenously 3 times a week intradialysis  -- Indication: For Anemia due to chronic kidney disease, on chronic dialysis    tacrolimus 0.5 mg oral capsule  -- 1 cap(s) by mouth every 12 hours  -- Indication: For Renal transplant recipient    Co Q-10  -- 1 cap(s) by mouth once a day  -- Indication: For Supplement    Omega-3  -- 1 cap(s) by mouth once a day  -- Indication: For Supplement    Renvela 800 mg oral tablet  -- 1 tab(s) by mouth 2 times a day  -- Indication: For ESRD (end stage renal disease) on dialysis    pantoprazole 40 mg oral delayed release tablet  -- 1 tab(s) by mouth once a day  -- Indication: For Acid reflux    Bactrim  mg-160 mg oral tablet  -- 1 tab(s) by mouth 3 times a week (M, W, F)  -- Indication: For Prophylaxis    Nephrocaps oral capsule  -- 1 cap(s) by mouth once a day  -- Indication: For Supplement    doxercalciferol 2 mcg/mL injectable solution  -- 1 microgram(s) injectable 3 times a week intra dialysis  -- Indication: For Supplement    ascorbic acid 500 mg oral tablet  -- 1 tab(s) by mouth once a day  -- Indication: For Supplement

## 2018-10-09 NOTE — DISCHARGE NOTE ADULT - MEDICATION SUMMARY - MEDICATIONS TO CHANGE
I will SWITCH the dose or number of times a day I take the medications listed below when I get home from the hospital:    Prograf 1 mg oral capsule  -- 1 cap(s) by mouth every 12 hours- 11 am and 11 pm

## 2018-10-09 NOTE — DISCHARGE NOTE ADULT - ADDITIONAL INSTRUCTIONS
Follow up with PCP/Nephrologist Dr. Patton in 1-2 weeks  Follow up with cardiologist Dr. Real on 10/25 at 1:30PM  Follow up with with INR check in 2-3 days to further dosing of coumadin as necessary Follow up with PCP/Nephrologist Dr. Patton in 1 week  Follow up with cardiologist Dr. Real on 10/25 at 1:30PM  Follow up with with INR check in 2 days with Dialysis on Thursday 10/11/18 and fax results to Dr. Real office ([phone]) for further dosing of coumadin as necessary  Need a repeat tacrolimus level next week to further adjust tacrolimus dosing by Dr. Patton

## 2018-10-10 RX ORDER — DILTIAZEM HCL 120 MG
1 CAPSULE, EXT RELEASE 24 HR ORAL
Qty: 30 | Refills: 0 | OUTPATIENT
Start: 2018-10-10 | End: 2018-11-08

## 2018-10-16 ENCOUNTER — INBOUND DOCUMENT (OUTPATIENT)
Age: 58
End: 2018-10-16

## 2018-11-11 PROCEDURE — 86803 HEPATITIS C AB TEST: CPT

## 2018-11-11 PROCEDURE — 82553 CREATINE MB FRACTION: CPT

## 2018-11-11 PROCEDURE — 85730 THROMBOPLASTIN TIME PARTIAL: CPT

## 2018-11-11 PROCEDURE — 82550 ASSAY OF CK (CPK): CPT

## 2018-11-11 PROCEDURE — 82947 ASSAY GLUCOSE BLOOD QUANT: CPT

## 2018-11-11 PROCEDURE — 86706 HEP B SURFACE ANTIBODY: CPT

## 2018-11-11 PROCEDURE — 83880 ASSAY OF NATRIURETIC PEPTIDE: CPT

## 2018-11-11 PROCEDURE — 82435 ASSAY OF BLOOD CHLORIDE: CPT

## 2018-11-11 PROCEDURE — 90662 IIV NO PRSV INCREASED AG IM: CPT

## 2018-11-11 PROCEDURE — 84132 ASSAY OF SERUM POTASSIUM: CPT

## 2018-11-11 PROCEDURE — 87340 HEPATITIS B SURFACE AG IA: CPT

## 2018-11-11 PROCEDURE — 84484 ASSAY OF TROPONIN QUANT: CPT

## 2018-11-11 PROCEDURE — 93306 TTE W/DOPPLER COMPLETE: CPT

## 2018-11-11 PROCEDURE — 80197 ASSAY OF TACROLIMUS: CPT

## 2018-11-11 PROCEDURE — 71045 X-RAY EXAM CHEST 1 VIEW: CPT

## 2018-11-11 PROCEDURE — 83735 ASSAY OF MAGNESIUM: CPT

## 2018-11-11 PROCEDURE — 85014 HEMATOCRIT: CPT

## 2018-11-11 PROCEDURE — 99291 CRITICAL CARE FIRST HOUR: CPT | Mod: 25

## 2018-11-11 PROCEDURE — 80053 COMPREHEN METABOLIC PANEL: CPT

## 2018-11-11 PROCEDURE — 84443 ASSAY THYROID STIM HORMONE: CPT

## 2018-11-11 PROCEDURE — 93005 ELECTROCARDIOGRAM TRACING: CPT

## 2018-11-11 PROCEDURE — 82803 BLOOD GASES ANY COMBINATION: CPT

## 2018-11-11 PROCEDURE — 96374 THER/PROPH/DIAG INJ IV PUSH: CPT

## 2018-11-11 PROCEDURE — 85027 COMPLETE CBC AUTOMATED: CPT

## 2018-11-11 PROCEDURE — 99261: CPT

## 2018-11-11 PROCEDURE — 83605 ASSAY OF LACTIC ACID: CPT

## 2018-11-11 PROCEDURE — 82330 ASSAY OF CALCIUM: CPT

## 2018-11-11 PROCEDURE — 84295 ASSAY OF SERUM SODIUM: CPT

## 2018-11-11 PROCEDURE — 71250 CT THORAX DX C-: CPT

## 2018-11-11 PROCEDURE — 85610 PROTHROMBIN TIME: CPT

## 2018-11-11 PROCEDURE — 84100 ASSAY OF PHOSPHORUS: CPT

## 2018-11-11 PROCEDURE — 80048 BASIC METABOLIC PNL TOTAL CA: CPT

## 2018-11-15 ENCOUNTER — RX RENEWAL (OUTPATIENT)
Age: 58
End: 2018-11-15

## 2018-11-29 ENCOUNTER — INPATIENT (INPATIENT)
Facility: HOSPITAL | Age: 58
LOS: 10 days | Discharge: HOME | DRG: 291 | End: 2018-12-10
Attending: INTERNAL MEDICINE | Admitting: INTERNAL MEDICINE
Payer: MEDICARE

## 2018-11-29 VITALS
HEIGHT: 71 IN | TEMPERATURE: 97 F | WEIGHT: 190.04 LBS | SYSTOLIC BLOOD PRESSURE: 187 MMHG | RESPIRATION RATE: 20 BRPM | OXYGEN SATURATION: 94 % | DIASTOLIC BLOOD PRESSURE: 99 MMHG | HEART RATE: 91 BPM

## 2018-11-29 DIAGNOSIS — I48.2 CHRONIC ATRIAL FIBRILLATION: ICD-10-CM

## 2018-11-29 DIAGNOSIS — Z94.0 KIDNEY TRANSPLANT STATUS: ICD-10-CM

## 2018-11-29 DIAGNOSIS — E87.79 OTHER FLUID OVERLOAD: ICD-10-CM

## 2018-11-29 DIAGNOSIS — K21.0 GASTRO-ESOPHAGEAL REFLUX DISEASE WITH ESOPHAGITIS: ICD-10-CM

## 2018-11-29 DIAGNOSIS — J96.01 ACUTE RESPIRATORY FAILURE WITH HYPOXIA: ICD-10-CM

## 2018-11-29 DIAGNOSIS — N18.6 END STAGE RENAL DISEASE: ICD-10-CM

## 2018-11-29 DIAGNOSIS — E87.70 FLUID OVERLOAD, UNSPECIFIED: ICD-10-CM

## 2018-11-29 DIAGNOSIS — Z94.0 KIDNEY TRANSPLANT STATUS: Chronic | ICD-10-CM

## 2018-11-29 DIAGNOSIS — I10 ESSENTIAL (PRIMARY) HYPERTENSION: ICD-10-CM

## 2018-11-29 DIAGNOSIS — R06.00 DYSPNEA, UNSPECIFIED: ICD-10-CM

## 2018-11-29 DIAGNOSIS — Z90.411 ACQUIRED PARTIAL ABSENCE OF PANCREAS: Chronic | ICD-10-CM

## 2018-11-29 LAB
ALBUMIN SERPL ELPH-MCNC: 4.1 G/DL — SIGNIFICANT CHANGE UP (ref 3.3–5)
ALP SERPL-CCNC: 58 U/L — SIGNIFICANT CHANGE UP (ref 40–120)
ALT FLD-CCNC: 12 U/L — SIGNIFICANT CHANGE UP (ref 10–45)
ANION GAP SERPL CALC-SCNC: 18 MMOL/L — HIGH (ref 5–17)
AST SERPL-CCNC: 14 U/L — SIGNIFICANT CHANGE UP (ref 10–40)
BASOPHILS # BLD AUTO: 0 K/UL — SIGNIFICANT CHANGE UP (ref 0–0.2)
BASOPHILS NFR BLD AUTO: 0.4 % — SIGNIFICANT CHANGE UP (ref 0–2)
BILIRUB SERPL-MCNC: 0.5 MG/DL — SIGNIFICANT CHANGE UP (ref 0.2–1.2)
BUN SERPL-MCNC: 48 MG/DL — HIGH (ref 7–23)
CALCIUM SERPL-MCNC: 9.4 MG/DL — SIGNIFICANT CHANGE UP (ref 8.4–10.5)
CHLORIDE SERPL-SCNC: 96 MMOL/L — SIGNIFICANT CHANGE UP (ref 96–108)
CO2 SERPL-SCNC: 27 MMOL/L — SIGNIFICANT CHANGE UP (ref 22–31)
CREAT SERPL-MCNC: 9.13 MG/DL — HIGH (ref 0.5–1.3)
EOSINOPHIL # BLD AUTO: 0.1 K/UL — SIGNIFICANT CHANGE UP (ref 0–0.5)
EOSINOPHIL NFR BLD AUTO: 1.2 % — SIGNIFICANT CHANGE UP (ref 0–6)
GAS PNL BLDV: SIGNIFICANT CHANGE UP
GLUCOSE SERPL-MCNC: 105 MG/DL — HIGH (ref 70–99)
HCT VFR BLD CALC: 29.1 % — LOW (ref 39–50)
HGB BLD-MCNC: 9.1 G/DL — LOW (ref 13–17)
LYMPHOCYTES # BLD AUTO: 2.1 K/UL — SIGNIFICANT CHANGE UP (ref 1–3.3)
LYMPHOCYTES # BLD AUTO: 20.5 % — SIGNIFICANT CHANGE UP (ref 13–44)
MCHC RBC-ENTMCNC: 28 PG — SIGNIFICANT CHANGE UP (ref 27–34)
MCHC RBC-ENTMCNC: 31.1 GM/DL — LOW (ref 32–36)
MCV RBC AUTO: 89.8 FL — SIGNIFICANT CHANGE UP (ref 80–100)
MONOCYTES # BLD AUTO: 1 K/UL — HIGH (ref 0–0.9)
MONOCYTES NFR BLD AUTO: 10 % — SIGNIFICANT CHANGE UP (ref 2–14)
NEUTROPHILS # BLD AUTO: 7 K/UL — SIGNIFICANT CHANGE UP (ref 1.8–7.4)
NEUTROPHILS NFR BLD AUTO: 67.8 % — SIGNIFICANT CHANGE UP (ref 43–77)
NT-PROBNP SERPL-SCNC: HIGH PG/ML (ref 0–300)
PLATELET # BLD AUTO: 434 K/UL — HIGH (ref 150–400)
POTASSIUM SERPL-MCNC: 5.2 MMOL/L — SIGNIFICANT CHANGE UP (ref 3.5–5.3)
POTASSIUM SERPL-SCNC: 5.2 MMOL/L — SIGNIFICANT CHANGE UP (ref 3.5–5.3)
PROT SERPL-MCNC: 6.8 G/DL — SIGNIFICANT CHANGE UP (ref 6–8.3)
RBC # BLD: 3.24 M/UL — LOW (ref 4.2–5.8)
RBC # FLD: 15.2 % — HIGH (ref 10.3–14.5)
SODIUM SERPL-SCNC: 141 MMOL/L — SIGNIFICANT CHANGE UP (ref 135–145)
TROPONIN T, HIGH SENSITIVITY RESULT: 178 NG/L — HIGH (ref 0–51)
WBC # BLD: 10.4 K/UL — SIGNIFICANT CHANGE UP (ref 3.8–10.5)
WBC # FLD AUTO: 10.4 K/UL — SIGNIFICANT CHANGE UP (ref 3.8–10.5)

## 2018-11-29 PROCEDURE — 93010 ELECTROCARDIOGRAM REPORT: CPT

## 2018-11-29 PROCEDURE — 99223 1ST HOSP IP/OBS HIGH 75: CPT

## 2018-11-29 PROCEDURE — 99285 EMERGENCY DEPT VISIT HI MDM: CPT | Mod: GC,25

## 2018-11-29 PROCEDURE — 71046 X-RAY EXAM CHEST 2 VIEWS: CPT | Mod: 26

## 2018-11-29 PROCEDURE — 99222 1ST HOSP IP/OBS MODERATE 55: CPT | Mod: GC

## 2018-11-29 RX ORDER — ASCORBIC ACID 60 MG
500 TABLET,CHEWABLE ORAL DAILY
Qty: 0 | Refills: 0 | Status: DISCONTINUED | OUTPATIENT
Start: 2018-11-29 | End: 2018-12-10

## 2018-11-29 RX ORDER — SEVELAMER CARBONATE 2400 MG/1
1 POWDER, FOR SUSPENSION ORAL
Qty: 0 | Refills: 0 | COMMUNITY

## 2018-11-29 RX ORDER — PANTOPRAZOLE SODIUM 20 MG/1
40 TABLET, DELAYED RELEASE ORAL
Qty: 0 | Refills: 0 | Status: DISCONTINUED | OUTPATIENT
Start: 2018-11-29 | End: 2018-12-10

## 2018-11-29 RX ORDER — WARFARIN SODIUM 2.5 MG/1
5 TABLET ORAL ONCE
Qty: 0 | Refills: 0 | Status: COMPLETED | OUTPATIENT
Start: 2018-11-29 | End: 2018-11-29

## 2018-11-29 RX ORDER — ERYTHROPOIETIN 10000 [IU]/ML
4000 INJECTION, SOLUTION INTRAVENOUS; SUBCUTANEOUS
Qty: 0 | Refills: 0 | Status: DISCONTINUED | OUTPATIENT
Start: 2018-11-29 | End: 2018-11-30

## 2018-11-29 RX ORDER — NIFEDIPINE 30 MG
60 TABLET, EXTENDED RELEASE 24 HR ORAL
Qty: 0 | Refills: 0 | Status: DISCONTINUED | OUTPATIENT
Start: 2018-11-29 | End: 2018-12-10

## 2018-11-29 RX ORDER — ASPIRIN/CALCIUM CARB/MAGNESIUM 324 MG
1 TABLET ORAL
Qty: 0 | Refills: 0 | COMMUNITY

## 2018-11-29 RX ORDER — HEPARIN SODIUM 5000 [USP'U]/ML
5000 INJECTION INTRAVENOUS; SUBCUTANEOUS EVERY 12 HOURS
Qty: 0 | Refills: 0 | Status: DISCONTINUED | OUTPATIENT
Start: 2018-11-29 | End: 2018-11-29

## 2018-11-29 RX ORDER — TACROLIMUS 5 MG/1
0.5 CAPSULE ORAL EVERY 12 HOURS
Qty: 0 | Refills: 0 | Status: DISCONTINUED | OUTPATIENT
Start: 2018-11-29 | End: 2018-12-10

## 2018-11-29 RX ORDER — OMEGA-3 ACID ETHYL ESTERS 1 G
4 CAPSULE ORAL DAILY
Qty: 0 | Refills: 0 | Status: DISCONTINUED | OUTPATIENT
Start: 2018-11-29 | End: 2018-12-10

## 2018-11-29 RX ORDER — LOSARTAN POTASSIUM 100 MG/1
25 TABLET, FILM COATED ORAL ONCE
Qty: 0 | Refills: 0 | Status: COMPLETED | OUTPATIENT
Start: 2018-11-29 | End: 2018-11-29

## 2018-11-29 RX ORDER — FUROSEMIDE 40 MG
80 TABLET ORAL DAILY
Qty: 0 | Refills: 0 | Status: DISCONTINUED | OUTPATIENT
Start: 2018-11-29 | End: 2018-12-04

## 2018-11-29 RX ORDER — ASPIRIN/CALCIUM CARB/MAGNESIUM 324 MG
81 TABLET ORAL DAILY
Qty: 0 | Refills: 0 | Status: DISCONTINUED | OUTPATIENT
Start: 2018-11-29 | End: 2018-12-10

## 2018-11-29 RX ORDER — OMEGA-3 ACID ETHYL ESTERS 1 G
1 CAPSULE ORAL
Qty: 0 | Refills: 0 | COMMUNITY

## 2018-11-29 RX ORDER — PANTOPRAZOLE SODIUM 20 MG/1
1 TABLET, DELAYED RELEASE ORAL
Qty: 0 | Refills: 0 | COMMUNITY

## 2018-11-29 RX ORDER — DOXERCALCIFEROL 2.5 UG/1
1 CAPSULE ORAL
Qty: 0 | Refills: 0 | Status: DISCONTINUED | OUTPATIENT
Start: 2018-11-29 | End: 2018-12-10

## 2018-11-29 RX ORDER — PROPRANOLOL HCL 160 MG
10 CAPSULE, EXTENDED RELEASE 24HR ORAL
Qty: 0 | Refills: 0 | Status: DISCONTINUED | OUTPATIENT
Start: 2018-11-29 | End: 2018-12-06

## 2018-11-29 RX ORDER — SEVELAMER CARBONATE 2400 MG/1
800 POWDER, FOR SUSPENSION ORAL EVERY 12 HOURS
Qty: 0 | Refills: 0 | Status: DISCONTINUED | OUTPATIENT
Start: 2018-11-29 | End: 2018-12-10

## 2018-11-29 RX ORDER — OXYCODONE HYDROCHLORIDE 5 MG/1
5 TABLET ORAL EVERY 6 HOURS
Qty: 0 | Refills: 0 | Status: DISCONTINUED | OUTPATIENT
Start: 2018-11-29 | End: 2018-12-03

## 2018-11-29 RX ORDER — DILTIAZEM HCL 120 MG
360 CAPSULE, EXT RELEASE 24 HR ORAL DAILY
Qty: 0 | Refills: 0 | Status: DISCONTINUED | OUTPATIENT
Start: 2018-11-29 | End: 2018-11-29

## 2018-11-29 RX ORDER — HYDRALAZINE HCL 50 MG
10 TABLET ORAL ONCE
Qty: 0 | Refills: 0 | Status: DISCONTINUED | OUTPATIENT
Start: 2018-11-29 | End: 2018-11-29

## 2018-11-29 RX ORDER — AZTREONAM 2 G
1 VIAL (EA) INJECTION
Qty: 0 | Refills: 0 | COMMUNITY

## 2018-11-29 RX ADMIN — TACROLIMUS 0.5 MILLIGRAM(S): 5 CAPSULE ORAL at 21:38

## 2018-11-29 RX ADMIN — LOSARTAN POTASSIUM 25 MILLIGRAM(S): 100 TABLET, FILM COATED ORAL at 21:38

## 2018-11-29 RX ADMIN — Medication 10 MILLIGRAM(S): at 18:23

## 2018-11-29 RX ADMIN — Medication 60 MILLIGRAM(S): at 18:23

## 2018-11-29 RX ADMIN — OXYCODONE HYDROCHLORIDE 5 MILLIGRAM(S): 5 TABLET ORAL at 17:07

## 2018-11-29 RX ADMIN — SEVELAMER CARBONATE 800 MILLIGRAM(S): 2400 POWDER, FOR SUSPENSION ORAL at 18:23

## 2018-11-29 NOTE — ED ADULT NURSE NOTE - OBJECTIVE STATEMENT
58 year old male presents ambulatory to ED in Red exam room 33, complaining of shortness of breath x 7 days after increasing dose of hydralazine from 10mg to 75mg. History of ESRN on Dialysis via right upper chest wall permacath [facility], s/p renal transplant, HTN. Nephrologist Dr. Sloan advised him to come in for evaluation today if still having SOB - Did not have dialysis today. SOB worse when laying down. Also endorsing mild HA since 8am and blurry vision which she states he gets when his BP is high. Patient is awake, alert, a&ox3, following commands, strong equal strength bilaterally x 4. Denies Chest pain, chest discomfort, palpatations, abdominal pain, NVD, fevers, chills. Patient undressed and placed into gown, call bell in hand and side rails up with bed in lowest position for safety. blanket provided. Comfort and safety provided. 58 year old male, A&Ox4, presents to ED by EMS w/ c/o of SOB x 7 days after increasing dose of hydralazine from 10mg to 75mg. History of kidney transplant, ESRN on Dialysis via right upper chest wall permacath [facility], s/p renal transplant, HTN. Pt. also on 80 mg Lasix. Nephrologist Dr. Sloan advised him to come in for evaluation today if still having SOB - Did not have dialysis today. SOB worse when laying down, improves w/ sitting up. Pt. also reports SOB is worse on exertion such as when he goes to brush his teeth or shave. Pt. was 80s on RA as per EMS - placed on 4 L NC and O2 improved to 95%. Pt. able to speak in clear, full sentences. Pt. in no acute respiratory distress. Pt. is also mild HA since 8am and blurry vision which she states he gets when his BP is high. Denies Chest pain, chest discomfort, palpitations, abdominal pain, NVD, fevers, chills. Pt. has bilateral lower extremity edema, which pt. reports has gotten worse in the past few days. Lung sounds clear in the upper fields, diminished in the lower fields. No cough. No recent travel. Call bell in hand and side rails up with bed in lowest position for safety. Pt. placed on CM - NSR to the 80s. Safety and comfort provided. Mom at bedside.

## 2018-11-29 NOTE — CONSULT NOTE ADULT - ASSESSMENT
echo 4/17/18: EF 53, normal LV fx, mild MR   echo 10/5/18: EF 62%, mild MR, severely dilated left atrium, normal LV sys function     a/p     59yo M w/pmhx of HTN, Paflutter,  ESRD s/p renal transplant x 2 but now requiring HD presenting with SOB and uncontrolled BP      1. Parox. Aflutter  cv stable no cp   check EKG  rate controlled on outpt cardizem. will stop Cardizem and start nifedipine for bp management  will start low dose propanolol  for rate control   recent echo with nl lv fxn   CHADS 2 - coumadin per INR level     2. Uncontrolled  HTN  will remain off labetolol due to intolerance to medication   Stop Cardizem CD. Start nifedipine 60 mg BID   continue with 0.3 mg cloNIDine Patch   start propanolol 10 mg BID for rate control   will consider low dose cozaar if K remains stable tomorrow and with close monitoring of K levels     trend BP    3. Acute Diastolic CHF  likely in the setting of uncontrolled HTN   evidence of fluid overload on exam   HS trop x1 noted, demand ischemia in the setting of ESRD/ uncontrolled HTN   BP Control   fluid removal with lasix/ HD   recent echo with normal LV fx      4. ESRD  renal f/u   continue fluid removal with HD and lasix
59 yo M with PMHx of ESRD on HD and history of two kidney transplants admitted for acute SOB and uncontrolled HTN. Pt has been having extra UF session as an outpatient at [facility] dialysis unit without resolution of his hypervolemia and uncontrolled HTN.

## 2018-11-29 NOTE — H&P ADULT - NSHPLABSRESULTS_GEN_ALL_CORE
.  LABS:                         9.1    10.4  )-----------( 434      ( 29 Nov 2018 10:55 )             29.1     11-29    141  |  96  |  48<H>  ----------------------------<  105<H>  5.2   |  27  |  9.13<H>    Ca    9.4      29 Nov 2018 10:55    TPro  6.8  /  Alb  4.1  /  TBili  0.5  /  DBili  x   /  AST  14  /  ALT  12  /  AlkPhos  58  11-29        Serum Pro-Brain Natriuretic Peptide: >07902 pg/mL (11-29 @ 10:55)        RADIOLOGY, EKG & ADDITIONAL TESTS: Reviewed.     cxr-->  Small to moderate size left pleural effusion with associated atelectasis, increased in size. Mild pulmonary vascular congestion.   ekg PENDING

## 2018-11-29 NOTE — H&P ADULT - NSHPREVIEWOFSYSTEMS_GEN_ALL_CORE
GENERAL: No fevers, no chills.  EYES: No blurry vision,  No photophobia  ENT: No sore throat.  No dysphagia  Cardiovascular: No chest pain, palpitations, orthopnea  Pulmonary: No cough, no wheezing. No shortness of breath  Gastrointestinal: No abdominal pain, no diarrhea, no constipation.    : No dysuria.  No hematuria  Musculoskeletal: No weakness.  No myalgias.  Dermatology:  No rashes.  Neuro: No Headache.  No vertigo.  No dizziness. GENERAL: No fevers, no chills.  EYES: No blurry vision,  No photophobia  ENT: No sore throat.  No dysphagia  Cardiovascular: No chest pain, palpitations, orthopnea  Pulmonary: No cough, no wheezing. + shortness of breath  Gastrointestinal: No abdominal pain, no diarrhea, no constipation.    : No dysuria.  No hematuria  Musculoskeletal: No weakness.  No myalgias. + lower extremity edema  Dermatology:  No rashes.  Neuro: No Headache.  No vertigo.  No dizziness.

## 2018-11-29 NOTE — CONSULT NOTE ADULT - ATTENDING COMMENTS
Patient seen and examined.  Agree with above NP note.  cv stable  admitted with uncontrolled htn, dyspnea in setting of volume overload  pt has tried multiple anti-htn meds with many potential adverse effects from them   has not tolerated bb's well  need to increase anti-htn regimen for better control  will stop cardizem, and change to nifedipine 60mg BID (he tolerated well in the past)  add propanolol for paf hx and htn (tolerated well in past)  will start low dose cozaar sulema if k stable  can closely monitor k as outpt
Please check hypertension work up as above and resume home medications to see if this is a compliance issue.  Lung ultrasonography performed by me at bedside showed B line pattern and bilateral pleural effusions consistent with a volume overload state -- most likely a volume mediated hypertension.

## 2018-11-29 NOTE — CONSULT NOTE ADULT - SUBJECTIVE AND OBJECTIVE BOX
CARDIOLOGY CONSULT - Dr. Real     CHIEF COMPLAINT: SOB/ uncontrolled bp     HPI:  58 year old male with Pmed hx as mentioned below presenting with MAY and uncontrolled BP. Patient is known to the practice. Cardiac Hx included Paflutter and HTN. Patient reports his bp readings at home has ranged from sys 190-200/ . He was discontinued off labetalol due to intolerance to medication. He was recently started on hydralazine as outpt but has reported s/s of SOB and now remains off hydralazine. Reports being compliant with cardizem and clonidine patch. Recent echo revealed dilated left atrium with no significant valvular disease and normal LV fx. He also endorses increase in LE edema.  He denies chest pain, palpitations, cough, recent fevers or chills. ROS otherwise negative.     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 Mild mitral regurgitation.  Aortic Valve/Aorta: Calcified trileaflet aortic valve with  normal opening. Peak transaortic valve gradient equals 8 mm  Hg. Peak left ventricular outflow tract gradient equals 7  mm Hg.  Aortic Root: 3.7 cm.  Left Atrium: Severely dilated left atrium.  LA volume index  = 61 cc/m2.  Left Ventricle: Normal left ventricular systolic function.  No segmental wall motion abnormalities. Mild left  ventricular enlargement.  Right Heart: Normal right atrium. Normal right ventricular  size and function. Normal tricuspid valve. Minimal  tricuspid regurgitation. Normal pulmonic valve.  Pericardium/Pleura: Normal pericardium with trace  pericardial effusion.  Left pleural effusion.  Hemodynamic: Estimated right atrial pressure is 8 mm Hg.  Inadequate tricuspid regurgitation Doppler envelope  precludes estimation of RVSP.  ------------------------------------------------------------------------  Conclusions:  1. Tethered mitral valve leaflets with normal opening. Mild  mitral regurgitation.  2. Calcified trileaflet aortic valve with normal opening.  3. Severely dilated left atrium.  LA volume index = 61  cc/m2.  4. Mild left ventricular enlargement.  5. Normal left ventricular systolic function. No segmental  wall motion abnormalities.  6. Normal right ventricular size and function.  ------------------------------------------------------------------------  Confirmed on  10/5/2018 - 11:10:55 by Jaxon Yepez MD  ------------------------------------------------------------------------    < end of copied text >    [ ]  Catheterization:    [ ] Stress Test:  	  < from: Nuclear Stress Test-Pharmacologic (04.04.18 @ 12:33) >  ------------------------------------------------------------------------  IMPRESSIONS:Probably Normal Study  * Chest Pain: No chest pain with administration of  Regadenoson.  * Symptom: lightheaded.  * HR Response: Appropriate.  * BP Response: Appropriate.  * Heart Rhythm: Sinus Rhythm - 98 BPM.  * Baseline ECG: Nonspecific ST-T wave abnormality.  * ECG Changes: No significant ischemic ST segment changes  beyond baseline abnormalities.  * Arrhythmia: None.  * Review of raw data shows: The study is of good technical  quality.  * The left ventricle was enlarged. There are large, mild  to moderate defects in inferior and inferolateral walls  that are partially fixed, mostly correct with prone  imaging, and have normal wall motion suggestive of  attenuation artifact.  * Post-stress gated wall motion analysis was performed  (LVEF = 53 %;LVEDV = 147 ml.), revealing normal LV  function.  ------------------------------------------------------------------------  Confirmed on  4/4/2018 - 15:43:08 by Jaxon Yepez MD  ------------------------------------------------------------------------    < end of copied text >      MEDICATIONS:  MEDICATIONS  (STANDING):  ascorbic acid 500 milliGRAM(s) Oral daily  aspirin enteric coated 81 milliGRAM(s) Oral daily  cloNIDine Patch 0.3 mG/24Hr(s) 1 patch Transdermal every 7 days  diltiazem    milliGRAM(s) Oral daily  doxercalciferol Injectable 1 MICROGram(s) IV Push <User Schedule>  epoetin sherlyn Injectable 4000 Unit(s) IV Push <User Schedule>  furosemide    Tablet 80 milliGRAM(s) Oral daily  multivitamin 1 Tablet(s) Oral daily  omega-3-Acid Ethyl Esters 4 Gram(s) Oral daily  pantoprazole    Tablet 40 milliGRAM(s) Oral before breakfast  predniSONE   Tablet 5 milliGRAM(s) Oral daily  sevelamer hydrochloride 800 milliGRAM(s) Oral every 12 hours  tacrolimus 0.5 milliGRAM(s) Oral every 12 hours  trimethoprim  160 mG/sulfamethoxazole 800 mG 1 Tablet(s) Oral <User Schedule>  warfarin 5 milliGRAM(s) Oral once      FAMILY HISTORY:  No pertinent family history in first degree relatives      SOCIAL HISTORY:    [x ] Non-smoker  [ ] Smoker  [ ] Alcohol    Allergies    No Known Allergies    Intolerances    beta blockers (Other (Mod to Severe))  	    REVIEW OF SYSTEMS:  CONSTITUTIONAL: No fever, weight loss, or fatigue  EYES: No eye pain, visual disturbances, or discharge  ENMT:  No difficulty hearing, tinnitus, vertigo; No sinus or throat pain  NECK: No pain or stiffness  RESPIRATORY: No cough, wheezing, chills or hemoptysis; +Shortness of Breath  CARDIOVASCULAR: No chest pain, palpitations, passing out, dizziness, + leg swelling  GASTROINTESTINAL: No abdominal or epigastric pain. No nausea, vomiting, or hematemesis; No diarrhea or constipation. No melena or hematochezia.  GENITOURINARY: No dysuria, frequency, hematuria, or incontinence  NEUROLOGICAL: No headaches, memory loss, loss of strength, numbness, or tremors  SKIN: No itching, burning, rashes, or lesions   	    [ x] All others negative	  [ ] Unable to obtain    PHYSICAL EXAM:  T(C): 36.7 (11-29-18 @ 12:40), Max: 36.8 (11-29-18 @ 10:34)  HR: 87 (11-29-18 @ 12:40) (87 - 91)  BP: 190/118 (11-29-18 @ 12:40) (187/99 - 190/118)  RR: 20 (11-29-18 @ 12:40) (20 - 22)  SpO2: 100% (11-29-18 @ 12:40) (94% - 100%)  Wt(kg): --  I&O's Summary      Appearance: Normal	  Psychiatry: A & O x 3, Mood & affect appropriate  HEENT:   Normal oral mucosa, PERRL, EOMI	  Lymphatic: No lymphadenopathy  Cardiovascular: Normal S1 S2,RRR  Respiratory: crackles bl at base   Gastrointestinal:  Soft, Non-tender, + BS	  Skin: No rashes, No ecchymoses, No cyanosis	  Neurologic: Non-focal  Extremities: Normal range of motion,bl LE ++ edema  Vascular: Peripheral pulses palpable 2+ bilaterally    TELEMETRY: 	    ECG:  	pending   RADIOLOGY:    < from: Xray Chest 2 Views PA/Lat (11.29.18 @ 11:11) >    IMPRESSION: Small to moderate size left pleural effusion with associated   atelectasis, increased in size. Mild pulmonary vascular congestion.          < end of copied text >      OTHER: 	  	  LABS:	 	    CARDIAC MARKERS:                                  9.1    10.4  )-----------( 434      ( 29 Nov 2018 10:55 )             29.1     11-29    141  |  96  |  48<H>  ----------------------------<  105<H>  5.2   |  27  |  9.13<H>    Ca    9.4      29 Nov 2018 10:55    TPro  6.8  /  Alb  4.1  /  TBili  0.5  /  DBili  x   /  AST  14  /  ALT  12  /  AlkPhos  58  11-29      proBNP: Serum Pro-Brain Natriuretic Peptide: >25898 pg/mL (11-29 @ 10:55)    Lipid Profile:   HgA1c:   TSH:

## 2018-11-29 NOTE — CONSULT NOTE ADULT - SUBJECTIVE AND OBJECTIVE BOX
[facility] DIVISION OF KIDNEY DISEASES AND HYPERTENSION -- INITIAL CONSULT NOTE  --------------------------------------------------------------------------------    HPI: 59 yo M with PMHx of ESRD on HD and history of two kidney transplants admitted for acute SOB and uncontrolled HTN. Pt has been having extra UF session as an outpatient at [facility] dialysis unit without resolution of his hypervolemia and uncontrolled HTN. Pt admits to compliance with his BP medications. Pt had his last HD on Tuesday. Pt seen and examined in the ER. Pt currently denies SOB or CP. Admits to LE edema. Plan for HD today.     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  History of partial pancreatectomy  History of renal transplant: x 2    FAMILY HISTORY:  No pertinent family history in first degree relatives    PAST SOCIAL HISTORY:    ALLERGIES & MEDICATIONS  --------------------------------------------------------------------------------  Allergies    No Known Allergies    Intolerances    beta blockers (Other (Mod to Severe))    Standing Inpatient Medications    PRN Inpatient Medications      REVIEW OF SYSTEMS  --------------------------------------------------------------------------------  Gen: No weakness  Skin: No rashes  Head/Eyes/Ears/Mouth: No headache  Respiratory: + dyspnea, cough  CV: No chest pain, PND, orthopnea  GI: No abdominal pain, diarrhea, constipation, nausea, vomiting  MSK: + edema  Neuro: No dizziness/lightheadedness  Heme: No easy bruising or bleeding    All other systems were reviewed and are negative, except as noted.    VITALS/PHYSICAL EXAM  --------------------------------------------------------------------------------  T(C): 36.8 (11-29-18 @ 10:34), Max: 36.8 (11-29-18 @ 10:34)  HR: 87 (11-29-18 @ 10:34) (87 - 91)  BP: 188/111 (11-29-18 @ 10:34) (187/99 - 188/111)  RR: 22 (11-29-18 @ 10:34) (20 - 22)  SpO2: 95% (11-29-18 @ 10:34) (94% - 95%)  Wt(kg): --  Height (cm): 180.34 (11-29-18 @ 10:03)  Weight (kg): 86.2 (11-29-18 @ 10:03)  BMI (kg/m2): 26.5 (11-29-18 @ 10:03)  BSA (m2): 2.06 (11-29-18 @ 10:03)    Physical Exam:  	Gen: NAD, well-appearing  	HEENT: Supple neck  	Pulm: CTA B/L  	CV: RRR, S1S2; no rub  	Abd: +BS, soft, nontender/nondistended  	: No suprapubic tenderness  	LE: +1 pitting edema b/l LE  	Skin: Warm, without rashes  	Vascular access: RIJ tunneled catheter, site without bleeding.    LABS/STUDIES  --------------------------------------------------------------------------------              9.1    10.4  >-----------<  434      [11-29-18 @ 10:55]              29.1     141  |  96  |  48  ----------------------------<  105      [11-29-18 @ 10:55]  5.2   |  27  |  9.13        Ca     9.4     [11-29-18 @ 10:55]    TPro  6.8  /  Alb  4.1  /  TBili  0.5  /  DBili  x   /  AST  14  /  ALT  12  /  AlkPhos  58  [11-29-18 @ 10:55]    Creatinine Trend:  SCr 9.13 [11-29 @ 10:55]  Iron 24, TIBC 161, %sat 15      [03-20-18 @ 11:31]  Ferritin 250      [03-19-18 @ 20:02]  TSH 6.46      [10-02-18 @ 12:25]    HBsAb 6.9      [10-06-18 @ 10:23]  HBsAg Nonreact      [10-06-18 @ 10:23]  HBcAb Nonreact      [03-17-18 @ 05:35]  HCV 0.14, Nonreact      [10-06-18 @ 10:23] [facility] DIVISION OF KIDNEY DISEASES AND HYPERTENSION -- INITIAL CONSULT NOTE  --------------------------------------------------------------------------------    HPI: 59 yo M with PMHx of ESRD on HD and history of two kidney transplants admitted for acute SOB and uncontrolled HTN. Pt has been having extra UF session as an outpatient at [facility] dialysis unit without resolution of his hypervolemia and uncontrolled HTN. Pt admits to compliance with his BP medications. Pt had his last HD on Tuesday. Pt seen and examined in the ER. Pt currently denies SOB or CP. Admits to LE edema. Plan for HD today.     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  History of partial pancreatectomy  History of renal transplant: x 2    FAMILY HISTORY:  No pertinent family history in first degree relatives    PAST SOCIAL HISTORY: runs a recording studio in [place]    ALLERGIES & MEDICATIONS  --------------------------------------------------------------------------------  Allergies    No Known Allergies    Intolerances    beta blockers (Other (Mod to Severe))    Standing Inpatient Medications    PRN Inpatient Medications      REVIEW OF SYSTEMS  --------------------------------------------------------------------------------  Gen: No weakness  Skin: No rashes  Head/Eyes/Ears/Mouth: No headache  Respiratory: + dyspnea, cough  CV: No chest pain, PND, orthopnea  GI: No abdominal pain, diarrhea, constipation, nausea, vomiting  MSK: + edema  Neuro: No dizziness/lightheadedness  Heme: No easy bruising or bleeding    All other systems were reviewed and are negative, except as noted.    VITALS/PHYSICAL EXAM  --------------------------------------------------------------------------------  T(C): 36.8 (11-29-18 @ 10:34), Max: 36.8 (11-29-18 @ 10:34)  HR: 87 (11-29-18 @ 10:34) (87 - 91)  BP: 188/111 (11-29-18 @ 10:34) (187/99 - 188/111)  RR: 22 (11-29-18 @ 10:34) (20 - 22)  SpO2: 95% (11-29-18 @ 10:34) (94% - 95%)  Wt(kg): --  Height (cm): 180.34 (11-29-18 @ 10:03)  Weight (kg): 86.2 (11-29-18 @ 10:03)  BMI (kg/m2): 26.5 (11-29-18 @ 10:03)  BSA (m2): 2.06 (11-29-18 @ 10:03)    Physical Exam:  	Gen: NAD, well-appearing  	HEENT: Supple neck  	Pulm: CTA B/L  	CV: RRR, S1S2; no rub  	Abd: +BS, soft, nontender/nondistended  	: No suprapubic tenderness  	LE: +1 pitting edema b/l LE  	Skin: Warm, without rashes  	Vascular access: RIJ tunneled catheter, site without bleeding.    LABS/STUDIES  --------------------------------------------------------------------------------              9.1    10.4  >-----------<  434      [11-29-18 @ 10:55]              29.1     141  |  96  |  48  ----------------------------<  105      [11-29-18 @ 10:55]  5.2   |  27  |  9.13        Ca     9.4     [11-29-18 @ 10:55]    TPro  6.8  /  Alb  4.1  /  TBili  0.5  /  DBili  x   /  AST  14  /  ALT  12  /  AlkPhos  58  [11-29-18 @ 10:55]    Creatinine Trend:  SCr 9.13 [11-29 @ 10:55]  Iron 24, TIBC 161, %sat 15      [03-20-18 @ 11:31]  Ferritin 250      [03-19-18 @ 20:02]  TSH 6.46      [10-02-18 @ 12:25]    HBsAb 6.9      [10-06-18 @ 10:23]  HBsAg Nonreact      [10-06-18 @ 10:23]  HBcAb Nonreact      [03-17-18 @ 05:35]  HCV 0.14, Nonreact      [10-06-18 @ 10:23]

## 2018-11-29 NOTE — CONSULT NOTE ADULT - PROBLEM SELECTOR RECOMMENDATION 2
Pt with elevated BP on current medication in the setting of hypervolemia. Continue home medications. Plan for UF with HD today Pt with elevated BP on current medication in the setting of hypervolemia. Continue home medications as ordered to help see if this is a compliance issue.   Check renal dopplers (of transplant kidney), check plasma metanephrines and serum carlos:renin ratio.  Plan for UF with HD today.  Please check tacrolimus trough level.

## 2018-11-29 NOTE — ED PROVIDER NOTE - ATTENDING CONTRIBUTION TO CARE
58M, pmh kidney transplant x 2, ESRD on TTS dialysis (did not receive today), HTN, presetns with SOB x 1 week. sx worse with lying flat, improved with sitting up. no chest pain. +non-productive cough. denies abd pain, back pain, f/c, n/v/d. denies unilateral calf pain, swelling. denies hx pe or dvt.    PE: NAD, NCAT, MMM, Trachea midline, Normal conjunctiva, lungs CTAB, S1/S2 RRR, Normal perfusion, 2+ radial pulses bilat, Abdomen Soft, NTND, No rebound/guarding, 1+ bilat LE edema, No deformity of extremities, No rashes,  No focal motor or sensory deficits.    EKG without acute changes. Hypertensive on arrival. +LE edema which pt states is baseline for him. Ddx includes but not limited to volume overload, pleural effusion. No cp, low suspicion acs. No unilateral LE edema, no cp, no tachycardia, much lower suspicion of PE. Check CBC eval for anemia, cmp eval for metabolic derangement. to be admitted for further workup. - Yevgeniy Alexander MD

## 2018-11-29 NOTE — ED PROVIDER NOTE - OBJECTIVE STATEMENT
58M p/w shortness of breath for 7 days. It is worse when lying down and better when sitting up. No chest pain/back pain/abd pain/fever. +non-productive cough. This started shortly have his hydralazine dose was increased to 75mg BID from 10 mg BID. Hx similar symptoms r/t starting beta blockers.    PMH: kidney transplant x2 (1995, 2013), ESRD on HD T/Th/Sat (no HD today), HTN  PCP: Dr. Derik Patton/Dr. Kumar expecting  Nephrologist: Dr. Sloan ([phone])

## 2018-11-29 NOTE — H&P ADULT - NSHPPHYSICALEXAM_GEN_ALL_CORE
Vital Signs Last 24 Hrs  T(C): 36.7 (29 Nov 2018 12:40), Max: 36.8 (29 Nov 2018 10:34)  T(F): 98.1 (29 Nov 2018 12:40), Max: 98.3 (29 Nov 2018 10:34)  HR: 87 (29 Nov 2018 12:40) (87 - 91)  BP: 190/118 (29 Nov 2018 12:40) (187/99 - 190/118)  BP(mean): --  RR: 20 (29 Nov 2018 12:40) (20 - 22)  SpO2: 100% (29 Nov 2018 12:40) (94% - 100%)    GENERAL: NAD, well-developed  HEAD:  Atraumatic, Normocephalic  EYES: EOMI, PERRLA, conjunctiva and sclera clear  ENT: Pharynx not erythematous  PULMONARY: decreased lung sounds at bases b/l, scant rales  CARDIOVASCULAR: Regular rate and rhythm; No murmurs, rubs, or gallops  ABDOMEN: Soft, Nontender, Nondistended; Bowel sounds present  EXTREMITIES:  2+ Peripheral Pulses, No clubbing, cyanosis; + 1 pitting edema to knees b/l   MUSCULOSKELETAL: No calf tenderness  PSYCH: AAOx3, normal affect  SKIN: warm and dry, No rashes or lesions

## 2018-11-29 NOTE — H&P ADULT - HISTORY OF PRESENT ILLNESS
Patient is a 58 year old male with HTN, ESRD on HD (history of renal transplant x 2) presents to the ED with worsening shortness of breath 1-2 days. Patient had his last HD two days ago.  For the last few weeks patient has been requiring extra UF session with each HD session. His shortness of breath is persistent, worsened with exertion. He denies chest pain, palpitations, cough. Patient has not had recent fevers, chills. He has also had uncontrolled hypertension for the last several weeks. His antihypertensives have been getting adjusted for the last few weeks. He also had some swelling of his lower extremities up to his knees.     In the ED, BP as high as 190 systolic, saturating 100% on 2l nasal canula. trop and bnp elevated (in the setting of ESRD). HB as baseline. CXR--> Small to moderate size left pleural effusion with associated atelectasis, increased in size. Mild pulmonary vascular congestion.

## 2018-11-29 NOTE — ED ADULT NURSE REASSESSMENT NOTE - NS ED NURSE REASSESS COMMENT FT1
pt comfortable appearing, sitting up in bed talking with family at bedside, no respiratory distress noted, advised of plan to dialysis and then either return to ER or to bed if available at that time

## 2018-11-29 NOTE — H&P ADULT - PROBLEM SELECTOR PLAN 1
- acute respiratory failure with hypoxia 2' volume overload in the setting of ESRD requiring HD with UF today. Respiratory status should improve after HD.   - c/w 2L nasal canula

## 2018-11-29 NOTE — H&P ADULT - ASSESSMENT
Patient is a 58 year old male with HTN, ESRD on HD (history of renal transplant x 2) presents to the ED with worsening shortness of breath 1-2 days admitted for acute respiratory failure with hypoxia in the setting of hypervolemia requiring extra HD session with UF, also with uncontrolled hypertension.

## 2018-11-30 PROCEDURE — 99233 SBSQ HOSP IP/OBS HIGH 50: CPT | Mod: GC

## 2018-11-30 RX ORDER — HYDROMORPHONE HYDROCHLORIDE 2 MG/ML
2 INJECTION INTRAMUSCULAR; INTRAVENOUS; SUBCUTANEOUS EVERY 12 HOURS
Qty: 0 | Refills: 0 | Status: DISCONTINUED | OUTPATIENT
Start: 2018-11-30 | End: 2018-12-05

## 2018-11-30 RX ORDER — HYDROMORPHONE HYDROCHLORIDE 2 MG/ML
2 INJECTION INTRAMUSCULAR; INTRAVENOUS; SUBCUTANEOUS EVERY 6 HOURS
Qty: 0 | Refills: 0 | Status: DISCONTINUED | OUTPATIENT
Start: 2018-11-30 | End: 2018-11-30

## 2018-11-30 RX ORDER — SENNA PLUS 8.6 MG/1
2 TABLET ORAL AT BEDTIME
Qty: 0 | Refills: 0 | Status: DISCONTINUED | OUTPATIENT
Start: 2018-11-30 | End: 2018-12-10

## 2018-11-30 RX ORDER — DOCUSATE SODIUM 100 MG
100 CAPSULE ORAL DAILY
Qty: 0 | Refills: 0 | Status: DISCONTINUED | OUTPATIENT
Start: 2018-11-30 | End: 2018-12-10

## 2018-11-30 RX ORDER — WARFARIN SODIUM 2.5 MG/1
2.5 TABLET ORAL ONCE
Qty: 0 | Refills: 0 | Status: COMPLETED | OUTPATIENT
Start: 2018-11-30 | End: 2018-11-30

## 2018-11-30 RX ORDER — HYDROMORPHONE HYDROCHLORIDE 2 MG/ML
0.5 INJECTION INTRAMUSCULAR; INTRAVENOUS; SUBCUTANEOUS ONCE
Qty: 0 | Refills: 0 | Status: DISCONTINUED | OUTPATIENT
Start: 2018-11-30 | End: 2018-11-30

## 2018-11-30 RX ORDER — HYDROMORPHONE HYDROCHLORIDE 2 MG/ML
1 INJECTION INTRAMUSCULAR; INTRAVENOUS; SUBCUTANEOUS EVERY 8 HOURS
Qty: 0 | Refills: 0 | Status: DISCONTINUED | OUTPATIENT
Start: 2018-11-30 | End: 2018-11-30

## 2018-11-30 RX ADMIN — Medication 10 MILLIGRAM(S): at 05:50

## 2018-11-30 RX ADMIN — Medication 1 TABLET(S): at 16:59

## 2018-11-30 RX ADMIN — Medication 10 MILLIGRAM(S): at 21:37

## 2018-11-30 RX ADMIN — Medication 1 TABLET(S): at 16:47

## 2018-11-30 RX ADMIN — DOXERCALCIFEROL 1 MICROGRAM(S): 2.5 CAPSULE ORAL at 13:50

## 2018-11-30 RX ADMIN — WARFARIN SODIUM 2.5 MILLIGRAM(S): 2.5 TABLET ORAL at 22:17

## 2018-11-30 RX ADMIN — SEVELAMER CARBONATE 800 MILLIGRAM(S): 2400 POWDER, FOR SUSPENSION ORAL at 05:50

## 2018-11-30 RX ADMIN — Medication 81 MILLIGRAM(S): at 16:59

## 2018-11-30 RX ADMIN — HYDROMORPHONE HYDROCHLORIDE 0.5 MILLIGRAM(S): 2 INJECTION INTRAMUSCULAR; INTRAVENOUS; SUBCUTANEOUS at 05:31

## 2018-11-30 RX ADMIN — Medication 60 MILLIGRAM(S): at 05:50

## 2018-11-30 RX ADMIN — TACROLIMUS 0.5 MILLIGRAM(S): 5 CAPSULE ORAL at 21:37

## 2018-11-30 RX ADMIN — Medication 500 MILLIGRAM(S): at 16:59

## 2018-11-30 RX ADMIN — OXYCODONE HYDROCHLORIDE 5 MILLIGRAM(S): 5 TABLET ORAL at 01:34

## 2018-11-30 RX ADMIN — Medication 1 PATCH: at 16:58

## 2018-11-30 RX ADMIN — HYDROMORPHONE HYDROCHLORIDE 0.5 MILLIGRAM(S): 2 INJECTION INTRAMUSCULAR; INTRAVENOUS; SUBCUTANEOUS at 06:22

## 2018-11-30 RX ADMIN — Medication 100 MILLIGRAM(S): at 23:06

## 2018-11-30 RX ADMIN — Medication 1 PATCH: at 21:47

## 2018-11-30 RX ADMIN — SEVELAMER CARBONATE 800 MILLIGRAM(S): 2400 POWDER, FOR SUSPENSION ORAL at 17:13

## 2018-11-30 RX ADMIN — TACROLIMUS 0.5 MILLIGRAM(S): 5 CAPSULE ORAL at 16:08

## 2018-11-30 RX ADMIN — PANTOPRAZOLE SODIUM 40 MILLIGRAM(S): 20 TABLET, DELAYED RELEASE ORAL at 08:05

## 2018-11-30 RX ADMIN — OXYCODONE HYDROCHLORIDE 5 MILLIGRAM(S): 5 TABLET ORAL at 04:54

## 2018-11-30 RX ADMIN — Medication 4 GRAM(S): at 16:47

## 2018-11-30 RX ADMIN — Medication 5 MILLIGRAM(S): at 05:49

## 2018-11-30 RX ADMIN — Medication 80 MILLIGRAM(S): at 05:50

## 2018-11-30 RX ADMIN — Medication 60 MILLIGRAM(S): at 21:37

## 2018-11-30 NOTE — CHART NOTE - NSCHARTNOTEFT_GEN_A_CORE
Pt felt unwell and was refusing to be initiated on dialysis in HD unit. Pt clinically stable. Labs reviewed. Will hold dialysis today. Plan for HD tomorrow morning. Case reviewed with nephrology attending.

## 2018-11-30 NOTE — PROGRESS NOTE ADULT - SUBJECTIVE AND OBJECTIVE BOX
Patient is a 58y old  Male who presents with a chief complaint of shortness of breath (30 Nov 2018 16:45)      SUBJECTIVE / OVERNIGHT EVENTS:    Events noted.  Pt is refusing PT/INR  Feels better.  CONSTITUTIONAL: No fever,  or fatigue  NECK: No pain or stiffness  RESPIRATORY: No cough, wheezing, chills or hemoptysis; No shortness of breath  CARDIOVASCULAR: No chest pain, palpitations, dizziness, or leg swelling  GASTROINTESTINAL: No abdominal or epigastric pain. No nausea, vomiting, or hematemesis; No diarrhea or constipation.   NEUROLOGICAL: No headaches,     MEDICATIONS  (STANDING):  acetaminophen 325 mG/butalbital 50 mG/caffeine 40 mG 1 Tablet(s) Oral once  ascorbic acid 500 milliGRAM(s) Oral daily  aspirin enteric coated 81 milliGRAM(s) Oral daily  cloNIDine Patch 0.3 mG/24Hr(s) 1 patch Transdermal every 7 days  doxercalciferol Injectable 1 MICROGram(s) IV Push <User Schedule>  furosemide    Tablet 80 milliGRAM(s) Oral daily  multivitamin 1 Tablet(s) Oral daily  NIFEdipine XL 60 milliGRAM(s) Oral <User Schedule>  omega-3-Acid Ethyl Esters 4 Gram(s) Oral daily  pantoprazole    Tablet 40 milliGRAM(s) Oral before breakfast  predniSONE   Tablet 5 milliGRAM(s) Oral daily  propranolol 10 milliGRAM(s) Oral two times a day  sevelamer hydrochloride 800 milliGRAM(s) Oral every 12 hours  tacrolimus 0.5 milliGRAM(s) Oral every 12 hours  trimethoprim  160 mG/sulfamethoxazole 800 mG 1 Tablet(s) Oral <User Schedule>  warfarin 2.5 milliGRAM(s) Oral once    MEDICATIONS  (PRN):  HYDROmorphone  Injectable 2 milliGRAM(s) IV Push every 6 hours PRN Moderate Pain (4 - 6)  oxyCODONE    IR 5 milliGRAM(s) Oral every 6 hours PRN Moderate Pain (4 - 6)        CAPILLARY BLOOD GLUCOSE        I&O's Summary    29 Nov 2018 07:01  -  30 Nov 2018 07:00  --------------------------------------------------------  IN: 1120 mL / OUT: 2800 mL / NET: -1680 mL    30 Nov 2018 07:01  -  30 Nov 2018 20:13  --------------------------------------------------------  IN: 540 mL / OUT: 700 mL / NET: -160 mL        PHYSICAL EXAM:  GENERAL: NAD  NECK: Supple, No JVD  CHEST/LUNG: Clear to auscultation bilaterally; No wheezing.  HEART: Regular rate and rhythm; No murmurs, rubs, or gallops  ABDOMEN: Soft, Nontender, Nondistended; Bowel sounds present  EXTREMITIES:   No clubbing, cyanosis, or edema  NEUROLOGY: AAO X 3      LABS:                        9.1    10.4  )-----------( 434      ( 29 Nov 2018 10:55 )             29.1     11-29    141  |  96  |  48<H>  ----------------------------<  105<H>  5.2   |  27  |  9.13<H>    Ca    9.4      29 Nov 2018 10:55    TPro  6.8  /  Alb  4.1  /  TBili  0.5  /  DBili  x   /  AST  14  /  ALT  12  /  AlkPhos  58  11-29            CAPILLARY BLOOD GLUCOSE                    RADIOLOGY & ADDITIONAL TESTS:    Imaging Personally Reviewed:    Consultant(s) Notes Reviewed:      Care Discussed with Consultants/Other Providers:

## 2018-11-30 NOTE — PROGRESS NOTE ADULT - PROBLEM SELECTOR PLAN 1
Patient had maintenance HD yesterday which he ultimately tolerated.  However, he remained hypertensive with UF.  Follow up blood work.  No urgent need for clearance today.

## 2018-11-30 NOTE — PROGRESS NOTE ADULT - SUBJECTIVE AND OBJECTIVE BOX
CARDIOLOGY FOLLOW UP - Dr. Real    CC no cp. sob improving       PHYSICAL EXAM:  T(C): 36.4 (11-30-18 @ 16:41), Max: 36.9 (11-30-18 @ 12:50)  HR: 84 (11-30-18 @ 16:41) (80 - 93)  BP: 127/73 (11-30-18 @ 16:41) (127/73 - 206/105)  RR: 18 (11-30-18 @ 16:41) (18 - 20)  SpO2: 96% (11-30-18 @ 16:41) (96% - 100%)  Wt(kg): --  I&O's Summary    29 Nov 2018 07:01  -  30 Nov 2018 07:00  --------------------------------------------------------  IN: 1120 mL / OUT: 2800 mL / NET: -1680 mL    30 Nov 2018 07:01  -  30 Nov 2018 16:46  --------------------------------------------------------  IN: 540 mL / OUT: 700 mL / NET: -160 mL        Appearance: Normal	  Cardiovascular: Normal S1 S2,RRR, No JVD, No murmurs  Respiratory: Lungs clear to auscultation	  Gastrointestinal:  Soft, Non-tender, + BS	  Extremities: bl ++ LE  edema        MEDICATIONS  (STANDING):  acetaminophen 325 mG/butalbital 50 mG/caffeine 40 mG 1 Tablet(s) Oral once  ascorbic acid 500 milliGRAM(s) Oral daily  aspirin enteric coated 81 milliGRAM(s) Oral daily  cloNIDine Patch 0.3 mG/24Hr(s) 1 patch Transdermal every 7 days  doxercalciferol Injectable 1 MICROGram(s) IV Push <User Schedule>  furosemide    Tablet 80 milliGRAM(s) Oral daily  multivitamin 1 Tablet(s) Oral daily  NIFEdipine XL 60 milliGRAM(s) Oral <User Schedule>  omega-3-Acid Ethyl Esters 4 Gram(s) Oral daily  pantoprazole    Tablet 40 milliGRAM(s) Oral before breakfast  predniSONE   Tablet 5 milliGRAM(s) Oral daily  propranolol 10 milliGRAM(s) Oral two times a day  sevelamer hydrochloride 800 milliGRAM(s) Oral every 12 hours  tacrolimus 0.5 milliGRAM(s) Oral every 12 hours  trimethoprim  160 mG/sulfamethoxazole 800 mG 1 Tablet(s) Oral <User Schedule>      TELEMETRY: 	    ECG:  	  RADIOLOGY:   DIAGNOSTIC TESTING:  [ ] Echocardiogram:  [ ]  Catheterization:  [ ] Stress Test:    OTHER: 	    LABS:	 	                                9.1    10.4  )-----------( 434      ( 29 Nov 2018 10:55 )             29.1     11-29    141  |  96  |  48<H>  ----------------------------<  105<H>  5.2   |  27  |  9.13<H>    Ca    9.4      29 Nov 2018 10:55    TPro  6.8  /  Alb  4.1  /  TBili  0.5  /  DBili  x   /  AST  14  /  ALT  12  /  AlkPhos  58  11-29

## 2018-11-30 NOTE — PROGRESS NOTE ADULT - ASSESSMENT
59 yo M with PMHx of ESRD on HD and history of two kidney transplants admitted for acute SOB and uncontrolled HTN. Pt has been having extra UF session as an outpatient at [facility] dialysis unit without resolution of his hypervolemia and uncontrolled HTN.

## 2018-11-30 NOTE — PROGRESS NOTE ADULT - ASSESSMENT
echo 4/17/18: EF 53, normal LV fx, mild MR   echo 10/5/18: EF 62%, mild MR, severely dilated left atrium, normal LV sys function     a/p     59yo M w/pmhx of HTN, Paflutter,  ESRD s/p renal transplant x 2 but now requiring HD presenting with SOB and uncontrolled BP      1. Parox. Aflutter  cv stable no cp   rate controlled  and remains in NSR, continue with  low dose propanolol for rate control   recent echo with nl lv fxn   CHADS 2 - coumadin per INR level     2. Uncontrolled  HTN  sys bp now much improved today s/p cozaar 25 mg x 1   will remain off labetolol due to intolerance to medication   continue nifedipine 60 mg BID, 0.3 mg cloNIDine Patch and propanolol 10 mg BID  potassium currently stable, Start cozaar 25 mg Daily with close monitoring of k levels     trend BP    3. Acute Diastolic CHF  likely in the setting of uncontrolled HTN   clinically improving   HS trop x1 noted, demand ischemia in the setting of ESRD/ uncontrolled HTN   BP Control, fluid removal with lasix/ HD   recent echo with normal LV fx      4. ESRD  renal f/u   continue fluid removal with HD and lasix

## 2018-11-30 NOTE — PROGRESS NOTE ADULT - SUBJECTIVE AND OBJECTIVE BOX
[facility] Division of Kidney Diseases & Hypertension  HEMODIALYSIS NOTE  --------------------------------------------------------------------------------  Chief Complaint: ESRD/Ongoing hemodialysis requirement    24 hour events/subjective: Patient was seen and evaluated on dialysis this morning.  He has a mild headache which he says is improved.  He has no chest pain and he still feels somewhat short of breath but it is better.    PAST HISTORY  --------------------------------------------------------------------------------  No significant changes to PMH, PSH, FHx, SHx, unless otherwise noted    ALLERGIES & MEDICATIONS  --------------------------------------------------------------------------------  Allergies    No Known Allergies    Intolerances    beta blockers (Other (Mod to Severe))  hydrALAZINE (Short breath (Mild to Mod))    Standing Inpatient Medications  acetaminophen 325 mG/butalbital 50 mG/caffeine 40 mG 1 Tablet(s) Oral once  ascorbic acid 500 milliGRAM(s) Oral daily  aspirin enteric coated 81 milliGRAM(s) Oral daily  cloNIDine Patch 0.3 mG/24Hr(s) 1 patch Transdermal every 7 days  doxercalciferol Injectable 1 MICROGram(s) IV Push <User Schedule>  epoetin sherlyn Injectable 4000 Unit(s) IV Push <User Schedule>  furosemide    Tablet 80 milliGRAM(s) Oral daily  multivitamin 1 Tablet(s) Oral daily  NIFEdipine XL 60 milliGRAM(s) Oral <User Schedule>  omega-3-Acid Ethyl Esters 4 Gram(s) Oral daily  pantoprazole    Tablet 40 milliGRAM(s) Oral before breakfast  predniSONE   Tablet 5 milliGRAM(s) Oral daily  propranolol 10 milliGRAM(s) Oral two times a day  sevelamer hydrochloride 800 milliGRAM(s) Oral every 12 hours  tacrolimus 0.5 milliGRAM(s) Oral every 12 hours  trimethoprim  160 mG/sulfamethoxazole 800 mG 1 Tablet(s) Oral <User Schedule>    PRN Inpatient Medications  oxyCODONE    IR 5 milliGRAM(s) Oral every 6 hours PRN      REVIEW OF SYSTEMS  --------------------------------------------------------------------------------  Gen: no fever  Cv: no chest pain  Pulm: dyspnea  GI: no abdominal pain  Neuro: headache      VITALS/PHYSICAL EXAM  --------------------------------------------------------------------------------  T(C): 36.8 (11-30-18 @ 07:15), Max: 37.2 (11-29-18 @ 16:25)  HR: 83 (11-30-18 @ 07:15) (80 - 94)  BP: 147/88 (11-30-18 @ 07:15) (137/74 - 206/105)  RR: 18 (11-30-18 @ 07:15) (18 - 22)  SpO2: 97% (11-30-18 @ 07:15) (94% - 100%)  Wt(kg): --  Height (cm): 180.34 (11-29-18 @ 17:35)  Weight (kg): 89.1 (11-29-18 @ 17:35)  BMI (kg/m2): 27.4 (11-29-18 @ 17:35)  BSA (m2): 2.09 (11-29-18 @ 17:35)      11-29-18 @ 07:01  -  11-30-18 @ 07:00  --------------------------------------------------------  IN: 1120 mL / OUT: 2800 mL / NET: -1680 mL      Physical Exam:    	Gen: NAD, well-appearing  	HEENT: supple neck  	Pulm: diminished at bases  	CV: RRR, S1S2; no rub  	Abd: +BS, soft, nontender/nondistended  	LE: Warm, FROM, 1+edema most pronounced in ankles  	Skin: Warm, without rashes  	Vascular access: Right IJ tunneled HD catheter    LABS/STUDIES  --------------------------------------------------------------------------------              9.1    10.4  >-----------<  434      [11-29-18 @ 10:55]              29.1     141  |  96  |  48  ----------------------------<  105      [11-29-18 @ 10:55]  5.2   |  27  |  9.13        Ca     9.4     [11-29-18 @ 10:55]    TPro  6.8  /  Alb  4.1  /  TBili  0.5  /  DBili  x   /  AST  14  /  ALT  12  /  AlkPhos  58  [11-29-18 @ 10:55]

## 2018-11-30 NOTE — PROGRESS NOTE ADULT - PROBLEM SELECTOR PLAN 2
The patient has continues to have blood pressure above target.  Agree with present medications.  However there is certainly a large component of volume - mediated hypertension.  Will need to do additional ultrafiltration today.  Please check doppler of transplant renal artery.  Check Rolo check plasma metanephrines.

## 2018-11-30 NOTE — CHART NOTE - NSCHARTNOTEFT_GEN_A_CORE
Patient refused labs today, On coumadin for AF.  Dosed 2.5 mg for tonight as per Dr Kumar.  Graciela Ballesteros NP  [phone]

## 2018-12-01 LAB
ANION GAP SERPL CALC-SCNC: 19 MMOL/L — HIGH (ref 5–17)
APTT BLD: 35.9 SEC — SIGNIFICANT CHANGE UP (ref 27.5–36.3)
BUN SERPL-MCNC: 56 MG/DL — HIGH (ref 7–23)
CALCIUM SERPL-MCNC: 9.2 MG/DL — SIGNIFICANT CHANGE UP (ref 8.4–10.5)
CHLORIDE SERPL-SCNC: 93 MMOL/L — LOW (ref 96–108)
CO2 SERPL-SCNC: 27 MMOL/L — SIGNIFICANT CHANGE UP (ref 22–31)
CREAT SERPL-MCNC: 8.97 MG/DL — HIGH (ref 0.5–1.3)
GLUCOSE SERPL-MCNC: 105 MG/DL — HIGH (ref 70–99)
HCT VFR BLD CALC: 28.7 % — LOW (ref 39–50)
HGB BLD-MCNC: 8.7 G/DL — LOW (ref 13–17)
INR BLD: 1.74 RATIO — HIGH (ref 0.88–1.16)
MAGNESIUM SERPL-MCNC: 2.2 MG/DL — SIGNIFICANT CHANGE UP (ref 1.6–2.6)
MCHC RBC-ENTMCNC: 27 PG — SIGNIFICANT CHANGE UP (ref 27–34)
MCHC RBC-ENTMCNC: 30.3 GM/DL — LOW (ref 32–36)
MCV RBC AUTO: 89.1 FL — SIGNIFICANT CHANGE UP (ref 80–100)
PLATELET # BLD AUTO: 477 K/UL — HIGH (ref 150–400)
POTASSIUM SERPL-MCNC: 5.4 MMOL/L — HIGH (ref 3.5–5.3)
POTASSIUM SERPL-SCNC: 5.4 MMOL/L — HIGH (ref 3.5–5.3)
PROTHROM AB SERPL-ACNC: 20.2 SEC — HIGH (ref 10–13.1)
RBC # BLD: 3.22 M/UL — LOW (ref 4.2–5.8)
RBC # FLD: 15.9 % — HIGH (ref 10.3–14.5)
SODIUM SERPL-SCNC: 139 MMOL/L — SIGNIFICANT CHANGE UP (ref 135–145)
TACROLIMUS SERPL-MCNC: <2 NG/ML — SIGNIFICANT CHANGE UP
WBC # BLD: 8.65 K/UL — SIGNIFICANT CHANGE UP (ref 3.8–10.5)
WBC # FLD AUTO: 8.65 K/UL — SIGNIFICANT CHANGE UP (ref 3.8–10.5)

## 2018-12-01 PROCEDURE — 90935 HEMODIALYSIS ONE EVALUATION: CPT

## 2018-12-01 RX ORDER — WARFARIN SODIUM 2.5 MG/1
5 TABLET ORAL ONCE
Qty: 0 | Refills: 0 | Status: COMPLETED | OUTPATIENT
Start: 2018-12-01 | End: 2018-12-01

## 2018-12-01 RX ORDER — DOXERCALCIFEROL 2.5 UG/1
0.5 CAPSULE ORAL
Qty: 0 | Refills: 0 | Status: DISCONTINUED | OUTPATIENT
Start: 2018-12-01 | End: 2018-12-01

## 2018-12-01 RX ORDER — ERYTHROPOIETIN 10000 [IU]/ML
5000 INJECTION, SOLUTION INTRAVENOUS; SUBCUTANEOUS
Qty: 0 | Refills: 0 | Status: DISCONTINUED | OUTPATIENT
Start: 2018-12-01 | End: 2018-12-10

## 2018-12-01 RX ADMIN — SEVELAMER CARBONATE 800 MILLIGRAM(S): 2400 POWDER, FOR SUSPENSION ORAL at 08:17

## 2018-12-01 RX ADMIN — Medication 10 MILLIGRAM(S): at 18:11

## 2018-12-01 RX ADMIN — Medication 500 MILLIGRAM(S): at 12:10

## 2018-12-01 RX ADMIN — HYDROMORPHONE HYDROCHLORIDE 2 MILLIGRAM(S): 2 INJECTION INTRAMUSCULAR; INTRAVENOUS; SUBCUTANEOUS at 19:28

## 2018-12-01 RX ADMIN — HYDROMORPHONE HYDROCHLORIDE 2 MILLIGRAM(S): 2 INJECTION INTRAMUSCULAR; INTRAVENOUS; SUBCUTANEOUS at 19:04

## 2018-12-01 RX ADMIN — Medication 81 MILLIGRAM(S): at 12:10

## 2018-12-01 RX ADMIN — Medication 60 MILLIGRAM(S): at 08:17

## 2018-12-01 RX ADMIN — WARFARIN SODIUM 5 MILLIGRAM(S): 2.5 TABLET ORAL at 21:34

## 2018-12-01 RX ADMIN — Medication 60 MILLIGRAM(S): at 18:11

## 2018-12-01 RX ADMIN — Medication 5 MILLIGRAM(S): at 08:17

## 2018-12-01 RX ADMIN — TACROLIMUS 0.5 MILLIGRAM(S): 5 CAPSULE ORAL at 09:18

## 2018-12-01 RX ADMIN — Medication 1 TABLET(S): at 12:10

## 2018-12-01 RX ADMIN — ERYTHROPOIETIN 5000 UNIT(S): 10000 INJECTION, SOLUTION INTRAVENOUS; SUBCUTANEOUS at 10:24

## 2018-12-01 RX ADMIN — Medication 10 MILLIGRAM(S): at 08:17

## 2018-12-01 RX ADMIN — Medication 1 GRAM(S): at 12:09

## 2018-12-01 RX ADMIN — Medication 80 MILLIGRAM(S): at 05:32

## 2018-12-01 RX ADMIN — TACROLIMUS 0.5 MILLIGRAM(S): 5 CAPSULE ORAL at 21:34

## 2018-12-01 RX ADMIN — Medication 1 PATCH: at 19:28

## 2018-12-01 RX ADMIN — SEVELAMER CARBONATE 800 MILLIGRAM(S): 2400 POWDER, FOR SUSPENSION ORAL at 18:11

## 2018-12-01 RX ADMIN — PANTOPRAZOLE SODIUM 40 MILLIGRAM(S): 20 TABLET, DELAYED RELEASE ORAL at 05:32

## 2018-12-01 RX ADMIN — Medication 100 MILLIGRAM(S): at 18:13

## 2018-12-01 NOTE — PROGRESS NOTE ADULT - ASSESSMENT
echo 4/17/18: EF 53, normal LV fx, mild MR   echo 10/5/18: EF 62%, mild MR, severely dilated left atrium, normal LV sys function     a/p     59yo M w/pmhx of HTN, Paflutter,  ESRD s/p renal transplant x 2 but now requiring HD presenting with SOB and uncontrolled BP      1. Parox. Aflutter  cv stable no cp   rate controlled  and remains in NSR, continue with  low dose propanolol for rate control   recent echo with nl lv fxn   CHADS 2 - coumadin per INR level     2. Uncontrolled  HTN  sys bp now much improved s/p cozaar 25 mg x 1   will remain off labetolol due to intolerance to medication   continue nifedipine 60 mg BID, 0.3 mg cloNIDine Patch and propanolol 10 mg BID  potassium currently stable, Start cozaar 25 mg Daily with close monitoring of k levels     trend BP    3. Acute Diastolic CHF  likely in the setting of uncontrolled HTN   clinically improving   HS trop x1 noted, demand ischemia in the setting of ESRD/ uncontrolled HTN   BP Control, fluid removal with lasix/ HD   recent echo with normal LV fx      4. ESRD  renal f/u   continue fluid removal with HD and lasix

## 2018-12-01 NOTE — PROGRESS NOTE ADULT - ASSESSMENT
· Assessment	  Patient is a 58 year old male with HTN, ESRD on HD (history of renal transplant x 2) presents to the ED with worsening shortness of breath 1-2 days admitted for acute respiratory failure with hypoxia in the setting of hypervolemia requiring extra HD session with UF, also with uncontrolled hypertension.      Problem/Plan - 1:  ·  Problem: Acute respiratory failure with hypoxia.  Plan: - acute respiratory failure with hypoxia 2' volume overload in the setting of ESRD s/p HD with UF. Respiratory status improved.  - c/w 2L nasal canula.      Problem/Plan - 2:  ·  Problem: Hypervolemia, unspecified hypervolemia type.  Plan: -S/p HD.   Problem/Plan - 3:  ·  Problem: ESRD (end stage renal disease) on dialysis.  Plan: - c/w HD per nephrology.      Problem/Plan - 4:  ·  Problem: Uncontrolled hypertension.  Plan: -  lasix 80 mg daily, cardizem 360 mg ER, clondine 0.3 mg patch weekly.  Add losartan 25 mg po daily if BP still high.     Problem/Plan - 5:  ·  Problem: Kidney transplant recipient.  Plan: - c/w tacrolimus, bactrim and prednisone 5 mg po daily.      Problem/Plan - 6:  Problem: Atrial fibrillation, chronic. Plan: - rate controlled  - c/w coumadin/PT/INr in AM    Problem/Plan - 7:  ·  Problem: Gastroesophageal reflux disease with esophagitis.  Plan: - c/w protonix.     Severe HA:  Usually with HD. Dilaudid PO PRN

## 2018-12-01 NOTE — PROGRESS NOTE ADULT - SUBJECTIVE AND OBJECTIVE BOX
Patient is a 58y old  Male who presents with a chief complaint of shortness of breath (01 Dec 2018 11:28)      SUBJECTIVE / OVERNIGHT EVENTS:    Events noted.  Feels better.  CONSTITUTIONAL: No fever,  or fatigue  NECK: No pain or stiffness  RESPIRATORY: No cough, wheezing, chills or hemoptysis; No shortness of breath  CARDIOVASCULAR: No chest pain, palpitations, dizziness, or leg swelling  GASTROINTESTINAL: No abdominal or epigastric pain. No nausea, vomiting, or hematemesis; No diarrhea or constipation.   NEUROLOGICAL: No headaches,     MEDICATIONS  (STANDING):  acetaminophen 325 mG/butalbital 50 mG/caffeine 40 mG 1 Tablet(s) Oral once  ascorbic acid 500 milliGRAM(s) Oral daily  aspirin enteric coated 81 milliGRAM(s) Oral daily  cloNIDine Patch 0.3 mG/24Hr(s) 1 patch Transdermal every 7 days  doxercalciferol Injectable 1 MICROGram(s) IV Push <User Schedule>  epoetin sherlyn Injectable 5000 Unit(s) IV Push <User Schedule>  furosemide    Tablet 80 milliGRAM(s) Oral daily  multivitamin 1 Tablet(s) Oral daily  NIFEdipine XL 60 milliGRAM(s) Oral <User Schedule>  omega-3-Acid Ethyl Esters 4 Gram(s) Oral daily  pantoprazole    Tablet 40 milliGRAM(s) Oral before breakfast  predniSONE   Tablet 5 milliGRAM(s) Oral daily  propranolol 10 milliGRAM(s) Oral two times a day  sevelamer hydrochloride 800 milliGRAM(s) Oral every 12 hours  tacrolimus 0.5 milliGRAM(s) Oral every 12 hours  trimethoprim  160 mG/sulfamethoxazole 800 mG 1 Tablet(s) Oral <User Schedule>  warfarin 5 milliGRAM(s) Oral once    MEDICATIONS  (PRN):  docusate sodium 100 milliGRAM(s) Oral daily PRN Constipation  HYDROmorphone   Tablet 2 milliGRAM(s) Oral every 12 hours PRN severe headache  oxyCODONE    IR 5 milliGRAM(s) Oral every 6 hours PRN Moderate Pain (4 - 6)  senna 2 Tablet(s) Oral at bedtime PRN Constipation        CAPILLARY BLOOD GLUCOSE        I&O's Summary    30 Nov 2018 07:01  -  01 Dec 2018 07:00  --------------------------------------------------------  IN: 840 mL / OUT: 3500 mL / NET: -2660 mL    01 Dec 2018 07:01  -  01 Dec 2018 15:21  --------------------------------------------------------  IN: 600 mL / OUT: 3000 mL / NET: -2400 mL        PHYSICAL EXAM:  GENERAL: NAD  NECK: Supple, No JVD  CHEST/LUNG: Clear to auscultation bilaterally; No wheezing.  HEART: Regular rate and rhythm; No murmurs, rubs, or gallops  ABDOMEN: Soft, Nontender, Nondistended; Bowel sounds present  EXTREMITIES:   No clubbing, cyanosis, or edema  NEUROLOGY: AAO X 3      LABS:                        8.7    8.65  )-----------( 477      ( 01 Dec 2018 10:32 )             28.7     12-01    139  |  93<L>  |  56<H>  ----------------------------<  105<H>  5.4<H>   |  27  |  8.97<H>    Ca    9.2      01 Dec 2018 09:17  Mg     2.2     12-01      PT/INR - ( 01 Dec 2018 10:32 )   PT: 20.2 sec;   INR: 1.74 ratio         PTT - ( 01 Dec 2018 10:32 )  PTT:35.9 sec        CAPILLARY BLOOD GLUCOSE                    RADIOLOGY & ADDITIONAL TESTS:    Imaging Personally Reviewed:    Consultant(s) Notes Reviewed:      Care Discussed with Consultants/Other Providers:

## 2018-12-01 NOTE — PROGRESS NOTE ADULT - SUBJECTIVE AND OBJECTIVE BOX
CARDIOLOGY FOLLOW UP - Dr. Real    CC resting comfortably in dialysis   no cp/sob  LE edema improving       PHYSICAL EXAM:  T(C): 36.6 (12-01-18 @ 08:40), Max: 36.9 (11-30-18 @ 12:50)  HR: 89 (12-01-18 @ 08:40) (84 - 98)  BP: 165/93 (12-01-18 @ 08:40) (125/82 - 165/93)  RR: 18 (12-01-18 @ 08:40) (18 - 18)  SpO2: 97% (12-01-18 @ 08:40) (95% - 97%)  Wt(kg): --  I&O's Summary    30 Nov 2018 07:01  -  01 Dec 2018 07:00  --------------------------------------------------------  IN: 840 mL / OUT: 3500 mL / NET: -2660 mL    01 Dec 2018 07:01  -  01 Dec 2018 11:28  --------------------------------------------------------  IN: 300 mL / OUT: 0 mL / NET: 300 mL        Appearance: Normal	  Cardiovascular: Normal S1 S2,RRR, No JVD, No murmurs  Respiratory: Lungs clear to auscultation	  Gastrointestinal:  Soft, Non-tender, + BS	  Extremities: Normal range of motion, No clubbing, b/l LE edema         MEDICATIONS  (STANDING):  acetaminophen 325 mG/butalbital 50 mG/caffeine 40 mG 1 Tablet(s) Oral once  ascorbic acid 500 milliGRAM(s) Oral daily  aspirin enteric coated 81 milliGRAM(s) Oral daily  cloNIDine Patch 0.3 mG/24Hr(s) 1 patch Transdermal every 7 days  doxercalciferol Injectable 1 MICROGram(s) IV Push <User Schedule>  epoetin sherlyn Injectable 5000 Unit(s) IV Push <User Schedule>  furosemide    Tablet 80 milliGRAM(s) Oral daily  multivitamin 1 Tablet(s) Oral daily  NIFEdipine XL 60 milliGRAM(s) Oral <User Schedule>  omega-3-Acid Ethyl Esters 4 Gram(s) Oral daily  pantoprazole    Tablet 40 milliGRAM(s) Oral before breakfast  predniSONE   Tablet 5 milliGRAM(s) Oral daily  propranolol 10 milliGRAM(s) Oral two times a day  sevelamer hydrochloride 800 milliGRAM(s) Oral every 12 hours  tacrolimus 0.5 milliGRAM(s) Oral every 12 hours  trimethoprim  160 mG/sulfamethoxazole 800 mG 1 Tablet(s) Oral <User Schedule>      TELEMETRY: 	    ECG:  	  RADIOLOGY:   DIAGNOSTIC TESTING:  [ ] Echocardiogram:  [ ]  Catheterization:  [ ] Stress Test:    OTHER: 	    LABS:	 	                                8.7    8.65  )-----------( 477      ( 01 Dec 2018 10:32 )             28.7     12-01    139  |  93<L>  |  56<H>  ----------------------------<  105<H>  5.4<H>   |  27  |  8.97<H>    Ca    9.2      01 Dec 2018 09:17  Mg     2.2     12-01      PT/INR - ( 01 Dec 2018 10:32 )   PT: 20.2 sec;   INR: 1.74 ratio         PTT - ( 01 Dec 2018 10:32 )  PTT:35.9 sec

## 2018-12-01 NOTE — PROGRESS NOTE ADULT - SUBJECTIVE AND OBJECTIVE BOX
[facility] DIVISION OF KIDNEY DISEASES AND HYPERTENSION -- HEMODIALYSIS NOTE  --------------------------------------------------------------------------------  Chief Complaint: ESRD/Ongoing hemodialysis requirement    24 hour events/subjective:  none  Feels better, breathing has improved  BP is better        PAST HISTORY  --------------------------------------------------------------------------------  No significant changes to PMH, PSH, FHx, SHx, unless otherwise noted    ALLERGIES & MEDICATIONS  --------------------------------------------------------------------------------  Allergies    No Known Allergies    Intolerances    beta blockers (Other (Mod to Severe))  hydrALAZINE (Short breath (Mild to Mod))    Standing Inpatient Medications  acetaminophen 325 mG/butalbital 50 mG/caffeine 40 mG 1 Tablet(s) Oral once  ascorbic acid 500 milliGRAM(s) Oral daily  aspirin enteric coated 81 milliGRAM(s) Oral daily  cloNIDine Patch 0.3 mG/24Hr(s) 1 patch Transdermal every 7 days  doxercalciferol Injectable 1 MICROGram(s) IV Push <User Schedule>  epoetin sherlyn Injectable 5000 Unit(s) IV Push <User Schedule>  furosemide    Tablet 80 milliGRAM(s) Oral daily  multivitamin 1 Tablet(s) Oral daily  NIFEdipine XL 60 milliGRAM(s) Oral <User Schedule>  omega-3-Acid Ethyl Esters 4 Gram(s) Oral daily  pantoprazole    Tablet 40 milliGRAM(s) Oral before breakfast  predniSONE   Tablet 5 milliGRAM(s) Oral daily  propranolol 10 milliGRAM(s) Oral two times a day  sevelamer hydrochloride 800 milliGRAM(s) Oral every 12 hours  tacrolimus 0.5 milliGRAM(s) Oral every 12 hours  trimethoprim  160 mG/sulfamethoxazole 800 mG 1 Tablet(s) Oral <User Schedule>    PRN Inpatient Medications  docusate sodium 100 milliGRAM(s) Oral daily PRN  HYDROmorphone   Tablet 2 milliGRAM(s) Oral every 12 hours PRN  oxyCODONE    IR 5 milliGRAM(s) Oral every 6 hours PRN  senna 2 Tablet(s) Oral at bedtime PRN      REVIEW OF SYSTEMS  --------------------------------------------------------------------------------  Gen: No weight changes, fatigue, fevers/chills, weakness  Head/Eyes/Ears/Mouth: No headache; Normal hearing; Normal vision    Respiratory: No dyspnea, cough, wheezing, hemoptysis  CV: No chest pain, PND, orthopnea  GI: No abdominal pain, diarrhea, constipation, nausea, vomiting, melena, hematochezia  : No increased frequency, dysuria, hematuria, nocturia  MSK: No joint pain/swelling; no back pain; no edema   Heme: No easy bruising or bleeding  All other systems were reviewed and are negative, except as noted.      All other systems were reviewed and are negative, except as noted.    VITALS/PHYSICAL EXAM  --------------------------------------------------------------------------------  T(C): 36.6 (12-01-18 @ 08:40), Max: 36.9 (11-30-18 @ 12:50)  HR: 89 (12-01-18 @ 08:40) (84 - 98)  BP: 165/93 (12-01-18 @ 08:40) (125/82 - 165/93)  RR: 18 (12-01-18 @ 08:40) (18 - 18)  SpO2: 97% (12-01-18 @ 08:40) (95% - 97%)  Wt(kg): --  Height (cm): 180.34 (11-29-18 @ 17:35)  Weight (kg): 89.1 (11-29-18 @ 17:35)  BMI (kg/m2): 27.4 (11-29-18 @ 17:35)  BSA (m2): 2.09 (11-29-18 @ 17:35)      11-30-18 @ 07:01  -  12-01-18 @ 07:00  --------------------------------------------------------  IN: 840 mL / OUT: 3500 mL / NET: -2660 mL    12-01-18 @ 07:01  -  12-01-18 @ 10:38  --------------------------------------------------------  IN: 300 mL / OUT: 0 mL / NET: 300 mL      Physical Exam:  PHYSICAL EXAM: vital signs as above  in no apparent distress  Neck: Supple, no JVD,    Lungs: no rhonchi, no wheeze, no crackles  CVS: S1 S2 no M/R/G  Abdomen: no tenderness, no organomegaly, BS present  Neuro: Grossly intact  Skin: warm, dry  Ext: no cyanosis or clubbing, ++edema  Access:rt chest pc +    LABS/STUDIES  --------------------------------------------------------------------------------              9.1    10.4  >-----------<  434      [11-29-18 @ 10:55]              29.1     139  |  93  |  56  ----------------------------<  105      [12-01-18 @ 09:17]  5.4   |  27  |  8.97        Ca     9.2     [12-01-18 @ 09:17]      Mg     2.2     [12-01-18 @ 09:17]    TPro  6.8  /  Alb  4.1  /  TBili  0.5  /  DBili  x   /  AST  14  /  ALT  12  /  AlkPhos  58  [11-29-18 @ 10:55]          Iron 24, TIBC 161, %sat 15      [03-20-18 @ 11:31]  Ferritin 250      [03-19-18 @ 20:02]  TSH 6.46      [10-02-18 @ 12:25]

## 2018-12-02 LAB
APTT BLD: 33.4 SEC — SIGNIFICANT CHANGE UP (ref 27.5–36.3)
HCT VFR BLD CALC: 32.6 % — LOW (ref 39–50)
HGB BLD-MCNC: 10 G/DL — LOW (ref 13–17)
INR BLD: 1.45 RATIO — HIGH (ref 0.88–1.16)
MCHC RBC-ENTMCNC: 28.3 PG — SIGNIFICANT CHANGE UP (ref 27–34)
MCHC RBC-ENTMCNC: 30.7 GM/DL — LOW (ref 32–36)
MCV RBC AUTO: 92.4 FL — SIGNIFICANT CHANGE UP (ref 80–100)
PLATELET # BLD AUTO: 498 K/UL — HIGH (ref 150–400)
PROTHROM AB SERPL-ACNC: 16.7 SEC — HIGH (ref 10–13.1)
RBC # BLD: 3.53 M/UL — LOW (ref 4.2–5.8)
RBC # FLD: 16.2 % — HIGH (ref 10.3–14.5)
TACROLIMUS SERPL-MCNC: <2 NG/ML — SIGNIFICANT CHANGE UP
WBC # BLD: 8.77 K/UL — SIGNIFICANT CHANGE UP (ref 3.8–10.5)
WBC # FLD AUTO: 8.77 K/UL — SIGNIFICANT CHANGE UP (ref 3.8–10.5)

## 2018-12-02 RX ORDER — HYDROMORPHONE HYDROCHLORIDE 2 MG/ML
0.5 INJECTION INTRAMUSCULAR; INTRAVENOUS; SUBCUTANEOUS ONCE
Qty: 0 | Refills: 0 | Status: DISCONTINUED | OUTPATIENT
Start: 2018-12-02 | End: 2018-12-02

## 2018-12-02 RX ORDER — WARFARIN SODIUM 2.5 MG/1
5 TABLET ORAL ONCE
Qty: 0 | Refills: 0 | Status: COMPLETED | OUTPATIENT
Start: 2018-12-02 | End: 2018-12-02

## 2018-12-02 RX ORDER — LOSARTAN POTASSIUM 100 MG/1
25 TABLET, FILM COATED ORAL DAILY
Qty: 0 | Refills: 0 | Status: DISCONTINUED | OUTPATIENT
Start: 2018-12-02 | End: 2018-12-02

## 2018-12-02 RX ORDER — LOSARTAN POTASSIUM 100 MG/1
25 TABLET, FILM COATED ORAL DAILY
Qty: 0 | Refills: 0 | Status: DISCONTINUED | OUTPATIENT
Start: 2018-12-02 | End: 2018-12-04

## 2018-12-02 RX ADMIN — SEVELAMER CARBONATE 800 MILLIGRAM(S): 2400 POWDER, FOR SUSPENSION ORAL at 17:14

## 2018-12-02 RX ADMIN — Medication 81 MILLIGRAM(S): at 11:33

## 2018-12-02 RX ADMIN — OXYCODONE HYDROCHLORIDE 5 MILLIGRAM(S): 5 TABLET ORAL at 17:22

## 2018-12-02 RX ADMIN — WARFARIN SODIUM 5 MILLIGRAM(S): 2.5 TABLET ORAL at 21:31

## 2018-12-02 RX ADMIN — SEVELAMER CARBONATE 800 MILLIGRAM(S): 2400 POWDER, FOR SUSPENSION ORAL at 05:53

## 2018-12-02 RX ADMIN — TACROLIMUS 0.5 MILLIGRAM(S): 5 CAPSULE ORAL at 08:54

## 2018-12-02 RX ADMIN — Medication 5 MILLIGRAM(S): at 05:54

## 2018-12-02 RX ADMIN — Medication 1 TABLET(S): at 11:32

## 2018-12-02 RX ADMIN — HYDROMORPHONE HYDROCHLORIDE 0.5 MILLIGRAM(S): 2 INJECTION INTRAMUSCULAR; INTRAVENOUS; SUBCUTANEOUS at 22:26

## 2018-12-02 RX ADMIN — LOSARTAN POTASSIUM 25 MILLIGRAM(S): 100 TABLET, FILM COATED ORAL at 18:27

## 2018-12-02 RX ADMIN — HYDROMORPHONE HYDROCHLORIDE 2 MILLIGRAM(S): 2 INJECTION INTRAMUSCULAR; INTRAVENOUS; SUBCUTANEOUS at 09:37

## 2018-12-02 RX ADMIN — Medication 10 MILLIGRAM(S): at 17:15

## 2018-12-02 RX ADMIN — Medication 60 MILLIGRAM(S): at 05:54

## 2018-12-02 RX ADMIN — Medication 80 MILLIGRAM(S): at 05:54

## 2018-12-02 RX ADMIN — Medication 60 MILLIGRAM(S): at 17:15

## 2018-12-02 RX ADMIN — Medication 10 MILLIGRAM(S): at 05:54

## 2018-12-02 RX ADMIN — PANTOPRAZOLE SODIUM 40 MILLIGRAM(S): 20 TABLET, DELAYED RELEASE ORAL at 05:54

## 2018-12-02 RX ADMIN — Medication 500 MILLIGRAM(S): at 11:32

## 2018-12-02 RX ADMIN — Medication 4 GRAM(S): at 11:33

## 2018-12-02 RX ADMIN — HYDROMORPHONE HYDROCHLORIDE 2 MILLIGRAM(S): 2 INJECTION INTRAMUSCULAR; INTRAVENOUS; SUBCUTANEOUS at 09:07

## 2018-12-02 RX ADMIN — TACROLIMUS 0.5 MILLIGRAM(S): 5 CAPSULE ORAL at 21:31

## 2018-12-02 NOTE — PROGRESS NOTE ADULT - SUBJECTIVE AND OBJECTIVE BOX
Patient is a 58y old  Male who presents with a chief complaint of shortness of breath (02 Dec 2018 08:28)      SUBJECTIVE / OVERNIGHT EVENTS:    Events noted.  Feels better.  CONSTITUTIONAL: No fever,  or fatigue  NECK: No pain or stiffness  RESPIRATORY: No cough, wheezing, chills or hemoptysis; No shortness of breath  CARDIOVASCULAR: No chest pain, palpitations, dizziness, or leg swelling  GASTROINTESTINAL: No abdominal or epigastric pain. No nausea, vomiting, or hematemesis; No diarrhea or constipation.   NEUROLOGICAL: Headache    MEDICATIONS  (STANDING):  acetaminophen 325 mG/butalbital 50 mG/caffeine 40 mG 1 Tablet(s) Oral once  ascorbic acid 500 milliGRAM(s) Oral daily  aspirin enteric coated 81 milliGRAM(s) Oral daily  cloNIDine Patch 0.3 mG/24Hr(s) 1 patch Transdermal every 7 days  doxercalciferol Injectable 1 MICROGram(s) IV Push <User Schedule>  epoetin sherlyn Injectable 5000 Unit(s) IV Push <User Schedule>  furosemide    Tablet 80 milliGRAM(s) Oral daily  losartan 25 milliGRAM(s) Oral daily  multivitamin 1 Tablet(s) Oral daily  NIFEdipine XL 60 milliGRAM(s) Oral <User Schedule>  omega-3-Acid Ethyl Esters 4 Gram(s) Oral daily  pantoprazole    Tablet 40 milliGRAM(s) Oral before breakfast  predniSONE   Tablet 5 milliGRAM(s) Oral daily  propranolol 10 milliGRAM(s) Oral two times a day  sevelamer hydrochloride 800 milliGRAM(s) Oral every 12 hours  tacrolimus 0.5 milliGRAM(s) Oral every 12 hours  trimethoprim  160 mG/sulfamethoxazole 800 mG 1 Tablet(s) Oral <User Schedule>  warfarin 5 milliGRAM(s) Oral once    MEDICATIONS  (PRN):  docusate sodium 100 milliGRAM(s) Oral daily PRN Constipation  HYDROmorphone   Tablet 2 milliGRAM(s) Oral every 12 hours PRN severe headache  oxyCODONE    IR 5 milliGRAM(s) Oral every 6 hours PRN Moderate Pain (4 - 6)  senna 2 Tablet(s) Oral at bedtime PRN Constipation        CAPILLARY BLOOD GLUCOSE        I&O's Summary    01 Dec 2018 07:01  -  02 Dec 2018 07:00  --------------------------------------------------------  IN: 1020 mL / OUT: 3500 mL / NET: -2480 mL    02 Dec 2018 07:01  -  02 Dec 2018 19:13  --------------------------------------------------------  IN: 600 mL / OUT: 300 mL / NET: 300 mL        PHYSICAL EXAM:  GENERAL: NAD  NECK: Supple, No JVD  CHEST/LUNG: Clear to auscultation bilaterally; No wheezing.  HEART: Regular rate and rhythm; No murmurs, rubs, or gallops  ABDOMEN: Soft, Nontender, Nondistended; Bowel sounds present  EXTREMITIES:   No clubbing, cyanosis, or edema  NEUROLOGY: AAO X 3      LABS:                        10.0   8.77  )-----------( 498      ( 02 Dec 2018 09:15 )             32.6     12-01    139  |  93<L>  |  56<H>  ----------------------------<  105<H>  5.4<H>   |  27  |  8.97<H>    Ca    9.2      01 Dec 2018 09:17  Mg     2.2     12-01      PT/INR - ( 02 Dec 2018 09:16 )   PT: 16.7 sec;   INR: 1.45 ratio         PTT - ( 02 Dec 2018 09:16 )  PTT:33.4 sec        CAPILLARY BLOOD GLUCOSE                    RADIOLOGY & ADDITIONAL TESTS:    Imaging Personally Reviewed:    Consultant(s) Notes Reviewed:      Care Discussed with Consultants/Other Providers:

## 2018-12-02 NOTE — PROGRESS NOTE ADULT - ASSESSMENT
echo 4/17/18: EF 53, normal LV fx, mild MR   echo 10/5/18: EF 62%, mild MR, severely dilated left atrium, normal LV sys function     a/p     59yo M w/pmhx of HTN, Paflutter,  ESRD s/p renal transplant x 2 but now requiring HD presenting with SOB and uncontrolled BP      1. Parox. Aflutter  cv stable no cp   rate controlled  and remains in NSR, continue with  low dose propanolol for rate control   recent echo with nl lv fxn   CHADS 2 - coumadin per INR level     2. Uncontrolled  HTN  sys bp improved  will remain off labetolol due to intolerance to medication   continue nifedipine 60 mg BID, 0.3 mg cloNIDine Patch and propanolol 10 mg BID  patient responded well to dose of cozaar  k still remains borderline elevated  will poss start low dose cozaar,maybe 12.5 or maybe only on non hd days with close K    3. Acute Diastolic CHF  likely in the setting of uncontrolled HTN   clinically improving   HS trop x1 noted, demand ischemia in the setting of ESRD/ uncontrolled HTN   BP Control, fluid removal with lasix/ HD   recent echo with normal LV fx      4. ESRD  renal f/u   continue fluid removal with HD and lasix     dvt ppx

## 2018-12-02 NOTE — PROGRESS NOTE ADULT - SUBJECTIVE AND OBJECTIVE BOX
CARDIOLOGY FOLLOW UP - Dr. Real    CC no cp/sob   le edema improving       PHYSICAL EXAM:  T(C): 36.4 (12-02-18 @ 07:41), Max: 37 (12-02-18 @ 06:15)  HR: 80 (12-02-18 @ 07:41) (80 - 92)  BP: 159/85 (12-02-18 @ 07:41) (136/87 - 180/90)  RR: 18 (12-02-18 @ 07:41) (18 - 18)  SpO2: 97% (12-02-18 @ 07:41) (95% - 98%)  Wt(kg): --  I&O's Summary    01 Dec 2018 07:01  -  02 Dec 2018 07:00  --------------------------------------------------------  IN: 1020 mL / OUT: 3500 mL / NET: -2480 mL        Appearance: Normal	  Cardiovascular: Normal S1 S2,RRR, No JVD, No murmurs  Respiratory: Lungs clear to auscultation	  Gastrointestinal:  Soft, Non-tender, + BS	  Extremities: Normal range of motion, No clubbing, b/l + 1 LE edema       MEDICATIONS  (STANDING):  acetaminophen 325 mG/butalbital 50 mG/caffeine 40 mG 1 Tablet(s) Oral once  ascorbic acid 500 milliGRAM(s) Oral daily  aspirin enteric coated 81 milliGRAM(s) Oral daily  cloNIDine Patch 0.3 mG/24Hr(s) 1 patch Transdermal every 7 days  doxercalciferol Injectable 1 MICROGram(s) IV Push <User Schedule>  epoetin sherlyn Injectable 5000 Unit(s) IV Push <User Schedule>  furosemide    Tablet 80 milliGRAM(s) Oral daily  multivitamin 1 Tablet(s) Oral daily  NIFEdipine XL 60 milliGRAM(s) Oral <User Schedule>  omega-3-Acid Ethyl Esters 4 Gram(s) Oral daily  pantoprazole    Tablet 40 milliGRAM(s) Oral before breakfast  predniSONE   Tablet 5 milliGRAM(s) Oral daily  propranolol 10 milliGRAM(s) Oral two times a day  sevelamer hydrochloride 800 milliGRAM(s) Oral every 12 hours  tacrolimus 0.5 milliGRAM(s) Oral every 12 hours  trimethoprim  160 mG/sulfamethoxazole 800 mG 1 Tablet(s) Oral <User Schedule>      TELEMETRY: 	    ECG:  	  RADIOLOGY:   DIAGNOSTIC TESTING:  [ ] Echocardiogram:  [ ]  Catheterization:  [ ] Stress Test:    OTHER: 	    LABS:	 	                                8.7    8.65  )-----------( 477      ( 01 Dec 2018 10:32 )             28.7     12-01    139  |  93<L>  |  56<H>  ----------------------------<  105<H>  5.4<H>   |  27  |  8.97<H>    Ca    9.2      01 Dec 2018 09:17  Mg     2.2     12-01      PT/INR - ( 01 Dec 2018 10:32 )   PT: 20.2 sec;   INR: 1.74 ratio         PTT - ( 01 Dec 2018 10:32 )  PTT:35.9 sec

## 2018-12-02 NOTE — PROGRESS NOTE ADULT - ASSESSMENT
· Assessment	  Patient is a 58 year old male with HTN, ESRD on HD (history of renal transplant x 2) presents to the ED with worsening shortness of breath 1-2 days admitted for acute respiratory failure with hypoxia in the setting of hypervolemia requiring extra HD session with UF, also with uncontrolled hypertension.      Problem/Plan - 1:  ·  Problem: Accelerated HTN:       Will start Losartan 25 daily with daily monitoring of K.    ESRD on HD/Hyperkalemia:  BMP  Nephro f/up     A Fib:  Coumadin/PT/INR

## 2018-12-03 DIAGNOSIS — N25.81 SECONDARY HYPERPARATHYROIDISM OF RENAL ORIGIN: ICD-10-CM

## 2018-12-03 LAB
ALDOLASE SERPL-CCNC: 4.8 U/L — SIGNIFICANT CHANGE UP (ref 3.3–10.3)
ANION GAP SERPL CALC-SCNC: 21 MMOL/L — HIGH (ref 5–17)
BUN SERPL-MCNC: 66 MG/DL — HIGH (ref 7–23)
CALCIUM SERPL-MCNC: 9.1 MG/DL — SIGNIFICANT CHANGE UP (ref 8.4–10.5)
CHLORIDE SERPL-SCNC: 95 MMOL/L — LOW (ref 96–108)
CO2 SERPL-SCNC: 22 MMOL/L — SIGNIFICANT CHANGE UP (ref 22–31)
CREAT SERPL-MCNC: 9.08 MG/DL — HIGH (ref 0.5–1.3)
GLUCOSE SERPL-MCNC: 90 MG/DL — SIGNIFICANT CHANGE UP (ref 70–99)
INR BLD: 1.36 RATIO — HIGH (ref 0.88–1.16)
POTASSIUM SERPL-MCNC: 6 MMOL/L — HIGH (ref 3.5–5.3)
POTASSIUM SERPL-SCNC: 6 MMOL/L — HIGH (ref 3.5–5.3)
PROTHROM AB SERPL-ACNC: 15.6 SEC — HIGH (ref 10–13.1)
SODIUM SERPL-SCNC: 138 MMOL/L — SIGNIFICANT CHANGE UP (ref 135–145)
TACROLIMUS SERPL-MCNC: 3.5 NG/ML — SIGNIFICANT CHANGE UP

## 2018-12-03 PROCEDURE — 76776 US EXAM K TRANSPL W/DOPPLER: CPT | Mod: 26,LT

## 2018-12-03 PROCEDURE — 90935 HEMODIALYSIS ONE EVALUATION: CPT | Mod: GC

## 2018-12-03 RX ORDER — HYDROMORPHONE HYDROCHLORIDE 2 MG/ML
0.5 INJECTION INTRAMUSCULAR; INTRAVENOUS; SUBCUTANEOUS ONCE
Qty: 0 | Refills: 0 | Status: DISCONTINUED | OUTPATIENT
Start: 2018-12-03 | End: 2018-12-03

## 2018-12-03 RX ORDER — WARFARIN SODIUM 2.5 MG/1
5 TABLET ORAL ONCE
Qty: 0 | Refills: 0 | Status: COMPLETED | OUTPATIENT
Start: 2018-12-03 | End: 2018-12-03

## 2018-12-03 RX ADMIN — PANTOPRAZOLE SODIUM 40 MILLIGRAM(S): 20 TABLET, DELAYED RELEASE ORAL at 13:52

## 2018-12-03 RX ADMIN — LOSARTAN POTASSIUM 25 MILLIGRAM(S): 100 TABLET, FILM COATED ORAL at 05:34

## 2018-12-03 RX ADMIN — OXYCODONE HYDROCHLORIDE 5 MILLIGRAM(S): 5 TABLET ORAL at 14:05

## 2018-12-03 RX ADMIN — Medication 10 MILLIGRAM(S): at 17:54

## 2018-12-03 RX ADMIN — Medication 4 GRAM(S): at 13:53

## 2018-12-03 RX ADMIN — Medication 500 MILLIGRAM(S): at 13:52

## 2018-12-03 RX ADMIN — Medication 1 PATCH: at 19:15

## 2018-12-03 RX ADMIN — TACROLIMUS 0.5 MILLIGRAM(S): 5 CAPSULE ORAL at 21:42

## 2018-12-03 RX ADMIN — Medication 1 TABLET(S): at 13:53

## 2018-12-03 RX ADMIN — Medication 60 MILLIGRAM(S): at 05:34

## 2018-12-03 RX ADMIN — SEVELAMER CARBONATE 800 MILLIGRAM(S): 2400 POWDER, FOR SUSPENSION ORAL at 17:54

## 2018-12-03 RX ADMIN — Medication 81 MILLIGRAM(S): at 13:52

## 2018-12-03 RX ADMIN — TACROLIMUS 0.5 MILLIGRAM(S): 5 CAPSULE ORAL at 08:59

## 2018-12-03 RX ADMIN — WARFARIN SODIUM 5 MILLIGRAM(S): 2.5 TABLET ORAL at 21:42

## 2018-12-03 RX ADMIN — HYDROMORPHONE HYDROCHLORIDE 0.5 MILLIGRAM(S): 2 INJECTION INTRAMUSCULAR; INTRAVENOUS; SUBCUTANEOUS at 21:59

## 2018-12-03 RX ADMIN — HYDROMORPHONE HYDROCHLORIDE 0.5 MILLIGRAM(S): 2 INJECTION INTRAMUSCULAR; INTRAVENOUS; SUBCUTANEOUS at 00:22

## 2018-12-03 RX ADMIN — Medication 5 MILLIGRAM(S): at 05:35

## 2018-12-03 RX ADMIN — HYDROMORPHONE HYDROCHLORIDE 0.5 MILLIGRAM(S): 2 INJECTION INTRAMUSCULAR; INTRAVENOUS; SUBCUTANEOUS at 22:29

## 2018-12-03 RX ADMIN — HYDROMORPHONE HYDROCHLORIDE 2 MILLIGRAM(S): 2 INJECTION INTRAMUSCULAR; INTRAVENOUS; SUBCUTANEOUS at 17:46

## 2018-12-03 RX ADMIN — DOXERCALCIFEROL 1 MICROGRAM(S): 2.5 CAPSULE ORAL at 13:55

## 2018-12-03 RX ADMIN — Medication 80 MILLIGRAM(S): at 05:35

## 2018-12-03 RX ADMIN — Medication 10 MILLIGRAM(S): at 05:35

## 2018-12-03 RX ADMIN — Medication 60 MILLIGRAM(S): at 17:53

## 2018-12-03 RX ADMIN — SEVELAMER CARBONATE 800 MILLIGRAM(S): 2400 POWDER, FOR SUSPENSION ORAL at 05:34

## 2018-12-03 RX ADMIN — Medication 1 TABLET(S): at 13:51

## 2018-12-03 NOTE — PROGRESS NOTE ADULT - PROBLEM SELECTOR PLAN 2
Maintain on present medications.  However there is certainly a large component of volume - mediated hypertension.  Will need to do additional HD in am   Please check doppler of transplant renal artery.  Check Rolo check plasma metanephrines.

## 2018-12-03 NOTE — PROGRESS NOTE ADULT - ASSESSMENT
59 yo M with PMHx of ESRD on HD and history of two kidney transplants admitted for acute SOB and uncontrolled HTN. Pt has been having extra UF session as an outpatient at [facility] dialysis unit without resolution of his hypervolemia and uncontrolled HTN.

## 2018-12-03 NOTE — PROGRESS NOTE ADULT - SUBJECTIVE AND OBJECTIVE BOX
CARDIOLOGY FOLLOW UP - Dr. Real    CC no cp  c/o sob- seen in dialysis, extra dialysis this am for fluid removal       PHYSICAL EXAM:  T(C): 36.8 (12-03-18 @ 08:55), Max: 37.2 (12-02-18 @ 22:17)  HR: 85 (12-03-18 @ 08:55) (77 - 87)  BP: 197/115 (12-03-18 @ 08:55) (164/76 - 197/115)  RR: 21 (12-03-18 @ 08:55) (18 - 21)  SpO2: 99% (12-03-18 @ 08:08) (95% - 99%)  Wt(kg): --  I&O's Summary    02 Dec 2018 07:01  -  03 Dec 2018 07:00  --------------------------------------------------------  IN: 840 mL / OUT: 700 mL / NET: 140 mL        Appearance: Normal	  Cardiovascular: Normal S1 S2,RRR, No JVD, No murmurs  Respiratory: +crackles 	  Gastrointestinal:  Soft, Non-tender, + BS	  Extremities: Normal range of motion, No clubbing, b/l LE edema         MEDICATIONS  (STANDING):  acetaminophen 325 mG/butalbital 50 mG/caffeine 40 mG 1 Tablet(s) Oral once  ascorbic acid 500 milliGRAM(s) Oral daily  aspirin enteric coated 81 milliGRAM(s) Oral daily  cloNIDine Patch 0.3 mG/24Hr(s) 1 patch Transdermal every 7 days  doxercalciferol Injectable 1 MICROGram(s) IV Push <User Schedule>  epoetin sherlyn Injectable 5000 Unit(s) IV Push <User Schedule>  furosemide    Tablet 80 milliGRAM(s) Oral daily  losartan 25 milliGRAM(s) Oral daily  multivitamin 1 Tablet(s) Oral daily  NIFEdipine XL 60 milliGRAM(s) Oral <User Schedule>  omega-3-Acid Ethyl Esters 4 Gram(s) Oral daily  pantoprazole    Tablet 40 milliGRAM(s) Oral before breakfast  predniSONE   Tablet 5 milliGRAM(s) Oral daily  propranolol 10 milliGRAM(s) Oral two times a day  sevelamer hydrochloride 800 milliGRAM(s) Oral every 12 hours  tacrolimus 0.5 milliGRAM(s) Oral every 12 hours  trimethoprim  160 mG/sulfamethoxazole 800 mG 1 Tablet(s) Oral <User Schedule>      TELEMETRY: 	    ECG:  	  RADIOLOGY:   DIAGNOSTIC TESTING:  [ ] Echocardiogram:  [ ]  Catheterization:  [ ] Stress Test:    OTHER: 	    LABS:	 	                                10.0   8.77  )-----------( 498      ( 02 Dec 2018 09:15 )             32.6     12-03    138  |  95<L>  |  66<H>  ----------------------------<  90  6.0<H>   |  22  |  9.08<H>    Ca    9.1      03 Dec 2018 07:31      PT/INR - ( 03 Dec 2018 09:16 )   PT: 15.6 sec;   INR: 1.36 ratio         PTT - ( 02 Dec 2018 09:16 )  PTT:33.4 sec

## 2018-12-03 NOTE — PROGRESS NOTE ADULT - PROBLEM SELECTOR PLAN 1
noted to be hyperkalemic likely as he is on tacrolimus, ARB, Bactrim   HD today : 3 hrs, 2K UF 3 kg  HD again in am

## 2018-12-03 NOTE — PROGRESS NOTE ADULT - SUBJECTIVE AND OBJECTIVE BOX
[facility] DIVISION OF KIDNEY DISEASES AND HYPERTENSION -- HEMODIALYSIS NOTE  --------------------------------------------------------------------------------  Chief Complaint: ESRD/Ongoing hemodialysis requirement    24 hour events/subjective:  K up at 6 today  Scheduled for UF but changed to HD         PAST HISTORY  --------------------------------------------------------------------------------  No significant changes to PMH, PSH, FHx, SHx, unless otherwise noted    ALLERGIES & MEDICATIONS  --------------------------------------------------------------------------------  Allergies    No Known Allergies    Intolerances    beta blockers (Other (Mod to Severe))  hydrALAZINE (Short breath (Mild to Mod))    Standing Inpatient Medications  acetaminophen 325 mG/butalbital 50 mG/caffeine 40 mG 1 Tablet(s) Oral once  ascorbic acid 500 milliGRAM(s) Oral daily  aspirin enteric coated 81 milliGRAM(s) Oral daily  cloNIDine Patch 0.3 mG/24Hr(s) 1 patch Transdermal every 7 days  doxercalciferol Injectable 1 MICROGram(s) IV Push <User Schedule>  epoetin sherlyn Injectable 5000 Unit(s) IV Push <User Schedule>  furosemide    Tablet 80 milliGRAM(s) Oral daily  losartan 25 milliGRAM(s) Oral daily  multivitamin 1 Tablet(s) Oral daily  NIFEdipine XL 60 milliGRAM(s) Oral <User Schedule>  omega-3-Acid Ethyl Esters 4 Gram(s) Oral daily  pantoprazole    Tablet 40 milliGRAM(s) Oral before breakfast  predniSONE   Tablet 5 milliGRAM(s) Oral daily  propranolol 10 milliGRAM(s) Oral two times a day  sevelamer hydrochloride 800 milliGRAM(s) Oral every 12 hours  tacrolimus 0.5 milliGRAM(s) Oral every 12 hours  trimethoprim  160 mG/sulfamethoxazole 800 mG 1 Tablet(s) Oral <User Schedule>    PRN Inpatient Medications  docusate sodium 100 milliGRAM(s) Oral daily PRN  HYDROmorphone   Tablet 2 milliGRAM(s) Oral every 12 hours PRN  oxyCODONE    IR 5 milliGRAM(s) Oral every 6 hours PRN  senna 2 Tablet(s) Oral at bedtime PRN      REVIEW OF SYSTEMS  --------------------------------------------------------------------------------  Gen: No weight changes, fatigue, fevers/chills, weakness  Head/Eyes/Ears/Mouth: No headache; Normal hearing; Normal vision    Respiratory: + dyspnea, cough, wheezing, hemoptysis  CV: No chest pain, PND, orthopnea  GI: No abdominal pain, diarrhea, constipation, nausea, vomiting, melena, hematochezia  : No increased frequency, dysuria, hematuria, nocturia  MSK: No joint pain/swelling; no back pain; no edema   Heme: No easy bruising or bleeding  All other systems were reviewed and are negative, except as noted.    All other systems were reviewed and are negative, except as noted.    VITALS/PHYSICAL EXAM  --------------------------------------------------------------------------------  T(C): 36.9 (12-03-18 @ 15:50), Max: 37.2 (12-02-18 @ 22:17)  HR: 85 (12-03-18 @ 15:50) (77 - 94)  BP: 171/85 (12-03-18 @ 15:50) (164/76 - 197/115)  RR: 18 (12-03-18 @ 15:50) (18 - 21)  SpO2: 96% (12-03-18 @ 15:50) (95% - 99%)  Wt(kg): --        12-02-18 @ 07:01  -  12-03-18 @ 07:00  --------------------------------------------------------  IN: 840 mL / OUT: 700 mL / NET: 140 mL    12-03-18 @ 07:01  -  12-03-18 @ 16:38  --------------------------------------------------------  IN: 450 mL / OUT: 2400 mL / NET: -1950 mL      Physical Exam:  	PHYSICAL EXAM: vital signs as above  in no apparent distress  Neck: Supple, no JVD,    Lungs: no rhonchi, no wheeze, no crackles  CVS: S1 S2 no M/R/G  Abdomen: no tenderness, no organomegaly, BS present  Neuro: Grossly intact  Skin: warm, dry  Ext: no cyanosis or clubbing, ++ edema  Access: Rt chest PC      LABS/STUDIES  --------------------------------------------------------------------------------              10.0   8.77  >-----------<  498      [12-02-18 @ 09:15]              32.6     138  |  95  |  66  ----------------------------<  90      [12-03-18 @ 07:31]  6.0   |  22  |  9.08        Ca     9.1     [12-03-18 @ 07:31]      PT/INR: PT 15.6 , INR 1.36       [12-03-18 @ 09:16]  PTT: 33.4       [12-02-18 @ 09:16]      Iron 24, TIBC 161, %sat 15      [03-20-18 @ 11:31]  Ferritin 250      [03-19-18 @ 20:02]  TSH 6.46      [10-02-18 @ 12:25]

## 2018-12-03 NOTE — CHART NOTE - NSCHARTNOTEFT_GEN_A_CORE
MEDICINE NP    MEAGAN RAMOS  58y Male    Patient is a 58y old  Male who presents with a chief complaint of shortness of breath (02 Dec 2018 14:59)       > Event Summary:  10:15PM Notified by RN, Patient with repeat /102 manually.  VS: T(C): 37.3  T(F): 99  HR: 79  BP: 194/101  RR: 18  SpO2 99%  (02 Dec 2018 22:17)  -Patient seen at bedside, OOB to chair.  AAOX3.  States had HD past x3 days ThFS, and w/ 9/10 pain to b/l LE, symptoms chronic occurrence after HD, requesting pain managment.   Denies headache, dizziness, sob, chest pain.    -VS manually by me 184/102.    -Dilaudid 0.5mg iv x1 given and monitored.      11PM:  Repeat /90.    -C/w Current management and monitoring.       5AM:    -Vital Signs Last 24 Hrs  T(C): 36.9 (03 Dec 2018 04:44), Max: 37.2 (02 Dec 2018 22:17)  T(F): 98.4 (03 Dec 2018 04:44), Max: 99 (02 Dec 2018 22:17)  HR: 87 (03 Dec 2018 04:44) (77 - 90)  BP: 182/92 (03 Dec 2018 04:44) (159/85 - 194/101)  RR: 18 (03 Dec 2018 04:44) (18 - 18)  SpO2: 97% (03 Dec 2018 04:44) (95% - 97%)  -Will give 6AM Medications now and re-evaluate  -Cardiology on board, f/u this AM  -Attending to follow        LEOLA Fraser-BC  Medicine Department  #[id number]

## 2018-12-03 NOTE — PROGRESS NOTE ADULT - ASSESSMENT
· Assessment	  Patient is a 58 year old male with HTN, ESRD on HD (history of renal transplant x 2) presents to the ED with worsening shortness of breath 1-2 days admitted for acute respiratory failure with hypoxia in the setting of hypervolemia requiring extra HD session with UF, also with uncontrolled hypertension.      Problem/Plan - 1:  ·  Problem: Accelerated HTN:       On Losartan 25 daily with daily K monitor       Cardio f/up noted.    ESRD on HD/Hyperkalemia:  HD today  Nephro f/up     A Fib:  Coumadin/PT/INR

## 2018-12-03 NOTE — PROGRESS NOTE ADULT - ASSESSMENT
echo 4/17/18: EF 53, normal LV fx, mild MR   echo 10/5/18: EF 62%, mild MR, severely dilated left atrium, normal LV sys function     a/p     59yo M w/pmhx of HTN, Paflutter,  ESRD s/p renal transplant x 2 but now requiring HD presenting with SOB and uncontrolled BP      1. Parox. Aflutter  cv stable no cp   rate controlled  and remains in NSR, continue with  low dose propanolol for rate control   recent echo with nl lv fxn   CHADS 2 - coumadin per INR level     2. Uncontrolled  HTN  sys bp elevated this am, likely secondary to fluid overload   extra dialysis planned for today for extra fluid removal   will remain off labetolol due to intolerance to medication   continue nifedipine 60 mg BID, 0.3 mg cloNIDine Patch and propanolol 10 mg BID  continue losartan 25mg daily, monitor potassium     3. Acute Diastolic CHF  likely in the setting of uncontrolled HTN   HS trop x1 noted, demand ischemia in the setting of ESRD/ uncontrolled HTN   BP Control, fluid removal with lasix/ HD   recent echo with normal LV fx      4. ESRD  renal f/u   continue fluid removal with HD and lasix     dvt ppx

## 2018-12-03 NOTE — PROGRESS NOTE ADULT - SUBJECTIVE AND OBJECTIVE BOX
Patient is a 58y old  Male who presents with a chief complaint of shortness of breath (03 Dec 2018 16:38)      SUBJECTIVE / OVERNIGHT EVENTS:    Events noted.  Feels better.  CONSTITUTIONAL: No fever,  or fatigue  NECK: No pain or stiffness  RESPIRATORY: No cough, wheezing, chills or hemoptysis; No shortness of breath  CARDIOVASCULAR: No chest pain, palpitations, dizziness, or leg swelling  GASTROINTESTINAL: No abdominal or epigastric pain. No nausea, vomiting, or hematemesis; No diarrhea or constipation.   NEUROLOGICAL: No headaches,     MEDICATIONS  (STANDING):  acetaminophen 325 mG/butalbital 50 mG/caffeine 40 mG 1 Tablet(s) Oral once  ascorbic acid 500 milliGRAM(s) Oral daily  aspirin enteric coated 81 milliGRAM(s) Oral daily  cloNIDine Patch 0.3 mG/24Hr(s) 1 patch Transdermal every 7 days  doxercalciferol Injectable 1 MICROGram(s) IV Push <User Schedule>  epoetin sherlyn Injectable 5000 Unit(s) IV Push <User Schedule>  furosemide    Tablet 80 milliGRAM(s) Oral daily  losartan 25 milliGRAM(s) Oral daily  multivitamin 1 Tablet(s) Oral daily  NIFEdipine XL 60 milliGRAM(s) Oral <User Schedule>  omega-3-Acid Ethyl Esters 4 Gram(s) Oral daily  pantoprazole    Tablet 40 milliGRAM(s) Oral before breakfast  predniSONE   Tablet 5 milliGRAM(s) Oral daily  propranolol 10 milliGRAM(s) Oral two times a day  sevelamer hydrochloride 800 milliGRAM(s) Oral every 12 hours  tacrolimus 0.5 milliGRAM(s) Oral every 12 hours  trimethoprim  160 mG/sulfamethoxazole 800 mG 1 Tablet(s) Oral <User Schedule>    MEDICATIONS  (PRN):  docusate sodium 100 milliGRAM(s) Oral daily PRN Constipation  HYDROmorphone   Tablet 2 milliGRAM(s) Oral every 12 hours PRN severe headache  oxyCODONE    IR 5 milliGRAM(s) Oral every 6 hours PRN Moderate Pain (4 - 6)  senna 2 Tablet(s) Oral at bedtime PRN Constipation        CAPILLARY BLOOD GLUCOSE        I&O's Summary    02 Dec 2018 07:01  -  03 Dec 2018 07:00  --------------------------------------------------------  IN: 840 mL / OUT: 700 mL / NET: 140 mL    03 Dec 2018 07:01  -  03 Dec 2018 23:09  --------------------------------------------------------  IN: 770 mL / OUT: 2400 mL / NET: -1630 mL        PHYSICAL EXAM:  GENERAL: NAD  NECK: Supple, No JVD  CHEST/LUNG: Clear to auscultation bilaterally; No wheezing.  HEART: Regular rate and rhythm; No murmurs, rubs, or gallops  ABDOMEN: Soft, Nontender, Nondistended; Bowel sounds present  EXTREMITIES:   No clubbing, cyanosis, or edema  NEUROLOGY: AAO X 3      LABS:                        10.0   8.77  )-----------( 498      ( 02 Dec 2018 09:15 )             32.6     12-03    138  |  95<L>  |  66<H>  ----------------------------<  90  6.0<H>   |  22  |  9.08<H>    Ca    9.1      03 Dec 2018 07:31      PT/INR - ( 03 Dec 2018 09:16 )   PT: 15.6 sec;   INR: 1.36 ratio         PTT - ( 02 Dec 2018 09:16 )  PTT:33.4 sec        CAPILLARY BLOOD GLUCOSE                    RADIOLOGY & ADDITIONAL TESTS:    Imaging Personally Reviewed:    Consultant(s) Notes Reviewed:      Care Discussed with Consultants/Other Providers:

## 2018-12-04 LAB
ANION GAP SERPL CALC-SCNC: 16 MMOL/L — SIGNIFICANT CHANGE UP (ref 5–17)
BUN SERPL-MCNC: 58 MG/DL — HIGH (ref 7–23)
CALCIUM SERPL-MCNC: 9.5 MG/DL — SIGNIFICANT CHANGE UP (ref 8.4–10.5)
CHLORIDE SERPL-SCNC: 95 MMOL/L — LOW (ref 96–108)
CO2 SERPL-SCNC: 26 MMOL/L — SIGNIFICANT CHANGE UP (ref 22–31)
CREAT SERPL-MCNC: 7.62 MG/DL — HIGH (ref 0.5–1.3)
GLUCOSE SERPL-MCNC: 128 MG/DL — HIGH (ref 70–99)
HCT VFR BLD CALC: 30.8 % — LOW (ref 39–50)
HGB BLD-MCNC: 9.6 G/DL — LOW (ref 13–17)
INR BLD: 1.33 RATIO — HIGH (ref 0.88–1.16)
MCHC RBC-ENTMCNC: 28.4 PG — SIGNIFICANT CHANGE UP (ref 27–34)
MCHC RBC-ENTMCNC: 31.2 GM/DL — LOW (ref 32–36)
MCV RBC AUTO: 91.1 FL — SIGNIFICANT CHANGE UP (ref 80–100)
PLATELET # BLD AUTO: 523 K/UL — HIGH (ref 150–400)
POTASSIUM SERPL-MCNC: 5.2 MMOL/L — SIGNIFICANT CHANGE UP (ref 3.5–5.3)
POTASSIUM SERPL-SCNC: 5.2 MMOL/L — SIGNIFICANT CHANGE UP (ref 3.5–5.3)
PROTHROM AB SERPL-ACNC: 15.4 SEC — HIGH (ref 10–12.9)
RBC # BLD: 3.38 M/UL — LOW (ref 4.2–5.8)
RBC # FLD: 15.8 % — HIGH (ref 10.3–14.5)
SODIUM SERPL-SCNC: 137 MMOL/L — SIGNIFICANT CHANGE UP (ref 135–145)
TACROLIMUS SERPL-MCNC: <2 NG/ML — SIGNIFICANT CHANGE UP
WBC # BLD: 8.95 K/UL — SIGNIFICANT CHANGE UP (ref 3.8–10.5)
WBC # FLD AUTO: 8.95 K/UL — SIGNIFICANT CHANGE UP (ref 3.8–10.5)

## 2018-12-04 PROCEDURE — 90935 HEMODIALYSIS ONE EVALUATION: CPT | Mod: GC

## 2018-12-04 RX ORDER — WARFARIN SODIUM 2.5 MG/1
6 TABLET ORAL ONCE
Qty: 0 | Refills: 0 | Status: COMPLETED | OUTPATIENT
Start: 2018-12-04 | End: 2018-12-04

## 2018-12-04 RX ORDER — FUROSEMIDE 40 MG
80 TABLET ORAL EVERY 12 HOURS
Qty: 0 | Refills: 0 | Status: DISCONTINUED | OUTPATIENT
Start: 2018-12-04 | End: 2018-12-05

## 2018-12-04 RX ORDER — HYDROMORPHONE HYDROCHLORIDE 2 MG/ML
1 INJECTION INTRAMUSCULAR; INTRAVENOUS; SUBCUTANEOUS EVERY 8 HOURS
Qty: 0 | Refills: 0 | Status: DISCONTINUED | OUTPATIENT
Start: 2018-12-04 | End: 2018-12-10

## 2018-12-04 RX ORDER — DOXAZOSIN MESYLATE 4 MG
1 TABLET ORAL AT BEDTIME
Qty: 0 | Refills: 0 | Status: DISCONTINUED | OUTPATIENT
Start: 2018-12-04 | End: 2018-12-05

## 2018-12-04 RX ADMIN — Medication 1 MILLIGRAM(S): at 21:58

## 2018-12-04 RX ADMIN — Medication 80 MILLIGRAM(S): at 18:48

## 2018-12-04 RX ADMIN — Medication 1 TABLET(S): at 13:03

## 2018-12-04 RX ADMIN — Medication 500 MILLIGRAM(S): at 13:03

## 2018-12-04 RX ADMIN — ERYTHROPOIETIN 5000 UNIT(S): 10000 INJECTION, SOLUTION INTRAVENOUS; SUBCUTANEOUS at 10:27

## 2018-12-04 RX ADMIN — TACROLIMUS 0.5 MILLIGRAM(S): 5 CAPSULE ORAL at 10:35

## 2018-12-04 RX ADMIN — LOSARTAN POTASSIUM 25 MILLIGRAM(S): 100 TABLET, FILM COATED ORAL at 13:03

## 2018-12-04 RX ADMIN — TACROLIMUS 0.5 MILLIGRAM(S): 5 CAPSULE ORAL at 20:25

## 2018-12-04 RX ADMIN — SEVELAMER CARBONATE 800 MILLIGRAM(S): 2400 POWDER, FOR SUSPENSION ORAL at 13:02

## 2018-12-04 RX ADMIN — Medication 4 GRAM(S): at 13:03

## 2018-12-04 RX ADMIN — HYDROMORPHONE HYDROCHLORIDE 1 MILLIGRAM(S): 2 INJECTION INTRAMUSCULAR; INTRAVENOUS; SUBCUTANEOUS at 23:39

## 2018-12-04 RX ADMIN — Medication 60 MILLIGRAM(S): at 18:48

## 2018-12-04 RX ADMIN — Medication 10 MILLIGRAM(S): at 05:55

## 2018-12-04 RX ADMIN — Medication 80 MILLIGRAM(S): at 05:54

## 2018-12-04 RX ADMIN — Medication 81 MILLIGRAM(S): at 13:03

## 2018-12-04 RX ADMIN — HYDROMORPHONE HYDROCHLORIDE 1 MILLIGRAM(S): 2 INJECTION INTRAMUSCULAR; INTRAVENOUS; SUBCUTANEOUS at 14:46

## 2018-12-04 RX ADMIN — PANTOPRAZOLE SODIUM 40 MILLIGRAM(S): 20 TABLET, DELAYED RELEASE ORAL at 05:55

## 2018-12-04 RX ADMIN — Medication 1 TABLET(S): at 18:50

## 2018-12-04 RX ADMIN — Medication 100 MILLIGRAM(S): at 20:25

## 2018-12-04 RX ADMIN — HYDROMORPHONE HYDROCHLORIDE 1 MILLIGRAM(S): 2 INJECTION INTRAMUSCULAR; INTRAVENOUS; SUBCUTANEOUS at 15:45

## 2018-12-04 RX ADMIN — HYDROMORPHONE HYDROCHLORIDE 2 MILLIGRAM(S): 2 INJECTION INTRAMUSCULAR; INTRAVENOUS; SUBCUTANEOUS at 06:42

## 2018-12-04 RX ADMIN — Medication 5 MILLIGRAM(S): at 13:03

## 2018-12-04 RX ADMIN — Medication 1 PATCH: at 20:26

## 2018-12-04 RX ADMIN — HYDROMORPHONE HYDROCHLORIDE 2 MILLIGRAM(S): 2 INJECTION INTRAMUSCULAR; INTRAVENOUS; SUBCUTANEOUS at 05:59

## 2018-12-04 RX ADMIN — Medication 1 PATCH: at 07:30

## 2018-12-04 RX ADMIN — SEVELAMER CARBONATE 800 MILLIGRAM(S): 2400 POWDER, FOR SUSPENSION ORAL at 18:48

## 2018-12-04 RX ADMIN — Medication 1 TABLET(S): at 19:20

## 2018-12-04 RX ADMIN — Medication 10 MILLIGRAM(S): at 18:48

## 2018-12-04 RX ADMIN — Medication 60 MILLIGRAM(S): at 05:55

## 2018-12-04 RX ADMIN — HYDROMORPHONE HYDROCHLORIDE 1 MILLIGRAM(S): 2 INJECTION INTRAMUSCULAR; INTRAVENOUS; SUBCUTANEOUS at 23:55

## 2018-12-04 NOTE — PROGRESS NOTE ADULT - SUBJECTIVE AND OBJECTIVE BOX
CARDIOLOGY FOLLOW UP - Dr. Real    CC no cp/sob       PHYSICAL EXAM:  T(C): 36.6 (12-04-18 @ 09:10), Max: 37 (12-04-18 @ 07:52)  HR: 83 (12-04-18 @ 07:52) (80 - 94)  BP: 185/105 (12-04-18 @ 09:10) (162/86 - 194/94)  RR: 18 (12-04-18 @ 09:10) (18 - 18)  SpO2: 98% (12-04-18 @ 09:10) (80% - 98%)  Wt(kg): --  I&O's Summary    03 Dec 2018 07:01  -  04 Dec 2018 07:00  --------------------------------------------------------  IN: 770 mL / OUT: 2400 mL / NET: -1630 mL    04 Dec 2018 07:01  -  04 Dec 2018 11:43  --------------------------------------------------------  IN: 400 mL / OUT: 0 mL / NET: 400 mL        Appearance: Normal	  Cardiovascular: Normal S1 S2,RRR, No JVD, No murmurs  Respiratory: Lungs clear to auscultation	  Gastrointestinal:  Soft, Non-tender, + BS	  Extremities: Normal range of motion, No clubbing, b/l+1 LE edema         MEDICATIONS  (STANDING):  acetaminophen 325 mG/butalbital 50 mG/caffeine 40 mG 1 Tablet(s) Oral once  ascorbic acid 500 milliGRAM(s) Oral daily  aspirin enteric coated 81 milliGRAM(s) Oral daily  cloNIDine Patch 0.3 mG/24Hr(s) 1 patch Transdermal every 7 days  doxercalciferol Injectable 1 MICROGram(s) IV Push <User Schedule>  epoetin sherlyn Injectable 5000 Unit(s) IV Push <User Schedule>  furosemide    Tablet 80 milliGRAM(s) Oral daily  losartan 25 milliGRAM(s) Oral daily  multivitamin 1 Tablet(s) Oral daily  NIFEdipine XL 60 milliGRAM(s) Oral <User Schedule>  omega-3-Acid Ethyl Esters 4 Gram(s) Oral daily  pantoprazole    Tablet 40 milliGRAM(s) Oral before breakfast  predniSONE   Tablet 5 milliGRAM(s) Oral daily  propranolol 10 milliGRAM(s) Oral two times a day  sevelamer hydrochloride 800 milliGRAM(s) Oral every 12 hours  tacrolimus 0.5 milliGRAM(s) Oral every 12 hours  trimethoprim  160 mG/sulfamethoxazole 800 mG 1 Tablet(s) Oral <User Schedule>      TELEMETRY: 	    ECG:  	  RADIOLOGY:   DIAGNOSTIC TESTING:  [ ] Echocardiogram:  [ ]  Catheterization:  [ ] Stress Test:    OTHER: 	    LABS:	 	                                9.6    8.95  )-----------( 523      ( 04 Dec 2018 10:32 )             30.8     12-04    137  |  95<L>  |  58<H>  ----------------------------<  128<H>  5.2   |  26  |  7.62<H>    Ca    9.5      04 Dec 2018 09:31      PT/INR - ( 03 Dec 2018 09:16 )   PT: 15.6 sec;   INR: 1.36 ratio

## 2018-12-04 NOTE — PROGRESS NOTE ADULT - PROBLEM SELECTOR PLAN 1
noted to be hyperkalemic likely as he is on tacrolimus, ARB, Bactrim   HD today with 2 K+ bath. noted to be hyperkalemic likely as he is on tacrolimus, ARB, Bactrim   Unable to stop bactrim /tacrolimus  HD today with 2 K+ bath.

## 2018-12-04 NOTE — PROGRESS NOTE ADULT - SUBJECTIVE AND OBJECTIVE BOX
Patient is a 58y old  Male who presents with a chief complaint of shortness of breath (04 Dec 2018 11:42)      SUBJECTIVE / OVERNIGHT EVENTS:    Events noted.  Feels better.  CONSTITUTIONAL: No fever,  or fatigue  NECK: No pain or stiffness  RESPIRATORY: No cough, wheezing, chills or hemoptysis; No shortness of breath  CARDIOVASCULAR: No chest pain, palpitations, dizziness, or leg swelling  GASTROINTESTINAL: No abdominal or epigastric pain. No nausea, vomiting, or hematemesis; No diarrhea or constipation.   NEUROLOGICAL: No headaches,     MEDICATIONS  (STANDING):  ascorbic acid 500 milliGRAM(s) Oral daily  aspirin enteric coated 81 milliGRAM(s) Oral daily  cloNIDine Patch 0.3 mG/24Hr(s) 1 patch Transdermal every 7 days  doxazosin 1 milliGRAM(s) Oral at bedtime  doxercalciferol Injectable 1 MICROGram(s) IV Push <User Schedule>  epoetin sherlyn Injectable 5000 Unit(s) IV Push <User Schedule>  furosemide    Tablet 80 milliGRAM(s) Oral every 12 hours  multivitamin 1 Tablet(s) Oral daily  NIFEdipine XL 60 milliGRAM(s) Oral <User Schedule>  omega-3-Acid Ethyl Esters 4 Gram(s) Oral daily  pantoprazole    Tablet 40 milliGRAM(s) Oral before breakfast  predniSONE   Tablet 5 milliGRAM(s) Oral daily  propranolol 10 milliGRAM(s) Oral two times a day  sevelamer hydrochloride 800 milliGRAM(s) Oral every 12 hours  tacrolimus 0.5 milliGRAM(s) Oral every 12 hours  trimethoprim  160 mG/sulfamethoxazole 800 mG 1 Tablet(s) Oral <User Schedule>    MEDICATIONS  (PRN):  docusate sodium 100 milliGRAM(s) Oral daily PRN Constipation  HYDROmorphone   Tablet 2 milliGRAM(s) Oral every 12 hours PRN severe headache  HYDROmorphone  Injectable 1 milliGRAM(s) IV Push every 8 hours PRN Moderate Pain (4 - 6)  oxyCODONE    IR 5 milliGRAM(s) Oral every 6 hours PRN Moderate Pain (4 - 6)  senna 2 Tablet(s) Oral at bedtime PRN Constipation        CAPILLARY BLOOD GLUCOSE        I&O's Summary    03 Dec 2018 07:01  -  04 Dec 2018 07:00  --------------------------------------------------------  IN: 770 mL / OUT: 2400 mL / NET: -1630 mL    04 Dec 2018 07:01  -  04 Dec 2018 20:34  --------------------------------------------------------  IN: 940 mL / OUT: 2700 mL / NET: -1760 mL        PHYSICAL EXAM:  GENERAL: NAD  NECK: Supple, No JVD  CHEST/LUNG: Clear to auscultation bilaterally; No wheezing.  HEART: Regular rate and rhythm; No murmurs, rubs, or gallops  ABDOMEN: Soft, Nontender, Nondistended; Bowel sounds present  EXTREMITIES:   No clubbing, cyanosis, or edema  NEUROLOGY: AAO X 3      LABS:                        9.6    8.95  )-----------( 523      ( 04 Dec 2018 10:32 )             30.8     12-04    137  |  95<L>  |  58<H>  ----------------------------<  128<H>  5.2   |  26  |  7.62<H>    Ca    9.5      04 Dec 2018 09:31      PT/INR - ( 03 Dec 2018 09:16 )   PT: 15.6 sec;   INR: 1.36 ratio                 CAPILLARY BLOOD GLUCOSE                    RADIOLOGY & ADDITIONAL TESTS:    Imaging Personally Reviewed:    Consultant(s) Notes Reviewed:      Care Discussed with Consultants/Other Providers:

## 2018-12-04 NOTE — PROGRESS NOTE ADULT - ASSESSMENT
57 yo M with PMHx of ESRD on HD and history of two kidney transplants admitted for acute SOB and uncontrolled HTN. Pt has been having extra UF session as an outpatient at [facility] dialysis unit without resolution of his hypervolemia and uncontrolled HTN.

## 2018-12-04 NOTE — PROGRESS NOTE ADULT - ASSESSMENT
· Assessment	  Patient is a 58 year old male with HTN, ESRD on HD (history of renal transplant x 2) presents to the ED with worsening shortness of breath 1-2 days admitted for acute respiratory failure with hypoxia in the setting of hypervolemia requiring extra HD session with UF, also with uncontrolled hypertension.      Problem/Plan - 1:  ·  Problem: Accelerated HTN:      Dw nephro  Dc losartan  Start cardura  Increase lasix    ESRD on HD/Hyperkalemia:  HD today  Nephro f/up     A Fib:  Coumadin/PT/INR

## 2018-12-04 NOTE — PROGRESS NOTE ADULT - ASSESSMENT
echo 4/17/18: EF 53, normal LV fx, mild MR   echo 10/5/18: EF 62%, mild MR, severely dilated left atrium, normal LV sys function     a/p     59yo M w/pmhx of HTN, Paflutter,  ESRD s/p renal transplant x 2 but now requiring HD presenting with SOB and uncontrolled BP      1. Parox. Aflutter  cv stable no cp   rate controlled  and remains in NSR, continue with  low dose propanolol for rate control   recent echo with nl lv fxn   CHADS 2 - coumadin per INR level     2. Uncontrolled  HTN  bp labile, likely 2/2 to some component of fluid overload   s/p extra dialysis session yesterday, routine dialysis planned for today   will remain off labetolol due to intolerance to medication   continue nifedipine 60 mg BID, 0.3 mg cloNIDine Patch and propanolol 10 mg BID  potassium improved post HD, continue losartan 25mg daily, monitor BMP     3. Acute Diastolic CHF  likely in the setting of uncontrolled HTN   HS trop x1 noted, demand ischemia in the setting of ESRD/ uncontrolled HTN   BP Control, fluid removal with lasix/ HD   recent echo with normal LV fx      4. ESRD  renal f/u   continue fluid removal with HD and lasix     dvt ppx

## 2018-12-04 NOTE — PROGRESS NOTE ADULT - SUBJECTIVE AND OBJECTIVE BOX
[facility] DIVISION OF KIDNEY DISEASES AND HYPERTENSION -- HEMODIALYSIS NOTE  --------------------------------------------------------------------------------  Chief Complaint: ESRD/Ongoing hemodialysis requirement    24 hour events/subjective:  Pt recieved HD for hyperkalemia yesterday. Scheduled for maintenance HD today.           PAST HISTORY  --------------------------------------------------------------------------------  No significant changes to PMH, PSH, FHx, SHx, unless otherwise noted    ALLERGIES & MEDICATIONS  --------------------------------------------------------------------------------  Allergies    No Known Allergies    Intolerances    beta blockers (Other (Mod to Severe))  hydrALAZINE (Short breath (Mild to Mod))    Standing Inpatient Medications  acetaminophen 325 mG/butalbital 50 mG/caffeine 40 mG 1 Tablet(s) Oral once  ascorbic acid 500 milliGRAM(s) Oral daily  aspirin enteric coated 81 milliGRAM(s) Oral daily  cloNIDine Patch 0.3 mG/24Hr(s) 1 patch Transdermal every 7 days  doxercalciferol Injectable 1 MICROGram(s) IV Push <User Schedule>  epoetin sherlyn Injectable 5000 Unit(s) IV Push <User Schedule>  furosemide    Tablet 80 milliGRAM(s) Oral daily  losartan 25 milliGRAM(s) Oral daily  multivitamin 1 Tablet(s) Oral daily  NIFEdipine XL 60 milliGRAM(s) Oral <User Schedule>  omega-3-Acid Ethyl Esters 4 Gram(s) Oral daily  pantoprazole    Tablet 40 milliGRAM(s) Oral before breakfast  predniSONE   Tablet 5 milliGRAM(s) Oral daily  propranolol 10 milliGRAM(s) Oral two times a day  sevelamer hydrochloride 800 milliGRAM(s) Oral every 12 hours  tacrolimus 0.5 milliGRAM(s) Oral every 12 hours  trimethoprim  160 mG/sulfamethoxazole 800 mG 1 Tablet(s) Oral <User Schedule>    PRN Inpatient Medications  docusate sodium 100 milliGRAM(s) Oral daily PRN  HYDROmorphone   Tablet 2 milliGRAM(s) Oral every 12 hours PRN  oxyCODONE    IR 5 milliGRAM(s) Oral every 6 hours PRN  senna 2 Tablet(s) Oral at bedtime PRN      REVIEW OF SYSTEMS  --------------------------------------------------------------------------------  Gen: No weight changes, fatigue, fevers/chills, weakness  Head/Eyes/Ears/Mouth: No headache; Normal hearing; Normal vision    Respiratory: + dyspnea, cough, wheezing, hemoptysis  CV: No chest pain, PND, orthopnea  GI: No abdominal pain, diarrhea, constipation, nausea, vomiting, melena, hematochezia  : No increased frequency, dysuria, hematuria, nocturia  MSK: No joint pain/swelling; no back pain; no edema   Heme: No easy bruising or bleeding  All other systems were reviewed and are negative, except as noted.    All other systems were reviewed and are negative, except as noted.        VITALS/PHYSICAL EXAM  --------------------------------------------------------------------------------  T(C): 37 (12-04-18 @ 07:52), Max: 37 (12-04-18 @ 07:52)  HR: 83 (12-04-18 @ 07:52) (80 - 94)  BP: 170/94 (12-04-18 @ 07:52) (162/86 - 194/94)  RR: 18 (12-04-18 @ 07:52) (18 - 18)  SpO2: 97% (12-04-18 @ 07:52) (80% - 98%)  Wt(kg): --        12-03-18 @ 07:01  -  12-04-18 @ 07:00  --------------------------------------------------------  IN: 770 mL / OUT: 2400 mL / NET: -1630 mL      Physical Exam:    Gen; NAD  Neck: Supple, no JVD,    Lungs: no rhonchi, no wheeze, no crackles  CVS: S1 S2   Abdomen: no tenderness  Skin: warm, dry  Ext: no cyanosis or clubbing, ++ edema  Access: Rt IJ tunneled catheter      LABS/STUDIES  --------------------------------------------------------------------------------    137  |  95  |  58  ----------------------------<  128      [12-04-18 @ 09:31]  5.2   |  26  |  7.62        Ca     9.5     [12-04-18 @ 09:31]      PT/INR: PT 15.6 , INR 1.36       [12-03-18 @ 09:16]      Creatinine Trend:  SCr 7.62 [12-04 @ 09:31]  SCr 9.08 [12-03 @ 07:31]  SCr 8.97 [12-01 @ 09:17]  SCr 9.13 [11-29 @ 10:55]        Iron 24, TIBC 161, %sat 15      [03-20-18 @ 11:31]  Ferritin 250      [03-19-18 @ 20:02]  TSH 6.46      [10-02-18 @ 12:25]

## 2018-12-04 NOTE — PROGRESS NOTE ADULT - PROBLEM SELECTOR PLAN 2
Maintain on present medications.  However there is certainly a large component of volume - mediated hypertension.   Please check doppler of transplant renal artery.  Check Rolo check plasma metanephrines. Maintain on present medications.  However there is certainly a large component of volume - mediated hypertension.  Transplant renal doppler negative.  Check Rolo check plasma metanephrines.  Would stop losartan as his risk of developing severe hyprekalemia is very high as an outpatient .  Consider starting Doxazosin at 1 mg daily

## 2018-12-05 LAB
ALDOST SERPL-MCNC: 47.6 NG/DL — HIGH
ANION GAP SERPL CALC-SCNC: 19 MMOL/L — HIGH (ref 5–17)
APTT BLD: 32.3 SEC — SIGNIFICANT CHANGE UP (ref 27.5–36.3)
BUN SERPL-MCNC: 51 MG/DL — HIGH (ref 7–23)
CALCIUM SERPL-MCNC: 9.4 MG/DL — SIGNIFICANT CHANGE UP (ref 8.4–10.5)
CHLORIDE SERPL-SCNC: 96 MMOL/L — SIGNIFICANT CHANGE UP (ref 96–108)
CO2 SERPL-SCNC: 25 MMOL/L — SIGNIFICANT CHANGE UP (ref 22–31)
CREAT SERPL-MCNC: 7.35 MG/DL — HIGH (ref 0.5–1.3)
GLUCOSE SERPL-MCNC: 115 MG/DL — HIGH (ref 70–99)
HCT VFR BLD CALC: 30.7 % — LOW (ref 39–50)
HGB BLD-MCNC: 10 G/DL — LOW (ref 13–17)
INR BLD: 1.27 RATIO — HIGH (ref 0.88–1.16)
MCHC RBC-ENTMCNC: 29.7 PG — SIGNIFICANT CHANGE UP (ref 27–34)
MCHC RBC-ENTMCNC: 32.7 GM/DL — SIGNIFICANT CHANGE UP (ref 32–36)
MCV RBC AUTO: 90.8 FL — SIGNIFICANT CHANGE UP (ref 80–100)
PLATELET # BLD AUTO: 500 K/UL — HIGH (ref 150–400)
POTASSIUM SERPL-MCNC: 5.4 MMOL/L — HIGH (ref 3.5–5.3)
POTASSIUM SERPL-SCNC: 5.4 MMOL/L — HIGH (ref 3.5–5.3)
PROTHROM AB SERPL-ACNC: 14.6 SEC — HIGH (ref 10–12.9)
RBC # BLD: 3.38 M/UL — LOW (ref 4.2–5.8)
RBC # FLD: 15.3 % — HIGH (ref 10.3–14.5)
SODIUM SERPL-SCNC: 140 MMOL/L — SIGNIFICANT CHANGE UP (ref 135–145)
WBC # BLD: 9 K/UL — SIGNIFICANT CHANGE UP (ref 3.8–10.5)
WBC # FLD AUTO: 9 K/UL — SIGNIFICANT CHANGE UP (ref 3.8–10.5)

## 2018-12-05 PROCEDURE — 99232 SBSQ HOSP IP/OBS MODERATE 35: CPT

## 2018-12-05 RX ORDER — FUROSEMIDE 40 MG
160 TABLET ORAL DAILY
Qty: 0 | Refills: 0 | Status: DISCONTINUED | OUTPATIENT
Start: 2018-12-05 | End: 2018-12-10

## 2018-12-05 RX ORDER — DOXAZOSIN MESYLATE 4 MG
2 TABLET ORAL AT BEDTIME
Qty: 0 | Refills: 0 | Status: DISCONTINUED | OUTPATIENT
Start: 2018-12-05 | End: 2018-12-06

## 2018-12-05 RX ORDER — WARFARIN SODIUM 2.5 MG/1
6 TABLET ORAL ONCE
Qty: 0 | Refills: 0 | Status: COMPLETED | OUTPATIENT
Start: 2018-12-05 | End: 2018-12-05

## 2018-12-05 RX ADMIN — Medication 4 GRAM(S): at 11:13

## 2018-12-05 RX ADMIN — Medication 5 MILLIGRAM(S): at 08:55

## 2018-12-05 RX ADMIN — WARFARIN SODIUM 6 MILLIGRAM(S): 2.5 TABLET ORAL at 00:01

## 2018-12-05 RX ADMIN — Medication 1 TABLET(S): at 08:55

## 2018-12-05 RX ADMIN — Medication 1 TABLET(S): at 11:12

## 2018-12-05 RX ADMIN — SENNA PLUS 2 TABLET(S): 8.6 TABLET ORAL at 17:16

## 2018-12-05 RX ADMIN — Medication 10 MILLIGRAM(S): at 17:16

## 2018-12-05 RX ADMIN — TACROLIMUS 0.5 MILLIGRAM(S): 5 CAPSULE ORAL at 22:20

## 2018-12-05 RX ADMIN — Medication 80 MILLIGRAM(S): at 05:45

## 2018-12-05 RX ADMIN — DOXERCALCIFEROL 1 MICROGRAM(S): 2.5 CAPSULE ORAL at 14:36

## 2018-12-05 RX ADMIN — HYDROMORPHONE HYDROCHLORIDE 2 MILLIGRAM(S): 2 INJECTION INTRAMUSCULAR; INTRAVENOUS; SUBCUTANEOUS at 20:40

## 2018-12-05 RX ADMIN — SEVELAMER CARBONATE 800 MILLIGRAM(S): 2400 POWDER, FOR SUSPENSION ORAL at 08:55

## 2018-12-05 RX ADMIN — Medication 81 MILLIGRAM(S): at 11:12

## 2018-12-05 RX ADMIN — HYDROMORPHONE HYDROCHLORIDE 2 MILLIGRAM(S): 2 INJECTION INTRAMUSCULAR; INTRAVENOUS; SUBCUTANEOUS at 19:40

## 2018-12-05 RX ADMIN — Medication 2 MILLIGRAM(S): at 22:20

## 2018-12-05 RX ADMIN — Medication 1 PATCH: at 19:00

## 2018-12-05 RX ADMIN — TACROLIMUS 0.5 MILLIGRAM(S): 5 CAPSULE ORAL at 08:55

## 2018-12-05 RX ADMIN — Medication 100 MILLIGRAM(S): at 17:16

## 2018-12-05 RX ADMIN — HYDROMORPHONE HYDROCHLORIDE 1 MILLIGRAM(S): 2 INJECTION INTRAMUSCULAR; INTRAVENOUS; SUBCUTANEOUS at 16:33

## 2018-12-05 RX ADMIN — Medication 1 PATCH: at 07:00

## 2018-12-05 RX ADMIN — Medication 60 MILLIGRAM(S): at 17:16

## 2018-12-05 RX ADMIN — PANTOPRAZOLE SODIUM 40 MILLIGRAM(S): 20 TABLET, DELAYED RELEASE ORAL at 05:45

## 2018-12-05 RX ADMIN — Medication 500 MILLIGRAM(S): at 11:12

## 2018-12-05 RX ADMIN — WARFARIN SODIUM 6 MILLIGRAM(S): 2.5 TABLET ORAL at 19:32

## 2018-12-05 RX ADMIN — SEVELAMER CARBONATE 800 MILLIGRAM(S): 2400 POWDER, FOR SUSPENSION ORAL at 19:31

## 2018-12-05 RX ADMIN — Medication 10 MILLIGRAM(S): at 05:45

## 2018-12-05 RX ADMIN — Medication 60 MILLIGRAM(S): at 05:45

## 2018-12-05 RX ADMIN — HYDROMORPHONE HYDROCHLORIDE 1 MILLIGRAM(S): 2 INJECTION INTRAMUSCULAR; INTRAVENOUS; SUBCUTANEOUS at 16:03

## 2018-12-05 NOTE — PROGRESS NOTE ADULT - SUBJECTIVE AND OBJECTIVE BOX
[facility] DIVISION OF KIDNEY DISEASES AND HYPERTENSION -- FOLLOW UP NOTE  --------------------------------------------------------------------------------  Chief Complaint:ESRD admitted with fluid overload    24 hour events/subjective:  None  Feels better        PAST HISTORY  --------------------------------------------------------------------------------  No significant changes to PMH, PSH, FHx, SHx, unless otherwise noted    ALLERGIES & MEDICATIONS  --------------------------------------------------------------------------------  Allergies    No Known Allergies    Intolerances    beta blockers (Other (Mod to Severe))  hydrALAZINE (Short breath (Mild to Mod))    Standing Inpatient Medications  ascorbic acid 500 milliGRAM(s) Oral daily  aspirin enteric coated 81 milliGRAM(s) Oral daily  cloNIDine Patch 0.3 mG/24Hr(s) 1 patch Transdermal every 7 days  doxazosin 1 milliGRAM(s) Oral at bedtime  doxercalciferol Injectable 1 MICROGram(s) IV Push <User Schedule>  epoetin sherlyn Injectable 5000 Unit(s) IV Push <User Schedule>  furosemide    Tablet 160 milliGRAM(s) Oral daily  multivitamin 1 Tablet(s) Oral daily  NIFEdipine XL 60 milliGRAM(s) Oral <User Schedule>  omega-3-Acid Ethyl Esters 4 Gram(s) Oral daily  pantoprazole    Tablet 40 milliGRAM(s) Oral before breakfast  predniSONE   Tablet 5 milliGRAM(s) Oral daily  propranolol 10 milliGRAM(s) Oral two times a day  sevelamer hydrochloride 800 milliGRAM(s) Oral every 12 hours  tacrolimus 0.5 milliGRAM(s) Oral every 12 hours  trimethoprim  160 mG/sulfamethoxazole 800 mG 1 Tablet(s) Oral <User Schedule>    PRN Inpatient Medications  docusate sodium 100 milliGRAM(s) Oral daily PRN  HYDROmorphone   Tablet 2 milliGRAM(s) Oral every 12 hours PRN  HYDROmorphone  Injectable 1 milliGRAM(s) IV Push every 8 hours PRN  oxyCODONE    IR 5 milliGRAM(s) Oral every 6 hours PRN  senna 2 Tablet(s) Oral at bedtime PRN      REVIEW OF SYSTEMS  --------------------------------------------------------------------------------  Gen: No weight changes, fatigue, fevers/chills, weakness  Head/Eyes/Ears/Mouth: No headache; Normal hearing; Normal vision    Respiratory: No dyspnea, cough, wheezing, hemoptysis  CV: No chest pain, PND, orthopnea  GI: No abdominal pain, diarrhea, constipation, nausea, vomiting, melena, hematochezia  : No increased frequency, dysuria, hematuria, nocturia  MSK: No joint pain/swelling; no back pain; +Edema   Heme: No easy bruising or bleeding  All other systems were reviewed and are negative, except as noted.           VITALS/PHYSICAL EXAM  --------------------------------------------------------------------------------  T(C): 36.7 (12-05-18 @ 08:00), Max: 36.8 (12-04-18 @ 21:28)  HR: 84 (12-05-18 @ 08:00) (76 - 90)  BP: 170/96 (12-05-18 @ 08:00) (165/89 - 185/99)  RR: 18 (12-05-18 @ 08:00) (18 - 18)  SpO2: 95% (12-05-18 @ 08:00) (95% - 99%)  Wt(kg): --        12-04-18 @ 07:01  -  12-05-18 @ 07:00  --------------------------------------------------------  IN: 1140 mL / OUT: 2700 mL / NET: -1560 mL      PHYSICAL EXAM: vital signs as above  in no apparent distress  Neck: Supple, no JVD,    Lungs: no rhonchi, no wheeze, no crackles  CVS: S1 S2 no M/R/G  Abdomen: no tenderness, no organomegaly, BS present  Neuro: Grossly intact  Skin: warm, dry  Ext: no cyanosis or clubbing, ++ edema  Access: Left arm AVF+thrill , +Bruit      LABS/STUDIES  --------------------------------------------------------------------------------              9.6    8.95  >-----------<  523      [12-04-18 @ 10:32]              30.8     137  |  95  |  58  ----------------------------<  128      [12-04-18 @ 09:31]  5.2   |  26  |  7.62        Ca     9.5     [12-04-18 @ 09:31]      PT/INR: PT 15.4 , INR 1.33       [12-04-18 @ 23:26]      Creatinine Trend:  SCr 7.62 [12-04 @ 09:31]  SCr 9.08 [12-03 @ 07:31]  SCr 8.97 [12-01 @ 09:17]  SCr 9.13 [11-29 @ 10:55]        Iron 24, TIBC 161, %sat 15      [03-20-18 @ 11:31]  Ferritin 250      [03-19-18 @ 20:02]  TSH 6.46      [10-02-18 @ 12:25]

## 2018-12-05 NOTE — PROGRESS NOTE ADULT - SUBJECTIVE AND OBJECTIVE BOX
CARDIOLOGY FOLLOW UP - Dr. Real    CC no cp/sob       PHYSICAL EXAM:  T(C): 36.7 (12-05-18 @ 08:00), Max: 36.8 (12-04-18 @ 21:28)  HR: 84 (12-05-18 @ 08:00) (76 - 90)  BP: 170/96 (12-05-18 @ 08:00) (165/89 - 185/99)  RR: 18 (12-05-18 @ 08:00) (18 - 18)  SpO2: 95% (12-05-18 @ 08:00) (95% - 99%)  Wt(kg): --  I&O's Summary    04 Dec 2018 07:01  -  05 Dec 2018 07:00  --------------------------------------------------------  IN: 1140 mL / OUT: 2700 mL / NET: -1560 mL        Appearance: Normal	  Cardiovascular: Normal S1 S2,RRR, No JVD, No murmurs  Respiratory: Lungs clear to auscultation	  Gastrointestinal:  Soft, Non-tender, + BS	  Extremities: Normal range of motion, No clubbing, b/l LE edema         MEDICATIONS  (STANDING):  ascorbic acid 500 milliGRAM(s) Oral daily  aspirin enteric coated 81 milliGRAM(s) Oral daily  cloNIDine Patch 0.3 mG/24Hr(s) 1 patch Transdermal every 7 days  doxazosin 1 milliGRAM(s) Oral at bedtime  doxercalciferol Injectable 1 MICROGram(s) IV Push <User Schedule>  epoetin sherlyn Injectable 5000 Unit(s) IV Push <User Schedule>  furosemide    Tablet 80 milliGRAM(s) Oral every 12 hours  multivitamin 1 Tablet(s) Oral daily  NIFEdipine XL 60 milliGRAM(s) Oral <User Schedule>  omega-3-Acid Ethyl Esters 4 Gram(s) Oral daily  pantoprazole    Tablet 40 milliGRAM(s) Oral before breakfast  predniSONE   Tablet 5 milliGRAM(s) Oral daily  propranolol 10 milliGRAM(s) Oral two times a day  sevelamer hydrochloride 800 milliGRAM(s) Oral every 12 hours  tacrolimus 0.5 milliGRAM(s) Oral every 12 hours  trimethoprim  160 mG/sulfamethoxazole 800 mG 1 Tablet(s) Oral <User Schedule>      TELEMETRY: 	    ECG:  	  RADIOLOGY:   DIAGNOSTIC TESTING:  [ ] Echocardiogram:  [ ]  Catheterization:  [ ] Stress Test:    OTHER: 	    LABS:	 	                                9.6    8.95  )-----------( 523      ( 04 Dec 2018 10:32 )             30.8     12-04    137  |  95<L>  |  58<H>  ----------------------------<  128<H>  5.2   |  26  |  7.62<H>    Ca    9.5      04 Dec 2018 09:31      PT/INR - ( 04 Dec 2018 23:26 )   PT: 15.4 sec;   INR: 1.33 ratio

## 2018-12-05 NOTE — PROGRESS NOTE ADULT - ASSESSMENT
· Assessment	  Patient is a 58 year old male with HTN, ESRD on HD (history of renal transplant x 2) presents to the ED with worsening shortness of breath 1-2 days admitted for acute respiratory failure with hypoxia in the setting of hypervolemia requiring extra HD session with UF, also with uncontrolled hypertension.      Problem/Plan - 1:  ·  Problem: Accelerated HTN:      Dw nephro  Dced losartan  On cardura  Cont lasix    ESRD on HD/Hyperkalemia:  HD today  Nephro f/up     A Fib:  Coumadin/PT/INR

## 2018-12-05 NOTE — PROGRESS NOTE ADULT - ASSESSMENT
57 yo M with PMHx of ESRD on HD and history of two kidney transplants admitted for acute SOB and uncontrolled HTN. Pt has been having extra UF session as an outpatient at [facility] dialysis unit without resolution of his hypervolemia and uncontrolled HTN.

## 2018-12-05 NOTE — PROGRESS NOTE ADULT - PROBLEM SELECTOR PLAN 1
noted to be hyperkalemic likely as he was on tacrolimus, ARB, Bactrim   Unable to stop bactrim /tacrolimus    AM labs pending  Next HD tomorrow

## 2018-12-05 NOTE — PROGRESS NOTE ADULT - SUBJECTIVE AND OBJECTIVE BOX
Patient is a 58y old  Male who presents with a chief complaint of shortness of breath (05 Dec 2018 11:52)      SUBJECTIVE / OVERNIGHT EVENTS:    Events noted.  Feels better.  CONSTITUTIONAL: No fever,  or fatigue  NECK: No pain or stiffness  RESPIRATORY: No cough, wheezing, chills or hemoptysis; No shortness of breath  CARDIOVASCULAR: No chest pain, palpitations, dizziness, or leg swelling  GASTROINTESTINAL: No abdominal or epigastric pain. No nausea, vomiting, or hematemesis; No diarrhea or constipation.   NEUROLOGICAL: No headaches,     MEDICATIONS  (STANDING):  ascorbic acid 500 milliGRAM(s) Oral daily  aspirin enteric coated 81 milliGRAM(s) Oral daily  cloNIDine Patch 0.3 mG/24Hr(s) 1 patch Transdermal every 7 days  doxazosin 2 milliGRAM(s) Oral at bedtime  doxercalciferol Injectable 1 MICROGram(s) IV Push <User Schedule>  epoetin sherlyn Injectable 5000 Unit(s) IV Push <User Schedule>  furosemide    Tablet 160 milliGRAM(s) Oral daily  multivitamin 1 Tablet(s) Oral daily  NIFEdipine XL 60 milliGRAM(s) Oral <User Schedule>  omega-3-Acid Ethyl Esters 4 Gram(s) Oral daily  pantoprazole    Tablet 40 milliGRAM(s) Oral before breakfast  predniSONE   Tablet 5 milliGRAM(s) Oral daily  propranolol 10 milliGRAM(s) Oral two times a day  sevelamer hydrochloride 800 milliGRAM(s) Oral every 12 hours  tacrolimus 0.5 milliGRAM(s) Oral every 12 hours  trimethoprim  160 mG/sulfamethoxazole 800 mG 1 Tablet(s) Oral <User Schedule>  warfarin 6 milliGRAM(s) Oral once    MEDICATIONS  (PRN):  docusate sodium 100 milliGRAM(s) Oral daily PRN Constipation  HYDROmorphone   Tablet 2 milliGRAM(s) Oral every 12 hours PRN severe headache  HYDROmorphone  Injectable 1 milliGRAM(s) IV Push every 8 hours PRN Moderate Pain (4 - 6)  oxyCODONE    IR 5 milliGRAM(s) Oral every 6 hours PRN Moderate Pain (4 - 6)  senna 2 Tablet(s) Oral at bedtime PRN Constipation        CAPILLARY BLOOD GLUCOSE        I&O's Summary    04 Dec 2018 07:01  -  05 Dec 2018 07:00  --------------------------------------------------------  IN: 1140 mL / OUT: 2700 mL / NET: -1560 mL    05 Dec 2018 07:01  -  05 Dec 2018 18:23  --------------------------------------------------------  IN: 400 mL / OUT: 2600 mL / NET: -2200 mL        PHYSICAL EXAM:  GENERAL: NAD  NECK: Supple, No JVD  CHEST/LUNG: Clear to auscultation bilaterally; No wheezing.  HEART: Regular rate and rhythm; No murmurs, rubs, or gallops  ABDOMEN: Soft, Nontender, Nondistended; Bowel sounds present  EXTREMITIES:   No clubbing, cyanosis, or edema  NEUROLOGY: AAO X 3      LABS:                        10.0   9.0   )-----------( 500      ( 05 Dec 2018 13:43 )             30.7     12-05    140  |  96  |  51<H>  ----------------------------<  115<H>  5.4<H>   |  25  |  7.35<H>    Ca    9.4      05 Dec 2018 13:43      PT/INR - ( 05 Dec 2018 13:43 )   PT: 14.6 sec;   INR: 1.27 ratio         PTT - ( 05 Dec 2018 13:43 )  PTT:32.3 sec        CAPILLARY BLOOD GLUCOSE                    RADIOLOGY & ADDITIONAL TESTS:    Imaging Personally Reviewed:    Consultant(s) Notes Reviewed:      Care Discussed with Consultants/Other Providers:

## 2018-12-05 NOTE — PROGRESS NOTE ADULT - ASSESSMENT
echo 4/17/18: EF 53, normal LV fx, mild MR   echo 10/5/18: EF 62%, mild MR, severely dilated left atrium, normal LV sys function     a/p     57yo M w/pmhx of HTN, Paflutter,  ESRD s/p renal transplant x 2 but now requiring HD presenting with SOB and uncontrolled BP      1. Parox. Aflutter  cv stable no cp   rate controlled  and remains in NSR, continue with  low dose propanolol for rate control   recent echo with nl lv fxn   CHADS 2 - coumadin per INR level     2. Uncontrolled  HTN  bp improved but still elevated    will remain off labetolol due to intolerance to medication   continue nifedipine 60 mg BID, 0.3 mg cloNIDine Patch and propanolol 10 mg BID  losartan dc'd, cardura started, titrate as needed     3. Acute Diastolic CHF  likely in the setting of uncontrolled HTN   HS trop x1 noted, demand ischemia in the setting of ESRD/ uncontrolled HTN   BP Control, fluid removal with lasix/ HD   recent echo with normal LV fx      4. ESRD  renal f/u   continue fluid removal with HD and lasix     dvt ppx echo 4/17/18: EF 53, normal LV fx, mild MR   echo 10/5/18: EF 62%, mild MR, severely dilated left atrium, normal LV sys function     a/p     57yo M w/pmhx of HTN, Paflutter,  ESRD s/p renal transplant x 2 but now requiring HD presenting with SOB and uncontrolled BP      1. Parox. Aflutter  cv stable no cp   rate controlled  and remains in NSR, continue with  low dose propanolol for rate control   recent echo with nl lv fxn   CHADS 2 - coumadin per INR level     2. Uncontrolled  HTN  bp improved but still elevated    will remain off labetolol due to intolerance to medication   continue nifedipine 60 mg BID, 0.3 mg cloNIDine Patch and propanolol 10 mg BID  losartan dc'd, cardura started at 1mg daily, pt. states he was previously taking 2mg which he tolerated well, increase cardura to 2mg daily     3. Acute Diastolic CHF  likely in the setting of uncontrolled HTN   HS trop x1 noted, demand ischemia in the setting of ESRD/ uncontrolled HTN   BP Control, fluid removal with lasix/ HD   recent echo with normal LV fx      4. ESRD  renal f/u   continue fluid removal with HD and lasix     dvt ppx

## 2018-12-05 NOTE — PROGRESS NOTE ADULT - PROBLEM SELECTOR PLAN 2
Maintain on present medications.  However there is certainly a large component of volume - mediated hypertension.    Transplant renal doppler negative.  Check Rolo check plasma metanephrines.   Please increase Doxazosin to 2  mg daily

## 2018-12-06 LAB
ANION GAP SERPL CALC-SCNC: 21 MMOL/L — HIGH (ref 5–17)
APTT BLD: 32.9 SEC — SIGNIFICANT CHANGE UP (ref 27.5–36.3)
BUN SERPL-MCNC: 66 MG/DL — HIGH (ref 7–23)
CALCIUM SERPL-MCNC: 9.5 MG/DL — SIGNIFICANT CHANGE UP (ref 8.4–10.5)
CHLORIDE SERPL-SCNC: 94 MMOL/L — LOW (ref 96–108)
CO2 SERPL-SCNC: 23 MMOL/L — SIGNIFICANT CHANGE UP (ref 22–31)
CREAT SERPL-MCNC: 9.38 MG/DL — HIGH (ref 0.5–1.3)
GLUCOSE SERPL-MCNC: 95 MG/DL — SIGNIFICANT CHANGE UP (ref 70–99)
HBV SURFACE AB SER-ACNC: 5.8 MIU/ML — LOW
HBV SURFACE AG SER-ACNC: SIGNIFICANT CHANGE UP
HCT VFR BLD CALC: 30.5 % — LOW (ref 39–50)
HCV AB S/CO SERPL IA: 0.11 S/CO — SIGNIFICANT CHANGE UP
HCV AB SERPL-IMP: SIGNIFICANT CHANGE UP
HGB BLD-MCNC: 10.4 G/DL — LOW (ref 13–17)
INR BLD: 1.32 RATIO — HIGH (ref 0.88–1.16)
MCHC RBC-ENTMCNC: 30.7 PG — SIGNIFICANT CHANGE UP (ref 27–34)
MCHC RBC-ENTMCNC: 34.2 GM/DL — SIGNIFICANT CHANGE UP (ref 32–36)
MCV RBC AUTO: 89.9 FL — SIGNIFICANT CHANGE UP (ref 80–100)
PLATELET # BLD AUTO: 543 K/UL — HIGH (ref 150–400)
POTASSIUM SERPL-MCNC: 5.7 MMOL/L — HIGH (ref 3.5–5.3)
POTASSIUM SERPL-SCNC: 5.7 MMOL/L — HIGH (ref 3.5–5.3)
PROTHROM AB SERPL-ACNC: 15.3 SEC — HIGH (ref 10–12.9)
RBC # BLD: 3.4 M/UL — LOW (ref 4.2–5.8)
RBC # FLD: 14.8 % — HIGH (ref 10.3–14.5)
SODIUM SERPL-SCNC: 138 MMOL/L — SIGNIFICANT CHANGE UP (ref 135–145)
TACROLIMUS SERPL-MCNC: <2 NG/ML — SIGNIFICANT CHANGE UP
WBC # BLD: 10 K/UL — SIGNIFICANT CHANGE UP (ref 3.8–10.5)
WBC # FLD AUTO: 10 K/UL — SIGNIFICANT CHANGE UP (ref 3.8–10.5)

## 2018-12-06 PROCEDURE — 90935 HEMODIALYSIS ONE EVALUATION: CPT

## 2018-12-06 RX ORDER — PROPRANOLOL HCL 160 MG
20 CAPSULE, EXTENDED RELEASE 24HR ORAL
Qty: 0 | Refills: 0 | Status: DISCONTINUED | OUTPATIENT
Start: 2018-12-06 | End: 2018-12-10

## 2018-12-06 RX ORDER — WARFARIN SODIUM 2.5 MG/1
7 TABLET ORAL ONCE
Qty: 0 | Refills: 0 | Status: COMPLETED | OUTPATIENT
Start: 2018-12-06 | End: 2018-12-06

## 2018-12-06 RX ORDER — DOXAZOSIN MESYLATE 4 MG
4 TABLET ORAL AT BEDTIME
Qty: 0 | Refills: 0 | Status: DISCONTINUED | OUTPATIENT
Start: 2018-12-06 | End: 2018-12-10

## 2018-12-06 RX ADMIN — Medication 100 MILLIGRAM(S): at 12:33

## 2018-12-06 RX ADMIN — HYDROMORPHONE HYDROCHLORIDE 1 MILLIGRAM(S): 2 INJECTION INTRAMUSCULAR; INTRAVENOUS; SUBCUTANEOUS at 12:34

## 2018-12-06 RX ADMIN — Medication 81 MILLIGRAM(S): at 12:33

## 2018-12-06 RX ADMIN — Medication 160 MILLIGRAM(S): at 06:17

## 2018-12-06 RX ADMIN — Medication 5 MILLIGRAM(S): at 06:18

## 2018-12-06 RX ADMIN — ERYTHROPOIETIN 5000 UNIT(S): 10000 INJECTION, SOLUTION INTRAVENOUS; SUBCUTANEOUS at 10:54

## 2018-12-06 RX ADMIN — Medication 500 MILLIGRAM(S): at 12:33

## 2018-12-06 RX ADMIN — WARFARIN SODIUM 7 MILLIGRAM(S): 2.5 TABLET ORAL at 22:06

## 2018-12-06 RX ADMIN — Medication 1 TABLET(S): at 12:33

## 2018-12-06 RX ADMIN — TACROLIMUS 0.5 MILLIGRAM(S): 5 CAPSULE ORAL at 09:00

## 2018-12-06 RX ADMIN — TACROLIMUS 0.5 MILLIGRAM(S): 5 CAPSULE ORAL at 22:06

## 2018-12-06 RX ADMIN — HYDROMORPHONE HYDROCHLORIDE 1 MILLIGRAM(S): 2 INJECTION INTRAMUSCULAR; INTRAVENOUS; SUBCUTANEOUS at 03:58

## 2018-12-06 RX ADMIN — Medication 1 PATCH: at 19:39

## 2018-12-06 RX ADMIN — HYDROMORPHONE HYDROCHLORIDE 1 MILLIGRAM(S): 2 INJECTION INTRAMUSCULAR; INTRAVENOUS; SUBCUTANEOUS at 22:45

## 2018-12-06 RX ADMIN — HYDROMORPHONE HYDROCHLORIDE 1 MILLIGRAM(S): 2 INJECTION INTRAMUSCULAR; INTRAVENOUS; SUBCUTANEOUS at 03:43

## 2018-12-06 RX ADMIN — Medication 10 MILLIGRAM(S): at 06:18

## 2018-12-06 RX ADMIN — Medication 60 MILLIGRAM(S): at 17:19

## 2018-12-06 RX ADMIN — SEVELAMER CARBONATE 800 MILLIGRAM(S): 2400 POWDER, FOR SUSPENSION ORAL at 17:19

## 2018-12-06 RX ADMIN — Medication 1 PATCH: at 07:00

## 2018-12-06 RX ADMIN — Medication 10 MILLIGRAM(S): at 17:19

## 2018-12-06 RX ADMIN — HYDROMORPHONE HYDROCHLORIDE 1 MILLIGRAM(S): 2 INJECTION INTRAMUSCULAR; INTRAVENOUS; SUBCUTANEOUS at 22:28

## 2018-12-06 RX ADMIN — PANTOPRAZOLE SODIUM 40 MILLIGRAM(S): 20 TABLET, DELAYED RELEASE ORAL at 06:18

## 2018-12-06 RX ADMIN — Medication 4 MILLIGRAM(S): at 22:28

## 2018-12-06 RX ADMIN — Medication 60 MILLIGRAM(S): at 06:19

## 2018-12-06 RX ADMIN — SEVELAMER CARBONATE 800 MILLIGRAM(S): 2400 POWDER, FOR SUSPENSION ORAL at 06:18

## 2018-12-06 RX ADMIN — Medication 4 GRAM(S): at 12:33

## 2018-12-06 RX ADMIN — HYDROMORPHONE HYDROCHLORIDE 1 MILLIGRAM(S): 2 INJECTION INTRAMUSCULAR; INTRAVENOUS; SUBCUTANEOUS at 13:04

## 2018-12-06 NOTE — PROGRESS NOTE ADULT - SUBJECTIVE AND OBJECTIVE BOX
CARDIOLOGY FOLLOW UP - Dr. Real    CC no cp/sob  bp still remains elevated       PHYSICAL EXAM:  T(C): 36.9 (12-06-18 @ 08:30), Max: 36.9 (12-06-18 @ 08:30)  HR: 85 (12-06-18 @ 08:30) (80 - 87)  BP: 178/98 (12-06-18 @ 09:00) (153/85 - 200/118)  RR: 18 (12-06-18 @ 08:30) (17 - 18)  SpO2: 100% (12-06-18 @ 08:30) (95% - 100%)  Wt(kg): --  I&O's Summary    05 Dec 2018 07:01  -  06 Dec 2018 07:00  --------------------------------------------------------  IN: 700 mL / OUT: 2600 mL / NET: -1900 mL        Appearance: Normal	  Cardiovascular: Normal S1 S2,RRR, No JVD, No murmurs  Respiratory: Lungs clear to auscultation	  Gastrointestinal:  Soft, Non-tender, + BS	  Extremities: Normal range of motion, No clubbing, b/l LE edema         MEDICATIONS  (STANDING):  ascorbic acid 500 milliGRAM(s) Oral daily  aspirin enteric coated 81 milliGRAM(s) Oral daily  cloNIDine Patch 0.3 mG/24Hr(s) 1 patch Transdermal every 7 days  doxazosin 2 milliGRAM(s) Oral at bedtime  doxercalciferol Injectable 1 MICROGram(s) IV Push <User Schedule>  epoetin sherlyn Injectable 5000 Unit(s) IV Push <User Schedule>  furosemide    Tablet 160 milliGRAM(s) Oral daily  multivitamin 1 Tablet(s) Oral daily  NIFEdipine XL 60 milliGRAM(s) Oral <User Schedule>  omega-3-Acid Ethyl Esters 4 Gram(s) Oral daily  pantoprazole    Tablet 40 milliGRAM(s) Oral before breakfast  predniSONE   Tablet 5 milliGRAM(s) Oral daily  propranolol 10 milliGRAM(s) Oral two times a day  sevelamer hydrochloride 800 milliGRAM(s) Oral every 12 hours  tacrolimus 0.5 milliGRAM(s) Oral every 12 hours  trimethoprim  160 mG/sulfamethoxazole 800 mG 1 Tablet(s) Oral <User Schedule>      TELEMETRY: 	    ECG:  	  RADIOLOGY:   DIAGNOSTIC TESTING:  [ ] Echocardiogram:  [ ]  Catheterization:  [ ] Stress Test:    OTHER: 	    LABS:	 	                                10.4   10.0  )-----------( 543      ( 06 Dec 2018 09:32 )             30.5     12-05    140  |  96  |  51<H>  ----------------------------<  115<H>  5.4<H>   |  25  |  7.35<H>    Ca    9.4      05 Dec 2018 13:43      PT/INR - ( 06 Dec 2018 09:32 )   PT: 15.3 sec;   INR: 1.32 ratio         PTT - ( 06 Dec 2018 09:32 )  PTT:32.9 sec CARDIOLOGY FOLLOW UP - Dr. Real    CC no cp/sob  seen in dialysis, bp now stable during dialysis. SYS bp range from 110-130      PHYSICAL EXAM:  T(C): 36.9 (12-06-18 @ 08:30), Max: 36.9 (12-06-18 @ 08:30)  HR: 85 (12-06-18 @ 08:30) (80 - 87)  BP: 178/98 (12-06-18 @ 09:00) (153/85 - 200/118)  RR: 18 (12-06-18 @ 08:30) (17 - 18)  SpO2: 100% (12-06-18 @ 08:30) (95% - 100%)  Wt(kg): --  I&O's Summary    05 Dec 2018 07:01  -  06 Dec 2018 07:00  --------------------------------------------------------  IN: 700 mL / OUT: 2600 mL / NET: -1900 mL        Appearance: Normal	  Cardiovascular: Normal S1 S2,RRR, No JVD, No murmurs  Respiratory: Lungs clear to auscultation	  Gastrointestinal:  Soft, Non-tender, + BS	  Extremities: Normal range of motion, No clubbing, b/l LE edema         MEDICATIONS  (STANDING):  ascorbic acid 500 milliGRAM(s) Oral daily  aspirin enteric coated 81 milliGRAM(s) Oral daily  cloNIDine Patch 0.3 mG/24Hr(s) 1 patch Transdermal every 7 days  doxazosin 2 milliGRAM(s) Oral at bedtime  doxercalciferol Injectable 1 MICROGram(s) IV Push <User Schedule>  epoetin sherlyn Injectable 5000 Unit(s) IV Push <User Schedule>  furosemide    Tablet 160 milliGRAM(s) Oral daily  multivitamin 1 Tablet(s) Oral daily  NIFEdipine XL 60 milliGRAM(s) Oral <User Schedule>  omega-3-Acid Ethyl Esters 4 Gram(s) Oral daily  pantoprazole    Tablet 40 milliGRAM(s) Oral before breakfast  predniSONE   Tablet 5 milliGRAM(s) Oral daily  propranolol 10 milliGRAM(s) Oral two times a day  sevelamer hydrochloride 800 milliGRAM(s) Oral every 12 hours  tacrolimus 0.5 milliGRAM(s) Oral every 12 hours  trimethoprim  160 mG/sulfamethoxazole 800 mG 1 Tablet(s) Oral <User Schedule>      TELEMETRY: 	    ECG:  	  RADIOLOGY:   DIAGNOSTIC TESTING:  [ ] Echocardiogram:  [ ]  Catheterization:  [ ] Stress Test:    OTHER: 	    LABS:	 	                                10.4   10.0  )-----------( 543      ( 06 Dec 2018 09:32 )             30.5     12-05    140  |  96  |  51<H>  ----------------------------<  115<H>  5.4<H>   |  25  |  7.35<H>    Ca    9.4      05 Dec 2018 13:43      PT/INR - ( 06 Dec 2018 09:32 )   PT: 15.3 sec;   INR: 1.32 ratio         PTT - ( 06 Dec 2018 09:32 )  PTT:32.9 sec CARDIOLOGY FOLLOW UP - Dr. Real    CC no cp/sob    PHYSICAL EXAM:  T(C): 36.9 (12-06-18 @ 08:30), Max: 36.9 (12-06-18 @ 08:30)  HR: 85 (12-06-18 @ 08:30) (80 - 87)  BP: 178/98 (12-06-18 @ 09:00) (153/85 - 200/118)  RR: 18 (12-06-18 @ 08:30) (17 - 18)  SpO2: 100% (12-06-18 @ 08:30) (95% - 100%)  Wt(kg): --  I&O's Summary    05 Dec 2018 07:01  -  06 Dec 2018 07:00  --------------------------------------------------------  IN: 700 mL / OUT: 2600 mL / NET: -1900 mL        Appearance: Normal	  Cardiovascular: Normal S1 S2,RRR, No JVD, No murmurs  Respiratory: Lungs clear to auscultation	  Gastrointestinal:  Soft, Non-tender, + BS	  Extremities: Normal range of motion, No clubbing, b/l LE edema         MEDICATIONS  (STANDING):  ascorbic acid 500 milliGRAM(s) Oral daily  aspirin enteric coated 81 milliGRAM(s) Oral daily  cloNIDine Patch 0.3 mG/24Hr(s) 1 patch Transdermal every 7 days  doxazosin 2 milliGRAM(s) Oral at bedtime  doxercalciferol Injectable 1 MICROGram(s) IV Push <User Schedule>  epoetin sherlyn Injectable 5000 Unit(s) IV Push <User Schedule>  furosemide    Tablet 160 milliGRAM(s) Oral daily  multivitamin 1 Tablet(s) Oral daily  NIFEdipine XL 60 milliGRAM(s) Oral <User Schedule>  omega-3-Acid Ethyl Esters 4 Gram(s) Oral daily  pantoprazole    Tablet 40 milliGRAM(s) Oral before breakfast  predniSONE   Tablet 5 milliGRAM(s) Oral daily  propranolol 10 milliGRAM(s) Oral two times a day  sevelamer hydrochloride 800 milliGRAM(s) Oral every 12 hours  tacrolimus 0.5 milliGRAM(s) Oral every 12 hours  trimethoprim  160 mG/sulfamethoxazole 800 mG 1 Tablet(s) Oral <User Schedule>      TELEMETRY: 	    ECG:  	  RADIOLOGY:   DIAGNOSTIC TESTING:  [ ] Echocardiogram:  [ ]  Catheterization:  [ ] Stress Test:    OTHER: 	    LABS:	 	                                10.4   10.0  )-----------( 543      ( 06 Dec 2018 09:32 )             30.5     12-05    140  |  96  |  51<H>  ----------------------------<  115<H>  5.4<H>   |  25  |  7.35<H>    Ca    9.4      05 Dec 2018 13:43      PT/INR - ( 06 Dec 2018 09:32 )   PT: 15.3 sec;   INR: 1.32 ratio         PTT - ( 06 Dec 2018 09:32 )  PTT:32.9 sec

## 2018-12-06 NOTE — PROGRESS NOTE ADULT - PROBLEM SELECTOR PLAN 1
noted to be hyperkalemic likely as he was on tacrolimus, ARB, Bactrim   Unable to stop bactrim /tacrolimus. HD today with 2 K+ bath. K is up: on tacrolimus and  Bactrim   Unable to stop bactrim /tacrolimus.   HD today with 2 K+ bath.

## 2018-12-06 NOTE — PROGRESS NOTE ADULT - ASSESSMENT
59 yo M with PMHx of ESRD on HD and history of two kidney transplants admitted for acute SOB and uncontrolled HTN. Pt has been having extra UF session as an outpatient at [facility] dialysis unit without resolution of his hypervolemia and uncontrolled HTN.

## 2018-12-06 NOTE — PROGRESS NOTE ADULT - ASSESSMENT
echo 4/17/18: EF 53, normal LV fx, mild MR   echo 10/5/18: EF 62%, mild MR, severely dilated left atrium, normal LV sys function     a/p     59yo M w/pmhx of HTN, Paflutter,  ESRD s/p renal transplant x 2 but now requiring HD presenting with SOB and uncontrolled BP      1. Parox. Aflutter  cv stable no cp   rate controlled  and remains in NSR, continue with  low dose propanolol for rate control   recent echo with nl lv fxn   CHADS 2 - coumadin per INR level     2. Uncontrolled  HTN  bp remains elevated   will remain off labetolol due to intolerance to medication   continue nifedipine 60 mg BID, 0.3 mg cloNIDine Patch and propanolol 10 mg BID  cardura started, increased to 2mg yesterday however bp still elevated, an considering doubling dose if BP reamins elevated     3. Acute Diastolic CHF  likely in the setting of uncontrolled HTN   HS trop x1 noted, demand ischemia in the setting of ESRD/ uncontrolled HTN   BP Control, fluid removal with lasix/ HD   recent echo with normal LV fx      4. ESRD  renal f/u   continue fluid removal with HD and lasix     dvt ppx echo 4/17/18: EF 53, normal LV fx, mild MR   echo 10/5/18: EF 62%, mild MR, severely dilated left atrium, normal LV sys function     a/p     57yo M w/pmhx of HTN, Paflutter,  ESRD s/p renal transplant x 2 but now requiring HD presenting with SOB and uncontrolled BP      1. Parox. Aflutter  cv stable no cp   rate controlled  and remains in NSR, continue with  low dose propanolol for rate control   recent echo with nl lv fxn   CHADS 2 - coumadin per INR level     2. Uncontrolled  HTN  bp improved during dialysis today   will remain off labetolol due to intolerance to medication   continue nifedipine 60 mg BID, 0.3 mg cloNIDine Patch and propanolol 10 mg BID  continue cardura at 2mg daily     3. Acute Diastolic CHF  likely in the setting of uncontrolled HTN   HS trop x1 noted, demand ischemia in the setting of ESRD/ uncontrolled HTN   BP Control, fluid removal with lasix/ HD   recent echo with normal LV fx      4. ESRD  renal f/u   continue fluid removal with HD and lasix     dvt ppx   no cv objection to discharge, pt. to f/u in our office in 1-2weeks echo 4/17/18: EF 53, normal LV fx, mild MR   echo 10/5/18: EF 62%, mild MR, severely dilated left atrium, normal LV sys function     a/p     57yo M w/pmhx of HTN, Paflutter,  ESRD s/p renal transplant x 2 but now requiring HD presenting with SOB and uncontrolled BP.      1. Parox. Aflutter  cv stable no cp   rate controlled  and remains in NSR, continue with  low dose propanolol for rate control   recent echo with nl lv fxn   CHADS 2 - coumadin per INR level     2. Uncontrolled  HTN  bp improved during dialysis today   will remain off labetolol due to intolerance to medication   continue nifedipine 60 mg BID, 0.3 mg cloNIDine Patch and   propanolol increased to 20 mg BID, cardura increased to 4mg daily     3. Acute Diastolic CHF  likely in the setting of uncontrolled HTN   HS trop x1 noted, demand ischemia in the setting of ESRD/ uncontrolled HTN   BP Control, fluid removal with lasix/ HD   recent echo with normal LV fx      4. ESRD  renal f/u   continue fluid removal with HD and lasix     dvt ppx   no cv objection to discharge, pt. to f/u in our office in 1-2weeks

## 2018-12-06 NOTE — PROGRESS NOTE ADULT - PROBLEM SELECTOR PLAN 2
Maintain on present medications.  However there is certainly a large component of volume - mediated hypertension.    Transplant renal doppler negative.  Check Rolo check plasma metanephrines.   Please increase Doxazosin to 4  mg daily Maintain on present medications.  However there is certainly a large component of volume - mediated hypertension.    Transplant renal doppler negative.   F/U Plasma metanephrines.   Please increase Doxazosin to 4  mg daily  Increase propranlol to 20 BID

## 2018-12-06 NOTE — PROGRESS NOTE ADULT - SUBJECTIVE AND OBJECTIVE BOX
Patient is a 58y old  Male who presents with a chief complaint of shortness of breath (06 Dec 2018 10:49)      SUBJECTIVE / OVERNIGHT EVENTS:    Events noted.  Feels better.  CONSTITUTIONAL: No fever,  or fatigue  NECK: No pain or stiffness  RESPIRATORY: No cough, wheezing, chills or hemoptysis; No shortness of breath  CARDIOVASCULAR: No chest pain, palpitations, dizziness, or leg swelling  GASTROINTESTINAL: No abdominal or epigastric pain. No nausea, vomiting, or hematemesis; No diarrhea or constipation.   NEUROLOGICAL: No headaches,     MEDICATIONS  (STANDING):  ascorbic acid 500 milliGRAM(s) Oral daily  aspirin enteric coated 81 milliGRAM(s) Oral daily  cloNIDine Patch 0.3 mG/24Hr(s) 1 patch Transdermal every 7 days  doxazosin 4 milliGRAM(s) Oral at bedtime  doxercalciferol Injectable 1 MICROGram(s) IV Push <User Schedule>  epoetin sherlyn Injectable 5000 Unit(s) IV Push <User Schedule>  furosemide    Tablet 160 milliGRAM(s) Oral daily  multivitamin 1 Tablet(s) Oral daily  NIFEdipine XL 60 milliGRAM(s) Oral <User Schedule>  omega-3-Acid Ethyl Esters 4 Gram(s) Oral daily  pantoprazole    Tablet 40 milliGRAM(s) Oral before breakfast  predniSONE   Tablet 5 milliGRAM(s) Oral daily  propranolol 20 milliGRAM(s) Oral two times a day  sevelamer hydrochloride 800 milliGRAM(s) Oral every 12 hours  tacrolimus 0.5 milliGRAM(s) Oral every 12 hours  trimethoprim  160 mG/sulfamethoxazole 800 mG 1 Tablet(s) Oral <User Schedule>    MEDICATIONS  (PRN):  docusate sodium 100 milliGRAM(s) Oral daily PRN Constipation  HYDROmorphone   Tablet 2 milliGRAM(s) Oral every 12 hours PRN severe headache  HYDROmorphone  Injectable 1 milliGRAM(s) IV Push every 8 hours PRN Moderate Pain (4 - 6)  oxyCODONE    IR 5 milliGRAM(s) Oral every 6 hours PRN Moderate Pain (4 - 6)  senna 2 Tablet(s) Oral at bedtime PRN Constipation        CAPILLARY BLOOD GLUCOSE        I&O's Summary    05 Dec 2018 07:01  -  06 Dec 2018 07:00  --------------------------------------------------------  IN: 700 mL / OUT: 2600 mL / NET: -1900 mL    06 Dec 2018 07:01  -  07 Dec 2018 00:02  --------------------------------------------------------  IN: 1100 mL / OUT: 1300 mL / NET: -200 mL        PHYSICAL EXAM:  GENERAL: NAD  NECK: Supple, No JVD  CHEST/LUNG: Clear to auscultation bilaterally; No wheezing.  HEART: Regular rate and rhythm; No murmurs, rubs, or gallops  ABDOMEN: Soft, Nontender, Nondistended; Bowel sounds present  EXTREMITIES:   No clubbing, cyanosis, or edema  NEUROLOGY: AAO X 3      LABS:                        10.4   10.0  )-----------( 543      ( 06 Dec 2018 09:32 )             30.5     12-06    138  |  94<L>  |  66<H>  ----------------------------<  95  5.7<H>   |  23  |  9.38<H>    Ca    9.5      06 Dec 2018 09:32      PT/INR - ( 06 Dec 2018 09:32 )   PT: 15.3 sec;   INR: 1.32 ratio         PTT - ( 06 Dec 2018 09:32 )  PTT:32.9 sec        CAPILLARY BLOOD GLUCOSE                    RADIOLOGY & ADDITIONAL TESTS:    Imaging Personally Reviewed:    Consultant(s) Notes Reviewed:      Care Discussed with Consultants/Other Providers:

## 2018-12-06 NOTE — PROGRESS NOTE ADULT - SUBJECTIVE AND OBJECTIVE BOX
[facility] DIVISION OF KIDNEY DISEASES AND HYPERTENSION -- HEMODIALYSIS NOTE  --------------------------------------------------------------------------------  Chief Complaint: ESRD/Ongoing hemodialysis requirement    24 hour events/subjective:        PAST HISTORY  --------------------------------------------------------------------------------  No significant changes to PMH, PSH, FHx, SHx, unless otherwise noted    ALLERGIES & MEDICATIONS  --------------------------------------------------------------------------------  Allergies    No Known Allergies    Intolerances    beta blockers (Other (Mod to Severe))  hydrALAZINE (Short breath (Mild to Mod))    Standing Inpatient Medications  ascorbic acid 500 milliGRAM(s) Oral daily  aspirin enteric coated 81 milliGRAM(s) Oral daily  cloNIDine Patch 0.3 mG/24Hr(s) 1 patch Transdermal every 7 days  doxazosin 2 milliGRAM(s) Oral at bedtime  doxercalciferol Injectable 1 MICROGram(s) IV Push <User Schedule>  epoetin sherlyn Injectable 5000 Unit(s) IV Push <User Schedule>  furosemide    Tablet 160 milliGRAM(s) Oral daily  multivitamin 1 Tablet(s) Oral daily  NIFEdipine XL 60 milliGRAM(s) Oral <User Schedule>  omega-3-Acid Ethyl Esters 4 Gram(s) Oral daily  pantoprazole    Tablet 40 milliGRAM(s) Oral before breakfast  predniSONE   Tablet 5 milliGRAM(s) Oral daily  propranolol 10 milliGRAM(s) Oral two times a day  sevelamer hydrochloride 800 milliGRAM(s) Oral every 12 hours  tacrolimus 0.5 milliGRAM(s) Oral every 12 hours  trimethoprim  160 mG/sulfamethoxazole 800 mG 1 Tablet(s) Oral <User Schedule>    PRN Inpatient Medications  docusate sodium 100 milliGRAM(s) Oral daily PRN  HYDROmorphone   Tablet 2 milliGRAM(s) Oral every 12 hours PRN  HYDROmorphone  Injectable 1 milliGRAM(s) IV Push every 8 hours PRN  oxyCODONE    IR 5 milliGRAM(s) Oral every 6 hours PRN  senna 2 Tablet(s) Oral at bedtime PRN      REVIEW OF SYSTEMS  --------------------------------------------------------------------------------  Gen: No weight changes, fatigue, fevers/chills, weakness  Skin: No rashes  Head/Eyes/Ears/Mouth: No headache; Normal hearing; Normal vision w/o blurriness; No sinus pain/discomfort, sore throat  Respiratory: No dyspnea, cough, wheezing, hemoptysis  CV: No chest pain, PND, orthopnea  GI: No abdominal pain, diarrhea, constipation, nausea, vomiting, melena, hematochezia  : No increased frequency, dysuria, hematuria, nocturia  MSK: No joint pain/swelling; no back pain; no edema  Neuro: No dizziness/lightheadedness, weakness, seizures, numbness, tingling  Heme: No easy bruising or bleeding  Endo: No heat/cold intolerance  Psych: No significant nervousness, anxiety, stress, depression    All other systems were reviewed and are negative, except as noted.    VITALS/PHYSICAL EXAM  --------------------------------------------------------------------------------  T(C): 36.9 (12-06-18 @ 08:30), Max: 36.9 (12-06-18 @ 08:30)  HR: 85 (12-06-18 @ 08:30) (80 - 87)  BP: 178/98 (12-06-18 @ 09:00) (153/85 - 200/118)  RR: 18 (12-06-18 @ 08:30) (17 - 18)  SpO2: 100% (12-06-18 @ 08:30) (95% - 100%)  Wt(kg): --        12-05-18 @ 07:01  -  12-06-18 @ 07:00  --------------------------------------------------------  IN: 700 mL / OUT: 2600 mL / NET: -1900 mL    12-06-18 @ 07:01  -  12-06-18 @ 10:49  --------------------------------------------------------  IN: 400 mL / OUT: 0 mL / NET: 400 mL      Physical Exam:  	Gen: NAD, well-appearing  	HEENT: PERRL, supple neck, clear oropharynx  	Pulm: CTA B/L  	CV: RRR, S1S2; no rub  	Back: No spinal or CVA tenderness; no sacral edema  	Abd: +BS, soft, nontender/nondistended  	: No suprapubic tenderness  	UE: Warm, FROM, no clubbing, intact strength; no edema; no asterixis  	LE: Warm, FROM, no clubbing, intact strength; no edema  	Neuro: No focal deficits, intact gait  	Psych: Normal affect and mood  	Skin: Warm, without rashes  	Vascular access:    LABS/STUDIES  --------------------------------------------------------------------------------              10.4   10.0  >-----------<  543      [12-06-18 @ 09:32]              30.5     138  |  94  |  66  ----------------------------<  95      [12-06-18 @ 09:32]  5.7   |  23  |  9.38        Ca     9.5     [12-06-18 @ 09:32]      PT/INR: PT 15.3 , INR 1.32       [12-06-18 @ 09:32]  PTT: 32.9       [12-06-18 @ 09:32]      Iron 24, TIBC 161, %sat 15      [03-20-18 @ 11:31]  Ferritin 250      [03-19-18 @ 20:02]  TSH 6.46      [10-02-18 @ 12:25]    HBsAb 5.8      [12-05-18 @ 16:14]  HBsAg Nonreact      [12-05-18 @ 16:14]  HCV 0.11, Nonreact      [12-05-18 @ 16:14] [facility] DIVISION OF KIDNEY DISEASES AND HYPERTENSION -- HEMODIALYSIS NOTE  --------------------------------------------------------------------------------  Chief Complaint: ESRD/Ongoing hemodialysis requirement    24 hour events/subjective:  None        PAST HISTORY  --------------------------------------------------------------------------------  No significant changes to PMH, PSH, FHx, SHx, unless otherwise noted    ALLERGIES & MEDICATIONS  --------------------------------------------------------------------------------  Allergies    No Known Allergies    Intolerances    beta blockers (Other (Mod to Severe))  hydrALAZINE (Short breath (Mild to Mod))    Standing Inpatient Medications  ascorbic acid 500 milliGRAM(s) Oral daily  aspirin enteric coated 81 milliGRAM(s) Oral daily  cloNIDine Patch 0.3 mG/24Hr(s) 1 patch Transdermal every 7 days  doxazosin 2 milliGRAM(s) Oral at bedtime  doxercalciferol Injectable 1 MICROGram(s) IV Push <User Schedule>  epoetin sherlyn Injectable 5000 Unit(s) IV Push <User Schedule>  furosemide    Tablet 160 milliGRAM(s) Oral daily  multivitamin 1 Tablet(s) Oral daily  NIFEdipine XL 60 milliGRAM(s) Oral <User Schedule>  omega-3-Acid Ethyl Esters 4 Gram(s) Oral daily  pantoprazole    Tablet 40 milliGRAM(s) Oral before breakfast  predniSONE   Tablet 5 milliGRAM(s) Oral daily  propranolol 10 milliGRAM(s) Oral two times a day  sevelamer hydrochloride 800 milliGRAM(s) Oral every 12 hours  tacrolimus 0.5 milliGRAM(s) Oral every 12 hours  trimethoprim  160 mG/sulfamethoxazole 800 mG 1 Tablet(s) Oral <User Schedule>    PRN Inpatient Medications  docusate sodium 100 milliGRAM(s) Oral daily PRN  HYDROmorphone   Tablet 2 milliGRAM(s) Oral every 12 hours PRN  HYDROmorphone  Injectable 1 milliGRAM(s) IV Push every 8 hours PRN  oxyCODONE    IR 5 milliGRAM(s) Oral every 6 hours PRN  senna 2 Tablet(s) Oral at bedtime PRN      REVIEW OF SYSTEMS  --------------------------------------------------------------------------------  Gen: No weight changes, fatigue, fevers/chills, weakness  Skin: No rashes  Head/Eyes/Ears/Mouth: No headache; Normal hearing; Normal vision w/o blurriness; No sinus pain/discomfort, sore throat  Respiratory: No dyspnea, cough, wheezing, hemoptysis  CV: No chest pain, PND, orthopnea  GI: No abdominal pain, diarrhea, constipation, nausea, vomiting, melena, hematochezia  : No increased frequency, dysuria, hematuria, nocturia  MSK: No joint pain/swelling; no back pain; no edema  Neuro: No dizziness/lightheadedness, weakness, seizures, numbness, tingling  Heme: No easy bruising or bleeding  Endo: No heat/cold intolerance  Psych: No significant nervousness, anxiety, stress, depression    All other systems were reviewed and are negative, except as noted.    VITALS/PHYSICAL EXAM  --------------------------------------------------------------------------------  T(C): 36.9 (12-06-18 @ 08:30), Max: 36.9 (12-06-18 @ 08:30)  HR: 85 (12-06-18 @ 08:30) (80 - 87)  BP: 178/98 (12-06-18 @ 09:00) (153/85 - 200/118)  RR: 18 (12-06-18 @ 08:30) (17 - 18)  SpO2: 100% (12-06-18 @ 08:30) (95% - 100%)  Wt(kg): --        12-05-18 @ 07:01  -  12-06-18 @ 07:00  --------------------------------------------------------  IN: 700 mL / OUT: 2600 mL / NET: -1900 mL    12-06-18 @ 07:01  -  12-06-18 @ 10:49  --------------------------------------------------------  IN: 400 mL / OUT: 0 mL / NET: 400 mL      Physical Exam:  	Gen: NAD, well-appearing  	HEENT: PERRL, supple neck, clear oropharynx  	Pulm: CTA B/L  	CV: RRR, S1S2; no rub  	Back: No spinal or CVA tenderness; no sacral edema  	Abd: +BS, soft, nontender/nondistended  	: No suprapubic tenderness  	UE: Warm, FROM, no clubbing, intact strength; no edema; no asterixis  	LE: Warm, FROM, no clubbing, intact strength; ++ edema  	Neuro: No focal deficits, intact gait  	Psych: Normal affect and mood  	Skin: Warm, without rashes  	Vascular access: Rt PC+    LABS/STUDIES  --------------------------------------------------------------------------------              10.4   10.0  >-----------<  543      [12-06-18 @ 09:32]              30.5     138  |  94  |  66  ----------------------------<  95      [12-06-18 @ 09:32]  5.7   |  23  |  9.38        Ca     9.5     [12-06-18 @ 09:32]      PT/INR: PT 15.3 , INR 1.32       [12-06-18 @ 09:32]  PTT: 32.9       [12-06-18 @ 09:32]      Iron 24, TIBC 161, %sat 15      [03-20-18 @ 11:31]  Ferritin 250      [03-19-18 @ 20:02]  TSH 6.46      [10-02-18 @ 12:25]    HBsAb 5.8      [12-05-18 @ 16:14]  HBsAg Nonreact      [12-05-18 @ 16:14]  HCV 0.11, Nonreact      [12-05-18 @ 16:14]

## 2018-12-07 LAB
ANION GAP SERPL CALC-SCNC: 19 MMOL/L — HIGH (ref 5–17)
APTT BLD: 29.9 SEC — SIGNIFICANT CHANGE UP (ref 27.5–36.3)
BUN SERPL-MCNC: 62 MG/DL — HIGH (ref 7–23)
CALCIUM SERPL-MCNC: 9 MG/DL — SIGNIFICANT CHANGE UP (ref 8.4–10.5)
CHLORIDE SERPL-SCNC: 96 MMOL/L — SIGNIFICANT CHANGE UP (ref 96–108)
CO2 SERPL-SCNC: 21 MMOL/L — LOW (ref 22–31)
CREAT SERPL-MCNC: 8.53 MG/DL — HIGH (ref 0.5–1.3)
GLUCOSE SERPL-MCNC: 102 MG/DL — HIGH (ref 70–99)
INR BLD: 1.45 RATIO — HIGH (ref 0.88–1.16)
METANEPHRINE, PL: <10 PG/ML — SIGNIFICANT CHANGE UP (ref 0–62)
NORMETANEPHRINE, PL: 65 PG/ML — SIGNIFICANT CHANGE UP (ref 0–145)
POTASSIUM SERPL-MCNC: 5.9 MMOL/L — HIGH (ref 3.5–5.3)
POTASSIUM SERPL-SCNC: 5.9 MMOL/L — HIGH (ref 3.5–5.3)
PROTHROM AB SERPL-ACNC: 16.7 SEC — HIGH (ref 10–12.9)
SODIUM SERPL-SCNC: 136 MMOL/L — SIGNIFICANT CHANGE UP (ref 135–145)
TACROLIMUS SERPL-MCNC: <2 NG/ML — SIGNIFICANT CHANGE UP

## 2018-12-07 PROCEDURE — 99233 SBSQ HOSP IP/OBS HIGH 50: CPT | Mod: GC

## 2018-12-07 RX ORDER — WARFARIN SODIUM 2.5 MG/1
6 TABLET ORAL ONCE
Qty: 0 | Refills: 0 | Status: COMPLETED | OUTPATIENT
Start: 2018-12-07 | End: 2018-12-07

## 2018-12-07 RX ADMIN — HYDROMORPHONE HYDROCHLORIDE 1 MILLIGRAM(S): 2 INJECTION INTRAMUSCULAR; INTRAVENOUS; SUBCUTANEOUS at 22:15

## 2018-12-07 RX ADMIN — Medication 500 MILLIGRAM(S): at 12:05

## 2018-12-07 RX ADMIN — SEVELAMER CARBONATE 800 MILLIGRAM(S): 2400 POWDER, FOR SUSPENSION ORAL at 07:55

## 2018-12-07 RX ADMIN — Medication 1 GRAM(S): at 12:04

## 2018-12-07 RX ADMIN — Medication 5 MILLIGRAM(S): at 07:55

## 2018-12-07 RX ADMIN — Medication 20 MILLIGRAM(S): at 13:06

## 2018-12-07 RX ADMIN — HYDROMORPHONE HYDROCHLORIDE 1 MILLIGRAM(S): 2 INJECTION INTRAMUSCULAR; INTRAVENOUS; SUBCUTANEOUS at 10:00

## 2018-12-07 RX ADMIN — TACROLIMUS 0.5 MILLIGRAM(S): 5 CAPSULE ORAL at 11:59

## 2018-12-07 RX ADMIN — DOXERCALCIFEROL 1 MICROGRAM(S): 2.5 CAPSULE ORAL at 18:50

## 2018-12-07 RX ADMIN — HYDROMORPHONE HYDROCHLORIDE 1 MILLIGRAM(S): 2 INJECTION INTRAMUSCULAR; INTRAVENOUS; SUBCUTANEOUS at 09:46

## 2018-12-07 RX ADMIN — PANTOPRAZOLE SODIUM 40 MILLIGRAM(S): 20 TABLET, DELAYED RELEASE ORAL at 07:49

## 2018-12-07 RX ADMIN — Medication 1 TABLET(S): at 08:43

## 2018-12-07 RX ADMIN — Medication 100 MILLIGRAM(S): at 21:49

## 2018-12-07 RX ADMIN — Medication 1 PATCH: at 07:50

## 2018-12-07 RX ADMIN — Medication 4 MILLIGRAM(S): at 21:48

## 2018-12-07 RX ADMIN — Medication 1 TABLET(S): at 12:06

## 2018-12-07 RX ADMIN — WARFARIN SODIUM 6 MILLIGRAM(S): 2.5 TABLET ORAL at 21:48

## 2018-12-07 RX ADMIN — Medication 1 PATCH: at 20:24

## 2018-12-07 RX ADMIN — HYDROMORPHONE HYDROCHLORIDE 1 MILLIGRAM(S): 2 INJECTION INTRAMUSCULAR; INTRAVENOUS; SUBCUTANEOUS at 21:55

## 2018-12-07 RX ADMIN — Medication 60 MILLIGRAM(S): at 13:06

## 2018-12-07 RX ADMIN — Medication 60 MILLIGRAM(S): at 21:47

## 2018-12-07 RX ADMIN — Medication 1 PATCH: at 07:47

## 2018-12-07 RX ADMIN — TACROLIMUS 0.5 MILLIGRAM(S): 5 CAPSULE ORAL at 21:47

## 2018-12-07 RX ADMIN — Medication 81 MILLIGRAM(S): at 12:01

## 2018-12-07 RX ADMIN — Medication 20 MILLIGRAM(S): at 21:47

## 2018-12-07 RX ADMIN — SEVELAMER CARBONATE 800 MILLIGRAM(S): 2400 POWDER, FOR SUSPENSION ORAL at 21:47

## 2018-12-07 NOTE — PROGRESS NOTE ADULT - SUBJECTIVE AND OBJECTIVE BOX
Patient is a 58y old  Male who presents with a chief complaint of shortness of breath (07 Dec 2018 13:32)      SUBJECTIVE / OVERNIGHT EVENTS:    Events noted.  Feels better.  CONSTITUTIONAL: No fever,  or fatigue  NECK: No pain or stiffness  RESPIRATORY: No cough, wheezing, chills or hemoptysis; No shortness of breath  CARDIOVASCULAR: No chest pain, palpitations, dizziness, or leg swelling  GASTROINTESTINAL: No abdominal or epigastric pain. No nausea, vomiting, or hematemesis; No diarrhea or constipation.   NEUROLOGICAL: No headaches,     MEDICATIONS  (STANDING):  ascorbic acid 500 milliGRAM(s) Oral daily  aspirin enteric coated 81 milliGRAM(s) Oral daily  cloNIDine Patch 0.3 mG/24Hr(s) 1 patch Transdermal every 7 days  doxazosin 4 milliGRAM(s) Oral at bedtime  doxercalciferol Injectable 1 MICROGram(s) IV Push <User Schedule>  epoetin sherlyn Injectable 5000 Unit(s) IV Push <User Schedule>  furosemide    Tablet 160 milliGRAM(s) Oral daily  multivitamin 1 Tablet(s) Oral daily  NIFEdipine XL 60 milliGRAM(s) Oral <User Schedule>  omega-3-Acid Ethyl Esters 4 Gram(s) Oral daily  pantoprazole    Tablet 40 milliGRAM(s) Oral before breakfast  predniSONE   Tablet 5 milliGRAM(s) Oral daily  propranolol 20 milliGRAM(s) Oral two times a day  sevelamer hydrochloride 800 milliGRAM(s) Oral every 12 hours  tacrolimus 0.5 milliGRAM(s) Oral every 12 hours  trimethoprim  160 mG/sulfamethoxazole 800 mG 1 Tablet(s) Oral <User Schedule>  warfarin 6 milliGRAM(s) Oral once    MEDICATIONS  (PRN):  docusate sodium 100 milliGRAM(s) Oral daily PRN Constipation  HYDROmorphone   Tablet 2 milliGRAM(s) Oral every 12 hours PRN severe headache  HYDROmorphone  Injectable 1 milliGRAM(s) IV Push every 8 hours PRN Moderate Pain (4 - 6)  senna 2 Tablet(s) Oral at bedtime PRN Constipation        CAPILLARY BLOOD GLUCOSE        I&O's Summary    06 Dec 2018 07:01  -  07 Dec 2018 07:00  --------------------------------------------------------  IN: 1100 mL / OUT: 1300 mL / NET: -200 mL    07 Dec 2018 07:01  -  07 Dec 2018 18:27  --------------------------------------------------------  IN: 750 mL / OUT: 0 mL / NET: 750 mL        PHYSICAL EXAM:  GENERAL: NAD  NECK: Supple, No JVD  CHEST/LUNG: Clear to auscultation bilaterally; No wheezing.  HEART: Regular rate and rhythm; No murmurs, rubs, or gallops  ABDOMEN: Soft, Nontender, Nondistended; Bowel sounds present  EXTREMITIES:   No clubbing, cyanosis, or edema  NEUROLOGY: AAO X 3      LABS:                        10.4   10.0  )-----------( 543      ( 06 Dec 2018 09:32 )             30.5     12-07    136  |  96  |  62<H>  ----------------------------<  102<H>  5.9<H>   |  21<L>  |  8.53<H>    Ca    9.0      07 Dec 2018 17:49      PT/INR - ( 07 Dec 2018 17:49 )   PT: 16.7 sec;   INR: 1.45 ratio         PTT - ( 07 Dec 2018 17:49 )  PTT:29.9 sec        CAPILLARY BLOOD GLUCOSE                    RADIOLOGY & ADDITIONAL TESTS:    Imaging Personally Reviewed:    Consultant(s) Notes Reviewed:      Care Discussed with Consultants/Other Providers:

## 2018-12-07 NOTE — PROGRESS NOTE ADULT - SUBJECTIVE AND OBJECTIVE BOX
[facility] DIVISION OF KIDNEY DISEASES AND HYPERTENSION -- FOLLOW UP NOTE  --------------------------------------------------------------------------------  Chief Complaint: SOB    24 hour events/subjective:  Underwent HD yesterday  1.3 L removed  Feels better        PAST HISTORY  --------------------------------------------------------------------------------  No significant changes to PMH, PSH, FHx, SHx, unless otherwise noted    ALLERGIES & MEDICATIONS  --------------------------------------------------------------------------------  Allergies    No Known Allergies    Intolerances    beta blockers (Other (Mod to Severe))  hydrALAZINE (Short breath (Mild to Mod))    Standing Inpatient Medications  ascorbic acid 500 milliGRAM(s) Oral daily  aspirin enteric coated 81 milliGRAM(s) Oral daily  cloNIDine Patch 0.3 mG/24Hr(s) 1 patch Transdermal every 7 days  doxazosin 4 milliGRAM(s) Oral at bedtime  doxercalciferol Injectable 1 MICROGram(s) IV Push <User Schedule>  epoetin sherlyn Injectable 5000 Unit(s) IV Push <User Schedule>  furosemide    Tablet 160 milliGRAM(s) Oral daily  multivitamin 1 Tablet(s) Oral daily  NIFEdipine XL 60 milliGRAM(s) Oral <User Schedule>  omega-3-Acid Ethyl Esters 4 Gram(s) Oral daily  pantoprazole    Tablet 40 milliGRAM(s) Oral before breakfast  predniSONE   Tablet 5 milliGRAM(s) Oral daily  propranolol 20 milliGRAM(s) Oral two times a day  sevelamer hydrochloride 800 milliGRAM(s) Oral every 12 hours  tacrolimus 0.5 milliGRAM(s) Oral every 12 hours  trimethoprim  160 mG/sulfamethoxazole 800 mG 1 Tablet(s) Oral <User Schedule>    PRN Inpatient Medications  docusate sodium 100 milliGRAM(s) Oral daily PRN  HYDROmorphone   Tablet 2 milliGRAM(s) Oral every 12 hours PRN  HYDROmorphone  Injectable 1 milliGRAM(s) IV Push every 8 hours PRN  senna 2 Tablet(s) Oral at bedtime PRN      REVIEW OF SYSTEMS  --------------------------------------------------------------------------------  Gen: No weight changes, fatigue, fevers/chills, weakness  Head/Eyes/Ears/Mouth: No headache; Normal hearing; Normal vision    Respiratory: No dyspnea, cough, wheezing, hemoptysis  CV: No chest pain, PND, orthopnea  GI: No abdominal pain, diarrhea, constipation, nausea, vomiting, melena, hematochezia  : No increased frequency, dysuria, hematuria, nocturia  MSK: No joint pain/swelling; no back pain; + edema   Heme: No easy bruising or bleeding  All other systems were reviewed and are negative, except as noted.      VITALS/PHYSICAL EXAM  --------------------------------------------------------------------------------  T(C): 36.8 (12-07-18 @ 12:27), Max: 36.8 (12-07-18 @ 12:27)  HR: 89 (12-07-18 @ 12:27) (82 - 94)  BP: 166/100 (12-07-18 @ 12:27) (149/86 - 183/95)  RR: 18 (12-07-18 @ 12:27) (18 - 18)  SpO2: 97% (12-07-18 @ 12:27) (96% - 98%)  Wt(kg): --        12-06-18 @ 07:01  -  12-07-18 @ 07:00  --------------------------------------------------------  IN: 1100 mL / OUT: 1300 mL / NET: -200 mL    12-07-18 @ 07:01  -  12-07-18 @ 13:12  --------------------------------------------------------  IN: 400 mL / OUT: 0 mL / NET: 400 mL      Physical Exam:  	PHYSICAL EXAM: vital signs as above  in no apparent distress  Neck: Supple, no JVD,    Lungs: no rhonchi, no wheeze, no crackles  CVS: S1 S2 no M/R/G  Abdomen: no tenderness, no organomegaly, BS present  Neuro: Grossly intact  Skin: warm, dry  Ext: no cyanosis or clubbing, + edema( improved)   Access:      LABS/STUDIES  --------------------------------------------------------------------------------              10.4   10.0  >-----------<  543      [12-06-18 @ 09:32]              30.5     138  |  94  |  66  ----------------------------<  95      [12-06-18 @ 09:32]  5.7   |  23  |  9.38        Ca     9.5     [12-06-18 @ 09:32]      PT/INR: PT 15.3 , INR 1.32       [12-06-18 @ 09:32]  PTT: 32.9       [12-06-18 @ 09:32]      Creatinine Trend:  SCr 9.38 [12-06 @ 09:32]  SCr 7.35 [12-05 @ 13:43]  SCr 7.62 [12-04 @ 09:31]  SCr 9.08 [12-03 @ 07:31]  SCr 8.97 [12-01 @ 09:17]        Iron 24, TIBC 161, %sat 15      [03-20-18 @ 11:31]  Ferritin 250      [03-19-18 @ 20:02]  TSH 6.46      [10-02-18 @ 12:25]    HBsAb 5.8      [12-05-18 @ 16:14]  HBsAg Nonreact      [12-05-18 @ 16:14]  HCV 0.11, Nonreact      [12-05-18 @ 16:14]

## 2018-12-07 NOTE — PROGRESS NOTE ADULT - PROBLEM SELECTOR PLAN 3
Maintain on  lasix   Schedule for UF only today   HD in am weight down from 91 kg to 84.5 kg   Maintain on  lasix   Schedule for UF only today   HD in am

## 2018-12-07 NOTE — PROGRESS NOTE ADULT - SUBJECTIVE AND OBJECTIVE BOX
CARDIOLOGY FOLLOW UP - Dr. Real    CC no cp/sob       PHYSICAL EXAM:  T(C): 36.8 (12-07-18 @ 12:27), Max: 36.8 (12-07-18 @ 12:27)  HR: 89 (12-07-18 @ 12:27) (82 - 94)  BP: 166/100 (12-07-18 @ 12:27) (149/86 - 183/95)  RR: 18 (12-07-18 @ 12:27) (18 - 18)  SpO2: 97% (12-07-18 @ 12:27) (96% - 98%)  Wt(kg): --  I&O's Summary    06 Dec 2018 07:01  -  07 Dec 2018 07:00  --------------------------------------------------------  IN: 1100 mL / OUT: 1300 mL / NET: -200 mL    07 Dec 2018 07:01  -  07 Dec 2018 13:32  --------------------------------------------------------  IN: 400 mL / OUT: 0 mL / NET: 400 mL        Appearance: Normal	  Cardiovascular: Normal S1 S2,RRR, No JVD, No murmurs  Respiratory: Lungs clear to auscultation	  Gastrointestinal:  Soft, Non-tender, + BS	  Extremities: Normal range of motion, No clubbing, b/l LE edema         MEDICATIONS  (STANDING):  ascorbic acid 500 milliGRAM(s) Oral daily  aspirin enteric coated 81 milliGRAM(s) Oral daily  cloNIDine Patch 0.3 mG/24Hr(s) 1 patch Transdermal every 7 days  doxazosin 4 milliGRAM(s) Oral at bedtime  doxercalciferol Injectable 1 MICROGram(s) IV Push <User Schedule>  epoetin sherlyn Injectable 5000 Unit(s) IV Push <User Schedule>  furosemide    Tablet 160 milliGRAM(s) Oral daily  multivitamin 1 Tablet(s) Oral daily  NIFEdipine XL 60 milliGRAM(s) Oral <User Schedule>  omega-3-Acid Ethyl Esters 4 Gram(s) Oral daily  pantoprazole    Tablet 40 milliGRAM(s) Oral before breakfast  predniSONE   Tablet 5 milliGRAM(s) Oral daily  propranolol 20 milliGRAM(s) Oral two times a day  sevelamer hydrochloride 800 milliGRAM(s) Oral every 12 hours  tacrolimus 0.5 milliGRAM(s) Oral every 12 hours  trimethoprim  160 mG/sulfamethoxazole 800 mG 1 Tablet(s) Oral <User Schedule>      TELEMETRY: 	    ECG:  	  RADIOLOGY:   DIAGNOSTIC TESTING:  [ ] Echocardiogram:  [ ]  Catheterization:  [ ] Stress Test:    OTHER: 	    LABS:	 	                                10.4   10.0  )-----------( 543      ( 06 Dec 2018 09:32 )             30.5     12-06    138  |  94<L>  |  66<H>  ----------------------------<  95  5.7<H>   |  23  |  9.38<H>    Ca    9.5      06 Dec 2018 09:32      PT/INR - ( 06 Dec 2018 09:32 )   PT: 15.3 sec;   INR: 1.32 ratio         PTT - ( 06 Dec 2018 09:32 )  PTT:32.9 sec CARDIOLOGY FOLLOW UP - Dr. Real    CC pt. sleeping  NAD noted     PHYSICAL EXAM:  T(C): 36.8 (12-07-18 @ 12:27), Max: 36.8 (12-07-18 @ 12:27)  HR: 89 (12-07-18 @ 12:27) (82 - 94)  BP: 166/100 (12-07-18 @ 12:27) (149/86 - 183/95)  RR: 18 (12-07-18 @ 12:27) (18 - 18)  SpO2: 97% (12-07-18 @ 12:27) (96% - 98%)  Wt(kg): --  I&O's Summary    06 Dec 2018 07:01  -  07 Dec 2018 07:00  --------------------------------------------------------  IN: 1100 mL / OUT: 1300 mL / NET: -200 mL    07 Dec 2018 07:01  -  07 Dec 2018 13:32  --------------------------------------------------------  IN: 400 mL / OUT: 0 mL / NET: 400 mL        Appearance: Normal	  Cardiovascular: Normal S1 S2,RRR, No JVD, No murmurs  Respiratory: Lungs clear to auscultation	  Gastrointestinal:  Soft, Non-tender, + BS	  Extremities: Normal range of motion, No clubbing, b/l LE edema         MEDICATIONS  (STANDING):  ascorbic acid 500 milliGRAM(s) Oral daily  aspirin enteric coated 81 milliGRAM(s) Oral daily  cloNIDine Patch 0.3 mG/24Hr(s) 1 patch Transdermal every 7 days  doxazosin 4 milliGRAM(s) Oral at bedtime  doxercalciferol Injectable 1 MICROGram(s) IV Push <User Schedule>  epoetin sherlyn Injectable 5000 Unit(s) IV Push <User Schedule>  furosemide    Tablet 160 milliGRAM(s) Oral daily  multivitamin 1 Tablet(s) Oral daily  NIFEdipine XL 60 milliGRAM(s) Oral <User Schedule>  omega-3-Acid Ethyl Esters 4 Gram(s) Oral daily  pantoprazole    Tablet 40 milliGRAM(s) Oral before breakfast  predniSONE   Tablet 5 milliGRAM(s) Oral daily  propranolol 20 milliGRAM(s) Oral two times a day  sevelamer hydrochloride 800 milliGRAM(s) Oral every 12 hours  tacrolimus 0.5 milliGRAM(s) Oral every 12 hours  trimethoprim  160 mG/sulfamethoxazole 800 mG 1 Tablet(s) Oral <User Schedule>      TELEMETRY: 	    ECG:  	  RADIOLOGY:   DIAGNOSTIC TESTING:  [ ] Echocardiogram:  [ ]  Catheterization:  [ ] Stress Test:    OTHER: 	    LABS:	 	                                10.4   10.0  )-----------( 543      ( 06 Dec 2018 09:32 )             30.5     12-06    138  |  94<L>  |  66<H>  ----------------------------<  95  5.7<H>   |  23  |  9.38<H>    Ca    9.5      06 Dec 2018 09:32      PT/INR - ( 06 Dec 2018 09:32 )   PT: 15.3 sec;   INR: 1.32 ratio         PTT - ( 06 Dec 2018 09:32 )  PTT:32.9 sec

## 2018-12-07 NOTE — PROGRESS NOTE ADULT - PROBLEM SELECTOR PLAN 2
Maintain on present medications.  However there is certainly a large component of volume - mediated hypertension.    Transplant renal doppler negative.   Plasma metanephrines negative  Plasma aldosterone is elevated : likely as a result of hyperkalemia  Maintain on current regimen

## 2018-12-07 NOTE — DIETITIAN INITIAL EVALUATION ADULT. - ENERGY NEEDS
ht:5'11", wt:186.2 pounds, BMI: 26kg/m2, IBW: 172pounds +/- 10%     pt admitted c SOB, pt c ESRD on HD, having extra UF, pt c hypervolemia, had been having hyperkalemia as well- as per Nephrology, levels also related to pt being on Tacro and Bactrim. Per Cardiology, pt c acute diastolic HF in setting of uncontrolled HTN.

## 2018-12-07 NOTE — PROGRESS NOTE ADULT - ASSESSMENT
echo 4/17/18: EF 53, normal LV fx, mild MR   echo 10/5/18: EF 62%, mild MR, severely dilated left atrium, normal LV sys function     a/p     59yo M w/pmhx of HTN, Paflutter,  ESRD s/p renal transplant x 2 but now requiring HD presenting with SOB and uncontrolled BP.    1. Parox. Aflutter  cv stable no cp   rate controlled  and remains in NSR, continue with  low dose propanolol for rate control   recent echo with nl lv fxn   CHADS 2 - coumadin per INR level     2. Uncontrolled  HTN  bp improved   will remain off labetolol due to intolerance to medication   continue nifedipine 60 mg BID, 0.3 mg cloNIDine Patch, propanolol 20 mg BID, cardura 4mg daily     3. Acute Diastolic CHF  likely in the setting of uncontrolled HTN   HS trop x1 noted, demand ischemia in the setting of ESRD/ uncontrolled HTN   BP Control, fluid removal with lasix/ HD   recent echo with normal LV fx      4. ESRD  renal f/u   continue fluid removal with HD and lasix     dvt ppx

## 2018-12-07 NOTE — DIETITIAN INITIAL EVALUATION ADULT. - FACTORS AFF FOOD INTAKE
pt reports in house good appetite and intake-has been consuming 100% of his meals; states he prefers to be on regular diet in house and is familiar with the menu and will adhere to diet

## 2018-12-07 NOTE — DIETITIAN INITIAL EVALUATION ADULT. - OTHER INFO
Pt seen for LOS. Pt reports his usual dry weight has been about 85.5 kg (about 188 pounds), noted wt in house has gone down from 196.4->186.2 pounds. NKFA reported. Noted per chart, pt was taking Nephrocaps, coQ10, omega 3, calcium, and vitamin D3 PTA.

## 2018-12-07 NOTE — DIETITIAN INITIAL EVALUATION ADULT. - ADHERENCE
reports he was following renal diet c fluid restriction at home and is able to verbalize foods low in potassium and phosphorus; noted from per previous RD note pt aware of vitamin K and Coumadin interaction as well

## 2018-12-07 NOTE — DIETITIAN INITIAL EVALUATION ADULT. - NS AS NUTRI INTERV MEALS SNACK
Continue c current diet. Pt c very good PO intake, likely meeting estimated nutrient needs. Encouraged PO intake as tolerated and encouraged diet adherence. Pt aware RD remains available as needed for further nutrition interventions as needed.

## 2018-12-07 NOTE — DIETITIAN INITIAL EVALUATION ADULT. - SOURCE
patient/previous RD evaluation from October 2018; pt declined full interview at this time since he has a headache but was willing to provide RD c some basic information/other (specify)

## 2018-12-07 NOTE — DIETITIAN INITIAL EVALUATION ADULT. - NS AS NUTRI INTERV VITAMIN
Recommend change multivitamin c minerals to Nephrocaps. Recommend change multivitamin c minerals to Nephrovite.

## 2018-12-07 NOTE — PROGRESS NOTE ADULT - ASSESSMENT
59 yo M with PMHx of ESRD on HD and history of two kidney transplants admitted for acute SOB and uncontrolled HTN. Pt has been having extra UF session as an outpatient at [facility] dialysis unit without resolution of his hypervolemia and uncontrolled HTN.

## 2018-12-07 NOTE — PROGRESS NOTE ADULT - ASSESSMENT
· Assessment	  Patient is a 58 year old male with HTN, ESRD on HD (history of renal transplant x 2) presents to the ED with worsening shortness of breath 1-2 days admitted for acute respiratory failure with hypoxia in the setting of hypervolemia requiring extra HD session with UF, also with uncontrolled hypertension.      Problem/Plan - 1:  ·  Problem: Accelerated HTN:      Nephro f/up noted.   Cont current meds.    ESRD on HD/Hyperkalemia:  HD today  Nephro f/up     A Fib:  Coumadin/PT/INR

## 2018-12-08 LAB
ANION GAP SERPL CALC-SCNC: 18 MMOL/L — HIGH (ref 5–17)
APTT BLD: 34.9 SEC — SIGNIFICANT CHANGE UP (ref 27.5–36.3)
BUN SERPL-MCNC: 73 MG/DL — HIGH (ref 7–23)
CALCIUM SERPL-MCNC: 9.1 MG/DL — SIGNIFICANT CHANGE UP (ref 8.4–10.5)
CHLORIDE SERPL-SCNC: 98 MMOL/L — SIGNIFICANT CHANGE UP (ref 96–108)
CO2 SERPL-SCNC: 21 MMOL/L — LOW (ref 22–31)
CREAT SERPL-MCNC: 9.65 MG/DL — HIGH (ref 0.5–1.3)
GLUCOSE SERPL-MCNC: 127 MG/DL — HIGH (ref 70–99)
HCT VFR BLD CALC: 31.3 % — LOW (ref 39–50)
HGB BLD-MCNC: 9.8 G/DL — LOW (ref 13–17)
INR BLD: 1.52 RATIO — HIGH (ref 0.88–1.16)
MCHC RBC-ENTMCNC: 27.5 PG — SIGNIFICANT CHANGE UP (ref 27–34)
MCHC RBC-ENTMCNC: 31.3 GM/DL — LOW (ref 32–36)
MCV RBC AUTO: 87.7 FL — SIGNIFICANT CHANGE UP (ref 80–100)
PLATELET # BLD AUTO: 571 K/UL — HIGH (ref 150–400)
POTASSIUM SERPL-MCNC: 6.3 MMOL/L — CRITICAL HIGH (ref 3.5–5.3)
POTASSIUM SERPL-SCNC: 6.3 MMOL/L — CRITICAL HIGH (ref 3.5–5.3)
PROTHROM AB SERPL-ACNC: 17.7 SEC — HIGH (ref 10–12.9)
RAPID RVP RESULT: SIGNIFICANT CHANGE UP
RBC # BLD: 3.57 M/UL — LOW (ref 4.2–5.8)
RBC # FLD: 15.7 % — HIGH (ref 10.3–14.5)
SODIUM SERPL-SCNC: 137 MMOL/L — SIGNIFICANT CHANGE UP (ref 135–145)
WBC # BLD: 8.45 K/UL — SIGNIFICANT CHANGE UP (ref 3.8–10.5)
WBC # FLD AUTO: 8.45 K/UL — SIGNIFICANT CHANGE UP (ref 3.8–10.5)

## 2018-12-08 PROCEDURE — 99233 SBSQ HOSP IP/OBS HIGH 50: CPT | Mod: GC

## 2018-12-08 RX ORDER — HYDROMORPHONE HYDROCHLORIDE 2 MG/ML
1 INJECTION INTRAMUSCULAR; INTRAVENOUS; SUBCUTANEOUS ONCE
Qty: 0 | Refills: 0 | Status: DISCONTINUED | OUTPATIENT
Start: 2018-12-08 | End: 2018-12-08

## 2018-12-08 RX ORDER — WARFARIN SODIUM 2.5 MG/1
7 TABLET ORAL ONCE
Qty: 0 | Refills: 0 | Status: COMPLETED | OUTPATIENT
Start: 2018-12-08 | End: 2018-12-08

## 2018-12-08 RX ADMIN — SEVELAMER CARBONATE 800 MILLIGRAM(S): 2400 POWDER, FOR SUSPENSION ORAL at 08:25

## 2018-12-08 RX ADMIN — HYDROMORPHONE HYDROCHLORIDE 1 MILLIGRAM(S): 2 INJECTION INTRAMUSCULAR; INTRAVENOUS; SUBCUTANEOUS at 17:04

## 2018-12-08 RX ADMIN — Medication 5 MILLIGRAM(S): at 08:27

## 2018-12-08 RX ADMIN — Medication 1 PATCH: at 08:26

## 2018-12-08 RX ADMIN — Medication 20 MILLIGRAM(S): at 17:03

## 2018-12-08 RX ADMIN — Medication 160 MILLIGRAM(S): at 08:25

## 2018-12-08 RX ADMIN — HYDROMORPHONE HYDROCHLORIDE 1 MILLIGRAM(S): 2 INJECTION INTRAMUSCULAR; INTRAVENOUS; SUBCUTANEOUS at 09:13

## 2018-12-08 RX ADMIN — PANTOPRAZOLE SODIUM 40 MILLIGRAM(S): 20 TABLET, DELAYED RELEASE ORAL at 08:25

## 2018-12-08 RX ADMIN — Medication 1 PATCH: at 21:05

## 2018-12-08 RX ADMIN — ERYTHROPOIETIN 5000 UNIT(S): 10000 INJECTION, SOLUTION INTRAVENOUS; SUBCUTANEOUS at 13:55

## 2018-12-08 RX ADMIN — Medication 1 TABLET(S): at 11:20

## 2018-12-08 RX ADMIN — Medication 4 GRAM(S): at 11:20

## 2018-12-08 RX ADMIN — TACROLIMUS 0.5 MILLIGRAM(S): 5 CAPSULE ORAL at 21:15

## 2018-12-08 RX ADMIN — HYDROMORPHONE HYDROCHLORIDE 1 MILLIGRAM(S): 2 INJECTION INTRAMUSCULAR; INTRAVENOUS; SUBCUTANEOUS at 02:15

## 2018-12-08 RX ADMIN — Medication 4 MILLIGRAM(S): at 21:15

## 2018-12-08 RX ADMIN — HYDROMORPHONE HYDROCHLORIDE 1 MILLIGRAM(S): 2 INJECTION INTRAMUSCULAR; INTRAVENOUS; SUBCUTANEOUS at 01:55

## 2018-12-08 RX ADMIN — Medication 500 MILLIGRAM(S): at 11:20

## 2018-12-08 RX ADMIN — Medication 60 MILLIGRAM(S): at 17:02

## 2018-12-08 RX ADMIN — Medication 60 MILLIGRAM(S): at 08:44

## 2018-12-08 RX ADMIN — Medication 20 MILLIGRAM(S): at 08:25

## 2018-12-08 RX ADMIN — HYDROMORPHONE HYDROCHLORIDE 1 MILLIGRAM(S): 2 INJECTION INTRAMUSCULAR; INTRAVENOUS; SUBCUTANEOUS at 16:37

## 2018-12-08 RX ADMIN — SEVELAMER CARBONATE 800 MILLIGRAM(S): 2400 POWDER, FOR SUSPENSION ORAL at 17:02

## 2018-12-08 RX ADMIN — HYDROMORPHONE HYDROCHLORIDE 1 MILLIGRAM(S): 2 INJECTION INTRAMUSCULAR; INTRAVENOUS; SUBCUTANEOUS at 08:43

## 2018-12-08 RX ADMIN — Medication 81 MILLIGRAM(S): at 11:20

## 2018-12-08 RX ADMIN — TACROLIMUS 0.5 MILLIGRAM(S): 5 CAPSULE ORAL at 09:21

## 2018-12-08 RX ADMIN — Medication 100 MILLIGRAM(S): at 21:20

## 2018-12-08 RX ADMIN — WARFARIN SODIUM 7 MILLIGRAM(S): 2.5 TABLET ORAL at 21:16

## 2018-12-08 NOTE — PROGRESS NOTE ADULT - ASSESSMENT
echo 4/17/18: EF 53, normal LV fx, mild MR   echo 10/5/18: EF 62%, mild MR, severely dilated left atrium, normal LV sys function     a/p     57yo M w/pmhx of HTN, Paflutter,  ESRD s/p renal transplant x 2 but now requiring HD presenting with SOB and uncontrolled BP.    1. Parox. Aflutter  cv stable no cp   rate controlled  and remains in NSR, continue with  low dose propanolol for rate control   recent echo with nl lv fxn   CHADS 2 - coumadin per INR level     2. Uncontrolled  HTN  bp improved   will remain off labetolol due to intolerance to medication   continue nifedipine 60 mg BID, 0.3 mg cloNIDine Patch, propanolol 20 mg BID, cardura 4mg daily     3. Acute Diastolic CHF  likely in the setting of uncontrolled HTN   HS trop x1 noted, demand ischemia in the setting of ESRD/ uncontrolled HTN   BP Control, fluid removal with lasix/ HD   recent echo with normal LV fx      4. ESRD  renal f/u   continue fluid removal with HD and lasix     dvt ppx

## 2018-12-08 NOTE — PROGRESS NOTE ADULT - PROBLEM SELECTOR PLAN 2
Maintain on present medications.  However there is certainly a large component of volume - mediated hypertension.  BP improved with intense UF and HD.     Transplant renal doppler negative.   Plasma metanephrines negative  Plasma aldosterone is elevated further investigation should be done. Primary carlos state should be r/o. Usually pts on HD have low carlos due to high volume.

## 2018-12-08 NOTE — PROGRESS NOTE ADULT - ASSESSMENT
57 yo M with PMHx of ESRD on HD and history of two kidney transplants admitted for acute SOB and uncontrolled HTN. Pt has been having extra UF session as an outpatient at [facility] dialysis unit without resolution of his hypervolemia and uncontrolled HTN.

## 2018-12-08 NOTE — PROGRESS NOTE ADULT - SUBJECTIVE AND OBJECTIVE BOX
Patient is a 58y old  Male who presents with a chief complaint of shortness of breath (08 Dec 2018 10:05)      SUBJECTIVE / OVERNIGHT EVENTS:    Events noted.  Feels better.  CONSTITUTIONAL: No fever,  or fatigue  NECK: No pain or stiffness  RESPIRATORY: No cough, wheezing, chills or hemoptysis; No shortness of breath  CARDIOVASCULAR: No chest pain, palpitations, dizziness, or leg swelling  GASTROINTESTINAL: No abdominal or epigastric pain. No nausea, vomiting, or hematemesis; No diarrhea or constipation.   NEUROLOGICAL: No headaches,     MEDICATIONS  (STANDING):  ascorbic acid 500 milliGRAM(s) Oral daily  aspirin enteric coated 81 milliGRAM(s) Oral daily  cloNIDine Patch 0.3 mG/24Hr(s) 1 patch Transdermal every 7 days  doxazosin 4 milliGRAM(s) Oral at bedtime  doxercalciferol Injectable 1 MICROGram(s) IV Push <User Schedule>  epoetin sherlyn Injectable 5000 Unit(s) IV Push <User Schedule>  furosemide    Tablet 160 milliGRAM(s) Oral daily  multivitamin 1 Tablet(s) Oral daily  NIFEdipine XL 60 milliGRAM(s) Oral <User Schedule>  omega-3-Acid Ethyl Esters 4 Gram(s) Oral daily  pantoprazole    Tablet 40 milliGRAM(s) Oral before breakfast  predniSONE   Tablet 5 milliGRAM(s) Oral daily  propranolol 20 milliGRAM(s) Oral two times a day  sevelamer hydrochloride 800 milliGRAM(s) Oral every 12 hours  tacrolimus 0.5 milliGRAM(s) Oral every 12 hours  trimethoprim  160 mG/sulfamethoxazole 800 mG 1 Tablet(s) Oral <User Schedule>  warfarin 7 milliGRAM(s) Oral once    MEDICATIONS  (PRN):  docusate sodium 100 milliGRAM(s) Oral daily PRN Constipation  HYDROmorphone  Injectable 1 milliGRAM(s) IV Push every 8 hours PRN Moderate Pain (4 - 6)  senna 2 Tablet(s) Oral at bedtime PRN Constipation        CAPILLARY BLOOD GLUCOSE        I&O's Summary    07 Dec 2018 07:01  -  08 Dec 2018 07:00  --------------------------------------------------------  IN: 1650 mL / OUT: 2900 mL / NET: -1250 mL    08 Dec 2018 07:01  -  08 Dec 2018 13:21  --------------------------------------------------------  IN: 240 mL / OUT: 0 mL / NET: 240 mL        PHYSICAL EXAM:  GENERAL: NAD  NECK: Supple, No JVD  CHEST/LUNG: Clear to auscultation bilaterally; No wheezing.  HEART: Regular rate and rhythm; No murmurs, rubs, or gallops  ABDOMEN: Soft, Nontender, Nondistended; Bowel sounds present  EXTREMITIES:   No clubbing, cyanosis, or edema  NEUROLOGY: AAO X 3      LABS:    12-07    136  |  96  |  62<H>  ----------------------------<  102<H>  5.9<H>   |  21<L>  |  8.53<H>    Ca    9.0      07 Dec 2018 17:49      PT/INR - ( 07 Dec 2018 17:49 )   PT: 16.7 sec;   INR: 1.45 ratio         PTT - ( 07 Dec 2018 17:49 )  PTT:29.9 sec        CAPILLARY BLOOD GLUCOSE                    RADIOLOGY & ADDITIONAL TESTS:    Imaging Personally Reviewed:    Consultant(s) Notes Reviewed:      Care Discussed with Consultants/Other Providers:

## 2018-12-08 NOTE — PROGRESS NOTE ADULT - SUBJECTIVE AND OBJECTIVE BOX
[facility] DIVISION OF KIDNEY DISEASES AND HYPERTENSION -- FOLLOW UP NOTE  --------------------------------------------------------------------------------  Chief Complaint:  SOB    24 hour events/subjective:  Pt examined during HD, had UF yesterday 2.4 lit removed, tolerated well, today   will remove 2lit BP much better, no events.       PAST HISTORY  --------------------------------------------------------------------------------  No significant changes to PMH, PSH, FHx, SHx, unless otherwise noted    ALLERGIES & MEDICATIONS  --------------------------------------------------------------------------------  Allergies    No Known Allergies    Intolerances    beta blockers (Other (Mod to Severe))  hydrALAZINE (Short breath (Mild to Mod))    Standing Inpatient Medications  ascorbic acid 500 milliGRAM(s) Oral daily  aspirin enteric coated 81 milliGRAM(s) Oral daily  cloNIDine Patch 0.3 mG/24Hr(s) 1 patch Transdermal every 7 days  doxazosin 4 milliGRAM(s) Oral at bedtime  doxercalciferol Injectable 1 MICROGram(s) IV Push <User Schedule>  epoetin sherlyn Injectable 5000 Unit(s) IV Push <User Schedule>  furosemide    Tablet 160 milliGRAM(s) Oral daily  multivitamin 1 Tablet(s) Oral daily  NIFEdipine XL 60 milliGRAM(s) Oral <User Schedule>  omega-3-Acid Ethyl Esters 4 Gram(s) Oral daily  pantoprazole    Tablet 40 milliGRAM(s) Oral before breakfast  predniSONE   Tablet 5 milliGRAM(s) Oral daily  propranolol 20 milliGRAM(s) Oral two times a day  sevelamer hydrochloride 800 milliGRAM(s) Oral every 12 hours  tacrolimus 0.5 milliGRAM(s) Oral every 12 hours  trimethoprim  160 mG/sulfamethoxazole 800 mG 1 Tablet(s) Oral <User Schedule>  warfarin 7 milliGRAM(s) Oral once    PRN Inpatient Medications  docusate sodium 100 milliGRAM(s) Oral daily PRN  HYDROmorphone  Injectable 1 milliGRAM(s) IV Push every 8 hours PRN  senna 2 Tablet(s) Oral at bedtime PRN      REVIEW OF SYSTEMS  --------------------------------------------------------------------------------  Gen: No weight changes, fatigue, fevers/chills, weakness  Head/Eyes/Ears/Mouth: No headache; Normal hearing; Normal vision    Respiratory: No dyspnea, cough, wheezing, hemoptysis  CV: No chest pain, PND, orthopnea  GI: No abdominal pain, diarrhea, constipation, nausea, vomiting, melena, hematochezia  : No increased frequency, dysuria, hematuria, nocturia  MSK: No joint pain/swelling; no back pain; + edema   Heme: No easy bruising or bleeding  All other systems were reviewed and are negative, except as noted.        VITALS/PHYSICAL EXAM  --------------------------------------------------------------------------------  T(C): 36.3 (12-08-18 @ 12:45), Max: 36.9 (12-07-18 @ 19:50)  HR: 73 (12-08-18 @ 12:45) (73 - 85)  BP: 143/79 (12-08-18 @ 12:45) (143/79 - 200/96)  RR: 17 (12-08-18 @ 12:45) (17 - 18)  SpO2: 97% (12-08-18 @ 12:45) (96% - 100%)  Wt(kg): --        12-07-18 @ 07:01  -  12-08-18 @ 07:00  --------------------------------------------------------  IN: 1650 mL / OUT: 2900 mL / NET: -1250 mL    12-08-18 @ 07:01  -  12-08-18 @ 15:49  --------------------------------------------------------  IN: 360 mL / OUT: 0 mL / NET: 360 mL      Physical Exam:  in no apparent distress  Neck: Supple, no JVD,    Lungs: no rhonchi, no wheeze, no crackles  CVS: S1 S2 no M/R/G  Abdomen: no tenderness, no organomegaly, BS present  Neuro: Grossly intact  Skin: warm, dry  Ext: no cyanosis or clubbing, + edema( improved)       LABS/STUDIES  --------------------------------------------------------------------------------    137  |  98  |  73  ----------------------------<  127      [12-08-18 @ 13:21]  6.3   |  21  |  9.65        Ca     9.1     [12-08-18 @ 13:21]      PT/INR: PT 17.7 , INR 1.52       [12-08-18 @ 13:21]  PTT: 34.9       [12-08-18 @ 13:21]      Creatinine Trend:  SCr 9.65 [12-08 @ 13:21]  SCr 8.53 [12-07 @ 17:49]  SCr 9.38 [12-06 @ 09:32]  SCr 7.35 [12-05 @ 13:43]  SCr 7.62 [12-04 @ 09:31]        Iron 24, TIBC 161, %sat 15      [03-20-18 @ 11:31]  Ferritin 250      [03-19-18 @ 20:02]  TSH 6.46      [10-02-18 @ 12:25]    HBsAb 5.8      [12-05-18 @ 16:14]  HBsAg Nonreact      [12-05-18 @ 16:14]  HCV 0.11, Nonreact      [12-05-18 @ 16:14]

## 2018-12-08 NOTE — PROGRESS NOTE ADULT - SUBJECTIVE AND OBJECTIVE BOX
CC: no events    TELEMETRY:     PHYSICAL EXAM:    T(C): 36.6 (12-08-18 @ 08:20), Max: 36.9 (12-07-18 @ 15:35)  HR: 84 (12-08-18 @ 08:20) (75 - 89)  BP: 152/80 (12-08-18 @ 08:20) (151/86 - 200/96)  RR: 18 (12-08-18 @ 08:20) (18 - 18)  SpO2: 97% (12-08-18 @ 08:20) (96% - 100%)  Wt(kg): --  I&O's Summary    07 Dec 2018 07:01  -  08 Dec 2018 07:00  --------------------------------------------------------  IN: 1650 mL / OUT: 2900 mL / NET: -1250 mL        Appearance: Normal	  Cardiovascular: Normal S1 S2,RRR, No JVD, No murmurs  Respiratory: Lungs clear to auscultation	  Gastrointestinal:  Soft, Non-tender, + BS	  Extremities: Normal range of motion, No clubbing, cyanosis or edema  Vascular: Peripheral pulses palpable 2+ bilaterally     LABS:	 	      12-07    136  |  96  |  62<H>  ----------------------------<  102<H>  5.9<H>   |  21<L>  |  8.53<H>    Ca    9.0      07 Dec 2018 17:49        PT/INR - ( 07 Dec 2018 17:49 )   PT: 16.7 sec;   INR: 1.45 ratio         PTT - ( 07 Dec 2018 17:49 )  PTT:29.9 sec    CARDIAC MARKERS:

## 2018-12-09 LAB
ANION GAP SERPL CALC-SCNC: 17 MMOL/L — SIGNIFICANT CHANGE UP (ref 5–17)
APTT BLD: 34.2 SEC — SIGNIFICANT CHANGE UP (ref 27.5–36.3)
BUN SERPL-MCNC: 52 MG/DL — HIGH (ref 7–23)
CALCIUM SERPL-MCNC: 9.3 MG/DL — SIGNIFICANT CHANGE UP (ref 8.4–10.5)
CHLORIDE SERPL-SCNC: 94 MMOL/L — LOW (ref 96–108)
CO2 SERPL-SCNC: 24 MMOL/L — SIGNIFICANT CHANGE UP (ref 22–31)
CREAT SERPL-MCNC: 8.05 MG/DL — HIGH (ref 0.5–1.3)
GLUCOSE SERPL-MCNC: 105 MG/DL — HIGH (ref 70–99)
HCT VFR BLD CALC: 30.3 % — LOW (ref 39–50)
HGB BLD-MCNC: 9.5 G/DL — LOW (ref 13–17)
INR BLD: 1.64 RATIO — HIGH (ref 0.88–1.16)
MCHC RBC-ENTMCNC: 28.3 PG — SIGNIFICANT CHANGE UP (ref 27–34)
MCHC RBC-ENTMCNC: 31.4 GM/DL — LOW (ref 32–36)
MCV RBC AUTO: 90.2 FL — SIGNIFICANT CHANGE UP (ref 80–100)
METANEPHRINE, PL: 31 PG/ML — SIGNIFICANT CHANGE UP (ref 0–62)
NORMETANEPHRINE, PL: 131 PG/ML — SIGNIFICANT CHANGE UP (ref 0–145)
PLATELET # BLD AUTO: 568 K/UL — HIGH (ref 150–400)
POTASSIUM SERPL-MCNC: 5.4 MMOL/L — HIGH (ref 3.5–5.3)
POTASSIUM SERPL-SCNC: 5.4 MMOL/L — HIGH (ref 3.5–5.3)
PROTHROM AB SERPL-ACNC: 18.6 SEC — HIGH (ref 10–13.1)
RBC # BLD: 3.36 M/UL — LOW (ref 4.2–5.8)
RBC # FLD: 15.8 % — HIGH (ref 10.3–14.5)
SODIUM SERPL-SCNC: 135 MMOL/L — SIGNIFICANT CHANGE UP (ref 135–145)
TACROLIMUS SERPL-MCNC: <2 NG/ML — SIGNIFICANT CHANGE UP
WBC # BLD: 8.54 K/UL — SIGNIFICANT CHANGE UP (ref 3.8–10.5)
WBC # FLD AUTO: 8.54 K/UL — SIGNIFICANT CHANGE UP (ref 3.8–10.5)

## 2018-12-09 RX ORDER — WARFARIN SODIUM 2.5 MG/1
8 TABLET ORAL ONCE
Qty: 0 | Refills: 0 | Status: COMPLETED | OUTPATIENT
Start: 2018-12-09 | End: 2018-12-09

## 2018-12-09 RX ADMIN — Medication 81 MILLIGRAM(S): at 11:02

## 2018-12-09 RX ADMIN — SEVELAMER CARBONATE 800 MILLIGRAM(S): 2400 POWDER, FOR SUSPENSION ORAL at 09:43

## 2018-12-09 RX ADMIN — Medication 5 MILLIGRAM(S): at 09:43

## 2018-12-09 RX ADMIN — HYDROMORPHONE HYDROCHLORIDE 1 MILLIGRAM(S): 2 INJECTION INTRAMUSCULAR; INTRAVENOUS; SUBCUTANEOUS at 01:00

## 2018-12-09 RX ADMIN — Medication 60 MILLIGRAM(S): at 05:18

## 2018-12-09 RX ADMIN — Medication 1 PATCH: at 07:00

## 2018-12-09 RX ADMIN — HYDROMORPHONE HYDROCHLORIDE 1 MILLIGRAM(S): 2 INJECTION INTRAMUSCULAR; INTRAVENOUS; SUBCUTANEOUS at 21:17

## 2018-12-09 RX ADMIN — SEVELAMER CARBONATE 800 MILLIGRAM(S): 2400 POWDER, FOR SUSPENSION ORAL at 17:22

## 2018-12-09 RX ADMIN — Medication 60 MILLIGRAM(S): at 17:22

## 2018-12-09 RX ADMIN — HYDROMORPHONE HYDROCHLORIDE 1 MILLIGRAM(S): 2 INJECTION INTRAMUSCULAR; INTRAVENOUS; SUBCUTANEOUS at 22:23

## 2018-12-09 RX ADMIN — WARFARIN SODIUM 8 MILLIGRAM(S): 2.5 TABLET ORAL at 21:09

## 2018-12-09 RX ADMIN — PANTOPRAZOLE SODIUM 40 MILLIGRAM(S): 20 TABLET, DELAYED RELEASE ORAL at 05:18

## 2018-12-09 RX ADMIN — Medication 1 TABLET(S): at 11:02

## 2018-12-09 RX ADMIN — HYDROMORPHONE HYDROCHLORIDE 1 MILLIGRAM(S): 2 INJECTION INTRAMUSCULAR; INTRAVENOUS; SUBCUTANEOUS at 00:14

## 2018-12-09 RX ADMIN — Medication 500 MILLIGRAM(S): at 11:02

## 2018-12-09 RX ADMIN — TACROLIMUS 0.5 MILLIGRAM(S): 5 CAPSULE ORAL at 09:43

## 2018-12-09 RX ADMIN — TACROLIMUS 0.5 MILLIGRAM(S): 5 CAPSULE ORAL at 21:09

## 2018-12-09 RX ADMIN — Medication 160 MILLIGRAM(S): at 09:43

## 2018-12-09 RX ADMIN — Medication 20 MILLIGRAM(S): at 05:18

## 2018-12-09 RX ADMIN — Medication 20 MILLIGRAM(S): at 17:23

## 2018-12-09 RX ADMIN — Medication 1 PATCH: at 20:07

## 2018-12-09 RX ADMIN — Medication 100 MILLIGRAM(S): at 21:09

## 2018-12-09 RX ADMIN — Medication 4 GRAM(S): at 11:02

## 2018-12-09 RX ADMIN — Medication 4 MILLIGRAM(S): at 21:09

## 2018-12-09 NOTE — PROGRESS NOTE ADULT - ASSESSMENT
· Assessment	  Patient is a 58 year old male with HTN, ESRD on HD (history of renal transplant x 2) presents to the ED with worsening shortness of breath 1-2 days admitted for acute respiratory failure with hypoxia in the setting of hypervolemia requiring extra HD session with UF, also with uncontrolled hypertension.      Problem/Plan - 1:  ·  Problem:  HTN:      Nephro f/up noted.   Cont current meds.    ESRD on HD/Hyperkalemia:  HD as per nephro  Nephro f/up     A Fib:  Coumadin/PT/INR

## 2018-12-09 NOTE — PROGRESS NOTE ADULT - SUBJECTIVE AND OBJECTIVE BOX
Patient is a 58y old  Male who presents with a chief complaint of shortness of breath (09 Dec 2018 10:44)      SUBJECTIVE / OVERNIGHT EVENTS:    Events noted.  Feels better.  CONSTITUTIONAL: No fever,  or fatigue  NECK: No pain or stiffness  RESPIRATORY: No cough, wheezing, chills or hemoptysis; No shortness of breath  CARDIOVASCULAR: No chest pain, palpitations, dizziness, or leg swelling  GASTROINTESTINAL: No abdominal or epigastric pain. No nausea, vomiting, or hematemesis; No diarrhea or constipation.   NEUROLOGICAL: No headaches,     MEDICATIONS  (STANDING):  ascorbic acid 500 milliGRAM(s) Oral daily  aspirin enteric coated 81 milliGRAM(s) Oral daily  cloNIDine Patch 0.3 mG/24Hr(s) 1 patch Transdermal every 7 days  doxazosin 4 milliGRAM(s) Oral at bedtime  doxercalciferol Injectable 1 MICROGram(s) IV Push <User Schedule>  epoetin sherlyn Injectable 5000 Unit(s) IV Push <User Schedule>  furosemide    Tablet 160 milliGRAM(s) Oral daily  multivitamin 1 Tablet(s) Oral daily  NIFEdipine XL 60 milliGRAM(s) Oral <User Schedule>  omega-3-Acid Ethyl Esters 4 Gram(s) Oral daily  pantoprazole    Tablet 40 milliGRAM(s) Oral before breakfast  predniSONE   Tablet 5 milliGRAM(s) Oral daily  propranolol 20 milliGRAM(s) Oral two times a day  sevelamer hydrochloride 800 milliGRAM(s) Oral every 12 hours  tacrolimus 0.5 milliGRAM(s) Oral every 12 hours  trimethoprim  160 mG/sulfamethoxazole 800 mG 1 Tablet(s) Oral <User Schedule>    MEDICATIONS  (PRN):  docusate sodium 100 milliGRAM(s) Oral daily PRN Constipation  HYDROmorphone  Injectable 1 milliGRAM(s) IV Push every 8 hours PRN Moderate Pain (4 - 6)  senna 2 Tablet(s) Oral at bedtime PRN Constipation        CAPILLARY BLOOD GLUCOSE        I&O's Summary    08 Dec 2018 07:01  -  09 Dec 2018 07:00  --------------------------------------------------------  IN: 610 mL / OUT: 1600 mL / NET: -990 mL    09 Dec 2018 07:01  -  09 Dec 2018 22:34  --------------------------------------------------------  IN: 320 mL / OUT: 0 mL / NET: 320 mL        PHYSICAL EXAM:  GENERAL: NAD  NECK: Supple, No JVD  CHEST/LUNG: Clear to auscultation bilaterally; No wheezing.  HEART: Regular rate and rhythm; No murmurs, rubs, or gallops  ABDOMEN: Soft, Nontender, Nondistended; Bowel sounds present  EXTREMITIES:   No clubbing, cyanosis, or edema  NEUROLOGY: AAO X 3      LABS:                        9.5    8.54  )-----------( 568      ( 09 Dec 2018 12:16 )             30.3     12-09    135  |  94<L>  |  52<H>  ----------------------------<  105<H>  5.4<H>   |  24  |  8.05<H>    Ca    9.3      09 Dec 2018 09:02      PT/INR - ( 09 Dec 2018 12:16 )   PT: 18.6 sec;   INR: 1.64 ratio         PTT - ( 09 Dec 2018 12:16 )  PTT:34.2 sec        CAPILLARY BLOOD GLUCOSE                    RADIOLOGY & ADDITIONAL TESTS:    Imaging Personally Reviewed:    Consultant(s) Notes Reviewed:      Care Discussed with Consultants/Other Providers:

## 2018-12-09 NOTE — PROGRESS NOTE ADULT - SUBJECTIVE AND OBJECTIVE BOX
CC: no events    TELEMETRY:     PHYSICAL EXAM:    T(C): 36.8 (12-09-18 @ 07:56), Max: 37.1 (12-08-18 @ 22:05)  HR: 80 (12-09-18 @ 07:56) (73 - 88)  BP: 146/78 (12-09-18 @ 07:56) (135/81 - 165/90)  RR: 18 (12-09-18 @ 07:56) (17 - 18)  SpO2: 96% (12-09-18 @ 07:56) (96% - 100%)  Wt(kg): --  I&O's Summary    08 Dec 2018 07:01  -  09 Dec 2018 07:00  --------------------------------------------------------  IN: 610 mL / OUT: 1600 mL / NET: -990 mL        Appearance: Normal	  Cardiovascular: Normal S1 S2,RRR, No JVD, No murmurs  Respiratory: Lungs clear to auscultation	  Gastrointestinal:  Soft, Non-tender, + BS	  Extremities: Normal range of motion, No clubbing, cyanosis or edema  Vascular: Peripheral pulses palpable 2+ bilaterally     LABS:	 	                          9.8    8.45  )-----------( 571      ( 08 Dec 2018 15:50 )             31.3     12-09    135  |  94<L>  |  52<H>  ----------------------------<  105<H>  5.4<H>   |  24  |  8.05<H>    Ca    9.3      09 Dec 2018 09:02        PT/INR - ( 08 Dec 2018 13:21 )   PT: 17.7 sec;   INR: 1.52 ratio         PTT - ( 08 Dec 2018 13:21 )  PTT:34.9 sec    CARDIAC MARKERS:

## 2018-12-10 ENCOUNTER — TRANSCRIPTION ENCOUNTER (OUTPATIENT)
Age: 58
End: 2018-12-10

## 2018-12-10 VITALS
HEART RATE: 87 BPM | TEMPERATURE: 98 F | RESPIRATION RATE: 18 BRPM | DIASTOLIC BLOOD PRESSURE: 76 MMHG | OXYGEN SATURATION: 97 % | SYSTOLIC BLOOD PRESSURE: 142 MMHG

## 2018-12-10 LAB
ANION GAP SERPL CALC-SCNC: 20 MMOL/L — HIGH (ref 5–17)
BUN SERPL-MCNC: 66 MG/DL — HIGH (ref 7–23)
CALCIUM SERPL-MCNC: 9.6 MG/DL — SIGNIFICANT CHANGE UP (ref 8.4–10.5)
CHLORIDE SERPL-SCNC: 96 MMOL/L — SIGNIFICANT CHANGE UP (ref 96–108)
CO2 SERPL-SCNC: 21 MMOL/L — LOW (ref 22–31)
CREAT SERPL-MCNC: 9.85 MG/DL — HIGH (ref 0.5–1.3)
GLUCOSE SERPL-MCNC: 104 MG/DL — HIGH (ref 70–99)
HCT VFR BLD CALC: 29.6 % — LOW (ref 39–50)
HGB BLD-MCNC: 9.3 G/DL — LOW (ref 13–17)
INR BLD: 1.74 RATIO — HIGH (ref 0.88–1.16)
MCHC RBC-ENTMCNC: 28.3 PG — SIGNIFICANT CHANGE UP (ref 27–34)
MCHC RBC-ENTMCNC: 31.4 GM/DL — LOW (ref 32–36)
MCV RBC AUTO: 90 FL — SIGNIFICANT CHANGE UP (ref 80–100)
PLATELET # BLD AUTO: 583 K/UL — HIGH (ref 150–400)
POTASSIUM SERPL-MCNC: 5.8 MMOL/L — HIGH (ref 3.5–5.3)
POTASSIUM SERPL-SCNC: 5.8 MMOL/L — HIGH (ref 3.5–5.3)
PROTHROM AB SERPL-ACNC: 20.2 SEC — HIGH (ref 10–13.1)
RBC # BLD: 3.29 M/UL — LOW (ref 4.2–5.8)
RBC # FLD: 15.9 % — HIGH (ref 10.3–14.5)
SODIUM SERPL-SCNC: 137 MMOL/L — SIGNIFICANT CHANGE UP (ref 135–145)
TACROLIMUS SERPL-MCNC: <2 NG/ML — SIGNIFICANT CHANGE UP
TACROLIMUS SERPL-MCNC: <2 NG/ML — SIGNIFICANT CHANGE UP
WBC # BLD: 12.03 K/UL — HIGH (ref 3.8–10.5)
WBC # FLD AUTO: 12.03 K/UL — HIGH (ref 3.8–10.5)

## 2018-12-10 PROCEDURE — 85730 THROMBOPLASTIN TIME PARTIAL: CPT

## 2018-12-10 PROCEDURE — 71046 X-RAY EXAM CHEST 2 VIEWS: CPT

## 2018-12-10 PROCEDURE — 87798 DETECT AGENT NOS DNA AMP: CPT

## 2018-12-10 PROCEDURE — 83835 ASSAY OF METANEPHRINES: CPT

## 2018-12-10 PROCEDURE — 82088 ASSAY OF ALDOSTERONE: CPT

## 2018-12-10 PROCEDURE — 87486 CHLMYD PNEUM DNA AMP PROBE: CPT

## 2018-12-10 PROCEDURE — 83605 ASSAY OF LACTIC ACID: CPT

## 2018-12-10 PROCEDURE — 87340 HEPATITIS B SURFACE AG IA: CPT

## 2018-12-10 PROCEDURE — 76776 US EXAM K TRANSPL W/DOPPLER: CPT

## 2018-12-10 PROCEDURE — 82435 ASSAY OF BLOOD CHLORIDE: CPT

## 2018-12-10 PROCEDURE — 99261: CPT

## 2018-12-10 PROCEDURE — 82803 BLOOD GASES ANY COMBINATION: CPT

## 2018-12-10 PROCEDURE — 87581 M.PNEUMON DNA AMP PROBE: CPT

## 2018-12-10 PROCEDURE — 82330 ASSAY OF CALCIUM: CPT

## 2018-12-10 PROCEDURE — 80053 COMPREHEN METABOLIC PANEL: CPT

## 2018-12-10 PROCEDURE — 82947 ASSAY GLUCOSE BLOOD QUANT: CPT

## 2018-12-10 PROCEDURE — 86803 HEPATITIS C AB TEST: CPT

## 2018-12-10 PROCEDURE — 85610 PROTHROMBIN TIME: CPT

## 2018-12-10 PROCEDURE — 93005 ELECTROCARDIOGRAM TRACING: CPT

## 2018-12-10 PROCEDURE — 85014 HEMATOCRIT: CPT

## 2018-12-10 PROCEDURE — 86706 HEP B SURFACE ANTIBODY: CPT

## 2018-12-10 PROCEDURE — 80048 BASIC METABOLIC PNL TOTAL CA: CPT

## 2018-12-10 PROCEDURE — 85027 COMPLETE CBC AUTOMATED: CPT

## 2018-12-10 PROCEDURE — 99285 EMERGENCY DEPT VISIT HI MDM: CPT | Mod: 25

## 2018-12-10 PROCEDURE — 87633 RESP VIRUS 12-25 TARGETS: CPT

## 2018-12-10 PROCEDURE — 82085 ASSAY OF ALDOLASE: CPT

## 2018-12-10 PROCEDURE — 84295 ASSAY OF SERUM SODIUM: CPT

## 2018-12-10 PROCEDURE — 83880 ASSAY OF NATRIURETIC PEPTIDE: CPT

## 2018-12-10 PROCEDURE — 84484 ASSAY OF TROPONIN QUANT: CPT

## 2018-12-10 PROCEDURE — 90935 HEMODIALYSIS ONE EVALUATION: CPT | Mod: GC

## 2018-12-10 PROCEDURE — 80197 ASSAY OF TACROLIMUS: CPT

## 2018-12-10 PROCEDURE — 84132 ASSAY OF SERUM POTASSIUM: CPT

## 2018-12-10 PROCEDURE — 83735 ASSAY OF MAGNESIUM: CPT

## 2018-12-10 RX ORDER — BUTALBITAL/ACETAMINOPHEN 50MG-650MG
1 TABLET ORAL
Qty: 0 | Refills: 0 | COMMUNITY

## 2018-12-10 RX ORDER — OXYCODONE HYDROCHLORIDE 5 MG/1
1 TABLET ORAL
Qty: 12 | Refills: 0 | OUTPATIENT
Start: 2018-12-10 | End: 2018-12-12

## 2018-12-10 RX ORDER — WARFARIN SODIUM 2.5 MG/1
2 TABLET ORAL
Qty: 14 | Refills: 0 | OUTPATIENT
Start: 2018-12-10 | End: 2018-12-16

## 2018-12-10 RX ORDER — UBIDECARENONE 100 MG
1 CAPSULE ORAL
Qty: 0 | Refills: 0 | COMMUNITY

## 2018-12-10 RX ORDER — NIFEDIPINE 30 MG
1 TABLET, EXTENDED RELEASE 24 HR ORAL
Qty: 60 | Refills: 0 | OUTPATIENT
Start: 2018-12-10 | End: 2019-01-08

## 2018-12-10 RX ORDER — PROPRANOLOL HCL 160 MG
1 CAPSULE, EXTENDED RELEASE 24HR ORAL
Qty: 60 | Refills: 0 | OUTPATIENT
Start: 2018-12-10 | End: 2019-01-08

## 2018-12-10 RX ORDER — DOCUSATE SODIUM 100 MG
1 CAPSULE ORAL
Qty: 30 | Refills: 0 | OUTPATIENT
Start: 2018-12-10 | End: 2019-01-08

## 2018-12-10 RX ORDER — FUROSEMIDE 40 MG
2 TABLET ORAL
Qty: 60 | Refills: 0 | OUTPATIENT
Start: 2018-12-10 | End: 2019-01-08

## 2018-12-10 RX ORDER — FUROSEMIDE 40 MG
1 TABLET ORAL
Qty: 0 | Refills: 0 | COMMUNITY

## 2018-12-10 RX ORDER — DOXAZOSIN MESYLATE 4 MG
1 TABLET ORAL
Qty: 30 | Refills: 0 | OUTPATIENT
Start: 2018-12-10 | End: 2019-01-08

## 2018-12-10 RX ORDER — SENNA PLUS 8.6 MG/1
2 TABLET ORAL
Qty: 60 | Refills: 0 | OUTPATIENT
Start: 2018-12-10 | End: 2019-01-08

## 2018-12-10 RX ADMIN — Medication 500 MILLIGRAM(S): at 12:13

## 2018-12-10 RX ADMIN — Medication 60 MILLIGRAM(S): at 05:39

## 2018-12-10 RX ADMIN — Medication 1 TABLET(S): at 12:13

## 2018-12-10 RX ADMIN — Medication 160 MILLIGRAM(S): at 12:13

## 2018-12-10 RX ADMIN — Medication 5 MILLIGRAM(S): at 12:12

## 2018-12-10 RX ADMIN — Medication 1 TABLET(S): at 12:12

## 2018-12-10 RX ADMIN — Medication 20 MILLIGRAM(S): at 05:39

## 2018-12-10 RX ADMIN — Medication 4 GRAM(S): at 12:17

## 2018-12-10 RX ADMIN — Medication 60 MILLIGRAM(S): at 16:13

## 2018-12-10 RX ADMIN — HYDROMORPHONE HYDROCHLORIDE 1 MILLIGRAM(S): 2 INJECTION INTRAMUSCULAR; INTRAVENOUS; SUBCUTANEOUS at 13:00

## 2018-12-10 RX ADMIN — TACROLIMUS 0.5 MILLIGRAM(S): 5 CAPSULE ORAL at 09:55

## 2018-12-10 RX ADMIN — SEVELAMER CARBONATE 800 MILLIGRAM(S): 2400 POWDER, FOR SUSPENSION ORAL at 12:13

## 2018-12-10 RX ADMIN — Medication 81 MILLIGRAM(S): at 12:12

## 2018-12-10 RX ADMIN — Medication 20 MILLIGRAM(S): at 16:13

## 2018-12-10 RX ADMIN — Medication 100 MILLIGRAM(S): at 12:30

## 2018-12-10 RX ADMIN — PANTOPRAZOLE SODIUM 40 MILLIGRAM(S): 20 TABLET, DELAYED RELEASE ORAL at 05:39

## 2018-12-10 RX ADMIN — Medication 1 PATCH: at 07:20

## 2018-12-10 RX ADMIN — HYDROMORPHONE HYDROCHLORIDE 1 MILLIGRAM(S): 2 INJECTION INTRAMUSCULAR; INTRAVENOUS; SUBCUTANEOUS at 12:14

## 2018-12-10 NOTE — PROGRESS NOTE ADULT - REASON FOR ADMISSION

## 2018-12-10 NOTE — PROGRESS NOTE ADULT - SUBJECTIVE AND OBJECTIVE BOX
[facility] DIVISION OF KIDNEY DISEASES AND HYPERTENSION -- HEMODIALYSIS NOTE  --------------------------------------------------------------------------------  Chief Complaint: ESRD/Ongoing hemodialysis requirement    24 hour events/subjective:  Pt examined during PUF session, tolerating well.  No acute complaint.        PAST HISTORY  --------------------------------------------------------------------------------  No significant changes to PMH, PSH, FHx, SHx, unless otherwise noted    ALLERGIES & MEDICATIONS  --------------------------------------------------------------------------------  Allergies    No Known Allergies    Intolerances    beta blockers (Other (Mod to Severe))  hydrALAZINE (Short breath (Mild to Mod))    Standing Inpatient Medications  ascorbic acid 500 milliGRAM(s) Oral daily  aspirin enteric coated 81 milliGRAM(s) Oral daily  cloNIDine Patch 0.3 mG/24Hr(s) 1 patch Transdermal every 7 days  doxazosin 4 milliGRAM(s) Oral at bedtime  doxercalciferol Injectable 1 MICROGram(s) IV Push <User Schedule>  epoetin sherlyn Injectable 5000 Unit(s) IV Push <User Schedule>  furosemide    Tablet 160 milliGRAM(s) Oral daily  multivitamin 1 Tablet(s) Oral daily  NIFEdipine XL 60 milliGRAM(s) Oral <User Schedule>  omega-3-Acid Ethyl Esters 4 Gram(s) Oral daily  pantoprazole    Tablet 40 milliGRAM(s) Oral before breakfast  predniSONE   Tablet 5 milliGRAM(s) Oral daily  propranolol 20 milliGRAM(s) Oral two times a day  sevelamer hydrochloride 800 milliGRAM(s) Oral every 12 hours  tacrolimus 0.5 milliGRAM(s) Oral every 12 hours  trimethoprim  160 mG/sulfamethoxazole 800 mG 1 Tablet(s) Oral <User Schedule>    PRN Inpatient Medications  docusate sodium 100 milliGRAM(s) Oral daily PRN  HYDROmorphone  Injectable 1 milliGRAM(s) IV Push every 8 hours PRN  senna 2 Tablet(s) Oral at bedtime PRN      REVIEW OF SYSTEMS  --------------------------------------------------------------------------------  Gen: No weight changes, fatigue, fevers/chills, weakness  Skin: No rashes  Head/Eyes/Ears/Mouth: No headache; Normal hearing; Normal vision w/o blurriness; No sinus pain/discomfort, sore throat  Respiratory: No dyspnea, cough, wheezing, hemoptysis  CV: No chest pain, PND, orthopnea  GI: No abdominal pain, diarrhea, constipation, nausea, vomiting, melena, hematochezia  : No increased frequency, dysuria, hematuria, nocturia  MSK: No joint pain/swelling; no back pain; no edema  Neuro: No dizziness/lightheadedness, weakness, seizures, numbness, tingling  Heme: No easy bruising or bleeding  Endo: No heat/cold intolerance  Psych: No significant nervousness, anxiety, stress, depression    All other systems were reviewed and are negative, except as noted.    VITALS/PHYSICAL EXAM  --------------------------------------------------------------------------------  T(C): 36.9 (12-10-18 @ 12:27), Max: 37.4 (12-10-18 @ 07:28)  HR: 83 (12-10-18 @ 12:27) (76 - 88)  BP: 154/83 (12-10-18 @ 12:27) (130/78 - 177/93)  RR: 17 (12-10-18 @ 12:27) (17 - 18)  SpO2: 97% (12-10-18 @ 12:27) (96% - 100%)  Wt(kg): --        12-09-18 @ 07:01  -  12-10-18 @ 07:00  --------------------------------------------------------  IN: 320 mL / OUT: 0 mL / NET: 320 mL    12-10-18 @ 07:01  -  12-10-18 @ 15:22  --------------------------------------------------------  IN: 740 mL / OUT: 3300 mL / NET: -2560 mL      Physical Exam:  	Gen: NAD, well-appearing  	Pulm: CTA B/L  	CV: RRR, S1S2; no rub  	Back: No spinal or CVA tenderness; no sacral edema  	Abd: +BS, soft, nontender/nondistended  	: No suprapubic tenderness  	UE: Warm,  no edema; no asterixis  	LE: Warm, ++ edema  	Neuro: awake and alert  	Psych: Normal affect and mood  	Skin: Warm, without rashes  	Vascular access: Rt IJ tunneled HD catheter       LABS/STUDIES  --------------------------------------------------------------------------------              9.3    12.03 >-----------<  583      [12-10-18 @ 11:11]              29.6     137  |  96  |  66  ----------------------------<  104      [12-10-18 @ 09:22]  5.8   |  21  |  9.85        Ca     9.6     [12-10-18 @ 09:22]      PT/INR: PT 20.2 , INR 1.74       [12-10-18 @ 11:11]  PTT: 34.2       [12-09-18 @ 12:16]      Iron 24, TIBC 161, %sat 15      [03-20-18 @ 11:31]  Ferritin 250      [03-19-18 @ 20:02]  TSH 6.46      [10-02-18 @ 12:25]    HBsAb 5.8      [12-05-18 @ 16:14]  HBsAg Nonreact      [12-05-18 @ 16:14]  HCV 0.11, Nonreact      [12-05-18 @ 16:14]

## 2018-12-10 NOTE — PROGRESS NOTE ADULT - ATTENDING COMMENTS
59 yo M with PMHx of ESRD on HD and history of two kidney transplants admitted for acute SOB and uncontrolled HTN. Pt has been having extra UF session as an outpatient at [facility] dialysis unit without resolution of his hypervolemia and uncontrolled HTN.    ESRD:   Maintain on the current HD prescription  3 hrs,  cc/min  UF : 2.5 kg  Maintain on MERYL  BP/vol: Improving  Next HD on monday   maintain on BP meds
Agree with above NP note.  cv stable  bp better but still elevated  K improved, titrate ARB
Agree with above NP note.  cv stable  bp better but still elevated  cont arb for now   recheck k in am post hd  if remains elevated will likely need to d/c arb  will d.w renal
Agree with above NP note.  cv stable  bp improved  cont current tx
Agree with above NP note.  cv stable  bp improved but still elevated  cont current meds  losartan daily with close monitoring of his k+ as he has limited options of anti-htn agents that he tolerates
agree with above NP note.  cv stable  sbp improved with volume removal and nifedipine  patient responded well to dose of cozaar  k still remains borderline elevated  trend bp  will poss start low dose cozaar,maybe 12.5 or maybe only on non hd days with close K monitoring   cont bb for paf  a/c per inr
Agree with above NP note.  cv stable  bp better but still elevated  increase cardura
agree with above NP note.  cv stable  sbp still elevated  bp meds continue to be titrated
Agree with above NP note.  cv stable  cont current anti-htn meds as ordered
Seen on HD.  Tolerating  1.  ESRD--HD TIW  2.  Hypertension--volume, med optimize  3.  Renal transplant hx--trend prograf to prevent fulminant rejection

## 2018-12-10 NOTE — DISCHARGE NOTE ADULT - ADDITIONAL INSTRUCTIONS
Follow up with your nephrologist, cardiologist and PMD within 5 days discharge. Follow up with your nephrologist, cardiologist and PMD within 5 days discharge.  Repeat INR in 2 days with PMD/cardiologist  Report to home HD site as schedule  Follow up with transplant doctor within 3 days of discharge

## 2018-12-10 NOTE — PROGRESS NOTE ADULT - PROBLEM SELECTOR PLAN 1
Pt having HD alternating with PUF every day.   Scheduled for PUF today. Aim for 2.5 kg UF as tolerated. Pt having HD alternating with PUF every day.   Scheduled for PUF today. Aim for 2.5 kg UF as tolerated.  Elevated K noted: would hold PM dose of tacrolimus today as well as AM dose in am   Please change diet to renal diet   Give extra dose of lasix today   Schedule for HD in am   Would d/c tomorrow after dialysis

## 2018-12-10 NOTE — DISCHARGE NOTE ADULT - MEDICATION SUMMARY - MEDICATIONS TO STOP TAKING
I will STOP taking the medications listed below when I get home from the hospital:    butalbital-acetaminophen 50 mg-325 mg oral capsule  -- Butalbital-acetaminophen-caffeine1 tab(s) by mouth once a day    Cardizem  mg/24 hours oral tablet, extended release  -- 1 tab(s) by mouth once a day   -- It is very important that you take or use this exactly as directed.  Do not skip doses or discontinue unless directed by your doctor.  Some non-prescription drugs may aggravate your condition.  Read all labels carefully.  If a warning appears, check with your doctor before taking.  Swallow whole.  Do not crush.

## 2018-12-10 NOTE — PROGRESS NOTE ADULT - PROVIDER SPECIALTY LIST ADULT
Cardiology
Internal Medicine
Nephrology
Internal Medicine

## 2018-12-10 NOTE — PROGRESS NOTE ADULT - SUBJECTIVE AND OBJECTIVE BOX
Patient is a 58y old  Male who presents with a chief complaint of shortness of breath (10 Dec 2018 15:22)      SUBJECTIVE / OVERNIGHT EVENTS:    Events noted.  Feels better.  CONSTITUTIONAL: No fever,  or fatigue  NECK: No pain or stiffness  RESPIRATORY: No cough, wheezing, chills or hemoptysis; No shortness of breath  CARDIOVASCULAR: No chest pain, palpitations, dizziness, or leg swelling  GASTROINTESTINAL: No abdominal or epigastric pain. No nausea, vomiting, or hematemesis; No diarrhea or constipation.   NEUROLOGICAL: No headaches,     MEDICATIONS  (STANDING):  ascorbic acid 500 milliGRAM(s) Oral daily  aspirin enteric coated 81 milliGRAM(s) Oral daily  cloNIDine Patch 0.3 mG/24Hr(s) 1 patch Transdermal every 7 days  doxazosin 4 milliGRAM(s) Oral at bedtime  doxercalciferol Injectable 1 MICROGram(s) IV Push <User Schedule>  epoetin sherlyn Injectable 5000 Unit(s) IV Push <User Schedule>  furosemide    Tablet 160 milliGRAM(s) Oral daily  multivitamin 1 Tablet(s) Oral daily  NIFEdipine XL 60 milliGRAM(s) Oral <User Schedule>  omega-3-Acid Ethyl Esters 4 Gram(s) Oral daily  pantoprazole    Tablet 40 milliGRAM(s) Oral before breakfast  predniSONE   Tablet 5 milliGRAM(s) Oral daily  propranolol 20 milliGRAM(s) Oral two times a day  sevelamer hydrochloride 800 milliGRAM(s) Oral every 12 hours  tacrolimus 0.5 milliGRAM(s) Oral every 12 hours  trimethoprim  160 mG/sulfamethoxazole 800 mG 1 Tablet(s) Oral <User Schedule>    MEDICATIONS  (PRN):  docusate sodium 100 milliGRAM(s) Oral daily PRN Constipation  HYDROmorphone  Injectable 1 milliGRAM(s) IV Push every 8 hours PRN Moderate Pain (4 - 6)  senna 2 Tablet(s) Oral at bedtime PRN Constipation        CAPILLARY BLOOD GLUCOSE        I&O's Summary    09 Dec 2018 07:01  -  10 Dec 2018 07:00  --------------------------------------------------------  IN: 320 mL / OUT: 0 mL / NET: 320 mL    10 Dec 2018 07:01  -  10 Dec 2018 22:14  --------------------------------------------------------  IN: 740 mL / OUT: 3300 mL / NET: -2560 mL        PHYSICAL EXAM:  GENERAL: NAD  NECK: Supple, No JVD  CHEST/LUNG: Clear to auscultation bilaterally; No wheezing.  HEART: Regular rate and rhythm; No murmurs, rubs, or gallops  ABDOMEN: Soft, Nontender, Nondistended; Bowel sounds present  EXTREMITIES:   No clubbing, cyanosis, or edema  NEUROLOGY: AAO X 3      LABS:                        9.3    12.03 )-----------( 583      ( 10 Dec 2018 11:11 )             29.6     12-10    137  |  96  |  66<H>  ----------------------------<  104<H>  5.8<H>   |  21<L>  |  9.85<H>    Ca    9.6      10 Dec 2018 09:22      PT/INR - ( 10 Dec 2018 11:11 )   PT: 20.2 sec;   INR: 1.74 ratio         PTT - ( 09 Dec 2018 12:16 )  PTT:34.2 sec        CAPILLARY BLOOD GLUCOSE                    RADIOLOGY & ADDITIONAL TESTS:    Imaging Personally Reviewed:    Consultant(s) Notes Reviewed:      Care Discussed with Consultants/Other Providers:

## 2018-12-10 NOTE — PROGRESS NOTE ADULT - SUBJECTIVE AND OBJECTIVE BOX
CARDIOLOGY FOLLOW UP - Dr. Real    CC no cp/sob  seen in dialysis resting comfortably, BP in dialysis improved       PHYSICAL EXAM:  T(C): 37 (12-10-18 @ 08:55), Max: 37.4 (12-10-18 @ 07:28)  HR: 80 (12-10-18 @ 08:55) (76 - 88)  BP: 174/101 (12-10-18 @ 08:55) (130/78 - 177/93)  RR: 18 (12-10-18 @ 08:55) (18 - 18)  SpO2: 100% (12-10-18 @ 08:55) (96% - 100%)  Wt(kg): --  I&O's Summary    09 Dec 2018 07:01  -  10 Dec 2018 07:00  --------------------------------------------------------  IN: 320 mL / OUT: 0 mL / NET: 320 mL    10 Dec 2018 07:01  -  10 Dec 2018 10:51  --------------------------------------------------------  IN: 240 mL / OUT: 0 mL / NET: 240 mL        Appearance: Normal	  Cardiovascular: Normal S1 S2,RRR, No JVD, No murmurs  Respiratory: Lungs clear to auscultation	  Gastrointestinal:  Soft, Non-tender, + BS	  Extremities: Normal range of motion, No clubbing, cyanosis or edema        MEDICATIONS  (STANDING):  ascorbic acid 500 milliGRAM(s) Oral daily  aspirin enteric coated 81 milliGRAM(s) Oral daily  cloNIDine Patch 0.3 mG/24Hr(s) 1 patch Transdermal every 7 days  doxazosin 4 milliGRAM(s) Oral at bedtime  doxercalciferol Injectable 1 MICROGram(s) IV Push <User Schedule>  epoetin sherlyn Injectable 5000 Unit(s) IV Push <User Schedule>  furosemide    Tablet 160 milliGRAM(s) Oral daily  multivitamin 1 Tablet(s) Oral daily  NIFEdipine XL 60 milliGRAM(s) Oral <User Schedule>  omega-3-Acid Ethyl Esters 4 Gram(s) Oral daily  pantoprazole    Tablet 40 milliGRAM(s) Oral before breakfast  predniSONE   Tablet 5 milliGRAM(s) Oral daily  propranolol 20 milliGRAM(s) Oral two times a day  sevelamer hydrochloride 800 milliGRAM(s) Oral every 12 hours  tacrolimus 0.5 milliGRAM(s) Oral every 12 hours  trimethoprim  160 mG/sulfamethoxazole 800 mG 1 Tablet(s) Oral <User Schedule>      TELEMETRY: 	    ECG:  	  RADIOLOGY:   DIAGNOSTIC TESTING:  [ ] Echocardiogram:  [ ]  Catheterization:  [ ] Stress Test:    OTHER: 	    LABS:	 	                                9.5    8.54  )-----------( 568      ( 09 Dec 2018 12:16 )             30.3     12-10    137  |  96  |  66<H>  ----------------------------<  104<H>  5.8<H>   |  21<L>  |  9.85<H>    Ca    9.6      10 Dec 2018 09:22      PT/INR - ( 09 Dec 2018 12:16 )   PT: 18.6 sec;   INR: 1.64 ratio         PTT - ( 09 Dec 2018 12:16 )  PTT:34.2 sec

## 2018-12-10 NOTE — PROGRESS NOTE ADULT - PROBLEM SELECTOR PLAN 2
Maintain on present medications.  However there is certainly a large component of volume - mediated hypertension.  BP improved with intense UF and HD.     Transplant renal doppler negative.   Plasma metanephrines negative  Plasma aldosterone is elevated further investigation should be done. Primary carlos state should be r/o. Usually pts on HD have low carlos due to high volume. Maintain on present medications.  However there is certainly a large component of volume - mediated hypertension.  BP improved with intense UF and HD.     Transplant renal doppler negative.   Plasma metanephrines negative  Plasma aldosterone is elevated likely as a result of High K .

## 2018-12-10 NOTE — DISCHARGE NOTE ADULT - SECONDARY DIAGNOSIS.
ESRD (end stage renal disease) Hypervolemia, unspecified hypervolemia type Uncontrolled hypertension

## 2018-12-10 NOTE — DISCHARGE NOTE ADULT - PATIENT PORTAL LINK FT
You can access the [facility] Patient Portal, offered by [facility], by registering with the following website: [email or web address]

## 2018-12-10 NOTE — DISCHARGE NOTE ADULT - MEDICATION SUMMARY - MEDICATIONS TO CHANGE
I will SWITCH the dose or number of times a day I take the medications listed below when I get home from the hospital:    Lasix 80 mg oral tablet  -- 1 tab(s) by mouth once a day I will SWITCH the dose or number of times a day I take the medications listed below when I get home from the hospital:    Lasix 80 mg oral tablet  -- 1 tab(s) by mouth once a day    warfarin 5 mg oral tablet  -- 1 tab(s) by mouth once a day (at bedtime)

## 2018-12-10 NOTE — PROGRESS NOTE ADULT - ASSESSMENT
57 yo M with PMHx of ESRD on HD and history of two kidney transplants admitted for acute SOB and uncontrolled HTN. Pt has been having extra UF session as an outpatient at [facility] dialysis unit without resolution of his hypervolemia and uncontrolled HTN.

## 2018-12-10 NOTE — DISCHARGE NOTE ADULT - CARE PLAN
Principal Discharge DX:	Dyspnea  Goal:	resolved  Assessment and plan of treatment:	Follow up with your cardiologist  Secondary Diagnosis:	ESRD (end stage renal disease)  Goal:	continue HD at your home site  Assessment and plan of treatment:	Follow up with your nephrologist  Secondary Diagnosis:	Hypervolemia, unspecified hypervolemia type  Secondary Diagnosis:	Uncontrolled hypertension Principal Discharge DX:	Dyspnea  Goal:	resolved  Assessment and plan of treatment:	Follow up with your cardiologist  Secondary Diagnosis:	ESRD (end stage renal disease)  Goal:	continue HD at your home site  Assessment and plan of treatment:	Follow up with your nephrologist  Secondary Diagnosis:	Hypervolemia, unspecified hypervolemia type  Secondary Diagnosis:	Uncontrolled hypertension  Goal:	maintain control

## 2018-12-10 NOTE — DISCHARGE NOTE ADULT - MEDICATION SUMMARY - MEDICATIONS TO TAKE
I will START or STAY ON the medications listed below when I get home from the hospital:    predniSONE 5 mg oral tablet  -- 1 tab(s) by mouth once a day (in the morning)  -- Indication: For Kidney transplant recipient    Aspir 81 oral delayed release tablet  -- 1 tab(s) by mouth once a day  -- Indication: For cad    oxyCODONE 5 mg oral tablet  -- 1 tab(s) by mouth every 6 hours, As Needed  -for severe pain after dialysis (Tues, Thurs & Sat) MDD:4  -- Caution federal law prohibits the transfer of this drug to any person other  than the person for whom it was prescribed.  It is very important that you take or use this exactly as directed.  Do not skip doses or discontinue unless directed by your doctor.  May cause drowsiness.  Alcohol may intensify this effect.  Use care when operating dangerous machinery.  This prescription cannot be refilled.  Using more of this medication than prescribed may cause serious breathing problems.    -- Indication: For pain    cloNIDine 0.3 mg/24 hr transdermal film, extended release  -- 1 patch by transdermal patch once a week on Saturdays  -- Indication: For Hypertension, unspecified type    doxazosin 4 mg oral tablet  -- 1 tab(s) by mouth once a day (at bedtime)  -- Indication: For Hypertension, unspecified type    propranolol 20 mg oral tablet  -- 1 tab(s) by mouth 2 times a day  -- Indication: For Hypertension, unspecified type    warfarin 5 mg oral tablet  -- 1 tab(s) by mouth once a day (at bedtime)   -- Indication: For Atrial fibrillation, chronic    NIFEdipine 60 mg oral tablet, extended release  -- 1 tab(s) by mouth 2 times a day   -- Indication: For Hypertension, unspecified type    furosemide 80 mg oral tablet  -- 2 tab(s) by mouth once a day  -- Indication: For CHF    tacrolimus 0.5 mg oral capsule  -- 1 cap(s) by mouth every 12 hours  -- Indication: For Kidney transplant recipient    docusate sodium 100 mg oral capsule  -- 1 cap(s) by mouth once a day, As needed, Constipation  -- Indication: For CONSTIPATION    senna oral tablet  -- 2 tab(s) by mouth once a day (at bedtime), As needed, Constipation  -- Indication: For CONSTIPATION    Omega-3  -- 1 cap(s) by mouth once a day  -- Indication: For SUPPLEMENT    Renvela 800 mg oral tablet  -- 1 tab(s) by mouth 2 times a day  -- Indication: For ESRD (end stage renal disease)    pantoprazole 40 mg oral delayed release tablet  -- 1 tab(s) by mouth once a day  -- Indication: For Gastroesophageal reflux disease with esophagitis    Bactrim  mg-160 mg oral tablet  -- 1 tab(s) by mouth 3 times a week (M, W, F)  -- Indication: For Kidney transplant recipient    Nephrocaps oral capsule  -- 1 cap(s) by mouth once a day  -- Indication: For SUPPLEMENT    ascorbic acid 500 mg oral tablet  -- 1 tab(s) by mouth once a day  -- Indication: For SUPPLEMENT I will START or STAY ON the medications listed below when I get home from the hospital:    predniSONE 5 mg oral tablet  -- 1 tab(s) by mouth once a day (in the morning)  -- Indication: For Kidney transplant recipient    Aspir 81 oral delayed release tablet  -- 1 tab(s) by mouth once a day  -- Indication: For cad    oxyCODONE 5 mg oral tablet  -- 1 tab(s) by mouth every 6 hours, As Needed  -for severe pain after dialysis (Tues, Thurs & Sat) MDD:4  -- Caution federal law prohibits the transfer of this drug to any person other  than the person for whom it was prescribed.  It is very important that you take or use this exactly as directed.  Do not skip doses or discontinue unless directed by your doctor.  May cause drowsiness.  Alcohol may intensify this effect.  Use care when operating dangerous machinery.  This prescription cannot be refilled.  Using more of this medication than prescribed may cause serious breathing problems.    -- Indication: For pain    cloNIDine 0.3 mg/24 hr transdermal film, extended release  -- 1 patch by transdermal patch once a week on Saturdays  -- Indication: For Hypertension, unspecified type    doxazosin 4 mg oral tablet  -- 1 tab(s) by mouth once a day (at bedtime)  -- Indication: For Hypertension, unspecified type    propranolol 20 mg oral tablet  -- 1 tab(s) by mouth 2 times a day  -- Indication: For Hypertension, unspecified type    warfarin 4 mg oral tablet  -- 2 tab(s) by mouth once a day (at bedtime)   -- Do not take this drug if you are pregnant.  It is very important that you take or use this exactly as directed.  Do not skip doses or discontinue unless directed by your doctor.  Obtain medical advice before taking any non-prescription drugs as some may affect the action of this medication.    -- Indication: For Atrial fibrillation, chronic    NIFEdipine 60 mg oral tablet, extended release  -- 1 tab(s) by mouth 2 times a day   -- Indication: For Hypertension, unspecified type    furosemide 80 mg oral tablet  -- 2 tab(s) by mouth once a day  -- Indication: For CHF    tacrolimus 0.5 mg oral capsule  -- 1 cap(s) by mouth every 12 hours  -- Indication: For Kidney transplant recipient    docusate sodium 100 mg oral capsule  -- 1 cap(s) by mouth once a day, As needed, Constipation  -- Indication: For CONSTIPATION    senna oral tablet  -- 2 tab(s) by mouth once a day (at bedtime), As needed, Constipation  -- Indication: For CONSTIPATION    Omega-3  -- 1 cap(s) by mouth once a day  -- Indication: For SUPPLEMENT    Renvela 800 mg oral tablet  -- 1 tab(s) by mouth 2 times a day  -- Indication: For ESRD (end stage renal disease)    pantoprazole 40 mg oral delayed release tablet  -- 1 tab(s) by mouth once a day  -- Indication: For Gastroesophageal reflux disease with esophagitis    Bactrim  mg-160 mg oral tablet  -- 1 tab(s) by mouth 3 times a week (M, W, F)  -- Indication: For Kidney transplant recipient    Nephrocaps oral capsule  -- 1 cap(s) by mouth once a day  -- Indication: For SUPPLEMENT    ascorbic acid 500 mg oral tablet  -- 1 tab(s) by mouth once a day  -- Indication: For SUPPLEMENT I will START or STAY ON the medications listed below when I get home from the hospital:    predniSONE 5 mg oral tablet  -- 1 tab(s) by mouth once a day (in the morning)  -- Indication: For Kidney transplant recipient    Aspir 81 oral delayed release tablet  -- 1 tab(s) by mouth once a day  -- Indication: For cad    oxyCODONE 5 mg oral tablet  -- 1 tab(s) by mouth every 6 hours, As Needed  -for severe pain after dialysis (Tues, Thurs & Sat) MDD:4  -- Caution federal law prohibits the transfer of this drug to any person other  than the person for whom it was prescribed.  It is very important that you take or use this exactly as directed.  Do not skip doses or discontinue unless directed by your doctor.  May cause drowsiness.  Alcohol may intensify this effect.  Use care when operating dangerous machinery.  This prescription cannot be refilled.  Using more of this medication than prescribed may cause serious breathing problems.    -- Indication: For Headace    cloNIDine 0.3 mg/24 hr transdermal film, extended release  -- 1 patch by transdermal patch once a week on Saturdays  -- Indication: For Hypertension, unspecified type    doxazosin 4 mg oral tablet  -- 1 tab(s) by mouth once a day (at bedtime)  -- Indication: For Hypertension, unspecified type    propranolol 20 mg oral tablet  -- 1 tab(s) by mouth 2 times a day  -- Indication: For Hypertension, unspecified type    warfarin 4 mg oral tablet  -- 2 tab(s) by mouth once a day (at bedtime)   -- Do not take this drug if you are pregnant.  It is very important that you take or use this exactly as directed.  Do not skip doses or discontinue unless directed by your doctor.  Obtain medical advice before taking any non-prescription drugs as some may affect the action of this medication.    -- Indication: For Atrial fibrillation, chronic    NIFEdipine 60 mg oral tablet, extended release  -- 1 tab(s) by mouth 2 times a day   -- Indication: For Hypertension, unspecified type    furosemide 80 mg oral tablet  -- 2 tab(s) by mouth once a day  -- Indication: For CHF    tacrolimus 0.5 mg oral capsule  -- 1 cap(s) by mouth every 12 hours  -- Indication: For Kidney transplant recipient    docusate sodium 100 mg oral capsule  -- 1 cap(s) by mouth once a day, As needed, Constipation  -- Indication: For CONSTIPATION    senna oral tablet  -- 2 tab(s) by mouth once a day (at bedtime), As needed, Constipation  -- Indication: For CONSTIPATION    Omega-3  -- 1 cap(s) by mouth once a day  -- Indication: For SUPPLEMENT    Renvela 800 mg oral tablet  -- 1 tab(s) by mouth 2 times a day  -- Indication: For ESRD (end stage renal disease)    pantoprazole 40 mg oral delayed release tablet  -- 1 tab(s) by mouth once a day  -- Indication: For Gastroesophageal reflux disease with esophagitis    Bactrim  mg-160 mg oral tablet  -- 1 tab(s) by mouth 3 times a week (M, W, F)  -- Indication: For Kidney transplant recipient    Nephrocaps oral capsule  -- 1 cap(s) by mouth once a day  -- Indication: For SUPPLEMENT    ascorbic acid 500 mg oral tablet  -- 1 tab(s) by mouth once a day  -- Indication: For SUPPLEMENT

## 2018-12-10 NOTE — DISCHARGE NOTE ADULT - PLAN OF CARE
resolved Follow up with your cardiologist continue HD at your home site Follow up with your nephrologist maintain control

## 2018-12-28 NOTE — PROVIDER CONTACT NOTE (OTHER) - NAME OF MD/NP/PA/DO NOTIFIED:
Reed NEW Dr Reddy 28yo woman, smoker on nexplanon, was placed in CDU for workup of syncope preceded by dizziness. She reports she has been working a lot, not sleeping much, and stressed; had an alcoholic drink prior to the episode. She did have some urinary incontinence but no tongue biting, was back to baseline after about 30 seconds, no chest pain, abd pain, fever, chills, SOB. Pt was eval in the ED with EKG, labs with cardiac enzymes, CXR which were unremarkable. D-dimer was negative. Pt comfortable on my eval, lungs CTA, CVS1S2 RRR. Repeat EKG and enzymes unchanged, pt monitored without incident and remained asymptomatic. Echo was unremarkable however pt eloped prior to being discharged. I called and spoke with her, discussed results. She will f/u PMD and return to the ED to  results.

## 2019-01-09 ENCOUNTER — RX RENEWAL (OUTPATIENT)
Age: 59
End: 2019-01-09

## 2019-02-09 NOTE — PROGRESS NOTE ADULT - ASSESSMENT
Rest as much as you can.  Wash your hands, use hand sanitizers  May use netti-pot or saline nasal rinse 2 times per day.  May use nasal saline spray 4-5 times per day.  If antibiotic prescribed, finish all antibiotics to prevent resistance bacteria.  May continue to have congestion despite treatment if allergy related and allergies are untreated.  It is important to treat your allergies to prevent further sinus infections.  May consider antihistamine such as: Claritin, Allegra or Zyrtec daily for a few days to decrease sinus/postnasal drainage, but only if very runny nose.  Push fluids, should take minimum of 6-8 8 oz. cups of fluid per day.  Salt water to gargle to ease sore throat.   Elevate head with 2-3 pillows if you are coughing at night.  Use humidifier in room.  Inhale steam from a pot of boiled water, warm wash cloths to face, warm fluids, warm showers, and eat chicken soup.  May take over the counter probiotic (Florastor/Ultimate Renee) 1-2 hours after antibiotic twice a day and for 7 days after antibiotic is completed.  Lactobacillus and bifidobacterium are usually best and work together to restore digestive health.  Greater than 20 billion colonies are best. OR May eat YOGURT (Activia, Danactive, Yo-Plus) daily during and after antibiotic therapy (but DAIRY can worsen mucus and inflammation, so avoid dairy if you have mucus).  May use Mucinex-D or generic every 12 hour over the counter per package instructions to help with nasal/sinus congestion and help thin/loosen mucus.     -DECONGESTANT FOR NASAL CONGESTION: PSEUDOEPHEDRINE 60 mg every 6 hours or 120 mg extended release every 12 hours as needed for nasal congestion ( Do not use if you have high blood pressure,heart disease, Glaucoma, Prostate issues) (Request pseudoephedrine products at pharmacy)                          OR  May use Afrin nasal spray for 3-4 days only (may cause rebound congestion if used longer). Use at night.     May use Ibuprofen  400-600 mg every 6-8 hrs as needed for pain.    May use Acetaminophen 325-650mg every 4-6 hrs as needed for pain or fever.    Better to AVOID OTC antihistamines if sinus infection, as these tend to over dry the sinuses  If taking any contraceptives, use another form of birth control for 3 weeks after antibiotic is completed and may have break through bleeding due to decreased effectiveness of birth control pill when taken with antibiotics      Follow up with primary care provider in 2-3 days if no improvement or worsening of symptoms such as swelling around eyes, headaches, or fevers, despite treatment.                                     Patient with advanced CKD, s/p Pericardial window via Left thoracotomy failed allograft on low dose Tac/prednisone

## 2019-02-18 NOTE — ED ADULT TRIAGE NOTE - NS ED TRIAGE AVPU SCALE
Alert-The patient is alert, awake and responds to voice. The patient is oriented to time, place, and person. The triage nurse is able to obtain subjective information. 2 seconds or less

## 2019-02-20 ENCOUNTER — RX RENEWAL (OUTPATIENT)
Age: 59
End: 2019-02-20

## 2019-02-25 DIAGNOSIS — K27.9 PEPTIC ULCER, SITE UNSPECIFIED, UNSPECIFIED AS ACUTE OR CHRONIC, W/OUT HEMORRHAGE OR PERFORATION: ICD-10-CM

## 2019-03-12 ENCOUNTER — APPOINTMENT (OUTPATIENT)
Dept: NEPHROLOGY | Facility: CLINIC | Age: 59
End: 2019-03-12
Payer: MEDICARE

## 2019-03-12 VITALS
SYSTOLIC BLOOD PRESSURE: 102 MMHG | DIASTOLIC BLOOD PRESSURE: 67 MMHG | BODY MASS INDEX: 22.66 KG/M2 | TEMPERATURE: 97.2 F | OXYGEN SATURATION: 100 % | HEIGHT: 69 IN | RESPIRATION RATE: 12 BRPM | WEIGHT: 153 LBS | HEART RATE: 88 BPM

## 2019-03-12 PROCEDURE — 99214 OFFICE O/P EST MOD 30 MIN: CPT

## 2019-03-12 NOTE — PHYSICAL EXAM
[General Appearance - Alert] : alert [General Appearance - In No Acute Distress] : in no acute distress [Sclera] : the sclera and conjunctiva were normal [PERRL With Normal Accommodation] : pupils were equal in size, round, and reactive to light [Extraocular Movements] : extraocular movements were intact [Outer Ear] : the ears and nose were normal in appearance [Oropharynx] : the oropharynx was normal [Neck Appearance] : the appearance of the neck was normal [Neck Cervical Mass (___cm)] : no neck mass was observed [Jugular Venous Distention Increased] : there was no jugular-venous distention [Thyroid Diffuse Enlargement] : the thyroid was not enlarged [Thyroid Nodule] : there were no palpable thyroid nodules [Auscultation Breath Sounds / Voice Sounds] : lungs were clear to auscultation bilaterally [Heart Rate And Rhythm] : heart rate was normal and rhythm regular [Heart Sounds] : normal S1 and S2 [Heart Sounds Gallop] : no gallops [Heart Sounds Pericardial Friction Rub] : no pericardial rub [Edema] : there was no peripheral edema [Bowel Sounds] : normal bowel sounds [Abdomen Soft] : soft [Abdomen Tenderness] : non-tender [Abdomen Mass (___ Cm)] : no abdominal mass palpated [FreeTextEntry1] : midline surgical scar, allograft palpable to the right of midline [Cervical Lymph Nodes Enlarged Posterior Bilaterally] : posterior cervical [Cervical Lymph Nodes Enlarged Anterior Bilaterally] : anterior cervical [Supraclavicular Lymph Nodes Enlarged Bilaterally] : supraclavicular [Axillary Lymph Nodes Enlarged Bilaterally] : axillary [Inguinal Lymph Nodes Enlarged Bilaterally] : inguinal [Involuntary Movements] : no involuntary movements were seen [] : no rash [No Focal Deficits] : no focal deficits [Oriented To Time, Place, And Person] : oriented to person, place, and time [Impaired Insight] : insight and judgment were intact [Affect] : the affect was normal

## 2019-03-12 NOTE — ASSESSMENT
[FreeTextEntry1] : Renal Transplant recipient: Had immediate allograft function, last creatinine 1.73.  No dysuria/hematuria. No fever/chills. Tolerating medications.\par Immunosuppression: reviewed; Thymo induction, on tac/MMF/prednisone, last Tac level noted; will recheck. Currently on 3 mg AM and 2 mg PM daily.; on liquid MMF and prednisone at 5 mg daily\par Hypertension: controlled; Reviewed medications. He is on Norvasc and Clonidine. He will discuss with transplant center regarding tapering of antihypertensive medications\par Hyperlipidemia: On statin, continue the same.\par Infection prophylaxis: On Bactrim, Valcyte as well as GI prophylaxis.\par Discussed ambulation, using incentive spirometer, optimal  blood pressure readings, adherence with medications and follow ups, follow up clinic visit schedule, avoiding dehydration, mosquito bites; prevention of DVT as well as food safety.\par He has met with transplant surgeon; post op at [facility].\par \par

## 2019-03-12 NOTE — HISTORY OF PRESENT ILLNESS
[FreeTextEntry1] : 58  years old  male,\par He received live donor kidney transplant on 1/31/19 at [facility] (Surgeon-Hui Rich)\par He was on hemodialysis for 10 months prior to transplant.\par Donor characteristics reviewed.\par Donor: Live unrelated donor\par Donor age -50\par Anatomy - reportedly had 3 arteries, 1 vein\par Has ureteral stent.Scheduled for removal on 3/26/19 at [facility]\par PRA was 0%. HLA Match: not available.\par Induction regimen noted. Thymoglobulin induction, Tac/MMF/Prednisone\par Currently taking Tacrolimus 3 mg am and 2 mg PM, CellCept  liquid 5 ml /dose and prednisone 5.\par He is on prophylaxis regimen: Bactrim SS/d, Valcyte liquid 9 ml/d, Mycelex troches (took for 30 days)\par Blood pressure control: Clonidine 0.3 mg/day Norvasc 10 mg/day\par Other meds: Protonix, aspirin 81 mg/day.\par Glucose control regimen: Not diabetic/none.\par Post op course: Voice issue reports having been evaluated by ENT, had injection of the vocal cord\par Immediate allograft function.Had righ I/J tunneled catheter removed at [facility].\par His post op course reviewed with patient and was discharged on 2/7/19.\par \par \par Had post op visit with surgeon. \par \par Currently\par Has no fever, no urinary symptoms except nocturia: 0-1 time/night at present\par Medications side effects: none noted.\par Adherence and understanding: Good.\par \par \par \par Functional /Employment status: Reviewed.\par Nephrologist: Dr. Thacker/ Dr. Delia Sandhu/Dr. Farnsworth/Dr. Vincent\par Coordinators- Farzana Mansfield and Samia at [facility]\par \par Labs from 2/11: Creatinine 1.73 Tac level: 10\par \par Walks about 3 blocks/day\par \par

## 2019-04-16 NOTE — PATIENT PROFILE ADULT. - FUNCTIONAL SCREEN CURRENT LEVEL: SWALLOWING (IF SCORE 2 OR MORE FOR ANY ITEM, CONSULT REHAB SERVICES), MLM)
Hospitalist Progress Note Patient: Av Oliver MRN: [id number]  CSN: [id number] YOB: 1925  Age: 81 y.o. Sex: female DOA: 4/12/2019 LOS:  LOS: 4 days Chief Complaint: SOB Assessment/Plan 80 yo AAF admitted for SOB c/w CHF, and recent falls prompting family to be concerned aboput her staying in independent apartment 
  
Hx of breast cancer, afib, not on AC due to GIB, leg swelling, and recent dysarthria 
  
Diastolic CHF-lasix daily, appears improved-repeat CXR opending Chronic afib-stable rate, not on [facility] Hypomagnesemia-replete IV Hx of GIB-none seen here Falls-SNF placement Leg edema-right is wrapped, left appears with mild edema, Duplex neg for DVT BL 
NIDDM-follow BG levels, SSI PRN, A1c in 7% range-add low dose januvia with BF Leukopenia-increased today-looks alos chrionic compared top rior labs Mild thrombocytopenia-looks chronic compared to old labs History of CVA's-chronic changes c/w CVA on CT head-nothing acute 
  
Discharge today Discussed with daughter Summary dictated 4/15 Disposition :SNF Patient Active Problem List  
Diagnosis Code  Ulcer of right pretibial region, with fat layer exposed ([facility] [address] L97.812  
 CHF (congestive heart failure) ([facility]) I50.9  Pleural effusion J90  Chronic atrial fibrillation ([facility]) I48.2  Diabetes mellitus type 2, diet-controlled ([facility] [address] E11.9  Facial droop R29.810  Elevated troponin R74.8  Diastolic CHF, acute on chronic ([facility]) I50.33  
 History of stroke Z86.73 Subjective: 
 
Doing well Did not sleep great but denies CP, SOB, nausea Review of systems: 
 
Constitutional: denies fevers Respiratory: denies  cough Cardiovascular: denies palpitations Gastrointestinal: denies , vomiting, diarrhea Vital signs/Intake and Output: 
Visit Vitals /86 (BP 1 Location: Left arm, BP Patient Position: At rest) Pulse 71 Temp 98.4 °F (36.9 °C) Resp 16 Ht 5' 7\" (1.702 m) Wt 81.1 kg (178 lb 14.4 oz) SpO2 100% Breastfeeding? No  
BMI 28.02 kg/m² Current Shift:  04/15 1901 - 04/16 0700 In: 360 [P.O.:360] Out: - Last three shifts:  04/14 0701 - 04/15 1900 In: 480 [P.O.:480] Out: 300 [Urine:300] Exam: 
 
General: elderly thin BF, ox3 Head/Neck: NCAT, supple, No masses, No lymphadenopathy CVS:Regular rate and rhythm, no M/R/G, S1/S2 heard, no thrill Lungs:Clear to auscultation bilaterally, no wheezes, rhonchi, or rales Abdomen: Soft, Nontender, No distention, Normal Bowel sounds, No hepatomegaly Extremities: 1-2 oplus edema LE BL 
Neuro:grossly normal , follows commands Psych:appropriate Labs: Results:  
   
Chemistry Recent Labs 04/16/19 
0345 04/15/19 
2558 04/14/19 
6840 * 111* 92  144 146*  
K 3.5 3.3* 3.2*  
 103 108 CO2 31 32 31 BUN 19* 19* 20* CREA 0.92 0.88 0.92  
CA 8.5 8.7 8.4* AGAP 6 9 7 BUCR 21* 22* 22* AP  --  92 89  
TP  --  5.8* 6.0* ALB  --  2.8* 2.8*  
GLOB  --  3.0 3.2 AGRAT  --  0.9 0.9  
  
CBC w/Diff Recent Labs 04/16/19 
0345 04/15/19 
1200 04/14/19 
9041 WBC 2.1* 1.9* 2.0*  
RBC 4.08* 4.24 4.41  
HGB 11.6* 12.1 12.5 HCT 36.6 38.2 40.2  123* 117* Cardiac Enzymes Recent Labs 04/13/19 
1547 04/13/19 
0915 CPK 54 54 CKND1 4.1* 4.3* Coagulation No results for input(s): PTP, INR, APTT in the last 72 hours. No lab exists for component: INREXT Lipid Panel Lab Results Component Value Date/Time Cholesterol, total 140 04/06/2011 03:50 AM  
 HDL Cholesterol 64 (H) 04/06/2011 03:50 AM  
 LDL, calculated 70.4 04/06/2011 03:50 AM  
 VLDL, calculated 5.6 04/06/2011 03:50 AM  
 Triglyceride 28 04/06/2011 03:50 AM  
 CHOL/HDL Ratio 2.2 04/06/2011 03:50 AM  
  
BNP No results for input(s): BNPP in the last 72 hours. Liver Enzymes Recent Labs 04/15/19 
9423 TP 5.8* ALB 2.8* AP 92 SGOT 26 Thyroid Studies Lab Results Component Value Date/Time TSH 0.78 04/06/2011 03:50 AM  
    
Procedures/imaging: see electronic medical records for all procedures/Xrays and details which were not copied into this note but were reviewed prior to creation of Plan Neville Chong MD 
 (0) swallows foods/liquids without difficulty

## 2019-04-16 NOTE — PROVIDER CONTACT NOTE (CRITICAL VALUE NOTIFICATION) - ASSESSMENT
Chronic Disease Visit Information    BP Readings from Last 3 Encounters:   12/14/18 (!) 86/54   11/16/18 126/74   11/10/18 (!) 154/91          BUN (mg/dL)   Date Value   02/16/2018 18     CREATININE (mg/dL)   Date Value   02/16/2018 0.78     Glucose (mg/dL)   Date Value   02/16/2018 117 (H)            Have you changed or started any medications since your last visit including any over-the-counter medicines, vitamins, or herbal medicines? no   Are you having any side effects from any of your medications? -  no  Have you stopped taking any of your medications? Is so, why? -  no    Have you seen any other physician or provider since your last visit? Yes - Records Requested  Have you had any other diagnostic tests since your last visit? No  Have you been seen in the emergency room and/or had an admission to a hospital since we last saw you? No  Have you had your annual diabetic retinal (eye) exam? No  Have you had your routine dental cleaning in the past 6 months? yes - April    Have you activated your [employer] innovate | mentor | invest account? If not, what are your barriers?  No: declined     Patient Care Team:  GITA Anne CNP as PCP - General (Family Nurse Practitioner)         Medical History Review  Past Medical, Family, and Social History reviewed and does contribute to the patient presenting condition    Health Maintenance   Topic Date Due    Hepatitis C screen  1962    HIV screen  06/12/1978    Lipid screen  06/12/2003    Shingles Vaccine (1 of 2) 06/12/2013    Colon cancer screen colonoscopy  06/12/2013    Potassium monitoring  02/16/2019    Creatinine monitoring  02/16/2019    Flu vaccine (Season Ended) 09/01/2019    DTaP/Tdap/Td vaccine (2 - Td) 01/01/2024    Pneumococcal 0-64 years Vaccine  Aged Out Patient free from complaints

## 2019-04-25 ENCOUNTER — APPOINTMENT (OUTPATIENT)
Dept: NEPHROLOGY | Facility: CLINIC | Age: 59
End: 2019-04-25

## 2019-05-09 NOTE — ED PROVIDER NOTE - NSTIMEPROVIDERCAREINITIATE_GEN_ER
Discharge Note  Short Stay      SUMMARY     Admit Date: 5/9/2019    Attending Physician: Fredy Magana      Discharge Physician: Fredy Magana      Discharge Date: 5/9/2019 10:57 AM    Procedure(s) (LRB):  RADIOFREQUENCY ABLATION, RIGHT L3,L4,L5 (Right)    Final Diagnosis: Lumbar spondylosis [M47.816]    Disposition: Home or self care    Patient Instructions:   Discharge Medication List as of 5/9/2019  9:21 AM      CONTINUE these medications which have NOT CHANGED    Details   acetaminophen (TYLENOL) 500 MG tablet Take 2 tablets (1,000 mg total) by mouth every 8 (eight) hours as needed., Starting 7/21/2015, Until Discontinued, OTC      albuterol (VENTOLIN HFA) 90 mcg/actuation inhaler Inhale 2 puffs into the lungs every 6 (six) hours as needed for Wheezing. Rescue, Starting Tue 12/18/2018, Until Wed 12/18/2019, Normal      atorvastatin (LIPITOR) 40 MG tablet Take 1 tablet (40 mg total) by mouth once daily., Starting Mon 12/3/2018, Until Tue 12/3/2019, Normal      azelastine (ASTELIN) 137 mcg (0.1 %) nasal spray 1 spray (137 mcg total) by Nasal route 2 (two) times daily., Starting Thu 4/12/2018, Normal      azelastine (OPTIVAR) 0.05 % ophthalmic solution Place 1 drop into both eyes 2 (two) times daily., Starting Fri 4/26/2019, Until Sat 4/25/2020, Normal      budesonide-formoterol 160-4.5 mcg (SYMBICORT) 160-4.5 mcg/actuation HFAA Inhale 2 puffs into the lungs every 12 (twelve) hours., Starting Tue 12/18/2018, Until Thu 2/14/2019, Normal      calcium citrate-vitamin D3 315-200 mg (CITRACAL+D) 315-200 mg-unit per tablet Take 1 tablet by mouth once daily., Until Discontinued, Historical Med      clopidogrel (PLAVIX) 75 mg tablet Take 1 tablet (75 mg total) by mouth once daily., Starting Thu 2/14/2019, Until Fri 2/14/2020, Normal      cyanocobalamin, vitamin B-12, (VITAMIN B-12) 50 mcg tablet Take 50 mcg by mouth once daily., Until Discontinued, Historical Med      docusate sodium (COLACE) 100 MG capsule Take 1  tablet by mouth Twice daily. 1 Capsule Oral Twice a day .  Take with pain medicine, Until Discontinued, Historical Med      doxazosin (CARDURA) 1 MG tablet Take 1 tablet (1 mg total) by mouth every evening., Starting Thu 4/12/2018, Until Fri 4/12/2019, Normal      doxycycline (VIBRA-TABS) 100 MG tablet Take 1 tablet (100 mg total) by mouth 2 (two) times daily. for 10 days, Starting Fri 5/3/2019, Until Mon 5/13/2019, Normal      esomeprazole (NEXIUM) 40 MG capsule Take 1 capsule (40 mg total) by mouth 2 (two) times daily before meals., Starting Mon 1/14/2019, Until Tue 1/14/2020, Normal      flu vac [id number] 36mos up,PF, 60 mcg (15 mcg x 4)/0.5 mL Syrg as directed, Starting Tue 10/2/2018, Normal      fluticasone (FLONASE) 50 mcg/actuation nasal spray 1 spray (50 mcg total) by Each Nare route once daily., Starting Tue 9/4/2018, Normal      fluticasone (FLOVENT HFA) 110 mcg/actuation inhaler Inhale 1 puff into the lungs 2 (two) times daily. Controller, Starting Tue 9/4/2018, Until Wed 9/4/2019, Normal      ipratropium (ATROVENT) 0.03 % nasal spray 2 sprays by Nasal route 2 (two) times daily. May be use more often if needed, Starting Fri 4/26/2019, Normal      montelukast (SINGULAIR) 10 mg tablet Starting Sat 3/9/2019, Historical Med      predniSONE (DELTASONE) 10 MG tablet Three tablets for 3 days, then 2 tablets for 3 days, then 1 tablet for 3 days.  Best if taken as single dose early in the morning with food, Normal      spironolactone (ALDACTONE) 25 MG tablet Take 1 tablet (25 mg total) by mouth once daily., Starting Thu 4/25/2019, Until Fri 4/24/2020, Normal      traMADol (ULTRAM) 50 mg tablet Take 1 tablet (50 mg total) by mouth every 8 (eight) hours as needed for Pain., Starting Fri 2/8/2019, Until Thu 5/9/2019, Normal      VITAMIN D2 50,000 unit capsule TK 1 C PO Q 7 DAYS, Historical Med      zolpidem (AMBIEN) 10 mg Tab TAKE 1 TABLET BY MOUTH EVERY DAY AT BEDTIME, Normal                 Discharge Diagnosis:  Lumbar spondylosis [M47.816]  Condition on Discharge: Stable with no complications to procedure   Diet on Discharge: Same as before.  Activity: as per instruction sheet.  Discharge to: Home with a responsible adult.  Follow up: 2-4 weeks     16-Aug-2018 19:15

## 2019-06-01 NOTE — DISCHARGE NOTE ADULT - PRINCIPAL DIAGNOSIS
Gastrointestinal hemorrhage with melena Normal rate, regular rhythm.  Heart sounds S1, S2.  No murmurs, rubs or gallops.

## 2019-08-02 NOTE — DISCHARGE NOTE ADULT - DOCTOR BEFORE STARTING, STOPPING, OR CHANGING THE DOSE OF ANY PRESCRIPTION OR OVER-THE-COUNTER MEDICATIONS. ANY PRODUCT CONTAINING ASPIRIN LESSENS THE BLOOD’S ABILITY TO FORM CLOTS AND ADDS TO THE
[Cervical Pap Smear] : cervical Pap smear [Liquid Base] : liquid base [Tolerated Well] : the patient tolerated the procedure well [No Complications] : there were no complications Statement Selected

## 2019-08-25 NOTE — PROGRESS NOTE ADULT - PROBLEM SELECTOR PLAN 6
Cont calcitriol Capsule 0.25 mg PO daily  Cholecalciferol 2000 Units Oral daily.
<<----- Click to add NO pertinent Family History

## 2019-09-03 NOTE — PROCEDURE NOTE - NSINDICATIONS_GEN_A_CORE
Clinic hours for Dr. Rivera:  Monday 7 am - 5 pm  Tuesday 7 am - 5 pm  Wednesday 7 am - 5 pm  Thursday 7 am - 5 pm  Friday  7 am - 4 pm    If you need a refill on your prescription please call your pharmacy and let them know. Please be proactive and call before your medication runs out. The pharmacy will then contact us for the refill. Please allow 24-48 hours for the refill to be processed.     If your physician has ordered additional laboratory or radiology testing as part of your ongoing plan of care, please allow 7-10 business days from the day of your lab draw or test for the results to be sent and reviewed by your provider. If your results are critical and require more immediate intervention, you will be contacted sooner. Your results will be conveyed to you via a phone call or letter.    You may be receiving a patient satisfaction survey in the mail. Please take the time to complete, as your feedback is very important to us. We strive to make your experience exceptional and your comments help us with that goal. We look forward to hearing from you.      
dialysis/CRRT

## 2019-09-13 ENCOUNTER — APPOINTMENT (OUTPATIENT)
Dept: NEPHROLOGY | Facility: CLINIC | Age: 59
End: 2019-09-13
Payer: MEDICARE

## 2019-09-13 VITALS
RESPIRATION RATE: 12 BRPM | DIASTOLIC BLOOD PRESSURE: 72 MMHG | SYSTOLIC BLOOD PRESSURE: 120 MMHG | OXYGEN SATURATION: 95 % | BODY MASS INDEX: 27.4 KG/M2 | HEART RATE: 77 BPM | TEMPERATURE: 98.2 F | WEIGHT: 185 LBS | HEIGHT: 69 IN

## 2019-09-13 PROCEDURE — 99214 OFFICE O/P EST MOD 30 MIN: CPT

## 2019-09-13 RX ORDER — MAGNESIUM OXIDE 400 MG
400 (241.3 MG) TABLET ORAL
Refills: 5 | Status: ACTIVE | COMMUNITY
Start: 2019-09-13

## 2019-09-13 RX ORDER — CLONIDINE 0.3 MG/24H
0.3 PATCH, EXTENDED RELEASE TRANSDERMAL
Qty: 13 | Refills: 3 | Status: DISCONTINUED | COMMUNITY
Start: 2018-05-23 | End: 2019-09-13

## 2019-09-13 RX ORDER — TACROLIMUS 0.5 MG/1
0.5 CAPSULE ORAL
Qty: 60 | Refills: 11 | Status: DISCONTINUED | COMMUNITY
Start: 2018-11-15 | End: 2019-09-13

## 2019-09-13 RX ORDER — MYCOPHENOLATE MOFETIL 500 MG/1
500 TABLET, FILM COATED ORAL TWICE DAILY
Qty: 120 | Refills: 11 | Status: ACTIVE | COMMUNITY
Start: 2019-09-13

## 2019-09-13 RX ORDER — ONDANSETRON 8 MG/1
8 TABLET ORAL
Qty: 30 | Refills: 1 | Status: DISCONTINUED | COMMUNITY
Start: 2018-05-07 | End: 2019-09-13

## 2019-09-13 RX ORDER — MINOXIDIL 2.5 MG/1
2.5 TABLET ORAL
Qty: 360 | Refills: 3 | Status: DISCONTINUED | COMMUNITY
Start: 2018-08-28 | End: 2019-09-13

## 2019-09-13 RX ORDER — SULFAMETHOXAZOLE AND TRIMETHOPRIM 400; 80 MG/1; MG/1
400-80 TABLET ORAL DAILY
Qty: 30 | Refills: 5 | Status: ACTIVE | COMMUNITY
Start: 2019-09-13

## 2019-09-13 RX ORDER — PANTOPRAZOLE 40 MG/1
40 TABLET, DELAYED RELEASE ORAL DAILY
Qty: 30 | Refills: 5 | Status: DISCONTINUED | COMMUNITY
Start: 2017-11-03 | End: 2019-09-13

## 2019-09-13 RX ORDER — FUROSEMIDE 40 MG/1
40 TABLET ORAL DAILY
Qty: 180 | Refills: 1 | Status: DISCONTINUED | COMMUNITY
Start: 2017-05-26 | End: 2019-09-13

## 2019-09-13 RX ORDER — BUTALBITAL, ACETAMINOPHEN AND CAFFEINE 325; 50; 40 MG/1; MG/1; MG/1
50-325-40 TABLET ORAL DAILY
Qty: 30 | Refills: 1 | Status: DISCONTINUED | COMMUNITY
Start: 2018-07-03 | End: 2019-09-13

## 2019-09-13 RX ORDER — SEVELAMER CARBONATE 800 MG/1
800 TABLET, FILM COATED ORAL 3 TIMES DAILY
Qty: 540 | Refills: 3 | Status: DISCONTINUED | COMMUNITY
Start: 2018-08-31 | End: 2019-09-13

## 2019-09-13 RX ORDER — LABETALOL HYDROCHLORIDE 200 MG/1
200 TABLET, FILM COATED ORAL EVERY 8 HOURS
Qty: 540 | Refills: 3 | Status: DISCONTINUED | COMMUNITY
Start: 2018-10-23 | End: 2019-09-13

## 2019-09-13 RX ORDER — SEVELAMER CARBONATE 800 MG/1
800 TABLET, FILM COATED ORAL 3 TIMES DAILY
Qty: 540 | Refills: 0 | Status: DISCONTINUED | COMMUNITY
Start: 2018-06-12 | End: 2019-09-13

## 2019-09-13 RX ORDER — PROPRANOLOL HYDROCHLORIDE 10 MG/1
10 TABLET ORAL
Qty: 360 | Refills: 3 | Status: DISCONTINUED | COMMUNITY
Start: 2018-08-31 | End: 2019-09-13

## 2019-09-13 RX ORDER — ASCORBIC ACID, THIAMINE, RIBOFLAVIN, NIACINAMIDE, PYRIDOXINE, FOLIC ACID, COBALAMIN, BIOTIN, PANTOTHENIC ACID 100; 1.5; 1.7; 20; 10; 1; 6; 300; 1 MG/1; MG/1; MG/1; MG/1; MG/1; MG/1; UG/1; UG/1; MG/1
TABLET, COATED ORAL DAILY
Qty: 90 | Refills: 3 | Status: DISCONTINUED | COMMUNITY
Start: 2018-05-23 | End: 2019-09-13

## 2019-09-13 RX ORDER — HYDRALAZINE HYDROCHLORIDE 10 MG/1
10 TABLET ORAL EVERY 8 HOURS
Qty: 810 | Refills: 0 | Status: DISCONTINUED | COMMUNITY
Start: 2018-11-16 | End: 2019-09-13

## 2019-09-13 NOTE — ASSESSMENT
[FreeTextEntry1] : Renal Transplant recipient: Had immediate allograft function, last creatinine 1.43.  No dysuria/hematuria. No fever/chills. Tolerating medications.\par Immunosuppression: reviewed; Thymo induction, on tac/MMF/prednisone, last Tac level noted; will recheck. Currently on 3 mg AM and 2 mg PM daily.; on full dose MMF and prednisone at 5 mg daily\par Hypertension: controlled; Reviewed medications. He is on  Clonidine. \par Hyperlipidemia: On statin, continue the same.\par Infection prophylaxis: On Bactrim, Valcyte as well as GI prophylaxis.\par Discussed avoiding dehydration, mosquito bites; prevention of DVT as well as food safety.\par Elevated bilirubin and dilated Bile duct: Has appointment with hepatologist at [facility]. Discussed plan of care and work up.\par Hoarseness of voice: On ENT follow up. Had vocal cord injection X 1.\par  \par \par

## 2019-09-13 NOTE — PHYSICAL EXAM
[General Appearance - Alert] : alert [General Appearance - In No Acute Distress] : in no acute distress [Sclera] : the sclera and conjunctiva were normal [PERRL With Normal Accommodation] : pupils were equal in size, round, and reactive to light [Extraocular Movements] : extraocular movements were intact [Oropharynx] : the oropharynx was normal [Outer Ear] : the ears and nose were normal in appearance [Neck Appearance] : the appearance of the neck was normal [Neck Cervical Mass (___cm)] : no neck mass was observed [Jugular Venous Distention Increased] : there was no jugular-venous distention [Thyroid Nodule] : there were no palpable thyroid nodules [Thyroid Diffuse Enlargement] : the thyroid was not enlarged [Heart Rate And Rhythm] : heart rate was normal and rhythm regular [Auscultation Breath Sounds / Voice Sounds] : lungs were clear to auscultation bilaterally [Heart Sounds] : normal S1 and S2 [Heart Sounds Pericardial Friction Rub] : no pericardial rub [Heart Sounds Gallop] : no gallops [Edema] : there was no peripheral edema [Bowel Sounds] : normal bowel sounds [Abdomen Soft] : soft [Abdomen Tenderness] : non-tender [Abdomen Mass (___ Cm)] : no abdominal mass palpated [Cervical Lymph Nodes Enlarged Posterior Bilaterally] : posterior cervical [Supraclavicular Lymph Nodes Enlarged Bilaterally] : supraclavicular [Cervical Lymph Nodes Enlarged Anterior Bilaterally] : anterior cervical [Axillary Lymph Nodes Enlarged Bilaterally] : axillary [Inguinal Lymph Nodes Enlarged Bilaterally] : inguinal [Involuntary Movements] : no involuntary movements were seen [No Focal Deficits] : no focal deficits [] : no rash [Oriented To Time, Place, And Person] : oriented to person, place, and time [Impaired Insight] : insight and judgment were intact [Affect] : the affect was normal [FreeTextEntry1] : midline surgical scar, allograft palpable to the right of midline

## 2019-09-18 NOTE — PROGRESS NOTE ADULT - PROBLEM/PLAN-3
Refilled for 1 month. She is due for a medication recheck with me if we could have her schedule. Thank you! Yadira Zamora MD     DISPLAY PLAN FREE TEXT

## 2019-11-25 NOTE — ED ADULT NURSE NOTE - PMH
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

## 2020-05-07 NOTE — PATIENT PROFILE ADULT. - HARM RISK FACTORS
May 7, 2020       MONSE Rodriguez  [address] [place] [address]  VIA Facsimile: [phone]      Patient: Denia Ng   YOB: 1953   Date of Visit: 5/7/2020       Dear Dr. Vazquez:    Thank you for referring Denia Ng to me for evaluation. Below are my notes for this visit with her.    If you have questions, please do not hesitate to call me. I look forward to following your patient along with you.      Sincerely,        Sunil Whaley MD        CC: No Recipients  Sunil Whaley MD  5/7/2020 11:23 AM  Sign when Signing Visit  New Patient Evaluation    HPI:  Patient is a 66-year-old female with a past medical history of hypertension/hyperlipidemia/GERD/history of mild bronchial asthma/family history of coronary disease with her younger brother having coronary event.  Patient had an episode of chest pain burning sensation retrosternally with some radiation of the shoulder in the axillary area so went emergency room.  Had an ECG done showed no severe by nuclear testing within acceptable limits.  Subsequent was discharged.  Was seen by the primary care physician had an echo and a stress test ordered which were negative for ischemia.  Was seen by the gastroenterologist for possible GI evaluation however because of her symptoms of shortness of breath also happening  with on and off chest pain in  the left inframammary area sharp in nature,  comes in for further evaluation.  Denies history of any PND's or orthopnea/leg edema.  Normally patient is per chest to be active but has been noticing shortness of breath with activity also.  Non-smoker.    MEDICATIONS  Outpatient Encounter Medications as of 5/7/2020   Medication Sig Dispense Refill   • amLODIPine (NORVASC) 5 MG tablet Take 5 mg by mouth daily.      • fluticasone-salmeterol (ADVAIR, WIXELA) 250-50 MCG/DOSE inhaler Inhale 1 puff into the lungs two times daily. 3 each 3   • albuterol (PROAIR HFA) 108 (90 Base) MCG/ACT inhaler  Inhale 2 puffs into the lungs every 4 hours as needed for Shortness of Breath or Wheezing. 1 Inhaler 12   • Respiratory Therapy Supplies [facility] [place] h20     • escitalopram (LEXAPRO) 20 MG tablet Take 1 tablet by mouth daily.     • albuterol (VENTOLIN) (2.5 MG/3ML) 0.083% nebulizer solution Take 2.5 mg by nebulization every 6 hours as needed for Wheezing.     • atorvastatin (LIPITOR) 10 MG tablet Take 10 mg by mouth daily.     • Immune Globulin, Human, (CUVITRU) 1 GM/5ML Solution Inject into the skin 1 day a week.      • EPINEPHrine 0.3 MG/0.3ML Solution Prefilled Syringe Inject as directed as needed.      • esomeprazole (NEXIUM) 40 MG capsule Take 40 mg by mouth daily (before breakfast).     • albuterol 108 (90 Base) MCG/ACT inhaler Inhale 2 puffs into the lungs every 4 hours as needed for Shortness of Breath or Wheezing.     • levocetirizine (XYZAL) 5 MG tablet Take 5 mg by mouth every evening.     • montelukast (SINGULAIR) 10 MG tablet Take 1 tablet by mouth nightly. 90 tablet 3   • [DISCONTINUED] amoxicillin-clavulanate (AUGMENTIN) 875-125 MG per tablet      • [DISCONTINUED] diclofenac (VOLTAREN) 1 % gel diclofenac 1 % topical gel     • [DISCONTINUED] meloxicam (MOBIC) 15 MG tablet meloxicam 15 mg tablet     • [DISCONTINUED] predniSONE (DELTASONE) 10 MG tablet prednisone 10 mg tablet     • [DISCONTINUED] TEMazepam (RESTORIL) 15 MG capsule Take 15 mg by mouth nightly as needed.      • [DISCONTINUED] doxycycline hyclate (VIBRA-TABS) 100 MG tablet 100 mg 2 times daily.     • [DISCONTINUED] zolpidem (AMBIEN) 10 MG tablet Take 10 mg by mouth nightly as needed.   1   • [DISCONTINUED] budesonide (RHINOCORT ALLERGY) 32 MCG/ACT nasal spray Spray 1 spray in each nostril 2 times daily.       No facility-administered encounter medications on file as of 5/7/2020.        ALLERGIES  Allergies as of 05/07/2020 - Reviewed 05/07/2020   Allergen Reaction Noted   • Loop diuretics Other (See Comments) 06/26/2003   • Sulfa antibiotics  RASH and SWELLING 06/08/2010   • Sulfones Other (See Comments) 06/26/2003   • Sulfonylureas Other (See Comments) 06/26/2003   • Thiazide-type diuretics Other (See Comments) 06/26/2003       Social History     Socioeconomic History   • Marital status: /Civil Union     Spouse name: Not on file   • Number of children: Not on file   • Years of education: Not on file   • Highest education level: Not on file   Occupational History   • Occupation: Employed    Social Needs   • Financial resource strain: Not on file   • Food insecurity:     Worry: Not on file     Inability: Not on file   • Transportation needs:     Medical: Not on file     Non-medical: Not on file   Tobacco Use   • Smoking status: Never Smoker   • Smokeless tobacco: Never Used   Substance and Sexual Activity   • Alcohol use: Yes   • Drug use: Not Currently   • Sexual activity: Not on file   Lifestyle   • Physical activity:     Days per week: Not on file     Minutes per session: Not on file   • Stress: Not on file   Relationships   • Social connections:     Talks on phone: Not on file     Gets together: Not on file     Attends Yazdanism service: Not on file     Active member of club or organization: Not on file     Attends meetings of clubs or organizations: Not on file     Relationship status: Not on file   • Intimate partner violence:     Fear of current or ex partner: Not on file     Emotionally abused: Not on file     Physically abused: Not on file     Forced sexual activity: Not on file   Other Topics Concern   • Not on file   Social History Narrative   • Not on file       Past Medical History:   Diagnosis Date   • Allergic rhinitis    • Asthma    • Chronic sinusitis    • GERD (gastroesophageal reflux disease)    • Migraine    • Obstructive sleep apnea    • Pneumonia        Family History   Problem Relation Age of Onset   • Asthma Father    • Heart Father    • Coronary Artery Disease Father        Review of Systems   Constitutional: Negative for  activity change.   Respiratory: Positive for shortness of breath. Negative for chest tightness.    Cardiovascular: Positive for chest pain. Negative for palpitations and leg swelling.   Gastrointestinal: Negative for abdominal pain.   Musculoskeletal: Negative for back pain and gait problem.   Skin: Negative for color change and pallor.   Neurological: Negative for dizziness, tremors, syncope, weakness and light-headedness.   Psychiatric/Behavioral: Negative for sleep disturbance.       Vitals: Blood pressure 118/68, pulse 82, resp. rate 16, height 5' 4\" (1.626 m), weight 83.5 kg (184 lb).    Physical Exam   Constitutional: She is oriented to person, place, and time. She appears well-nourished.   HENT:   Head: Normocephalic and atraumatic.   Neck: Neck supple. No JVD present.   Cardiovascular: Regular rhythm and intact distal pulses. Exam reveals no gallop and no friction rub.   No murmur heard.  Pulmonary/Chest: Breath sounds normal. No respiratory distress. She has no wheezes. She has no rales. She exhibits no tenderness.   Abdominal: Soft. Bowel sounds are normal.   Musculoskeletal:         General: No edema.   Neurological: She is alert and oriented to person, place, and time.   Skin: Skin is warm and dry. She is not diaphoretic.   Psychiatric: She has a normal mood and affect.   Vitals reviewed.      EKG;  ECG done in emergency room showed sinus rhythm,  normal ECG.    LABORATORY  Recent Results (from the past 2016 hour(s))   STRESS TEST WITH MYOCARDIAL PERFUSION    Collection Time: 04/21/20  9:11 AM   Result Value Ref Range    Predicted HR Max 155 BPM       ASSESSMENT & PLAN  1.  Chest pain and shortness of breath: Has atypical features also.  Intermediate risk for developing  coronary disease with underlying family history together with multiple risk factors.  Discussed  options with the patient regarding  invasive evaluation with a coronary angiography versus coronary CTA.  Long discussion with the patient  and .  Risk and benefit discussed.  will do a coronary CTA first depending the finding of the same consider for further testing accordingly.  Further management depending finding the same  2.  Hypertension: Blood pressure otherwise normal stable less than 140 x 90 mmHg.  3.  Hyperlipidemia: on Statin.  Followed by primary care physician.    Further management per the finding of the testing.  Thank you for your referral       No follow-ups on file.    Diagnoses and all orders for this visit:  Other chest pain  -     ELECTROCARDIOGRAM 12-LEAD  -     CT CARDIAC STRUCTURE AND MORPHOLOGY W WO CONTRAST; Future  Essential hypertension  Pure hypercholesterolemia      Sunil Whaley MD, FACC.              no

## 2020-09-06 NOTE — CHART NOTE - NSCHARTNOTEFT_GEN_A_CORE
Called by RN that, pt's BP was 198/100@ HR 63 at 2022. Pt was evaluated. c/o Blurry vision. Denies CP/SOB/Palpitations/ Diaphoresis, HA/ Dizziness/ syncope, fever/chills, Abdominal Pain/N/V/D/C, orthopnea/ PND.     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  History of partial pancreatectomy  History of renal transplant: x 2    MEDICATIONS  (STANDING):  ascorbic acid 500 milliGRAM(s) Oral daily  Butalbital, acetaminophen/caffeine 1 Tablet(s) 1 Tablet(s) Oral daily  calcitriol   Capsule 0.25 MICROGram(s) Oral daily  cholecalciferol 2000 Unit(s) Oral daily  cloNIDine Patch 0.3 mG/24Hr(s) 1 patch Topical every 7 days  furosemide    Tablet 80 milliGRAM(s) Oral daily  losartan 50 milliGRAM(s) Oral two times a day  Nephro-martha 1 Tablet(s) Oral daily  NIFEdipine XL 60 milliGRAM(s) Oral <User Schedule>  predniSONE   Tablet 5 milliGRAM(s) Oral daily  tacrolimus 1 milliGRAM(s) Oral every 12 hours  trimethoprim  160 mG/sulfamethoxazole 800 mG 1 Tablet(s) Oral <User Schedule>    MEDICATIONS  (PRN):  acetaminophen   Tablet. 650 milliGRAM(s) Oral every 6 hours PRN Mild Pain (1 - 3)  hydrALAZINE Injectable 20 milliGRAM(s) IV Push every 6 hours PRN SBP >185  ondansetron Injectable 4 milliGRAM(s) IV Push every 6 hours PRN give with hydralazine pushes    Vital Signs Last 24 Hrs  T(C): 37 (18 Aug 2018 20:42), Max: 37 (18 Aug 2018 20:42)  T(F): 98.6 (18 Aug 2018 20:42), Max: 98.6 (18 Aug 2018 20:42)  HR: 63 (18 Aug 2018 20:42) (55 - 63)  BP: 171/87 (18 Aug 2018 21:56) (154/87 - 198/100)  BP(mean): --  RR: 18 (18 Aug 2018 20:42) (17 - 18)  SpO2: 97% (18 Aug 2018 20:42) (95% - 98%)    PHYSICAL EXAM.   General: NAD.   HEENT: NC/AT.   Neck; Supple, No JVD.   Cardiovascular: Normal S1 S2,RRR, No JVD, No .  Respiratory: Lungs clear to auscultation.	  Gastrointestinal:  Soft, Non-tender, + BS	  Neuro: A&OX3, non- focal.                        11.4   10.88 )-----------( 366      ( 17 Aug 2018 08:14 )             37.3   08-17    138  |  92<L>  |  44<H>  ----------------------------<  88  5.5<H>   |  26  |  7.09<H>    Ca    9.1      17 Aug 2018 07:16  Phos  6.3     08-17  Mg     2.2     08-17    A/P: 59yo M w/pmhx of HTN, ESRD s/p renal transplant x 2 but now requiring HD again (T Th Sat), now presenting with vision changes x 5 days as well as elevated blood pressures, admitted w/ htn emergency with vision changes.     # Hypertensive Urgency.   - Keep K/Mg> 4.0/2.0.   - Hydralazine 20mg IV q6h prn.   - c/w Procardia 60mg BID.   - c/w Losartan 50mg BID.  - c/w Clonidine 0.3mg qd.   - c/w Lasix 80mg PO qd.   - Pt received Hydralazine 20mg IVP X1.   - CT Head: No evidence of acute intracranial hemorrhage, midline shift or CT   evidence of acute territorial infarct.   - f/u MRI of Brain.   - c/w strict BP control  - f/u MRI with gadolinium, MRV venography.   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  - Neuro checks q4h.   - Renal: s/p HD today.   - f/u Renal/Opthalmo/ Neuro recommendations.   - Will endorse to primary team in am.   - Repeat BP was:  171/87. .  - Monitor BP.     ELIDIA WOO PA-C.   # [id number]  Medicine PA, Universal Safety Interventions

## 2020-10-08 NOTE — PROGRESS NOTE ADULT - PROBLEM/PLAN-3
October 8, 2020      Isabel Moulton MD  [address]  [facility] [address]           [facility]  [address]  [facility] [address]  Phone: [phone]  Fax: [phone]          Patient: Sunitha Pillai   MR Number: [id number]   YOB: 1953   Date of Visit: 10/8/2020       Dear Dr. Isabel Moulton:    Thank you for referring Sunitha Pillai to me for evaluation. Attached you will find relevant portions of my assessment and plan of care.    If you have questions, please do not hesitate to call me. I look forward to following Sunitha Pillai along with you.    Sincerely,    Sai Conteh Jr., MD    Enclosure  CC:  No Recipients    If you would like to receive this communication electronically, please contact [email or web address] or [phone] to request more information on [facility] [employer] access.    For providers and/or their staff who would like to refer a patient to [facility], please contact us through our one-stop-shop provider referral line, [facility], at [phone].    If you feel you have received this communication in error or would no longer like to receive these types of communications, please e-mail [email or web address]         
DISPLAY PLAN FREE TEXT

## 2020-12-08 ENCOUNTER — APPOINTMENT (OUTPATIENT)
Dept: NEPHROLOGY | Facility: CLINIC | Age: 60
End: 2020-12-08
Payer: MEDICARE

## 2020-12-08 VITALS
DIASTOLIC BLOOD PRESSURE: 64 MMHG | TEMPERATURE: 97.9 F | SYSTOLIC BLOOD PRESSURE: 114 MMHG | BODY MASS INDEX: 28.58 KG/M2 | WEIGHT: 193 LBS | OXYGEN SATURATION: 94 % | RESPIRATION RATE: 15 BRPM | HEIGHT: 69 IN | HEART RATE: 90 BPM

## 2020-12-08 DIAGNOSIS — I10 ESSENTIAL (PRIMARY) HYPERTENSION: ICD-10-CM

## 2020-12-08 DIAGNOSIS — H54.7 UNSPECIFIED VISUAL LOSS: ICD-10-CM

## 2020-12-08 PROCEDURE — 99214 OFFICE O/P EST MOD 30 MIN: CPT

## 2020-12-10 PROBLEM — H54.7: Status: ACTIVE | Noted: 2020-12-10

## 2020-12-10 NOTE — ASSESSMENT
[FreeTextEntry1] : Renal Transplant recipient: Stable allograft function.  No dysuria/hematuria. No fever/chills. Tolerating medications.\par Immunosuppression: reviewed; Thymo induction, on tac/MMF/prednisone, last Tac level noted; will recheck. Currently on 2 mg AM and 1 mg PM daily.; on  MMF and prednisone at 5 mg daily\par Decreased vision: On follow up with Ophthalmologist, planned for surgery on 12/21/20\par Infection prophylaxis: Reviewed.\par Discussed Renal Preservation Strategies.\par \par To Whomsoever This May Concern\par \par Patient is medically optimized for the planned procedure from renal transplant point of view.\par Clinically stable, blood pressure is controlled. Physical findings and Lab data from the last visit are enclosed.\par Patient to be monitored for blood pressure, urine output, electrolytes and creatinine if remains admitted perioperatively.\par Patient's immunosuppression to be continued with out interruption.\par Patient if remains NPO needs needs to have the immunosuppression given parenterally. Please contact renal team perioperatively for parenteral dosages if necessary.\par Please contact if you need any further information.\par \par \par Derik Ba MD\par Direct Phone: [phone]\par \par \par

## 2020-12-10 NOTE — QUALITY MEASURES
[Patient has started or completed the] : patient has started or completed the process of evaluation for renal transplant
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
## 2020-12-10 NOTE — PHYSICAL EXAM
[Outer Ear] : the ears and nose were normal in appearance [Jugular Venous Distention Increased] : there was no jugular-venous distention [Edema] : there was no peripheral edema [General Appearance - Alert] : alert [General Appearance - In No Acute Distress] : in no acute distress [FreeTextEntry1] : no acute signs [Involuntary Movements] : no involuntary movements were seen [] : no rash [No Focal Deficits] : no focal deficits [Oriented To Time, Place, And Person] : oriented to person, place, and time [Impaired Insight] : insight and judgment were intact [Affect] : the affect was normal

## 2021-05-25 NOTE — PATIENT PROFILE ADULT. - FUNCTIONAL LEVEL PRIOR: AMBULATION
[facility]  Office: [address], DO, Luz White, DO, Kellylinus Hidalgo, DO, Pablito Liz Blood, DO, Megan Marcos MD, Ally Contreras MD, Tawnya Monteiro MD, Bonnielee Nissen, MD, Jasmyne Muñoz MD, Marcy Ricardo MD, Argelia Moe MD, Kathya Diaz MD, Jayant Marshall, DO, Emily Dan MD, Prudencio Pino, DO, Татьяна Schaffer MD,  Kaylah Manzo, DO, Christy Gross MD, Beau Cyr MD, Steven Read MD, Minoo Jimenez MD, Maddie Cottrell, Venus Mak, CNP, Sonali Christine, CNP, Maile Roman, CNS, Juliet Hill, CNP, Aiden Madsen, CNP, Pascual Samuels, CNP, Puja San, CNP, Zulema Lopez, CNP, Tay Grace, PA-C, Toby Lopez, [facility], Jared Chery, CNP, Shree Drew, CNP, Wicho Knight, CNP, Nam Huffman, CNP, Tahira Bates, CNP, Peng Layne, [address] [place] [address]    Progress Note    5/25/2021    8:51 AM    Name:   Lani Velasquez  MRN:     [id number]     Acct:      [de-identified]   Room:   2012/2012-01  IP Day:  6  Admit Date:  5/19/2021  5:05 AM    PCP:   TERRANCE Dinero CNP  Code Status:  Full Code    Subjective:     C/C:   Chief Complaint   Patient presents with    Respiratory Distress     Interval History Status: improved. 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 M with history of ESRD on HD, COPD who presented with shortness of breath. States symptoms going on for the past 2 days, but has been having intermittent shortness of breath going on for the past 2 years.  Having worsening dyspnea with exertion. Also having sputum production. Was recently admitted 2 days prior for COPD exacerbation, d/c on steroids and antibiotics at that time. States he went home and developed worsening shortness of breath causing him to come back to ER. Has significant secondhand smoke exposure at home    Review of Systems:     Constitutional:  negative for chills, fevers, sweats  Respiratory:  negative for cough, dyspnea on exertion, +shortness of breath, wheezing  Cardiovascular:  negative for chest pain, chest pressure/discomfort, lower extremity edema, palpitations  Gastrointestinal:  negative for abdominal pain, constipation, diarrhea, nausea, vomiting  Neurological:  negative for dizziness, headache    Medications: Allergies:     Allergies   Allergen Reactions    Lisinopril      cough    Ace Inhibitors      coughing    Pcn [Penicillins]        Current Meds:   Scheduled Meds:    fluticasone  1 spray Each Nostril Daily    albuterol  2.5 mg Nebulization BID    montelukast  10 mg Oral Nightly    predniSONE  30 mg Oral Daily    insulin lispro  0-18 Units Subcutaneous TID WC    insulin lispro  0-9 Units Subcutaneous Nightly    guaiFENesin  600 mg Oral BID    insulin lispro  3 Units Subcutaneous Q8H    insulin glargine  20 Units Subcutaneous Nightly    aspirin  81 mg Oral Daily    atorvastatin  80 mg Oral Daily    carvedilol  25 mg Oral BID WC    cloNIDine  0.2 mg Oral TID    clopidogrel  75 mg Oral Daily    hydrALAZINE  100 mg Oral 3 times per day    isosorbide mononitrate  60 mg Oral Daily    losartan  50 mg Oral BID    NIFEdipine  90 mg Oral Nightly    prazosin  4 mg Oral BID    QUEtiapine  100 mg Oral Nightly    sodium bicarbonate  1,300 mg Oral BID    sodium chloride flush  5-40 mL Intravenous 2 times per day    famotidine  20 mg Oral BID    enoxaparin  40 mg Subcutaneous Daily    bumetanide  2 mg Intravenous Once    ipratropium-albuterol  1 ampule Inhalation 4x daily     Continuous Infusions:  dextrose      sodium chloride       PRN Meds: LORazepam, nitroGLYCERIN, heparin (porcine), heparin (porcine), albuterol, glucose, dextrose, glucagon (rDNA), dextrose, sodium chloride flush, sodium chloride, promethazine **OR** ondansetron, nicotine, polyethylene glycol, acetaminophen **OR** acetaminophen    Data:     Past Medical History:   has a past medical history of Asthma, CAD in native artery, nonobstructive, Carotid stenosis, left, Carpal tunnel syndrome, Cerebrovascular disease, Chronic kidney disease, COPD (chronic obstructive pulmonary disease) ([facility] [address] [facility] History of colon polyps, History of weakness of extremity, HTN (hypertension), Hyperlipidemia, Lung, cysts, congenital, PTSD (post-traumatic stress disorder), PTSD (post-traumatic stress disorder), TIA (transient ischemic attack), and Type II or unspecified type diabetes mellitus without mention of complication, not stated as uncontrolled. Social History:   reports that he quit smoking about 6 years ago. His smoking use included cigarettes. He has a 17.50 pack-year smoking history. He has never used smokeless tobacco. He reports that he does not drink alcohol and does not use drugs. Family History:   Family History   Problem Relation Age of Onset    Cancer Mother     Heart Disease Father        Vitals:  /67   Pulse 62   Temp 97.7 °F (36.5 °C) (Oral)   Resp 16   Wt 145 lb 3.2 oz (65.9 kg)   SpO2 100%   BMI 23.44 kg/m²   Temp (24hrs), Av.1 °F (36.7 °C), Min:97.6 °F (36.4 °C), Max:98.5 °F (36.9 °C)    Recent Labs     21  1624 21  2045 21  2238 21  0642   POCGLU 158* 292* 370* 274*       I/O (24Hr):     Intake/Output Summary (Last 24 hours) at 2021 0851  Last data filed at 2021 0617  Gross per 24 hour   Intake 1660 ml   Output --   Net 1660 ml       Labs:  Hematology:  Recent Labs     21  0925 21  0405 21  0633   WBC 11.4* 10.1 9.3   RBC 2.82* 2.75* 2.83* HGB 8.8* 8.7* 8.8*   HCT 27.9* 27.2* 27.7*   MCV 98.9 98.9 97.9   MCH 31.2 31.6 31.1   MCHC 31.5 32.0 31.8   RDW 12.8 12.9 12.5    333 341   MPV 10.0 10.2 10.1     Chemistry:  Recent Labs     05/23/21  0925 05/24/21  0405 05/25/21  0633   * 132* 132*   K 4.2 3.9 4.3   CL 93* 96* 97*   CO2 25 25 25   GLUCOSE 348* 412* 302*   BUN 59* 77* 96*   CREATININE 3.04* 3.62* 4.10*   MG 1.5* 1.6 1.8   ANIONGAP 11 11 10   LABGLOM 21* 17* 15*   GFRAA 25* 21* 18*   CALCIUM 7.6* 7.5* 7.9*   PHOS 4.8* 5.2* 5.1*     Recent Labs     05/23/21  0925 05/24/21  0405 05/24/21  0758 05/24/21  1231 05/24/21  1624 05/24/21  2045 05/24/21  2238 05/25/21  0633 05/25/21  0642   LABALBU 2.6* 2.4*  --   --   --   --   --  2.5*  --    POCGLU  --   --  298* 113* 158* 292* 370*  --  274*     ABG:  Lab Results   Component Value Date    POCPH 7.39 06/17/2013    POCPCO2 46 06/17/2013    POCPO2 288 06/17/2013    POCHCO3 27.8 06/17/2013    NBEA NOT REPORTED 06/17/2013    PBEA 3 06/17/2013    YPD6VJU 29 06/17/2013    MUSO3URL 100 06/17/2013    FIO2 NOT REPORTED 05/15/2021     Lab Results   Component Value Date/Time    SPECIAL 2ML L FR 12/03/2020 11:24 AM     Lab Results   Component Value Date/Time    CULTURE NO GROWTH 6 DAYS 12/03/2020 11:24 AM       Radiology:  XR CHEST PORTABLE    Result Date: 5/20/2021  No acute cardiopulmonary abnormality. XR CHEST PORTABLE    Result Date: 5/20/2021  Unremarkable single upright portable AP view of the chest.     XR CHEST PORTABLE    Result Date: 5/19/2021  Unremarkable single upright portable AP view of the chest.     CT SINUS WO CONTRAST    Result Date: 5/24/2021  Complete opacification of the right maxillary sinus with mild mucoperiosteal thickening of the ethmoid air cells and inferior frontal sinuses consistent with sinus inflammation/sinusitis. Right ostiomeatal complex is occluded by mucoperiosteal thickening. Cerebral atrophy. Atherosclerotic disease as above.        Physical Examination: (2) assistive person

## 2021-06-08 ENCOUNTER — NON-APPOINTMENT (OUTPATIENT)
Age: 61
End: 2021-06-08

## 2021-07-22 NOTE — ED PROVIDER NOTE - ABDOMINAL EXAM
Attempted to reach patient. No answer. Unable to leave message on VM. Please attempt call again later.     Home phone number is for Kyle Mckeon Sr. - He stated that Kyle Peralta and Winnie are at work.      
Attempted to reach patient. No answer. \"The wireless caller is not available, please try again later\". Unable to leave voicemail.   
Attempted to reach patient. No answer. \"The wireless caller is not available, please try again later\". Unable to leave voicemail.     Attempted to reach patient's wife, Winnie  (permission to speak to Winnie documented in Epic). \"The number you are calling is not a working number\".       
Called patient. New cell number entered in demographic.   MD message conveyed. Patient verbalized understanding.    
Please let patient know that the average blood sugar is in the diabetes range at 6.6, we diagnose diabetes if it stays in this range twice.  It is a 3 month average, so we can recheck in 3 months  To help it, work to eat less sugar, but also less bread, pasta, rice, potates, chips, crackers (carbs) as this turns to sugar in digestion.    Lab ordered for recheck in about 3 months    Cholesterol remains mildly elevated  psa normal  
nondistended/soft/nontender...

## 2021-08-03 NOTE — PATIENT PROFILE ADULT. - NSTOBACCONEVERSMOKERY/N_GEN_A
Health Maintenance Due   Topic Date Due   • COVID-19 Vaccine (1) Never done   • Influenza Vaccine (1) 08/01/2021   • Depression Screening  01/26/2022       Patient is due for topics listed above, he wishes to proceed with Depression Screening , but is not proceeding with Immunization(s) COVID-19 and Influenza at this time. The following has occurred: Depression screen completed today.     Patient has given verbal permission for Nurse Practitioner Student to examine/participate in care of patient today.            No

## 2022-02-23 NOTE — PATIENT PROFILE ADULT. - BRADEN SCORE (IF 18 OR LESS ACTIVATE SKIN INJURY RISK INCREASED GUIDELINE), MLM
19 Bi-Rhombic Flap Text: The defect edges were debeveled with a #15 scalpel blade.  Given the location of the defect and the proximity to free margins a bi-rhombic flap was deemed most appropriate.  Using a sterile surgical marker, an appropriate rhombic flap was drawn incorporating the defect. The area thus outlined was incised deep to adipose tissue with a #15 scalpel blade.  The skin margins were undermined to an appropriate distance in all directions utilizing iris scissors.

## 2022-02-24 NOTE — DIETITIAN INITIAL EVALUATION ADULT. - ETIOLOGY
Please note this is a virtual visit for which the patient has consented  Ms. Matthew is a very pleasant 70-year-old female who was seen in our office on February 8, 2022.  Please refer to our note from then.  At that time we recommended for the patient to obtain MRI of the cervical spine giving her signs and symptoms.  MRI of the cervical spine with multiple levels of degenerative disc disease and canal stenosis of the cervical spine worse at the level of C3-C4 C4-C5 C5-C6 with both anterior and posterior pathology with likely calcifications of the ligamentum flavum causing significant canal stenosis.  At the level of C4-C5 there is left moderate foraminal stenosis C3-C4 moderate bilateral foraminal narrowing C5-C6 severe right foraminal narrowing.    The above was discussed with the patient.  Her images were shown to her.    The patient voiced understanding of the images findings.  The patient was understanding that some of her symptoms likely related to the significant stenosis of the cervical spine especially myelopathic signs and symptoms.  The patient acquired regarding certain exercises that might resolve imaging findings.  It was explained that physical therapy may assist with symptoms however the picture most likely will not be different.  Other than conservative management operative intervention in the form of C3-C4 C4-C5 C5-C6 posterior decompression and fusion discussed with the patient.  The patient elected to proceed with the procedure.  Possible complications include not limited to death paralysis injury to nerve roots injury to thecal sac intraspinal cord the need for reoperation postoperative pain bleeding infection CSF fistula that requires operative intervention deep venous thrombosis pulmonary embolism failure of the hardware adjacent level disease postoperative valve decline worsening of her current logical status and anesthesia complications.    Please note this was dictated utilizing dragon  system  30 minutes spent interviewing the patient, reviewing the images, discussing the images with the patient, and coordinating care     kidney dysfunction, ESRD

## 2022-02-28 NOTE — DISCHARGE NOTE ADULT - CARE PROVIDER_API CALL
Campbell Real (MD), Cardiovascular Disease; Internal Medicine; Interventional Cardiology; Nuclear Cardiology  [address] [facility] [address]  [place] [address]  Phone: [phone]  Fax: [phone] No

## 2022-07-14 ENCOUNTER — APPOINTMENT (OUTPATIENT)
Dept: NEPHROLOGY | Facility: CLINIC | Age: 62
End: 2022-07-14

## 2022-07-14 VITALS
BODY MASS INDEX: 29.47 KG/M2 | DIASTOLIC BLOOD PRESSURE: 86 MMHG | TEMPERATURE: 97.3 F | HEIGHT: 69 IN | OXYGEN SATURATION: 96 % | WEIGHT: 199 LBS | SYSTOLIC BLOOD PRESSURE: 128 MMHG | RESPIRATION RATE: 15 BRPM | HEART RATE: 68 BPM

## 2022-07-14 DIAGNOSIS — E66.3 OVERWEIGHT: ICD-10-CM

## 2022-07-14 PROCEDURE — 99214 OFFICE O/P EST MOD 30 MIN: CPT

## 2022-07-14 RX ORDER — CLONIDINE 0.2 MG/24H
0.2 PATCH, EXTENDED RELEASE TRANSDERMAL
Refills: 0 | Status: DISCONTINUED | COMMUNITY
Start: 2019-09-13 | End: 2022-07-14

## 2022-07-14 RX ORDER — NYSTATIN 100000 [USP'U]/ML
100000 SUSPENSION ORAL 4 TIMES DAILY
Qty: 2 | Refills: 3 | Status: DISCONTINUED | COMMUNITY
Start: 2021-01-05 | End: 2022-07-14

## 2022-07-14 RX ORDER — LIDOCAINE HYDROCHLORIDE 20 MG/ML
2 SOLUTION ORAL; TOPICAL
Qty: 300 | Refills: 1 | Status: DISCONTINUED | COMMUNITY
Start: 2021-01-05 | End: 2022-07-14

## 2022-07-14 RX ORDER — CYPROHEPTADINE HYDROCHLORIDE 4 MG/1
4 TABLET ORAL
Qty: 30 | Refills: 3 | Status: DISCONTINUED | COMMUNITY
Start: 2018-08-31 | End: 2022-07-14

## 2022-07-14 RX ORDER — OMEPRAZOLE 40 MG/1
40 CAPSULE, DELAYED RELEASE ORAL
Qty: 25 | Refills: 0 | Status: DISCONTINUED | COMMUNITY
Start: 2022-01-10

## 2022-07-14 NOTE — ASSESSMENT
[FreeTextEntry1] : Renal Transplant recipient: Stable allograft function.  No dysuria/hematuria. No fever/chills. Tolerating medications.\par Immunosuppression: reviewed; Thymo induction, on tac/MMF/prednisone, last Tac level noted. Has repeat labs scheduled in September.\par Currently on 2 mg AM and 1 mg PM daily.; on  full dose MMF and prednisone at 5 mg daily\par Decreased vision: Improved;  On follow up with Ophthalmologist \par Infection prophylaxis: Reviewed.\par Discussed Renal Preservation Strategies.\par Revewed health maintenance including derm and eye check.\par Discussed Renal Preservation strategies including achieving optimal weight through calory restriction and regular exercise, daily exercise, sleep hygiene, maintaining optimized control of  blood pressure  avoidance of NSAIDs, Low Na and Low animal protein diet and medication adherence.\par \par

## 2022-07-14 NOTE — HISTORY OF PRESENT ILLNESS
[FreeTextEntry1] : He is doing well overall.Diminished vision is being followed by ophthalmologists and he has had cataract surgery. Has new glasses with yellow tint and prism. Reports his near vision has significantly improved and with new glasses he has improved far vision.\par Reviewed available clinical and lab data.\par Reports most recent creatinine 1.64 Tac level 5.1 Hemoglobin 12.5 \par Next set of labs scheduled for September 2022.\par Normal blood pressure on no meds.\par Reviewed and updated his medications.\par He has had episodes of epigastric discomfort lasting short periods of time. He reports fully investigated at [facility] with GI specialist and was told to take prn Famotidine.\par He has easy fatigue but wrks full time with his jaeyos.\par \par

## 2022-07-14 NOTE — PHYSICAL EXAM
[General Appearance - Alert] : alert [General Appearance - In No Acute Distress] : in no acute distress [Outer Ear] : the ears and nose were normal in appearance [Jugular Venous Distention Increased] : there was no jugular-venous distention [Heart Sounds Gallop] : no gallops [Heart Sounds Pericardial Friction Rub] : no pericardial rub [Edema] : there was no peripheral edema [FreeTextEntry1] : no acute signs [Cervical Lymph Nodes Enlarged Posterior Bilaterally] : posterior cervical [Cervical Lymph Nodes Enlarged Anterior Bilaterally] : anterior cervical [Supraclavicular Lymph Nodes Enlarged Bilaterally] : supraclavicular [Involuntary Movements] : no involuntary movements were seen [] : no rash [No Focal Deficits] : no focal deficits [Oriented To Time, Place, And Person] : oriented to person, place, and time [Impaired Insight] : insight and judgment were intact [Affect] : the affect was normal

## 2022-09-14 NOTE — ED CLERICAL - NS ED CLERK UNITS
APER Z Plasty Text: The lesion was extirpated to the level of the fat with a #15 scalpel blade.  Given the location of the defect, shape of the defect and the proximity to free margins a Z-plasty was deemed most appropriate for repair.  Using a sterile surgical marker, the appropriate transposition arms of the Z-plasty were drawn incorporating the defect and placing the expected incisions within the relaxed skin tension lines where possible.    The area thus outlined was incised deep to adipose tissue with a #15 scalpel blade.  The skin margins were undermined to an appropriate distance in all directions utilizing iris scissors.  The opposing transposition arms were then transposed into place in opposite direction and anchored with interrupted buried subcutaneous sutures.

## 2022-10-06 NOTE — CONSULT NOTE ADULT - I WAS PHYSICALLY PRESENT FOR THE KEY PORTIONS OF THE EVALUATION AND MANAGEMENT (E/M) SERVICE PROVIDED.  I AGREE WITH THE ABOVE HISTORY, PHYSICAL, AND PLAN WHICH I HAVE REVIEWED AND EDITED WHERE APPROPRIATE
Pt arrives to the ED via EMS c/o Abd pain in RLQ and LLQ. Increasing since Wednesday. Pt nauseated and only dry heaves. Pt reports hx of gallbladder issues and had an appt with a doctor today but can't remember the specialty the are. Pt reports 3 small BM of soft stool today. Pt reports \"burping a lot and that helps with the pain\". Pt in ED cart, locked lowest position, rails up for safety, call light within reach, no signs of distress noted at this time.
Statement Selected

## 2022-11-23 ENCOUNTER — NON-APPOINTMENT (OUTPATIENT)
Age: 62
End: 2022-11-23

## 2023-01-01 NOTE — ED ADULT TRIAGE NOTE - AS O2 DELIVERY
Subjective Data:   LAUREN SHERMAN is a 6 day old Female who is Hospital Day # 6.     SGA  Di/di twin A  Nutrition, hypoglycemia.    Additional Information:  Overnight Events: Acute events in the past 24 hours  include   Additional Information:    Ellen was transferred to  from NICU with no issues.     Objective Data:   Medications:    Medications:          Continuous Medications       --------------------------------  No continuous medications are active       Scheduled Medications       --------------------------------    1. Cholecalciferol  (Vitamin D3) Oral Liquid - PEDS:  200  International Unit(s)  Oral  Every 24 Hours    2. Zinc  Oxide 40% Topical - PEDS:  1  application(s)  Topical  4 Times a Day         PRN Medications       --------------------------------    1. Zinc   Oxide 20% Topical - LEDA:  1  application(s)  Topical  5 Times a Day        Physical Exam:   Weight:       Weights   5/30 3:00: Abdominal Circumference (cm) 23  5/29 15:00: Pediatric Weight (kg) (Weight (kg))  1.585  (loss of 75 grams  11% of BW)  Vital Signs:      T   P  R  BP   SpO2   Value  36.9C  151  52  95/68   99%           on room air, no respiratory support  Date/Time 5/30 6:00 5/30 6:00 5/30 6:00 5/29 12:45  5/30 6:00  Range  (36.4C - 37.2C )  (118 - 165 )  (28 - 52 )  (81 - 95 )/ (44 - 68 )  (95% - 100% )    Thermoregulation:   Normothermic in an open crib.    Pain Score = 0  Pain reported at 5/30 1:00: 0  General:    Ellen resting comfortably dressed and swaddled in an open crib.  NG in place.  No apparent distress.    Neurologic:    Anterior fontanelle soft & flat with sutures well approximated.  Moves all extremities with appropriate tone for gestational age.    Respiratory:    Breathing comfortably in room air.  Bilateral breath sounds clear and equal with good air exchange.  Respirations unlabored.    Cardiac:    Regular rate and rhythm, normal S1 / S2 heart sounds.  No murmur appreciated on exam.  Well perfused  with brisk capillary refill and +2/= pulses in all extremities.    Abdomen:    Abdomen soft and non-distended.  No organomegaly/masses or tenderness to palpation.  Bowel sounds normoactive in all four quadrants.    Skin:    Pink, and intact with some excoriation to buttocks noted - zinc 40 in place.    Other:    Appropriate pre-term female genitalia.      System Based Note:   Neurologic:    Apneas:  x0  Bradycardias:  x0  Desaturations:  x0  Respiratory:    Stable in room air.    Cardiovascular:    No active issues.    FEN/GI:    The Intake and Output Totals for the last 24 hours are:      Intake   Output  Net      246   149  97    Totals for Past 24 hours:  Total Intake  mL/kg/day  135.16 mL/kg/day  Total Output mL/kg/day  81.86 mL/kg/day  Urine mL/kg/hr             3.41 mL/kg/hr  Stool:                                       x8    Non-measured Intake for the last 24 hours (6am to 6am) are:     Intake    Breast Feeding Attempt - 1        23 Abdominal Circumference (cm) 5/30 3:00  23 Abdominal Circumference (cm) 5/30 3:00    Bilirubin/Heme:      Total Bilirubin    Value(mg/dL)    HOL   5.7                  59                  2023 11:56:00    Direct Bilirubin    Value(mg/dL)    HOL   0.5                  59                  2023 11:56:00    Transcutaneous Bilirubin    Value(mg/dL)    HOL   6.5                 Not specified               2023 09:00:00  5.6                 Not specified               2023 21:00:00  8.1                 63               2023 09:00:00  10                 76               2023 21:00:00  9.4                 87               2023 09:00:00  9.9                 99               2023 21:00:00  9.7                 Not specified               2023 09:00:00  9.5                 Not specified               2023 06:00:00          Social/Parental Support:    5/30:  Mom not present for rounds, will call and provide an  "update.  Other:    ONBS:  sent  Hearing Screen: ####  Immunizations: ####  Carseat challenge: ####  CCHD:  passed  Infant CPR: ####  Home going class: ####  Repeat thyroid studies: ####  PMD: ####      Problem/Assessment/Plan:        Admitting Dx:   Hypothermia in : Entered Date: 2023 23:26       Additional Dx:   Feeding problem of , unspecified feeding problem:  Onset Date: 2023, Entered Date: 2023 11:28   Hypoglycemia, : Onset Date: 2023, Entered Date:  2023 11:21   [place] small for gestational age, 2608-0598 grams: Onset  Date: 2023, Entered Date: 2023 15:42   Infant born at 36 weeks gestation: Entered Date: 2023  08:52    Assessment:    Baby Girl Ellen \"Twin A\" Martinez is a 36.1 SGA infant female who is DOL 6, cGa 37 weeks.  AOP not on caffeine with occasional events- last celine  at rest needing  mild stim & did have one desaturation events overnight with a PO feed self-resolved.  Tolerating fortified MBM/DBM and currently working on nutrition optimization. History of hypoglycemia, currently euglycemic on prolonged feeding infusion. Following  TCBs daily which have been below light level.         Plan:    CNS:  ·  Monitor Apnea/Bradycardia events.  ·  Monitor temperatures closely in an open crib due to history of hypothermia.      RESP:   ·  Continue in RA and monitor respiratory status/desaturations.    FENGI:  ·  Increase TFG to 160 ml/kg/day (150).  ·  Continue feeds of MBM/DBM SHLMF infused over 45 minutes (hypoglycemia) and fortify to 24kCal (22kCal) and monitor feed tolerance.  ·  Continue Vitamin D Supplementation of 200 IUs/Daily.    Heme/Bili:  ·  Follow TcB Q24  LL:  19.4    ID  ·  CMV PCR ordered & pending.  ·  Initiate Zinc 40% for excoriation to buttocks.    Discharge/Social:  ·  Hearing screen ordered.  ·  HBV at DOL 30 or day of discharge.  ·  Continue to update/support family.      Mora Kothari, APRN-CNP, " "Doc Halo/Vocera       Daily Risk Screen:  Does patient have a central line? no   Does patient have an indwelling urinary catheter? no   Is the patient intubated? no     Multidisciplinary Rounding:   The following staff  were in attendance attending physician, fellow, advance practice nurse and nurse. The  following topics were discussed during rounds activity, blood test results, diet, discharge planning, home care needs, issues/problems expressed by family, medications and plan of care.    Update:   Supervisory Update:    NICU Attending 5/30/23    I assessed the infant during morning rounds with the team. I have reviewed the MEMO encounter note, approve the MEMO's documentation and have added additional information from my personal encounter.      36 week infant, requires intensive care for monitoring of thermoregulation, glycemic control, and feeding tolerance and stamina due to prematurity and SGA.  No temperature instability since return to NICU.  Comfortable and well saturated on room air.   , MBM/DBM  Working on PO feeding, took 19% of total feed volume by mouth. Bilirubin remains below LL     Last bradycardia was 5/28      Exam:  BW 1585 (-75g), Initial Admit weight 1820g, CW 1740g  Gen: asleep, reactive with exam, NGT in place  CV: pink, well perfused  Pulm: good air exchange, comfortable on RA  Abd: soft, ND/NT    Plan:    Increase feeds to 160mL/kg/day  Will monitor bradys  Encourage PO    -Angelia Murphy MD          Attestation:   Note Completion:  I am a:  Advanced Practice Provider   Comments/ Additional Findings    See \"update\" section for details          Electronic Signatures:  Mora Kothari (APRN-CNP)  (Signed 2023 14:50)   Authored: Assessment/Plan Review, Subjective Data, Objective  Data, Physical Exam, System Based Note, Problem/Assessment/Plan, Multidisciplinary Rounding  Angelia Murphy)  (Signed 2023 16:02)   Authored: Update, Note Completion   Co-Signer: Assessment/Plan " Review, Subjective Data, Objective Data, Physical Exam, System Based Note, Problem/Assessment/Plan, Multidisciplinary  Rounding, Note Completion  Ginny Bearden (APRN-CNP)  (Signed 2023 15:12)   Entered: Subjective Data, Physical Exam, System Based  Note, Problem/Assessment/Plan, Multidisciplinary Rounding, Note Completion   Authored: Assessment/Plan Review, Subjective Data, Objective Data, Physical Exam, System Based Note, Problem/Assessment/Plan, Multidisciplinary  Rounding, Note Completion      Last Updated: 2023 16:02 by Angelia Murphy)    room air

## 2023-05-15 ENCOUNTER — NON-APPOINTMENT (OUTPATIENT)
Age: 63
End: 2023-05-15

## 2023-05-19 LAB
DEPRECATED O AND P PREP STL: NORMAL
G LAMBLIA AG STL QL: NORMAL

## 2023-05-22 ENCOUNTER — APPOINTMENT (OUTPATIENT)
Dept: NEPHROLOGY | Facility: CLINIC | Age: 63
End: 2023-05-22

## 2023-05-23 ENCOUNTER — APPOINTMENT (OUTPATIENT)
Dept: INTERNAL MEDICINE | Facility: CLINIC | Age: 63
End: 2023-05-23
Payer: MEDICARE

## 2023-05-23 VITALS
BODY MASS INDEX: 27.91 KG/M2 | DIASTOLIC BLOOD PRESSURE: 68 MMHG | OXYGEN SATURATION: 96 % | HEART RATE: 78 BPM | SYSTOLIC BLOOD PRESSURE: 132 MMHG | WEIGHT: 189 LBS | TEMPERATURE: 97.9 F

## 2023-05-23 PROCEDURE — 99204 OFFICE O/P NEW MOD 45 MIN: CPT

## 2023-05-23 NOTE — PHYSICAL EXAM
[No Acute Distress] : no acute distress [Normal Sclera/Conjunctiva] : normal sclera/conjunctiva [Normal Outer Ear/Nose] : the outer ears and nose were normal in appearance [No JVD] : no jugular venous distention [No Respiratory Distress] : no respiratory distress  [No Accessory Muscle Use] : no accessory muscle use [Clear to Auscultation] : lungs were clear to auscultation bilaterally [Normal Rate] : normal rate  [Regular Rhythm] : with a regular rhythm [Normal S1, S2] : normal S1 and S2 [No Edema] : there was no peripheral edema [Soft] : abdomen soft [Normal Anterior Cervical Nodes] : no anterior cervical lymphadenopathy [No CVA Tenderness] : no CVA  tenderness [Coordination Grossly Intact] : coordination grossly intact

## 2023-05-25 NOTE — REVIEW OF SYSTEMS
[Diarrhea] : diarrhea [Vomiting] : vomiting [Fever] : no fever [Night Sweats] : no night sweats [Discharge] : no discharge [Vision Problems] : no vision problems [Earache] : no earache [Nasal Discharge] : no nasal discharge [Chest Pain] : no chest pain [Orthopena] : no orthopnea [Shortness Of Breath] : no shortness of breath [Abdominal Pain] : no abdominal pain [Dysuria] : no dysuria [Incontinence] : no incontinence [Joint Pain] : no joint pain [Back Pain] : no back pain [Itching] : no itching [Skin Rash] : no skin rash [Headache] : no headache [Memory Loss] : no memory loss

## 2023-05-25 NOTE — HISTORY OF PRESENT ILLNESS
[FreeTextEntry8] : 62 year old male with PMHx of ESRD s/p Kidney transplant who presents with complaints of diarrhea for 3 weeks. He went to urgent care last week, was informed that he had norvovirus infection but all other stool tests were negative. Currently he has no acute medical complaints such as chest pain, shortness of breath or cough.

## 2023-05-25 NOTE — PLAN
[FreeTextEntry1] : Diarrhea- viral in nature, advised symptomatic relief\par Renal transplant recipient - c/w current regimen, f/u with nephrology\par \par f/u in 1 month with routine labs

## 2023-05-30 ENCOUNTER — NON-APPOINTMENT (OUTPATIENT)
Age: 63
End: 2023-05-30

## 2023-06-01 ENCOUNTER — APPOINTMENT (OUTPATIENT)
Dept: NEPHROLOGY | Facility: CLINIC | Age: 63
End: 2023-06-01

## 2023-06-15 ENCOUNTER — APPOINTMENT (OUTPATIENT)
Dept: CARDIOLOGY | Facility: CLINIC | Age: 63
End: 2023-06-15
Payer: MEDICARE

## 2023-06-15 ENCOUNTER — NON-APPOINTMENT (OUTPATIENT)
Age: 63
End: 2023-06-15

## 2023-06-15 VITALS
HEART RATE: 80 BPM | SYSTOLIC BLOOD PRESSURE: 115 MMHG | BODY MASS INDEX: 27.4 KG/M2 | TEMPERATURE: 98.7 F | HEIGHT: 69 IN | DIASTOLIC BLOOD PRESSURE: 80 MMHG | OXYGEN SATURATION: 97 % | WEIGHT: 185 LBS

## 2023-06-15 DIAGNOSIS — Z00.00 ENCOUNTER FOR GENERAL ADULT MEDICAL EXAMINATION W/OUT ABNORMAL FINDINGS: ICD-10-CM

## 2023-06-15 PROCEDURE — G0438: CPT

## 2023-06-15 PROCEDURE — 93000 ELECTROCARDIOGRAM COMPLETE: CPT

## 2023-06-19 NOTE — HISTORY OF PRESENT ILLNESS
[FreeTextEntry1] : 62 year old male with PMHx of ESRD s/p Kidney transplant (Renal Dr. Patton) who presents as a new patient who saw Dr. Saenz in the past for annual physical.\par \par This patient presents today for general medical exam and to follow up for any medical issues. They deny any new health problems or new family medical history. The patient denies heat/cold intolerance, weight gain or loss, changes in their hair pattern, alteration in sleep habits, or change in their mood patterns. They also deny joint pain, rash, change in vision.\par \par He has been having diarrhea x 1 month. He had norovirus initially but he is still complaining of diarrhea. negative stool samples. \par \par Pt also states he also sometimes has abdominal discomfort after eating. \par \par s/p ldl 152, bilirubin 2

## 2023-07-18 ENCOUNTER — APPOINTMENT (OUTPATIENT)
Dept: CARDIOLOGY | Facility: CLINIC | Age: 63
End: 2023-07-18
Payer: MEDICARE

## 2023-07-18 VITALS
HEART RATE: 73 BPM | TEMPERATURE: 98.3 F | BODY MASS INDEX: 27.11 KG/M2 | HEIGHT: 69 IN | WEIGHT: 183 LBS | SYSTOLIC BLOOD PRESSURE: 120 MMHG | OXYGEN SATURATION: 97 % | DIASTOLIC BLOOD PRESSURE: 80 MMHG

## 2023-07-18 DIAGNOSIS — N18.5 CHRONIC KIDNEY DISEASE, STAGE 5: ICD-10-CM

## 2023-07-18 DIAGNOSIS — R19.7 DIARRHEA, UNSPECIFIED: ICD-10-CM

## 2023-07-18 DIAGNOSIS — R10.9 UNSPECIFIED ABDOMINAL PAIN: ICD-10-CM

## 2023-07-18 DIAGNOSIS — I51.7 CARDIOMEGALY: ICD-10-CM

## 2023-07-18 DIAGNOSIS — Z94.0 KIDNEY TRANSPLANT STATUS: ICD-10-CM

## 2023-07-18 PROCEDURE — 93306 TTE W/DOPPLER COMPLETE: CPT

## 2023-07-18 PROCEDURE — 99214 OFFICE O/P EST MOD 30 MIN: CPT

## 2023-07-18 PROCEDURE — 93000 ELECTROCARDIOGRAM COMPLETE: CPT

## 2023-07-18 NOTE — HISTORY OF PRESENT ILLNESS
[FreeTextEntry1] : Pt is a 62 year old male with PMHx of ESRD s/p Kidney transplant x3 (Renal Dr. Patton) and HLD who presents for a follow up today. Of note, pt was here in June 2023 and has been having diarrhea x 2 month with negative stool samples. Pt also states he also sometimes has abdominal discomfort after eating. Pt states symptoms have been improving since prior. \par \par The patient is here for follow-up of elevated cholesterol. Currently tolerating prescribed medications. Denies muscle pain, joint pain, back pain, urinary changes, nausea, vomiting, abdominal pain or diarrhea. The patient is trying to follow a low cholesterol diet.\par \par s/p , bilirubin 2.  Pt is scheduled to get blood work with nephrologist next week and will forward results to us.

## 2023-08-30 NOTE — PROVIDER CONTACT NOTE (OTHER) - RECOMMENDATIONS
Continue to monitor K+ level, monitor VS, and I/O's. Admission Reconciliation is Not Complete  Discharge Reconciliation is Not Complete Admission Reconciliation is Not Complete  Discharge Reconciliation is Completed

## 2023-09-21 ENCOUNTER — RX RENEWAL (OUTPATIENT)
Age: 63
End: 2023-09-21

## 2023-10-04 ENCOUNTER — APPOINTMENT (OUTPATIENT)
Dept: NEPHROLOGY | Facility: CLINIC | Age: 63
End: 2023-10-04
Payer: MEDICARE

## 2023-10-04 VITALS
SYSTOLIC BLOOD PRESSURE: 121 MMHG | DIASTOLIC BLOOD PRESSURE: 76 MMHG | TEMPERATURE: 98 F | HEIGHT: 69 IN | BODY MASS INDEX: 26.66 KG/M2 | HEART RATE: 78 BPM | RESPIRATION RATE: 15 BRPM | WEIGHT: 180 LBS | OXYGEN SATURATION: 95 %

## 2023-10-04 DIAGNOSIS — E78.5 HYPERLIPIDEMIA, UNSPECIFIED: ICD-10-CM

## 2023-10-04 DIAGNOSIS — D84.9 IMMUNODEFICIENCY, UNSPECIFIED: ICD-10-CM

## 2023-10-04 PROCEDURE — 99214 OFFICE O/P EST MOD 30 MIN: CPT

## 2023-10-05 PROBLEM — E78.5 HYPERLIPIDEMIA, UNSPECIFIED HYPERLIPIDEMIA TYPE: Status: ACTIVE | Noted: 2023-06-15

## 2023-10-05 PROBLEM — D84.9 IMMUNOSUPPRESSED STATUS: Status: ACTIVE | Noted: 2017-08-24

## 2023-10-19 ENCOUNTER — RX RENEWAL (OUTPATIENT)
Age: 63
End: 2023-10-19

## 2023-12-05 ENCOUNTER — APPOINTMENT (OUTPATIENT)
Dept: TRANSPLANT | Facility: CLINIC | Age: 63
End: 2023-12-05

## 2023-12-05 DIAGNOSIS — Z94.0 KIDNEY TRANSPLANT STATUS: ICD-10-CM

## 2023-12-19 ENCOUNTER — APPOINTMENT (OUTPATIENT)
Dept: INFECTIOUS DISEASE | Facility: CLINIC | Age: 63
End: 2023-12-19
Payer: MEDICARE

## 2023-12-19 DIAGNOSIS — Z23 ENCOUNTER FOR IMMUNIZATION: ICD-10-CM

## 2023-12-19 PROCEDURE — 90678 RSV VACC PREF BIVALENT IM: CPT | Mod: GY

## 2023-12-19 PROCEDURE — 90471 IMMUNIZATION ADMIN: CPT

## 2024-03-15 ENCOUNTER — RX RENEWAL (OUTPATIENT)
Age: 64
End: 2024-03-15

## 2024-03-15 RX ORDER — MYCOPHENOLATE MOFETIL 500 MG/1
500 TABLET ORAL
Qty: 120 | Refills: 5 | Status: ACTIVE | COMMUNITY
Start: 2024-03-15 | End: 1900-01-01

## 2024-05-25 NOTE — HISTORY OF PRESENT ILLNESS
[FreeTextEntry1] : 59  years old  male,\par He received live donor kidney transplant on 1/30/19 at [facility] (Surgeon-Hui Rich)\par He was on hemodialysis for 10 months prior to transplant.\par Donor characteristics reviewed.\par Donor: Live unrelated donor\par Donor age -50\par Anatomy - reportedly had 3 arteries, 1 vein\par Has ureteral stent.Removed on 3/26/19 at [facility]\par PRA was 0%. HLA Match: not available.\par Induction regimen noted. Thymoglobulin induction, Tac/MMF/Prednisone\par Currently taking Tacrolimus 3 mg am and 2 mg PM, CellCept 1000 /dose and prednisone 5.\par He is on prophylaxis regimen: Bactrim SS/d, \par Blood pressure control: Clonidine TTS 0.2 mg/day \par Other meds: Protonix, aspirin 81 mg/day.\par Glucose control regimen: Not diabetic/none.\par Post op course: Voice issue reports having been evaluated by ENT, had injection of the vocal cord\par Immediate allograft function.Had righ I/J tunneled catheter removed at [facility].\par His post op course reviewed with patient and was discharged on 2/7/19.\par \par \par \par Currently\par Noted mild elevation in bilirubin upto 2 mg/dl and 8 mm bile duct on sonogram. 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 No gall stones in gb. on sonogram.\par Has no fever, no urinary symptoms except nocturia: 1 time/night at present\par Medications side effects: none noted. Reports some forget fulness\par Last saw Dr. Carmona/Dr. Sandhu in August\par Last Creatiine 1.43 mg/dl\par \par No hospitalization since transplant\par Had vocal cord injection, by Dr. Gil Watts, [facility] ENT surgeon at the [facility] center.\par \par Functional /Employment status: Reviewed.\par Nephrologist:   Dr. Delia Sandhu/Dr. Carmona\par Coordinators- Farzana Mansfield and Samia SILVERIO at [facility]\par \par Labs from 2/11: Creatinine 1.73 Tac level: 10\par August 2019: Cr 1.43 mg/dl\par \par Walks about 20 blocks/day\par System review: No GI symptoms, No head aches. Weight has been stable\par  124

## 2024-06-26 ENCOUNTER — RX RENEWAL (OUTPATIENT)
Age: 64
End: 2024-06-26

## 2024-06-26 RX ORDER — SULFAMETHOXAZOLE AND TRIMETHOPRIM 400; 80 MG/1; MG/1
400-80 TABLET ORAL
Qty: 30 | Refills: 10 | Status: ACTIVE | COMMUNITY
Start: 2024-06-26 | End: 1900-01-01

## 2024-07-03 ENCOUNTER — NON-APPOINTMENT (OUTPATIENT)
Age: 64
End: 2024-07-03

## 2024-07-03 DIAGNOSIS — I10 ESSENTIAL (PRIMARY) HYPERTENSION: ICD-10-CM

## 2024-07-16 ENCOUNTER — APPOINTMENT (OUTPATIENT)
Dept: NEPHROLOGY | Facility: CLINIC | Age: 64
End: 2024-07-16
Payer: MEDICARE

## 2024-07-16 VITALS
RESPIRATION RATE: 16 BRPM | SYSTOLIC BLOOD PRESSURE: 129 MMHG | DIASTOLIC BLOOD PRESSURE: 75 MMHG | OXYGEN SATURATION: 95 % | HEIGHT: 69 IN | HEART RATE: 89 BPM | WEIGHT: 176 LBS | BODY MASS INDEX: 26.07 KG/M2 | TEMPERATURE: 98.3 F

## 2024-07-16 DIAGNOSIS — D84.9 IMMUNODEFICIENCY, UNSPECIFIED: ICD-10-CM

## 2024-07-16 DIAGNOSIS — R19.7 DIARRHEA, UNSPECIFIED: ICD-10-CM

## 2024-07-16 DIAGNOSIS — Z94.0 KIDNEY TRANSPLANT STATUS: ICD-10-CM

## 2024-07-16 PROCEDURE — 99215 OFFICE O/P EST HI 40 MIN: CPT

## 2024-07-16 RX ORDER — MYCOPHENILIC ACID 360 MG/1
360 TABLET, DELAYED RELEASE ORAL
Qty: 120 | Refills: 11 | Status: ACTIVE | COMMUNITY
Start: 2024-07-16 | End: 1900-01-01

## 2024-07-30 ENCOUNTER — RX RENEWAL (OUTPATIENT)
Age: 64
End: 2024-07-30

## 2024-09-02 ENCOUNTER — RX RENEWAL (OUTPATIENT)
Age: 64
End: 2024-09-02

## 2024-09-11 NOTE — PHYSICAL THERAPY INITIAL EVALUATION ADULT - FUNCTIONAL LIMITATIONS, PT EVAL
[General Appearance - Well Developed] : well developed [Abdomen Soft] : soft [de-identified] : bilateral flank incsion healed home management/self-care

## 2024-10-02 NOTE — PROGRESS NOTE ADULT - PROBLEM SELECTOR PLAN 4
Security Standby Initiated  
repeat TTE showing still large effusion but no tamponade  Not uremic. 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 The drug was stopped 3 weeks ago.   https://onlinelibrary.vann.com/doi/pdf/10.1111/tri.97078 is a sample case that shows that. Would still rule out other causes such as malignancy vs infections
As above
C/w with tacro, valcyte, bactrim, ad prednisone,
c/w epogen 10,000  check iron studies
needs repeat TTE for re evaluation of pericardial fluid.
repeat TTE showing still large effusion but no tamponade  Not uremic. 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 The drug was stopped 3 weeks ago.   https://onlinelibrary.vann.com/doi/pdf/10.1111/tri.16924 is a sample case that shows that. Would still rule out other causes such as malignancy vs infections

## 2024-11-12 ENCOUNTER — NON-APPOINTMENT (OUTPATIENT)
Age: 64
End: 2024-11-12

## 2024-11-12 ENCOUNTER — APPOINTMENT (OUTPATIENT)
Dept: PULMONOLOGY | Facility: CLINIC | Age: 64
End: 2024-11-12
Payer: MEDICARE

## 2024-11-12 ENCOUNTER — APPOINTMENT (OUTPATIENT)
Dept: INTERNAL MEDICINE | Facility: CLINIC | Age: 64
End: 2024-11-12
Payer: MEDICARE

## 2024-11-12 VITALS
WEIGHT: 178.31 LBS | BODY MASS INDEX: 26.41 KG/M2 | HEIGHT: 69 IN | SYSTOLIC BLOOD PRESSURE: 128 MMHG | HEART RATE: 102 BPM | OXYGEN SATURATION: 96 % | DIASTOLIC BLOOD PRESSURE: 76 MMHG | TEMPERATURE: 98.5 F

## 2024-11-12 DIAGNOSIS — E78.5 HYPERLIPIDEMIA, UNSPECIFIED: ICD-10-CM

## 2024-11-12 DIAGNOSIS — R05.8 OTHER SPECIFIED COUGH: ICD-10-CM

## 2024-11-12 DIAGNOSIS — Z94.0 KIDNEY TRANSPLANT STATUS: ICD-10-CM

## 2024-11-12 DIAGNOSIS — D84.9 IMMUNODEFICIENCY, UNSPECIFIED: ICD-10-CM

## 2024-11-12 DIAGNOSIS — I10 ESSENTIAL (PRIMARY) HYPERTENSION: ICD-10-CM

## 2024-11-12 PROCEDURE — 99214 OFFICE O/P EST MOD 30 MIN: CPT

## 2024-11-12 PROCEDURE — 71046 X-RAY EXAM CHEST 2 VIEWS: CPT

## 2024-11-15 ENCOUNTER — TRANSCRIPTION ENCOUNTER (OUTPATIENT)
Age: 64
End: 2024-11-15

## 2024-11-17 NOTE — PATIENT PROFILE ADULT. - MENTAL HEALTH CONDITIONS/SYMPTOMS, PROFILE
Pt observed with increased sediement/cloudiness in urine. T/C placed to [facility] office to notify. Per PCP office Pt instructed to be seen at urgent care as no orders would be able to be given from PA d/t patient has not been seen by PCP recently with no upcoming scheduled appointments. Pt verbalized understanding.
none

## 2024-11-19 LAB
RAPID RVP RESULT: NOT DETECTED
SARS-COV-2 RNA NPH QL NAA+NON-PROBE: NOT DETECTED

## 2024-12-09 ENCOUNTER — APPOINTMENT (OUTPATIENT)
Dept: CARDIOLOGY | Facility: CLINIC | Age: 64
End: 2024-12-09
Payer: MEDICARE

## 2024-12-09 ENCOUNTER — NON-APPOINTMENT (OUTPATIENT)
Age: 64
End: 2024-12-09

## 2024-12-09 VITALS
HEIGHT: 69 IN | TEMPERATURE: 97.9 F | DIASTOLIC BLOOD PRESSURE: 74 MMHG | OXYGEN SATURATION: 97 % | WEIGHT: 177 LBS | SYSTOLIC BLOOD PRESSURE: 128 MMHG | BODY MASS INDEX: 26.22 KG/M2 | HEART RATE: 87 BPM

## 2024-12-09 DIAGNOSIS — Z94.0 KIDNEY TRANSPLANT STATUS: ICD-10-CM

## 2024-12-09 DIAGNOSIS — E78.5 HYPERLIPIDEMIA, UNSPECIFIED: ICD-10-CM

## 2024-12-09 DIAGNOSIS — I10 ESSENTIAL (PRIMARY) HYPERTENSION: ICD-10-CM

## 2024-12-09 DIAGNOSIS — Z01.818 ENCOUNTER FOR OTHER PREPROCEDURAL EXAMINATION: ICD-10-CM

## 2024-12-09 PROCEDURE — 93306 TTE W/DOPPLER COMPLETE: CPT

## 2024-12-09 PROCEDURE — 99214 OFFICE O/P EST MOD 30 MIN: CPT

## 2024-12-09 PROCEDURE — G2211 COMPLEX E/M VISIT ADD ON: CPT

## 2024-12-09 PROCEDURE — 93000 ELECTROCARDIOGRAM COMPLETE: CPT

## 2025-02-21 NOTE — PROGRESS NOTE ADULT - SUBJECTIVE AND OBJECTIVE BOX
Patient is a 58y old  Male who presents with a chief complaint of Accelerated hypertension/hypertensive emergency/vision changes (17 Aug 2018 16:30)      SUBJECTIVE / OVERNIGHT EVENTS:   Feels better.  Denies CP/SOB/Palpitation/HA.    MEDICATIONS  (STANDING):  ascorbic acid 500 milliGRAM(s) Oral daily  Butalbital, acetaminophen/caffeine 1 Tablet(s) 1 Tablet(s) Oral daily  calcitriol   Capsule 0.25 MICROGram(s) Oral daily  chlorhexidine 4% Liquid 1 Application(s) Topical <User Schedule>  cholecalciferol 2000 Unit(s) Oral daily  cloNIDine Patch 0.3 mG/24Hr(s) 1 patch Topical every 7 days  furosemide    Tablet 80 milliGRAM(s) Oral daily  HYDROmorphone  Injectable 1 milliGRAM(s) IV Push once  losartan 50 milliGRAM(s) Oral two times a day  minoxidil 5 milliGRAM(s) Oral at bedtime  Nephro-martha 1 Tablet(s) Oral daily  NIFEdipine XL 60 milliGRAM(s) Oral <User Schedule>  predniSONE   Tablet 5 milliGRAM(s) Oral daily  tacrolimus 1 milliGRAM(s) Oral every 12 hours  trimethoprim  160 mG/sulfamethoxazole 800 mG 1 Tablet(s) Oral <User Schedule>    MEDICATIONS  (PRN):  acetaminophen   Tablet. 650 milliGRAM(s) Oral every 6 hours PRN Mild Pain (1 - 3)  ondansetron Injectable 4 milliGRAM(s) IV Push every 6 hours PRN give with hydralazine pushes        CAPILLARY BLOOD GLUCOSE        I&O's Summary    22 Aug 2018 07:01  -  23 Aug 2018 07:00  --------------------------------------------------------  IN: 440 mL / OUT: 0 mL / NET: 440 mL    23 Aug 2018 07:01  -  23 Aug 2018 13:52  --------------------------------------------------------  IN: 900 mL / OUT: 0 mL / NET: 900 mL        PHYSICAL EXAM:  GENERAL: NAD, well-developed  HEAD:  Atraumatic, Normocephalic  NECK: Supple, No JVD  CHEST/LUNG: Clear to auscultation bilaterally; No wheezing.  HEART: Regular rate and rhythm; No murmurs, rubs, or gallops  ABDOMEN: Soft, Nontender, Nondistended; Bowel sounds present  EXTREMITIES:   No clubbing, cyanosis, or edema  NEUROLOGY: AAO X 3  SKIN: No rashes    LABS:    08-23    136  |  94<L>  |  66<H>  ----------------------------<  132<H>  5.7<H>   |  24  |  8.93<H>    Ca    9.7      23 Aug 2018 10:46              CAPILLARY BLOOD GLUCOSE                    RADIOLOGY & ADDITIONAL TESTS:    Imaging Personally Reviewed:    Consultant(s) Notes Reviewed:      Care Discussed with Consultants/Other Providers: lactate 10

## 2025-05-02 ENCOUNTER — RX RENEWAL (OUTPATIENT)
Age: 65
End: 2025-05-02

## 2025-08-25 ENCOUNTER — RX RENEWAL (OUTPATIENT)
Age: 65
End: 2025-08-25

## 2025-09-15 ENCOUNTER — RX RENEWAL (OUTPATIENT)
Age: 65
End: 2025-09-15